# Patient Record
Sex: FEMALE | Race: WHITE | NOT HISPANIC OR LATINO | Employment: OTHER | URBAN - NONMETROPOLITAN AREA
[De-identification: names, ages, dates, MRNs, and addresses within clinical notes are randomized per-mention and may not be internally consistent; named-entity substitution may affect disease eponyms.]

---

## 2017-01-13 ENCOUNTER — TELEPHONE (OUTPATIENT)
Dept: GASTROENTEROLOGY | Facility: CLINIC | Age: 47
End: 2017-01-13

## 2017-01-13 RX ORDER — ONDANSETRON 4 MG/1
4 TABLET, FILM COATED ORAL EVERY 8 HOURS PRN
Qty: 60 TABLET | Refills: 1 | OUTPATIENT
Start: 2017-01-13 | End: 2017-02-12

## 2017-01-19 ENCOUNTER — APPOINTMENT (OUTPATIENT)
Dept: LAB | Facility: HOSPITAL | Age: 47
End: 2017-01-19
Attending: INTERNAL MEDICINE

## 2017-01-19 ENCOUNTER — TRANSCRIBE ORDERS (OUTPATIENT)
Dept: ADMINISTRATIVE | Facility: HOSPITAL | Age: 47
End: 2017-01-19

## 2017-01-19 DIAGNOSIS — N39.0 URINARY TRACT INFECTION, SITE NOT SPECIFIED: Primary | ICD-10-CM

## 2017-01-19 LAB
BACTERIA UR QL AUTO: ABNORMAL /HPF
BILIRUB UR QL STRIP: NEGATIVE
CLARITY UR: CLEAR
COLOR UR: YELLOW
GLUCOSE UR STRIP-MCNC: NEGATIVE MG/DL
HGB UR QL STRIP.AUTO: NEGATIVE
HYALINE CASTS UR QL AUTO: ABNORMAL /LPF
KETONES UR QL STRIP: NEGATIVE
LEUKOCYTE ESTERASE UR QL STRIP.AUTO: ABNORMAL
NITRITE UR QL STRIP: NEGATIVE
PH UR STRIP.AUTO: <=5 [PH] (ref 5–8)
PROT UR QL STRIP: NEGATIVE
RBC # UR: ABNORMAL /HPF
REF LAB TEST METHOD: ABNORMAL
SP GR UR STRIP: 1.01 (ref 1–1.03)
SQUAMOUS #/AREA URNS HPF: ABNORMAL /HPF
UROBILINOGEN UR QL STRIP: ABNORMAL
WBC UR QL AUTO: ABNORMAL /HPF

## 2017-01-19 PROCEDURE — 81001 URINALYSIS AUTO W/SCOPE: CPT | Performed by: INTERNAL MEDICINE

## 2017-01-19 PROCEDURE — 87086 URINE CULTURE/COLONY COUNT: CPT | Performed by: INTERNAL MEDICINE

## 2017-01-21 LAB — BACTERIA SPEC AEROBE CULT: NORMAL

## 2017-01-23 ENCOUNTER — TELEPHONE (OUTPATIENT)
Dept: PRIMARY CARE CLINIC | Age: 47
End: 2017-01-23

## 2017-01-23 ENCOUNTER — HOSPITAL ENCOUNTER (OUTPATIENT)
Dept: ULTRASOUND IMAGING | Age: 47
Discharge: HOME | End: 2017-01-23
Payer: MEDICARE

## 2017-01-23 ENCOUNTER — OFFICE VISIT (OUTPATIENT)
Dept: PRIMARY CARE CLINIC | Age: 47
End: 2017-01-23
Payer: MEDICARE

## 2017-01-23 ENCOUNTER — HOSPITAL ENCOUNTER (OUTPATIENT)
Dept: GENERAL RADIOLOGY | Age: 47
Discharge: HOME | End: 2017-01-23
Payer: MEDICARE

## 2017-01-23 VITALS
OXYGEN SATURATION: 98 % | DIASTOLIC BLOOD PRESSURE: 62 MMHG | SYSTOLIC BLOOD PRESSURE: 126 MMHG | WEIGHT: 116.8 LBS | HEIGHT: 67 IN | BODY MASS INDEX: 18.33 KG/M2 | HEART RATE: 113 BPM | RESPIRATION RATE: 18 BRPM

## 2017-01-23 DIAGNOSIS — Z76.89 ENCOUNTER TO ESTABLISH CARE WITH NEW DOCTOR: ICD-10-CM

## 2017-01-23 DIAGNOSIS — N13.30 HYDRONEPHROSIS, UNSPECIFIED HYDRONEPHROSIS TYPE: ICD-10-CM

## 2017-01-23 DIAGNOSIS — I67.5 MOYA MOYA DISEASE: ICD-10-CM

## 2017-01-23 DIAGNOSIS — I10 ESSENTIAL HYPERTENSION: ICD-10-CM

## 2017-01-23 DIAGNOSIS — M54.50 ACUTE BILATERAL LOW BACK PAIN WITHOUT SCIATICA: ICD-10-CM

## 2017-01-23 DIAGNOSIS — M54.50 ACUTE BILATERAL LOW BACK PAIN WITHOUT SCIATICA: Primary | ICD-10-CM

## 2017-01-23 PROCEDURE — 4004F PT TOBACCO SCREEN RCVD TLK: CPT | Performed by: INTERNAL MEDICINE

## 2017-01-23 PROCEDURE — 76770 US EXAM ABDO BACK WALL COMP: CPT

## 2017-01-23 PROCEDURE — G8419 CALC BMI OUT NRM PARAM NOF/U: HCPCS | Performed by: INTERNAL MEDICINE

## 2017-01-23 PROCEDURE — 99204 OFFICE O/P NEW MOD 45 MIN: CPT | Performed by: INTERNAL MEDICINE

## 2017-01-23 PROCEDURE — G8484 FLU IMMUNIZE NO ADMIN: HCPCS | Performed by: INTERNAL MEDICINE

## 2017-01-23 PROCEDURE — G8427 DOCREV CUR MEDS BY ELIG CLIN: HCPCS | Performed by: INTERNAL MEDICINE

## 2017-01-23 PROCEDURE — 72110 X-RAY EXAM L-2 SPINE 4/>VWS: CPT

## 2017-01-23 RX ORDER — METOPROLOL SUCCINATE 100 MG/1
100 TABLET, EXTENDED RELEASE ORAL NIGHTLY
Qty: 30 TABLET | Refills: 3 | Status: SHIPPED | OUTPATIENT
Start: 2017-01-23 | End: 2017-06-08 | Stop reason: SDUPTHER

## 2017-01-23 RX ORDER — FLUTICASONE PROPIONATE 50 MCG
1 SPRAY, SUSPENSION (ML) NASAL DAILY
Qty: 1 BOTTLE | Refills: 5 | Status: SHIPPED | OUTPATIENT
Start: 2017-01-23 | End: 2018-02-08 | Stop reason: SDUPTHER

## 2017-01-23 RX ORDER — MELOXICAM 7.5 MG/1
7.5 TABLET ORAL DAILY
Qty: 30 TABLET | Refills: 3 | Status: SHIPPED | OUTPATIENT
Start: 2017-01-23 | End: 2017-02-01 | Stop reason: ALTCHOICE

## 2017-01-23 RX ORDER — FLUTICASONE PROPIONATE 50 MCG
1 SPRAY, SUSPENSION (ML) NASAL DAILY
COMMUNITY
End: 2017-01-23 | Stop reason: SDUPTHER

## 2017-01-23 RX ORDER — ONDANSETRON 4 MG/1
4 TABLET, FILM COATED ORAL EVERY 8 HOURS PRN
Qty: 90 TABLET | Refills: 1 | Status: SHIPPED | OUTPATIENT
Start: 2017-01-23 | End: 2017-04-24 | Stop reason: SDUPTHER

## 2017-01-23 ASSESSMENT — ENCOUNTER SYMPTOMS
NAUSEA: 0
DIARRHEA: 0
VOMITING: 0
CONSTIPATION: 1
COUGH: 0
BACK PAIN: 0
SHORTNESS OF BREATH: 1

## 2017-01-24 ENCOUNTER — OFFICE VISIT (OUTPATIENT)
Dept: GASTROENTEROLOGY | Facility: CLINIC | Age: 47
End: 2017-01-24

## 2017-01-24 VITALS
HEIGHT: 67 IN | SYSTOLIC BLOOD PRESSURE: 102 MMHG | TEMPERATURE: 96.3 F | DIASTOLIC BLOOD PRESSURE: 78 MMHG | WEIGHT: 115 LBS | BODY MASS INDEX: 18.05 KG/M2 | HEART RATE: 76 BPM

## 2017-01-24 DIAGNOSIS — D50.8 OTHER IRON DEFICIENCY ANEMIA: Primary | ICD-10-CM

## 2017-01-24 PROCEDURE — 99213 OFFICE O/P EST LOW 20 MIN: CPT | Performed by: INTERNAL MEDICINE

## 2017-01-24 RX ORDER — CIPROFLOXACIN 500 MG/1
TABLET, FILM COATED ORAL
Refills: 0 | COMMUNITY
Start: 2016-12-21 | End: 2017-02-28 | Stop reason: HOSPADM

## 2017-01-24 RX ORDER — METRONIDAZOLE 500 MG/1
TABLET ORAL
COMMUNITY
Start: 2016-08-12 | End: 2017-01-24 | Stop reason: ALTCHOICE

## 2017-01-24 RX ORDER — FLUTICASONE PROPIONATE 50 MCG
2 SPRAY, SUSPENSION (ML) NASAL DAILY
COMMUNITY
Start: 2017-01-23 | End: 2019-06-13 | Stop reason: SDUPTHER

## 2017-01-24 RX ORDER — AMITRIPTYLINE HYDROCHLORIDE 100 MG/1
100 TABLET, FILM COATED ORAL NIGHTLY
Refills: 2 | COMMUNITY
Start: 2017-01-13 | End: 2019-06-18 | Stop reason: SDUPTHER

## 2017-01-24 RX ORDER — MELOXICAM 7.5 MG/1
TABLET ORAL
COMMUNITY
Start: 2017-01-23 | End: 2017-01-24 | Stop reason: ALTCHOICE

## 2017-01-24 RX ORDER — ONDANSETRON 4 MG/1
TABLET, FILM COATED ORAL EVERY 8 HOURS
COMMUNITY
Start: 2017-01-23 | End: 2017-02-28 | Stop reason: HOSPADM

## 2017-01-24 RX ORDER — ONDANSETRON 4 MG/1
TABLET, FILM COATED ORAL EVERY 6 HOURS PRN
COMMUNITY
Start: 2017-01-23 | End: 2018-01-19 | Stop reason: ALTCHOICE

## 2017-01-24 RX ORDER — METOPROLOL SUCCINATE 100 MG/1
100 TABLET, EXTENDED RELEASE ORAL NIGHTLY
COMMUNITY
Start: 2017-01-23 | End: 2019-03-06 | Stop reason: SDUPTHER

## 2017-01-24 RX ORDER — FLUTICASONE PROPIONATE 50 MCG
SPRAY, SUSPENSION (ML) NASAL
COMMUNITY
Start: 2017-01-23 | End: 2017-02-28 | Stop reason: HOSPADM

## 2017-01-24 NOTE — LETTER
January 24, 2017     Tong Toussaint MD  [address] [facility] [address]  [facility] [address]  [facility] [address]    Patient: Kamryn Oliva   YOB: 1970   Date of Visit: 1/24/2017       Dear Dr. Breana MD:    Thank you for referring Kamryn Oliva to me for evaluation. Below is a copy of my consult note.    If you have questions, please do not hesitate to call me. I look forward to following Kamryn along with you.         Sincerely,        Camilo Hanson MD        CC: No Recipients    [facility]    Chief Complaint   Patient presents with   • Follow-up     hosp stay       Subjective     HPI    Kamryn Oliva is a 47 y.o. female who presents with a chief complaint of  anemia.  She was in the hospital recently with apparently urosepsis.  Creatinine was above 3 and she had nausea and vomiting.  She brought in a picture of her emesis and appears to be bile-colored fluid in the commode with possible slight red discoloration.  He states she had been vomiting several times and then had some blood in her emesis.  While in the hospital she is found to be anemic.  She was admitted with a hemoglobin approximately 10 and decreased a low 7.5 with vigorous hydration.  She was transfused 2 units and at time of discharge her hemoglobin was 10.  Reviewing her labs over the last 1 year her hemoglobin has run below normal.  Dr. Rashida Nice did see her in the hospital for elevated platelets in the 800s as well as the anemia.  He felt she had iron deficiency anemia from blood loss.  The patient did have a negative endoscopy in October negative colonoscopy in November 2016.    Currently she feels better.  She admits that she is under stress and does have much of an appetite which she attributes to the stress.  She did follow up with nephrology yesterday who checked labs at [facility] and an ultrasound of her kidneys of which results we do not have.  Her last creatinine I saw her was back down to 0.6.  She still has  intermittent abdominal discomfort with her IBS.         Past Medical History   Diagnosis Date   • Constipation    • Depression    • H/O gastric ulcer    • Headache    • Hypertension    • IBS (irritable bowel syndrome)    • Insomnia    • Maravilla maravilla disease    • Rapid weight loss    • SBO (small bowel obstruction)    • Volvulosis          Current Outpatient Prescriptions:   •  amitriptyline (ELAVIL) 50 MG tablet, , Disp: , Rfl: 2  •  amLODIPine (NORVASC) 10 MG tablet, Take 10 mg by mouth daily., Disp: , Rfl:   •  aspirin 81 MG EC tablet, Take 81 mg by mouth daily., Disp: , Rfl:   •  aspirin-acetaminophen-caffeine (EXCEDRIN MIGRAINE) 250-250-65 MG per tablet, Take 1 tablet by mouth every 6 (six) hours as needed for headaches., Disp: , Rfl:   •  dicyclomine (BENTYL) 10 MG capsule, Take 1 capsule by mouth 4 (Four) Times a Day As Needed (prn abdominal cramps)., Disp: 120 capsule, Rfl: 11  •  fluticasone (FLONASE) 50 MCG/ACT nasal spray, , Disp: , Rfl:   •  metoprolol succinate XL (TOPROL-XL) 100 MG 24 hr tablet, , Disp: , Rfl:   •  ondansetron (ZOFRAN) 4 MG tablet, , Disp: , Rfl:   •  pantoprazole (PROTONIX) 40 MG EC tablet, Take 1 tablet by mouth 2 (Two) Times a Day., Disp: 60 tablet, Rfl: 1  •  zonisamide (ZONEGRAN) 100 MG capsule, Take 200 mg by mouth Daily., Disp: , Rfl:   •  amitriptyline (ELAVIL) 10 MG tablet, Take 50 mg by mouth Every Night., Disp: , Rfl:   •  ciprofloxacin (CIPRO) 500 MG tablet, , Disp: , Rfl: 0  •  fluticasone (FLONASE) 50 MCG/ACT nasal spray, 1 spray by Nasal route daily, Disp: , Rfl:   •  fluticasone (FLOVENT DISKUS) 50 MCG/BLIST diskus inhaler, Inhale 1 puff 2 (two) times a day., Disp: , Rfl:   •  lisinopril (PRINIVIL,ZESTRIL) 40 MG tablet, Take 40 mg by mouth daily., Disp: , Rfl:   •  ondansetron (ZOFRAN) 4 MG tablet, Every 8 (Eight) Hours., Disp: , Rfl:   •  ondansetron ODT (ZOFRAN-ODT) 4 MG disintegrating tablet, Take 4 mg by mouth every 8 (eight) hours as needed for nausea or vomiting.,  Disp: , Rfl:     Allergies   Allergen Reactions   • Anectine [Succinylcholine Chloride] Other (See Comments)     Hard to wake up   • Stadol [Butorphanol] Hives       Social History     Social History   • Marital status:      Spouse name: N/A   • Number of children: N/A   • Years of education: N/A     Occupational History   • Not on file.     Social History Main Topics   • Smoking status: Former Smoker     Packs/day: 1.00     Types: Cigarettes     Quit date: 3/10/2016   • Smokeless tobacco: Never Used   • Alcohol use No   • Drug use: No   • Sexual activity: Not on file     Other Topics Concern   • Not on file     Social History Narrative       Family History   Problem Relation Age of Onset   • Cancer Maternal Grandfather    • Colon cancer Neg Hx    • Colon polyps Neg Hx        Review of Systems   Constitutional: Negative for chills and fever.   Cardiovascular: Negative for chest pain and palpitations.   Gastrointestinal: Negative for abdominal distention, abdominal pain, anal bleeding, blood in stool, constipation, diarrhea, nausea, rectal pain and vomiting.         Objective     Vitals:    01/24/17 1354   BP: 102/78   Pulse: 76   Temp: 96.3 °F (35.7 °C)       Physical Exam   Constitutional: She appears well-developed and well-nourished.   Eyes: Conjunctivae and EOM are normal.   Cardiovascular: Normal rate, regular rhythm and normal heart sounds.    Pulmonary/Chest: Effort normal and breath sounds normal.   Abdominal: Soft. Bowel sounds are normal. She exhibits no distension and no mass. There is no tenderness. There is no rebound and no guarding.   Musculoskeletal: She exhibits no edema.   Skin: Skin is warm.         Assessment/Plan      Kamryn was seen today for follow-up.    Diagnoses and all orders for this visit:    Other iron deficiency anemia  -     Case request      She has no current signs of active bleeding.  She has seen bright red blood per rectum but she has known internal hemorrhoids.  It sounds  like the emesis she had and of which she showed me the picture of May of been secondary to Linh-Louis tear associated with intractable nausea and vomiting.  I do think it is reasonable to repeat an upper endoscopy examination to reassess and she is in agreement to do this.    We did talk about pursuing M2A capsule endoscopy.  She has a history of small bowel obstruction requiring surgical intervention within a year and rehospitalization for small bowel obstruction this year.  This would be a contraindication to capsule endoscopy just we will not pursue this.  She does not want to take any chances with swallowing the camera due to the difficulty she is having with past bowel obstruction and I agree.  Risks are greater than benefit.  Continue ongoing management by primary care provider and other specialists.     EMR Dragon/transcription disclaimer:  Much of this encounter note is electronic transcription/translation of spoken language to printed text.  The electronic translation of spoken language may be erroneous, or at times, nonsensical words or phrases may be inadvertently transcribed.  Although I have reviewed the note for such errors, some may still exist.    Camilo Hanson MD  4:59 PM  01/24/17

## 2017-01-24 NOTE — PROGRESS NOTES
[facility]    Chief Complaint   Patient presents with   • Follow-up     hosp stay       Subjective     HPI    Kamryn Oliva is a 47 y.o. female who presents with a chief complaint of  anemia.  She was in the hospital recently with apparently urosepsis.  Creatinine was above 3 and she had nausea and vomiting.  She brought in a picture of her emesis and appears to be bile-colored fluid in the commode with possible slight red discoloration.  He states she had been vomiting several times and then had some blood in her emesis.  While in the hospital she is found to be anemic.  She was admitted with a hemoglobin approximately 10 and decreased a low 7.5 with vigorous hydration.  She was transfused 2 units and at time of discharge her hemoglobin was 10.  Reviewing her labs over the last 1 year her hemoglobin has run below normal.  Dr. Rashida Nice did see her in the hospital for elevated platelets in the 800s as well as the anemia.  He felt she had iron deficiency anemia from blood loss.  The patient did have a negative endoscopy in October negative colonoscopy in November 2016.    Currently she feels better.  She admits that she is under stress and does have much of an appetite which she attributes to the stress.  She did follow up with nephrology yesterday who checked labs at [facility] and an ultrasound of her kidneys of which results we do not have.  Her last creatinine I saw her was back down to 0.6.  She still has intermittent abdominal discomfort with her IBS.         Past Medical History   Diagnosis Date   • Constipation    • Depression    • H/O gastric ulcer    • Headache    • Hypertension    • IBS (irritable bowel syndrome)    • Insomnia    • Maravilla maravilla disease    • Rapid weight loss    • SBO (small bowel obstruction)    • Volvulosis          Current Outpatient Prescriptions:   •  amitriptyline (ELAVIL) 50 MG tablet, , Disp: , Rfl: 2  •  amLODIPine (NORVASC) 10 MG tablet, Take 10 mg by mouth  daily., Disp: , Rfl:   •  aspirin 81 MG EC tablet, Take 81 mg by mouth daily., Disp: , Rfl:   •  aspirin-acetaminophen-caffeine (EXCEDRIN MIGRAINE) 250-250-65 MG per tablet, Take 1 tablet by mouth every 6 (six) hours as needed for headaches., Disp: , Rfl:   •  dicyclomine (BENTYL) 10 MG capsule, Take 1 capsule by mouth 4 (Four) Times a Day As Needed (prn abdominal cramps)., Disp: 120 capsule, Rfl: 11  •  fluticasone (FLONASE) 50 MCG/ACT nasal spray, , Disp: , Rfl:   •  metoprolol succinate XL (TOPROL-XL) 100 MG 24 hr tablet, , Disp: , Rfl:   •  ondansetron (ZOFRAN) 4 MG tablet, , Disp: , Rfl:   •  pantoprazole (PROTONIX) 40 MG EC tablet, Take 1 tablet by mouth 2 (Two) Times a Day., Disp: 60 tablet, Rfl: 1  •  zonisamide (ZONEGRAN) 100 MG capsule, Take 200 mg by mouth Daily., Disp: , Rfl:   •  amitriptyline (ELAVIL) 10 MG tablet, Take 50 mg by mouth Every Night., Disp: , Rfl:   •  ciprofloxacin (CIPRO) 500 MG tablet, , Disp: , Rfl: 0  •  fluticasone (FLONASE) 50 MCG/ACT nasal spray, 1 spray by Nasal route daily, Disp: , Rfl:   •  fluticasone (FLOVENT DISKUS) 50 MCG/BLIST diskus inhaler, Inhale 1 puff 2 (two) times a day., Disp: , Rfl:   •  lisinopril (PRINIVIL,ZESTRIL) 40 MG tablet, Take 40 mg by mouth daily., Disp: , Rfl:   •  ondansetron (ZOFRAN) 4 MG tablet, Every 8 (Eight) Hours., Disp: , Rfl:   •  ondansetron ODT (ZOFRAN-ODT) 4 MG disintegrating tablet, Take 4 mg by mouth every 8 (eight) hours as needed for nausea or vomiting., Disp: , Rfl:     Allergies   Allergen Reactions   • Anectine [Succinylcholine Chloride] Other (See Comments)     Hard to wake up   • Stadol [Butorphanol] Hives       Social History     Social History   • Marital status:      Spouse name: N/A   • Number of children: N/A   • Years of education: N/A     Occupational History   • Not on file.     Social History Main Topics   • Smoking status: Former Smoker     Packs/day: 1.00     Types: Cigarettes     Quit date: 3/10/2016   • Smokeless  tobacco: Never Used   • Alcohol use No   • Drug use: No   • Sexual activity: Not on file     Other Topics Concern   • Not on file     Social History Narrative       Family History   Problem Relation Age of Onset   • Cancer Maternal Grandfather    • Colon cancer Neg Hx    • Colon polyps Neg Hx        Review of Systems   Constitutional: Negative for chills and fever.   Cardiovascular: Negative for chest pain and palpitations.   Gastrointestinal: Negative for abdominal distention, abdominal pain, anal bleeding, blood in stool, constipation, diarrhea, nausea, rectal pain and vomiting.         Objective     Vitals:    01/24/17 1354   BP: 102/78   Pulse: 76   Temp: 96.3 °F (35.7 °C)       Physical Exam   Constitutional: She appears well-developed and well-nourished.   Eyes: Conjunctivae and EOM are normal.   Cardiovascular: Normal rate, regular rhythm and normal heart sounds.    Pulmonary/Chest: Effort normal and breath sounds normal.   Abdominal: Soft. Bowel sounds are normal. She exhibits no distension and no mass. There is no tenderness. There is no rebound and no guarding.   Musculoskeletal: She exhibits no edema.   Skin: Skin is warm.         Assessment/Plan      Kamryn was seen today for follow-up.    Diagnoses and all orders for this visit:    Other iron deficiency anemia  -     Case request      She has no current signs of active bleeding.  She has seen bright red blood per rectum but she has known internal hemorrhoids.  It sounds like the emesis she had and of which she showed me the picture of May of been secondary to Linh-Louis tear associated with intractable nausea and vomiting.  I do think it is reasonable to repeat an upper endoscopy examination to reassess and she is in agreement to do this.    We did talk about pursuing M2A capsule endoscopy.  She has a history of small bowel obstruction requiring surgical intervention within a year and rehospitalization for small bowel obstruction this year.  This would  be a contraindication to capsule endoscopy just we will not pursue this.  She does not want to take any chances with swallowing the camera due to the difficulty she is having with past bowel obstruction and I agree.  Risks are greater than benefit.  Continue ongoing management by primary care provider and other specialists.     EMR Dragon/transcription disclaimer:  Much of this encounter note is electronic transcription/translation of spoken language to printed text.  The electronic translation of spoken language may be erroneous, or at times, nonsensical words or phrases may be inadvertently transcribed.  Although I have reviewed the note for such errors, some may still exist.    Camilo Hanson MD  4:59 PM  01/24/17

## 2017-01-24 NOTE — MR AVS SNAPSHOT
Kamryn RODRIGUEZ Hazel   1/24/2017 1:45 PM   Office Visit    Dept Phone:  [phone]   Encounter #:  [id number]    Provider:  Camilo Hanson MD   Department:  [facility]                Your Full Care Plan              Today's Medication Changes          These changes are accurate as of: 1/24/17  2:33 PM.  If you have any questions, ask your nurse or doctor.               Stop taking medication(s)listed here:     meloxicam 7.5 MG tablet   Commonly known as:  MOBIC   Stopped by:  Camilo Hanson MD           metroNIDAZOLE 500 MG tablet   Commonly known as:  FLAGYL   Stopped by:  Camilo Hanson MD                      Your Updated Medication List          This list is accurate as of: 1/24/17  2:33 PM.  Always use your most recent med list.                * amitriptyline 50 MG tablet   Commonly known as:  ELAVIL       * amitriptyline 10 MG tablet   Commonly known as:  ELAVIL       amLODIPine 10 MG tablet   Commonly known as:  NORVASC       aspirin 81 MG EC tablet       aspirin-acetaminophen-caffeine 250-250-65 MG per tablet   Commonly known as:  EXCEDRIN MIGRAINE       ciprofloxacin 500 MG tablet   Commonly known as:  CIPRO       dicyclomine 10 MG capsule   Commonly known as:  BENTYL   Take 1 capsule by mouth 4 (Four) Times a Day As Needed (prn abdominal cramps).       * fluticasone 50 MCG/ACT nasal spray   Commonly known as:  FLONASE       * fluticasone 50 MCG/ACT nasal spray   Commonly known as:  FLONASE       fluticasone 50 MCG/BLIST diskus inhaler   Commonly known as:  FLOVENT DISKUS       lisinopril 40 MG tablet   Commonly known as:  PRINIVIL,ZESTRIL       metoprolol succinate  MG 24 hr tablet   Commonly known as:  TOPROL-XL       * ondansetron 4 MG tablet   Commonly known as:  ZOFRAN       * ondansetron 4 MG tablet   Commonly known as:  ZOFRAN       ondansetron ODT 4 MG disintegrating tablet   Commonly known as:  ZOFRAN-ODT       pantoprazole 40  "MG EC tablet   Commonly known as:  PROTONIX   Take 1 tablet by mouth 2 (Two) Times a Day.       zonisamide 100 MG capsule   Commonly known as:  ZONEGRAN       * Notice:  This list has 6 medication(s) that are the same as other medications prescribed for you. Read the directions carefully, and ask your doctor or other care provider to review them with you.            We Performed the Following     Case request       You Were Diagnosed With        Codes Comments    Other iron deficiency anemia    -  Primary ICD-10-CM: D50.8       Instructions     None    Patient Instructions History      Upcoming Appointments     Visit Type Date Time Department    OFFICE VISIT 1/24/2017  1:45 PM [facility]      MyChart Signup     Our records indicate that you have declined [employer] signup. If you would like to sign up for [employer], please email [email or web address] or call [phone] to obtain an activation code.             Other Info from Your Visit           Allergies     Anectine [Succinylcholine Chloride] Allergy Other (See Comments)    Hard to wake up    Stadol [Butorphanol] Allergy Hives      Reason for Visit     Follow-up hosp stay      Vital Signs     Blood Pressure Pulse Temperature Height Weight Body Mass Index    102/78 (BP Location: Left arm, Patient Position: Sitting, Cuff Size: Adult) 76 96.3 °F (35.7 °C) 67\" (170.2 cm) 115 lb (52.2 kg) 18.01 kg/m2    Smoking Status                   Former Smoker           Problems and Diagnoses Noted     Iron deficiency anemia    -  Primary        "

## 2017-01-24 NOTE — LETTER
January 24, 2017     Lambert Faust DO  [address] [place] [address] [address]  [place] [address]    Patient: Kamryn Oliva   YOB: 1970   Date of Visit: 1/24/2017       Dear Dr. Govind DO:    Thank you for referring Kamryn Oliva to me for evaluation. Below are the relevant portions of my assessment and plan of care.         Kamryn was seen today for follow-up.    Diagnoses and all orders for this visit:    Other iron deficiency anemia  -     Case request      She has no current signs of active bleeding.  She has seen bright red blood per rectum but she has known internal hemorrhoids.  It sounds like the emesis she had and of which she showed me the picture of May of been secondary to Linh-Louis tear associated with intractable nausea and vomiting.  I do think it is reasonable to repeat an upper endoscopy examination to reassess and she is in agreement to do this.    We did talk about pursuing M2A capsule endoscopy.  She has a history of small bowel obstruction requiring surgical intervention within a year and rehospitalization for small bowel obstruction this year.  This would be a contraindication to capsule endoscopy just we will not pursue this.  She does not want to take any chances with swallowing the camera due to the difficulty she is having with past bowel obstruction and I agree.  Risks are greater than benefit.  Continue ongoing management by primary care provider and other specialists.     EMR Dragon/transcription disclaimer:  Much of this encounter note is electronic transcription/translation of spoken language to printed text.  The electronic translation of spoken language may be erroneous, or at times, nonsensical words or phrases may be inadvertently transcribed.  Although I have reviewed the note for such errors, some may still exist.    Camilo Hanson MD  4:59 PM  01/24/17                If you have questions, please do not hesitate to call me. I look forward to following Kamryn  along with you.         Sincerely,        Camilo Hanson MD        CC: No Recipients

## 2017-01-25 ENCOUNTER — ANESTHESIA EVENT (OUTPATIENT)
Dept: GASTROENTEROLOGY | Facility: HOSPITAL | Age: 47
End: 2017-01-25

## 2017-01-25 ENCOUNTER — HOSPITAL ENCOUNTER (OUTPATIENT)
Facility: HOSPITAL | Age: 47
Setting detail: HOSPITAL OUTPATIENT SURGERY
Discharge: HOME | End: 2017-01-25
Attending: INTERNAL MEDICINE | Admitting: INTERNAL MEDICINE

## 2017-01-25 ENCOUNTER — ANESTHESIA (OUTPATIENT)
Dept: GASTROENTEROLOGY | Facility: HOSPITAL | Age: 47
End: 2017-01-25

## 2017-01-25 VITALS
SYSTOLIC BLOOD PRESSURE: 96 MMHG | BODY MASS INDEX: 18.05 KG/M2 | WEIGHT: 115 LBS | RESPIRATION RATE: 14 BRPM | HEIGHT: 67 IN | OXYGEN SATURATION: 100 % | DIASTOLIC BLOOD PRESSURE: 58 MMHG | HEART RATE: 58 BPM | TEMPERATURE: 97.3 F

## 2017-01-25 DIAGNOSIS — D50.8 OTHER IRON DEFICIENCY ANEMIA: ICD-10-CM

## 2017-01-25 PROCEDURE — 88305 TISSUE EXAM BY PATHOLOGIST: CPT | Performed by: INTERNAL MEDICINE

## 2017-01-25 PROCEDURE — 25010000002 PROPOFOL 10 MG/ML EMULSION: Performed by: NURSE ANESTHETIST, CERTIFIED REGISTERED

## 2017-01-25 RX ORDER — PROPOFOL 10 MG/ML
VIAL (ML) INTRAVENOUS AS NEEDED
Status: DISCONTINUED | OUTPATIENT
Start: 2017-01-25 | End: 2017-01-25 | Stop reason: SURG

## 2017-01-25 RX ORDER — MIDAZOLAM HYDROCHLORIDE 1 MG/ML
2 INJECTION INTRAMUSCULAR; INTRAVENOUS
Status: DISCONTINUED | OUTPATIENT
Start: 2017-01-25 | End: 2017-01-25 | Stop reason: HOSPADM

## 2017-01-25 RX ORDER — SODIUM CHLORIDE 0.9 % (FLUSH) 0.9 %
1-10 SYRINGE (ML) INJECTION AS NEEDED
Status: DISCONTINUED | OUTPATIENT
Start: 2017-01-25 | End: 2017-01-25 | Stop reason: HOSPADM

## 2017-01-25 RX ORDER — MIDAZOLAM HYDROCHLORIDE 1 MG/ML
1 INJECTION INTRAMUSCULAR; INTRAVENOUS
Status: DISCONTINUED | OUTPATIENT
Start: 2017-01-25 | End: 2017-01-25 | Stop reason: HOSPADM

## 2017-01-25 RX ORDER — SODIUM CHLORIDE, SODIUM LACTATE, POTASSIUM CHLORIDE, CALCIUM CHLORIDE 600; 310; 30; 20 MG/100ML; MG/100ML; MG/100ML; MG/100ML
100 INJECTION, SOLUTION INTRAVENOUS CONTINUOUS
Status: DISCONTINUED | OUTPATIENT
Start: 2017-01-25 | End: 2017-01-25 | Stop reason: HOSPADM

## 2017-01-25 RX ORDER — SODIUM CHLORIDE 9 MG/ML
100 INJECTION, SOLUTION INTRAVENOUS CONTINUOUS
Status: DISCONTINUED | OUTPATIENT
Start: 2017-01-25 | End: 2017-01-25 | Stop reason: HOSPADM

## 2017-01-25 RX ORDER — ONDANSETRON 2 MG/ML
4 INJECTION INTRAMUSCULAR; INTRAVENOUS ONCE AS NEEDED
Status: DISCONTINUED | OUTPATIENT
Start: 2017-01-25 | End: 2017-01-25 | Stop reason: HOSPADM

## 2017-01-25 RX ORDER — LIDOCAINE HYDROCHLORIDE 20 MG/ML
INJECTION, SOLUTION INFILTRATION; PERINEURAL AS NEEDED
Status: DISCONTINUED | OUTPATIENT
Start: 2017-01-25 | End: 2017-01-25 | Stop reason: SURG

## 2017-01-25 RX ADMIN — PROPOFOL 100 MG: 10 INJECTION, EMULSION INTRAVENOUS at 09:42

## 2017-01-25 RX ADMIN — SODIUM CHLORIDE 100 ML/HR: 9 INJECTION, SOLUTION INTRAVENOUS at 09:09

## 2017-01-25 RX ADMIN — LIDOCAINE HYDROCHLORIDE 60 MG: 20 INJECTION, SOLUTION INFILTRATION; PERINEURAL at 09:42

## 2017-01-25 NOTE — PLAN OF CARE
Problem: GI Endoscopy (Adult)  Goal: Signs and Symptoms of Listed Potential Problems Will be Absent or Manageable (GI Endoscopy)  Outcome: Outcome(s) achieved Date Met:  01/25/17 01/25/17 0958   GI Endoscopy   Problems Assessed (GI Endoscopy) all   Problems Present (GI Endoscopy) none

## 2017-01-25 NOTE — PLAN OF CARE
Problem: GI Endoscopy (Adult)  Goal: Signs and Symptoms of Listed Potential Problems Will be Absent or Manageable (GI Endoscopy)  Outcome: Ongoing (interventions implemented as appropriate)    01/25/17 0940   GI Endoscopy   Problems Assessed (GI Endoscopy) all   Problems Present (GI Endoscopy) none

## 2017-01-25 NOTE — IP AVS SNAPSHOT
AFTER VISIT SUMMARY             Kamryn Oliva           About your hospitalization     You were admitted on:  January 25, 2017 You last received care in the:  [facility] ENDOSCOPY       Procedures & Surgeries      Procedure(s) (LRB):  ESOPHAGOGASTRODUODENOSCOPY (N/A)     1/25/2017     Surgeon(s):  Camilo Hanson MD  -------------------      Medications    If you or your caregiver advised us that you are currently taking a medication and that medication is marked below as “Resume”, this simply indicates that we have reviewed those medications to make sure our new therapy recommendations do not interfere.  If you have concerns about medications other than those new ones which we are prescribing today, please consult the physician who prescribed them (or your primary physician).  Our review of your home medications is not meant to indicate that we are directing their use.             Your Medications      CONTINUE taking these medications     amitriptyline 50 MG tablet   Commonly known as:  ELAVIL           amLODIPine 10 MG tablet   Take 10 mg by mouth daily.   Commonly known as:  NORVASC           aspirin 81 MG EC tablet   Take 81 mg by mouth daily.           aspirin-acetaminophen-caffeine 250-250-65 MG per tablet   Take 1 tablet by mouth every 6 (six) hours as needed for headaches.   Commonly known as:  EXCEDRIN MIGRAINE           ciprofloxacin 500 MG tablet   Commonly known as:  CIPRO           dicyclomine 10 MG capsule   Take 1 capsule by mouth 4 (Four) Times a Day As Needed (prn abdominal cramps).   Commonly known as:  BENTYL           fluticasone 50 MCG/ACT nasal spray   Commonly known as:  FLONASE           fluticasone 50 MCG/ACT nasal spray   1 spray by Nasal route daily   Commonly known as:  FLONASE           fluticasone 50 MCG/BLIST diskus inhaler   Inhale 1 puff 2 (two) times a day.   Commonly known as:  FLOVENT DISKUS           lisinopril 40 MG tablet   Take 40 mg by mouth daily.      Commonly known as:  PRINIVIL,ZESTRIL           metoprolol succinate  MG 24 hr tablet   Commonly known as:  TOPROL-XL           ondansetron 4 MG tablet   Commonly known as:  ZOFRAN           ondansetron 4 MG tablet   Every 8 (Eight) Hours.   Commonly known as:  ZOFRAN           ondansetron ODT 4 MG disintegrating tablet   Take 4 mg by mouth every 8 (eight) hours as needed for nausea or vomiting.   Commonly known as:  ZOFRAN-ODT           pantoprazole 40 MG EC tablet   Take 1 tablet by mouth 2 (Two) Times a Day.   Commonly known as:  PROTONIX           zonisamide 100 MG capsule   Take 200 mg by mouth Daily.   Commonly known as:  ZONEGRAN                      Your Medications      Your Medication List           Morning Noon Evening Bedtime As Needed    amitriptyline 50 MG tablet   Commonly known as:  ELAVIL                                amLODIPine 10 MG tablet   Take 10 mg by mouth daily.   Commonly known as:  NORVASC                                aspirin 81 MG EC tablet   Take 81 mg by mouth daily.                                aspirin-acetaminophen-caffeine 250-250-65 MG per tablet   Take 1 tablet by mouth every 6 (six) hours as needed for headaches.   Commonly known as:  EXCEDRIN MIGRAINE                                ciprofloxacin 500 MG tablet   Commonly known as:  CIPRO                                dicyclomine 10 MG capsule   Take 1 capsule by mouth 4 (Four) Times a Day As Needed (prn abdominal cramps).   Commonly known as:  BENTYL                                * fluticasone 50 MCG/ACT nasal spray   Commonly known as:  FLONASE                                * fluticasone 50 MCG/ACT nasal spray   1 spray by Nasal route daily   Commonly known as:  FLONASE                                fluticasone 50 MCG/BLIST diskus inhaler   Inhale 1 puff 2 (two) times a day.   Commonly known as:  FLOVENT DISKUS                                lisinopril 40 MG tablet   Take 40 mg by mouth daily.   Commonly known as:   PRINIVIL,ZESTRIL                                metoprolol succinate  MG 24 hr tablet   Commonly known as:  TOPROL-XL                                * ondansetron 4 MG tablet   Commonly known as:  ZOFRAN                                * ondansetron 4 MG tablet   Every 8 (Eight) Hours.   Commonly known as:  ZOFRAN                                ondansetron ODT 4 MG disintegrating tablet   Take 4 mg by mouth every 8 (eight) hours as needed for nausea or vomiting.   Commonly known as:  ZOFRAN-ODT                                pantoprazole 40 MG EC tablet   Take 1 tablet by mouth 2 (Two) Times a Day.   Commonly known as:  PROTONIX                                zonisamide 100 MG capsule   Take 200 mg by mouth Daily.   Commonly known as:  ZONEGRAN                                * Notice:  This list has 4 medication(s) that are the same as other medications prescribed for you. Read the directions carefully, and ask your doctor or other care provider to review them with you.             Instructions for After Discharge        Diet Instructions     Diet:       Diet Texture / Consistency:  Regular             Discharge References/Attachments     CONSCIOUS SEDATION, ADULT, CARE AFTER (ENGLISH)    ESOPHAGOGASTRODUODENOSCOPY, CARE AFTER (ENGLISH)       Follow-ups for After Discharge        Follow-up Information     Follow up with Camilo Hanson MD Follow up today.    Specialty:  Gastroenterology    Why:  RETURN TO GI OFFICE AS NEEDED    Contact information:    [facility] [id number] [phone]        Referrals and Follow-ups to Schedule     Notify Physician or Go To The ED For the Following Conditions    As directed    Abdominal pain, chest pain, severe back pain, vomiting, fever, rectal bleeding or black bowel movements.              MyChart Signup     Our records indicate that you have declined [facility] MyChart signup. If you would like to sign up for "[employer]hart, please email  [email or web address] or call [phone] to obtain an activation code.         Summary of Your Hospitalization        Reason for Hospitalization     Your primary diagnosis was:  Not on File      Care Providers     Provider Service Role Specialty    Camilo Hanson MD -- Attending Provider Gastroenterology    Camilo Hanson MD -- Surgeon  Gastroenterology      Your Allergies  Date Reviewed: 1/25/2017    Allergen Reactions    Anectine (Succinylcholine Chloride) Other (See Comments)    Hard to wake up         Stadol (Butorphanol) Hives      Pending Labs     Order Current Status    Tissue Exam Collected (01/25/17 0946)      Patient Belongings Returned     Document Return of Belongings Flowsheet     Were the patient bedside belongings sent home?   Yes   Belongings Retrieved from Security & Sent Home   Yes    Belongings Sent to Safe   --   Medications Retrieved from Pharmacy & Sent Home   N/A              More Information      Moderate Conscious Sedation, Adult, Care After  Refer to this sheet in the next few weeks. These instructions provide you with information on caring for yourself after your procedure. Your health care provider may also give you more specific instructions. Your treatment has been planned according to current medical practices, but problems sometimes occur. Call your health care provider if you have any problems or questions after your procedure.  WHAT TO EXPECT AFTER THE PROCEDURE   After your procedure:  · You may feel sleepy, clumsy, and have poor balance for several hours.  · Vomiting may occur if you eat too soon after the procedure.  HOME CARE INSTRUCTIONS  · Do not participate in any activities where you could become injured for at least 24 hours. Do not:    Drive.    Swim.    Ride a bicycle.    Operate heavy machinery.    Cook.    Use power tools.    Climb ladders.    Work from a high place.  · Do not make important decisions or sign legal documents until you are improved.  · If  you vomit, drink water, juice, or soup when you can drink without vomiting. Make sure you have little or no nausea before eating solid foods.  · Only take over-the-counter or prescription medicines for pain, discomfort, or fever as directed by your health care provider.  · Make sure you and your family fully understand everything about the medicines given to you, including what side effects may occur.  · You should not drink alcohol, take sleeping pills, or take medicines that cause drowsiness for at least 24 hours.  · If you smoke, do not smoke without supervision.  · If you are feeling better, you may resume normal activities 24 hours after you were sedated.  · Keep all appointments with your health care provider.  SEEK MEDICAL CARE IF:  · Your skin is pale or bluish in color.  · You continue to feel nauseous or vomit.  · Your pain is getting worse and is not helped by medicine.  · You have bleeding or swelling.  · You are still sleepy or feeling clumsy after 24 hours.  SEEK IMMEDIATE MEDICAL CARE IF:  · You develop a rash.  · You have difficulty breathing.  · You develop any type of allergic problem.  · You have a fever.  MAKE SURE YOU:  · Understand these instructions.  · Will watch your condition.  · Will get help right away if you are not doing well or get worse.     This information is not intended to replace advice given to you by your health care provider. Make sure you discuss any questions you have with your health care provider.     Document Released: 10/08/2014 Document Revised: 01/08/2016 Document Reviewed: 10/08/2014  [employer] Interactive Patient Education ©2016 [employer]          Esophagogastroduodenoscopy, Care After  Refer to this sheet in the next few weeks. These instructions provide you with information about caring for yourself after your procedure. Your health care provider may also give you more specific instructions. Your treatment has been planned according to current medical practices,  but problems sometimes occur. Call your health care provider if you have any problems or questions after your procedure.  WHAT TO EXPECT AFTER THE PROCEDURE  After your procedure, it is typical to feel:  · Soreness in your throat.  · Pain with swallowing.  · Sick to your stomach (nauseous).  · Bloated.  · Dizzy.  · Fatigued.  HOME CARE INSTRUCTIONS  · Do not eat or drink anything until the numbing medicine (local anesthetic) has worn off and your gag reflex has returned. You will know that the local anesthetic has worn off when you can swallow comfortably.  · Do not drive or operate machinery until directed by your health care provider.  · Take medicines only as directed by your health care provider.  SEEK MEDICAL CARE IF:   · You cannot stop coughing.  · You are not urinating at all or less than usual.  SEEK IMMEDIATE MEDICAL CARE IF:  · You have difficulty swallowing.  · You cannot eat or drink.  · You have worsening throat or chest pain.  · You have dizziness or lightheadedness or you faint.  · You have nausea or vomiting.  · You have chills.  · You have a fever.  · You have severe abdominal pain.  · You have black, tarry, or bloody stools.     This information is not intended to replace advice given to you by your health care provider. Make sure you discuss any questions you have with your health care provider.     Document Released: 12/04/2013 Document Revised: 01/08/2016 Document Reviewed: 12/04/2013  [employer] Interactive Patient Education ©2016 [employer]            SYMPTOMS OF A STROKE    Call 911 or have someone take you to the Emergency Department if you have any of the following:    · Sudden numbness or weakness of your face, arm or leg especially on one side of the body  · Sudden confusion, diffiiculty speaking or trouble understanding   · Changes in your vision or loss of sight in one eye  · Sudden severe headache with no known cause  · sudden dizziness, trouble walking, loss of balance or  coordination    It is important to seek emergency care right away if you have further stroke symptoms. If you get emergency help quickly, the powerful clot-dissolving medicines can reduce the disabilities caused by a stroke.     For more information:    American Stroke Association  5-338-5-STROKE  www.strokeassociation.org           IF YOU SMOKE OR USE TOBACCO PLEASE READ THE FOLLOWING:    Why is smoking bad for me?  Smoking increases the risk of heart disease, lung disease, vascular disease, stroke, and cancer.     If you smoke, STOP!    If you would like more information on quitting smoking, please visit the [employer] website: [email or web address]   This link will provide additional resources including the QUIT line and the Beat the Pack support groups.     For more information:    American Cancer Society  (591) 241-1048    American Heart Association  1-221.972.1222               YOU ARE THE MOST IMPORTANT FACTOR IN YOUR RECOVERY.     Follow all instructions carefully.     I have reviewed my discharge instructions with my nurse, including the following information, if applicable:     Information about my illness and diagnosis   Follow up appointments (including lab draws)   Wound Care   Equipment Needs   Medications (new and continuing) along with side effects   Preventative information such as vaccines and smoking cessations   Diet   Pain   I know when to contact my Doctor's office or seek emergency care      I want my nurse to describe the side effects of my medications: YES NO   If the answer is no, I understand the side effects of my medications: YES NO   My nurse described the side effects of my medications in a way that I could understand: YES NO   I have taken my personal belongings and my own medications with me at discharge: YES NO            I have received this information and my questions have been answered. I have discussed any concerns I see with this  plan with the nurse or physician. I understand these instructions.    Signature of Patient or Responsible Person: _____________________________________    Date: _________________  Time: __________________    Signature of Healthcare Provider: _______________________________________  Date: _________________  Time: __________________

## 2017-01-25 NOTE — PLAN OF CARE
Problem: Patient Care Overview (Adult)  Goal: Plan of Care Review  Outcome: Outcome(s) achieved Date Met:  01/25/17 01/25/17 0958   Coping/Psychosocial Response Interventions   Plan Of Care Reviewed With patient   Patient Care Overview   Progress improving   Outcome Evaluation   Outcome Summary/Follow up Plan DISCHARGE CRITERIA MET

## 2017-01-25 NOTE — PLAN OF CARE
Problem: Patient Care Overview (Adult)  Goal: Discharge Needs Assessment  Outcome: Outcome(s) achieved Date Met:  01/25/17 01/25/17 0955   Discharge Needs Assessment   Concerns To Be Addressed no discharge needs identified   Equipment Needed After Discharge none   Discharge Disposition home or self-care   Current Health   Anticipated Changes Related to Illness none   Self-Care   Equipment Currently Used at Home none   Living Environment   Transportation Available car

## 2017-01-25 NOTE — H&P (VIEW-ONLY)
[facility] Gastroenterology    Chief Complaint   Patient presents with   • Follow-up     hosp stay       Subjective     HPI    Kamryn Oliva is a 47 y.o. female who presents with a chief complaint of  anemia.  She was in the hospital recently with apparently urosepsis.  Creatinine was above 3 and she had nausea and vomiting.  She brought in a picture of her emesis and appears to be bile-colored fluid in the commode with possible slight red discoloration.  He states she had been vomiting several times and then had some blood in her emesis.  While in the hospital she is found to be anemic.  She was admitted with a hemoglobin approximately 10 and decreased a low 7.5 with vigorous hydration.  She was transfused 2 units and at time of discharge her hemoglobin was 10.  Reviewing her labs over the last 1 year her hemoglobin has run below normal.  Dr. Rashida Nice did see her in the hospital for elevated platelets in the 800s as well as the anemia.  He felt she had iron deficiency anemia from blood loss.  The patient did have a negative endoscopy in October negative colonoscopy in November 2016.    Currently she feels better.  She admits that she is under stress and does have much of an appetite which she attributes to the stress.  She did follow up with nephrology yesterday who checked labs at [facility] and an ultrasound of her kidneys of which results we do not have.  Her last creatinine I saw her was back down to 0.6.  She still has intermittent abdominal discomfort with her IBS.         Past Medical History   Diagnosis Date   • Constipation    • Depression    • H/O gastric ulcer    • Headache    • Hypertension    • IBS (irritable bowel syndrome)    • Insomnia    • Maravilla maravilla disease    • Rapid weight loss    • SBO (small bowel obstruction)    • Volvulosis          Current Outpatient Prescriptions:   •  amitriptyline (ELAVIL) 50 MG tablet, , Disp: , Rfl: 2  •  amLODIPine (NORVASC) 10 MG tablet, Take 10 mg by mouth  daily., Disp: , Rfl:   •  aspirin 81 MG EC tablet, Take 81 mg by mouth daily., Disp: , Rfl:   •  aspirin-acetaminophen-caffeine (EXCEDRIN MIGRAINE) 250-250-65 MG per tablet, Take 1 tablet by mouth every 6 (six) hours as needed for headaches., Disp: , Rfl:   •  dicyclomine (BENTYL) 10 MG capsule, Take 1 capsule by mouth 4 (Four) Times a Day As Needed (prn abdominal cramps)., Disp: 120 capsule, Rfl: 11  •  fluticasone (FLONASE) 50 MCG/ACT nasal spray, , Disp: , Rfl:   •  metoprolol succinate XL (TOPROL-XL) 100 MG 24 hr tablet, , Disp: , Rfl:   •  ondansetron (ZOFRAN) 4 MG tablet, , Disp: , Rfl:   •  pantoprazole (PROTONIX) 40 MG EC tablet, Take 1 tablet by mouth 2 (Two) Times a Day., Disp: 60 tablet, Rfl: 1  •  zonisamide (ZONEGRAN) 100 MG capsule, Take 200 mg by mouth Daily., Disp: , Rfl:   •  amitriptyline (ELAVIL) 10 MG tablet, Take 50 mg by mouth Every Night., Disp: , Rfl:   •  ciprofloxacin (CIPRO) 500 MG tablet, , Disp: , Rfl: 0  •  fluticasone (FLONASE) 50 MCG/ACT nasal spray, 1 spray by Nasal route daily, Disp: , Rfl:   •  fluticasone (FLOVENT DISKUS) 50 MCG/BLIST diskus inhaler, Inhale 1 puff 2 (two) times a day., Disp: , Rfl:   •  lisinopril (PRINIVIL,ZESTRIL) 40 MG tablet, Take 40 mg by mouth daily., Disp: , Rfl:   •  ondansetron (ZOFRAN) 4 MG tablet, Every 8 (Eight) Hours., Disp: , Rfl:   •  ondansetron ODT (ZOFRAN-ODT) 4 MG disintegrating tablet, Take 4 mg by mouth every 8 (eight) hours as needed for nausea or vomiting., Disp: , Rfl:     Allergies   Allergen Reactions   • Anectine [Succinylcholine Chloride] Other (See Comments)     Hard to wake up   • Stadol [Butorphanol] Hives       Social History     Social History   • Marital status:      Spouse name: N/A   • Number of children: N/A   • Years of education: N/A     Occupational History   • Not on file.     Social History Main Topics   • Smoking status: Former Smoker     Packs/day: 1.00     Types: Cigarettes     Quit date: 3/10/2016   • Smokeless  tobacco: Never Used   • Alcohol use No   • Drug use: No   • Sexual activity: Not on file     Other Topics Concern   • Not on file     Social History Narrative       Family History   Problem Relation Age of Onset   • Cancer Maternal Grandfather    • Colon cancer Neg Hx    • Colon polyps Neg Hx        Review of Systems   Constitutional: Negative for chills and fever.   Cardiovascular: Negative for chest pain and palpitations.   Gastrointestinal: Negative for abdominal distention, abdominal pain, anal bleeding, blood in stool, constipation, diarrhea, nausea, rectal pain and vomiting.         Objective     Vitals:    01/24/17 1354   BP: 102/78   Pulse: 76   Temp: 96.3 °F (35.7 °C)       Physical Exam   Constitutional: She appears well-developed and well-nourished.   Eyes: Conjunctivae and EOM are normal.   Cardiovascular: Normal rate, regular rhythm and normal heart sounds.    Pulmonary/Chest: Effort normal and breath sounds normal.   Abdominal: Soft. Bowel sounds are normal. She exhibits no distension and no mass. There is no tenderness. There is no rebound and no guarding.   Musculoskeletal: She exhibits no edema.   Skin: Skin is warm.         Assessment/Plan      Kamryn was seen today for follow-up.    Diagnoses and all orders for this visit:    Other iron deficiency anemia  -     Case request      She has no current signs of active bleeding.  She has seen bright red blood per rectum but she has known internal hemorrhoids.  It sounds like the emesis she had and of which she showed me the picture of May of been secondary to Linh-Louis tear associated with intractable nausea and vomiting.  I do think it is reasonable to repeat an upper endoscopy examination to reassess and she is in agreement to do this.    We did talk about pursuing M2A capsule endoscopy.  She has a history of small bowel obstruction requiring surgical intervention within a year and rehospitalization for small bowel obstruction this year.  This would  be a contraindication to capsule endoscopy just we will not pursue this.  She does not want to take any chances with swallowing the camera due to the difficulty she is having with past bowel obstruction and I agree.  Risks are greater than benefit.  Continue ongoing management by primary care provider and other specialists.     EMR Dragon/transcription disclaimer:  Much of this encounter note is electronic transcription/translation of spoken language to printed text.  The electronic translation of spoken language may be erroneous, or at times, nonsensical words or phrases may be inadvertently transcribed.  Although I have reviewed the note for such errors, some may still exist.    Camilo Hanson MD  4:59 PM  01/24/17

## 2017-01-25 NOTE — IP AVS SNAPSHOT
RAAD PRINCE   Facility: [facility] ([place])   SA: [facility]    MRN:  [id number]   PT#:     Report:  [id number] - Summary of Care Document           Basic Information     Date Of Birth Sex Race Ethnicity Preferred Language Preferred Written Language    1970 Female White or  Not  or  English English      Allergies as of 1/25/2017  Reviewed On: 1/25/2017 By: Imani Metcalf RN       Noted Reaction Type Reactions    Anectine [Succinylcholine Chloride] 02/04/2013   Allergy Other (See Comments)    Hard to wake up    Stadol [Butorphanol] 02/04/2013   Allergy Hives      Current Medications Are     amitriptyline (ELAVIL) 50 MG tablet     amLODIPine (NORVASC) 10 MG tablet Take 10 mg by mouth daily.    aspirin 81 MG EC tablet Take 81 mg by mouth daily.    aspirin-acetaminophen-caffeine (EXCEDRIN MIGRAINE) 250-250-65 MG per tablet Take 1 tablet by mouth every 6 (six) hours as needed for headaches.    ciprofloxacin (CIPRO) 500 MG tablet     dicyclomine (BENTYL) 10 MG capsule Take 1 capsule by mouth 4 (Four) Times a Day As Needed (prn abdominal cramps).    fluticasone (FLONASE) 50 MCG/ACT nasal spray     fluticasone (FLONASE) 50 MCG/ACT nasal spray 1 spray by Nasal route daily    fluticasone (FLOVENT DISKUS) 50 MCG/BLIST diskus inhaler Inhale 1 puff 2 (two) times a day.    lisinopril (PRINIVIL,ZESTRIL) 40 MG tablet Take 40 mg by mouth daily.    metoprolol succinate XL (TOPROL-XL) 100 MG 24 hr tablet     ondansetron (ZOFRAN) 4 MG tablet     ondansetron (ZOFRAN) 4 MG tablet Every 8 (Eight) Hours.    ondansetron ODT (ZOFRAN-ODT) 4 MG disintegrating tablet Take 4 mg by mouth every 8 (eight) hours as needed for nausea or vomiting.    pantoprazole (PROTONIX) 40 MG EC tablet Take 1 tablet by mouth 2 (Two) Times a Day.    zonisamide (ZONEGRAN) 100 MG capsule Take 200 mg by mouth Daily.    amitriptyline (ELAVIL) 10 MG tablet (Discontinued) Take 50 mg by mouth Every Night.       "  Current Immunizations     No immunizations on file.      Problem List as of 1/25/2017  Date Reviewed: 1/25/2017             Codes Priority Class Noted - Resolved    UTI (urinary tract infection) ICD-10-CM: N39.0  ICD-9-CM: 599.0   12/26/2016 - Present    KRISHNA (acute kidney injury) ICD-10-CM: N17.9  ICD-9-CM: 584.9   12/26/2016 - Present    Hydronephrosis ICD-10-CM: N13.30  ICD-9-CM: 591   12/26/2016 - Present    Renal failure ICD-10-CM: N19  ICD-9-CM: 586   12/26/2016 - Present    Sepsis ICD-10-CM: A41.9  ICD-9-CM: 038.9, 995.91   12/27/2016 - Present      Diagnoses        Codes Comments    Other iron deficiency anemia     ICD-10-CM: D50.8       Lab and Imaging Results     None      Vitals     BP Pulse Temp Resp Ht Wt    97/61 65 97.3 °F (36.3 °C) (Temporal Artery ) 10 67\" (170.2 cm) 115 lb (52.2 kg)    LMP SpO2 BMI          (LMP Unknown) 100% 18.01 kg/m2      Vitals History      Social History     Category History    Smoking Tobacco Use Former Smoker; Start date: ; Quit date: 3/10/2016; 1 pack/day; Types: Cigarettes    Smokeless Tobacco Use Never Used    Tobacco Comment     Alcohol Use No    Drug Use No    Sexual Activity Defer    ADL Not Asked      Patient Care Team        Relationship Specialty Notifications Start End    Lambert Faust DO PCP - General Nephrology  1/24/17        Instructions for After Discharge        Follow-up Information     Follow up with Camilo Hanson MD Follow up today.    Specialty:  Gastroenterology    Why:  RETURN TO GI OFFICE AS NEEDED    Contact information:    [address] [facility] [address]  [facility] [id number] [phone]        Diet Instructions     Diet:       Diet Texture / Consistency:  Regular             Referrals and Follow-ups to Schedule     Notify Physician or Go To The ED For the Following Conditions    As directed    Abdominal pain, chest pain, severe back pain, vomiting, fever, rectal bleeding or black bowel movements.              "

## 2017-01-25 NOTE — ANESTHESIA PREPROCEDURE EVALUATION
Anesthesia Evaluation      History of anesthetic complications   Airway   Mallampati: II  TM distance: >3 FB  Neck ROM: full  no difficulty expected  Dental - normal exam     Pulmonary - negative pulmonary ROS and normal exam   Cardiovascular - normal exam  Exercise tolerance: poor (<4 METS)  (+) hypertension poorly controlled, dysrhythmias PAC, Tachycardia,     Patient on routine beta blocker and Beta blocker given within 24 hours of surgery  Rhythm: regular  Rate: normal    Neuro/Psych  (+) neuromuscular disease, CVA, headaches, psychiatric history,    GI/Hepatic/Renal/Endo    (+)  chronic renal disease ARF,     Musculoskeletal (-) negative ROS    Abdominal  - normal exam   Substance History      OB/GYN negative ob/gyn ROS         Other - negative ROS      ROS/Med Hx Other: Hydronephrosis 12/26 from kidney blockage  Anectine prolong blockade                        Anesthesia Plan    ASA 3     general     intravenous induction   Anesthetic plan and risks discussed with patient.

## 2017-01-25 NOTE — PLAN OF CARE
Problem: Patient Care Overview (Adult)  Goal: Adult Individualization and Mutuality  Outcome: Outcome(s) achieved Date Met:  01/25/17

## 2017-01-25 NOTE — PLAN OF CARE
Problem: Patient Care Overview (Adult)  Goal: Plan of Care Review  Outcome: Ongoing (interventions implemented as appropriate)    01/25/17 0940   Coping/Psychosocial Response Interventions   Plan Of Care Reviewed With patient   Patient Care Overview   Progress progress toward functional goals as expected

## 2017-01-25 NOTE — ANESTHESIA POSTPROCEDURE EVALUATION
Patient: Kamryn Oliva    Procedure Summary     Date Anesthesia Start Anesthesia Stop Room / Location    01/25/17 0942 0951 [facility] ENDOSCOPY 4 / [facility] ENDOSCOPY       Procedure Diagnosis Surgeon Provider    ESOPHAGOGASTRODUODENOSCOPY (N/A Esophagus) Other iron deficiency anemia  (Other iron deficiency anemia [D50.8]) MD Rey Donahue CRNA          Anesthesia Type: general  Last vitals  BP      Temp      Pulse     Resp      SpO2        Post Anesthesia Care and Evaluation    Patient location during evaluation: PACU  Patient participation: complete - patient participated  Level of consciousness: awake and alert  Pain score: 0  Pain management: adequate  Airway patency: patent  Anesthetic complications: No anesthetic complications    Cardiovascular status: acceptable  Respiratory status: acceptable  Hydration status: acceptable

## 2017-01-26 LAB
CYTO UR: NORMAL
LAB AP CASE REPORT: NORMAL
LAB AP CLINICAL INFORMATION: NORMAL
Lab: NORMAL
PATH REPORT.FINAL DX SPEC: NORMAL
PATH REPORT.GROSS SPEC: NORMAL

## 2017-02-01 ENCOUNTER — OFFICE VISIT (OUTPATIENT)
Dept: NEUROLOGY | Age: 47
End: 2017-02-01
Payer: MEDICARE

## 2017-02-01 VITALS
SYSTOLIC BLOOD PRESSURE: 131 MMHG | WEIGHT: 121 LBS | HEART RATE: 64 BPM | BODY MASS INDEX: 18.99 KG/M2 | DIASTOLIC BLOOD PRESSURE: 88 MMHG | HEIGHT: 67 IN

## 2017-02-01 DIAGNOSIS — G43.719 INTRACTABLE CHRONIC MIGRAINE WITHOUT AURA AND WITHOUT STATUS MIGRAINOSUS: Primary | ICD-10-CM

## 2017-02-01 PROCEDURE — 4004F PT TOBACCO SCREEN RCVD TLK: CPT | Performed by: PSYCHIATRY & NEUROLOGY

## 2017-02-01 PROCEDURE — 64615 CHEMODENERV MUSC MIGRAINE: CPT | Performed by: PSYCHIATRY & NEUROLOGY

## 2017-02-01 RX ORDER — METOPROLOL SUCCINATE 100 MG/1
TABLET, EXTENDED RELEASE ORAL
COMMUNITY
Start: 2017-01-23 | End: 2017-02-01 | Stop reason: SDUPTHER

## 2017-02-01 RX ORDER — FLUTICASONE PROPIONATE 50 MCG
SPRAY, SUSPENSION (ML) NASAL
COMMUNITY
Start: 2017-01-23 | End: 2017-02-01 | Stop reason: SDUPTHER

## 2017-02-01 RX ORDER — ONDANSETRON 4 MG/1
TABLET, FILM COATED ORAL
COMMUNITY
Start: 2017-01-23 | End: 2017-02-01 | Stop reason: SDUPTHER

## 2017-02-01 RX ORDER — AMITRIPTYLINE HYDROCHLORIDE 50 MG/1
TABLET, FILM COATED ORAL
COMMUNITY
Start: 2017-01-13 | End: 2017-02-01 | Stop reason: SDUPTHER

## 2017-02-01 RX ORDER — CIPROFLOXACIN 500 MG/1
TABLET, FILM COATED ORAL
COMMUNITY
Start: 2016-12-21 | End: 2017-02-01 | Stop reason: ALTCHOICE

## 2017-02-13 ENCOUNTER — TELEPHONE (OUTPATIENT)
Dept: PRIMARY CARE CLINIC | Age: 47
End: 2017-02-13

## 2017-02-17 RX ORDER — AMLODIPINE BESYLATE 10 MG/1
10 TABLET ORAL DAILY
Qty: 30 TABLET | Refills: 3 | Status: SHIPPED | OUTPATIENT
Start: 2017-02-17 | End: 2017-02-23 | Stop reason: ALTCHOICE

## 2017-02-23 ENCOUNTER — OFFICE VISIT (OUTPATIENT)
Dept: PRIMARY CARE CLINIC | Age: 47
End: 2017-02-23
Payer: MEDICARE

## 2017-02-23 ENCOUNTER — APPOINTMENT (OUTPATIENT)
Dept: CT IMAGING | Facility: HOSPITAL | Age: 47
End: 2017-02-23

## 2017-02-23 ENCOUNTER — HOSPITAL ENCOUNTER (INPATIENT)
Facility: HOSPITAL | Age: 47
LOS: 5 days | Discharge: HOME | End: 2017-02-28
Attending: EMERGENCY MEDICINE | Admitting: FAMILY MEDICINE

## 2017-02-23 VITALS
WEIGHT: 114 LBS | BODY MASS INDEX: 17.89 KG/M2 | SYSTOLIC BLOOD PRESSURE: 98 MMHG | OXYGEN SATURATION: 98 % | HEART RATE: 116 BPM | HEIGHT: 67 IN | RESPIRATION RATE: 20 BRPM | DIASTOLIC BLOOD PRESSURE: 64 MMHG

## 2017-02-23 DIAGNOSIS — N30.90 CYSTITIS: ICD-10-CM

## 2017-02-23 DIAGNOSIS — I10 ESSENTIAL HYPERTENSION: ICD-10-CM

## 2017-02-23 DIAGNOSIS — M54.50 ACUTE BILATERAL LOW BACK PAIN WITHOUT SCIATICA: ICD-10-CM

## 2017-02-23 DIAGNOSIS — I67.5 MOYA MOYA DISEASE: Primary | ICD-10-CM

## 2017-02-23 DIAGNOSIS — N17.9 AKI (ACUTE KIDNEY INJURY) (HCC): Primary | ICD-10-CM

## 2017-02-23 DIAGNOSIS — I10 ESSENTIAL HYPERTENSION: Chronic | ICD-10-CM

## 2017-02-23 DIAGNOSIS — R42 DIZZINESS: ICD-10-CM

## 2017-02-23 DIAGNOSIS — R77.8 ELEVATED TROPONIN: ICD-10-CM

## 2017-02-23 DIAGNOSIS — I67.5 MOYA MOYA DISEASE: ICD-10-CM

## 2017-02-23 LAB
ALBUMIN SERPL-MCNC: 5.2 G/DL (ref 3.5–5)
ALBUMIN SERPL-MCNC: 5.5 G/DL (ref 3.5–5.2)
ALBUMIN/GLOB SERPL: 1.7 G/DL (ref 1.1–2.5)
ALP BLD-CCNC: 79 U/L (ref 35–104)
ALP SERPL-CCNC: 77 U/L (ref 24–120)
ALT SERPL W P-5'-P-CCNC: 46 U/L (ref 0–54)
ALT SERPL-CCNC: 22 U/L (ref 5–33)
AMYLASE SERPL-CCNC: 69 U/L (ref 30–110)
ANION GAP SERPL CALCULATED.3IONS-SCNC: 16 MMOL/L (ref 4–13)
ANION GAP SERPL CALCULATED.3IONS-SCNC: 24 MMOL/L (ref 7–19)
AST SERPL-CCNC: 23 U/L (ref 5–32)
AST SERPL-CCNC: 29 U/L (ref 7–45)
BACTERIA UR QL AUTO: ABNORMAL /HPF
BACTERIA: ABNORMAL /HPF
BASOPHILS # BLD AUTO: 0.02 10*3/MM3 (ref 0–0.2)
BASOPHILS NFR BLD AUTO: 0.1 % (ref 0–2)
BILIRUB SERPL-MCNC: 0.5 MG/DL (ref 0.1–1)
BILIRUB SERPL-MCNC: <0.2 MG/DL (ref 0.2–1.2)
BILIRUB UR QL STRIP: NEGATIVE
BILIRUBIN URINE: NEGATIVE
BLOOD, URINE: NEGATIVE
BUN BLD-MCNC: 15 MG/DL (ref 5–21)
BUN BLDV-MCNC: 13 MG/DL (ref 6–20)
BUN/CREAT SERPL: 8 (ref 7–25)
CALCIUM SERPL-MCNC: 11.5 MG/DL (ref 8.6–10)
CALCIUM SPEC-SCNC: 11.6 MG/DL (ref 8.4–10.4)
CHLORIDE BLD-SCNC: 97 MMOL/L (ref 98–111)
CHLORIDE SERPL-SCNC: 101 MMOL/L (ref 98–110)
CLARITY UR: CLEAR
CLARITY: ABNORMAL
CO2 SERPL-SCNC: 24 MMOL/L (ref 24–31)
CO2: 23 MMOL/L (ref 22–29)
COLOR UR: YELLOW
COLOR: YELLOW
CREAT BLD-MCNC: 1.87 MG/DL (ref 0.5–1.4)
CREAT SERPL-MCNC: 1.7 MG/DL (ref 0.5–0.9)
CREATININE URINE: 172.5 MG/DL (ref 4.2–622)
D DIMER PPP FEU-MCNC: <0.22 MG/L (FEU) (ref 0–0.5)
DEPRECATED RDW RBC AUTO: 48.6 FL (ref 40–54)
EOSINOPHIL # BLD AUTO: 0.05 10*3/MM3 (ref 0–0.7)
EOSINOPHIL NFR BLD AUTO: 0.3 % (ref 0–4)
EPITHELIAL CELLS, UA: ABNORMAL /HPF
ERYTHROCYTE [DISTWIDTH] IN BLOOD BY AUTOMATED COUNT: 14.2 % (ref 12–15)
FLUAV AG NPH QL: NEGATIVE
FLUBV AG NPH QL IA: NEGATIVE
GFR NON-AFRICAN AMERICAN: 32
GFR SERPL CREATININE-BSD FRML MDRD: 29 ML/MIN/1.73
GLOBULIN UR ELPH-MCNC: 3.1 GM/DL
GLOBULIN: 3.1 G/DL
GLUCOSE BLD-MCNC: 109 MG/DL (ref 74–109)
GLUCOSE BLD-MCNC: 89 MG/DL (ref 70–100)
GLUCOSE UR STRIP-MCNC: NEGATIVE MG/DL
GLUCOSE URINE: NEGATIVE MG/DL
HCT VFR BLD AUTO: 41.7 % (ref 37–47)
HCT VFR BLD CALC: 43.4 % (ref 37–47)
HEMOGLOBIN: 14.1 G/DL (ref 12–16)
HGB BLD-MCNC: 13.8 G/DL (ref 12–16)
HGB UR QL STRIP.AUTO: NEGATIVE
HOLD SPECIMEN: NORMAL
HYALINE CASTS UR QL AUTO: ABNORMAL /LPF
IMM GRANULOCYTES # BLD: 0.05 10*3/MM3 (ref 0–0.03)
IMM GRANULOCYTES NFR BLD: 0.3 % (ref 0–5)
KETONES UR QL STRIP: NEGATIVE
KETONES, URINE: NEGATIVE MG/DL
LEUKOCYTE ESTERASE UR QL STRIP.AUTO: ABNORMAL
LEUKOCYTE ESTERASE, URINE: ABNORMAL
LIPASE SERPL-CCNC: 75 U/L (ref 23–203)
LYMPHOCYTES # BLD AUTO: 4.02 10*3/MM3 (ref 0.72–4.86)
LYMPHOCYTES NFR BLD AUTO: 27.3 % (ref 15–45)
MCH RBC QN AUTO: 30.8 PG (ref 27–31)
MCH RBC QN AUTO: 30.9 PG (ref 28–32)
MCHC RBC AUTO-ENTMCNC: 32.5 G/DL (ref 33–37)
MCHC RBC AUTO-ENTMCNC: 33.1 G/DL (ref 33–36)
MCV RBC AUTO: 93.3 FL (ref 82–98)
MCV RBC AUTO: 94.8 FL (ref 81–99)
MONOCYTES # BLD AUTO: 0.59 10*3/MM3 (ref 0.19–1.3)
MONOCYTES NFR BLD AUTO: 4 % (ref 4–12)
NEUTROPHILS # BLD AUTO: 9.99 10*3/MM3 (ref 1.87–8.4)
NEUTROPHILS NFR BLD AUTO: 68 % (ref 39–78)
NITRITE UR QL STRIP: NEGATIVE
NITRITE, URINE: NEGATIVE
NT-PROBNP SERPL-MCNC: 279 PG/ML (ref 0–450)
PDW BLD-RTO: 14.1 % (ref 11.5–14.5)
PH UA: 5.5
PH UR STRIP.AUTO: 5.5 [PH] (ref 5–8)
PLATELET # BLD AUTO: 376 10*3/MM3 (ref 130–400)
PLATELET # BLD: 432 K/UL (ref 130–400)
PMV BLD AUTO: 11 FL (ref 7.4–10.4)
PMV BLD AUTO: 11.2 FL (ref 6–12)
POTASSIUM BLD-SCNC: 4.1 MMOL/L (ref 3.5–5.3)
POTASSIUM SERPL-SCNC: 4.3 MMOL/L (ref 3.5–5)
PROT SERPL-MCNC: 8.3 G/DL (ref 6.3–8.7)
PROT UR QL STRIP: ABNORMAL
PROTEIN PROTEIN: 49 MG/DL (ref 15–45)
PROTEIN UA: 30 MG/DL
RBC # BLD AUTO: 4.47 10*6/MM3 (ref 4.2–5.4)
RBC # BLD: 4.58 M/UL (ref 4.2–5.4)
RBC # UR: ABNORMAL /HPF
RBC UA: ABNORMAL /HPF (ref 0–2)
REF LAB TEST METHOD: ABNORMAL
SODIUM BLD-SCNC: 141 MMOL/L (ref 135–145)
SODIUM BLD-SCNC: 144 MMOL/L (ref 136–145)
SP GR UR STRIP: 1.02 (ref 1–1.03)
SPECIFIC GRAVITY UA: 1.02
SQUAMOUS #/AREA URNS HPF: ABNORMAL /HPF
TOTAL PROTEIN: 8.6 G/DL (ref 6.6–8.7)
TROPONIN I SERPL-MCNC: 0.03 NG/ML (ref 0–0.07)
TROPONIN I SERPL-MCNC: 0.12 NG/ML (ref 0–0.07)
TROPONIN I SERPL-MCNC: 0.19 NG/ML (ref 0–0.07)
TSH SERPL DL<=0.05 MIU/L-ACNC: 4.1 UIU/ML (ref 0.27–4.2)
URIC ACID, SERUM: 6 MG/DL (ref 2.4–5.7)
UROBILINOGEN UR QL STRIP: ABNORMAL
UROBILINOGEN, URINE: 0.2 E.U./DL
WBC # BLD: 11.4 K/UL (ref 4.8–10.8)
WBC NRBC COR # BLD: 14.72 10*3/MM3 (ref 4.8–10.8)
WBC UA: ABNORMAL /HPF (ref 0–5)
WBC UR QL AUTO: ABNORMAL /HPF
WHOLE BLOOD HOLD SPECIMEN: NORMAL
WHOLE BLOOD HOLD SPECIMEN: NORMAL
YEAST: ABNORMAL /HPF

## 2017-02-23 PROCEDURE — 93010 ELECTROCARDIOGRAM REPORT: CPT | Performed by: INTERNAL MEDICINE

## 2017-02-23 PROCEDURE — 93005 ELECTROCARDIOGRAM TRACING: CPT

## 2017-02-23 PROCEDURE — 71250 CT THORAX DX C-: CPT

## 2017-02-23 PROCEDURE — 36415 COLL VENOUS BLD VENIPUNCTURE: CPT | Performed by: EMERGENCY MEDICINE

## 2017-02-23 PROCEDURE — 25010000002 HYDROMORPHONE PER 4 MG: Performed by: EMERGENCY MEDICINE

## 2017-02-23 PROCEDURE — 82150 ASSAY OF AMYLASE: CPT | Performed by: EMERGENCY MEDICINE

## 2017-02-23 PROCEDURE — 25010000002 ONDANSETRON PER 1 MG: Performed by: EMERGENCY MEDICINE

## 2017-02-23 PROCEDURE — G8419 CALC BMI OUT NRM PARAM NOF/U: HCPCS | Performed by: INTERNAL MEDICINE

## 2017-02-23 PROCEDURE — 93005 ELECTROCARDIOGRAM TRACING: CPT | Performed by: EMERGENCY MEDICINE

## 2017-02-23 PROCEDURE — 4004F PT TOBACCO SCREEN RCVD TLK: CPT | Performed by: INTERNAL MEDICINE

## 2017-02-23 PROCEDURE — G8484 FLU IMMUNIZE NO ADMIN: HCPCS | Performed by: INTERNAL MEDICINE

## 2017-02-23 PROCEDURE — 83690 ASSAY OF LIPASE: CPT | Performed by: EMERGENCY MEDICINE

## 2017-02-23 PROCEDURE — 84484 ASSAY OF TROPONIN QUANT: CPT

## 2017-02-23 PROCEDURE — 99214 OFFICE O/P EST MOD 30 MIN: CPT | Performed by: INTERNAL MEDICINE

## 2017-02-23 PROCEDURE — G8427 DOCREV CUR MEDS BY ELIG CLIN: HCPCS | Performed by: INTERNAL MEDICINE

## 2017-02-23 PROCEDURE — 25010000002 LORAZEPAM PER 2 MG: Performed by: FAMILY MEDICINE

## 2017-02-23 PROCEDURE — 85025 COMPLETE CBC W/AUTO DIFF WBC: CPT | Performed by: EMERGENCY MEDICINE

## 2017-02-23 PROCEDURE — 85379 FIBRIN DEGRADATION QUANT: CPT | Performed by: EMERGENCY MEDICINE

## 2017-02-23 PROCEDURE — 87804 INFLUENZA ASSAY W/OPTIC: CPT | Performed by: EMERGENCY MEDICINE

## 2017-02-23 PROCEDURE — 80053 COMPREHEN METABOLIC PANEL: CPT | Performed by: EMERGENCY MEDICINE

## 2017-02-23 PROCEDURE — 81001 URINALYSIS AUTO W/SCOPE: CPT | Performed by: EMERGENCY MEDICINE

## 2017-02-23 PROCEDURE — 87040 BLOOD CULTURE FOR BACTERIA: CPT | Performed by: EMERGENCY MEDICINE

## 2017-02-23 PROCEDURE — 70450 CT HEAD/BRAIN W/O DYE: CPT

## 2017-02-23 PROCEDURE — 99284 EMERGENCY DEPT VISIT MOD MDM: CPT

## 2017-02-23 PROCEDURE — 83880 ASSAY OF NATRIURETIC PEPTIDE: CPT | Performed by: EMERGENCY MEDICINE

## 2017-02-23 PROCEDURE — 25010000002 CEFTRIAXONE: Performed by: FAMILY MEDICINE

## 2017-02-23 RX ORDER — HYDROCODONE BITARTRATE AND ACETAMINOPHEN 5; 325 MG/1; MG/1
1 TABLET ORAL EVERY 6 HOURS PRN
Qty: 30 TABLET | Refills: 0 | Status: SHIPPED | OUTPATIENT
Start: 2017-02-23 | End: 2017-03-20 | Stop reason: SDUPTHER

## 2017-02-23 RX ORDER — TEMAZEPAM 7.5 MG/1
7.5 CAPSULE ORAL NIGHTLY PRN
Status: DISCONTINUED | OUTPATIENT
Start: 2017-02-23 | End: 2017-02-28 | Stop reason: HOSPADM

## 2017-02-23 RX ORDER — SODIUM CHLORIDE 9 MG/ML
125 INJECTION, SOLUTION INTRAVENOUS CONTINUOUS
Status: DISCONTINUED | OUTPATIENT
Start: 2017-02-23 | End: 2017-02-23

## 2017-02-23 RX ORDER — LORAZEPAM 2 MG/ML
1 INJECTION INTRAMUSCULAR ONCE
Status: COMPLETED | OUTPATIENT
Start: 2017-02-23 | End: 2017-02-23

## 2017-02-23 RX ORDER — HYDRALAZINE HYDROCHLORIDE 20 MG/ML
10 INJECTION INTRAMUSCULAR; INTRAVENOUS EVERY 4 HOURS PRN
Status: DISCONTINUED | OUTPATIENT
Start: 2017-02-23 | End: 2017-02-28 | Stop reason: HOSPADM

## 2017-02-23 RX ORDER — ONDANSETRON 2 MG/ML
4 INJECTION INTRAMUSCULAR; INTRAVENOUS ONCE
Status: COMPLETED | OUTPATIENT
Start: 2017-02-23 | End: 2017-02-23

## 2017-02-23 RX ORDER — SODIUM CHLORIDE 9 MG/ML
125 INJECTION, SOLUTION INTRAVENOUS CONTINUOUS
Status: DISCONTINUED | OUTPATIENT
Start: 2017-02-23 | End: 2017-02-26

## 2017-02-23 RX ADMIN — LORAZEPAM 1 MG: 2 INJECTION INTRAMUSCULAR; INTRAVENOUS at 19:37

## 2017-02-23 RX ADMIN — SODIUM CHLORIDE 125 ML/HR: 9 INJECTION, SOLUTION INTRAVENOUS at 14:16

## 2017-02-23 RX ADMIN — HYDROMORPHONE HYDROCHLORIDE 1 MG: 1 INJECTION, SOLUTION INTRAMUSCULAR; INTRAVENOUS; SUBCUTANEOUS at 14:16

## 2017-02-23 RX ADMIN — ONDANSETRON HYDROCHLORIDE 4 MG: 2 SOLUTION INTRAMUSCULAR; INTRAVENOUS at 14:18

## 2017-02-23 RX ADMIN — HYDROMORPHONE HYDROCHLORIDE 1 MG: 1 INJECTION, SOLUTION INTRAMUSCULAR; INTRAVENOUS; SUBCUTANEOUS at 17:10

## 2017-02-23 RX ADMIN — SODIUM CHLORIDE 125 ML/HR: 9 INJECTION, SOLUTION INTRAVENOUS at 19:42

## 2017-02-23 RX ADMIN — CEFTRIAXONE 1 G: 1 INJECTION, POWDER, FOR SOLUTION INTRAMUSCULAR; INTRAVENOUS at 21:34

## 2017-02-23 ASSESSMENT — ENCOUNTER SYMPTOMS
BACK PAIN: 0
COUGH: 0
VOMITING: 0
CONSTIPATION: 0
DIARRHEA: 0
SHORTNESS OF BREATH: 1
NAUSEA: 0

## 2017-02-24 ENCOUNTER — APPOINTMENT (OUTPATIENT)
Dept: CARDIOLOGY | Facility: HOSPITAL | Age: 47
End: 2017-02-24
Attending: INTERNAL MEDICINE

## 2017-02-24 ENCOUNTER — APPOINTMENT (OUTPATIENT)
Dept: ULTRASOUND IMAGING | Facility: HOSPITAL | Age: 47
End: 2017-02-24

## 2017-02-24 LAB
ANION GAP SERPL CALCULATED.3IONS-SCNC: 7 MMOL/L (ref 4–13)
BH CV ECHO MEAS - AO MAX PG (FULL): 2.1 MMHG
BH CV ECHO MEAS - AO MAX PG: 6 MMHG
BH CV ECHO MEAS - AO MEAN PG (FULL): 1 MMHG
BH CV ECHO MEAS - AO MEAN PG: 3 MMHG
BH CV ECHO MEAS - AO ROOT AREA (BSA CORRECTED): 1.8
BH CV ECHO MEAS - AO ROOT AREA: 6.8 CM^2
BH CV ECHO MEAS - AO ROOT DIAM: 3 CM
BH CV ECHO MEAS - AO V2 MAX: 122 CM/SEC
BH CV ECHO MEAS - AO V2 MEAN: 81.7 CM/SEC
BH CV ECHO MEAS - AO V2 VTI: 25 CM
BH CV ECHO MEAS - AVA(I,A): 2 CM^2
BH CV ECHO MEAS - AVA(I,D): 2 CM^2
BH CV ECHO MEAS - AVA(V,A): 2.3 CM^2
BH CV ECHO MEAS - AVA(V,D): 2.3 CM^2
BH CV ECHO MEAS - BSA(HAYCOCK): 1.6 M^2
BH CV ECHO MEAS - BSA: 1.6 M^2
BH CV ECHO MEAS - BZI_BMI: 17.3 KILOGRAMS/M^2
BH CV ECHO MEAS - BZI_METRIC_HEIGHT: 172.7 CM
BH CV ECHO MEAS - BZI_METRIC_WEIGHT: 51.7 KG
BH CV ECHO MEAS - CONTRAST EF 4CH: 60.3 ML/M^2
BH CV ECHO MEAS - EDV(CUBED): 47.4 ML
BH CV ECHO MEAS - EDV(MOD-SP4): 24 ML
BH CV ECHO MEAS - EDV(TEICH): 55.2 ML
BH CV ECHO MEAS - EF(CUBED): 62.5 %
BH CV ECHO MEAS - EF(MOD-SP4): 60.3 %
BH CV ECHO MEAS - EF(TEICH): 55 %
BH CV ECHO MEAS - ESV(CUBED): 17.8 ML
BH CV ECHO MEAS - ESV(MOD-SP4): 9.5 ML
BH CV ECHO MEAS - ESV(TEICH): 24.8 ML
BH CV ECHO MEAS - FS: 27.9 %
BH CV ECHO MEAS - IVS/LVPW: 0.85
BH CV ECHO MEAS - IVSD: 0.91 CM
BH CV ECHO MEAS - LA DIMENSION: 1.5 CM
BH CV ECHO MEAS - LA/AO: 0.51
BH CV ECHO MEAS - LAT PEAK E' VEL: 12.2 CM/SEC
BH CV ECHO MEAS - LV DIASTOLIC VOL/BSA (35-75): 14.9 ML/M^2
BH CV ECHO MEAS - LV MASS(C)D: 107 GRAMS
BH CV ECHO MEAS - LV MASS(C)DI: 66.5 GRAMS/M^2
BH CV ECHO MEAS - LV MAX PG: 3.9 MMHG
BH CV ECHO MEAS - LV MEAN PG: 2 MMHG
BH CV ECHO MEAS - LV SYSTOLIC VOL/BSA (12-30): 5.9 ML/M^2
BH CV ECHO MEAS - LV V1 MAX: 98.3 CM/SEC
BH CV ECHO MEAS - LV V1 MEAN: 60.4 CM/SEC
BH CV ECHO MEAS - LV V1 VTI: 17.6 CM
BH CV ECHO MEAS - LVIDD: 3.6 CM
BH CV ECHO MEAS - LVIDS: 2.6 CM
BH CV ECHO MEAS - LVLD AP4: 6 CM
BH CV ECHO MEAS - LVLS AP4: 4.7 CM
BH CV ECHO MEAS - LVOT AREA (M): 2.8 CM^2
BH CV ECHO MEAS - LVOT AREA: 2.8 CM^2
BH CV ECHO MEAS - LVOT DIAM: 1.9 CM
BH CV ECHO MEAS - LVPWD: 1.1 CM
BH CV ECHO MEAS - MV A MAX VEL: 84.4 CM/SEC
BH CV ECHO MEAS - MV DEC TIME: 0.12 SEC
BH CV ECHO MEAS - MV E MAX VEL: 98 CM/SEC
BH CV ECHO MEAS - MV E/A: 1.2
BH CV ECHO MEAS - RAP SYSTOLE: 10 MMHG
BH CV ECHO MEAS - RVSP: 32.3 MMHG
BH CV ECHO MEAS - SI(AO): 106.2 ML/M^2
BH CV ECHO MEAS - SI(CUBED): 18.4 ML/M^2
BH CV ECHO MEAS - SI(LVOT): 31 ML/M^2
BH CV ECHO MEAS - SI(MOD-SP4): 9 ML/M^2
BH CV ECHO MEAS - SI(TEICH): 18.8 ML/M^2
BH CV ECHO MEAS - SV(AO): 170.9 ML
BH CV ECHO MEAS - SV(CUBED): 29.7 ML
BH CV ECHO MEAS - SV(LVOT): 49.9 ML
BH CV ECHO MEAS - SV(MOD-SP4): 14.5 ML
BH CV ECHO MEAS - SV(TEICH): 30.3 ML
BH CV ECHO MEAS - TR MAX VEL: 236 CM/SEC
BH CV STRESS BP STAGE 1: NORMAL
BH CV STRESS BP STAGE 2: NORMAL
BH CV STRESS DURATION MIN STAGE 1: 3
BH CV STRESS DURATION MIN STAGE 2: 3
BH CV STRESS DURATION MIN STAGE 3: 2
BH CV STRESS DURATION SEC STAGE 1: 0
BH CV STRESS DURATION SEC STAGE 2: 0
BH CV STRESS DURATION SEC STAGE 3: 20
BH CV STRESS GRADE STAGE 1: 10
BH CV STRESS GRADE STAGE 2: 12
BH CV STRESS GRADE STAGE 3: 14
BH CV STRESS HR STAGE 1: 120
BH CV STRESS HR STAGE 2: 121
BH CV STRESS HR STAGE 3: 148
BH CV STRESS METS STAGE 1: 5
BH CV STRESS METS STAGE 2: 7.5
BH CV STRESS METS STAGE 3: 10
BH CV STRESS PROTOCOL 1: NORMAL
BH CV STRESS RECOVERY BP: NORMAL MMHG
BH CV STRESS RECOVERY HR: 83 BPM
BH CV STRESS SPEED STAGE 1: 1.7
BH CV STRESS SPEED STAGE 2: 2.5
BH CV STRESS SPEED STAGE 3: 3.4
BH CV STRESS STAGE 1: 1
BH CV STRESS STAGE 2: 2
BH CV STRESS STAGE 3: 3
BUN BLD-MCNC: 12 MG/DL (ref 5–21)
BUN/CREAT SERPL: 9.7 (ref 7–25)
CALCIUM SPEC-SCNC: 8.9 MG/DL (ref 8.4–10.4)
CHLORIDE SERPL-SCNC: 110 MMOL/L (ref 98–110)
CO2 SERPL-SCNC: 24 MMOL/L (ref 24–31)
CREAT BLD-MCNC: 1.24 MG/DL (ref 0.5–1.4)
D-LACTATE SERPL-SCNC: 0.6 MMOL/L (ref 0.5–2)
DEPRECATED RDW RBC AUTO: 45.4 FL (ref 40–54)
ERYTHROCYTE [DISTWIDTH] IN BLOOD BY AUTOMATED COUNT: 13.8 % (ref 12–15)
GFR SERPL CREATININE-BSD FRML MDRD: 47 ML/MIN/1.73
GLUCOSE BLD-MCNC: 81 MG/DL (ref 70–100)
HCT VFR BLD AUTO: 31.6 % (ref 37–47)
HGB BLD-MCNC: 10.3 G/DL (ref 12–16)
LEFT ATRIUM VOLUME INDEX: 11.9 ML/M2
LV EF 2D ECHO EST: 60 %
MAXIMAL PREDICTED HEART RATE: 174 BPM
MCH RBC QN AUTO: 30.6 PG (ref 28–32)
MCHC RBC AUTO-ENTMCNC: 32.6 G/DL (ref 33–36)
MCV RBC AUTO: 93.8 FL (ref 82–98)
PERCENT MAX PREDICTED HR: 85.06 %
PLATELET # BLD AUTO: 275 10*3/MM3 (ref 130–400)
PMV BLD AUTO: 10.2 FL (ref 6–12)
POTASSIUM BLD-SCNC: 4 MMOL/L (ref 3.5–5.3)
RBC # BLD AUTO: 3.37 10*6/MM3 (ref 4.2–5.4)
SODIUM BLD-SCNC: 141 MMOL/L (ref 135–145)
STRESS BASELINE BP: NORMAL MMHG
STRESS BASELINE HR: 85 BPM
STRESS PERCENT HR: 100 %
STRESS POST ESTIMATED WORKLOAD: 10 METS
STRESS POST EXERCISE DUR MIN: 8 MIN
STRESS POST EXERCISE DUR SEC: 20 SEC
STRESS POST PEAK BP: NORMAL MMHG
STRESS POST PEAK HR: 148 BPM
STRESS TARGET HR: 148 BPM
TROPONIN I SERPL-MCNC: 0.1 NG/ML (ref 0–0.03)
TROPONIN I SERPL-MCNC: 0.13 NG/ML (ref 0–0.03)
TROPONIN I SERPL-MCNC: 0.14 NG/ML (ref 0–0.03)
WBC NRBC COR # BLD: 6.65 10*3/MM3 (ref 4.8–10.8)

## 2017-02-24 PROCEDURE — 93306 TTE W/DOPPLER COMPLETE: CPT | Performed by: INTERNAL MEDICINE

## 2017-02-24 PROCEDURE — 87086 URINE CULTURE/COLONY COUNT: CPT | Performed by: INTERNAL MEDICINE

## 2017-02-24 PROCEDURE — 85027 COMPLETE CBC AUTOMATED: CPT | Performed by: INTERNAL MEDICINE

## 2017-02-24 PROCEDURE — 25010000002 HYDRALAZINE PER 20 MG: Performed by: INTERNAL MEDICINE

## 2017-02-24 PROCEDURE — 76775 US EXAM ABDO BACK WALL LIM: CPT

## 2017-02-24 PROCEDURE — 83605 ASSAY OF LACTIC ACID: CPT | Performed by: INTERNAL MEDICINE

## 2017-02-24 PROCEDURE — 93018 CV STRESS TEST I&R ONLY: CPT | Performed by: INTERNAL MEDICINE

## 2017-02-24 PROCEDURE — 93017 CV STRESS TEST TRACING ONLY: CPT

## 2017-02-24 PROCEDURE — 84484 ASSAY OF TROPONIN QUANT: CPT | Performed by: INTERNAL MEDICINE

## 2017-02-24 PROCEDURE — 93306 TTE W/DOPPLER COMPLETE: CPT

## 2017-02-24 PROCEDURE — 25010000002 CEFTRIAXONE: Performed by: INTERNAL MEDICINE

## 2017-02-24 PROCEDURE — 99222 1ST HOSP IP/OBS MODERATE 55: CPT | Performed by: UROLOGY

## 2017-02-24 PROCEDURE — 80048 BASIC METABOLIC PNL TOTAL CA: CPT | Performed by: INTERNAL MEDICINE

## 2017-02-24 RX ORDER — ALPRAZOLAM 0.5 MG/1
0.5 TABLET ORAL 3 TIMES DAILY PRN
Status: DISCONTINUED | OUTPATIENT
Start: 2017-02-24 | End: 2017-02-28 | Stop reason: HOSPADM

## 2017-02-24 RX ORDER — ALPRAZOLAM 0.5 MG/1
1 TABLET ORAL 3 TIMES DAILY PRN
Status: DISCONTINUED | OUTPATIENT
Start: 2017-02-24 | End: 2017-02-24

## 2017-02-24 RX ORDER — PANTOPRAZOLE SODIUM 40 MG/1
40 TABLET, DELAYED RELEASE ORAL
Status: DISCONTINUED | OUTPATIENT
Start: 2017-02-24 | End: 2017-02-28 | Stop reason: HOSPADM

## 2017-02-24 RX ORDER — METOPROLOL SUCCINATE 100 MG/1
100 TABLET, EXTENDED RELEASE ORAL
Status: DISCONTINUED | OUTPATIENT
Start: 2017-02-24 | End: 2017-02-28 | Stop reason: HOSPADM

## 2017-02-24 RX ORDER — ASPIRIN 81 MG/1
81 TABLET ORAL DAILY
Status: DISCONTINUED | OUTPATIENT
Start: 2017-02-24 | End: 2017-02-28 | Stop reason: HOSPADM

## 2017-02-24 RX ORDER — AMITRIPTYLINE HYDROCHLORIDE 25 MG/1
50 TABLET, FILM COATED ORAL NIGHTLY
Status: DISCONTINUED | OUTPATIENT
Start: 2017-02-24 | End: 2017-02-28 | Stop reason: HOSPADM

## 2017-02-24 RX ORDER — HYDROCODONE BITARTRATE AND ACETAMINOPHEN 7.5; 325 MG/1; MG/1
1 TABLET ORAL EVERY 6 HOURS PRN
Status: DISCONTINUED | OUTPATIENT
Start: 2017-02-24 | End: 2017-02-26

## 2017-02-24 RX ADMIN — HYDRALAZINE HYDROCHLORIDE 10 MG: 20 INJECTION INTRAMUSCULAR; INTRAVENOUS at 11:26

## 2017-02-24 RX ADMIN — METOPROLOL SUCCINATE 100 MG: 100 TABLET, FILM COATED, EXTENDED RELEASE ORAL at 10:25

## 2017-02-24 RX ADMIN — TEMAZEPAM 7.5 MG: 7.5 CAPSULE ORAL at 00:55

## 2017-02-24 RX ADMIN — HYDROCODONE BITARTRATE AND ACETAMINOPHEN 1 TABLET: 7.5; 325 TABLET ORAL at 16:48

## 2017-02-24 RX ADMIN — PANTOPRAZOLE SODIUM 40 MG: 40 TABLET, DELAYED RELEASE ORAL at 10:25

## 2017-02-24 RX ADMIN — ALPRAZOLAM 0.5 MG: 0.5 TABLET ORAL at 20:48

## 2017-02-24 RX ADMIN — SODIUM CHLORIDE 125 ML/HR: 9 INJECTION, SOLUTION INTRAVENOUS at 14:26

## 2017-02-24 RX ADMIN — AMITRIPTYLINE HYDROCHLORIDE 50 MG: 25 TABLET, FILM COATED ORAL at 20:48

## 2017-02-24 RX ADMIN — HYDROCODONE BITARTRATE AND ACETAMINOPHEN 1 TABLET: 7.5; 325 TABLET ORAL at 10:25

## 2017-02-24 RX ADMIN — ASPIRIN 81 MG: 81 TABLET ORAL at 10:25

## 2017-02-24 RX ADMIN — SODIUM CHLORIDE 125 ML/HR: 9 INJECTION, SOLUTION INTRAVENOUS at 00:55

## 2017-02-24 RX ADMIN — CEFTRIAXONE 1 G: 1 INJECTION, POWDER, FOR SOLUTION INTRAMUSCULAR; INTRAVENOUS at 20:48

## 2017-02-24 RX ADMIN — HYDRALAZINE HYDROCHLORIDE 10 MG: 20 INJECTION INTRAMUSCULAR; INTRAVENOUS at 17:16

## 2017-02-24 RX ADMIN — TEMAZEPAM 7.5 MG: 7.5 CAPSULE ORAL at 22:50

## 2017-02-24 RX ADMIN — HYDROCODONE BITARTRATE AND ACETAMINOPHEN 1 TABLET: 7.5; 325 TABLET ORAL at 22:50

## 2017-02-25 LAB
GASTROCULT GAST QL: POSITIVE
URINE CULTURE, ROUTINE: NORMAL

## 2017-02-25 PROCEDURE — 82270 OCCULT BLOOD FECES: CPT | Performed by: FAMILY MEDICINE

## 2017-02-25 PROCEDURE — 25010000002 HYDRALAZINE PER 20 MG: Performed by: INTERNAL MEDICINE

## 2017-02-25 RX ADMIN — TEMAZEPAM 7.5 MG: 7.5 CAPSULE ORAL at 22:51

## 2017-02-25 RX ADMIN — METOPROLOL SUCCINATE 100 MG: 100 TABLET, FILM COATED, EXTENDED RELEASE ORAL at 08:25

## 2017-02-25 RX ADMIN — SODIUM CHLORIDE 125 ML/HR: 9 INJECTION, SOLUTION INTRAVENOUS at 11:38

## 2017-02-25 RX ADMIN — ALPRAZOLAM 0.5 MG: 0.5 TABLET ORAL at 08:25

## 2017-02-25 RX ADMIN — SODIUM CHLORIDE 125 ML/HR: 9 INJECTION, SOLUTION INTRAVENOUS at 02:07

## 2017-02-25 RX ADMIN — ALPRAZOLAM 0.5 MG: 0.5 TABLET ORAL at 16:33

## 2017-02-25 RX ADMIN — HYDROCODONE BITARTRATE AND ACETAMINOPHEN 1 TABLET: 7.5; 325 TABLET ORAL at 22:51

## 2017-02-25 RX ADMIN — ASPIRIN 81 MG: 81 TABLET ORAL at 08:25

## 2017-02-25 RX ADMIN — HYDROCODONE BITARTRATE AND ACETAMINOPHEN 1 TABLET: 7.5; 325 TABLET ORAL at 16:33

## 2017-02-25 RX ADMIN — PANTOPRAZOLE SODIUM 40 MG: 40 TABLET, DELAYED RELEASE ORAL at 05:48

## 2017-02-25 RX ADMIN — AMITRIPTYLINE HYDROCHLORIDE 50 MG: 25 TABLET, FILM COATED ORAL at 20:53

## 2017-02-25 RX ADMIN — HYDRALAZINE HYDROCHLORIDE 10 MG: 20 INJECTION INTRAMUSCULAR; INTRAVENOUS at 17:24

## 2017-02-25 RX ADMIN — SODIUM CHLORIDE 125 ML/HR: 9 INJECTION, SOLUTION INTRAVENOUS at 18:17

## 2017-02-25 RX ADMIN — HYDROCODONE BITARTRATE AND ACETAMINOPHEN 1 TABLET: 7.5; 325 TABLET ORAL at 08:25

## 2017-02-26 ENCOUNTER — APPOINTMENT (OUTPATIENT)
Dept: NUCLEAR MEDICINE | Facility: HOSPITAL | Age: 47
End: 2017-02-26

## 2017-02-26 ENCOUNTER — APPOINTMENT (OUTPATIENT)
Dept: CARDIOLOGY | Facility: HOSPITAL | Age: 47
End: 2017-02-26

## 2017-02-26 PROBLEM — R77.8 ELEVATED TROPONIN: Status: ACTIVE | Noted: 2017-02-26

## 2017-02-26 PROBLEM — K58.2 IRRITABLE BOWEL SYNDROME WITH BOTH CONSTIPATION AND DIARRHEA: Chronic | Status: ACTIVE | Noted: 2017-02-26

## 2017-02-26 PROBLEM — I67.5 MOYA MOYA DISEASE: Chronic | Status: ACTIVE | Noted: 2017-02-26

## 2017-02-26 PROBLEM — I10 ESSENTIAL HYPERTENSION: Chronic | Status: ACTIVE | Noted: 2017-02-26

## 2017-02-26 PROBLEM — D64.9 ANEMIA: Chronic | Status: ACTIVE | Noted: 2017-02-26

## 2017-02-26 LAB
BACTERIA SPEC AEROBE CULT: NORMAL
BH CV STRESS BP STAGE 1: NORMAL
BH CV STRESS BP STAGE 2: NORMAL
BH CV STRESS BP STAGE 3: NORMAL
BH CV STRESS DURATION MIN STAGE 1: 3
BH CV STRESS DURATION MIN STAGE 2: 3
BH CV STRESS DURATION MIN STAGE 3: 3
BH CV STRESS DURATION MIN STAGE 4: 3
BH CV STRESS DURATION SEC STAGE 1: 0
BH CV STRESS DURATION SEC STAGE 2: 0
BH CV STRESS DURATION SEC STAGE 3: 0
BH CV STRESS DURATION SEC STAGE 4: 0
BH CV STRESS GRADE STAGE 1: 10
BH CV STRESS GRADE STAGE 2: 12
BH CV STRESS GRADE STAGE 3: 14
BH CV STRESS GRADE STAGE 4: 16
BH CV STRESS HR STAGE 1: 110
BH CV STRESS HR STAGE 2: 118
BH CV STRESS HR STAGE 3: 136
BH CV STRESS HR STAGE 4: 160
BH CV STRESS METS STAGE 1: 5
BH CV STRESS METS STAGE 2: 7.5
BH CV STRESS METS STAGE 3: 10
BH CV STRESS METS STAGE 4: 13.5
BH CV STRESS PROTOCOL 1: NORMAL
BH CV STRESS RECOVERY BP: NORMAL MMHG
BH CV STRESS RECOVERY HR: 75 BPM
BH CV STRESS SPEED STAGE 1: 1.7
BH CV STRESS SPEED STAGE 2: 2.5
BH CV STRESS SPEED STAGE 3: 3.4
BH CV STRESS SPEED STAGE 4: 4.2
BH CV STRESS STAGE 1: 1
BH CV STRESS STAGE 2: 2
BH CV STRESS STAGE 3: 3
BH CV STRESS STAGE 4: 4
MAXIMAL PREDICTED HEART RATE: 174 BPM
PERCENT MAX PREDICTED HR: 91.95 %
STRESS BASELINE BP: NORMAL MMHG
STRESS BASELINE HR: 67 BPM
STRESS PERCENT HR: 108 %
STRESS POST ESTIMATED WORKLOAD: 13.5 METS
STRESS POST EXERCISE DUR MIN: 9 MIN
STRESS POST EXERCISE DUR SEC: 18 SEC
STRESS POST PEAK BP: NORMAL MMHG
STRESS POST PEAK HR: 160 BPM
STRESS TARGET HR: 148 BPM

## 2017-02-26 PROCEDURE — 93352 ADMIN ECG CONTRAST AGENT: CPT | Performed by: INTERNAL MEDICINE

## 2017-02-26 PROCEDURE — A9558 XE133 XENON 10MCI: HCPCS | Performed by: FAMILY MEDICINE

## 2017-02-26 PROCEDURE — 25010000002 PERFLUTREN (DEFINITY) 8.476 MG IN SODIUM CHLORIDE 10 ML INJECTION: Performed by: FAMILY MEDICINE

## 2017-02-26 PROCEDURE — 88300 SURGICAL PATH GROSS: CPT | Performed by: FAMILY MEDICINE

## 2017-02-26 PROCEDURE — 82360 CALCULUS ASSAY QUANT: CPT | Performed by: FAMILY MEDICINE

## 2017-02-26 PROCEDURE — 93350 STRESS TTE ONLY: CPT | Performed by: INTERNAL MEDICINE

## 2017-02-26 PROCEDURE — A9540 TC99M MAA: HCPCS | Performed by: FAMILY MEDICINE

## 2017-02-26 PROCEDURE — 93017 CV STRESS TEST TRACING ONLY: CPT

## 2017-02-26 PROCEDURE — C1751 CATH, INF, PER/CENT/MIDLINE: HCPCS

## 2017-02-26 PROCEDURE — 0 TECHNETIUM ALBUMIN AGGREGATED: Performed by: FAMILY MEDICINE

## 2017-02-26 PROCEDURE — 99221 1ST HOSP IP/OBS SF/LOW 40: CPT | Performed by: INTERNAL MEDICINE

## 2017-02-26 PROCEDURE — 93351 STRESS TTE COMPLETE: CPT

## 2017-02-26 PROCEDURE — 0 XENON XE 133: Performed by: FAMILY MEDICINE

## 2017-02-26 PROCEDURE — 25010000002 HYDRALAZINE PER 20 MG: Performed by: FAMILY MEDICINE

## 2017-02-26 PROCEDURE — 02HV33Z INSERTION OF INFUSION DEVICE INTO SUPERIOR VENA CAVA, PERCUTANEOUS APPROACH: ICD-10-PCS | Performed by: FAMILY MEDICINE

## 2017-02-26 PROCEDURE — 25010000002 HYDRALAZINE PER 20 MG: Performed by: INTERNAL MEDICINE

## 2017-02-26 PROCEDURE — 78582 LUNG VENTILAT&PERFUS IMAGING: CPT

## 2017-02-26 PROCEDURE — 93018 CV STRESS TEST I&R ONLY: CPT | Performed by: INTERNAL MEDICINE

## 2017-02-26 RX ORDER — ZONISAMIDE 100 MG/1
200 CAPSULE ORAL DAILY
Status: DISCONTINUED | OUTPATIENT
Start: 2017-02-26 | End: 2017-02-28 | Stop reason: HOSPADM

## 2017-02-26 RX ORDER — BUTALBITAL, ACETAMINOPHEN AND CAFFEINE 50; 325; 40 MG/1; MG/1; MG/1
1 TABLET ORAL EVERY 6 HOURS PRN
Status: DISCONTINUED | OUTPATIENT
Start: 2017-02-26 | End: 2017-02-28 | Stop reason: HOSPADM

## 2017-02-26 RX ORDER — CLONIDINE HYDROCHLORIDE 0.1 MG/1
0.1 TABLET ORAL EVERY 4 HOURS PRN
Status: DISCONTINUED | OUTPATIENT
Start: 2017-02-26 | End: 2017-02-28

## 2017-02-26 RX ORDER — HYDROCODONE BITARTRATE AND ACETAMINOPHEN 10; 325 MG/1; MG/1
1 TABLET ORAL ONCE AS NEEDED
Status: COMPLETED | OUTPATIENT
Start: 2017-02-26 | End: 2017-02-26

## 2017-02-26 RX ORDER — LISINOPRIL 10 MG/1
10 TABLET ORAL
Status: DISCONTINUED | OUTPATIENT
Start: 2017-02-26 | End: 2017-02-28 | Stop reason: HOSPADM

## 2017-02-26 RX ORDER — ONDANSETRON 2 MG/ML
4 INJECTION INTRAMUSCULAR; INTRAVENOUS EVERY 6 HOURS PRN
Status: DISCONTINUED | OUTPATIENT
Start: 2017-02-26 | End: 2017-02-28 | Stop reason: HOSPADM

## 2017-02-26 RX ORDER — LISINOPRIL 10 MG/1
10 TABLET ORAL
Status: DISCONTINUED | OUTPATIENT
Start: 2017-02-26 | End: 2017-02-26

## 2017-02-26 RX ORDER — AMLODIPINE BESYLATE 10 MG/1
10 TABLET ORAL DAILY
Status: DISCONTINUED | OUTPATIENT
Start: 2017-02-26 | End: 2017-02-26

## 2017-02-26 RX ORDER — HYDROCODONE BITARTRATE AND ACETAMINOPHEN 10; 325 MG/1; MG/1
1 TABLET ORAL EVERY 6 HOURS PRN
Status: DISCONTINUED | OUTPATIENT
Start: 2017-02-26 | End: 2017-02-28 | Stop reason: HOSPADM

## 2017-02-26 RX ADMIN — Medication 1 DOSE: at 10:48

## 2017-02-26 RX ADMIN — HYDROCODONE BITARTRATE AND ACETAMINOPHEN 1 TABLET: 10; 325 TABLET ORAL at 18:39

## 2017-02-26 RX ADMIN — AMITRIPTYLINE HYDROCHLORIDE 50 MG: 25 TABLET, FILM COATED ORAL at 19:54

## 2017-02-26 RX ADMIN — HYDROCODONE BITARTRATE AND ACETAMINOPHEN 1 TABLET: 10; 325 TABLET ORAL at 11:43

## 2017-02-26 RX ADMIN — ALPRAZOLAM 0.5 MG: 0.5 TABLET ORAL at 11:48

## 2017-02-26 RX ADMIN — HYDROCODONE BITARTRATE AND ACETAMINOPHEN 1 TABLET: 10; 325 TABLET ORAL at 21:18

## 2017-02-26 RX ADMIN — ALPRAZOLAM 0.5 MG: 0.5 TABLET ORAL at 19:52

## 2017-02-26 RX ADMIN — CLONIDINE HYDROCHLORIDE 0.1 MG: 0.1 TABLET ORAL at 19:56

## 2017-02-26 RX ADMIN — SODIUM CHLORIDE 5 ML: 9 INJECTION INTRAMUSCULAR; INTRAVENOUS; SUBCUTANEOUS at 10:26

## 2017-02-26 RX ADMIN — HYDROCODONE BITARTRATE AND ACETAMINOPHEN 1 TABLET: 10; 325 TABLET ORAL at 21:13

## 2017-02-26 RX ADMIN — Medication 14.9 MILLICURIE: at 10:45

## 2017-02-26 RX ADMIN — HYDRALAZINE HYDROCHLORIDE 10 MG: 20 INJECTION INTRAMUSCULAR; INTRAVENOUS at 18:49

## 2017-02-26 RX ADMIN — ZONISAMIDE 200 MG: 100 CAPSULE ORAL at 19:54

## 2017-02-26 RX ADMIN — CLONIDINE HYDROCHLORIDE 0.1 MG: 0.1 TABLET ORAL at 00:56

## 2017-02-26 RX ADMIN — ASPIRIN 81 MG: 81 TABLET ORAL at 11:43

## 2017-02-26 RX ADMIN — BUTALBITAL, ACETAMINOPHEN AND CAFFEINE 1 TABLET: 50; 325; 40 TABLET ORAL at 19:52

## 2017-02-26 RX ADMIN — SODIUM CHLORIDE 5 ML: 9 INJECTION INTRAMUSCULAR; INTRAVENOUS; SUBCUTANEOUS at 10:11

## 2017-02-26 RX ADMIN — PANTOPRAZOLE SODIUM 40 MG: 40 TABLET, DELAYED RELEASE ORAL at 05:39

## 2017-02-26 RX ADMIN — ALPRAZOLAM 0.5 MG: 0.5 TABLET ORAL at 00:56

## 2017-02-26 RX ADMIN — HYDROCODONE BITARTRATE AND ACETAMINOPHEN 1 TABLET: 7.5; 325 TABLET ORAL at 05:39

## 2017-02-26 RX ADMIN — HYDRALAZINE HYDROCHLORIDE 10 MG: 20 INJECTION INTRAMUSCULAR; INTRAVENOUS at 14:28

## 2017-02-26 RX ADMIN — METOPROLOL SUCCINATE 100 MG: 100 TABLET, FILM COATED, EXTENDED RELEASE ORAL at 11:43

## 2017-02-26 RX ADMIN — TEMAZEPAM 7.5 MG: 7.5 CAPSULE ORAL at 21:18

## 2017-02-27 LAB
ANION GAP SERPL CALCULATED.3IONS-SCNC: 8 MMOL/L (ref 4–13)
BASOPHILS # BLD AUTO: 0.02 10*3/MM3 (ref 0–0.2)
BASOPHILS NFR BLD AUTO: 0.2 % (ref 0–2)
BUN BLD-MCNC: 8 MG/DL (ref 5–21)
BUN/CREAT SERPL: 8.4 (ref 7–25)
CALCIUM SPEC-SCNC: 9.3 MG/DL (ref 8.4–10.4)
CHLORIDE SERPL-SCNC: 107 MMOL/L (ref 98–110)
CO2 SERPL-SCNC: 24 MMOL/L (ref 24–31)
CREAT BLD-MCNC: 0.95 MG/DL (ref 0.5–1.4)
DEPRECATED RDW RBC AUTO: 44.2 FL (ref 40–54)
EOSINOPHIL # BLD AUTO: 0.25 10*3/MM3 (ref 0–0.7)
EOSINOPHIL NFR BLD AUTO: 2.9 % (ref 0–4)
ERYTHROCYTE [DISTWIDTH] IN BLOOD BY AUTOMATED COUNT: 13.3 % (ref 12–15)
GFR SERPL CREATININE-BSD FRML MDRD: 63 ML/MIN/1.73
GLUCOSE BLD-MCNC: 85 MG/DL (ref 70–100)
HCT VFR BLD AUTO: 35.5 % (ref 37–47)
HGB BLD-MCNC: 11.7 G/DL (ref 12–16)
IMM GRANULOCYTES # BLD: 0.01 10*3/MM3 (ref 0–0.03)
IMM GRANULOCYTES NFR BLD: 0.1 % (ref 0–5)
LYMPHOCYTES # BLD AUTO: 5.79 10*3/MM3 (ref 0.72–4.86)
LYMPHOCYTES NFR BLD AUTO: 66.2 % (ref 15–45)
MCH RBC QN AUTO: 30.1 PG (ref 28–32)
MCHC RBC AUTO-ENTMCNC: 33 G/DL (ref 33–36)
MCV RBC AUTO: 91.3 FL (ref 82–98)
MONOCYTES # BLD AUTO: 0.43 10*3/MM3 (ref 0.19–1.3)
MONOCYTES NFR BLD AUTO: 4.9 % (ref 4–12)
NEUTROPHILS # BLD AUTO: 2.24 10*3/MM3 (ref 1.87–8.4)
NEUTROPHILS NFR BLD AUTO: 25.7 % (ref 39–78)
PLATELET # BLD AUTO: 286 10*3/MM3 (ref 130–400)
PMV BLD AUTO: 10.8 FL (ref 6–12)
POTASSIUM BLD-SCNC: 3.6 MMOL/L (ref 3.5–5.3)
RBC # BLD AUTO: 3.89 10*6/MM3 (ref 4.2–5.4)
RBC MORPH BLD: NORMAL
SMALL PLATELETS BLD QL SMEAR: ADEQUATE
SODIUM BLD-SCNC: 139 MMOL/L (ref 135–145)
WBC MORPH BLD: NORMAL
WBC NRBC COR # BLD: 8.74 10*3/MM3 (ref 4.8–10.8)

## 2017-02-27 PROCEDURE — 25010000002 FUROSEMIDE PER 20 MG: Performed by: FAMILY MEDICINE

## 2017-02-27 PROCEDURE — 80048 BASIC METABOLIC PNL TOTAL CA: CPT | Performed by: NURSE PRACTITIONER

## 2017-02-27 PROCEDURE — 25010000002 ONDANSETRON PER 1 MG: Performed by: FAMILY MEDICINE

## 2017-02-27 PROCEDURE — 85007 BL SMEAR W/DIFF WBC COUNT: CPT | Performed by: FAMILY MEDICINE

## 2017-02-27 PROCEDURE — 85635 REPTILASE TEST: CPT | Performed by: FAMILY MEDICINE

## 2017-02-27 PROCEDURE — 85025 COMPLETE CBC W/AUTO DIFF WBC: CPT | Performed by: FAMILY MEDICINE

## 2017-02-27 RX ORDER — AMLODIPINE BESYLATE 10 MG/1
10 TABLET ORAL DAILY
Status: DISCONTINUED | OUTPATIENT
Start: 2017-02-27 | End: 2017-02-28 | Stop reason: HOSPADM

## 2017-02-27 RX ORDER — FUROSEMIDE 10 MG/ML
10 INJECTION INTRAMUSCULAR; INTRAVENOUS ONCE
Status: COMPLETED | OUTPATIENT
Start: 2017-02-27 | End: 2017-02-27

## 2017-02-27 RX ADMIN — CLONIDINE HYDROCHLORIDE 0.1 MG: 0.1 TABLET ORAL at 08:05

## 2017-02-27 RX ADMIN — AMLODIPINE BESYLATE 10 MG: 10 TABLET ORAL at 14:28

## 2017-02-27 RX ADMIN — TEMAZEPAM 7.5 MG: 7.5 CAPSULE ORAL at 20:45

## 2017-02-27 RX ADMIN — PANTOPRAZOLE SODIUM 40 MG: 40 TABLET, DELAYED RELEASE ORAL at 06:46

## 2017-02-27 RX ADMIN — BUTALBITAL, ACETAMINOPHEN AND CAFFEINE 1 TABLET: 50; 325; 40 TABLET ORAL at 08:28

## 2017-02-27 RX ADMIN — ZONISAMIDE 200 MG: 100 CAPSULE ORAL at 08:25

## 2017-02-27 RX ADMIN — HYDROCODONE BITARTRATE AND ACETAMINOPHEN 1 TABLET: 10; 325 TABLET ORAL at 20:45

## 2017-02-27 RX ADMIN — HYDROCODONE BITARTRATE AND ACETAMINOPHEN 1 TABLET: 10; 325 TABLET ORAL at 03:35

## 2017-02-27 RX ADMIN — ALPRAZOLAM 0.5 MG: 0.5 TABLET ORAL at 02:32

## 2017-02-27 RX ADMIN — ONDANSETRON HYDROCHLORIDE 4 MG: 2 SOLUTION INTRAMUSCULAR; INTRAVENOUS at 07:50

## 2017-02-27 RX ADMIN — ALPRAZOLAM 0.5 MG: 0.5 TABLET ORAL at 17:56

## 2017-02-27 RX ADMIN — HYDROCODONE BITARTRATE AND ACETAMINOPHEN 1 TABLET: 10; 325 TABLET ORAL at 15:46

## 2017-02-27 RX ADMIN — METOPROLOL SUCCINATE 100 MG: 100 TABLET, FILM COATED, EXTENDED RELEASE ORAL at 08:25

## 2017-02-27 RX ADMIN — ASPIRIN 81 MG: 81 TABLET ORAL at 08:25

## 2017-02-27 RX ADMIN — ALPRAZOLAM 0.5 MG: 0.5 TABLET ORAL at 10:42

## 2017-02-27 RX ADMIN — HYDROCODONE BITARTRATE AND ACETAMINOPHEN 1 TABLET: 10; 325 TABLET ORAL at 09:33

## 2017-02-27 RX ADMIN — FUROSEMIDE 10 MG: 10 INJECTION, SOLUTION INTRAMUSCULAR; INTRAVENOUS at 17:56

## 2017-02-27 RX ADMIN — AMITRIPTYLINE HYDROCHLORIDE 50 MG: 25 TABLET, FILM COATED ORAL at 20:45

## 2017-02-28 VITALS
HEART RATE: 93 BPM | SYSTOLIC BLOOD PRESSURE: 142 MMHG | WEIGHT: 119.6 LBS | OXYGEN SATURATION: 100 % | HEIGHT: 67 IN | BODY MASS INDEX: 18.77 KG/M2 | DIASTOLIC BLOOD PRESSURE: 70 MMHG | RESPIRATION RATE: 20 BRPM | TEMPERATURE: 97.8 F

## 2017-02-28 LAB — BACTERIA SPEC AEROBE CULT: NORMAL

## 2017-02-28 PROCEDURE — 25010000002 HYDRALAZINE PER 20 MG: Performed by: FAMILY MEDICINE

## 2017-02-28 RX ORDER — CLONIDINE HYDROCHLORIDE 0.1 MG/1
0.1 TABLET ORAL EVERY 4 HOURS PRN
Status: DISCONTINUED | OUTPATIENT
Start: 2017-02-28 | End: 2017-02-28 | Stop reason: HOSPADM

## 2017-02-28 RX ORDER — CLONIDINE HYDROCHLORIDE 0.1 MG/1
0.1 TABLET ORAL EVERY 8 HOURS PRN
Qty: 10 TABLET | Refills: 0 | Status: SHIPPED | OUTPATIENT
Start: 2017-02-28 | End: 2018-09-12 | Stop reason: HOSPADM

## 2017-02-28 RX ORDER — ALPRAZOLAM 0.5 MG/1
0.5 TABLET ORAL 3 TIMES DAILY PRN
Qty: 15 TABLET | Refills: 0 | Status: SHIPPED | OUTPATIENT
Start: 2017-02-28 | End: 2017-03-07

## 2017-02-28 RX ADMIN — ASPIRIN 81 MG: 81 TABLET ORAL at 08:27

## 2017-02-28 RX ADMIN — ZONISAMIDE 200 MG: 100 CAPSULE ORAL at 08:27

## 2017-02-28 RX ADMIN — HYDROCODONE BITARTRATE AND ACETAMINOPHEN 1 TABLET: 10; 325 TABLET ORAL at 05:06

## 2017-02-28 RX ADMIN — METOPROLOL SUCCINATE 100 MG: 100 TABLET, FILM COATED, EXTENDED RELEASE ORAL at 08:27

## 2017-02-28 RX ADMIN — ALPRAZOLAM 0.5 MG: 0.5 TABLET ORAL at 05:06

## 2017-02-28 RX ADMIN — PANTOPRAZOLE SODIUM 40 MG: 40 TABLET, DELAYED RELEASE ORAL at 05:06

## 2017-02-28 RX ADMIN — AMLODIPINE BESYLATE 10 MG: 10 TABLET ORAL at 08:27

## 2017-02-28 RX ADMIN — HYDRALAZINE HYDROCHLORIDE 10 MG: 20 INJECTION INTRAMUSCULAR; INTRAVENOUS at 08:35

## 2017-03-01 LAB — REPTILASE TIME: 14.7 SEC (ref 0–21.9)

## 2017-03-02 ENCOUNTER — TELEPHONE (OUTPATIENT)
Dept: PRIMARY CARE CLINIC | Age: 47
End: 2017-03-02

## 2017-03-07 ENCOUNTER — APPOINTMENT (OUTPATIENT)
Dept: PREADMISSION TESTING | Facility: HOSPITAL | Age: 47
End: 2017-03-07

## 2017-03-07 VITALS
WEIGHT: 116.4 LBS | OXYGEN SATURATION: 99 % | RESPIRATION RATE: 16 BRPM | HEIGHT: 68 IN | DIASTOLIC BLOOD PRESSURE: 96 MMHG | BODY MASS INDEX: 17.64 KG/M2 | SYSTOLIC BLOOD PRESSURE: 136 MMHG | HEART RATE: 70 BPM

## 2017-03-07 LAB
ALBUMIN SERPL-MCNC: 4.7 G/DL (ref 3.5–5)
ALBUMIN/GLOB SERPL: 1.6 G/DL (ref 1.1–2.5)
ALP SERPL-CCNC: 93 U/L (ref 24–120)
ALT SERPL W P-5'-P-CCNC: 20 U/L (ref 0–54)
ANION GAP SERPL CALCULATED.3IONS-SCNC: 10 MMOL/L (ref 4–13)
AST SERPL-CCNC: 27 U/L (ref 7–45)
BILIRUB SERPL-MCNC: 0.3 MG/DL (ref 0.1–1)
BUN BLD-MCNC: 18 MG/DL (ref 5–21)
BUN/CREAT SERPL: 16.4 (ref 7–25)
CALCIUM SPEC-SCNC: 10.7 MG/DL (ref 8.4–10.4)
CHLORIDE SERPL-SCNC: 103 MMOL/L (ref 98–110)
CO2 SERPL-SCNC: 27 MMOL/L (ref 24–31)
CREAT BLD-MCNC: 1.1 MG/DL (ref 0.5–1.4)
DEPRECATED RDW RBC AUTO: 45 FL (ref 40–54)
EOSINOPHIL # BLD MANUAL: 0.09 10*3/MM3 (ref 0–0.7)
EOSINOPHIL NFR BLD MANUAL: 1 % (ref 0–4)
ERYTHROCYTE [DISTWIDTH] IN BLOOD BY AUTOMATED COUNT: 13.5 % (ref 12–15)
GFR SERPL CREATININE-BSD FRML MDRD: 53 ML/MIN/1.73
GLOBULIN UR ELPH-MCNC: 3 GM/DL
GLUCOSE BLD-MCNC: 91 MG/DL (ref 70–100)
HCT VFR BLD AUTO: 38.8 % (ref 37–47)
HGB BLD-MCNC: 12.7 G/DL (ref 12–16)
LYMPHOCYTES # BLD MANUAL: 5.03 10*3/MM3 (ref 0.72–4.86)
LYMPHOCYTES NFR BLD MANUAL: 2 % (ref 4–12)
LYMPHOCYTES NFR BLD MANUAL: 58 % (ref 15–45)
MCH RBC QN AUTO: 30.2 PG (ref 28–32)
MCHC RBC AUTO-ENTMCNC: 32.7 G/DL (ref 33–36)
MCV RBC AUTO: 92.2 FL (ref 82–98)
MONOCYTES # BLD AUTO: 0.17 10*3/MM3 (ref 0.19–1.3)
NEUTROPHILS # BLD AUTO: 2.86 10*3/MM3 (ref 1.87–8.4)
NEUTROPHILS NFR BLD MANUAL: 33 % (ref 39–78)
PLATELET # BLD AUTO: 331 10*3/MM3 (ref 130–400)
PMV BLD AUTO: 10.7 FL (ref 6–12)
POTASSIUM BLD-SCNC: 5 MMOL/L (ref 3.5–5.3)
PROT SERPL-MCNC: 7.7 G/DL (ref 6.3–8.7)
RBC # BLD AUTO: 4.21 10*6/MM3 (ref 4.2–5.4)
RBC MORPH BLD: NORMAL
SCAN SLIDE: NORMAL
SMALL PLATELETS BLD QL SMEAR: ADEQUATE
SMUDGE CELLS BLD QL SMEAR: ABNORMAL
SODIUM BLD-SCNC: 140 MMOL/L (ref 135–145)
VARIANT LYMPHS NFR BLD MANUAL: 6 % (ref 0–5)
WBC NRBC COR # BLD: 8.67 10*3/MM3 (ref 4.8–10.8)

## 2017-03-07 PROCEDURE — 36415 COLL VENOUS BLD VENIPUNCTURE: CPT

## 2017-03-07 PROCEDURE — 80053 COMPREHEN METABOLIC PANEL: CPT | Performed by: SPECIALIST

## 2017-03-07 PROCEDURE — 85007 BL SMEAR W/DIFF WBC COUNT: CPT | Performed by: SPECIALIST

## 2017-03-07 PROCEDURE — 85025 COMPLETE CBC W/AUTO DIFF WBC: CPT | Performed by: SPECIALIST

## 2017-03-07 RX ORDER — HYDROCODONE BITARTRATE AND ACETAMINOPHEN 5; 325 MG/1; MG/1
1 TABLET ORAL EVERY 6 HOURS PRN
Status: ON HOLD | COMMUNITY
End: 2018-02-02 | Stop reason: CLARIF

## 2017-03-07 NOTE — DISCHARGE INSTRUCTIONS
DAY OF SURGERY INSTRUCTIONS        YOUR SURGEON: AIMEE COTTRELL    PROCEDURE: EXCISION OF LESION ON LEFT BREAST, LEFT AXILLARY LYMPH NODE BIOPSY    DATE OF SURGERY: MARCH 9, 2017    ARRIVAL TIME: AS DIRECTED BY OFFICE    DAY OF SURGERY TAKE ONLY THESE MEDICATIONS: METOPROLOL AND AMLODIPINE        BEFORE YOU COME TO THE HOSPITAL  (Pre-op instructions)  • Do not eat, drink, smoke or chew gum after midnight the night before surgery.  This also includes no mints.  • Morning of surgery take only the medicines you have been instructed with a sip of water unless otherwise instructed  by your physician.  • Do not shave, wear makeup or dark nail polish.  • Remove all jewelry including rings.  • Leave anything you consider valuable at home.  • Leave your suitcase in the car until after your surgery.  • Bring the following with you if applicable:  o Picture ID and insurance, Medicare or Medicaid cards  o Co-pay/deductible required by insurance (cash, check, credit card)  o Medications (no narcotics) in original bottles (not a list) including all over-the-counter medications.  o Copy of advance directive, living will or power-of- documents if not brought to PAT  o CPAP or BIPAP mask and tubing  o Skin prep instruction sheet  o Relaxation aids (MP3 player, book, magazine)  • Confirm your arrival time with you surgeon the day before your surgery (surgery times are subject to change)  • On the day of surgery check in at registration located at the main entrance of the hospital.       Outpatient Surgery Guidelines, Adult  Outpatient procedures are those for which the person having the procedure is allowed to go home the same day as the procedure. Various procedures are done on an outpatient basis. You should follow some general guidelines if you will be having an outpatient procedure.  LET YOUR HEALTH CARE PROVIDER KNOW ABOUT:  · Any allergies you have.  · All medicines you are taking, including vitamins, herbs, eye  drops, creams, and over-the-counter medicines.  · Previous problems you or members of your family have had with the use of anesthetics.  · Any blood disorders you have.  · Previous surgeries you have had.  · Medical conditions you have.  RISKS AND COMPLICATIONS  Your health care provider will discuss possible risks and complications with you before surgery. Common risks and complications include:    · Problems due to the use of anesthetics.  · Blood loss and replacement (does not apply to minor surgical procedures).  · Temporary increase in pain due to surgery.  · Uncorrected pain or problems that the surgery was meant to correct.  · Infection.  · New damage.  BEFORE THE PROCEDURE  · Ask your health care provider about changing or stopping your regular medicines. You may need to stop taking certain medicines in the days or weeks before the procedure.  · Stop smoking at least 2 weeks before surgery. This lowers your risk for complications during and after surgery. Ask your health care provider for help with this if needed.  · Eat your usual meals and a light supper the day before surgery. Continue fluid intake. Do not drink alcohol.  · Do not eat or drink after midnight the night before your surgery.   · Arrange for someone to take you home and to stay with you for 24 hours after the procedure. Medicine given for your procedure may affect your ability to drive or to care for yourself.  · Call your health care provider's office if you develop an illness or problem that may prevent you from safely having your procedure.  AFTER THE PROCEDURE  After surgery, you will be taken to a recovery area, where your progress will be monitored. If there are no complications, you will be allowed to go home when you are awake, stable, and taking fluids well. You may have numbness around the surgical site. Healing will take some time. You will have tenderness at the surgical site and may have some swelling and bruising. You may also  have some nausea.  HOME CARE INSTRUCTIONS  · Do not drive for 24 hours, or as directed by your health care provider. Do not drive while taking prescription pain medicines.  · Do not drink alcohol for 24 hours.  · Do not make important decisions or sign legal documents for 24 hours.  · You may resume a normal diet and activities as directed.  · Do not lift anything heavier than 10 pounds (4.5 kg) or play contact sports until your health care provider says it is okay.  · Change your bandages (dressings) as directed.  · Only take over-the-counter or prescription medicines as directed by your health care provider.  · Follow up with your health care provider as directed.  SEEK MEDICAL CARE IF:  · You have increased bleeding (more than a small spot) from the surgical site.  · You have redness, swelling, or increasing pain in the wound.  · You see pus coming from the wound.  · You have a fever.  · You notice a bad smell coming from the wound or dressing.  · You feel lightheaded or faint.  · You develop a rash.  · You have trouble breathing.  · You develop allergies.  MAKE SURE YOU:  · Understand these instructions.  · Will watch your condition.  · Will get help right away if you are not doing well or get worse.     This information is not intended to replace advice given to you by your health care provider. Make sure you discuss any questions you have with your health care provider.     Document Released: 09/12/2002 Document Revised: 05/03/2016 Document Reviewed: 05/22/2014  [employer] Interactive Patient Education ©2016 [employer]       Fall Prevention in Hospitals, Adult  As a hospital patient, your condition and the treatments you receive can increase your risk for falls. Some additional risk factors for falls in a hospital include:  · Being in an unfamiliar environment.  · Being on bed rest.  · Your surgery.  · Taking certain medicines.  · Your tubing requirements, such as intravenous (IV) therapy or catheters.  It is  important that you learn how to decrease fall risks while at the hospital. Below are important tips that can help prevent falls.  SAFETY TIPS FOR PREVENTING FALLS  Talk about your risk of falling.  · Ask your health care provider why you are at risk for falling. Is it your medicine, illness, tubing placement, or something else?  · Make a plan with your health care provider to keep you safe from falls.  · Ask your health care provider or pharmacist about side effects of your medicines. Some medicines can make you dizzy or affect your coordination.  Ask for help.  · Ask for help before getting out of bed. You may need to press your call button.  · Ask for assistance in getting safely to the toilet.  · Ask for a walker or cane to be put at your bedside. Ask that most of the side rails on your bed be placed up before your health care provider leaves the room.  · Ask family or friends to sit with you.  · Ask for things that are out of your reach, such as your glasses, hearing aids, telephone, bedside table, or call button.  Follow these tips to avoid falling:  · Stay lying or seated, rather than standing, while waiting for help.  · Wear rubber-soled slippers or shoes whenever you walk in the hospital.  · Avoid quick, sudden movements.  ¨ Change positions slowly.  ¨ Sit on the side of your bed before standing.  ¨ Stand up slowly and wait before you start to walk.  · Let your health care provider know if there is a spill on the floor.  · Pay careful attention to the medical equipment, electrical cords, and tubes around you.  · When you need help, use your call button by your bed or in the bathroom. Wait for one of your health care providers to help you.  · If you feel dizzy or unsure of your footing, return to bed and wait for assistance.  · Avoid being distracted by the TV, telephone, or another person in your room.  · Do not lean or support yourself on rolling objects, such as IV poles or bedside tables.     This  information is not intended to replace advice given to you by your health care provider. Make sure you discuss any questions you have with your health care provider.     Document Released: 12/15/2001 Document Revised: 01/08/2016 Document Reviewed: 08/25/2013  [employer] Interactive Patient Education ©2016 [employer]       Surgical Site Infections FAQs  What is a Surgical Site Infection (SSI)?  A surgical site infection is an infection that occurs after surgery in the part of the body where the surgery took place. Most patients who have surgery do not develop an infection. However, infections develop in about 1 to 3 out of every 100 patients who have surgery.  Some of the common symptoms of a surgical site infection are:  · Redness and pain around the area where you had surgery  · Drainage of cloudy fluid from your surgical wound  · Fever  Can SSIs be treated?  Yes. Most surgical site infections can be treated with antibiotics. The antibiotic given to you depends on the bacteria (germs) causing the infection. Sometimes patients with SSIs also need another surgery to treat the infection.  What are some of the things that hospitals are doing to prevent SSIs?  To prevent SSIs, doctors, nurses, and other healthcare providers:  · Clean their hands and arms up to their elbows with an antiseptic agent just before the surgery.  · Clean their hands with soap and water or an alcohol-based hand rub before and after caring for each patient.  · May remove some of your hair immediately before your surgery using electric clippers if the hair is in the same area where the procedure will occur. They should not shave you with a razor.  · Wear special hair covers, masks, gowns, and gloves during surgery to keep the surgery area clean.  · Give you antibiotics before your surgery starts. In most cases, you should get antibiotics within 60 minutes before the surgery starts and the antibiotics should be stopped within 24 hours after  surgery.  · Clean the skin at the site of your surgery with a special soap that kills germs.  What can I do to help prevent SSIs?  Before your surgery:  · Tell your doctor about other medical problems you may have. Health problems such as allergies, diabetes, and obesity could affect your surgery and your treatment.  · Quit smoking. Patients who smoke get more infections. Talk to your doctor about how you can quit before your surgery.  · Do not shave near where you will have surgery. Shaving with a razor can irritate your skin and make it easier to develop an infection.  At the time of your surgery:  · Speak up if someone tries to shave you with a razor before surgery. Ask why you need to be shaved and talk with your surgeon if you have any concerns.  · Ask if you will get antibiotics before surgery.  After your surgery:  · Make sure that your healthcare providers clean their hands before examining you, either with soap and water or an alcohol-based hand rub.  · If you do not see your providers clean their hands, please ask them to do so.  · Family and friends who visit you should not touch the surgical wound or dressings.  · Family and friends should clean their hands with soap and water or an alcohol-based hand rub before and after visiting you. If you do not see them clean their hands, ask them to clean their hands.  What do I need to do when I go home from the hospital?  · Before you go home, your doctor or nurse should explain everything you need to know about taking care of your wound. Make sure you understand how to care for your wound before you leave the hospital.  · Always clean your hands before and after caring for your wound.  · Before you go home, make sure you know who to contact if you have questions or problems after you get home.  · If you have any symptoms of an infection, such as redness and pain at the surgery site, drainage, or fever, call your doctor immediately.  If you have additional  questions, please ask your doctor or nurse.  Developed and co-sponsored by The Society for Healthcare Epidemiology of [place] (SHEA); Infectious Diseases Society of [place] (IDSA); American Hospital Association; Association for Professionals in Infection Control and Epidemiology (APIC); Centers for Disease Control and Prevention (CDC); and The Joint Commission.     This information is not intended to replace advice given to you by your health care provider. Make sure you discuss any questions you have with your health care provider.     Document Released: 12/23/2014 Document Revised: 01/08/2016 Document Reviewed: 03/02/2016  [employer] Interactive Patient Education ©2016 [employer].       [facility]  CHG 4% Patient Instruction Sheet    Preparing the Skin Before Surgery  Preparing or “prepping” skin before surgery can reduce the risk of infection at the surgical site. To make the process easier,[facility] has chosen 4% Chlorhexidine Gluconate (CHG) antiseptic solution.   The steps below outline the prepping process and should be carefully followed.                                                                                                                                                      Prep the skin at the following time(s):                                                      We recommend you shower the night before surgery, and again the morning of surgery with the 4% CHG antiseptic solution using half of the bottle and a cloth each time.  Dress in clean clothes/sleepwear after showering.  See instructions below for application.          Do not apply any lotions or moisturizers.       Do not shave the area to be prepped for at least 2 days prior to surgery.    Clipping the hair may be done immediately prior to your surgery at the hospital    if needed.    Directions:  Thoroughly rinse your body with water.  Apply 4% CHG to a cloth and wash skin gently, paying special attention to the operative site.   Rinse again thoroughly.  Once you have begun using this product do not apply anything else to your skin. If itching or redness persists, rinse affected areas and discontinue use.    When using this product:  • Keep out of eyes, ears, and mouth.  • If solution should contact these areas, rinse out promptly and thoroughly with water.  • For external use only.  • Do not use in genital area, on your face or head.      PATIENT/FAMILY/RESPONSIBLE PARTY VERBALIZES UNDERSTANDING OF ABOVE EDUCATION

## 2017-03-08 ENCOUNTER — OFFICE VISIT (OUTPATIENT)
Dept: PRIMARY CARE CLINIC | Age: 47
End: 2017-03-08
Payer: MEDICARE

## 2017-03-08 VITALS
RESPIRATION RATE: 18 BRPM | WEIGHT: 116 LBS | DIASTOLIC BLOOD PRESSURE: 64 MMHG | SYSTOLIC BLOOD PRESSURE: 128 MMHG | HEART RATE: 71 BPM | OXYGEN SATURATION: 97 % | BODY MASS INDEX: 18.21 KG/M2 | HEIGHT: 67 IN

## 2017-03-08 DIAGNOSIS — M54.50 ACUTE BILATERAL LOW BACK PAIN WITHOUT SCIATICA: ICD-10-CM

## 2017-03-08 DIAGNOSIS — I67.5 MOYA MOYA DISEASE: Primary | ICD-10-CM

## 2017-03-08 DIAGNOSIS — I10 ESSENTIAL HYPERTENSION: ICD-10-CM

## 2017-03-08 PROCEDURE — 99214 OFFICE O/P EST MOD 30 MIN: CPT | Performed by: INTERNAL MEDICINE

## 2017-03-08 RX ORDER — CLONIDINE HYDROCHLORIDE 0.1 MG/1
0.1 TABLET ORAL EVERY 8 HOURS PRN
COMMUNITY
End: 2017-04-27 | Stop reason: ALTCHOICE

## 2017-03-08 RX ORDER — AMITRIPTYLINE HYDROCHLORIDE 50 MG/1
50 TABLET, FILM COATED ORAL NIGHTLY
Qty: 30 TABLET | Refills: 5 | Status: SHIPPED | OUTPATIENT
Start: 2017-03-08 | End: 2017-09-21 | Stop reason: SDUPTHER

## 2017-03-08 RX ORDER — ALPRAZOLAM 0.5 MG/1
0.5 TABLET ORAL 3 TIMES DAILY PRN
COMMUNITY
End: 2017-03-08 | Stop reason: SDUPTHER

## 2017-03-08 RX ORDER — ALPRAZOLAM 0.5 MG/1
0.5 TABLET ORAL 3 TIMES DAILY PRN
Qty: 90 TABLET | Refills: 0 | Status: SHIPPED | OUTPATIENT
Start: 2017-03-08 | End: 2017-04-07 | Stop reason: SDUPTHER

## 2017-03-09 ENCOUNTER — HOSPITAL ENCOUNTER (OUTPATIENT)
Facility: HOSPITAL | Age: 47
Setting detail: HOSPITAL OUTPATIENT SURGERY
Discharge: HOME | End: 2017-03-09
Attending: SPECIALIST | Admitting: SPECIALIST

## 2017-03-09 ENCOUNTER — ANESTHESIA EVENT (OUTPATIENT)
Dept: PERIOP | Facility: HOSPITAL | Age: 47
End: 2017-03-09

## 2017-03-09 ENCOUNTER — ANESTHESIA (OUTPATIENT)
Dept: PERIOP | Facility: HOSPITAL | Age: 47
End: 2017-03-09

## 2017-03-09 VITALS
HEART RATE: 63 BPM | DIASTOLIC BLOOD PRESSURE: 97 MMHG | TEMPERATURE: 98.4 F | OXYGEN SATURATION: 91 % | SYSTOLIC BLOOD PRESSURE: 120 MMHG | RESPIRATION RATE: 16 BRPM

## 2017-03-09 DIAGNOSIS — L98.9 SKIN LESION OF CHEST WALL: ICD-10-CM

## 2017-03-09 PROCEDURE — 88305 TISSUE EXAM BY PATHOLOGIST: CPT | Performed by: SPECIALIST

## 2017-03-09 PROCEDURE — 88185 FLOWCYTOMETRY/TC ADD-ON: CPT

## 2017-03-09 PROCEDURE — 25010000002 PROPOFOL 1000 MG/ML EMULSION: Performed by: NURSE ANESTHETIST, CERTIFIED REGISTERED

## 2017-03-09 PROCEDURE — 88184 FLOWCYTOMETRY/ TC 1 MARKER: CPT | Performed by: SPECIALIST

## 2017-03-09 PROCEDURE — 25010000002 MIDAZOLAM PER 1 MG: Performed by: NURSE ANESTHETIST, CERTIFIED REGISTERED

## 2017-03-09 PROCEDURE — 25010000002 FENTANYL CITRATE (PF) 100 MCG/2ML SOLUTION: Performed by: NURSE ANESTHETIST, CERTIFIED REGISTERED

## 2017-03-09 PROCEDURE — 25010000002 MORPHINE SULFATE (PF) 2 MG/ML SOLUTION: Performed by: ANESTHESIOLOGY

## 2017-03-09 PROCEDURE — 25010000002 ONDANSETRON PER 1 MG: Performed by: ANESTHESIOLOGY

## 2017-03-09 PROCEDURE — 25010000002 HYDROMORPHONE PER 4 MG: Performed by: ANESTHESIOLOGY

## 2017-03-09 RX ORDER — BUPIVACAINE HYDROCHLORIDE AND EPINEPHRINE 2.5; 5 MG/ML; UG/ML
INJECTION, SOLUTION INFILTRATION; PERINEURAL AS NEEDED
Status: DISCONTINUED | OUTPATIENT
Start: 2017-03-09 | End: 2017-03-09 | Stop reason: HOSPADM

## 2017-03-09 RX ORDER — MIDAZOLAM HYDROCHLORIDE 1 MG/ML
INJECTION INTRAMUSCULAR; INTRAVENOUS AS NEEDED
Status: DISCONTINUED | OUTPATIENT
Start: 2017-03-09 | End: 2017-03-09 | Stop reason: SURG

## 2017-03-09 RX ORDER — HYDROCODONE BITARTRATE AND ACETAMINOPHEN 5; 325 MG/1; MG/1
1-2 TABLET ORAL EVERY 4 HOURS PRN
Qty: 30 TABLET | Refills: 0 | Status: SHIPPED | OUTPATIENT
Start: 2017-03-09 | End: 2017-08-03 | Stop reason: SDUPTHER

## 2017-03-09 RX ORDER — MIDAZOLAM HYDROCHLORIDE 1 MG/ML
1 INJECTION INTRAMUSCULAR; INTRAVENOUS
Status: DISCONTINUED | OUTPATIENT
Start: 2017-03-09 | End: 2017-03-09 | Stop reason: HOSPADM

## 2017-03-09 RX ORDER — MIDAZOLAM HYDROCHLORIDE 1 MG/ML
2 INJECTION INTRAMUSCULAR; INTRAVENOUS
Status: DISCONTINUED | OUTPATIENT
Start: 2017-03-09 | End: 2017-03-09 | Stop reason: HOSPADM

## 2017-03-09 RX ORDER — NALOXONE HCL 0.4 MG/ML
0.04 VIAL (ML) INJECTION AS NEEDED
Status: DISCONTINUED | OUTPATIENT
Start: 2017-03-09 | End: 2017-03-09 | Stop reason: HOSPADM

## 2017-03-09 RX ORDER — SODIUM CHLORIDE 0.9 % (FLUSH) 0.9 %
1-10 SYRINGE (ML) INJECTION AS NEEDED
Status: DISCONTINUED | OUTPATIENT
Start: 2017-03-09 | End: 2017-03-09 | Stop reason: HOSPADM

## 2017-03-09 RX ORDER — SODIUM CHLORIDE 9 MG/ML
50 INJECTION, SOLUTION INTRAVENOUS CONTINUOUS
Status: DISCONTINUED | OUTPATIENT
Start: 2017-03-09 | End: 2017-03-09 | Stop reason: HOSPADM

## 2017-03-09 RX ORDER — SODIUM CHLORIDE, SODIUM LACTATE, POTASSIUM CHLORIDE, CALCIUM CHLORIDE 600; 310; 30; 20 MG/100ML; MG/100ML; MG/100ML; MG/100ML
100 INJECTION, SOLUTION INTRAVENOUS CONTINUOUS
Status: DISCONTINUED | OUTPATIENT
Start: 2017-03-09 | End: 2017-03-09

## 2017-03-09 RX ORDER — IPRATROPIUM BROMIDE AND ALBUTEROL SULFATE 2.5; .5 MG/3ML; MG/3ML
3 SOLUTION RESPIRATORY (INHALATION) ONCE AS NEEDED
Status: DISCONTINUED | OUTPATIENT
Start: 2017-03-09 | End: 2017-03-09 | Stop reason: HOSPADM

## 2017-03-09 RX ORDER — MAGNESIUM HYDROXIDE 1200 MG/15ML
LIQUID ORAL AS NEEDED
Status: DISCONTINUED | OUTPATIENT
Start: 2017-03-09 | End: 2017-03-09 | Stop reason: HOSPADM

## 2017-03-09 RX ORDER — ONDANSETRON 2 MG/ML
4 INJECTION INTRAMUSCULAR; INTRAVENOUS AS NEEDED
Status: DISCONTINUED | OUTPATIENT
Start: 2017-03-09 | End: 2017-03-09 | Stop reason: HOSPADM

## 2017-03-09 RX ORDER — MORPHINE SULFATE 2 MG/ML
2 INJECTION, SOLUTION INTRAMUSCULAR; INTRAVENOUS AS NEEDED
Status: DISCONTINUED | OUTPATIENT
Start: 2017-03-09 | End: 2017-03-09 | Stop reason: HOSPADM

## 2017-03-09 RX ORDER — FLUMAZENIL 0.1 MG/ML
0.2 INJECTION INTRAVENOUS AS NEEDED
Status: DISCONTINUED | OUTPATIENT
Start: 2017-03-09 | End: 2017-03-09 | Stop reason: HOSPADM

## 2017-03-09 RX ORDER — HYDROCODONE BITARTRATE AND ACETAMINOPHEN 5; 325 MG/1; MG/1
1 TABLET ORAL EVERY 4 HOURS PRN
Status: DISCONTINUED | OUTPATIENT
Start: 2017-03-09 | End: 2017-03-09 | Stop reason: HOSPADM

## 2017-03-09 RX ORDER — VASOPRESSIN 20 U/ML
INJECTION PARENTERAL AS NEEDED
Status: DISCONTINUED | OUTPATIENT
Start: 2017-03-09 | End: 2017-03-09 | Stop reason: SURG

## 2017-03-09 RX ORDER — MEPERIDINE HYDROCHLORIDE 25 MG/ML
12.5 INJECTION INTRAMUSCULAR; INTRAVENOUS; SUBCUTANEOUS
Status: DISCONTINUED | OUTPATIENT
Start: 2017-03-09 | End: 2017-03-09 | Stop reason: HOSPADM

## 2017-03-09 RX ORDER — SODIUM CHLORIDE 9 MG/ML
INJECTION, SOLUTION INTRAVENOUS CONTINUOUS PRN
Status: DISCONTINUED | OUTPATIENT
Start: 2017-03-09 | End: 2017-03-09 | Stop reason: SURG

## 2017-03-09 RX ORDER — HYDRALAZINE HYDROCHLORIDE 20 MG/ML
5 INJECTION INTRAMUSCULAR; INTRAVENOUS
Status: DISCONTINUED | OUTPATIENT
Start: 2017-03-09 | End: 2017-03-09 | Stop reason: HOSPADM

## 2017-03-09 RX ORDER — LABETALOL HYDROCHLORIDE 5 MG/ML
5 INJECTION, SOLUTION INTRAVENOUS
Status: DISCONTINUED | OUTPATIENT
Start: 2017-03-09 | End: 2017-03-09 | Stop reason: HOSPADM

## 2017-03-09 RX ORDER — FENTANYL CITRATE 50 UG/ML
INJECTION, SOLUTION INTRAMUSCULAR; INTRAVENOUS AS NEEDED
Status: DISCONTINUED | OUTPATIENT
Start: 2017-03-09 | End: 2017-03-09 | Stop reason: SURG

## 2017-03-09 RX ORDER — METOCLOPRAMIDE HYDROCHLORIDE 5 MG/ML
5 INJECTION INTRAMUSCULAR; INTRAVENOUS
Status: DISCONTINUED | OUTPATIENT
Start: 2017-03-09 | End: 2017-03-09 | Stop reason: HOSPADM

## 2017-03-09 RX ORDER — ALPRAZOLAM 1 MG/1
1 TABLET ORAL 3 TIMES DAILY PRN
COMMUNITY
End: 2019-03-06 | Stop reason: ALTCHOICE

## 2017-03-09 RX ADMIN — MIDAZOLAM HYDROCHLORIDE 5 MG: 1 INJECTION, SOLUTION INTRAMUSCULAR; INTRAVENOUS at 08:52

## 2017-03-09 RX ADMIN — PROPOFOL 150 MCG/KG/MIN: 10 INJECTION, EMULSION INTRAVENOUS at 08:52

## 2017-03-09 RX ADMIN — HYDROMORPHONE HYDROCHLORIDE 1 MG: 1 INJECTION, SOLUTION INTRAMUSCULAR; INTRAVENOUS; SUBCUTANEOUS at 10:03

## 2017-03-09 RX ADMIN — SODIUM CHLORIDE: 9 INJECTION, SOLUTION INTRAVENOUS at 08:52

## 2017-03-09 RX ADMIN — HYDROMORPHONE HYDROCHLORIDE 1 MG: 1 INJECTION, SOLUTION INTRAMUSCULAR; INTRAVENOUS; SUBCUTANEOUS at 10:17

## 2017-03-09 RX ADMIN — HYDROCODONE BITARTRATE AND ACETAMINOPHEN 1 TABLET: 5; 325 TABLET ORAL at 11:40

## 2017-03-09 RX ADMIN — SODIUM CHLORIDE 50 ML/HR: 9 INJECTION, SOLUTION INTRAVENOUS at 10:30

## 2017-03-09 RX ADMIN — MORPHINE SULFATE 2 MG: 2 INJECTION, SOLUTION INTRAMUSCULAR; INTRAVENOUS at 09:57

## 2017-03-09 RX ADMIN — VASOPRESSIN 2 UNITS: 20 INJECTION INTRAVENOUS at 09:03

## 2017-03-09 RX ADMIN — FENTANYL CITRATE 250 MCG: 50 INJECTION INTRAMUSCULAR; INTRAVENOUS at 08:52

## 2017-03-09 RX ADMIN — ONDANSETRON 4 MG: 2 INJECTION INTRAMUSCULAR; INTRAVENOUS at 09:57

## 2017-03-09 RX ADMIN — CEFAZOLIN 1 G: 1 INJECTION, POWDER, FOR SOLUTION INTRAMUSCULAR; INTRAVENOUS; PARENTERAL at 08:46

## 2017-03-09 NOTE — ANESTHESIA PREPROCEDURE EVALUATION
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):  NPO Status: > 8 hours   Airway   Mallampati: I  TM distance: <3 FB  Neck ROM: full  no difficulty expected  Dental - normal exam     Pulmonary - normal exam   (-) COPD, asthma, not a smoker  Cardiovascular - normal exam  Exercise tolerance: good (4-7 METS)    ECG reviewed    (+) hypertension, PVD (maravilla maravilla disease- multiple strokes, reports two bypass surgeries on arteries in brain, 2007),   (-) CAD, angina      Neuro/Psych  (+) seizures (last seizure 2-3 years ago) well controlled, CVA residual symptoms, headaches, psychiatric history Anxiety,    GI/Hepatic/Renal/Endo    (+)  chronic renal disease (recent admission last week for ARF. Resolved with IV hydration. Etiology unclear. ),     Musculoskeletal     Abdominal  - normal exam    Bowel sounds: normal.   Substance History      OB/GYN          Other          Other Comment: Crusted lesion on breast, swollen lymph nodes                            Anesthesia Plan    ASA 3     MAC     intravenous induction   Anesthetic plan and risks discussed with patient.

## 2017-03-09 NOTE — ANESTHESIA PROCEDURE NOTES
Airway  Urgency: elective    Date/Time: 3/9/2017 8:45 AM  Airway not difficult    General Information and Staff    Patient location during procedure: OR    Indications and Patient Condition  Indications for airway management: airway protection    Preoxygenated: yes  MILS maintained throughout  Mask difficulty assessment: 1 - vent by mask    Final Airway Details  Final airway type: supraglottic airway      Successful airway: classic  Size 3.5  Airway Seal Pressure (cm H2O): 20    Number of attempts at approach: 1

## 2017-03-10 NOTE — ANESTHESIA POSTPROCEDURE EVALUATION
Patient: Kamryn RODRIGUEZ [place]    Procedure Summary     Date Anesthesia Start Anesthesia Stop Room / Location    03/09/17 0852 0947  [facility] OR 06 / [facility] OR       Procedure Diagnosis Surgeon Provider    EXCISION LEFT BREAST LESION AND LEFT AXILLARY LYMPH NODE BIOPSY (Left Breast) (LEFT BREAST LESION) April L MD Rafael Farley CRNA          Anesthesia Type: MAC  Last vitals  BP      Temp      Pulse     Resp      SpO2        Post Anesthesia Care and Evaluation    Patient participation: complete - patient participated  Level of consciousness: awake and alert  Pain management: adequate  Airway patency: patent  Anesthetic complications: No anesthetic complications    Cardiovascular status: acceptable  Respiratory status: acceptable  Hydration status: acceptable    Comments: Discharged from pacu per protocol

## 2017-03-16 LAB
LAB AP CASE REPORT: NORMAL
Lab: NORMAL
PATH REPORT.FINAL DX SPEC: NORMAL
PATH REPORT.GROSS SPEC: NORMAL

## 2017-03-23 RX ORDER — HYDROCODONE BITARTRATE AND ACETAMINOPHEN 5; 325 MG/1; MG/1
1 TABLET ORAL EVERY 6 HOURS PRN
Qty: 30 TABLET | Refills: 0 | Status: SHIPPED | OUTPATIENT
Start: 2017-03-23 | End: 2017-03-30

## 2017-04-07 RX ORDER — ALPRAZOLAM 0.5 MG/1
0.5 TABLET ORAL 3 TIMES DAILY PRN
Qty: 90 TABLET | Refills: 2 | OUTPATIENT
Start: 2017-04-07 | End: 2017-05-07

## 2017-04-12 ENCOUNTER — OFFICE VISIT (OUTPATIENT)
Dept: PRIMARY CARE CLINIC | Age: 47
End: 2017-04-12
Payer: MEDICARE

## 2017-04-12 VITALS
HEART RATE: 68 BPM | OXYGEN SATURATION: 99 % | DIASTOLIC BLOOD PRESSURE: 68 MMHG | BODY MASS INDEX: 18.68 KG/M2 | SYSTOLIC BLOOD PRESSURE: 128 MMHG | RESPIRATION RATE: 18 BRPM | WEIGHT: 119 LBS | HEIGHT: 67 IN

## 2017-04-12 DIAGNOSIS — I67.5 MOYA MOYA DISEASE: Primary | ICD-10-CM

## 2017-04-12 DIAGNOSIS — I10 ESSENTIAL HYPERTENSION: ICD-10-CM

## 2017-04-12 DIAGNOSIS — N17.9 AKI (ACUTE KIDNEY INJURY) (HCC): ICD-10-CM

## 2017-04-12 DIAGNOSIS — G89.29 CHRONIC LEFT-SIDED LOW BACK PAIN WITH LEFT-SIDED SCIATICA: ICD-10-CM

## 2017-04-12 DIAGNOSIS — M54.42 CHRONIC LEFT-SIDED LOW BACK PAIN WITH LEFT-SIDED SCIATICA: ICD-10-CM

## 2017-04-12 LAB
ANION GAP SERPL CALCULATED.3IONS-SCNC: 12 MMOL/L (ref 7–19)
BUN BLDV-MCNC: 14 MG/DL (ref 6–20)
CALCIUM SERPL-MCNC: 10.4 MG/DL (ref 8.6–10)
CHLORIDE BLD-SCNC: 105 MMOL/L (ref 98–111)
CO2: 25 MMOL/L (ref 22–29)
CREAT SERPL-MCNC: 0.8 MG/DL (ref 0.5–0.9)
GFR NON-AFRICAN AMERICAN: >60
GLUCOSE BLD-MCNC: 99 MG/DL (ref 74–109)
POTASSIUM SERPL-SCNC: 4.6 MMOL/L (ref 3.5–5)
SODIUM BLD-SCNC: 142 MMOL/L (ref 136–145)

## 2017-04-12 PROCEDURE — 99214 OFFICE O/P EST MOD 30 MIN: CPT | Performed by: INTERNAL MEDICINE

## 2017-04-12 PROCEDURE — G8420 CALC BMI NORM PARAMETERS: HCPCS | Performed by: INTERNAL MEDICINE

## 2017-04-12 PROCEDURE — 4004F PT TOBACCO SCREEN RCVD TLK: CPT | Performed by: INTERNAL MEDICINE

## 2017-04-12 PROCEDURE — G8427 DOCREV CUR MEDS BY ELIG CLIN: HCPCS | Performed by: INTERNAL MEDICINE

## 2017-04-12 RX ORDER — HYDROCODONE BITARTRATE AND ACETAMINOPHEN 5; 325 MG/1; MG/1
1 TABLET ORAL EVERY 6 HOURS PRN
Qty: 90 TABLET | Refills: 0 | Status: SHIPPED | OUTPATIENT
Start: 2017-04-12 | End: 2017-06-02 | Stop reason: ALTCHOICE

## 2017-04-12 RX ORDER — HYDROCODONE BITARTRATE AND ACETAMINOPHEN 5; 325 MG/1; MG/1
1 TABLET ORAL EVERY 6 HOURS PRN
COMMUNITY
End: 2017-04-12 | Stop reason: SDUPTHER

## 2017-04-12 ASSESSMENT — ENCOUNTER SYMPTOMS
NAUSEA: 0
DIARRHEA: 0
VOMITING: 0
CONSTIPATION: 0
SHORTNESS OF BREATH: 1
BACK PAIN: 0
COUGH: 0

## 2017-04-24 RX ORDER — ONDANSETRON 4 MG/1
4 TABLET, FILM COATED ORAL EVERY 8 HOURS PRN
Qty: 90 TABLET | Refills: 1 | Status: SHIPPED | OUTPATIENT
Start: 2017-04-24 | End: 2017-07-27 | Stop reason: SDUPTHER

## 2017-04-26 ENCOUNTER — PROCEDURE VISIT (OUTPATIENT)
Dept: NEUROLOGY | Age: 47
End: 2017-04-26
Payer: MEDICARE

## 2017-04-26 VITALS
DIASTOLIC BLOOD PRESSURE: 68 MMHG | WEIGHT: 115 LBS | HEIGHT: 67 IN | SYSTOLIC BLOOD PRESSURE: 116 MMHG | BODY MASS INDEX: 18.05 KG/M2

## 2017-04-26 DIAGNOSIS — G43.719 INTRACTABLE CHRONIC MIGRAINE WITHOUT AURA AND WITHOUT STATUS MIGRAINOSUS: Primary | ICD-10-CM

## 2017-04-26 PROCEDURE — 64615 CHEMODENERV MUSC MIGRAINE: CPT | Performed by: PSYCHIATRY & NEUROLOGY

## 2017-04-26 PROCEDURE — 4004F PT TOBACCO SCREEN RCVD TLK: CPT | Performed by: PSYCHIATRY & NEUROLOGY

## 2017-04-27 ENCOUNTER — HOSPITAL ENCOUNTER (OUTPATIENT)
Dept: PAIN MANAGEMENT | Age: 47
Discharge: HOME | End: 2017-04-27
Payer: MEDICARE

## 2017-04-27 VITALS
HEART RATE: 116 BPM | DIASTOLIC BLOOD PRESSURE: 127 MMHG | OXYGEN SATURATION: 100 % | TEMPERATURE: 97.5 F | RESPIRATION RATE: 16 BRPM | HEIGHT: 67 IN | BODY MASS INDEX: 18.21 KG/M2 | WEIGHT: 116 LBS | SYSTOLIC BLOOD PRESSURE: 179 MMHG

## 2017-04-27 PROCEDURE — 99205 OFFICE O/P NEW HI 60 MIN: CPT

## 2017-04-27 RX ORDER — AMLODIPINE BESYLATE 10 MG/1
10 TABLET ORAL DAILY
COMMUNITY
End: 2017-06-29

## 2017-04-27 RX ORDER — CLONIDINE HYDROCHLORIDE 0.1 MG/1
0.1 TABLET ORAL EVERY 8 HOURS PRN
Qty: 60 TABLET | Refills: 11 | Status: SHIPPED | OUTPATIENT
Start: 2017-04-27 | End: 2018-02-28 | Stop reason: SDUPTHER

## 2017-04-27 ASSESSMENT — PAIN DESCRIPTION - ORIENTATION: ORIENTATION: LOWER

## 2017-04-27 ASSESSMENT — PAIN DESCRIPTION - LOCATION: LOCATION: BACK

## 2017-04-27 ASSESSMENT — PAIN DESCRIPTION - PAIN TYPE: TYPE: CHRONIC PAIN

## 2017-04-27 ASSESSMENT — PAIN DESCRIPTION - ONSET: ONSET: ON-GOING

## 2017-04-27 ASSESSMENT — PAIN DESCRIPTION - FREQUENCY: FREQUENCY: CONTINUOUS

## 2017-04-27 ASSESSMENT — PAIN SCALES - GENERAL: PAINLEVEL_OUTOF10: 8

## 2017-04-27 ASSESSMENT — ACTIVITIES OF DAILY LIVING (ADL): EFFECT OF PAIN ON DAILY ACTIVITIES: DAILY CHORES AND ACTIVITES

## 2017-04-27 ASSESSMENT — PAIN DESCRIPTION - PROGRESSION: CLINICAL_PROGRESSION: NOT CHANGED

## 2017-04-27 ASSESSMENT — PAIN DESCRIPTION - DESCRIPTORS: DESCRIPTORS: ACHING;CONSTANT;THROBBING;RADIATING;NUMBNESS;TINGLING

## 2017-05-02 RX ORDER — LIDOCAINE 50 MG/G
1-3 PATCH TOPICAL DAILY
Qty: 60 PATCH | Refills: 5 | Status: SHIPPED | OUTPATIENT
Start: 2017-05-02 | End: 2017-06-02

## 2017-05-25 ENCOUNTER — OFFICE VISIT (OUTPATIENT)
Dept: PRIMARY CARE CLINIC | Age: 47
End: 2017-05-25
Payer: MEDICARE

## 2017-05-25 VITALS
RESPIRATION RATE: 18 BRPM | HEART RATE: 69 BPM | DIASTOLIC BLOOD PRESSURE: 78 MMHG | WEIGHT: 112 LBS | OXYGEN SATURATION: 98 % | BODY MASS INDEX: 17.58 KG/M2 | SYSTOLIC BLOOD PRESSURE: 130 MMHG | HEIGHT: 67 IN

## 2017-05-25 DIAGNOSIS — R30.0 DYSURIA: ICD-10-CM

## 2017-05-25 DIAGNOSIS — N13.30 HYDRONEPHROSIS, UNSPECIFIED HYDRONEPHROSIS TYPE: ICD-10-CM

## 2017-05-25 DIAGNOSIS — I67.5 MOYA MOYA DISEASE: Primary | ICD-10-CM

## 2017-05-25 DIAGNOSIS — I10 ESSENTIAL HYPERTENSION: ICD-10-CM

## 2017-05-25 LAB
ALBUMIN SERPL-MCNC: 4.9 G/DL (ref 3.5–5.2)
ALP BLD-CCNC: 72 U/L (ref 35–104)
ALT SERPL-CCNC: 13 U/L (ref 5–33)
ANION GAP SERPL CALCULATED.3IONS-SCNC: 14 MMOL/L (ref 7–19)
AST SERPL-CCNC: 21 U/L (ref 5–32)
BILIRUB SERPL-MCNC: 0.3 MG/DL (ref 0.2–1.2)
BILIRUBIN URINE: ABNORMAL
BLOOD, URINE: NEGATIVE
BUN BLDV-MCNC: 10 MG/DL (ref 6–20)
CALCIUM SERPL-MCNC: 10.1 MG/DL (ref 8.6–10)
CHLORIDE BLD-SCNC: 100 MMOL/L (ref 98–111)
CLARITY: CLEAR
CO2: 24 MMOL/L (ref 22–29)
COLOR: YELLOW
CREAT SERPL-MCNC: 0.9 MG/DL (ref 0.5–0.9)
GFR NON-AFRICAN AMERICAN: >60
GLUCOSE BLD-MCNC: 98 MG/DL (ref 74–109)
GLUCOSE URINE: NEGATIVE MG/DL
HCT VFR BLD CALC: 41.2 % (ref 37–47)
HEMOGLOBIN: 13.2 G/DL (ref 12–16)
KETONES, URINE: NEGATIVE MG/DL
LEUKOCYTE ESTERASE, URINE: NEGATIVE
MCH RBC QN AUTO: 31 PG (ref 27–31)
MCHC RBC AUTO-ENTMCNC: 32 G/DL (ref 33–37)
MCV RBC AUTO: 96.7 FL (ref 81–99)
NITRITE, URINE: NEGATIVE
PDW BLD-RTO: 13.4 % (ref 11.5–14.5)
PH UA: 5.5
PLATELET # BLD: 356 K/UL (ref 130–400)
PMV BLD AUTO: 10.8 FL (ref 7.4–10.4)
POTASSIUM SERPL-SCNC: 3.7 MMOL/L (ref 3.5–5)
PROTEIN UA: NEGATIVE MG/DL
RBC # BLD: 4.26 M/UL (ref 4.2–5.4)
SODIUM BLD-SCNC: 138 MMOL/L (ref 136–145)
SPECIFIC GRAVITY UA: 1.02
TOTAL PROTEIN: 7.7 G/DL (ref 6.6–8.7)
UROBILINOGEN, URINE: 0.2 E.U./DL
WBC # BLD: 8.1 K/UL (ref 4.8–10.8)

## 2017-05-25 PROCEDURE — 99214 OFFICE O/P EST MOD 30 MIN: CPT | Performed by: INTERNAL MEDICINE

## 2017-05-25 PROCEDURE — G8427 DOCREV CUR MEDS BY ELIG CLIN: HCPCS | Performed by: INTERNAL MEDICINE

## 2017-05-25 PROCEDURE — 4004F PT TOBACCO SCREEN RCVD TLK: CPT | Performed by: INTERNAL MEDICINE

## 2017-05-25 PROCEDURE — G8419 CALC BMI OUT NRM PARAM NOF/U: HCPCS | Performed by: INTERNAL MEDICINE

## 2017-05-25 ASSESSMENT — ENCOUNTER SYMPTOMS
DIARRHEA: 0
COUGH: 0
NAUSEA: 0
VOMITING: 0
SHORTNESS OF BREATH: 1
BACK PAIN: 1
CONSTIPATION: 0

## 2017-05-27 LAB — URINE CULTURE, ROUTINE: NORMAL

## 2017-06-02 ENCOUNTER — HOSPITAL ENCOUNTER (OUTPATIENT)
Dept: PAIN MANAGEMENT | Age: 47
Discharge: HOME | End: 2017-06-02
Payer: MEDICARE

## 2017-06-02 VITALS
BODY MASS INDEX: 17.13 KG/M2 | OXYGEN SATURATION: 100 % | WEIGHT: 113 LBS | DIASTOLIC BLOOD PRESSURE: 92 MMHG | HEIGHT: 68 IN | TEMPERATURE: 98.4 F | SYSTOLIC BLOOD PRESSURE: 136 MMHG | HEART RATE: 83 BPM | RESPIRATION RATE: 18 BRPM

## 2017-06-02 DIAGNOSIS — M51.36 DDD (DEGENERATIVE DISC DISEASE), LUMBAR: Primary | ICD-10-CM

## 2017-06-02 DIAGNOSIS — M54.16 LUMBAR RADICULOPATHY: ICD-10-CM

## 2017-06-02 PROCEDURE — 99214 OFFICE O/P EST MOD 30 MIN: CPT

## 2017-06-02 RX ORDER — ALPRAZOLAM 0.5 MG/1
0.5 TABLET ORAL 3 TIMES DAILY PRN
COMMUNITY
End: 2017-07-12 | Stop reason: SDUPTHER

## 2017-06-02 RX ORDER — HYDROCODONE BITARTRATE AND ACETAMINOPHEN 7.5; 325 MG/1; MG/1
1 TABLET ORAL EVERY 6 HOURS PRN
COMMUNITY
End: 2017-06-02 | Stop reason: CLARIF

## 2017-06-02 RX ORDER — HYDROCODONE BITARTRATE AND ACETAMINOPHEN 7.5; 325 MG/1; MG/1
1 TABLET ORAL 2 TIMES DAILY PRN
Qty: 60 TABLET | Refills: 0 | Status: SHIPPED | OUTPATIENT
Start: 2017-06-02 | End: 2017-06-28 | Stop reason: SDUPTHER

## 2017-06-02 ASSESSMENT — PAIN SCALES - GENERAL: PAINLEVEL_OUTOF10: 4

## 2017-06-02 ASSESSMENT — PAIN DESCRIPTION - PAIN TYPE: TYPE: CHRONIC PAIN

## 2017-06-02 ASSESSMENT — PAIN DESCRIPTION - LOCATION: LOCATION: BACK;HEAD

## 2017-06-02 ASSESSMENT — PAIN DESCRIPTION - PROGRESSION: CLINICAL_PROGRESSION: NOT CHANGED

## 2017-06-02 ASSESSMENT — PAIN DESCRIPTION - ONSET: ONSET: ON-GOING

## 2017-06-02 ASSESSMENT — PAIN DESCRIPTION - FREQUENCY: FREQUENCY: CONTINUOUS

## 2017-06-02 ASSESSMENT — PAIN DESCRIPTION - ORIENTATION: ORIENTATION: LOWER;UPPER;MID

## 2017-06-02 ASSESSMENT — ACTIVITIES OF DAILY LIVING (ADL): EFFECT OF PAIN ON DAILY ACTIVITIES: LIMITS ACTIVITY

## 2017-06-02 ASSESSMENT — PAIN DESCRIPTION - DESCRIPTORS: DESCRIPTORS: ACHING;CONSTANT;THROBBING;NUMBNESS;RADIATING

## 2017-06-08 RX ORDER — METOPROLOL SUCCINATE 100 MG/1
100 TABLET, EXTENDED RELEASE ORAL NIGHTLY
Qty: 30 TABLET | Refills: 11 | Status: SHIPPED | OUTPATIENT
Start: 2017-06-08 | End: 2017-12-27 | Stop reason: SDUPTHER

## 2017-06-19 ENCOUNTER — HOSPITAL ENCOUNTER (EMERGENCY)
Facility: HOSPITAL | Age: 47
Discharge: HOME | End: 2017-06-19
Attending: EMERGENCY MEDICINE | Admitting: EMERGENCY MEDICINE

## 2017-06-19 ENCOUNTER — HOSPITAL ENCOUNTER (OUTPATIENT)
Dept: MRI IMAGING | Age: 47
Discharge: HOME | End: 2017-06-19
Payer: MEDICARE

## 2017-06-19 ENCOUNTER — TELEPHONE (OUTPATIENT)
Dept: PRIMARY CARE CLINIC | Age: 47
End: 2017-06-19

## 2017-06-19 VITALS
SYSTOLIC BLOOD PRESSURE: 119 MMHG | RESPIRATION RATE: 16 BRPM | HEART RATE: 64 BPM | HEIGHT: 68 IN | BODY MASS INDEX: 16.82 KG/M2 | WEIGHT: 111 LBS | OXYGEN SATURATION: 100 % | DIASTOLIC BLOOD PRESSURE: 86 MMHG | TEMPERATURE: 97.2 F

## 2017-06-19 DIAGNOSIS — R11.0 NAUSEA: Primary | ICD-10-CM

## 2017-06-19 DIAGNOSIS — M54.16 LUMBAR RADICULOPATHY: ICD-10-CM

## 2017-06-19 DIAGNOSIS — M51.36 DDD (DEGENERATIVE DISC DISEASE), LUMBAR: ICD-10-CM

## 2017-06-19 LAB
ALBUMIN SERPL-MCNC: 5.1 G/DL (ref 3.5–5)
ALBUMIN/GLOB SERPL: 1.5 G/DL (ref 1.1–2.5)
ALP SERPL-CCNC: 65 U/L (ref 24–120)
ALT SERPL W P-5'-P-CCNC: 33 U/L (ref 0–54)
AMYLASE SERPL-CCNC: 91 U/L (ref 30–110)
ANION GAP SERPL CALCULATED.3IONS-SCNC: 13 MMOL/L (ref 4–13)
APTT PPP: 23.5 SECONDS (ref 24.1–34.8)
AST SERPL-CCNC: 31 U/L (ref 7–45)
BASOPHILS # BLD AUTO: 0.01 10*3/MM3 (ref 0–0.2)
BASOPHILS NFR BLD AUTO: 0.1 % (ref 0–2)
BILIRUB SERPL-MCNC: 0.5 MG/DL (ref 0.1–1)
BUN BLD-MCNC: 14 MG/DL (ref 5–21)
BUN/CREAT SERPL: 10.9 (ref 7–25)
CALCIUM SPEC-SCNC: 10.8 MG/DL (ref 8.4–10.4)
CHLORIDE SERPL-SCNC: 101 MMOL/L (ref 98–110)
CO2 SERPL-SCNC: 27 MMOL/L (ref 24–31)
CREAT BLD-MCNC: 1.29 MG/DL (ref 0.5–1.4)
DEPRECATED RDW RBC AUTO: 47.3 FL (ref 40–54)
EOSINOPHIL # BLD AUTO: 0.08 10*3/MM3 (ref 0–0.7)
EOSINOPHIL NFR BLD AUTO: 1 % (ref 0–4)
ERYTHROCYTE [DISTWIDTH] IN BLOOD BY AUTOMATED COUNT: 13.6 % (ref 12–15)
GFR SERPL CREATININE-BSD FRML MDRD: 44 ML/MIN/1.73
GLOBULIN UR ELPH-MCNC: 3.3 GM/DL
GLUCOSE BLD-MCNC: 79 MG/DL (ref 70–100)
HCT VFR BLD AUTO: 39.4 % (ref 37–47)
HGB BLD-MCNC: 12.9 G/DL (ref 12–16)
IMM GRANULOCYTES # BLD: 0.01 10*3/MM3 (ref 0–0.03)
IMM GRANULOCYTES NFR BLD: 0.1 % (ref 0–5)
INR PPP: 0.84 (ref 0.91–1.09)
LIPASE SERPL-CCNC: 40 U/L (ref 23–203)
LYMPHOCYTES # BLD AUTO: 3.95 10*3/MM3 (ref 0.72–4.86)
LYMPHOCYTES NFR BLD AUTO: 49.7 % (ref 15–45)
MCH RBC QN AUTO: 31.1 PG (ref 28–32)
MCHC RBC AUTO-ENTMCNC: 32.7 G/DL (ref 33–36)
MCV RBC AUTO: 94.9 FL (ref 82–98)
MONOCYTES # BLD AUTO: 0.35 10*3/MM3 (ref 0.19–1.3)
MONOCYTES NFR BLD AUTO: 4.4 % (ref 4–12)
NEUTROPHILS # BLD AUTO: 3.54 10*3/MM3 (ref 1.87–8.4)
NEUTROPHILS NFR BLD AUTO: 44.7 % (ref 39–78)
PLATELET # BLD AUTO: 290 10*3/MM3 (ref 130–400)
PMV BLD AUTO: 11 FL (ref 6–12)
POTASSIUM BLD-SCNC: 4.2 MMOL/L (ref 3.5–5.3)
PROT SERPL-MCNC: 8.4 G/DL (ref 6.3–8.7)
PROTHROMBIN TIME: 11.8 SECONDS (ref 11.9–14.6)
RBC # BLD AUTO: 4.15 10*6/MM3 (ref 4.2–5.4)
SODIUM BLD-SCNC: 141 MMOL/L (ref 135–145)
WBC NRBC COR # BLD: 7.94 10*3/MM3 (ref 4.8–10.8)

## 2017-06-19 PROCEDURE — 85025 COMPLETE CBC W/AUTO DIFF WBC: CPT | Performed by: EMERGENCY MEDICINE

## 2017-06-19 PROCEDURE — 80053 COMPREHEN METABOLIC PANEL: CPT | Performed by: EMERGENCY MEDICINE

## 2017-06-19 PROCEDURE — 85610 PROTHROMBIN TIME: CPT | Performed by: EMERGENCY MEDICINE

## 2017-06-19 PROCEDURE — 83690 ASSAY OF LIPASE: CPT | Performed by: EMERGENCY MEDICINE

## 2017-06-19 PROCEDURE — 85730 THROMBOPLASTIN TIME PARTIAL: CPT | Performed by: EMERGENCY MEDICINE

## 2017-06-19 PROCEDURE — 82150 ASSAY OF AMYLASE: CPT | Performed by: EMERGENCY MEDICINE

## 2017-06-19 PROCEDURE — 72148 MRI LUMBAR SPINE W/O DYE: CPT

## 2017-06-19 PROCEDURE — 93010 ELECTROCARDIOGRAM REPORT: CPT | Performed by: INTERNAL MEDICINE

## 2017-06-19 PROCEDURE — 99284 EMERGENCY DEPT VISIT MOD MDM: CPT

## 2017-06-19 PROCEDURE — 93005 ELECTROCARDIOGRAM TRACING: CPT | Performed by: EMERGENCY MEDICINE

## 2017-06-19 NOTE — ED PROVIDER NOTES
Subjective   Patient is a 47 y.o. female presenting with GI bleeding.   GI Bleeding   Location:  Gi bleeding and wants her kidneys checked   Severity:  Moderate  Onset quality:  Gradual  Timing:  Constant  Progression:  Worsening  Chronicity:  New  Associated symptoms: vomiting    Associated symptoms: no abdominal pain, no chest pain, no congestion, no cough, no diarrhea, no fever, no headaches, no loss of consciousness, no myalgias, no rhinorrhea, no shortness of breath and no sore throat        Review of Systems   Constitutional: Negative.  Negative for fever.   HENT: Negative.  Negative for congestion, rhinorrhea and sore throat.    Eyes: Negative.    Respiratory: Negative.  Negative for cough and shortness of breath.    Cardiovascular: Negative.  Negative for chest pain.   Gastrointestinal: Positive for vomiting. Negative for abdominal pain and diarrhea.   Endocrine: Negative.    Genitourinary: Negative.    Musculoskeletal: Negative for myalgias.   Skin: Negative.    Neurological: Negative.  Negative for loss of consciousness and headaches.   Hematological: Negative.    All other systems reviewed and are negative.      Past Medical History:   Diagnosis Date   • Acute kidney failure    • Constipation    • Depression    • H/O gastric ulcer    • Headache    • History of transfusion    • Hypertension    • IBS (irritable bowel syndrome)    • Insomnia    • Kidney stone    • Maravilla maravilla disease    • Rapid weight loss    • SBO (small bowel obstruction)    • Stroke     X 2   • UTI (urinary tract infection)     CHRONIC, SEPTIC X2   • Volvulosis        Allergies   Allergen Reactions   • Anectine [Succinylcholine Chloride] Other (See Comments)     Hard to wake up   • Stadol [Butorphanol] Hives       Past Surgical History:   Procedure Laterality Date   • APPENDECTOMY      COMPLICATION OF SEPTIC   • BRAIN SURGERY      x2   • BREAST LUMPECTOMY Left 3/9/2017    Procedure: EXCISION LEFT BREAST LESION AND LEFT AXILLARY LYMPH NODE  BIOPSY;  Surgeon: Evi Farley MD;  Location: [facility] OR;  Service:    • BREAST SURGERY     •  SECTION     • COLON SURGERY     • COLONOSCOPY  2007    normal   • COLONOSCOPY N/A 11/10/2016    Procedure: COLONOSCOPY WITH ANESTHESIA;  Surgeon: Camilo Hanson MD;  Location: [facility] ENDOSCOPY;  Service:    • ENDOSCOPY  2009    antral ulcer   • ENDOSCOPY N/A 10/10/2016    Procedure: ESOPHAGOGASTRODUODENOSCOPY WITH ANESTHESIA;  Surgeon: Camilo Hanson MD;  Location: [facility] ENDOSCOPY;  Service:    • ENDOSCOPY N/A 2017    Procedure: ESOPHAGOGASTRODUODENOSCOPY;  Surgeon: Camilo Hanson MD;  Location: [facility] ENDOSCOPY;  Service:    • HERNIA REPAIR     • HYSTERECTOMY     • SMALL INTESTINE SURGERY N/A 2016   • TONSILLECTOMY         Family History   Problem Relation Age of Onset   • Cancer Maternal Grandfather    • No Known Problems Mother    • No Known Problems Father    • No Known Problems Sister    • No Known Problems Brother    • No Known Problems Brother    • No Known Problems Son    • Colon cancer Neg Hx    • Colon polyps Neg Hx        Social History     Social History   • Marital status:      Spouse name: N/A   • Number of children: N/A   • Years of education: N/A     Social History Main Topics   • Smoking status: Former Smoker     Packs/day: 1.00     Years: 15.00     Types: Cigarettes, Electronic Cigarette     Quit date: 3/10/2016   • Smokeless tobacco: Never Used   • Alcohol use Yes      Comment: occasional   • Drug use: No   • Sexual activity: Defer     Other Topics Concern   • None     Social History Narrative           Objective   Physical Exam   Constitutional: She is oriented to person, place, and time. She appears well-developed and well-nourished.  Non-toxic appearance.   HENT:   Head: Normocephalic and atraumatic.   Mouth/Throat: Oropharynx is clear and moist.   Eyes: Conjunctivae are normal. Pupils are equal, round, and reactive to light.   Neck: Normal range of  motion. Neck supple. No hepatojugular reflux and no JVD present.   Cardiovascular: Normal rate, regular rhythm, normal heart sounds and intact distal pulses.  PMI is not displaced.  Exam reveals no decreased pulses.    No murmur heard.  Pulmonary/Chest: Effort normal and breath sounds normal. No accessory muscle usage. No apnea. No respiratory distress. She has no decreased breath sounds. She has no wheezes.   Abdominal: Normal appearance, normal aorta and bowel sounds are normal. She exhibits no shifting dullness, no distension, no fluid wave, no abdominal bruit, no ascites, no pulsatile midline mass and no mass. There is no tenderness. There is no guarding.   Musculoskeletal: Normal range of motion.   Neurological: She is alert and oriented to person, place, and time. She has normal strength and normal reflexes. She displays normal reflexes. No cranial nerve deficit. Coordination normal. GCS eye subscore is 4. GCS verbal subscore is 5. GCS motor subscore is 6.   Skin: Skin is warm and dry.   Psychiatric: She has a normal mood and affect. Her behavior is normal.   Nursing note and vitals reviewed.      Procedures         ED Course  ED Course   Comment By Time   NSR Reza Reyes MD 06/19 0906   Was on the patient and the patient does not have the renal failure there is no evidence of any GI bleed and she does not complain or hematemesis Reza Reyes MD 06/19 2528   She had THOUGHT THAT they may have been some blood in the vomit but she was not sure Reza Reyes MD 06/19 7888   The patient's symptoms are now better.  Patient is not having pain.  No chest pain, difficulty breathing, nausea, vomiting or palpitations.  No anxiety or dizziness.  Vital signs have been reviewed and appear to be correct.  Physical exam findings are improved.  Alert.  Oriented X3 .  No acute distress.  Breath sounds normal.  No respiratory distress, decreased breath sounds or wheezes.  Normal heart rate and rhythm.  Heart sounds normal.   .  Abdomen soft and nontender.  No abdominal tenderness or guarding or rebound tenderness.  Skin warm and dry.  No cyanosis or diaphoresis.  Oriented X 3.  Not anxious.  No alteration in mental status or weakness. Reza Reyes MD 06/19 173                  [facility]    Final diagnoses:   Nausea            Reza Reyes MD  06/19/17 [id number]

## 2017-06-29 ENCOUNTER — TELEPHONE (OUTPATIENT)
Dept: PAIN MANAGEMENT | Age: 47
End: 2017-06-29

## 2017-06-29 RX ORDER — AMLODIPINE BESYLATE 10 MG/1
10 TABLET ORAL DAILY
Qty: 30 TABLET | Refills: 5 | Status: SHIPPED | OUTPATIENT
Start: 2017-06-29 | End: 2018-01-03 | Stop reason: SDUPTHER

## 2017-07-06 RX ORDER — HYDROCODONE BITARTRATE AND ACETAMINOPHEN 7.5; 325 MG/1; MG/1
1 TABLET ORAL 2 TIMES DAILY PRN
Qty: 60 TABLET | Refills: 0
Start: 2017-07-06 | End: 2017-07-13 | Stop reason: SDUPTHER

## 2017-07-12 ENCOUNTER — OFFICE VISIT (OUTPATIENT)
Dept: PRIMARY CARE CLINIC | Age: 47
End: 2017-07-12
Payer: MEDICARE

## 2017-07-12 VITALS
HEIGHT: 67 IN | OXYGEN SATURATION: 98 % | BODY MASS INDEX: 17.27 KG/M2 | SYSTOLIC BLOOD PRESSURE: 114 MMHG | RESPIRATION RATE: 18 BRPM | HEART RATE: 97 BPM | DIASTOLIC BLOOD PRESSURE: 68 MMHG | WEIGHT: 110 LBS

## 2017-07-12 DIAGNOSIS — I10 ESSENTIAL HYPERTENSION: Primary | ICD-10-CM

## 2017-07-12 DIAGNOSIS — M54.42 CHRONIC LEFT-SIDED LOW BACK PAIN WITH LEFT-SIDED SCIATICA: ICD-10-CM

## 2017-07-12 DIAGNOSIS — I67.5 MOYA MOYA DISEASE: ICD-10-CM

## 2017-07-12 DIAGNOSIS — G89.29 CHRONIC LEFT-SIDED LOW BACK PAIN WITH LEFT-SIDED SCIATICA: ICD-10-CM

## 2017-07-12 DIAGNOSIS — F41.9 ANXIETY: ICD-10-CM

## 2017-07-12 PROCEDURE — 1036F TOBACCO NON-USER: CPT | Performed by: INTERNAL MEDICINE

## 2017-07-12 PROCEDURE — G8427 DOCREV CUR MEDS BY ELIG CLIN: HCPCS | Performed by: INTERNAL MEDICINE

## 2017-07-12 PROCEDURE — G8419 CALC BMI OUT NRM PARAM NOF/U: HCPCS | Performed by: INTERNAL MEDICINE

## 2017-07-12 PROCEDURE — 99214 OFFICE O/P EST MOD 30 MIN: CPT | Performed by: INTERNAL MEDICINE

## 2017-07-12 RX ORDER — METOCLOPRAMIDE 5 MG/1
5 TABLET ORAL 4 TIMES DAILY
Qty: 120 TABLET | Refills: 3 | Status: SHIPPED | OUTPATIENT
Start: 2017-07-12 | End: 2017-08-30 | Stop reason: ALTCHOICE

## 2017-07-12 RX ORDER — ALPRAZOLAM 0.5 MG/1
0.5 TABLET ORAL 3 TIMES DAILY PRN
Qty: 90 TABLET | Refills: 1 | Status: SHIPPED | OUTPATIENT
Start: 2017-07-12 | End: 2017-08-09 | Stop reason: DRUGHIGH

## 2017-07-12 ASSESSMENT — ENCOUNTER SYMPTOMS
CONSTIPATION: 0
COUGH: 0
SHORTNESS OF BREATH: 1
BACK PAIN: 1
DIARRHEA: 0
NAUSEA: 0
VOMITING: 0

## 2017-07-13 ENCOUNTER — HOSPITAL ENCOUNTER (OUTPATIENT)
Dept: PAIN MANAGEMENT | Age: 47
Discharge: HOME | End: 2017-07-13
Payer: MEDICARE

## 2017-07-13 VITALS
TEMPERATURE: 98 F | HEIGHT: 67 IN | HEART RATE: 67 BPM | SYSTOLIC BLOOD PRESSURE: 140 MMHG | RESPIRATION RATE: 16 BRPM | BODY MASS INDEX: 17.27 KG/M2 | OXYGEN SATURATION: 100 % | DIASTOLIC BLOOD PRESSURE: 84 MMHG | WEIGHT: 110 LBS

## 2017-07-13 DIAGNOSIS — M47.816 LUMBAR FACET ARTHROPATHY: Chronic | ICD-10-CM

## 2017-07-13 DIAGNOSIS — M54.59 LUMBAR FACET JOINT PAIN: ICD-10-CM

## 2017-07-13 PROCEDURE — 2500000003 HC RX 250 WO HCPCS

## 2017-07-13 PROCEDURE — 64493 INJ PARAVERT F JNT L/S 1 LEV: CPT

## 2017-07-13 PROCEDURE — 6360000002 HC RX W HCPCS

## 2017-07-13 PROCEDURE — 80307 DRUG TEST PRSMV CHEM ANLYZR: CPT

## 2017-07-13 PROCEDURE — 64494 INJ PARAVERT F JNT L/S 2 LEV: CPT

## 2017-07-13 PROCEDURE — 3209999900 FLUORO FOR SURGICAL PROCEDURES

## 2017-07-13 RX ORDER — HYDROCODONE BITARTRATE AND ACETAMINOPHEN 7.5; 325 MG/1; MG/1
1 TABLET ORAL 2 TIMES DAILY PRN
Qty: 60 TABLET | Refills: 0 | Status: SHIPPED | OUTPATIENT
Start: 2017-08-01 | End: 2017-08-30 | Stop reason: ALTCHOICE

## 2017-07-13 RX ORDER — BUPIVACAINE HYDROCHLORIDE 5 MG/ML
INJECTION, SOLUTION EPIDURAL; INTRACAUDAL
Status: COMPLETED | OUTPATIENT
Start: 2017-07-13 | End: 2017-07-13

## 2017-07-13 RX ADMIN — BUPIVACAINE HYDROCHLORIDE 0.5 ML: 5 INJECTION, SOLUTION EPIDURAL; INTRACAUDAL at 12:02

## 2017-07-13 ASSESSMENT — PAIN DESCRIPTION - PAIN TYPE: TYPE: CHRONIC PAIN

## 2017-07-13 ASSESSMENT — PAIN DESCRIPTION - ORIENTATION: ORIENTATION: LEFT;LOWER

## 2017-07-13 ASSESSMENT — PAIN DESCRIPTION - DESCRIPTORS: DESCRIPTORS: ACHING;CONSTANT;RADIATING;THROBBING;NUMBNESS

## 2017-07-13 ASSESSMENT — PAIN SCALES - GENERAL: PAINLEVEL_OUTOF10: 8

## 2017-07-13 ASSESSMENT — PAIN DESCRIPTION - PROGRESSION: CLINICAL_PROGRESSION: NOT CHANGED

## 2017-07-13 ASSESSMENT — PAIN DESCRIPTION - LOCATION: LOCATION: BACK;HIP;BUTTOCKS

## 2017-07-13 ASSESSMENT — PAIN DESCRIPTION - FREQUENCY: FREQUENCY: CONTINUOUS

## 2017-07-13 ASSESSMENT — PAIN DESCRIPTION - ONSET: ONSET: ON-GOING

## 2017-07-19 ENCOUNTER — PROCEDURE VISIT (OUTPATIENT)
Dept: NEUROLOGY | Age: 47
End: 2017-07-19
Payer: MEDICARE

## 2017-07-19 ENCOUNTER — HOSPITAL ENCOUNTER (EMERGENCY)
Facility: HOSPITAL | Age: 47
Discharge: HOME | End: 2017-07-19
Attending: EMERGENCY MEDICINE | Admitting: EMERGENCY MEDICINE

## 2017-07-19 ENCOUNTER — APPOINTMENT (OUTPATIENT)
Dept: CT IMAGING | Facility: HOSPITAL | Age: 47
End: 2017-07-19

## 2017-07-19 VITALS
OXYGEN SATURATION: 100 % | HEIGHT: 68 IN | WEIGHT: 109.13 LBS | HEART RATE: 54 BPM | TEMPERATURE: 98.8 F | DIASTOLIC BLOOD PRESSURE: 93 MMHG | BODY MASS INDEX: 16.54 KG/M2 | RESPIRATION RATE: 18 BRPM | SYSTOLIC BLOOD PRESSURE: 137 MMHG

## 2017-07-19 VITALS
WEIGHT: 110 LBS | SYSTOLIC BLOOD PRESSURE: 110 MMHG | BODY MASS INDEX: 17.27 KG/M2 | DIASTOLIC BLOOD PRESSURE: 60 MMHG | HEIGHT: 67 IN

## 2017-07-19 DIAGNOSIS — R10.84 GENERALIZED ABDOMINAL PAIN: Primary | ICD-10-CM

## 2017-07-19 DIAGNOSIS — G43.719 INTRACTABLE CHRONIC MIGRAINE WITHOUT AURA AND WITHOUT STATUS MIGRAINOSUS: Primary | ICD-10-CM

## 2017-07-19 DIAGNOSIS — N83.201 CYST OF RIGHT OVARY: ICD-10-CM

## 2017-07-19 LAB
ALBUMIN SERPL-MCNC: 5.1 G/DL (ref 3.5–5)
ALBUMIN/GLOB SERPL: 1.6 G/DL (ref 1.1–2.5)
ALP SERPL-CCNC: 76 U/L (ref 24–120)
ALT SERPL W P-5'-P-CCNC: 32 U/L (ref 0–54)
AMPHET+METHAMPHET UR QL: NEGATIVE
AMYLASE SERPL-CCNC: 68 U/L (ref 30–110)
ANION GAP SERPL CALCULATED.3IONS-SCNC: 12 MMOL/L (ref 4–13)
AST SERPL-CCNC: 30 U/L (ref 7–45)
BARBITURATES UR QL SCN: NEGATIVE
BASOPHILS # BLD AUTO: 0.02 10*3/MM3 (ref 0–0.2)
BASOPHILS NFR BLD AUTO: 0.3 % (ref 0–2)
BENZODIAZ UR QL SCN: POSITIVE
BILIRUB SERPL-MCNC: 0.4 MG/DL (ref 0.1–1)
BILIRUB UR QL STRIP: NEGATIVE
BUN BLD-MCNC: 9 MG/DL (ref 5–21)
BUN/CREAT SERPL: 7.8 (ref 7–25)
CALCIUM SPEC-SCNC: 10.2 MG/DL (ref 8.4–10.4)
CANNABINOIDS SERPL QL: POSITIVE
CHLORIDE SERPL-SCNC: 104 MMOL/L (ref 98–110)
CLARITY UR: CLEAR
CO2 SERPL-SCNC: 24 MMOL/L (ref 24–31)
COCAINE UR QL: NEGATIVE
COLOR UR: YELLOW
CREAT BLD-MCNC: 1.15 MG/DL (ref 0.5–1.4)
CRP SERPL-MCNC: <0.5 MG/DL (ref 0–0.99)
D-LACTATE SERPL-SCNC: 0.7 MMOL/L (ref 0.5–2)
DEPRECATED RDW RBC AUTO: 45.2 FL (ref 40–54)
EOSINOPHIL # BLD AUTO: 0.1 10*3/MM3 (ref 0–0.7)
EOSINOPHIL NFR BLD AUTO: 1.4 % (ref 0–4)
ERYTHROCYTE [DISTWIDTH] IN BLOOD BY AUTOMATED COUNT: 13 % (ref 12–15)
GFR SERPL CREATININE-BSD FRML MDRD: 51 ML/MIN/1.73
GLOBULIN UR ELPH-MCNC: 3.1 GM/DL
GLUCOSE BLD-MCNC: 83 MG/DL (ref 70–100)
GLUCOSE UR STRIP-MCNC: NEGATIVE MG/DL
HCG SERPL QL: NEGATIVE
HCT VFR BLD AUTO: 39 % (ref 37–47)
HGB BLD-MCNC: 12.7 G/DL (ref 12–16)
HGB UR QL STRIP.AUTO: NEGATIVE
IMM GRANULOCYTES # BLD: 0.02 10*3/MM3 (ref 0–0.03)
IMM GRANULOCYTES NFR BLD: 0.3 % (ref 0–5)
KETONES UR QL STRIP: NEGATIVE
LEUKOCYTE ESTERASE UR QL STRIP.AUTO: NEGATIVE
LIPASE SERPL-CCNC: 34 U/L (ref 23–203)
LYMPHOCYTES # BLD AUTO: 3.76 10*3/MM3 (ref 0.72–4.86)
LYMPHOCYTES NFR BLD AUTO: 53 % (ref 15–45)
MCH RBC QN AUTO: 30.8 PG (ref 28–32)
MCHC RBC AUTO-ENTMCNC: 32.6 G/DL (ref 33–36)
MCV RBC AUTO: 94.7 FL (ref 82–98)
METHADONE UR QL SCN: NEGATIVE
MONOCYTES # BLD AUTO: 0.44 10*3/MM3 (ref 0.19–1.3)
MONOCYTES NFR BLD AUTO: 6.2 % (ref 4–12)
NEUTROPHILS # BLD AUTO: 2.76 10*3/MM3 (ref 1.87–8.4)
NEUTROPHILS NFR BLD AUTO: 38.8 % (ref 39–78)
NITRITE UR QL STRIP: NEGATIVE
OPIATES UR QL: POSITIVE
PCP UR QL SCN: NEGATIVE
PH UR STRIP.AUTO: <=5 [PH] (ref 5–8)
PLATELET # BLD AUTO: 332 10*3/MM3 (ref 130–400)
PMV BLD AUTO: 10.4 FL (ref 6–12)
POTASSIUM BLD-SCNC: 3.8 MMOL/L (ref 3.5–5.3)
PROCALCITONIN SERPL-MCNC: <0.25 NG/ML
PROT SERPL-MCNC: 8.2 G/DL (ref 6.3–8.7)
PROT UR QL STRIP: NEGATIVE
RBC # BLD AUTO: 4.12 10*6/MM3 (ref 4.2–5.4)
SODIUM BLD-SCNC: 140 MMOL/L (ref 135–145)
SP GR UR STRIP: 1.01 (ref 1–1.03)
UROBILINOGEN UR QL STRIP: NORMAL
WBC NRBC COR # BLD: 7.1 10*3/MM3 (ref 4.8–10.8)

## 2017-07-19 PROCEDURE — 96375 TX/PRO/DX INJ NEW DRUG ADDON: CPT

## 2017-07-19 PROCEDURE — 80307 DRUG TEST PRSMV CHEM ANLYZR: CPT | Performed by: EMERGENCY MEDICINE

## 2017-07-19 PROCEDURE — 80053 COMPREHEN METABOLIC PANEL: CPT | Performed by: EMERGENCY MEDICINE

## 2017-07-19 PROCEDURE — 83605 ASSAY OF LACTIC ACID: CPT | Performed by: EMERGENCY MEDICINE

## 2017-07-19 PROCEDURE — 64615 CHEMODENERV MUSC MIGRAINE: CPT | Performed by: PSYCHIATRY & NEUROLOGY

## 2017-07-19 PROCEDURE — 83690 ASSAY OF LIPASE: CPT | Performed by: EMERGENCY MEDICINE

## 2017-07-19 PROCEDURE — 84703 CHORIONIC GONADOTROPIN ASSAY: CPT | Performed by: EMERGENCY MEDICINE

## 2017-07-19 PROCEDURE — 0 IOHEXOL 300 MG/ML SOLUTION: Performed by: EMERGENCY MEDICINE

## 2017-07-19 PROCEDURE — 1036F TOBACCO NON-USER: CPT | Performed by: PSYCHIATRY & NEUROLOGY

## 2017-07-19 PROCEDURE — 85025 COMPLETE CBC W/AUTO DIFF WBC: CPT | Performed by: EMERGENCY MEDICINE

## 2017-07-19 PROCEDURE — 99284 EMERGENCY DEPT VISIT MOD MDM: CPT

## 2017-07-19 PROCEDURE — 96374 THER/PROPH/DIAG INJ IV PUSH: CPT

## 2017-07-19 PROCEDURE — 86140 C-REACTIVE PROTEIN: CPT | Performed by: EMERGENCY MEDICINE

## 2017-07-19 PROCEDURE — 25010000002 HYDROMORPHONE PER 4 MG: Performed by: EMERGENCY MEDICINE

## 2017-07-19 PROCEDURE — 74177 CT ABD & PELVIS W/CONTRAST: CPT

## 2017-07-19 PROCEDURE — 81003 URINALYSIS AUTO W/O SCOPE: CPT | Performed by: EMERGENCY MEDICINE

## 2017-07-19 PROCEDURE — 25010000002 ONDANSETRON PER 1 MG: Performed by: EMERGENCY MEDICINE

## 2017-07-19 PROCEDURE — 84145 PROCALCITONIN (PCT): CPT | Performed by: EMERGENCY MEDICINE

## 2017-07-19 PROCEDURE — 82150 ASSAY OF AMYLASE: CPT | Performed by: EMERGENCY MEDICINE

## 2017-07-19 PROCEDURE — 0 IOPAMIDOL 61 % SOLUTION: Performed by: EMERGENCY MEDICINE

## 2017-07-19 RX ORDER — ONDANSETRON 2 MG/ML
4 INJECTION INTRAMUSCULAR; INTRAVENOUS ONCE
Status: COMPLETED | OUTPATIENT
Start: 2017-07-19 | End: 2017-07-19

## 2017-07-19 RX ADMIN — IOHEXOL 50 ML: 300 INJECTION, SOLUTION INTRAVENOUS at 15:14

## 2017-07-19 RX ADMIN — HYDROMORPHONE HYDROCHLORIDE 1 MG: 1 INJECTION, SOLUTION INTRAMUSCULAR; INTRAVENOUS; SUBCUTANEOUS at 15:54

## 2017-07-19 RX ADMIN — IOPAMIDOL 100 ML: 612 INJECTION, SOLUTION INTRAVENOUS at 17:30

## 2017-07-19 RX ADMIN — ONDANSETRON 4 MG: 2 INJECTION INTRAMUSCULAR; INTRAVENOUS at 15:54

## 2017-07-19 NOTE — ED PROVIDER NOTES
Subjective   Patient is a 47 y.o. female presenting with abdominal pain.   Abdominal Pain   Pain location:  Generalized  Pain quality: aching, bloating and burning    Pain radiates to:  Does not radiate  Pain severity:  Moderate  Onset quality:  Gradual  Timing:  Constant  Progression:  Worsening  Chronicity:  Recurrent  Context: not alcohol use, not awakening from sleep, not diet changes, not previous surgeries, not recent illness, not recent travel, not sick contacts and not suspicious food intake    Relieved by:  Nothing  Worsened by:  Nothing  Ineffective treatments:  None tried  Associated symptoms: fatigue and nausea    Associated symptoms: no anorexia, no belching, no chest pain, no chills, no constipation, no cough, no diarrhea, no fever, no hematuria, no shortness of breath, no vaginal discharge and no vomiting    Risk factors: no alcohol abuse, no NSAID use and not obese        Review of Systems   Constitutional: Positive for fatigue. Negative for chills and fever.   HENT: Negative.    Eyes: Negative.    Respiratory: Negative.  Negative for cough and shortness of breath.    Cardiovascular: Negative.  Negative for chest pain.   Gastrointestinal: Positive for abdominal pain and nausea. Negative for anorexia, constipation, diarrhea and vomiting.   Endocrine: Negative.    Genitourinary: Negative.  Negative for hematuria and vaginal discharge.   Skin: Negative.    Neurological: Negative.    Hematological: Negative.    All other systems reviewed and are negative.      Past Medical History:   Diagnosis Date   • Acute kidney failure    • Constipation    • Depression    • H/O gastric ulcer    • Headache    • History of transfusion    • Hypertension    • IBS (irritable bowel syndrome)    • Insomnia    • Kidney stone    • Maravilla maravilla disease    • Rapid weight loss    • SBO (small bowel obstruction)    • Stroke     X 2   • UTI (urinary tract infection)     CHRONIC, SEPTIC X2   • Volvulosis        Allergies   Allergen  Reactions   • Anectine [Succinylcholine Chloride] Other (See Comments)     Hard to wake up   • Stadol [Butorphanol] Hives       Past Surgical History:   Procedure Laterality Date   • APPENDECTOMY      COMPLICATION OF SEPTIC   • BRAIN SURGERY      x2   • BREAST LUMPECTOMY Left 3/9/2017    Procedure: EXCISION LEFT BREAST LESION AND LEFT AXILLARY LYMPH NODE BIOPSY;  Surgeon: Evi Farley MD;  Location: [facility] OR;  Service:    • BREAST SURGERY     •  SECTION     • COLON SURGERY     • COLONOSCOPY  2007    normal   • COLONOSCOPY N/A 11/10/2016    Procedure: COLONOSCOPY WITH ANESTHESIA;  Surgeon: Camilo Hanson MD;  Location: [facility] ENDOSCOPY;  Service:    • ENDOSCOPY  2009    antral ulcer   • ENDOSCOPY N/A 10/10/2016    Procedure: ESOPHAGOGASTRODUODENOSCOPY WITH ANESTHESIA;  Surgeon: Camilo Hanson MD;  Location: [facility] ENDOSCOPY;  Service:    • ENDOSCOPY N/A 2017    Procedure: ESOPHAGOGASTRODUODENOSCOPY;  Surgeon: Camilo Hanson MD;  Location: [facility] ENDOSCOPY;  Service:    • HERNIA REPAIR     • HYSTERECTOMY     • SMALL INTESTINE SURGERY N/A 2016   • TONSILLECTOMY         Family History   Problem Relation Age of Onset   • Cancer Maternal Grandfather    • No Known Problems Mother    • No Known Problems Father    • No Known Problems Sister    • No Known Problems Brother    • No Known Problems Brother    • No Known Problems Son    • Colon cancer Neg Hx    • Colon polyps Neg Hx        Social History     Social History   • Marital status:      Spouse name: N/A   • Number of children: N/A   • Years of education: N/A     Social History Main Topics   • Smoking status: Former Smoker     Packs/day: 1.00     Years: 15.00     Types: Cigarettes, Electronic Cigarette     Quit date: 3/10/2016   • Smokeless tobacco: Never Used   • Alcohol use Yes      Comment: occasional   • Drug use: No   • Sexual activity: Defer     Other Topics Concern   • None     Social History Narrative            Objective   Physical Exam   Constitutional: She is oriented to person, place, and time. She appears well-developed and well-nourished.  Non-toxic appearance.   HENT:   Head: Normocephalic and atraumatic.   Mouth/Throat: Oropharynx is clear and moist.   Eyes: Conjunctivae are normal. Pupils are equal, round, and reactive to light.   Neck: Normal range of motion. Neck supple. No hepatojugular reflux and no JVD present.   Cardiovascular: Normal rate, regular rhythm, normal heart sounds and intact distal pulses.  PMI is not displaced.  Exam reveals no decreased pulses.    No murmur heard.  Pulmonary/Chest: Effort normal and breath sounds normal. No accessory muscle usage. No apnea. No respiratory distress. She has no decreased breath sounds. She has no wheezes.   Abdominal: Normal appearance, normal aorta and bowel sounds are normal. She exhibits no shifting dullness, no distension, no fluid wave, no abdominal bruit, no ascites, no pulsatile midline mass and no mass. There is tenderness. There is no guarding.   Musculoskeletal: Normal range of motion.   Neurological: She is alert and oriented to person, place, and time. She has normal strength and normal reflexes. No cranial nerve deficit. GCS eye subscore is 4. GCS verbal subscore is 5. GCS motor subscore is 6.   Skin: Skin is warm and dry.   Psychiatric: She has a normal mood and affect. Her behavior is normal.   Nursing note and vitals reviewed.      Procedures         ED Course  ED Course   Comment By Time   Patient with multiple visits for abdominal pain lab workup today shows urine to be positive for benzodiazepines opioids and marijuana cc urinalysis and chemistries are all within normal limits with a normal amylase lipase and CRP and pro calcitonin level Reza Reyes MD 07/19 1828   Right ovarian cyst, likely physiologic in this age group. Exam  otherwise negative. Reza Reyes MD 07/19 1829   CT scan report was discussed and the patient patient came  in abdominal pain she is on PPIs at home my advise is to follow the GI specialist will discharge her home she pelvic ultrasound as an outpatient also Reza Reyes MD 07/19 1829                  MDM    Final diagnoses:   Generalized abdominal pain   Cyst of right ovary            Reza Reyes MD  07/19/17 [id number]

## 2017-07-24 LAB
ALPRAZOLAM, URINE CONFIRM: 260 NG/ML
ALPRAZOLAM, URINE: POSITIVE
AMPHETAMINES, URINE: NEGATIVE NG/ML
BARBITURATES, URINE: NEGATIVE NG/ML
BENZODIAZEPINES, URINE: ABNORMAL NG/ML
BENZODIAZEPINES, URINE: POSITIVE NG/ML
CANNABINOIDS, URINE: ABNORMAL NG/ML
CARBOXY THC GC/MS URINE: 13 NG/ML
CLONAZEPAM, URINE: NEGATIVE
COCAINE METABOLITE, URINE: NEGATIVE NG/ML
CODEINE, URINE: NEGATIVE
CREATININE, URINE: 65.2 MG/DL (ref 20–300)
ETHANOL U, QUAN: NEGATIVE %
FENTANYL URINE: NEGATIVE PG/ML
FLURAZEPAM, UR: NEGATIVE
HYDROCODONE, UR CONF: 740 NG/ML
HYDROCODONE, URINE: POSITIVE
HYDROMORPHONE, URINE: NEGATIVE
LORAZEPAM, URINE: NEGATIVE
MEPERIDINE, UR: NEGATIVE NG/ML
METHADONE SCREEN, URINE: NEGATIVE NG/ML
MIDAZOLAM, URINE: NEGATIVE
MORPHINE URINE: NEGATIVE
NORDIAZEPAM, URINE: NEGATIVE
OPIATES, URINE: ABNORMAL NG/ML
OPIATES, URINE: POSITIVE NG/ML
OXAZEPAM, URINE: NEGATIVE
OXYCODONE/OXYMORPHONE, UR: NEGATIVE NG/ML
PH, URINE: 5.3 (ref 4.5–8.9)
PHENCYCLIDINE, URINE: NEGATIVE NG/ML
PROPOXYPHENE, URINE: NEGATIVE NG/ML
TEMAZEPAM, URINE: NEGATIVE
TRIAZOLAM, URINE: NEGATIVE

## 2017-07-27 RX ORDER — ONDANSETRON 4 MG/1
4 TABLET, FILM COATED ORAL EVERY 8 HOURS PRN
Qty: 90 TABLET | Refills: 0 | OUTPATIENT
Start: 2017-07-27 | End: 2017-12-04

## 2017-08-03 ENCOUNTER — OFFICE VISIT (OUTPATIENT)
Dept: GASTROENTEROLOGY | Facility: CLINIC | Age: 47
End: 2017-08-03

## 2017-08-03 VITALS
DIASTOLIC BLOOD PRESSURE: 92 MMHG | TEMPERATURE: 96.8 F | OXYGEN SATURATION: 98 % | SYSTOLIC BLOOD PRESSURE: 132 MMHG | BODY MASS INDEX: 15.91 KG/M2 | HEIGHT: 68 IN | WEIGHT: 105 LBS | HEART RATE: 61 BPM

## 2017-08-03 DIAGNOSIS — R10.84 GENERALIZED ABDOMINAL PAIN: ICD-10-CM

## 2017-08-03 DIAGNOSIS — Z72.0 TOBACCO USE: ICD-10-CM

## 2017-08-03 DIAGNOSIS — R11.0 NAUSEA: Primary | ICD-10-CM

## 2017-08-03 DIAGNOSIS — I10 ESSENTIAL HYPERTENSION: ICD-10-CM

## 2017-08-03 DIAGNOSIS — R63.4 WEIGHT LOSS: ICD-10-CM

## 2017-08-03 PROCEDURE — 99214 OFFICE O/P EST MOD 30 MIN: CPT | Performed by: NURSE PRACTITIONER

## 2017-08-03 RX ORDER — METOCLOPRAMIDE 5 MG/1
5 TABLET ORAL
COMMUNITY
End: 2018-07-09 | Stop reason: ALTCHOICE

## 2017-08-03 NOTE — PROGRESS NOTES
[facility] GASTROENTEROLOGY - OFFICE NOTE    8/3/2017    Kamryn Oliva   1970    Primary Physician: Lambert Faust DO    Chief Complaint   Patient presents with   • GI Problem     weight loss, N/V         HISTORY OF PRESENT ILLNESS    Kamryn Oliva is a 47 y.o. female presents  With  Complaint of nausea.  She also has abdominal pain as had weight loss as well.  States that she has had this nausea for at least 4-5 months.  This occurs daily.  Eating makes it worse.  She has only had 2 episodes of vomiting during this time.  She is lost 20 pounds over the last 2 months.  Her appetite is decreased.  She has been experiencing generalized abdominal pain.  She has been taking Bentyl which does not help.  She is currently on pantoprazole twice a day and has been taking for more than a year.  She has taken Reglan over the last 2 months with no help.  She does take aspirin daily.  She takes Excedrin at least once a week.  She is moving her bowels once daily.  She has not seen any signs of active GI bleeding such as bright red blood per rectum or black stool.  No hematemesis.  No fevers or chills.  She has been evaluated by Dr. Hanson.  Last EGD was 01/25/2017 which was normal, biopsies for celiac was negative.  Last colonoscopy was November 2016 which showed internal hemorrhoid.  She also had another EGD October 2016 which was normal and urea was negative.  Her last office visit in January 2017 with Dr. Hanson, they did discuss pursuing an M2A capsule endoscopy however with her history of small bowel obstruction requiring surgical intervention this would be a contraindication to capsule endoscopy.  The patient did not want to take any chances with swallowing the camera due to history of bowel obstruction, risk or greater than benefit.   CT scan abdomen and pelvis with contrast 07/19/2017 showed right ovarian cyst, somewhat heterogenous appearance of the liver felt to be related to the face of contrast  enhancement, there was no appreciable mesenteric or retro-peritoneal lymphadenopathy, no origins of the celiac axis, SMA, and MOON opacify normally.  CT of the head without contrast February 2017 showed nothing acute, no intracranial hemorrhage or mass identified.   August 2016 she had a small bowel follow-through which showed no evidence of small bowel obstruction.          Ov  1/24/17    Kamryn Oliva is a 47 y.o. female who presents with a chief complaint of  anemia.  She was in the hospital recently with apparently urosepsis.  Creatinine was above 3 and she had nausea and vomiting.  She brought in a picture of her emesis and appears to be bile-colored fluid in the commode with possible slight red discoloration.  He states she had been vomiting several times and then had some blood in her emesis.  While in the hospital she is found to be anemic.  She was admitted with a hemoglobin approximately 10 and decreased a low 7.5 with vigorous hydration.  She was transfused 2 units and at time of discharge her hemoglobin was 10.  Reviewing her labs over the last 1 year her hemoglobin has run below normal.  Dr. Rashida Nice did see her in the hospital for elevated platelets in the 800s as well as the anemia.  He felt she had iron deficiency anemia from blood loss.  The patient did have a negative endoscopy in October negative colonoscopy in November 2016.     Currently she feels better.  She admits that she is under stress and does have much of an appetite which she attributes to the stress.  She did follow up with nephrology yesterday who checked labs at [facility] and an ultrasound of her kidneys of which results we do not have.  Her last creatinine I saw her was back down to 0.6.  She still has intermittent abdominal discomfort with her IBS.       Past Medical History:   Diagnosis Date   • Acute kidney failure    • Constipation    • Depression    • H/O gastric ulcer    • Headache    • History of transfusion    •  Hypertension    • IBS (irritable bowel syndrome)    • Insomnia    • Kidney stone    • Maravilla maravilla disease    • Rapid weight loss    • SBO (small bowel obstruction)    • Stroke     X 2   • UTI (urinary tract infection)     CHRONIC, SEPTIC X2   • Volvulosis        Past Surgical History:   Procedure Laterality Date   • APPENDECTOMY      COMPLICATION OF SEPTIC   • BRAIN SURGERY      x2   • BREAST LUMPECTOMY Left 3/9/2017    Procedure: EXCISION LEFT BREAST LESION AND LEFT AXILLARY LYMPH NODE BIOPSY;  Surgeon: Evi Farley MD;  Location: [facility] OR;  Service:    • BREAST SURGERY     •  SECTION     • COLON SURGERY     • COLONOSCOPY  2007    normal   • COLONOSCOPY N/A 11/10/2016    Procedure: COLONOSCOPY WITH ANESTHESIA;  Surgeon: Camilo Hanson MD;  Location: [facility] ENDOSCOPY;  Service:    • ENDOSCOPY  2009    antral ulcer   • ENDOSCOPY N/A 10/10/2016    Procedure: ESOPHAGOGASTRODUODENOSCOPY WITH ANESTHESIA;  Surgeon: Camilo Hanson MD;  Location: [facility] ENDOSCOPY;  Service:    • ENDOSCOPY N/A 2017    Procedure: ESOPHAGOGASTRODUODENOSCOPY;  Surgeon: Camilo Hanson MD;  Location: [facility] ENDOSCOPY;  Service:    • HERNIA REPAIR     • HYSTERECTOMY     • SMALL INTESTINE SURGERY N/A 2016   • TONSILLECTOMY         Outpatient Prescriptions Marked as Taking for the 8/3/17 encounter (Office Visit) with YOUNG Oropeza   Medication Sig Dispense Refill   • ALPRAZolam (XANAX) 0.5 MG tablet Take 0.5 mg by mouth 3 (Three) Times a Day As Needed for Anxiety.     • amitriptyline (ELAVIL) 50 MG tablet Every Night.  2   • amLODIPine (NORVASC) 10 MG tablet Take 10 mg by mouth daily.     • aspirin 81 MG EC tablet Take 81 mg by mouth daily.     • aspirin-acetaminophen-caffeine (EXCEDRIN MIGRAINE) 250-250-65 MG per tablet Take 1 tablet by mouth every 6 (six) hours as needed for headaches.     • CloNIDine (CATAPRES) 0.1 MG tablet Take 1 tablet by mouth Every 8 (Eight) Hours As Needed for high blood pressure  (SBP>160 mmHg). 10 tablet 0   • dicyclomine (BENTYL) 10 MG capsule Take 1 capsule by mouth 4 (Four) Times a Day As Needed (prn abdominal cramps). 120 capsule 11   • fluticasone (FLONASE) 50 MCG/ACT nasal spray 2 sprays Daily.     • HYDROcodone-acetaminophen (NORCO) 5-325 MG per tablet Take 1 tablet by mouth Every 6 (Six) Hours As Needed.     • metoclopramide (REGLAN) 5 MG tablet Take 5 mg by mouth 4 (Four) Times a Day Before Meals & at Bedtime.     • metoprolol succinate XL (TOPROL-XL) 100 MG 24 hr tablet Every Night.     • ondansetron (ZOFRAN) 4 MG tablet Every 6 (Six) Hours As Needed for Nausea.     • pantoprazole (PROTONIX) 40 MG EC tablet Take 1 tablet by mouth 2 (Two) Times a Day. 60 tablet 1   • zonisamide (ZONEGRAN) 100 MG capsule Take 200 mg by mouth Every Night.         Allergies   Allergen Reactions   • Anectine [Succinylcholine Chloride] Other (See Comments)     Hard to wake up   • Stadol [Butorphanol] Hives       Social History     Social History   • Marital status:      Spouse name: N/A   • Number of children: N/A   • Years of education: N/A     Occupational History   • Not on file.     Social History Main Topics   • Smoking status: Former Smoker     Packs/day: 1.00     Years: 15.00     Types: Cigarettes, Electronic Cigarette     Quit date: 3/10/2016   • Smokeless tobacco: Never Used   • Alcohol use Yes      Comment: occasional   • Drug use: No   • Sexual activity: Defer     Other Topics Concern   • Not on file     Social History Narrative       Family History   Problem Relation Age of Onset   • Cancer Maternal Grandfather    • No Known Problems Mother    • No Known Problems Father    • No Known Problems Sister    • No Known Problems Brother    • No Known Problems Brother    • No Known Problems Son    • Colon cancer Neg Hx    • Colon polyps Neg Hx        Review of Systems   Constitutional: Positive for fatigue and unexpected weight change. Negative for appetite change, chills and fever.   HENT:  "Negative for sore throat and trouble swallowing.    Respiratory: Negative for cough, chest tightness, shortness of breath and wheezing.    Cardiovascular: Negative for chest pain and palpitations.   Gastrointestinal: Positive for abdominal pain, nausea and vomiting. Negative for abdominal distention, anal bleeding, blood in stool, constipation, diarrhea and rectal pain.        As mentioned in hpi   Genitourinary: Negative for difficulty urinating, dysuria and hematuria.   Musculoskeletal: Negative for arthralgias and back pain.   Skin: Negative for color change and rash.   Neurological: Negative for dizziness, syncope, light-headedness and headaches.       Vitals:    08/03/17 1341   BP: 132/92   BP Location: Left arm   Patient Position: Sitting   Cuff Size: Adult   Pulse: 61   Temp: 96.8 °F (36 °C)   SpO2: 98%   Weight: 105 lb (47.6 kg)   Height: 67.5\" (171.5 cm)      Body mass index is 16.2 kg/(m^2).    Physical Exam   Constitutional: She appears well-developed and well-nourished. No distress.   HENT:   Head: Normocephalic and atraumatic.   Eyes: EOM are normal. No scleral icterus.   Neck: Neck supple. No JVD present.   Cardiovascular: Normal rate, regular rhythm and normal heart sounds.    Pulmonary/Chest: Effort normal and breath sounds normal. No stridor.   Abdominal: Soft. Bowel sounds are normal. She exhibits no distension and no mass. There is tenderness (mild generalized). There is no rebound and no guarding.   Musculoskeletal: She exhibits no edema.   Neurological: She is alert.   Skin: Skin is warm and dry. No rash noted.   Psychiatric: She has a normal mood and affect. Her behavior is normal.   Vitals reviewed.      Results for orders placed or performed during the hospital encounter of 07/19/17   Comprehensive Metabolic Panel   Result Value Ref Range    Glucose 83 70 - 100 mg/dL    BUN 9 5 - 21 mg/dL    Creatinine 1.15 0.50 - 1.40 mg/dL    Sodium 140 135 - 145 mmol/L    Potassium 3.8 3.5 - 5.3 mmol/L    " Chloride 104 98 - 110 mmol/L    CO2 24.0 24.0 - 31.0 mmol/L    Calcium 10.2 8.4 - 10.4 mg/dL    Total Protein 8.2 6.3 - 8.7 g/dL    Albumin 5.10 (H) 3.50 - 5.00 g/dL    ALT (SGPT) 32 0 - 54 U/L    AST (SGOT) 30 7 - 45 U/L    Alkaline Phosphatase 76 24 - 120 U/L    Total Bilirubin 0.4 0.1 - 1.0 mg/dL    eGFR Non African Amer 51 (L) >60 mL/min/1.73    Globulin 3.1 gm/dL    A/G Ratio 1.6 1.1 - 2.5 g/dL    BUN/Creatinine Ratio 7.8 7.0 - 25.0    Anion Gap 12.0 4.0 - 13.0 mmol/L   Amylase   Result Value Ref Range    Amylase 68 30 - 110 U/L   Lipase   Result Value Ref Range    Lipase 34 23 - 203 U/L   Urinalysis With / Culture If Indicated   Result Value Ref Range    Color, UA Yellow Yellow, Straw    Appearance, UA Clear Clear    pH, UA <=5.0 5.0 - 8.0    Specific Gravity, UA 1.008 1.005 - 1.030    Glucose, UA Negative Negative    Ketones, UA Negative Negative    Bilirubin, UA Negative Negative    Blood, UA Negative Negative    Protein, UA Negative Negative    Leuk Esterase, UA Negative Negative    Nitrite, UA Negative Negative    Urobilinogen, UA 0.2 E.U./dL 0.2 - 1.0 E.U./dL   Urine Drug Screen   Result Value Ref Range    Amphetamine Screen, Urine Negative Negative    Barbiturates Screen, Urine Negative Negative    Benzodiazepine Screen, Urine Positive (A) Negative    Cocaine Screen, Urine Negative Negative    Methadone Screen, Urine Negative Negative    Opiate Screen Positive (A) Negative    Phencyclidine (PCP), Urine Negative Negative    THC, Screen, Urine Positive (A) Negative   C-reactive Protein   Result Value Ref Range    C-Reactive Protein <0.50 0.00 - 0.99 mg/dL   Procalcitonin   Result Value Ref Range    Procalcitonin <0.25 <=0.25 ng/mL   Lactic Acid, Plasma   Result Value Ref Range    Lactate 0.7 0.5 - 2.0 mmol/L   hCG, Serum, Qualitative   Result Value Ref Range    HCG Qualitative Negative Negative   CBC Auto Differential   Result Value Ref Range    WBC 7.10 4.80 - 10.80 10*3/mm3    RBC 4.12 (L) 4.20 - 5.40  10*6/mm3    Hemoglobin 12.7 12.0 - 16.0 g/dL    Hematocrit 39.0 37.0 - 47.0 %    MCV 94.7 82.0 - 98.0 fL    MCH 30.8 28.0 - 32.0 pg    MCHC 32.6 (L) 33.0 - 36.0 g/dL    RDW 13.0 12.0 - 15.0 %    RDW-SD 45.2 40.0 - 54.0 fl    MPV 10.4 6.0 - 12.0 fL    Platelets 332 130 - 400 10*3/mm3    Neutrophil % 38.8 (L) 39.0 - 78.0 %    Lymphocyte % 53.0 (H) 15.0 - 45.0 %    Monocyte % 6.2 4.0 - 12.0 %    Eosinophil % 1.4 0.0 - 4.0 %    Basophil % 0.3 0.0 - 2.0 %    Immature Grans % 0.3 0.0 - 5.0 %    Neutrophils, Absolute 2.76 1.87 - 8.40 10*3/mm3    Lymphocytes, Absolute 3.76 0.72 - 4.86 10*3/mm3    Monocytes, Absolute 0.44 0.19 - 1.30 10*3/mm3    Eosinophils, Absolute 0.10 0.00 - 0.70 10*3/mm3    Basophils, Absolute 0.02 0.00 - 0.20 10*3/mm3    Immature Grans, Absolute 0.02 0.00 - 0.03 10*3/mm3           ASSESSMENT AND PLAN    Kamryn was seen today for gi problem.    Diagnoses and all orders for this visit:    Nausea  -     Case Request; Standing  -     Case Request    Generalized abdominal pain    Weight loss    Essential hypertension    Tobacco use    Other orders  -     Implement Anesthesia Orders Day of Procedure; Standing  -     Obtain Informed Consent; Standing    She continues with nausea.  She has had workup including EGD, colonoscopy, recent CT scan abdomen and pelvis.  CT of the head that were 2017.  M2A capsule study was discussed with her history of bowel obstruction the risk outweighed the benefits. We will plan for repeat EGD.  I am going to add Carafate.  Recommend continue PPI daily. Instructed patient to take heart or blood pressure medication a.m. of procedure if that is when normally taken.  Strongly encouraged smoking cessation, greater than 3 minutes  Counseled.  ER if worsening symptoms.    ESOPHAGOGASTRODUODENOSCOPY WITH ANESTHESIA (N/A)   Risk, benefits, and alternatives of endoscopy were explained in full.  They understand that there is a risk of bleeding, perforation, and infection.  The risk of  perforation goes up with esophageal dilation.  Other options to evaluate UGI complaints could involve barium swallow or UGI series, but these would be diagnostic tests only.  Patient was given time to ask questions.  I answered them to their satisfaction and they are agreeable to proceeding    Body mass index is 16.2 kg/(m^2).           YOUNG Medina    EMR Dragon/transcription disclaimer:  Much of this encounter note is electronic transcription/translation of spoken language to printed text.  The electronic translation of spoken language may be erroneous, or at times, nonsensical words or phrases may be inadvertently transcribed.  Although I have reviewed the note for such errors, some may still exist.

## 2017-08-04 PROBLEM — R10.84 GENERALIZED ABDOMINAL PAIN: Status: ACTIVE | Noted: 2017-08-04

## 2017-08-04 PROBLEM — R63.4 WEIGHT LOSS: Status: ACTIVE | Noted: 2017-08-04

## 2017-08-05 ENCOUNTER — APPOINTMENT (OUTPATIENT)
Dept: GENERAL RADIOLOGY | Facility: HOSPITAL | Age: 47
End: 2017-08-05

## 2017-08-05 ENCOUNTER — HOSPITAL ENCOUNTER (EMERGENCY)
Facility: HOSPITAL | Age: 47
Discharge: HOME | End: 2017-08-05
Attending: EMERGENCY MEDICINE | Admitting: EMERGENCY MEDICINE

## 2017-08-05 ENCOUNTER — APPOINTMENT (OUTPATIENT)
Dept: CT IMAGING | Facility: HOSPITAL | Age: 47
End: 2017-08-05

## 2017-08-05 VITALS
HEIGHT: 68 IN | WEIGHT: 106 LBS | OXYGEN SATURATION: 94 % | TEMPERATURE: 98.8 F | HEART RATE: 82 BPM | DIASTOLIC BLOOD PRESSURE: 84 MMHG | BODY MASS INDEX: 16.06 KG/M2 | SYSTOLIC BLOOD PRESSURE: 138 MMHG | RESPIRATION RATE: 18 BRPM

## 2017-08-05 DIAGNOSIS — E87.1 HYPONATREMIA: Primary | ICD-10-CM

## 2017-08-05 DIAGNOSIS — R10.84 GENERALIZED ABDOMINAL PAIN: ICD-10-CM

## 2017-08-05 LAB
ALBUMIN SERPL-MCNC: 4.9 G/DL (ref 3.5–5)
ALBUMIN/GLOB SERPL: 1.8 G/DL (ref 1.1–2.5)
ALP SERPL-CCNC: 78 U/L (ref 24–120)
ALT SERPL W P-5'-P-CCNC: 33 U/L (ref 0–54)
AMYLASE SERPL-CCNC: 83 U/L (ref 30–110)
ANION GAP SERPL CALCULATED.3IONS-SCNC: 12 MMOL/L (ref 4–13)
APTT PPP: 26.4 SECONDS (ref 24.1–34.8)
AST SERPL-CCNC: 25 U/L (ref 7–45)
BASOPHILS # BLD AUTO: 0.01 10*3/MM3 (ref 0–0.2)
BASOPHILS NFR BLD AUTO: 0.1 % (ref 0–2)
BILIRUB SERPL-MCNC: 0.4 MG/DL (ref 0.1–1)
BILIRUB UR QL STRIP: NEGATIVE
BUN BLD-MCNC: 11 MG/DL (ref 5–21)
BUN/CREAT SERPL: 12.8 (ref 7–25)
CALCIUM SPEC-SCNC: 10 MG/DL (ref 8.4–10.4)
CHLORIDE SERPL-SCNC: 95 MMOL/L (ref 98–110)
CK MB SERPL-CCNC: 0.51 NG/ML (ref 0–5)
CLARITY UR: CLEAR
CO2 SERPL-SCNC: 25 MMOL/L (ref 24–31)
COLOR UR: YELLOW
CREAT BLD-MCNC: 0.86 MG/DL (ref 0.5–1.4)
D DIMER PPP FEU-MCNC: <0.22 MG/L (FEU) (ref 0–0.5)
DEPRECATED RDW RBC AUTO: 39.1 FL (ref 40–54)
EOSINOPHIL # BLD AUTO: 0.03 10*3/MM3 (ref 0–0.7)
EOSINOPHIL NFR BLD AUTO: 0.3 % (ref 0–4)
ERYTHROCYTE [DISTWIDTH] IN BLOOD BY AUTOMATED COUNT: 11.9 % (ref 12–15)
GFR SERPL CREATININE-BSD FRML MDRD: 71 ML/MIN/1.73
GLOBULIN UR ELPH-MCNC: 2.8 GM/DL
GLUCOSE BLD-MCNC: 86 MG/DL (ref 70–100)
GLUCOSE UR STRIP-MCNC: NEGATIVE MG/DL
HCT VFR BLD AUTO: 37.9 % (ref 37–47)
HGB BLD-MCNC: 12.9 G/DL (ref 12–16)
HGB UR QL STRIP.AUTO: NEGATIVE
HOLD SPECIMEN: NORMAL
HOLD SPECIMEN: NORMAL
IMM GRANULOCYTES # BLD: 0.02 10*3/MM3 (ref 0–0.03)
IMM GRANULOCYTES NFR BLD: 0.2 % (ref 0–5)
INR PPP: 0.87 (ref 0.91–1.09)
KETONES UR QL STRIP: NEGATIVE
LEUKOCYTE ESTERASE UR QL STRIP.AUTO: NEGATIVE
LIPASE SERPL-CCNC: 75 U/L (ref 23–203)
LYMPHOCYTES # BLD AUTO: 2.45 10*3/MM3 (ref 0.72–4.86)
LYMPHOCYTES NFR BLD AUTO: 26.4 % (ref 15–45)
MAGNESIUM SERPL-MCNC: 1.8 MG/DL (ref 1.4–2.2)
MCH RBC QN AUTO: 30.9 PG (ref 28–32)
MCHC RBC AUTO-ENTMCNC: 34 G/DL (ref 33–36)
MCV RBC AUTO: 90.9 FL (ref 82–98)
MONOCYTES # BLD AUTO: 0.55 10*3/MM3 (ref 0.19–1.3)
MONOCYTES NFR BLD AUTO: 5.9 % (ref 4–12)
MYOGLOBIN SERPL-MCNC: 47.1 NG/ML (ref 0–110)
NEUTROPHILS # BLD AUTO: 6.23 10*3/MM3 (ref 1.87–8.4)
NEUTROPHILS NFR BLD AUTO: 67.1 % (ref 39–78)
NITRITE UR QL STRIP: NEGATIVE
NT-PROBNP SERPL-MCNC: 136 PG/ML (ref 0–450)
PH UR STRIP.AUTO: 6.5 [PH] (ref 5–8)
PLATELET # BLD AUTO: 303 10*3/MM3 (ref 130–400)
PMV BLD AUTO: 10.8 FL (ref 6–12)
POTASSIUM BLD-SCNC: 3.6 MMOL/L (ref 3.5–5.3)
PROT SERPL-MCNC: 7.7 G/DL (ref 6.3–8.7)
PROT UR QL STRIP: NEGATIVE
PROTHROMBIN TIME: 12.1 SECONDS (ref 11.9–14.6)
RBC # BLD AUTO: 4.17 10*6/MM3 (ref 4.2–5.4)
SODIUM BLD-SCNC: 132 MMOL/L (ref 135–145)
SP GR UR STRIP: 1.01 (ref 1–1.03)
TROPONIN I SERPL-MCNC: <0.012 NG/ML (ref 0–0.03)
TROPONIN I SERPL-MCNC: <0.012 NG/ML (ref 0–0.03)
UROBILINOGEN UR QL STRIP: NORMAL
WBC NRBC COR # BLD: 9.29 10*3/MM3 (ref 4.8–10.8)
WHOLE BLOOD HOLD SPECIMEN: NORMAL
WHOLE BLOOD HOLD SPECIMEN: NORMAL

## 2017-08-05 PROCEDURE — 93005 ELECTROCARDIOGRAM TRACING: CPT | Performed by: EMERGENCY MEDICINE

## 2017-08-05 PROCEDURE — 36415 COLL VENOUS BLD VENIPUNCTURE: CPT | Performed by: EMERGENCY MEDICINE

## 2017-08-05 PROCEDURE — 83874 ASSAY OF MYOGLOBIN: CPT | Performed by: EMERGENCY MEDICINE

## 2017-08-05 PROCEDURE — 85025 COMPLETE CBC W/AUTO DIFF WBC: CPT | Performed by: EMERGENCY MEDICINE

## 2017-08-05 PROCEDURE — 96374 THER/PROPH/DIAG INJ IV PUSH: CPT

## 2017-08-05 PROCEDURE — 83690 ASSAY OF LIPASE: CPT | Performed by: EMERGENCY MEDICINE

## 2017-08-05 PROCEDURE — 0 IOPAMIDOL 61 % SOLUTION: Performed by: EMERGENCY MEDICINE

## 2017-08-05 PROCEDURE — 99284 EMERGENCY DEPT VISIT MOD MDM: CPT

## 2017-08-05 PROCEDURE — 85730 THROMBOPLASTIN TIME PARTIAL: CPT | Performed by: EMERGENCY MEDICINE

## 2017-08-05 PROCEDURE — 74177 CT ABD & PELVIS W/CONTRAST: CPT

## 2017-08-05 PROCEDURE — 25010000002 PROMETHAZINE PER 50 MG: Performed by: EMERGENCY MEDICINE

## 2017-08-05 PROCEDURE — 96361 HYDRATE IV INFUSION ADD-ON: CPT

## 2017-08-05 PROCEDURE — 25010000002 ONDANSETRON PER 1 MG: Performed by: EMERGENCY MEDICINE

## 2017-08-05 PROCEDURE — 71010 HC CHEST PA OR AP: CPT

## 2017-08-05 PROCEDURE — 81003 URINALYSIS AUTO W/O SCOPE: CPT | Performed by: EMERGENCY MEDICINE

## 2017-08-05 PROCEDURE — 82150 ASSAY OF AMYLASE: CPT | Performed by: EMERGENCY MEDICINE

## 2017-08-05 PROCEDURE — 83735 ASSAY OF MAGNESIUM: CPT | Performed by: EMERGENCY MEDICINE

## 2017-08-05 PROCEDURE — 84484 ASSAY OF TROPONIN QUANT: CPT | Performed by: EMERGENCY MEDICINE

## 2017-08-05 PROCEDURE — 25010000002 MEPERIDINE PER 100 MG: Performed by: EMERGENCY MEDICINE

## 2017-08-05 PROCEDURE — 85379 FIBRIN DEGRADATION QUANT: CPT | Performed by: EMERGENCY MEDICINE

## 2017-08-05 PROCEDURE — 93010 ELECTROCARDIOGRAM REPORT: CPT | Performed by: INTERNAL MEDICINE

## 2017-08-05 PROCEDURE — 96375 TX/PRO/DX INJ NEW DRUG ADDON: CPT

## 2017-08-05 PROCEDURE — 85610 PROTHROMBIN TIME: CPT | Performed by: EMERGENCY MEDICINE

## 2017-08-05 PROCEDURE — 96376 TX/PRO/DX INJ SAME DRUG ADON: CPT

## 2017-08-05 PROCEDURE — 83880 ASSAY OF NATRIURETIC PEPTIDE: CPT | Performed by: EMERGENCY MEDICINE

## 2017-08-05 PROCEDURE — 80053 COMPREHEN METABOLIC PANEL: CPT | Performed by: EMERGENCY MEDICINE

## 2017-08-05 PROCEDURE — 82553 CREATINE MB FRACTION: CPT | Performed by: EMERGENCY MEDICINE

## 2017-08-05 RX ORDER — MEPERIDINE HYDROCHLORIDE 25 MG/ML
25 INJECTION INTRAMUSCULAR; INTRAVENOUS; SUBCUTANEOUS ONCE
Status: COMPLETED | OUTPATIENT
Start: 2017-08-05 | End: 2017-08-05

## 2017-08-05 RX ORDER — PROMETHAZINE HYDROCHLORIDE 25 MG/ML
12.5 INJECTION, SOLUTION INTRAMUSCULAR; INTRAVENOUS ONCE
Status: COMPLETED | OUTPATIENT
Start: 2017-08-05 | End: 2017-08-05

## 2017-08-05 RX ORDER — ONDANSETRON 2 MG/ML
4 INJECTION INTRAMUSCULAR; INTRAVENOUS ONCE
Status: COMPLETED | OUTPATIENT
Start: 2017-08-05 | End: 2017-08-05

## 2017-08-05 RX ORDER — PROMETHAZINE HYDROCHLORIDE 25 MG/1
25 TABLET ORAL EVERY 6 HOURS PRN
Qty: 6 TABLET | Refills: 0 | Status: SHIPPED | OUTPATIENT
Start: 2017-08-05 | End: 2019-03-06 | Stop reason: SDUPTHER

## 2017-08-05 RX ORDER — SODIUM CHLORIDE 9 MG/ML
75 INJECTION, SOLUTION INTRAVENOUS CONTINUOUS
Status: DISCONTINUED | OUTPATIENT
Start: 2017-08-05 | End: 2017-08-05 | Stop reason: HOSPADM

## 2017-08-05 RX ADMIN — PROMETHAZINE HYDROCHLORIDE 12.5 MG: 25 INJECTION INTRAMUSCULAR; INTRAVENOUS at 16:29

## 2017-08-05 RX ADMIN — ONDANSETRON 4 MG: 2 INJECTION INTRAMUSCULAR; INTRAVENOUS at 14:30

## 2017-08-05 RX ADMIN — MEPERIDINE HYDROCHLORIDE 25 MG: 25 INJECTION, SOLUTION INTRAMUSCULAR; INTRAVENOUS; SUBCUTANEOUS at 14:29

## 2017-08-05 RX ADMIN — SODIUM CHLORIDE 250 ML: 9 INJECTION, SOLUTION INTRAVENOUS at 15:14

## 2017-08-05 RX ADMIN — MEPERIDINE HYDROCHLORIDE 25 MG: 25 INJECTION, SOLUTION INTRAMUSCULAR; INTRAVENOUS; SUBCUTANEOUS at 16:29

## 2017-08-05 RX ADMIN — SODIUM CHLORIDE 75 ML/HR: 9 INJECTION, SOLUTION INTRAVENOUS at 16:30

## 2017-08-05 RX ADMIN — IOPAMIDOL 100 ML: 612 INJECTION, SOLUTION INTRAVENOUS at 15:30

## 2017-08-05 NOTE — ED PROVIDER NOTES
"Subjective   HPI Comments: Patient is a 47-year-old female with history of SBO, kidney stones, chronic hypertension, chronic  iron deficiency anemia, chronic IBS with constipation and diarrhea and chronic Moyamoya disease.  Patient reports that over the last 2 months she has had a 20 pound weight loss, intermittent chronic dizziness, intermittent generalized abdominal \"pain\" and right lower back pain with chronic nausea and intermittent \"dull\" left-sided chest pain with occasional shortness of breath.  She reports that over the last 2 weeks she has had worsening abdominal pain, worsening right lower back pain and decreased oral intake.  She reports her abdominal pain currently is worsened by nothing and relieved by nothing.  She reports her abdominal pain currently is a 10 (out of 10).  Patient reports that Dr. Hanson is scheduled to do an EGD test on her this coming Tuesday (08/08/2017).      History provided by:  Patient      Review of Systems   Constitutional: Positive for appetite change, fatigue and unexpected weight change.   HENT: Negative.    Eyes: Negative.    Respiratory: Positive for shortness of breath.    Cardiovascular: Positive for chest pain.   Gastrointestinal: Positive for abdominal pain, diarrhea and nausea. Negative for vomiting.   Endocrine: Negative.    Genitourinary: Negative.    Musculoskeletal: Positive for back pain (Right sided lower back pain).   Skin: Negative.    Allergic/Immunologic: Negative.    Neurological: Positive for dizziness.   Hematological: Negative.    Psychiatric/Behavioral: Negative.    All other systems reviewed and are negative.      Past Medical History:   Diagnosis Date   • Acute kidney failure    • Constipation    • Depression    • H/O gastric ulcer    • Headache    • History of transfusion    • Hypertension    • IBS (irritable bowel syndrome)    • Insomnia    • Kidney stone    • Maravilla maravilla disease    • Rapid weight loss    • SBO (small bowel obstruction)    • Stroke  "    X 2   • UTI (urinary tract infection)     CHRONIC, SEPTIC X2   • Volvulosis        Allergies   Allergen Reactions   • Anectine [Succinylcholine Chloride] Other (See Comments)     Hard to wake up   • Stadol [Butorphanol] Hives       Past Surgical History:   Procedure Laterality Date   • APPENDECTOMY      COMPLICATION OF SEPTIC   • BRAIN SURGERY      x2   • BREAST LUMPECTOMY Left 3/9/2017    Procedure: EXCISION LEFT BREAST LESION AND LEFT AXILLARY LYMPH NODE BIOPSY;  Surgeon: Evi Farley MD;  Location: [facility] OR;  Service:    • BREAST SURGERY     •  SECTION     • COLON SURGERY     • COLONOSCOPY  2007    normal   • COLONOSCOPY N/A 11/10/2016    Procedure: COLONOSCOPY WITH ANESTHESIA;  Surgeon: Camilo Hanson MD;  Location: [facility] ENDOSCOPY;  Service:    • ENDOSCOPY  2009    antral ulcer   • ENDOSCOPY N/A 10/10/2016    Procedure: ESOPHAGOGASTRODUODENOSCOPY WITH ANESTHESIA;  Surgeon: Camilo Hanson MD;  Location: [facility] ENDOSCOPY;  Service:    • ENDOSCOPY N/A 2017    Procedure: ESOPHAGOGASTRODUODENOSCOPY;  Surgeon: Camilo Hanson MD;  Location: [facility] ENDOSCOPY;  Service:    • HERNIA REPAIR     • HYSTERECTOMY     • SMALL INTESTINE SURGERY N/A 2016   • TONSILLECTOMY         Family History   Problem Relation Age of Onset   • Cancer Maternal Grandfather    • No Known Problems Mother    • No Known Problems Father    • No Known Problems Sister    • No Known Problems Brother    • No Known Problems Brother    • No Known Problems Son    • Colon cancer Neg Hx    • Colon polyps Neg Hx        Social History     Social History   • Marital status:      Spouse name: N/A   • Number of children: N/A   • Years of education: N/A     Social History Main Topics   • Smoking status: Former Smoker     Packs/day: 1.00     Years: 15.00     Types: Cigarettes, Electronic Cigarette     Quit date: 3/10/2016   • Smokeless tobacco: Never Used   • Alcohol use Yes      Comment: occasional   • Drug use:  No   • Sexual activity: Defer     Other Topics Concern   • None     Social History Narrative           Objective   Physical Exam   Constitutional: She is oriented to person, place, and time. She appears well-developed and well-nourished.   Thin, ill-appearing female   HENT:   Head: Normocephalic and atraumatic.   Right Ear: External ear normal.   Left Ear: External ear normal.   Nose: Nose normal.   Mouth/Throat: Oropharynx is clear and moist.   Eyes: Conjunctivae and EOM are normal. Pupils are equal, round, and reactive to light. Right eye exhibits no discharge. Left eye exhibits no discharge.   Neck: Normal range of motion. Neck supple. No tracheal deviation present. No thyromegaly present.   Cardiovascular: Normal rate, regular rhythm, normal heart sounds and intact distal pulses.    No murmur heard.  Pulmonary/Chest: Effort normal and breath sounds normal. No respiratory distress. She exhibits no tenderness.   Abdominal: Soft. She exhibits no distension. There is tenderness (Moderate generalized abdominal pain to palpation and bilateral CVA pain.).   Musculoskeletal: She exhibits no edema, tenderness or deformity.   Mild generalized weakness.   Neurological: She is alert and oriented to person, place, and time. No cranial nerve deficit.   Skin: Skin is warm and dry. No erythema. No pallor.   Psychiatric: She has a normal mood and affect. Judgment normal.   Nursing note and vitals reviewed.      ECG 12 Lead    Date/Time: 8/5/2017 1:03 PM  Performed by: NONI POP  Authorized by: NONI POP   Interpreted by physician  Rhythm: sinus rhythm  BPM: 64  QRS axis: normal  Comments: Possible Left atrial enlargement; Septal infarct, age undetermined.    ECG 12 Lead    Date/Time: 8/5/2017 5:36 PM  Performed by: NONI POP  Authorized by: NONI POP   Interpreted by physician  Rhythm: sinus rhythm  BPM: 67  Comments: Possible Left atrial enlargement; Septal infarct, age  "undetermined; ST & T wave abnormality, consider anterior ischemia.               ED Course  ED Course   Comment By Time   Indications, risks and benefits of a CT scan of the abdomen and pelvis was discussed with the patient in details.  The patient voiced understanding.  The patient agrees to have a CT scan of the abdomen and pelvis performed at this emergency department visit. Inder Modi MD 08/05 7380   Nursing has just reported that the patient has told her that she has been drinking alcohol \"heavily\" for the last 2 weeks. Inder Modi MD 08/05 1514                  MDM  Number of Diagnoses or Management Options  Generalized abdominal pain:   Hyponatremia: minor     Amount and/or Complexity of Data Reviewed  Clinical lab tests: ordered and reviewed  Tests in the radiology section of CPT®: ordered and reviewed    Risk of Complications, Morbidity, and/or Mortality  Presenting problems: moderate  Diagnostic procedures: moderate  Management options: moderate    Patient Progress  Patient progress: stable      Final diagnoses:   Hyponatremia   Generalized abdominal pain            Inder Modi MD  08/05/17 1918    "

## 2017-08-07 ENCOUNTER — TELEPHONE (OUTPATIENT)
Dept: GASTROENTEROLOGY | Facility: CLINIC | Age: 47
End: 2017-08-07

## 2017-08-07 ENCOUNTER — TELEPHONE (OUTPATIENT)
Dept: NEUROLOGY | Age: 47
End: 2017-08-07

## 2017-08-07 NOTE — TELEPHONE ENCOUNTER
As we discussed. Pt called stating she was dx. With hypotremia in th ER Sat and wants to know if this will effect her endoscopy tomorrow. I told her no.

## 2017-08-08 ENCOUNTER — ANESTHESIA (OUTPATIENT)
Dept: GASTROENTEROLOGY | Facility: HOSPITAL | Age: 47
End: 2017-08-08

## 2017-08-08 ENCOUNTER — HOSPITAL ENCOUNTER (OUTPATIENT)
Facility: HOSPITAL | Age: 47
Setting detail: HOSPITAL OUTPATIENT SURGERY
Discharge: HOME | End: 2017-08-08
Attending: INTERNAL MEDICINE | Admitting: INTERNAL MEDICINE

## 2017-08-08 ENCOUNTER — ANESTHESIA EVENT (OUTPATIENT)
Dept: GASTROENTEROLOGY | Facility: HOSPITAL | Age: 47
End: 2017-08-08

## 2017-08-08 VITALS
TEMPERATURE: 97.3 F | RESPIRATION RATE: 16 BRPM | SYSTOLIC BLOOD PRESSURE: 137 MMHG | WEIGHT: 100 LBS | OXYGEN SATURATION: 100 % | HEART RATE: 79 BPM | HEIGHT: 67 IN | DIASTOLIC BLOOD PRESSURE: 101 MMHG | BODY MASS INDEX: 15.7 KG/M2

## 2017-08-08 DIAGNOSIS — R11.0 NAUSEA: Primary | ICD-10-CM

## 2017-08-08 DIAGNOSIS — R63.4 WEIGHT LOSS: ICD-10-CM

## 2017-08-08 PROCEDURE — 25010000002 PROPOFOL 10 MG/ML EMULSION: Performed by: NURSE ANESTHETIST, CERTIFIED REGISTERED

## 2017-08-08 PROCEDURE — 43235 EGD DIAGNOSTIC BRUSH WASH: CPT | Performed by: INTERNAL MEDICINE

## 2017-08-08 RX ORDER — ONDANSETRON 2 MG/ML
4 INJECTION INTRAMUSCULAR; INTRAVENOUS ONCE AS NEEDED
Status: DISCONTINUED | OUTPATIENT
Start: 2017-08-08 | End: 2017-08-08 | Stop reason: HOSPADM

## 2017-08-08 RX ORDER — FENTANYL CITRATE 50 UG/ML
25 INJECTION, SOLUTION INTRAMUSCULAR; INTRAVENOUS
Status: CANCELLED | OUTPATIENT
Start: 2017-08-08

## 2017-08-08 RX ORDER — DEXTROSE MONOHYDRATE 25 G/50ML
12.5 INJECTION, SOLUTION INTRAVENOUS AS NEEDED
Status: CANCELLED | OUTPATIENT
Start: 2017-08-08

## 2017-08-08 RX ORDER — METOPROLOL TARTRATE 5 MG/5ML
2 INJECTION INTRAVENOUS ONCE AS NEEDED
Status: COMPLETED | OUTPATIENT
Start: 2017-08-08 | End: 2017-08-08

## 2017-08-08 RX ORDER — MIDAZOLAM HYDROCHLORIDE 1 MG/ML
1 INJECTION INTRAMUSCULAR; INTRAVENOUS
Status: CANCELLED | OUTPATIENT
Start: 2017-08-08

## 2017-08-08 RX ORDER — MIDAZOLAM HYDROCHLORIDE 1 MG/ML
2 INJECTION INTRAMUSCULAR; INTRAVENOUS
Status: CANCELLED | OUTPATIENT
Start: 2017-08-08

## 2017-08-08 RX ORDER — SODIUM CHLORIDE, SODIUM LACTATE, POTASSIUM CHLORIDE, CALCIUM CHLORIDE 600; 310; 30; 20 MG/100ML; MG/100ML; MG/100ML; MG/100ML
9 INJECTION, SOLUTION INTRAVENOUS CONTINUOUS
Status: CANCELLED | OUTPATIENT
Start: 2017-08-08

## 2017-08-08 RX ORDER — SODIUM CHLORIDE 0.9 % (FLUSH) 0.9 %
3 SYRINGE (ML) INJECTION AS NEEDED
Status: DISCONTINUED | OUTPATIENT
Start: 2017-08-08 | End: 2017-08-08 | Stop reason: HOSPADM

## 2017-08-08 RX ORDER — SODIUM CHLORIDE 0.9 % (FLUSH) 0.9 %
1-10 SYRINGE (ML) INJECTION AS NEEDED
Status: CANCELLED | OUTPATIENT
Start: 2017-08-08

## 2017-08-08 RX ORDER — LIDOCAINE HYDROCHLORIDE 10 MG/ML
0.5 INJECTION, SOLUTION INFILTRATION; PERINEURAL ONCE AS NEEDED
Status: DISCONTINUED | OUTPATIENT
Start: 2017-08-08 | End: 2017-08-08 | Stop reason: HOSPADM

## 2017-08-08 RX ORDER — LIDOCAINE HYDROCHLORIDE 20 MG/ML
INJECTION, SOLUTION INFILTRATION; PERINEURAL AS NEEDED
Status: DISCONTINUED | OUTPATIENT
Start: 2017-08-08 | End: 2017-08-08 | Stop reason: SURG

## 2017-08-08 RX ORDER — PROPOFOL 10 MG/ML
VIAL (ML) INTRAVENOUS AS NEEDED
Status: DISCONTINUED | OUTPATIENT
Start: 2017-08-08 | End: 2017-08-08 | Stop reason: SURG

## 2017-08-08 RX ORDER — SODIUM CHLORIDE 9 MG/ML
500 INJECTION, SOLUTION INTRAVENOUS CONTINUOUS PRN
Status: DISCONTINUED | OUTPATIENT
Start: 2017-08-08 | End: 2017-08-08 | Stop reason: HOSPADM

## 2017-08-08 RX ADMIN — PROPOFOL 25 MG: 10 INJECTION, EMULSION INTRAVENOUS at 12:56

## 2017-08-08 RX ADMIN — METOPROLOL TARTRATE 2 MG: 5 INJECTION INTRAVENOUS at 10:55

## 2017-08-08 RX ADMIN — PROPOFOL 50 MG: 10 INJECTION, EMULSION INTRAVENOUS at 12:54

## 2017-08-08 RX ADMIN — PROPOFOL 25 MG: 10 INJECTION, EMULSION INTRAVENOUS at 12:51

## 2017-08-08 RX ADMIN — SODIUM CHLORIDE 500 ML: 9 INJECTION, SOLUTION INTRAVENOUS at 10:54

## 2017-08-08 RX ADMIN — PROPOFOL 50 MG: 10 INJECTION, EMULSION INTRAVENOUS at 12:50

## 2017-08-08 RX ADMIN — PROPOFOL 50 MG: 10 INJECTION, EMULSION INTRAVENOUS at 12:47

## 2017-08-08 RX ADMIN — LIDOCAINE HYDROCHLORIDE 100 MG: 20 INJECTION, SOLUTION INFILTRATION; PERINEURAL at 12:47

## 2017-08-08 NOTE — H&P (VIEW-ONLY)
[facility] GASTROENTEROLOGY - OFFICE NOTE    8/3/2017    Kamryn Oliva   1970    Primary Physician: Lambert Faust DO    Chief Complaint   Patient presents with   • GI Problem     weight loss, N/V         HISTORY OF PRESENT ILLNESS    Kamryn Oliva is a 47 y.o. female presents  With  Complaint of nausea.  She also has abdominal pain as had weight loss as well.  States that she has had this nausea for at least 4-5 months.  This occurs daily.  Eating makes it worse.  She has only had 2 episodes of vomiting during this time.  She is lost 20 pounds over the last 2 months.  Her appetite is decreased.  She has been experiencing generalized abdominal pain.  She has been taking Bentyl which does not help.  She is currently on pantoprazole twice a day and has been taking for more than a year.  She has taken Reglan over the last 2 months with no help.  She does take aspirin daily.  She takes Excedrin at least once a week.  She is moving her bowels once daily.  She has not seen any signs of active GI bleeding such as bright red blood per rectum or black stool.  No hematemesis.  No fevers or chills.  She has been evaluated by Dr. Hanson.  Last EGD was 01/25/2017 which was normal, biopsies for celiac was negative.  Last colonoscopy was November 2016 which showed internal hemorrhoid.  She also had another EGD October 2016 which was normal and urea was negative.  Her last office visit in January 2017 with Dr. Hanson, they did discuss pursuing an M2A capsule endoscopy however with her history of small bowel obstruction requiring surgical intervention this would be a contraindication to capsule endoscopy.  The patient did not want to take any chances with swallowing the camera due to history of bowel obstruction, risk or greater than benefit.   CT scan abdomen and pelvis with contrast 07/19/2017 showed right ovarian cyst, somewhat heterogenous appearance of the liver felt to be related to the face of contrast  enhancement, there was no appreciable mesenteric or retro-peritoneal lymphadenopathy, no origins of the celiac axis, SMA, and MOON opacify normally.  CT of the head without contrast February 2017 showed nothing acute, no intracranial hemorrhage or mass identified.   August 2016 she had a small bowel follow-through which showed no evidence of small bowel obstruction.          Ov  1/24/17    Kamryn Oliva is a 47 y.o. female who presents with a chief complaint of  anemia.  She was in the hospital recently with apparently urosepsis.  Creatinine was above 3 and she had nausea and vomiting.  She brought in a picture of her emesis and appears to be bile-colored fluid in the commode with possible slight red discoloration.  He states she had been vomiting several times and then had some blood in her emesis.  While in the hospital she is found to be anemic.  She was admitted with a hemoglobin approximately 10 and decreased a low 7.5 with vigorous hydration.  She was transfused 2 units and at time of discharge her hemoglobin was 10.  Reviewing her labs over the last 1 year her hemoglobin has run below normal.  Dr. Rashida Nice did see her in the hospital for elevated platelets in the 800s as well as the anemia.  He felt she had iron deficiency anemia from blood loss.  The patient did have a negative endoscopy in October negative colonoscopy in November 2016.     Currently she feels better.  She admits that she is under stress and does have much of an appetite which she attributes to the stress.  She did follow up with nephrology yesterday who checked labs at [facility] and an ultrasound of her kidneys of which results we do not have.  Her last creatinine I saw her was back down to 0.6.  She still has intermittent abdominal discomfort with her IBS.       Past Medical History:   Diagnosis Date   • Acute kidney failure    • Constipation    • Depression    • H/O gastric ulcer    • Headache    • History of transfusion    •  Hypertension    • IBS (irritable bowel syndrome)    • Insomnia    • Kidney stone    • Maravilla maravilla disease    • Rapid weight loss    • SBO (small bowel obstruction)    • Stroke     X 2   • UTI (urinary tract infection)     CHRONIC, SEPTIC X2   • Volvulosis        Past Surgical History:   Procedure Laterality Date   • APPENDECTOMY      COMPLICATION OF SEPTIC   • BRAIN SURGERY      x2   • BREAST LUMPECTOMY Left 3/9/2017    Procedure: EXCISION LEFT BREAST LESION AND LEFT AXILLARY LYMPH NODE BIOPSY;  Surgeon: Evi Farley MD;  Location: [facility] OR;  Service:    • BREAST SURGERY     •  SECTION     • COLON SURGERY     • COLONOSCOPY  2007    normal   • COLONOSCOPY N/A 11/10/2016    Procedure: COLONOSCOPY WITH ANESTHESIA;  Surgeon: Camilo Hanson MD;  Location: [facility] ENDOSCOPY;  Service:    • ENDOSCOPY  2009    antral ulcer   • ENDOSCOPY N/A 10/10/2016    Procedure: ESOPHAGOGASTRODUODENOSCOPY WITH ANESTHESIA;  Surgeon: Camilo Hanson MD;  Location: [facility] ENDOSCOPY;  Service:    • ENDOSCOPY N/A 2017    Procedure: ESOPHAGOGASTRODUODENOSCOPY;  Surgeon: Camilo Hanson MD;  Location: [facility] ENDOSCOPY;  Service:    • HERNIA REPAIR     • HYSTERECTOMY     • SMALL INTESTINE SURGERY N/A 2016   • TONSILLECTOMY         Outpatient Prescriptions Marked as Taking for the 8/3/17 encounter (Office Visit) with YOUNG Oropeza   Medication Sig Dispense Refill   • ALPRAZolam (XANAX) 0.5 MG tablet Take 0.5 mg by mouth 3 (Three) Times a Day As Needed for Anxiety.     • amitriptyline (ELAVIL) 50 MG tablet Every Night.  2   • amLODIPine (NORVASC) 10 MG tablet Take 10 mg by mouth daily.     • aspirin 81 MG EC tablet Take 81 mg by mouth daily.     • aspirin-acetaminophen-caffeine (EXCEDRIN MIGRAINE) 250-250-65 MG per tablet Take 1 tablet by mouth every 6 (six) hours as needed for headaches.     • CloNIDine (CATAPRES) 0.1 MG tablet Take 1 tablet by mouth Every 8 (Eight) Hours As Needed for high blood pressure  (SBP>160 mmHg). 10 tablet 0   • dicyclomine (BENTYL) 10 MG capsule Take 1 capsule by mouth 4 (Four) Times a Day As Needed (prn abdominal cramps). 120 capsule 11   • fluticasone (FLONASE) 50 MCG/ACT nasal spray 2 sprays Daily.     • HYDROcodone-acetaminophen (NORCO) 5-325 MG per tablet Take 1 tablet by mouth Every 6 (Six) Hours As Needed.     • metoclopramide (REGLAN) 5 MG tablet Take 5 mg by mouth 4 (Four) Times a Day Before Meals & at Bedtime.     • metoprolol succinate XL (TOPROL-XL) 100 MG 24 hr tablet Every Night.     • ondansetron (ZOFRAN) 4 MG tablet Every 6 (Six) Hours As Needed for Nausea.     • pantoprazole (PROTONIX) 40 MG EC tablet Take 1 tablet by mouth 2 (Two) Times a Day. 60 tablet 1   • zonisamide (ZONEGRAN) 100 MG capsule Take 200 mg by mouth Every Night.         Allergies   Allergen Reactions   • Anectine [Succinylcholine Chloride] Other (See Comments)     Hard to wake up   • Stadol [Butorphanol] Hives       Social History     Social History   • Marital status:      Spouse name: N/A   • Number of children: N/A   • Years of education: N/A     Occupational History   • Not on file.     Social History Main Topics   • Smoking status: Former Smoker     Packs/day: 1.00     Years: 15.00     Types: Cigarettes, Electronic Cigarette     Quit date: 3/10/2016   • Smokeless tobacco: Never Used   • Alcohol use Yes      Comment: occasional   • Drug use: No   • Sexual activity: Defer     Other Topics Concern   • Not on file     Social History Narrative       Family History   Problem Relation Age of Onset   • Cancer Maternal Grandfather    • No Known Problems Mother    • No Known Problems Father    • No Known Problems Sister    • No Known Problems Brother    • No Known Problems Brother    • No Known Problems Son    • Colon cancer Neg Hx    • Colon polyps Neg Hx        Review of Systems   Constitutional: Positive for fatigue and unexpected weight change. Negative for appetite change, chills and fever.   HENT:  "Negative for sore throat and trouble swallowing.    Respiratory: Negative for cough, chest tightness, shortness of breath and wheezing.    Cardiovascular: Negative for chest pain and palpitations.   Gastrointestinal: Positive for abdominal pain, nausea and vomiting. Negative for abdominal distention, anal bleeding, blood in stool, constipation, diarrhea and rectal pain.        As mentioned in hpi   Genitourinary: Negative for difficulty urinating, dysuria and hematuria.   Musculoskeletal: Negative for arthralgias and back pain.   Skin: Negative for color change and rash.   Neurological: Negative for dizziness, syncope, light-headedness and headaches.       Vitals:    08/03/17 1341   BP: 132/92   BP Location: Left arm   Patient Position: Sitting   Cuff Size: Adult   Pulse: 61   Temp: 96.8 °F (36 °C)   SpO2: 98%   Weight: 105 lb (47.6 kg)   Height: 67.5\" (171.5 cm)      Body mass index is 16.2 kg/(m^2).    Physical Exam   Constitutional: She appears well-developed and well-nourished. No distress.   HENT:   Head: Normocephalic and atraumatic.   Eyes: EOM are normal. No scleral icterus.   Neck: Neck supple. No JVD present.   Cardiovascular: Normal rate, regular rhythm and normal heart sounds.    Pulmonary/Chest: Effort normal and breath sounds normal. No stridor.   Abdominal: Soft. Bowel sounds are normal. She exhibits no distension and no mass. There is tenderness (mild generalized). There is no rebound and no guarding.   Musculoskeletal: She exhibits no edema.   Neurological: She is alert.   Skin: Skin is warm and dry. No rash noted.   Psychiatric: She has a normal mood and affect. Her behavior is normal.   Vitals reviewed.      Results for orders placed or performed during the hospital encounter of 07/19/17   Comprehensive Metabolic Panel   Result Value Ref Range    Glucose 83 70 - 100 mg/dL    BUN 9 5 - 21 mg/dL    Creatinine 1.15 0.50 - 1.40 mg/dL    Sodium 140 135 - 145 mmol/L    Potassium 3.8 3.5 - 5.3 mmol/L    " Chloride 104 98 - 110 mmol/L    CO2 24.0 24.0 - 31.0 mmol/L    Calcium 10.2 8.4 - 10.4 mg/dL    Total Protein 8.2 6.3 - 8.7 g/dL    Albumin 5.10 (H) 3.50 - 5.00 g/dL    ALT (SGPT) 32 0 - 54 U/L    AST (SGOT) 30 7 - 45 U/L    Alkaline Phosphatase 76 24 - 120 U/L    Total Bilirubin 0.4 0.1 - 1.0 mg/dL    eGFR Non African Amer 51 (L) >60 mL/min/1.73    Globulin 3.1 gm/dL    A/G Ratio 1.6 1.1 - 2.5 g/dL    BUN/Creatinine Ratio 7.8 7.0 - 25.0    Anion Gap 12.0 4.0 - 13.0 mmol/L   Amylase   Result Value Ref Range    Amylase 68 30 - 110 U/L   Lipase   Result Value Ref Range    Lipase 34 23 - 203 U/L   Urinalysis With / Culture If Indicated   Result Value Ref Range    Color, UA Yellow Yellow, Straw    Appearance, UA Clear Clear    pH, UA <=5.0 5.0 - 8.0    Specific Gravity, UA 1.008 1.005 - 1.030    Glucose, UA Negative Negative    Ketones, UA Negative Negative    Bilirubin, UA Negative Negative    Blood, UA Negative Negative    Protein, UA Negative Negative    Leuk Esterase, UA Negative Negative    Nitrite, UA Negative Negative    Urobilinogen, UA 0.2 E.U./dL 0.2 - 1.0 E.U./dL   Urine Drug Screen   Result Value Ref Range    Amphetamine Screen, Urine Negative Negative    Barbiturates Screen, Urine Negative Negative    Benzodiazepine Screen, Urine Positive (A) Negative    Cocaine Screen, Urine Negative Negative    Methadone Screen, Urine Negative Negative    Opiate Screen Positive (A) Negative    Phencyclidine (PCP), Urine Negative Negative    THC, Screen, Urine Positive (A) Negative   C-reactive Protein   Result Value Ref Range    C-Reactive Protein <0.50 0.00 - 0.99 mg/dL   Procalcitonin   Result Value Ref Range    Procalcitonin <0.25 <=0.25 ng/mL   Lactic Acid, Plasma   Result Value Ref Range    Lactate 0.7 0.5 - 2.0 mmol/L   hCG, Serum, Qualitative   Result Value Ref Range    HCG Qualitative Negative Negative   CBC Auto Differential   Result Value Ref Range    WBC 7.10 4.80 - 10.80 10*3/mm3    RBC 4.12 (L) 4.20 - 5.40  10*6/mm3    Hemoglobin 12.7 12.0 - 16.0 g/dL    Hematocrit 39.0 37.0 - 47.0 %    MCV 94.7 82.0 - 98.0 fL    MCH 30.8 28.0 - 32.0 pg    MCHC 32.6 (L) 33.0 - 36.0 g/dL    RDW 13.0 12.0 - 15.0 %    RDW-SD 45.2 40.0 - 54.0 fl    MPV 10.4 6.0 - 12.0 fL    Platelets 332 130 - 400 10*3/mm3    Neutrophil % 38.8 (L) 39.0 - 78.0 %    Lymphocyte % 53.0 (H) 15.0 - 45.0 %    Monocyte % 6.2 4.0 - 12.0 %    Eosinophil % 1.4 0.0 - 4.0 %    Basophil % 0.3 0.0 - 2.0 %    Immature Grans % 0.3 0.0 - 5.0 %    Neutrophils, Absolute 2.76 1.87 - 8.40 10*3/mm3    Lymphocytes, Absolute 3.76 0.72 - 4.86 10*3/mm3    Monocytes, Absolute 0.44 0.19 - 1.30 10*3/mm3    Eosinophils, Absolute 0.10 0.00 - 0.70 10*3/mm3    Basophils, Absolute 0.02 0.00 - 0.20 10*3/mm3    Immature Grans, Absolute 0.02 0.00 - 0.03 10*3/mm3           ASSESSMENT AND PLAN    Kamryn was seen today for gi problem.    Diagnoses and all orders for this visit:    Nausea  -     Case Request; Standing  -     Case Request    Generalized abdominal pain    Weight loss    Essential hypertension    Tobacco use    Other orders  -     Implement Anesthesia Orders Day of Procedure; Standing  -     Obtain Informed Consent; Standing    She continues with nausea.  She has had workup including EGD, colonoscopy, recent CT scan abdomen and pelvis.  CT of the head that were 2017.  M2A capsule study was discussed with her history of bowel obstruction the risk outweighed the benefits. We will plan for repeat EGD.  I am going to add Carafate.  Recommend continue PPI daily. Instructed patient to take heart or blood pressure medication a.m. of procedure if that is when normally taken.  Strongly encouraged smoking cessation, greater than 3 minutes  Counseled.  ER if worsening symptoms.    ESOPHAGOGASTRODUODENOSCOPY WITH ANESTHESIA (N/A)   Risk, benefits, and alternatives of endoscopy were explained in full.  They understand that there is a risk of bleeding, perforation, and infection.  The risk of  perforation goes up with esophageal dilation.  Other options to evaluate UGI complaints could involve barium swallow or UGI series, but these would be diagnostic tests only.  Patient was given time to ask questions.  I answered them to their satisfaction and they are agreeable to proceeding    Body mass index is 16.2 kg/(m^2).           YOUNG Medina    EMR Dragon/transcription disclaimer:  Much of this encounter note is electronic transcription/translation of spoken language to printed text.  The electronic translation of spoken language may be erroneous, or at times, nonsensical words or phrases may be inadvertently transcribed.  Although I have reviewed the note for such errors, some may still exist.

## 2017-08-08 NOTE — INTERVAL H&P NOTE
H&P updated. The patient was examined and the following changes are noted:  Patient states she was in the emergency room this past weekend.  Chest CT scan and pelvis which was unremarkable.  She is able keeping calorie supplements.

## 2017-08-08 NOTE — ANESTHESIA POSTPROCEDURE EVALUATION
Patient: Kamryn RODRIGUEZ [place]    Procedure Summary     Date Anesthesia Start Anesthesia Stop Room / Location    08/08/17 1242 [id number] [facility] ENDOSCOPY 5 / [facility] ENDOSCOPY       Procedure Diagnosis Surgeon Provider    ESOPHAGOGASTRODUODENOSCOPY WITH ANESTHESIA (N/A Esophagus) Nausea  (Nausea [R11.0]) MD Virgilio Donahue CRNA          Anesthesia Type: general  Last vitals  BP   128/91 (08/08/17 1308)    Temp        Pulse   74 (08/08/17 1308)   Resp   20 (08/08/17 1308)    SpO2   99 % (08/08/17 1308)      Post Anesthesia Care and Evaluation    Patient location during evaluation: PACU  Patient participation: complete - patient participated  Level of consciousness: awake and alert  Pain score: 0  Pain management: adequate  Airway patency: patent  Anesthetic complications: No anesthetic complications  PONV Status: none  Cardiovascular status: acceptable and stable  Respiratory status: acceptable  Hydration status: acceptable

## 2017-08-08 NOTE — ANESTHESIA PREPROCEDURE EVALUATION
Anesthesia Evaluation     Patient summary reviewed   history of anesthetic complications (pseudocholinesterase deficiency):  NPO Solid Status: > 8 hours       Airway   Mallampati: II  TM distance: >3 FB  Neck ROM: full  Dental      Pulmonary    (-) asthma, sleep apnea, not a smoker  Cardiovascular   Exercise tolerance: good (4-7 METS)    Patient on routine beta blocker and Beta blocker given within 24 hours of surgery    (+) hypertension,   (-) pacemaker, past MI, angina, cardiac stents      Neuro/Psych  (+) seizures well controlled, CVA (moyamoya disease),    GI/Hepatic/Renal/Endo    (+)  GERD,   (-) liver disease, no renal disease, diabetes    Musculoskeletal     Abdominal    Substance History      OB/GYN          Other                                      Anesthesia Plan    ASA 3     general     intravenous induction   Anesthetic plan and risks discussed with patient.

## 2017-08-08 NOTE — PLAN OF CARE
Problem: GI Endoscopy (Adult)  Goal: Signs and Symptoms of Listed Potential Problems Will be Absent or Manageable (GI Endoscopy)  Outcome: Outcome(s) achieved Date Met:  08/08/17 08/08/17 3972   GI Endoscopy   Problems Assessed (GI Endoscopy) all   Problems Present (GI Endoscopy) none

## 2017-08-08 NOTE — DISCHARGE INSTRUCTIONS
An order for a small bowel follow through has been scheduled for you by Dr. Hanson.  If you haven't heard from scheduling within 48 hours contact Dr. Hanson's office.

## 2017-08-08 NOTE — PLAN OF CARE
Problem: Patient Care Overview (Adult)  Goal: Plan of Care Review  Outcome: Outcome(s) achieved Date Met:  08/08/17 08/08/17 1331   Patient Care Overview   Progress no change   Outcome Evaluation   Outcome Summary/Follow up Plan meets discharge criteria   Coping/Psychosocial Response Interventions   Plan Of Care Reviewed With patient;family

## 2017-08-08 NOTE — PLAN OF CARE
Problem: Patient Care Overview (Adult)  Goal: Plan of Care Review  Outcome: Ongoing (interventions implemented as appropriate)    08/08/17 1248   Patient Care Overview   Progress improving   Outcome Evaluation   Outcome Summary/Follow up Plan no noted problems

## 2017-08-09 ENCOUNTER — OFFICE VISIT (OUTPATIENT)
Dept: PRIMARY CARE CLINIC | Age: 47
End: 2017-08-09
Payer: MEDICARE

## 2017-08-09 VITALS
RESPIRATION RATE: 16 BRPM | HEIGHT: 68 IN | BODY MASS INDEX: 15.31 KG/M2 | SYSTOLIC BLOOD PRESSURE: 160 MMHG | HEART RATE: 150 BPM | WEIGHT: 101 LBS | TEMPERATURE: 97.4 F | DIASTOLIC BLOOD PRESSURE: 100 MMHG | OXYGEN SATURATION: 99 %

## 2017-08-09 DIAGNOSIS — M47.816 LUMBAR FACET ARTHROPATHY: Chronic | ICD-10-CM

## 2017-08-09 DIAGNOSIS — G89.29 CHRONIC LEFT-SIDED LOW BACK PAIN WITH LEFT-SIDED SCIATICA: ICD-10-CM

## 2017-08-09 DIAGNOSIS — I67.5 MOYA MOYA DISEASE: Primary | ICD-10-CM

## 2017-08-09 DIAGNOSIS — M54.42 CHRONIC LEFT-SIDED LOW BACK PAIN WITH LEFT-SIDED SCIATICA: ICD-10-CM

## 2017-08-09 DIAGNOSIS — I77.79 DISSECTION OF OTHER SPECIFIED ARTERY (HCC): ICD-10-CM

## 2017-08-09 PROCEDURE — 1036F TOBACCO NON-USER: CPT | Performed by: INTERNAL MEDICINE

## 2017-08-09 PROCEDURE — G8419 CALC BMI OUT NRM PARAM NOF/U: HCPCS | Performed by: INTERNAL MEDICINE

## 2017-08-09 PROCEDURE — G8427 DOCREV CUR MEDS BY ELIG CLIN: HCPCS | Performed by: INTERNAL MEDICINE

## 2017-08-09 PROCEDURE — 99214 OFFICE O/P EST MOD 30 MIN: CPT | Performed by: INTERNAL MEDICINE

## 2017-08-09 RX ORDER — ALPRAZOLAM 1 MG/1
1 TABLET ORAL NIGHTLY PRN
Qty: 90 TABLET | Refills: 0 | Status: SHIPPED | OUTPATIENT
Start: 2017-08-09 | End: 2017-09-11

## 2017-08-10 ENCOUNTER — HOSPITAL ENCOUNTER (OUTPATIENT)
Dept: MRI IMAGING | Age: 47
Discharge: HOME | End: 2017-08-10
Payer: MEDICARE

## 2017-08-10 ENCOUNTER — TELEPHONE (OUTPATIENT)
Dept: PRIMARY CARE CLINIC | Age: 47
End: 2017-08-10

## 2017-08-10 DIAGNOSIS — I67.5 MOYA MOYA DISEASE: ICD-10-CM

## 2017-08-10 LAB
GFR NON-AFRICAN AMERICAN: 37
PERFORMED ON: ABNORMAL
POC CREATININE: 1.5 MG/DL (ref 0.3–1.3)
POC SAMPLE TYPE: ABNORMAL

## 2017-08-10 PROCEDURE — 82565 ASSAY OF CREATININE: CPT

## 2017-08-10 PROCEDURE — 70551 MRI BRAIN STEM W/O DYE: CPT

## 2017-08-10 ASSESSMENT — ENCOUNTER SYMPTOMS
BLOOD IN STOOL: 0
BACK PAIN: 1
CONSTIPATION: 0
SHORTNESS OF BREATH: 1
ANAL BLEEDING: 0
ABDOMINAL PAIN: 1
VOMITING: 0
ABDOMINAL DISTENTION: 0
DIARRHEA: 0
NAUSEA: 1
COUGH: 0

## 2017-08-11 ENCOUNTER — HOSPITAL ENCOUNTER (EMERGENCY)
Age: 47
Discharge: HOME | End: 2017-08-11
Attending: EMERGENCY MEDICINE
Payer: MEDICARE

## 2017-08-11 ENCOUNTER — TELEPHONE (OUTPATIENT)
Dept: NEUROLOGY | Age: 47
End: 2017-08-11

## 2017-08-11 ENCOUNTER — TELEPHONE (OUTPATIENT)
Dept: PRIMARY CARE CLINIC | Age: 47
End: 2017-08-11

## 2017-08-11 ENCOUNTER — APPOINTMENT (OUTPATIENT)
Dept: CT IMAGING | Age: 47
End: 2017-08-11
Payer: MEDICARE

## 2017-08-11 VITALS
DIASTOLIC BLOOD PRESSURE: 78 MMHG | SYSTOLIC BLOOD PRESSURE: 135 MMHG | HEIGHT: 67 IN | TEMPERATURE: 98.7 F | RESPIRATION RATE: 16 BRPM | WEIGHT: 101 LBS | HEART RATE: 70 BPM | OXYGEN SATURATION: 100 % | BODY MASS INDEX: 15.85 KG/M2

## 2017-08-11 DIAGNOSIS — R10.84 GENERALIZED ABDOMINAL PAIN: Primary | ICD-10-CM

## 2017-08-11 DIAGNOSIS — R46.89 SPELL OF ABNORMAL BEHAVIOR: Primary | ICD-10-CM

## 2017-08-11 LAB
ALBUMIN SERPL-MCNC: 5.2 G/DL (ref 3.5–5.2)
ALP BLD-CCNC: 70 U/L (ref 35–104)
ALT SERPL-CCNC: 15 U/L (ref 5–33)
ANION GAP SERPL CALCULATED.3IONS-SCNC: 20 MMOL/L (ref 7–19)
AST SERPL-CCNC: 20 U/L (ref 5–32)
BILIRUB SERPL-MCNC: <0.2 MG/DL (ref 0.2–1.2)
BILIRUBIN URINE: NEGATIVE
BLOOD, URINE: NEGATIVE
BUN BLDV-MCNC: 17 MG/DL (ref 6–20)
CALCIUM SERPL-MCNC: 10.7 MG/DL (ref 8.6–10)
CHLORIDE BLD-SCNC: 94 MMOL/L (ref 98–111)
CLARITY: CLEAR
CO2: 25 MMOL/L (ref 22–29)
COLOR: YELLOW
CREAT SERPL-MCNC: 1.3 MG/DL (ref 0.5–0.9)
GFR NON-AFRICAN AMERICAN: 44
GLUCOSE BLD-MCNC: 96 MG/DL (ref 74–109)
GLUCOSE URINE: NEGATIVE MG/DL
HCT VFR BLD CALC: 42.8 % (ref 37–47)
HEMOGLOBIN: 14.3 G/DL (ref 12–16)
INR BLD: 0.86 (ref 0.88–1.18)
KETONES, URINE: NEGATIVE MG/DL
LEUKOCYTE ESTERASE, URINE: NEGATIVE
LIPASE: 26 U/L (ref 13–60)
MCH RBC QN AUTO: 31.4 PG (ref 27–31)
MCHC RBC AUTO-ENTMCNC: 33.4 G/DL (ref 33–37)
MCV RBC AUTO: 93.9 FL (ref 81–99)
NITRITE, URINE: NEGATIVE
PDW BLD-RTO: 11.9 % (ref 11.5–14.5)
PH UA: 6.5
PLATELET # BLD: 312 K/UL (ref 130–400)
PMV BLD AUTO: 9.9 FL (ref 9.4–12.3)
POTASSIUM SERPL-SCNC: 3 MMOL/L (ref 3.5–5)
PROTEIN UA: NEGATIVE MG/DL
PROTHROMBIN TIME: 11.6 SEC (ref 12–14.6)
RBC # BLD: 4.56 M/UL (ref 4.2–5.4)
SODIUM BLD-SCNC: 139 MMOL/L (ref 136–145)
SPECIFIC GRAVITY UA: 1.02
TOTAL PROTEIN: 8.1 G/DL (ref 6.6–8.7)
UROBILINOGEN, URINE: 0.2 E.U./DL
WBC # BLD: 8.1 K/UL (ref 4.8–10.8)

## 2017-08-11 PROCEDURE — 99284 EMERGENCY DEPT VISIT MOD MDM: CPT

## 2017-08-11 PROCEDURE — 83690 ASSAY OF LIPASE: CPT

## 2017-08-11 PROCEDURE — 80053 COMPREHEN METABOLIC PANEL: CPT

## 2017-08-11 PROCEDURE — 36415 COLL VENOUS BLD VENIPUNCTURE: CPT

## 2017-08-11 PROCEDURE — 99282 EMERGENCY DEPT VISIT SF MDM: CPT | Performed by: NURSE PRACTITIONER

## 2017-08-11 PROCEDURE — 6360000004 HC RX CONTRAST MEDICATION: Performed by: EMERGENCY MEDICINE

## 2017-08-11 PROCEDURE — 6360000002 HC RX W HCPCS: Performed by: NURSE PRACTITIONER

## 2017-08-11 PROCEDURE — 81003 URINALYSIS AUTO W/O SCOPE: CPT

## 2017-08-11 PROCEDURE — 96374 THER/PROPH/DIAG INJ IV PUSH: CPT

## 2017-08-11 PROCEDURE — 85610 PROTHROMBIN TIME: CPT

## 2017-08-11 PROCEDURE — 75635 CT ANGIO ABDOMINAL ARTERIES: CPT

## 2017-08-11 PROCEDURE — 2580000003 HC RX 258: Performed by: NURSE PRACTITIONER

## 2017-08-11 PROCEDURE — 85027 COMPLETE CBC AUTOMATED: CPT

## 2017-08-11 RX ORDER — 0.9 % SODIUM CHLORIDE 0.9 %
500 INTRAVENOUS SOLUTION INTRAVENOUS ONCE
Status: COMPLETED | OUTPATIENT
Start: 2017-08-11 | End: 2017-08-11

## 2017-08-11 RX ORDER — LORAZEPAM 2 MG/ML
1 INJECTION INTRAMUSCULAR ONCE
Status: COMPLETED | OUTPATIENT
Start: 2017-08-11 | End: 2017-08-11

## 2017-08-11 RX ADMIN — SODIUM CHLORIDE 500 ML: 9 INJECTION, SOLUTION INTRAVENOUS at 02:47

## 2017-08-11 RX ADMIN — IOVERSOL 90 ML: 741 INJECTION INTRA-ARTERIAL; INTRAVENOUS at 03:10

## 2017-08-11 RX ADMIN — LORAZEPAM 1 MG: 2 INJECTION INTRAMUSCULAR; INTRAVENOUS at 02:54

## 2017-08-11 ASSESSMENT — ENCOUNTER SYMPTOMS
ABDOMINAL DISTENTION: 0
CONSTIPATION: 1
VOMITING: 1
ABDOMINAL PAIN: 1
NAUSEA: 1
DIARRHEA: 1

## 2017-08-11 ASSESSMENT — PAIN SCALES - GENERAL: PAINLEVEL_OUTOF10: 10

## 2017-08-13 ENCOUNTER — HOSPITAL ENCOUNTER (EMERGENCY)
Age: 47
Discharge: HOME | End: 2017-08-13
Attending: EMERGENCY MEDICINE
Payer: MEDICARE

## 2017-08-13 ENCOUNTER — APPOINTMENT (OUTPATIENT)
Dept: CT IMAGING | Age: 47
End: 2017-08-13
Payer: MEDICARE

## 2017-08-13 VITALS
HEART RATE: 78 BPM | BODY MASS INDEX: 14.85 KG/M2 | OXYGEN SATURATION: 99 % | WEIGHT: 98 LBS | TEMPERATURE: 98 F | HEIGHT: 68 IN | DIASTOLIC BLOOD PRESSURE: 60 MMHG | SYSTOLIC BLOOD PRESSURE: 118 MMHG | RESPIRATION RATE: 20 BRPM

## 2017-08-13 DIAGNOSIS — F41.1 ANXIETY STATE: ICD-10-CM

## 2017-08-13 DIAGNOSIS — K82.1 HYDROPS OF GALLBLADDER: Primary | ICD-10-CM

## 2017-08-13 DIAGNOSIS — E87.6 HYPOKALEMIA: ICD-10-CM

## 2017-08-13 LAB
ALBUMIN SERPL-MCNC: 4.9 G/DL (ref 3.5–5.2)
ALP BLD-CCNC: 61 U/L (ref 35–104)
ALT SERPL-CCNC: 14 U/L (ref 5–33)
ANION GAP SERPL CALCULATED.3IONS-SCNC: 15 MMOL/L (ref 7–19)
AST SERPL-CCNC: 18 U/L (ref 5–32)
BASOPHILS ABSOLUTE: 0 K/UL (ref 0–0.2)
BASOPHILS RELATIVE PERCENT: 0.3 % (ref 0–1)
BILIRUB SERPL-MCNC: <0.2 MG/DL (ref 0.2–1.2)
BILIRUBIN URINE: NEGATIVE
BLOOD, URINE: NEGATIVE
BUN BLDV-MCNC: 17 MG/DL (ref 6–20)
CALCIUM SERPL-MCNC: 9.9 MG/DL (ref 8.6–10)
CHLORIDE BLD-SCNC: 99 MMOL/L (ref 98–111)
CLARITY: CLEAR
CO2: 24 MMOL/L (ref 22–29)
COLOR: YELLOW
CREAT SERPL-MCNC: 1.1 MG/DL (ref 0.5–0.9)
EOSINOPHILS ABSOLUTE: 0.1 K/UL (ref 0–0.6)
EOSINOPHILS RELATIVE PERCENT: 1.7 % (ref 0–5)
GFR NON-AFRICAN AMERICAN: 53
GLUCOSE BLD-MCNC: 90 MG/DL (ref 74–109)
GLUCOSE URINE: NEGATIVE MG/DL
HCT VFR BLD CALC: 33.5 % (ref 37–47)
HEMOGLOBIN: 11.3 G/DL (ref 12–16)
KETONES, URINE: NEGATIVE MG/DL
LEUKOCYTE ESTERASE, URINE: NEGATIVE
LYMPHOCYTES ABSOLUTE: 4 K/UL (ref 1.1–4.5)
LYMPHOCYTES RELATIVE PERCENT: 65.9 % (ref 20–40)
MCH RBC QN AUTO: 31.8 PG (ref 27–31)
MCHC RBC AUTO-ENTMCNC: 33.7 G/DL (ref 33–37)
MCV RBC AUTO: 94.4 FL (ref 81–99)
MONOCYTES ABSOLUTE: 0.3 K/UL (ref 0–0.9)
MONOCYTES RELATIVE PERCENT: 4.8 % (ref 0–10)
NEUTROPHILS ABSOLUTE: 1.7 K/UL (ref 1.5–7.5)
NEUTROPHILS RELATIVE PERCENT: 27.3 % (ref 50–65)
NITRITE, URINE: NEGATIVE
PDW BLD-RTO: 11.6 % (ref 11.5–14.5)
PH UA: 6
PLATELET # BLD: 227 K/UL (ref 130–400)
PMV BLD AUTO: 9.9 FL (ref 9.4–12.3)
POTASSIUM SERPL-SCNC: 3 MMOL/L (ref 3.5–5)
PROTEIN UA: NEGATIVE MG/DL
RBC # BLD: 3.55 M/UL (ref 4.2–5.4)
SODIUM BLD-SCNC: 138 MMOL/L (ref 136–145)
SPECIFIC GRAVITY UA: 1.01
TOTAL PROTEIN: 7.8 G/DL (ref 6.6–8.7)
UROBILINOGEN, URINE: 0.2 E.U./DL
WBC # BLD: 6 K/UL (ref 4.8–10.8)

## 2017-08-13 PROCEDURE — 81003 URINALYSIS AUTO W/O SCOPE: CPT

## 2017-08-13 PROCEDURE — 80053 COMPREHEN METABOLIC PANEL: CPT

## 2017-08-13 PROCEDURE — 6360000002 HC RX W HCPCS: Performed by: EMERGENCY MEDICINE

## 2017-08-13 PROCEDURE — 36415 COLL VENOUS BLD VENIPUNCTURE: CPT

## 2017-08-13 PROCEDURE — 96375 TX/PRO/DX INJ NEW DRUG ADDON: CPT

## 2017-08-13 PROCEDURE — 99283 EMERGENCY DEPT VISIT LOW MDM: CPT

## 2017-08-13 PROCEDURE — 85025 COMPLETE CBC W/AUTO DIFF WBC: CPT

## 2017-08-13 PROCEDURE — 99283 EMERGENCY DEPT VISIT LOW MDM: CPT | Performed by: EMERGENCY MEDICINE

## 2017-08-13 PROCEDURE — 74150 CT ABDOMEN W/O CONTRAST: CPT

## 2017-08-13 PROCEDURE — 96374 THER/PROPH/DIAG INJ IV PUSH: CPT

## 2017-08-13 PROCEDURE — 6370000000 HC RX 637 (ALT 250 FOR IP): Performed by: EMERGENCY MEDICINE

## 2017-08-13 RX ORDER — POTASSIUM CHLORIDE 20 MEQ/1
40 TABLET, EXTENDED RELEASE ORAL ONCE
Status: COMPLETED | OUTPATIENT
Start: 2017-08-13 | End: 2017-08-13

## 2017-08-13 RX ORDER — LORAZEPAM 1 MG/1
2 TABLET ORAL ONCE
Status: COMPLETED | OUTPATIENT
Start: 2017-08-13 | End: 2017-08-13

## 2017-08-13 RX ORDER — KETOROLAC TROMETHAMINE 30 MG/ML
30 INJECTION, SOLUTION INTRAMUSCULAR; INTRAVENOUS ONCE
Status: COMPLETED | OUTPATIENT
Start: 2017-08-13 | End: 2017-08-13

## 2017-08-13 RX ORDER — ONDANSETRON 2 MG/ML
4 INJECTION INTRAMUSCULAR; INTRAVENOUS ONCE
Status: COMPLETED | OUTPATIENT
Start: 2017-08-13 | End: 2017-08-13

## 2017-08-13 RX ADMIN — KETOROLAC TROMETHAMINE 30 MG: 30 INJECTION, SOLUTION INTRAMUSCULAR at 17:29

## 2017-08-13 RX ADMIN — LORAZEPAM 2 MG: 1 TABLET ORAL at 19:02

## 2017-08-13 RX ADMIN — POTASSIUM CHLORIDE 40 MEQ: 20 TABLET, EXTENDED RELEASE ORAL at 19:02

## 2017-08-13 RX ADMIN — ONDANSETRON 4 MG: 2 INJECTION INTRAMUSCULAR; INTRAVENOUS at 17:29

## 2017-08-13 ASSESSMENT — ENCOUNTER SYMPTOMS
NAUSEA: 1
VOMITING: 0

## 2017-08-13 ASSESSMENT — PAIN SCALES - GENERAL
PAINLEVEL_OUTOF10: 0
PAINLEVEL_OUTOF10: 10
PAINLEVEL_OUTOF10: 10

## 2017-08-13 ASSESSMENT — PAIN DESCRIPTION - LOCATION: LOCATION: ABDOMEN

## 2017-08-14 ENCOUNTER — TELEPHONE (OUTPATIENT)
Dept: NEUROLOGY | Age: 47
End: 2017-08-14

## 2017-08-14 ENCOUNTER — HOSPITAL ENCOUNTER (OUTPATIENT)
Dept: GENERAL RADIOLOGY | Facility: HOSPITAL | Age: 47
Discharge: HOME | End: 2017-08-14
Attending: INTERNAL MEDICINE | Admitting: INTERNAL MEDICINE

## 2017-08-14 DIAGNOSIS — R11.0 NAUSEA: ICD-10-CM

## 2017-08-14 DIAGNOSIS — R63.4 WEIGHT LOSS: ICD-10-CM

## 2017-08-14 PROCEDURE — 74245: CPT

## 2017-08-15 ENCOUNTER — TELEPHONE (OUTPATIENT)
Dept: NEUROLOGY | Age: 47
End: 2017-08-15

## 2017-08-15 ENCOUNTER — TELEPHONE (OUTPATIENT)
Dept: PRIMARY CARE CLINIC | Age: 47
End: 2017-08-15

## 2017-08-15 RX ORDER — ZONISAMIDE 100 MG/1
CAPSULE ORAL
Qty: 60 CAPSULE | Refills: 11 | Status: ON HOLD | OUTPATIENT
Start: 2017-08-15 | End: 2017-08-22

## 2017-08-18 ENCOUNTER — TELEPHONE (OUTPATIENT)
Dept: PRIMARY CARE CLINIC | Age: 47
End: 2017-08-18

## 2017-08-21 ENCOUNTER — APPOINTMENT (OUTPATIENT)
Dept: CT IMAGING | Age: 47
End: 2017-08-21
Payer: MEDICARE

## 2017-08-21 ENCOUNTER — HOSPITAL ENCOUNTER (OUTPATIENT)
Age: 47
Setting detail: OBSERVATION
Discharge: HOME | End: 2017-08-24
Attending: EMERGENCY MEDICINE | Admitting: INTERNAL MEDICINE
Payer: MEDICARE

## 2017-08-21 ENCOUNTER — APPOINTMENT (OUTPATIENT)
Dept: GENERAL RADIOLOGY | Age: 47
End: 2017-08-21
Payer: MEDICARE

## 2017-08-21 DIAGNOSIS — R41.82 ALTERED MENTAL STATUS, UNSPECIFIED: Primary | ICD-10-CM

## 2017-08-21 LAB
ALBUMIN SERPL-MCNC: 4.8 G/DL (ref 3.5–5.2)
ALP BLD-CCNC: 68 U/L (ref 35–104)
ALT SERPL-CCNC: 18 U/L (ref 5–33)
ANION GAP SERPL CALCULATED.3IONS-SCNC: 15 MMOL/L (ref 7–19)
APTT: 27.9 SEC (ref 26–36.2)
AST SERPL-CCNC: 19 U/L (ref 5–32)
BASOPHILS ABSOLUTE: 0 K/UL (ref 0–0.2)
BASOPHILS RELATIVE PERCENT: 0.4 % (ref 0–1)
BILIRUB SERPL-MCNC: <0.2 MG/DL (ref 0.2–1.2)
BILIRUBIN URINE: NEGATIVE
BLOOD, URINE: NEGATIVE
BUN BLDV-MCNC: 11 MG/DL (ref 6–20)
CALCIUM SERPL-MCNC: 9.8 MG/DL (ref 8.6–10)
CHLORIDE BLD-SCNC: 101 MMOL/L (ref 98–111)
CLARITY: CLEAR
CO2: 22 MMOL/L (ref 22–29)
COLOR: YELLOW
CREAT SERPL-MCNC: 0.9 MG/DL (ref 0.5–0.9)
EOSINOPHILS ABSOLUTE: 0.2 K/UL (ref 0–0.6)
EOSINOPHILS RELATIVE PERCENT: 2.1 % (ref 0–5)
FOLATE: 13.5 NG/ML (ref 4.8–37.3)
GFR NON-AFRICAN AMERICAN: >60
GLUCOSE BLD-MCNC: 100 MG/DL (ref 74–109)
GLUCOSE URINE: NEGATIVE MG/DL
HCG QUALITATIVE: NEGATIVE
HCT VFR BLD CALC: 39.8 % (ref 37–47)
HEMOGLOBIN: 13.4 G/DL (ref 12–16)
INR BLD: 0.88 (ref 0.88–1.18)
KETONES, URINE: NEGATIVE MG/DL
LEUKOCYTE ESTERASE, URINE: NEGATIVE
LYMPHOCYTES ABSOLUTE: 4.2 K/UL (ref 1.1–4.5)
LYMPHOCYTES RELATIVE PERCENT: 55.8 % (ref 20–40)
MCH RBC QN AUTO: 31.7 PG (ref 27–31)
MCHC RBC AUTO-ENTMCNC: 33.7 G/DL (ref 33–37)
MCV RBC AUTO: 94.1 FL (ref 81–99)
MONOCYTES ABSOLUTE: 0.5 K/UL (ref 0–0.9)
MONOCYTES RELATIVE PERCENT: 6.2 % (ref 0–10)
NEUTROPHILS ABSOLUTE: 2.7 K/UL (ref 1.5–7.5)
NEUTROPHILS RELATIVE PERCENT: 35.2 % (ref 50–65)
NITRITE, URINE: NEGATIVE
PDW BLD-RTO: 11.9 % (ref 11.5–14.5)
PH UA: 7
PLATELET # BLD: 293 K/UL (ref 130–400)
PMV BLD AUTO: 10.1 FL (ref 9.4–12.3)
POTASSIUM SERPL-SCNC: 4 MMOL/L (ref 3.5–5)
PROTEIN UA: NEGATIVE MG/DL
PROTHROMBIN TIME: 11.8 SEC (ref 12–14.6)
RBC # BLD: 4.23 M/UL (ref 4.2–5.4)
SODIUM BLD-SCNC: 138 MMOL/L (ref 136–145)
SPECIFIC GRAVITY UA: 1.01
TOTAL PROTEIN: 7.6 G/DL (ref 6.6–8.7)
UROBILINOGEN, URINE: 0.2 E.U./DL
VITAMIN B-12: 1984 PG/ML (ref 211–946)
WBC # BLD: 7.6 K/UL (ref 4.8–10.8)

## 2017-08-21 PROCEDURE — G0378 HOSPITAL OBSERVATION PER HR: HCPCS

## 2017-08-21 PROCEDURE — 84703 CHORIONIC GONADOTROPIN ASSAY: CPT

## 2017-08-21 PROCEDURE — 85730 THROMBOPLASTIN TIME PARTIAL: CPT

## 2017-08-21 PROCEDURE — 80307 DRUG TEST PRSMV CHEM ANLYZR: CPT

## 2017-08-21 PROCEDURE — 81003 URINALYSIS AUTO W/O SCOPE: CPT

## 2017-08-21 PROCEDURE — 2580000003 HC RX 258: Performed by: EMERGENCY MEDICINE

## 2017-08-21 PROCEDURE — 93005 ELECTROCARDIOGRAM TRACING: CPT

## 2017-08-21 PROCEDURE — 85610 PROTHROMBIN TIME: CPT

## 2017-08-21 PROCEDURE — 1210000000 HC MED SURG R&B

## 2017-08-21 PROCEDURE — 99284 EMERGENCY DEPT VISIT MOD MDM: CPT | Performed by: EMERGENCY MEDICINE

## 2017-08-21 PROCEDURE — 96374 THER/PROPH/DIAG INJ IV PUSH: CPT

## 2017-08-21 PROCEDURE — 99285 EMERGENCY DEPT VISIT HI MDM: CPT

## 2017-08-21 PROCEDURE — 6360000002 HC RX W HCPCS: Performed by: EMERGENCY MEDICINE

## 2017-08-21 PROCEDURE — 82746 ASSAY OF FOLIC ACID SERUM: CPT

## 2017-08-21 PROCEDURE — 80053 COMPREHEN METABOLIC PANEL: CPT

## 2017-08-21 PROCEDURE — 71010 XR CHEST PORTABLE: CPT

## 2017-08-21 PROCEDURE — 70450 CT HEAD/BRAIN W/O DYE: CPT

## 2017-08-21 PROCEDURE — 85025 COMPLETE CBC W/AUTO DIFF WBC: CPT

## 2017-08-21 PROCEDURE — 82607 VITAMIN B-12: CPT

## 2017-08-21 PROCEDURE — 36415 COLL VENOUS BLD VENIPUNCTURE: CPT

## 2017-08-21 RX ORDER — LORAZEPAM 2 MG/ML
1 INJECTION INTRAMUSCULAR ONCE
Status: COMPLETED | OUTPATIENT
Start: 2017-08-21 | End: 2017-08-21

## 2017-08-21 RX ORDER — 0.9 % SODIUM CHLORIDE 0.9 %
1000 INTRAVENOUS SOLUTION INTRAVENOUS ONCE
Status: COMPLETED | OUTPATIENT
Start: 2017-08-21 | End: 2017-08-22

## 2017-08-21 RX ADMIN — LORAZEPAM 1 MG: 2 INJECTION INTRAMUSCULAR; INTRAVENOUS at 23:13

## 2017-08-21 RX ADMIN — SODIUM CHLORIDE 1000 ML: 9 INJECTION, SOLUTION INTRAVENOUS at 21:52

## 2017-08-21 ASSESSMENT — PAIN SCALES - GENERAL
PAINLEVEL_OUTOF10: 0
PAINLEVEL_OUTOF10: 6

## 2017-08-22 PROBLEM — I63.9 ISCHEMIC STROKE (HCC): Status: ACTIVE | Noted: 2017-08-22

## 2017-08-22 PROBLEM — R41.3 MEMORY LOSS: Status: ACTIVE | Noted: 2017-08-22

## 2017-08-22 LAB
ALBUMIN SERPL-MCNC: 4.2 G/DL (ref 3.5–5.2)
ALP BLD-CCNC: 55 U/L (ref 35–104)
ALT SERPL-CCNC: 15 U/L (ref 5–33)
AMPHETAMINE SCREEN, URINE: NEGATIVE
ANION GAP SERPL CALCULATED.3IONS-SCNC: 12 MMOL/L (ref 7–19)
AST SERPL-CCNC: 17 U/L (ref 5–32)
BARBITURATE SCREEN URINE: NEGATIVE
BASOPHILS ABSOLUTE: 0 K/UL (ref 0–0.2)
BASOPHILS RELATIVE PERCENT: 0.3 % (ref 0–1)
BENZODIAZEPINE SCREEN, URINE: POSITIVE
BILIRUB SERPL-MCNC: <0.2 MG/DL (ref 0.2–1.2)
BUN BLDV-MCNC: 8 MG/DL (ref 6–20)
CALCIUM SERPL-MCNC: 9.2 MG/DL (ref 8.6–10)
CANNABINOID SCREEN URINE: POSITIVE
CHLORIDE BLD-SCNC: 107 MMOL/L (ref 98–111)
CO2: 22 MMOL/L (ref 22–29)
COCAINE METABOLITE SCREEN URINE: NEGATIVE
CREAT SERPL-MCNC: 0.9 MG/DL (ref 0.5–0.9)
EOSINOPHILS ABSOLUTE: 0.1 K/UL (ref 0–0.6)
EOSINOPHILS RELATIVE PERCENT: 2.2 % (ref 0–5)
GFR NON-AFRICAN AMERICAN: >60
GLUCOSE BLD-MCNC: 86 MG/DL (ref 74–109)
HCT VFR BLD CALC: 36.7 % (ref 37–47)
HEMOGLOBIN: 11.9 G/DL (ref 12–16)
LYMPHOCYTES ABSOLUTE: 3.5 K/UL (ref 1.1–4.5)
LYMPHOCYTES RELATIVE PERCENT: 54.4 % (ref 20–40)
Lab: ABNORMAL
MCH RBC QN AUTO: 31.1 PG (ref 27–31)
MCHC RBC AUTO-ENTMCNC: 32.4 G/DL (ref 33–37)
MCV RBC AUTO: 95.8 FL (ref 81–99)
MONOCYTES ABSOLUTE: 0.3 K/UL (ref 0–0.9)
MONOCYTES RELATIVE PERCENT: 5.1 % (ref 0–10)
NEUTROPHILS ABSOLUTE: 2.5 K/UL (ref 1.5–7.5)
NEUTROPHILS RELATIVE PERCENT: 37.8 % (ref 50–65)
OPIATE SCREEN URINE: POSITIVE
PDW BLD-RTO: 12.1 % (ref 11.5–14.5)
PLATELET # BLD: 256 K/UL (ref 130–400)
PMV BLD AUTO: 10.2 FL (ref 9.4–12.3)
POTASSIUM SERPL-SCNC: 3.9 MMOL/L (ref 3.5–5)
RBC # BLD: 3.83 M/UL (ref 4.2–5.4)
SODIUM BLD-SCNC: 141 MMOL/L (ref 136–145)
TOTAL PROTEIN: 6.5 G/DL (ref 6.6–8.7)
TROPONIN: <0.01 NG/ML (ref 0–0.03)
WBC # BLD: 6.5 K/UL (ref 4.8–10.8)

## 2017-08-22 PROCEDURE — 6370000000 HC RX 637 (ALT 250 FOR IP): Performed by: INTERNAL MEDICINE

## 2017-08-22 PROCEDURE — G0378 HOSPITAL OBSERVATION PER HR: HCPCS

## 2017-08-22 PROCEDURE — 36415 COLL VENOUS BLD VENIPUNCTURE: CPT

## 2017-08-22 PROCEDURE — 2580000003 HC RX 258: Performed by: INTERNAL MEDICINE

## 2017-08-22 PROCEDURE — 85025 COMPLETE CBC W/AUTO DIFF WBC: CPT

## 2017-08-22 PROCEDURE — 95951 PR EEG MONITORING/VIDEORECORD: CPT | Performed by: PSYCHIATRY & NEUROLOGY

## 2017-08-22 PROCEDURE — 99215 OFFICE O/P EST HI 40 MIN: CPT | Performed by: PSYCHIATRY & NEUROLOGY

## 2017-08-22 PROCEDURE — 95951 HC EEG MONITORING VIDEO RECORDING: CPT

## 2017-08-22 PROCEDURE — 99222 1ST HOSP IP/OBS MODERATE 55: CPT | Performed by: INTERNAL MEDICINE

## 2017-08-22 PROCEDURE — 6370000000 HC RX 637 (ALT 250 FOR IP): Performed by: PHYSICIAN ASSISTANT

## 2017-08-22 PROCEDURE — 99999 PR OFFICE/OUTPT VISIT,PROCEDURE ONLY: CPT | Performed by: PHYSICIAN ASSISTANT

## 2017-08-22 PROCEDURE — 6370000000 HC RX 637 (ALT 250 FOR IP): Performed by: PSYCHIATRY & NEUROLOGY

## 2017-08-22 PROCEDURE — 84484 ASSAY OF TROPONIN QUANT: CPT

## 2017-08-22 PROCEDURE — 80053 COMPREHEN METABOLIC PANEL: CPT

## 2017-08-22 RX ORDER — AMLODIPINE BESYLATE 10 MG/1
10 TABLET ORAL DAILY
Status: DISCONTINUED | OUTPATIENT
Start: 2017-08-22 | End: 2017-08-24 | Stop reason: HOSPADM

## 2017-08-22 RX ORDER — PANTOPRAZOLE SODIUM 40 MG/1
40 TABLET, DELAYED RELEASE ORAL DAILY
Status: DISCONTINUED | OUTPATIENT
Start: 2017-08-22 | End: 2017-08-24 | Stop reason: HOSPADM

## 2017-08-22 RX ORDER — ALPRAZOLAM 0.5 MG/1
0.5 TABLET ORAL 3 TIMES DAILY PRN
Status: DISCONTINUED | OUTPATIENT
Start: 2017-08-22 | End: 2017-08-24 | Stop reason: HOSPADM

## 2017-08-22 RX ORDER — METOPROLOL SUCCINATE 50 MG/1
100 TABLET, EXTENDED RELEASE ORAL NIGHTLY
Status: DISCONTINUED | OUTPATIENT
Start: 2017-08-22 | End: 2017-08-24 | Stop reason: HOSPADM

## 2017-08-22 RX ORDER — ASPIRIN 81 MG/1
81 TABLET ORAL DAILY
Status: DISCONTINUED | OUTPATIENT
Start: 2017-08-22 | End: 2017-08-24 | Stop reason: HOSPADM

## 2017-08-22 RX ORDER — FLUTICASONE PROPIONATE 50 MCG
1 SPRAY, SUSPENSION (ML) NASAL DAILY
Status: DISCONTINUED | OUTPATIENT
Start: 2017-08-22 | End: 2017-08-24 | Stop reason: HOSPADM

## 2017-08-22 RX ORDER — ALPRAZOLAM 0.5 MG/1
0.5 TABLET ORAL 3 TIMES DAILY PRN
COMMUNITY
End: 2017-09-11 | Stop reason: DRUGHIGH

## 2017-08-22 RX ORDER — LORAZEPAM 0.5 MG/1
0.5 TABLET ORAL EVERY 4 HOURS PRN
Status: DISCONTINUED | OUTPATIENT
Start: 2017-08-22 | End: 2017-08-22

## 2017-08-22 RX ORDER — HYDROCODONE BITARTRATE AND ACETAMINOPHEN 7.5; 325 MG/1; MG/1
1 TABLET ORAL EVERY 6 HOURS PRN
Status: DISCONTINUED | OUTPATIENT
Start: 2017-08-22 | End: 2017-08-24 | Stop reason: HOSPADM

## 2017-08-22 RX ORDER — ZONISAMIDE 100 MG/1
200 CAPSULE ORAL DAILY
Status: DISCONTINUED | OUTPATIENT
Start: 2017-08-22 | End: 2017-08-24 | Stop reason: HOSPADM

## 2017-08-22 RX ORDER — CLONIDINE HYDROCHLORIDE 0.1 MG/1
0.1 TABLET ORAL EVERY 8 HOURS PRN
Status: DISCONTINUED | OUTPATIENT
Start: 2017-08-22 | End: 2017-08-24 | Stop reason: HOSPADM

## 2017-08-22 RX ORDER — ALPRAZOLAM 1 MG/1
1 TABLET ORAL NIGHTLY PRN
Status: DISCONTINUED | OUTPATIENT
Start: 2017-08-22 | End: 2017-08-24 | Stop reason: HOSPADM

## 2017-08-22 RX ORDER — ACETAMINOPHEN 325 MG/1
650 TABLET ORAL EVERY 6 HOURS PRN
Status: DISCONTINUED | OUTPATIENT
Start: 2017-08-22 | End: 2017-08-24 | Stop reason: HOSPADM

## 2017-08-22 RX ORDER — AMITRIPTYLINE HYDROCHLORIDE 50 MG/1
50 TABLET, FILM COATED ORAL NIGHTLY
Status: DISCONTINUED | OUTPATIENT
Start: 2017-08-22 | End: 2017-08-24 | Stop reason: HOSPADM

## 2017-08-22 RX ORDER — SODIUM CHLORIDE 9 MG/ML
INJECTION, SOLUTION INTRAVENOUS CONTINUOUS
Status: DISCONTINUED | OUTPATIENT
Start: 2017-08-22 | End: 2017-08-24

## 2017-08-22 RX ORDER — ATORVASTATIN CALCIUM 40 MG/1
40 TABLET, FILM COATED ORAL NIGHTLY
Status: DISCONTINUED | OUTPATIENT
Start: 2017-08-22 | End: 2017-08-24 | Stop reason: HOSPADM

## 2017-08-22 RX ADMIN — METOPROLOL SUCCINATE 100 MG: 50 TABLET, EXTENDED RELEASE ORAL at 20:59

## 2017-08-22 RX ADMIN — HYDROCODONE BITARTRATE AND ACETAMINOPHEN 1 TABLET: 7.5; 325 TABLET ORAL at 04:55

## 2017-08-22 RX ADMIN — LORAZEPAM 0.5 MG: 0.5 TABLET ORAL at 07:28

## 2017-08-22 RX ADMIN — AMITRIPTYLINE HYDROCHLORIDE 50 MG: 50 TABLET, FILM COATED ORAL at 20:59

## 2017-08-22 RX ADMIN — LORAZEPAM 0.5 MG: 0.5 TABLET ORAL at 12:33

## 2017-08-22 RX ADMIN — ATORVASTATIN CALCIUM 40 MG: 40 TABLET, FILM COATED ORAL at 20:59

## 2017-08-22 RX ADMIN — AMLODIPINE BESYLATE 10 MG: 10 TABLET ORAL at 17:25

## 2017-08-22 RX ADMIN — ZONISAMIDE 200 MG: 100 CAPSULE ORAL at 10:45

## 2017-08-22 RX ADMIN — HYDROCODONE BITARTRATE AND ACETAMINOPHEN 1 TABLET: 7.5; 325 TABLET ORAL at 21:00

## 2017-08-22 RX ADMIN — ALPRAZOLAM 1 MG: 1 TABLET ORAL at 21:00

## 2017-08-22 RX ADMIN — SODIUM CHLORIDE: 9 INJECTION, SOLUTION INTRAVENOUS at 04:07

## 2017-08-22 RX ADMIN — HYDROCODONE BITARTRATE AND ACETAMINOPHEN 1 TABLET: 7.5; 325 TABLET ORAL at 12:37

## 2017-08-22 RX ADMIN — CLONIDINE HYDROCHLORIDE 0.1 MG: 0.1 TABLET ORAL at 12:33

## 2017-08-22 RX ADMIN — ASPIRIN 81 MG: 81 TABLET, COATED ORAL at 08:52

## 2017-08-22 RX ADMIN — ALPRAZOLAM 0.5 MG: 0.5 TABLET ORAL at 17:25

## 2017-08-22 RX ADMIN — FLUTICASONE PROPIONATE 1 SPRAY: 50 SPRAY, METERED NASAL at 08:52

## 2017-08-22 RX ADMIN — PANTOPRAZOLE SODIUM 40 MG: 40 TABLET, DELAYED RELEASE ORAL at 08:52

## 2017-08-22 ASSESSMENT — ENCOUNTER SYMPTOMS
VOMITING: 0
SORE THROAT: 0
SHORTNESS OF BREATH: 1
BACK PAIN: 0
ABDOMINAL PAIN: 1
NAUSEA: 1
DIARRHEA: 0
RHINORRHEA: 0

## 2017-08-22 ASSESSMENT — PAIN SCALES - GENERAL
PAINLEVEL_OUTOF10: 10
PAINLEVEL_OUTOF10: 8
PAINLEVEL_OUTOF10: 10

## 2017-08-23 ENCOUNTER — APPOINTMENT (OUTPATIENT)
Dept: MRI IMAGING | Age: 47
End: 2017-08-23
Payer: MEDICARE

## 2017-08-23 ENCOUNTER — TELEPHONE (OUTPATIENT)
Dept: PRIMARY CARE CLINIC | Age: 47
End: 2017-08-23

## 2017-08-23 LAB
ALBUMIN SERPL-MCNC: 4 G/DL (ref 3.5–5.2)
ALP BLD-CCNC: 64 U/L (ref 35–104)
ALT SERPL-CCNC: 18 U/L (ref 5–33)
ANION GAP SERPL CALCULATED.3IONS-SCNC: 14 MMOL/L (ref 7–19)
AST SERPL-CCNC: 20 U/L (ref 5–32)
BASOPHILS ABSOLUTE: 0 K/UL (ref 0–0.2)
BASOPHILS RELATIVE PERCENT: 0.3 % (ref 0–1)
BILIRUB SERPL-MCNC: <0.2 MG/DL (ref 0.2–1.2)
BUN BLDV-MCNC: 12 MG/DL (ref 6–20)
CALCIUM SERPL-MCNC: 9.6 MG/DL (ref 8.6–10)
CHLORIDE BLD-SCNC: 105 MMOL/L (ref 98–111)
CO2: 22 MMOL/L (ref 22–29)
CREAT SERPL-MCNC: 0.8 MG/DL (ref 0.5–0.9)
EOSINOPHILS ABSOLUTE: 0.1 K/UL (ref 0–0.6)
EOSINOPHILS RELATIVE PERCENT: 1.4 % (ref 0–5)
GFR NON-AFRICAN AMERICAN: >60
GLUCOSE BLD-MCNC: 100 MG/DL (ref 74–109)
HCT VFR BLD CALC: 35.9 % (ref 37–47)
HEMOGLOBIN: 11.8 G/DL (ref 12–16)
LYMPHOCYTES ABSOLUTE: 4.2 K/UL (ref 1.1–4.5)
LYMPHOCYTES RELATIVE PERCENT: 54 % (ref 20–40)
MCH RBC QN AUTO: 31.1 PG (ref 27–31)
MCHC RBC AUTO-ENTMCNC: 32.9 G/DL (ref 33–37)
MCV RBC AUTO: 94.5 FL (ref 81–99)
MONOCYTES ABSOLUTE: 0.5 K/UL (ref 0–0.9)
MONOCYTES RELATIVE PERCENT: 6.2 % (ref 0–10)
NEUTROPHILS ABSOLUTE: 2.9 K/UL (ref 1.5–7.5)
NEUTROPHILS RELATIVE PERCENT: 37.7 % (ref 50–65)
PDW BLD-RTO: 11.9 % (ref 11.5–14.5)
PLATELET # BLD: 251 K/UL (ref 130–400)
PMV BLD AUTO: 10 FL (ref 9.4–12.3)
POTASSIUM SERPL-SCNC: 4.2 MMOL/L (ref 3.5–5)
RBC # BLD: 3.8 M/UL (ref 4.2–5.4)
SODIUM BLD-SCNC: 141 MMOL/L (ref 136–145)
TOTAL PROTEIN: 6.5 G/DL (ref 6.6–8.7)
WBC # BLD: 7.7 K/UL (ref 4.8–10.8)

## 2017-08-23 PROCEDURE — 6370000000 HC RX 637 (ALT 250 FOR IP): Performed by: PHYSICIAN ASSISTANT

## 2017-08-23 PROCEDURE — 2580000003 HC RX 258: Performed by: INTERNAL MEDICINE

## 2017-08-23 PROCEDURE — 99213 OFFICE O/P EST LOW 20 MIN: CPT | Performed by: PSYCHIATRY & NEUROLOGY

## 2017-08-23 PROCEDURE — 6370000000 HC RX 637 (ALT 250 FOR IP): Performed by: INTERNAL MEDICINE

## 2017-08-23 PROCEDURE — 85025 COMPLETE CBC W/AUTO DIFF WBC: CPT

## 2017-08-23 PROCEDURE — 36415 COLL VENOUS BLD VENIPUNCTURE: CPT

## 2017-08-23 PROCEDURE — 99225 PR SBSQ OBSERVATION CARE/DAY 25 MINUTES: CPT | Performed by: FAMILY MEDICINE

## 2017-08-23 PROCEDURE — 70544 MR ANGIOGRAPHY HEAD W/O DYE: CPT

## 2017-08-23 PROCEDURE — 70547 MR ANGIOGRAPHY NECK W/O DYE: CPT

## 2017-08-23 PROCEDURE — G0378 HOSPITAL OBSERVATION PER HR: HCPCS

## 2017-08-23 PROCEDURE — 80053 COMPREHEN METABOLIC PANEL: CPT

## 2017-08-23 PROCEDURE — 6370000000 HC RX 637 (ALT 250 FOR IP): Performed by: PSYCHIATRY & NEUROLOGY

## 2017-08-23 RX ORDER — ONDANSETRON 4 MG/1
4 TABLET, FILM COATED ORAL EVERY 4 HOURS PRN
Status: DISCONTINUED | OUTPATIENT
Start: 2017-08-23 | End: 2017-08-24 | Stop reason: HOSPADM

## 2017-08-23 RX ADMIN — ALPRAZOLAM 0.5 MG: 0.5 TABLET ORAL at 08:31

## 2017-08-23 RX ADMIN — AMITRIPTYLINE HYDROCHLORIDE 50 MG: 50 TABLET, FILM COATED ORAL at 21:11

## 2017-08-23 RX ADMIN — ATORVASTATIN CALCIUM 40 MG: 40 TABLET, FILM COATED ORAL at 21:12

## 2017-08-23 RX ADMIN — ZONISAMIDE 200 MG: 100 CAPSULE ORAL at 09:59

## 2017-08-23 RX ADMIN — ALPRAZOLAM 0.5 MG: 0.5 TABLET ORAL at 16:43

## 2017-08-23 RX ADMIN — PANTOPRAZOLE SODIUM 40 MG: 40 TABLET, DELAYED RELEASE ORAL at 10:00

## 2017-08-23 RX ADMIN — FLUTICASONE PROPIONATE 1 SPRAY: 50 SPRAY, METERED NASAL at 09:59

## 2017-08-23 RX ADMIN — METOPROLOL SUCCINATE 100 MG: 50 TABLET, EXTENDED RELEASE ORAL at 21:12

## 2017-08-23 RX ADMIN — ASPIRIN 81 MG: 81 TABLET, COATED ORAL at 10:00

## 2017-08-23 RX ADMIN — SODIUM CHLORIDE: 9 INJECTION, SOLUTION INTRAVENOUS at 14:02

## 2017-08-23 RX ADMIN — ALPRAZOLAM 1 MG: 1 TABLET ORAL at 21:23

## 2017-08-24 VITALS
TEMPERATURE: 98.8 F | HEIGHT: 67 IN | RESPIRATION RATE: 16 BRPM | WEIGHT: 98 LBS | SYSTOLIC BLOOD PRESSURE: 149 MMHG | OXYGEN SATURATION: 99 % | BODY MASS INDEX: 15.38 KG/M2 | HEART RATE: 83 BPM | DIASTOLIC BLOOD PRESSURE: 106 MMHG

## 2017-08-24 PROBLEM — F50.00 ANOREXIA NERVOSA: Status: ACTIVE | Noted: 2017-08-24

## 2017-08-24 LAB
ALBUMIN SERPL-MCNC: 3.6 G/DL (ref 3.5–5.2)
ALP BLD-CCNC: 58 U/L (ref 35–104)
ALT SERPL-CCNC: 15 U/L (ref 5–33)
ANION GAP SERPL CALCULATED.3IONS-SCNC: 13 MMOL/L (ref 7–19)
AST SERPL-CCNC: 16 U/L (ref 5–32)
BASOPHILS ABSOLUTE: 0 K/UL (ref 0–0.2)
BASOPHILS RELATIVE PERCENT: 0.4 % (ref 0–1)
BILIRUB SERPL-MCNC: <0.2 MG/DL (ref 0.2–1.2)
BUN BLDV-MCNC: 11 MG/DL (ref 6–20)
CALCIUM SERPL-MCNC: 9.2 MG/DL (ref 8.6–10)
CHLORIDE BLD-SCNC: 105 MMOL/L (ref 98–111)
CO2: 21 MMOL/L (ref 22–29)
CREAT SERPL-MCNC: 0.7 MG/DL (ref 0.5–0.9)
EOSINOPHILS ABSOLUTE: 0.1 K/UL (ref 0–0.6)
EOSINOPHILS RELATIVE PERCENT: 1.9 % (ref 0–5)
GFR NON-AFRICAN AMERICAN: >60
GLUCOSE BLD-MCNC: 94 MG/DL (ref 74–109)
HCT VFR BLD CALC: 35.7 % (ref 37–47)
HEMOGLOBIN: 11.5 G/DL (ref 12–16)
LYMPHOCYTES ABSOLUTE: 2.9 K/UL (ref 1.1–4.5)
LYMPHOCYTES RELATIVE PERCENT: 50.3 % (ref 20–40)
MCH RBC QN AUTO: 31.3 PG (ref 27–31)
MCHC RBC AUTO-ENTMCNC: 32.2 G/DL (ref 33–37)
MCV RBC AUTO: 97.3 FL (ref 81–99)
MONOCYTES ABSOLUTE: 0.3 K/UL (ref 0–0.9)
MONOCYTES RELATIVE PERCENT: 5.6 % (ref 0–10)
NEUTROPHILS ABSOLUTE: 2.4 K/UL (ref 1.5–7.5)
NEUTROPHILS RELATIVE PERCENT: 41.8 % (ref 50–65)
PDW BLD-RTO: 12.1 % (ref 11.5–14.5)
PLATELET # BLD: 220 K/UL (ref 130–400)
PMV BLD AUTO: 10.8 FL (ref 9.4–12.3)
POTASSIUM SERPL-SCNC: 4 MMOL/L (ref 3.5–5)
RBC # BLD: 3.67 M/UL (ref 4.2–5.4)
SODIUM BLD-SCNC: 139 MMOL/L (ref 136–145)
TOTAL PROTEIN: 5.9 G/DL (ref 6.6–8.7)
WBC # BLD: 5.7 K/UL (ref 4.8–10.8)

## 2017-08-24 PROCEDURE — 6360000002 HC RX W HCPCS: Performed by: INTERNAL MEDICINE

## 2017-08-24 PROCEDURE — 85025 COMPLETE CBC W/AUTO DIFF WBC: CPT

## 2017-08-24 PROCEDURE — 6370000000 HC RX 637 (ALT 250 FOR IP): Performed by: INTERNAL MEDICINE

## 2017-08-24 PROCEDURE — 99213 OFFICE O/P EST LOW 20 MIN: CPT | Performed by: PSYCHIATRY & NEUROLOGY

## 2017-08-24 PROCEDURE — G0378 HOSPITAL OBSERVATION PER HR: HCPCS

## 2017-08-24 PROCEDURE — 2580000003 HC RX 258: Performed by: INTERNAL MEDICINE

## 2017-08-24 PROCEDURE — 80053 COMPREHEN METABOLIC PANEL: CPT

## 2017-08-24 PROCEDURE — 6370000000 HC RX 637 (ALT 250 FOR IP): Performed by: PHYSICIAN ASSISTANT

## 2017-08-24 PROCEDURE — 36415 COLL VENOUS BLD VENIPUNCTURE: CPT

## 2017-08-24 PROCEDURE — 6370000000 HC RX 637 (ALT 250 FOR IP): Performed by: PSYCHIATRY & NEUROLOGY

## 2017-08-24 PROCEDURE — 99217 PR OBSERVATION CARE DISCHARGE MANAGEMENT: CPT | Performed by: FAMILY MEDICINE

## 2017-08-24 RX ADMIN — ZONISAMIDE 200 MG: 100 CAPSULE ORAL at 08:47

## 2017-08-24 RX ADMIN — ASPIRIN 81 MG: 81 TABLET, COATED ORAL at 08:52

## 2017-08-24 RX ADMIN — SODIUM CHLORIDE: 9 INJECTION, SOLUTION INTRAVENOUS at 05:21

## 2017-08-24 RX ADMIN — FLUTICASONE PROPIONATE 1 SPRAY: 50 SPRAY, METERED NASAL at 08:44

## 2017-08-24 RX ADMIN — PANTOPRAZOLE SODIUM 40 MG: 40 TABLET, DELAYED RELEASE ORAL at 08:46

## 2017-08-24 RX ADMIN — AMLODIPINE BESYLATE 10 MG: 10 TABLET ORAL at 08:46

## 2017-08-24 RX ADMIN — ALPRAZOLAM 0.5 MG: 0.5 TABLET ORAL at 08:52

## 2017-08-24 RX ADMIN — HYDROCODONE BITARTRATE AND ACETAMINOPHEN 1 TABLET: 7.5; 325 TABLET ORAL at 08:46

## 2017-08-24 RX ADMIN — CLONIDINE HYDROCHLORIDE 0.1 MG: 0.1 TABLET ORAL at 12:51

## 2017-08-24 RX ADMIN — ONDANSETRON HYDROCHLORIDE 4 MG: 4 TABLET, FILM COATED ORAL at 01:36

## 2017-08-24 ASSESSMENT — PAIN SCALES - GENERAL: PAINLEVEL_OUTOF10: 8

## 2017-08-25 ENCOUNTER — OFFICE VISIT (OUTPATIENT)
Dept: PRIMARY CARE CLINIC | Age: 47
End: 2017-08-25
Payer: MEDICARE

## 2017-08-25 VITALS
BODY MASS INDEX: 16.17 KG/M2 | WEIGHT: 103 LBS | OXYGEN SATURATION: 99 % | TEMPERATURE: 97.5 F | HEART RATE: 71 BPM | SYSTOLIC BLOOD PRESSURE: 106 MMHG | HEIGHT: 67 IN | DIASTOLIC BLOOD PRESSURE: 80 MMHG | RESPIRATION RATE: 18 BRPM

## 2017-08-25 DIAGNOSIS — I67.5 MOYA MOYA DISEASE: Primary | ICD-10-CM

## 2017-08-25 DIAGNOSIS — M47.816 LUMBAR FACET ARTHROPATHY: ICD-10-CM

## 2017-08-25 DIAGNOSIS — F41.9 ANXIETY: ICD-10-CM

## 2017-08-25 DIAGNOSIS — I10 ESSENTIAL HYPERTENSION: ICD-10-CM

## 2017-08-25 PROCEDURE — G8427 DOCREV CUR MEDS BY ELIG CLIN: HCPCS | Performed by: INTERNAL MEDICINE

## 2017-08-25 PROCEDURE — G8419 CALC BMI OUT NRM PARAM NOF/U: HCPCS | Performed by: INTERNAL MEDICINE

## 2017-08-25 PROCEDURE — 99214 OFFICE O/P EST MOD 30 MIN: CPT | Performed by: INTERNAL MEDICINE

## 2017-08-25 PROCEDURE — 1036F TOBACCO NON-USER: CPT | Performed by: INTERNAL MEDICINE

## 2017-08-25 RX ORDER — PROMETHAZINE HYDROCHLORIDE 25 MG/1
25 TABLET ORAL EVERY 6 HOURS PRN
Qty: 30 TABLET | Refills: 2 | Status: SHIPPED | OUTPATIENT
Start: 2017-08-25 | End: 2017-08-30 | Stop reason: DRUGHIGH

## 2017-08-25 RX ORDER — ZONISAMIDE 100 MG/1
CAPSULE ORAL
Qty: 30 CAPSULE | Refills: 3 | Status: SHIPPED | OUTPATIENT
Start: 2017-08-25 | End: 2017-12-04 | Stop reason: ALTCHOICE

## 2017-08-25 RX ORDER — MEGESTROL ACETATE 40 MG/1
40 TABLET ORAL DAILY
Qty: 30 TABLET | Refills: 3 | Status: SHIPPED | OUTPATIENT
Start: 2017-08-25 | End: 2018-10-19 | Stop reason: SDUPTHER

## 2017-08-25 ASSESSMENT — ENCOUNTER SYMPTOMS
ABDOMINAL DISTENTION: 0
ANAL BLEEDING: 0
DIARRHEA: 0
COUGH: 0
BACK PAIN: 1
VOMITING: 0
ABDOMINAL PAIN: 1
NAUSEA: 1
BLOOD IN STOOL: 0
SHORTNESS OF BREATH: 1
CONSTIPATION: 0

## 2017-08-28 ENCOUNTER — HOSPITAL ENCOUNTER (OUTPATIENT)
Dept: CT IMAGING | Age: 47
Discharge: HOME | End: 2017-08-28
Payer: MEDICARE

## 2017-08-28 ENCOUNTER — HOSPITAL ENCOUNTER (OUTPATIENT)
Dept: MRI IMAGING | Age: 47
Discharge: HOME | End: 2017-08-28
Payer: MEDICARE

## 2017-08-28 DIAGNOSIS — I67.5 MOYA MOYA DISEASE: ICD-10-CM

## 2017-08-28 DIAGNOSIS — G89.29 CHRONIC LEFT-SIDED LOW BACK PAIN WITH LEFT-SIDED SCIATICA: ICD-10-CM

## 2017-08-28 DIAGNOSIS — M47.816 LUMBAR FACET ARTHROPATHY: Chronic | ICD-10-CM

## 2017-08-28 DIAGNOSIS — M54.42 CHRONIC LEFT-SIDED LOW BACK PAIN WITH LEFT-SIDED SCIATICA: ICD-10-CM

## 2017-08-28 DIAGNOSIS — I77.79 DISSECTION OF OTHER SPECIFIED ARTERY (HCC): ICD-10-CM

## 2017-08-28 LAB
GFR NON-AFRICAN AMERICAN: 53
PERFORMED ON: ABNORMAL
POC CREATININE: 1.1 MG/DL (ref 0.3–1.3)
POC SAMPLE TYPE: ABNORMAL

## 2017-08-28 PROCEDURE — 6360000004 HC RX CONTRAST MEDICATION: Performed by: INTERNAL MEDICINE

## 2017-08-28 PROCEDURE — 75635 CT ANGIO ABDOMINAL ARTERIES: CPT

## 2017-08-28 PROCEDURE — 82565 ASSAY OF CREATININE: CPT

## 2017-08-28 RX ADMIN — IOVERSOL 88 ML: 741 INJECTION INTRA-ARTERIAL; INTRAVENOUS at 09:59

## 2017-08-29 LAB
EKG P AXIS: 70 DEGREES
EKG P-R INTERVAL: 115 MS
EKG Q-T INTERVAL: 385 MS
EKG QRS DURATION: 66 MS
EKG QTC CALCULATION (BAZETT): 422 MS
EKG T AXIS: 45 DEGREES

## 2017-08-30 ENCOUNTER — HOSPITAL ENCOUNTER (OUTPATIENT)
Dept: PAIN MANAGEMENT | Age: 47
Discharge: HOME | End: 2017-08-30
Payer: MEDICARE

## 2017-08-30 VITALS
OXYGEN SATURATION: 98 % | SYSTOLIC BLOOD PRESSURE: 132 MMHG | TEMPERATURE: 96.9 F | RESPIRATION RATE: 18 BRPM | WEIGHT: 99 LBS | HEIGHT: 68 IN | DIASTOLIC BLOOD PRESSURE: 94 MMHG | HEART RATE: 109 BPM | BODY MASS INDEX: 15.01 KG/M2

## 2017-08-30 DIAGNOSIS — M47.816 LUMBAR FACET ARTHROPATHY: ICD-10-CM

## 2017-08-30 PROCEDURE — 99213 OFFICE O/P EST LOW 20 MIN: CPT

## 2017-08-30 RX ORDER — PROMETHAZINE HYDROCHLORIDE 25 MG/1
25 TABLET ORAL EVERY 6 HOURS PRN
COMMUNITY
Start: 2017-08-05 | End: 2017-09-21 | Stop reason: SDUPTHER

## 2017-08-30 RX ORDER — ACETAMINOPHEN, ASPIRIN AND CAFFEINE 250; 250; 65 MG/1; MG/1; MG/1
1 TABLET, FILM COATED ORAL 2 TIMES DAILY PRN
COMMUNITY
End: 2019-01-25

## 2017-08-30 ASSESSMENT — PAIN DESCRIPTION - ONSET: ONSET: ON-GOING

## 2017-08-30 ASSESSMENT — PAIN DESCRIPTION - FREQUENCY: FREQUENCY: CONTINUOUS

## 2017-08-30 ASSESSMENT — PAIN DESCRIPTION - DESCRIPTORS: DESCRIPTORS: ACHING;CONSTANT;SHARP;BURNING

## 2017-08-30 ASSESSMENT — PAIN DESCRIPTION - PAIN TYPE: TYPE: CHRONIC PAIN

## 2017-08-30 ASSESSMENT — PAIN DESCRIPTION - LOCATION: LOCATION: BACK

## 2017-08-30 ASSESSMENT — ACTIVITIES OF DAILY LIVING (ADL): EFFECT OF PAIN ON DAILY ACTIVITIES: LIMITS ACTIVITIES

## 2017-08-30 ASSESSMENT — PAIN DESCRIPTION - PROGRESSION: CLINICAL_PROGRESSION: GRADUALLY WORSENING

## 2017-08-30 ASSESSMENT — PAIN SCALES - GENERAL: PAINLEVEL_OUTOF10: 10

## 2017-09-11 RX ORDER — ALPRAZOLAM 1 MG/1
1 TABLET ORAL 3 TIMES DAILY PRN
Qty: 90 TABLET | Refills: 1 | OUTPATIENT
Start: 2017-09-11 | End: 2017-11-13 | Stop reason: SDUPTHER

## 2017-09-21 ENCOUNTER — OFFICE VISIT (OUTPATIENT)
Dept: PRIMARY CARE CLINIC | Age: 47
End: 2017-09-21
Payer: MEDICARE

## 2017-09-21 VITALS
HEIGHT: 67 IN | RESPIRATION RATE: 20 BRPM | SYSTOLIC BLOOD PRESSURE: 122 MMHG | DIASTOLIC BLOOD PRESSURE: 82 MMHG | HEART RATE: 69 BPM | OXYGEN SATURATION: 97 % | WEIGHT: 97.2 LBS | BODY MASS INDEX: 15.26 KG/M2 | TEMPERATURE: 98.2 F

## 2017-09-21 DIAGNOSIS — I67.5 MOYA MOYA DISEASE: ICD-10-CM

## 2017-09-21 DIAGNOSIS — I10 ESSENTIAL HYPERTENSION: ICD-10-CM

## 2017-09-21 DIAGNOSIS — G43.019 INTRACTABLE MIGRAINE WITHOUT AURA AND WITHOUT STATUS MIGRAINOSUS: Primary | ICD-10-CM

## 2017-09-21 PROCEDURE — G8418 CALC BMI BLW LOW PARAM F/U: HCPCS | Performed by: INTERNAL MEDICINE

## 2017-09-21 PROCEDURE — 99214 OFFICE O/P EST MOD 30 MIN: CPT | Performed by: INTERNAL MEDICINE

## 2017-09-21 PROCEDURE — G8428 CUR MEDS NOT DOCUMENT: HCPCS | Performed by: INTERNAL MEDICINE

## 2017-09-21 PROCEDURE — 1036F TOBACCO NON-USER: CPT | Performed by: INTERNAL MEDICINE

## 2017-09-21 RX ORDER — PROMETHAZINE HYDROCHLORIDE 25 MG/1
25 TABLET ORAL EVERY 6 HOURS PRN
Qty: 90 TABLET | Refills: 3 | Status: SHIPPED | OUTPATIENT
Start: 2017-09-21 | End: 2017-12-04 | Stop reason: SDUPTHER

## 2017-09-21 RX ORDER — BUTALBITAL, ACETAMINOPHEN AND CAFFEINE 300; 40; 50 MG/1; MG/1; MG/1
1 CAPSULE ORAL EVERY 4 HOURS PRN
Qty: 60 CAPSULE | Refills: 1 | Status: SHIPPED | OUTPATIENT
Start: 2017-09-21 | End: 2017-09-22 | Stop reason: DRUGHIGH

## 2017-09-21 RX ORDER — AMITRIPTYLINE HYDROCHLORIDE 50 MG/1
50 TABLET, FILM COATED ORAL NIGHTLY
Qty: 30 TABLET | Refills: 5 | Status: SHIPPED | OUTPATIENT
Start: 2017-09-21 | End: 2018-03-20 | Stop reason: SDUPTHER

## 2017-09-21 ASSESSMENT — ENCOUNTER SYMPTOMS
DIARRHEA: 0
NAUSEA: 0
CONSTIPATION: 0
SHORTNESS OF BREATH: 0
COUGH: 0
VOMITING: 0
BACK PAIN: 0

## 2017-09-22 RX ORDER — BUTALBITAL, ACETAMINOPHEN AND CAFFEINE 300; 40; 50 MG/1; MG/1; MG/1
1 CAPSULE ORAL EVERY 4 HOURS PRN
Qty: 60 CAPSULE | Refills: 1 | Status: CANCELLED | OUTPATIENT
Start: 2017-09-22

## 2017-09-22 RX ORDER — BUTALBITAL, ACETAMINOPHEN AND CAFFEINE 50; 325; 40 MG/1; MG/1; MG/1
1 TABLET ORAL EVERY 4 HOURS PRN
Qty: 60 TABLET | Refills: 1 | Status: SHIPPED | OUTPATIENT
Start: 2017-09-22 | End: 2017-12-04

## 2017-11-07 ENCOUNTER — HOSPITAL ENCOUNTER (EMERGENCY)
Facility: HOSPITAL | Age: 47
Discharge: HOME | End: 2017-11-07
Admitting: EMERGENCY MEDICINE

## 2017-11-07 ENCOUNTER — APPOINTMENT (OUTPATIENT)
Dept: GENERAL RADIOLOGY | Facility: HOSPITAL | Age: 47
End: 2017-11-07

## 2017-11-07 VITALS
OXYGEN SATURATION: 98 % | SYSTOLIC BLOOD PRESSURE: 113 MMHG | RESPIRATION RATE: 17 BRPM | WEIGHT: 96 LBS | HEIGHT: 67 IN | DIASTOLIC BLOOD PRESSURE: 90 MMHG | BODY MASS INDEX: 15.07 KG/M2 | TEMPERATURE: 97.5 F | HEART RATE: 75 BPM

## 2017-11-07 DIAGNOSIS — R07.81 PLEURITIC CHEST PAIN: ICD-10-CM

## 2017-11-07 DIAGNOSIS — T85.49XA DEFLATION OF BREAST IMPLANT, INITIAL ENCOUNTER: Primary | ICD-10-CM

## 2017-11-07 LAB
ALBUMIN SERPL-MCNC: 4.9 G/DL (ref 3.5–5)
ALBUMIN/GLOB SERPL: 1.6 G/DL (ref 1.1–2.5)
ALP SERPL-CCNC: 59 U/L (ref 24–120)
ALT SERPL W P-5'-P-CCNC: 19 U/L (ref 0–54)
ANION GAP SERPL CALCULATED.3IONS-SCNC: 20 MMOL/L (ref 4–13)
AST SERPL-CCNC: 28 U/L (ref 7–45)
BASOPHILS # BLD AUTO: 0.03 10*3/MM3 (ref 0–0.2)
BASOPHILS NFR BLD AUTO: 0.2 % (ref 0–2)
BILIRUB SERPL-MCNC: 0.3 MG/DL (ref 0.1–1)
BILIRUB UR QL STRIP: NEGATIVE
BUN BLD-MCNC: 18 MG/DL (ref 5–21)
BUN/CREAT SERPL: 13.5 (ref 7–25)
CALCIUM SPEC-SCNC: 10.7 MG/DL (ref 8.4–10.4)
CHLORIDE SERPL-SCNC: 107 MMOL/L (ref 98–110)
CLARITY UR: CLEAR
CO2 SERPL-SCNC: 16 MMOL/L (ref 24–31)
COLOR UR: YELLOW
CREAT BLD-MCNC: 1.33 MG/DL (ref 0.5–1.4)
CRP SERPL-MCNC: <0.5 MG/DL (ref 0–0.99)
D-LACTATE SERPL-SCNC: 1.1 MMOL/L (ref 0.5–2)
DEPRECATED RDW RBC AUTO: 49.2 FL (ref 40–54)
EOSINOPHIL # BLD AUTO: 0.15 10*3/MM3 (ref 0–0.7)
EOSINOPHIL NFR BLD AUTO: 1.2 % (ref 0–4)
ERYTHROCYTE [DISTWIDTH] IN BLOOD BY AUTOMATED COUNT: 14.4 % (ref 12–15)
ERYTHROCYTE [SEDIMENTATION RATE] IN BLOOD: 9 MM/HR (ref 0–20)
GFR SERPL CREATININE-BSD FRML MDRD: 43 ML/MIN/1.73
GLOBULIN UR ELPH-MCNC: 3.1 GM/DL
GLUCOSE BLD-MCNC: 96 MG/DL (ref 70–100)
GLUCOSE UR STRIP-MCNC: NEGATIVE MG/DL
HCT VFR BLD AUTO: 38.8 % (ref 37–47)
HGB BLD-MCNC: 13.2 G/DL (ref 12–16)
HGB UR QL STRIP.AUTO: NEGATIVE
HOLD SPECIMEN: NORMAL
HOLD SPECIMEN: NORMAL
IMM GRANULOCYTES # BLD: 0.07 10*3/MM3 (ref 0–0.03)
IMM GRANULOCYTES NFR BLD: 0.6 % (ref 0–5)
KETONES UR QL STRIP: NEGATIVE
LEUKOCYTE ESTERASE UR QL STRIP.AUTO: NEGATIVE
LYMPHOCYTES # BLD AUTO: 4.2 10*3/MM3 (ref 0.72–4.86)
LYMPHOCYTES NFR BLD AUTO: 33.4 % (ref 15–45)
MCH RBC QN AUTO: 31.7 PG (ref 28–32)
MCHC RBC AUTO-ENTMCNC: 34 G/DL (ref 33–36)
MCV RBC AUTO: 93.3 FL (ref 82–98)
MONOCYTES # BLD AUTO: 0.76 10*3/MM3 (ref 0.19–1.3)
MONOCYTES NFR BLD AUTO: 6 % (ref 4–12)
NEUTROPHILS # BLD AUTO: 7.36 10*3/MM3 (ref 1.87–8.4)
NEUTROPHILS NFR BLD AUTO: 58.6 % (ref 39–78)
NITRITE UR QL STRIP: NEGATIVE
PH UR STRIP.AUTO: <=5 [PH] (ref 5–8)
PLATELET # BLD AUTO: 479 10*3/MM3 (ref 130–400)
PMV BLD AUTO: 9.9 FL (ref 6–12)
POTASSIUM BLD-SCNC: 4.1 MMOL/L (ref 3.5–5.3)
PROT SERPL-MCNC: 8 G/DL (ref 6.3–8.7)
PROT UR QL STRIP: NEGATIVE
RBC # BLD AUTO: 4.16 10*6/MM3 (ref 4.2–5.4)
SODIUM BLD-SCNC: 143 MMOL/L (ref 135–145)
SP GR UR STRIP: 1.03 (ref 1–1.03)
UROBILINOGEN UR QL STRIP: NORMAL
WBC NRBC COR # BLD: 12.57 10*3/MM3 (ref 4.8–10.8)
WHOLE BLOOD HOLD SPECIMEN: NORMAL
WHOLE BLOOD HOLD SPECIMEN: NORMAL

## 2017-11-07 PROCEDURE — 96374 THER/PROPH/DIAG INJ IV PUSH: CPT

## 2017-11-07 PROCEDURE — 80053 COMPREHEN METABOLIC PANEL: CPT | Performed by: PHYSICIAN ASSISTANT

## 2017-11-07 PROCEDURE — 96361 HYDRATE IV INFUSION ADD-ON: CPT

## 2017-11-07 PROCEDURE — 99284 EMERGENCY DEPT VISIT MOD MDM: CPT

## 2017-11-07 PROCEDURE — 85651 RBC SED RATE NONAUTOMATED: CPT | Performed by: PHYSICIAN ASSISTANT

## 2017-11-07 PROCEDURE — 74022 RADEX COMPL AQT ABD SERIES: CPT

## 2017-11-07 PROCEDURE — 81003 URINALYSIS AUTO W/O SCOPE: CPT | Performed by: PHYSICIAN ASSISTANT

## 2017-11-07 PROCEDURE — 83605 ASSAY OF LACTIC ACID: CPT | Performed by: PHYSICIAN ASSISTANT

## 2017-11-07 PROCEDURE — 86140 C-REACTIVE PROTEIN: CPT | Performed by: PHYSICIAN ASSISTANT

## 2017-11-07 PROCEDURE — 25010000002 KETOROLAC TROMETHAMINE PER 15 MG: Performed by: PHYSICIAN ASSISTANT

## 2017-11-07 PROCEDURE — 85025 COMPLETE CBC W/AUTO DIFF WBC: CPT | Performed by: PHYSICIAN ASSISTANT

## 2017-11-07 RX ORDER — KETOROLAC TROMETHAMINE 15 MG/ML
15 INJECTION, SOLUTION INTRAMUSCULAR; INTRAVENOUS ONCE
Status: COMPLETED | OUTPATIENT
Start: 2017-11-07 | End: 2017-11-07

## 2017-11-07 RX ORDER — ONDANSETRON 4 MG/1
4 TABLET, ORALLY DISINTEGRATING ORAL ONCE
Status: COMPLETED | OUTPATIENT
Start: 2017-11-07 | End: 2017-11-07

## 2017-11-07 RX ORDER — DICLOFENAC SODIUM 75 MG/1
75 TABLET, DELAYED RELEASE ORAL 2 TIMES DAILY
Qty: 14 TABLET | Refills: 0 | Status: ON HOLD | OUTPATIENT
Start: 2017-11-07 | End: 2018-02-02

## 2017-11-07 RX ADMIN — ONDANSETRON 4 MG: 4 TABLET, ORALLY DISINTEGRATING ORAL at 15:48

## 2017-11-07 RX ADMIN — SODIUM CHLORIDE 1000 ML: 9 INJECTION, SOLUTION INTRAVENOUS at 14:24

## 2017-11-07 RX ADMIN — KETOROLAC TROMETHAMINE 15 MG: 15 INJECTION, SOLUTION INTRAMUSCULAR; INTRAVENOUS at 14:22

## 2017-11-07 NOTE — ED PROVIDER NOTES
Subjective   History of Present Illness  47-year-old female presents with multiple complaints.  The patient reports she has had sharp uncomfortable pains in her left side that is worse with taking a deep breath and also she has noticed intermittent swelling from the left side of her ribs.  Patient reports symptoms have been present for 2 weeks and are progressively getting worse.  She also notes she has had dark foul-smelling urine, and her right breast implant is deflated.  Denies chest pressure or weight on her chest, nausea vomiting diarrhea fevers chills  Review of Systems   All other systems reviewed and are negative.      Past Medical History:   Diagnosis Date   • Acute kidney failure    • Constipation    • Depression    • H/O gastric ulcer    • Headache    • History of transfusion    • Hypertension    • IBS (irritable bowel syndrome)    • Insomnia    • Kidney stone    • Maravilla maravilla disease    • Rapid weight loss    • SBO (small bowel obstruction)    • Stroke     X 2   • UTI (urinary tract infection)     CHRONIC, SEPTIC X2   • Volvulosis        Allergies   Allergen Reactions   • Anectine [Succinylcholine Chloride] Other (See Comments)     Hard to wake up   • Stadol [Butorphanol] Hives       Past Surgical History:   Procedure Laterality Date   • APPENDECTOMY      COMPLICATION OF SEPTIC   • BRAIN SURGERY      x2   • BREAST LUMPECTOMY Left 3/9/2017    Procedure: EXCISION LEFT BREAST LESION AND LEFT AXILLARY LYMPH NODE BIOPSY;  Surgeon: Evi Farley MD;  Location: [facility] OR;  Service:    • BREAST SURGERY     •  SECTION     • COLON SURGERY     • COLONOSCOPY  2007    normal   • COLONOSCOPY N/A 11/10/2016    Procedure: COLONOSCOPY WITH ANESTHESIA;  Surgeon: Camilo Hanson MD;  Location: [facility] ENDOSCOPY;  Service:    • ENDOSCOPY  2009    antral ulcer   • ENDOSCOPY N/A 10/10/2016    Procedure: ESOPHAGOGASTRODUODENOSCOPY WITH ANESTHESIA;  Surgeon: Camilo Hanson MD;  Location: [facility] ENDOSCOPY;   Service:    • ENDOSCOPY N/A 1/25/2017    Procedure: ESOPHAGOGASTRODUODENOSCOPY;  Surgeon: Camilo Hanson MD;  Location: [facility] ENDOSCOPY;  Service:    • ENDOSCOPY N/A 8/8/2017    Procedure: ESOPHAGOGASTRODUODENOSCOPY WITH ANESTHESIA;  Surgeon: Camilo Hanson MD;  Location: [facility] ENDOSCOPY;  Service:    • HERNIA REPAIR     • HYSTERECTOMY     • SMALL INTESTINE SURGERY N/A 03/2016   • TONSILLECTOMY         Family History   Problem Relation Age of Onset   • Cancer Maternal Grandfather    • No Known Problems Mother    • No Known Problems Father    • No Known Problems Sister    • No Known Problems Brother    • No Known Problems Brother    • No Known Problems Son    • Colon cancer Neg Hx    • Colon polyps Neg Hx        Social History     Social History   • Marital status:      Spouse name: N/A   • Number of children: N/A   • Years of education: N/A     Social History Main Topics   • Smoking status: Former Smoker     Packs/day: 1.00     Years: 15.00     Types: Cigarettes, Electronic Cigarette     Quit date: 3/10/2016   • Smokeless tobacco: Never Used   • Alcohol use Yes      Comment: occasional   • Drug use: No   • Sexual activity: Defer     Other Topics Concern   • None     Social History Narrative           Objective   Physical Exam   Constitutional: She is oriented to person, place, and time. She appears well-developed and well-nourished.   HENT:   Head: Normocephalic.   Eyes: Pupils are equal, round, and reactive to light.   Neck: Normal range of motion.   Cardiovascular: Normal rate and regular rhythm.    Pulmonary/Chest: Effort normal.   Abdominal: Soft. There is no tenderness.   Musculoskeletal: Normal range of motion.   Right deflated breast implant   Neurological: She is alert and oriented to person, place, and time.   Skin: Skin is warm.   Psychiatric: She has a normal mood and affect. Her behavior is normal.   Nursing note and vitals reviewed.      Procedures         ED Course  ED Course                   MDM  Number of Diagnoses or Management Options  Diagnosis management comments: Will d.c in stable condition and treat for pleurisy, labs unremarkable       Amount and/or Complexity of Data Reviewed  Clinical lab tests: ordered and reviewed  Tests in the radiology section of CPT®: ordered and reviewed  Tests in the medicine section of CPT®: reviewed and ordered  Decide to obtain previous medical records or to obtain history from someone other than the patient: yes    Risk of Complications, Morbidity, and/or Mortality  Presenting problems: moderate  Diagnostic procedures: moderate  Management options: moderate    Patient Progress  Patient progress: improved      Final diagnoses:   Deflation of breast implant, initial encounter   Pleuritic chest pain            Jasen Chambers PA-C  11/08/17 0047

## 2017-11-07 NOTE — DISCHARGE INSTRUCTIONS
Follow up with Dr. Mark for re evaluation of breast implant      Return to ED for worsening symptoms

## 2017-11-13 RX ORDER — ALPRAZOLAM 1 MG/1
1 TABLET ORAL 3 TIMES DAILY PRN
Qty: 90 TABLET | Refills: 1 | OUTPATIENT
Start: 2017-11-13 | End: 2018-01-15 | Stop reason: SDUPTHER

## 2017-11-13 NOTE — TELEPHONE ENCOUNTER
patient called with request for refill on xanax. Last office visit 9-21 with next scheduled appointment 12-4  Dose verified.    Rx reviewed,sent to pharmacy

## 2017-11-14 ENCOUNTER — TELEPHONE (OUTPATIENT)
Dept: PRIMARY CARE CLINIC | Age: 47
End: 2017-11-14

## 2017-11-14 ENCOUNTER — TELEPHONE (OUTPATIENT)
Dept: NEUROLOGY | Age: 47
End: 2017-11-14

## 2017-11-14 NOTE — TELEPHONE ENCOUNTER
I am unaware of this problem. I would be happy to review any literature she can produce on it. O/w, her primary doctor can feel free to substitute another med for zonegran.

## 2017-11-15 NOTE — ED NOTES
"ED Call Back Questions    1. How are you doing since leaving the Emergency Department?  No better; lots of pain-passing stones    2. Do you have any questions about your discharge instructions? No     3. Have you filled your new prescriptions yet? Yes   a. Do you have any questions about those medications? No     4. Were you able to make a follow-up appointment with the physician? No  - states Dr. Faust is out of town    5. Do you have a primary care physician? Yes   a. If No, would you like for me to set you up with one? N/A  i. If Yes, “I will have our ED  give you a call right back at this number to work with you on the best time for an appointment.”    6. We are always looking to get better at what we do. Do you have any suggestions for what we can do to be even better? No   a. If Yes, \"Thank you for sharing your concerns. I apologize. I will follow up with our manager and patient . Would you like someone to call you back?\" N/A    7. Is there anything else I can do for you? No  - pt states she is still in excruciating pain and unsure what to do because of pain; recommended pt to come back to er if needed for adam Sharif RN  11/15/17 3721    "

## 2017-11-21 ENCOUNTER — APPOINTMENT (OUTPATIENT)
Dept: GENERAL RADIOLOGY | Facility: HOSPITAL | Age: 47
End: 2017-11-21

## 2017-11-21 ENCOUNTER — HOSPITAL ENCOUNTER (EMERGENCY)
Facility: HOSPITAL | Age: 47
Discharge: HOME | End: 2017-11-21
Admitting: EMERGENCY MEDICINE

## 2017-11-21 ENCOUNTER — APPOINTMENT (OUTPATIENT)
Dept: CT IMAGING | Facility: HOSPITAL | Age: 47
End: 2017-11-21

## 2017-11-21 VITALS
WEIGHT: 105 LBS | HEART RATE: 72 BPM | HEIGHT: 67 IN | RESPIRATION RATE: 16 BRPM | BODY MASS INDEX: 16.48 KG/M2 | OXYGEN SATURATION: 98 % | DIASTOLIC BLOOD PRESSURE: 77 MMHG | SYSTOLIC BLOOD PRESSURE: 131 MMHG | TEMPERATURE: 97.8 F

## 2017-11-21 DIAGNOSIS — D64.9 ANEMIA, UNSPECIFIED TYPE: Primary | ICD-10-CM

## 2017-11-21 DIAGNOSIS — R07.9 CHEST PAIN, UNSPECIFIED TYPE: ICD-10-CM

## 2017-11-21 LAB
ABO GROUP BLD: NORMAL
ALBUMIN SERPL-MCNC: 4 G/DL (ref 3.5–5)
ALBUMIN/GLOB SERPL: 1.5 G/DL (ref 1.1–2.5)
ALP SERPL-CCNC: 66 U/L (ref 24–120)
ALT SERPL W P-5'-P-CCNC: 28 U/L (ref 0–54)
AMYLASE SERPL-CCNC: 67 U/L (ref 30–110)
ANION GAP SERPL CALCULATED.3IONS-SCNC: 9 MMOL/L (ref 4–13)
APTT PPP: 27 SECONDS (ref 24.1–34.8)
ARTERIAL PATENCY WRIST A: ABNORMAL
AST SERPL-CCNC: 21 U/L (ref 7–45)
ATMOSPHERIC PRESS: 751 MMHG
BASE EXCESS BLDA CALC-SCNC: -3.3 MMOL/L (ref 0–2)
BASOPHILS # BLD AUTO: 0.01 10*3/MM3 (ref 0–0.2)
BASOPHILS NFR BLD AUTO: 0.1 % (ref 0–2)
BDY SITE: ABNORMAL
BILIRUB SERPL-MCNC: <0.1 MG/DL (ref 0.1–1)
BILIRUB UR QL STRIP: NEGATIVE
BLD GP AB SCN SERPL QL: NEGATIVE
BODY TEMPERATURE: 37 C
BUN BLD-MCNC: 8 MG/DL (ref 5–21)
BUN/CREAT SERPL: 8.2 (ref 7–25)
CALCIUM SPEC-SCNC: 9.5 MG/DL (ref 8.4–10.4)
CHLORIDE SERPL-SCNC: 107 MMOL/L (ref 98–110)
CLARITY UR: CLEAR
CO2 SERPL-SCNC: 25 MMOL/L (ref 24–31)
COLOR UR: YELLOW
CREAT BLD-MCNC: 0.98 MG/DL (ref 0.5–1.4)
CRP SERPL-MCNC: <0.5 MG/DL (ref 0–0.99)
D DIMER PPP FEU-MCNC: 0.24 MG/L (FEU) (ref 0–0.5)
D-LACTATE SERPL-SCNC: 0.9 MMOL/L (ref 0.5–2)
DEPRECATED RDW RBC AUTO: 49.7 FL (ref 40–54)
DEVELOPER EXPIRATION DATE: NORMAL
DEVELOPER LOT NUMBER: 113
EOSINOPHIL # BLD AUTO: 0.2 10*3/MM3 (ref 0–0.7)
EOSINOPHIL NFR BLD AUTO: 2 % (ref 0–4)
ERYTHROCYTE [DISTWIDTH] IN BLOOD BY AUTOMATED COUNT: 14.2 % (ref 12–15)
EXPIRATION DATE: NORMAL
FECAL OCCULT BLOOD SCREEN, POC: NEGATIVE
GFR SERPL CREATININE-BSD FRML MDRD: 61 ML/MIN/1.73
GLOBULIN UR ELPH-MCNC: 2.7 GM/DL
GLUCOSE BLD-MCNC: 74 MG/DL (ref 70–100)
GLUCOSE UR STRIP-MCNC: NEGATIVE MG/DL
HCO3 BLDA-SCNC: 21 MMOL/L (ref 20–26)
HCT VFR BLD AUTO: 33 % (ref 37–47)
HGB BLD-MCNC: 10.8 G/DL (ref 12–16)
HGB UR QL STRIP.AUTO: NEGATIVE
IMM GRANULOCYTES # BLD: 0.03 10*3/MM3 (ref 0–0.03)
IMM GRANULOCYTES NFR BLD: 0.3 % (ref 0–5)
INR PPP: 0.83 (ref 0.91–1.09)
KETONES UR QL STRIP: NEGATIVE
LEUKOCYTE ESTERASE UR QL STRIP.AUTO: NEGATIVE
LIPASE SERPL-CCNC: 52 U/L (ref 23–203)
LYMPHOCYTES # BLD AUTO: 3.48 10*3/MM3 (ref 0.72–4.86)
LYMPHOCYTES NFR BLD AUTO: 35.5 % (ref 15–45)
Lab: 113
Lab: ABNORMAL
MCH RBC QN AUTO: 31.4 PG (ref 28–32)
MCHC RBC AUTO-ENTMCNC: 32.7 G/DL (ref 33–36)
MCV RBC AUTO: 95.9 FL (ref 82–98)
MODALITY: ABNORMAL
MONOCYTES # BLD AUTO: 0.51 10*3/MM3 (ref 0.19–1.3)
MONOCYTES NFR BLD AUTO: 5.2 % (ref 4–12)
NEGATIVE CONTROL: NEGATIVE
NEUTROPHILS # BLD AUTO: 5.56 10*3/MM3 (ref 1.87–8.4)
NEUTROPHILS NFR BLD AUTO: 56.9 % (ref 39–78)
NITRITE UR QL STRIP: NEGATIVE
NT-PROBNP SERPL-MCNC: 377 PG/ML (ref 0–450)
PCO2 BLDA: 33.8 MM HG (ref 35–45)
PH BLDA: 7.4 PH UNITS (ref 7.35–7.45)
PH UR STRIP.AUTO: 5.5 [PH] (ref 5–8)
PLATELET # BLD AUTO: 473 10*3/MM3 (ref 130–400)
PMV BLD AUTO: 10.1 FL (ref 6–12)
PO2 BLDA: 100 MM HG (ref 83–108)
POSITIVE CONTROL: POSITIVE
POTASSIUM BLD-SCNC: 3.7 MMOL/L (ref 3.5–5.3)
PROT SERPL-MCNC: 6.7 G/DL (ref 6.3–8.7)
PROT UR QL STRIP: NEGATIVE
PROTHROMBIN TIME: 11.6 SECONDS (ref 11.9–14.6)
RBC # BLD AUTO: 3.44 10*6/MM3 (ref 4.2–5.4)
RH BLD: POSITIVE
SAO2 % BLDCOA: 98.1 % (ref 94–99)
SODIUM BLD-SCNC: 141 MMOL/L (ref 135–145)
SP GR UR STRIP: 1.01 (ref 1–1.03)
TROPONIN I SERPL-MCNC: <0.012 NG/ML (ref 0–0.03)
TROPONIN I SERPL-MCNC: <0.012 NG/ML (ref 0–0.03)
UROBILINOGEN UR QL STRIP: NORMAL
VENTILATOR MODE: ABNORMAL
WBC NRBC COR # BLD: 9.79 10*3/MM3 (ref 4.8–10.8)

## 2017-11-21 PROCEDURE — 85610 PROTHROMBIN TIME: CPT | Performed by: NURSE PRACTITIONER

## 2017-11-21 PROCEDURE — 86900 BLOOD TYPING SEROLOGIC ABO: CPT | Performed by: NURSE PRACTITIONER

## 2017-11-21 PROCEDURE — 99285 EMERGENCY DEPT VISIT HI MDM: CPT

## 2017-11-21 PROCEDURE — 85379 FIBRIN DEGRADATION QUANT: CPT | Performed by: NURSE PRACTITIONER

## 2017-11-21 PROCEDURE — 96375 TX/PRO/DX INJ NEW DRUG ADDON: CPT

## 2017-11-21 PROCEDURE — 93010 ELECTROCARDIOGRAM REPORT: CPT | Performed by: INTERNAL MEDICINE

## 2017-11-21 PROCEDURE — 25010000002 ONDANSETRON PER 1 MG: Performed by: NURSE PRACTITIONER

## 2017-11-21 PROCEDURE — 25010000002 HYDROMORPHONE PER 4 MG: Performed by: NURSE PRACTITIONER

## 2017-11-21 PROCEDURE — 83880 ASSAY OF NATRIURETIC PEPTIDE: CPT | Performed by: NURSE PRACTITIONER

## 2017-11-21 PROCEDURE — 82803 BLOOD GASES ANY COMBINATION: CPT

## 2017-11-21 PROCEDURE — 80053 COMPREHEN METABOLIC PANEL: CPT | Performed by: NURSE PRACTITIONER

## 2017-11-21 PROCEDURE — 36415 COLL VENOUS BLD VENIPUNCTURE: CPT | Performed by: NURSE PRACTITIONER

## 2017-11-21 PROCEDURE — 86901 BLOOD TYPING SEROLOGIC RH(D): CPT | Performed by: NURSE PRACTITIONER

## 2017-11-21 PROCEDURE — 96374 THER/PROPH/DIAG INJ IV PUSH: CPT

## 2017-11-21 PROCEDURE — 82270 OCCULT BLOOD FECES: CPT | Performed by: NURSE PRACTITIONER

## 2017-11-21 PROCEDURE — 83605 ASSAY OF LACTIC ACID: CPT | Performed by: NURSE PRACTITIONER

## 2017-11-21 PROCEDURE — 0 IOPAMIDOL 61 % SOLUTION: Performed by: NURSE PRACTITIONER

## 2017-11-21 PROCEDURE — 85025 COMPLETE CBC W/AUTO DIFF WBC: CPT | Performed by: NURSE PRACTITIONER

## 2017-11-21 PROCEDURE — 85730 THROMBOPLASTIN TIME PARTIAL: CPT | Performed by: NURSE PRACTITIONER

## 2017-11-21 PROCEDURE — 71020 HC CHEST PA AND LATERAL: CPT

## 2017-11-21 PROCEDURE — 82150 ASSAY OF AMYLASE: CPT | Performed by: NURSE PRACTITIONER

## 2017-11-21 PROCEDURE — 84484 ASSAY OF TROPONIN QUANT: CPT | Performed by: NURSE PRACTITIONER

## 2017-11-21 PROCEDURE — 86140 C-REACTIVE PROTEIN: CPT | Performed by: NURSE PRACTITIONER

## 2017-11-21 PROCEDURE — 81003 URINALYSIS AUTO W/O SCOPE: CPT | Performed by: NURSE PRACTITIONER

## 2017-11-21 PROCEDURE — 74177 CT ABD & PELVIS W/CONTRAST: CPT

## 2017-11-21 PROCEDURE — 93005 ELECTROCARDIOGRAM TRACING: CPT | Performed by: NURSE PRACTITIONER

## 2017-11-21 PROCEDURE — 96376 TX/PRO/DX INJ SAME DRUG ADON: CPT

## 2017-11-21 PROCEDURE — 86850 RBC ANTIBODY SCREEN: CPT | Performed by: NURSE PRACTITIONER

## 2017-11-21 PROCEDURE — 36600 WITHDRAWAL OF ARTERIAL BLOOD: CPT

## 2017-11-21 PROCEDURE — 83690 ASSAY OF LIPASE: CPT | Performed by: NURSE PRACTITIONER

## 2017-11-21 PROCEDURE — 36415 COLL VENOUS BLD VENIPUNCTURE: CPT

## 2017-11-21 RX ORDER — ONDANSETRON 2 MG/ML
4 INJECTION INTRAMUSCULAR; INTRAVENOUS ONCE
Status: COMPLETED | OUTPATIENT
Start: 2017-11-21 | End: 2017-11-21

## 2017-11-21 RX ORDER — SODIUM CHLORIDE 0.9 % (FLUSH) 0.9 %
10 SYRINGE (ML) INJECTION AS NEEDED
Status: DISCONTINUED | OUTPATIENT
Start: 2017-11-21 | End: 2017-11-22 | Stop reason: HOSPADM

## 2017-11-21 RX ADMIN — IOPAMIDOL 100 ML: 612 INJECTION, SOLUTION INTRAVENOUS at 17:32

## 2017-11-21 RX ADMIN — SODIUM CHLORIDE 500 ML: 9 INJECTION, SOLUTION INTRAVENOUS at 15:02

## 2017-11-21 RX ADMIN — HYDROMORPHONE HYDROCHLORIDE 0.5 MG: 1 INJECTION, SOLUTION INTRAMUSCULAR; INTRAVENOUS; SUBCUTANEOUS at 15:23

## 2017-11-21 RX ADMIN — ONDANSETRON 4 MG: 2 INJECTION, SOLUTION INTRAMUSCULAR; INTRAVENOUS at 15:22

## 2017-11-21 RX ADMIN — HYDROMORPHONE HYDROCHLORIDE 0.5 MG: 1 INJECTION, SOLUTION INTRAMUSCULAR; INTRAVENOUS; SUBCUTANEOUS at 18:43

## 2017-11-21 NOTE — ED PROVIDER NOTES
"Subjective   HPI Comments: Patient is a 47-year-old  female who presents ER today with multiple complaints.  The patient reports to me that \"my urine is backing up into the stomach..  Patient states she has a history of kidney failure in the past due to a urinary tract infection.  States this happened one year ago.  Patient states they never found out what caused this.  She states that this time she is having dark colored urine.  He states chest distress of her stomach to make herself urinate.  The patient states that she feels that her urine is backing up into her stomach.  She states that this is causing her entire abdomen hurt.  Patient also reports that she is having chest pain.  States that this is been occurring for 1 month.  In nature and is midsternal nonradiating.  The patient reports cough and congestion recently.  She reports she has felt short of breath.  The patient also reports black tarry stools.  He states this is been occurring for over one year.  The patient states she has had an EGD performed which was normal however has been unable to have a colonoscopy due to a previous bowel obstruction.  The patient states that she sees Dr. Field for this.  Patient presents ER today for further evaluation.    Patient is a 47 y.o. female presenting with abdominal pain.   History provided by:  Patient   used: No    Abdominal Pain   Pain location:  Generalized  Pain quality: aching and cramping    Pain radiates to:  Does not radiate  Pain severity:  Mild  Onset quality:  Sudden  Duration:  4 weeks  Timing:  Constant  Progression:  Unchanged  Chronicity:  New  Context: not alcohol use, not awakening from sleep, not diet changes, not eating, not laxative use, not medication withdrawal, not previous surgeries, not recent illness, not recent sexual activity, not recent travel, not retching, not sick contacts, not suspicious food intake and not trauma    Relieved by:  " Nothing  Worsened by:  Nothing  Ineffective treatments:  None tried  Associated symptoms: chest pain, cough, dysuria, melena, nausea, shortness of breath and vomiting    Associated symptoms: no anorexia, no belching, no chills, no constipation, no diarrhea, no fatigue, no flatus, no hematemesis, no hematochezia, no hematuria, no sore throat, no vaginal bleeding and no vaginal discharge    Risk factors: multiple surgeries    Risk factors: no alcohol abuse, no aspirin use, not elderly, no NSAID use, not obese, not pregnant and no recent hospitalization        Review of Systems   Constitutional: Negative for chills and fatigue.   HENT: Negative for sore throat.    Respiratory: Positive for cough and shortness of breath.    Cardiovascular: Positive for chest pain.   Gastrointestinal: Positive for abdominal pain, melena, nausea and vomiting. Negative for anorexia, constipation, diarrhea, flatus, hematemesis and hematochezia.   Genitourinary: Positive for dysuria. Negative for hematuria, vaginal bleeding and vaginal discharge.   All other systems reviewed and are negative.      Past Medical History:   Diagnosis Date   • Acute kidney failure    • Constipation    • Depression    • H/O gastric ulcer    • Headache    • History of transfusion    • Hypertension    • IBS (irritable bowel syndrome)    • Insomnia    • Kidney stone    • Maravilla maravilla disease    • Rapid weight loss    • SBO (small bowel obstruction)    • Stroke     X 2   • UTI (urinary tract infection)     CHRONIC, SEPTIC X2   • Volvulosis        Allergies   Allergen Reactions   • Anectine [Succinylcholine Chloride] Other (See Comments)     Hard to wake up   • Stadol [Butorphanol] Hives       Past Surgical History:   Procedure Laterality Date   • APPENDECTOMY      COMPLICATION OF SEPTIC   • BRAIN SURGERY      x2   • BREAST LUMPECTOMY Left 3/9/2017    Procedure: EXCISION LEFT BREAST LESION AND LEFT AXILLARY LYMPH NODE BIOPSY;  Surgeon: Evi Farley MD;  Location:   [facility] OR;  Service:    • BREAST SURGERY     •  SECTION     • COLON SURGERY     • COLONOSCOPY  2007    normal   • COLONOSCOPY N/A 11/10/2016    Procedure: COLONOSCOPY WITH ANESTHESIA;  Surgeon: Camilo Hanson MD;  Location: [facility] ENDOSCOPY;  Service:    • ENDOSCOPY  2009    antral ulcer   • ENDOSCOPY N/A 10/10/2016    Procedure: ESOPHAGOGASTRODUODENOSCOPY WITH ANESTHESIA;  Surgeon: Camilo Hanson MD;  Location: [facility] ENDOSCOPY;  Service:    • ENDOSCOPY N/A 2017    Procedure: ESOPHAGOGASTRODUODENOSCOPY;  Surgeon: Camilo Hanson MD;  Location: [facility] ENDOSCOPY;  Service:    • ENDOSCOPY N/A 2017    Procedure: ESOPHAGOGASTRODUODENOSCOPY WITH ANESTHESIA;  Surgeon: Camilo Hanson MD;  Location: [facility] ENDOSCOPY;  Service:    • HERNIA REPAIR     • HYSTERECTOMY     • SMALL INTESTINE SURGERY N/A 2016   • TONSILLECTOMY         Family History   Problem Relation Age of Onset   • Cancer Maternal Grandfather    • No Known Problems Mother    • No Known Problems Father    • No Known Problems Sister    • No Known Problems Brother    • No Known Problems Brother    • No Known Problems Son    • Colon cancer Neg Hx    • Colon polyps Neg Hx        Social History     Social History   • Marital status:      Spouse name: N/A   • Number of children: N/A   • Years of education: N/A     Social History Main Topics   • Smoking status: Former Smoker     Packs/day: 1.00     Years: 15.00     Types: Cigarettes, Electronic Cigarette     Quit date: 3/10/2016   • Smokeless tobacco: Never Used   • Alcohol use Yes      Comment: occasional   • Drug use: No   • Sexual activity: Defer     Other Topics Concern   • None     Social History Narrative           Objective   Physical Exam   Constitutional: She is oriented to person, place, and time. She appears well-developed and well-nourished.   HENT:   Head: Normocephalic and atraumatic.   Cardiovascular: Normal rate, regular rhythm and normal heart sounds.     Pulmonary/Chest: Effort normal and breath sounds normal.   Abdominal: Soft. Bowel sounds are normal. There is generalized tenderness.   Genitourinary: Rectal exam shows external hemorrhoid. Rectal exam shows guaiac negative stool.   Neurological: She is alert and oriented to person, place, and time.   Skin: Skin is warm and dry.   Psychiatric: She has a normal mood and affect.   Nursing note and vitals reviewed.      Procedures         ED Course  ED Course   Comment By Time   Hemocult stool negative; exam chaperoned per FEDERICO Castillo, APRN 11/21 1800   Patient's labs reviewed.  They are unremarkable.  Patient does have immobility and hematocrit of 33.  This is decreased from her labs 2 weeks ago.  She did have a Hemoccult negative stool. Samantha Sutton, APRN 11/21 2151   CT scan of the abdomen and pelvis showed mild cough still patient.  There is no other acute findings time.  Chest x-ray was normal.  The patient has had 2 EKGs that showed no significant changes and was 2 normal troponins. Samantha Sutton, APRN 11/21 2152   At this time patient will be discharged home in stable condition.  She is advised to follow-up with her primary care provider as well as her GI physician.  She is advised that she may return to the ER if worsening symptoms.  This time she will be discharged home in stable condition. Samantha KIMBERLEY Sutton, APRN 11/21 2152        CT Abdomen Pelvis With Contrast   Final Result   1. Mild constipation with mild colonic distention. A discrete transition   point or obstruction is not demonstrated.   2. Mild stranding within the peritoneal fat was noted on the previous   exam of 08/05/2017 within the anterior pelvis. This is felt to represent   a more chronic finding in this patient. This may be related to the lack   of intra-abdominal fat. This is felt to be unlikely to represent an   acute inflammatory process. There is no free fluid or focal bowel wall   thickening present. The  appendix is not well visualized.   2. Mild gallbladder distention. This is unchanged from previous study.   3. The patient's undergone previous bilateral breast augmentation. The   right implant is ruptured.   This report was finalized on 11/21/2017 18:22 by Dr. Jerry Land MD.      XR Chest 2 View   Final Result        Labs Reviewed   COMPREHENSIVE METABOLIC PANEL - Abnormal; Notable for the following:        Result Value    Total Bilirubin <0.1 (*)     All other components within normal limits   PROTIME-INR - Abnormal; Notable for the following:     Protime 11.6 (*)     INR 0.83 (*)     All other components within normal limits   CBC WITH AUTO DIFFERENTIAL - Abnormal; Notable for the following:     RBC 3.44 (*)     Hemoglobin 10.8 (*)     Hematocrit 33.0 (*)     MCHC 32.7 (*)     Platelets 473 (*)     All other components within normal limits   BLOOD GAS, ARTERIAL - Abnormal; Notable for the following:     pCO2, Arterial 33.8 (*)     Base Excess, Arterial -3.3 (*)     All other components within normal limits   APTT - Normal   LIPASE - Normal   AMYLASE - Normal   URINALYSIS W/ CULTURE IF INDICATED - Normal    Narrative:     Urine microscopic not indicated.   TROPONIN (IN-HOUSE) - Normal   D-DIMER, QUANTITATIVE - Normal    Narrative:     Reference Range is 0-0.50 mg/L FEU. However, results <0.50 mg/L FEU tends to rule out DVT or PE. Results >0.50 mg/L FEU are not useful in predicting absence or presence of DVT or PE.   BNP (IN-HOUSE) - Normal   LACTIC ACID, PLASMA - Normal   C-REACTIVE PROTEIN - Normal   TROPONIN (IN-HOUSE) - Normal   POCT OCCULT BLOOD STOOL - Normal   BLOOD GAS, ARTERIAL   TYPE AND SCREEN   CBC AND DIFFERENTIAL    Narrative:     The following orders were created for panel order CBC & Differential.  Procedure                               Abnormality         Status                     ---------                               -----------         ------                     CBC Auto  Differential[[id number]]        Abnormal            Final result                 Please view results for these tests on the individual orders.               MDM  Number of Diagnoses or Management Options  Anemia, unspecified type: established and worsening  Chest pain, unspecified type: new and requires workup     Amount and/or Complexity of Data Reviewed  Clinical lab tests: ordered and reviewed  Tests in the radiology section of CPT®: ordered and reviewed  Tests in the medicine section of CPT®: ordered and reviewed  Decide to obtain previous medical records or to obtain history from someone other than the patient: yes  Discuss the patient with other providers: yes    Patient Progress  Patient progress: stable      Final diagnoses:   Anemia, unspecified type   Chest pain, unspecified type            Samantha Sutton, APRN  11/21/17 7627

## 2017-11-22 NOTE — DISCHARGE INSTRUCTIONS
Please f/u with your GI physician tomorrow  Please f/u with  Your PCP tomorrow  R/t to the ER as needed

## 2017-12-04 ENCOUNTER — OFFICE VISIT (OUTPATIENT)
Dept: PRIMARY CARE CLINIC | Age: 47
End: 2017-12-04
Payer: MEDICARE

## 2017-12-04 VITALS
HEIGHT: 68 IN | BODY MASS INDEX: 16.37 KG/M2 | WEIGHT: 108 LBS | DIASTOLIC BLOOD PRESSURE: 60 MMHG | TEMPERATURE: 98.6 F | HEART RATE: 101 BPM | SYSTOLIC BLOOD PRESSURE: 110 MMHG | OXYGEN SATURATION: 96 %

## 2017-12-04 DIAGNOSIS — I67.5 MOYA MOYA DISEASE: ICD-10-CM

## 2017-12-04 DIAGNOSIS — M47.816 LUMBAR FACET ARTHROPATHY: ICD-10-CM

## 2017-12-04 DIAGNOSIS — N20.0 NEPHROLITHIASIS: Primary | ICD-10-CM

## 2017-12-04 DIAGNOSIS — F41.9 ANXIETY: ICD-10-CM

## 2017-12-04 DIAGNOSIS — I10 ESSENTIAL HYPERTENSION: ICD-10-CM

## 2017-12-04 PROCEDURE — G8484 FLU IMMUNIZE NO ADMIN: HCPCS | Performed by: INTERNAL MEDICINE

## 2017-12-04 PROCEDURE — G8427 DOCREV CUR MEDS BY ELIG CLIN: HCPCS | Performed by: INTERNAL MEDICINE

## 2017-12-04 PROCEDURE — 99214 OFFICE O/P EST MOD 30 MIN: CPT | Performed by: INTERNAL MEDICINE

## 2017-12-04 PROCEDURE — G8418 CALC BMI BLW LOW PARAM F/U: HCPCS | Performed by: INTERNAL MEDICINE

## 2017-12-04 PROCEDURE — 1036F TOBACCO NON-USER: CPT | Performed by: INTERNAL MEDICINE

## 2017-12-04 RX ORDER — LEVETIRACETAM 500 MG/1
500 TABLET ORAL 2 TIMES DAILY
Qty: 60 TABLET | Refills: 3 | Status: SHIPPED | OUTPATIENT
Start: 2017-12-04 | End: 2018-04-11 | Stop reason: SDUPTHER

## 2017-12-04 RX ORDER — ACETAMINOPHEN AND CODEINE PHOSPHATE 300; 30 MG/1; MG/1
1 TABLET ORAL EVERY 4 HOURS PRN
Qty: 25 TABLET | Refills: 0 | Status: SHIPPED | OUTPATIENT
Start: 2017-12-04 | End: 2017-12-13 | Stop reason: SDUPTHER

## 2017-12-04 RX ORDER — PROMETHAZINE HYDROCHLORIDE 25 MG/1
25 TABLET ORAL EVERY 6 HOURS PRN
Qty: 90 TABLET | Refills: 3 | Status: SHIPPED | OUTPATIENT
Start: 2017-12-04 | End: 2018-05-11 | Stop reason: SDUPTHER

## 2017-12-04 ASSESSMENT — ENCOUNTER SYMPTOMS
SHORTNESS OF BREATH: 0
COUGH: 0
NAUSEA: 1
BACK PAIN: 1
CONSTIPATION: 1
DIARRHEA: 0
ABDOMINAL DISTENTION: 1
ABDOMINAL PAIN: 1
VOMITING: 0

## 2017-12-04 NOTE — PROGRESS NOTES
treatment plan. Follow up as directed.      Electronically signed by Anselmo Daniels DO on 1/1/2018 at 1:33 PM

## 2017-12-08 DIAGNOSIS — N20.0 NEPHROLITHIASIS: ICD-10-CM

## 2017-12-08 LAB
24HR URINE VOLUME (ML): 1500 ML
CALCIUM 24 HOUR URINE: 120 MG/24HR (ref 100–300)
CREATININE 24 HOUR URINE: 0.6 G/24HR (ref 1–2)
Lab: 24 HOURS
PARATHYROID HORMONE INTACT: 19.8 PG/ML (ref 15–65)
PROTEIN 24 HOUR URINE: 75 MG/24HR (ref 50–100)
SODIUM 24 HOUR URINE: 123 MMOL/24 HR (ref 40–220)
URIC ACID, SERUM: 3.1 MG/DL (ref 2.4–5.7)

## 2017-12-11 LAB
CITRIC ACID, U, 24HR: 26 MG/D (ref 320–1240)
CITRIC ACID, URINE: 17 MG/L
CITRIC ACID/CREATININE RATIO URINE: 46 MG/G
CREATININE 24 HOUR URINE: 555 MG/D (ref 700–1600)
CREATININE URINE: 37 MG/DL
HOURS COLLECTED: 24 HR
URINE TOTAL VOLUME: 1500 ML

## 2017-12-13 RX ORDER — ACETAMINOPHEN AND CODEINE PHOSPHATE 300; 30 MG/1; MG/1
1 TABLET ORAL EVERY 4 HOURS PRN
Qty: 25 TABLET | Refills: 0 | Status: SHIPPED | OUTPATIENT
Start: 2017-12-13 | End: 2017-12-27 | Stop reason: SDUPTHER

## 2017-12-13 NOTE — TELEPHONE ENCOUNTER
patient called left message with request for refill on tylenol #3. Last office visit 12-4 with next scheduled appointment 1-15  Dose verified.    Last UDS  8-22    Last fill per chart 12-4  Component Results     Component Value Ref Range & Units Status Collected Lab   Amphetamine Screen, Urine Negative  Negative <1000 ng/mL Final 08/21/2017 10:11 PM [address] Lab   Barbiturate Screen, Ur Negative  Negative < 200 ng/mL Final 08/21/2017 10:11 PM [address] [facility] [address] [facility]   Benzodiazepine Screen, Urine Positive   Negative <100 ng/mL Final 08/21/2017 10:11 PM [address] [facility] [address] [facility]   Cannabinoid Scrn, Ur Positive   Negative <50 ng/mL Final 08/21/2017 10:11 PM [address] URINE Negative  Negative <300 ng/mL Final 08/21/2017 10:11 PM [address] Lab   Opiate Scrn, Ur Positive   Negative < 300 ng/mL Final 08/21/2017 10:11 PM [address] Lab   Drug Screen Comment: see below   Final 08/21/2017 10:11 PM [address] [facility]   This method is a screening test to detect only these drug   classes as part of a medical workup.  Confirmatory testing

## 2017-12-27 RX ORDER — METOPROLOL SUCCINATE 100 MG/1
100 TABLET, EXTENDED RELEASE ORAL NIGHTLY
Qty: 30 TABLET | Refills: 5 | Status: SHIPPED | OUTPATIENT
Start: 2017-12-27

## 2017-12-27 RX ORDER — ACETAMINOPHEN AND CODEINE PHOSPHATE 300; 30 MG/1; MG/1
1 TABLET ORAL EVERY 4 HOURS PRN
Qty: 25 TABLET | Refills: 0 | Status: SHIPPED | OUTPATIENT
Start: 2017-12-27 | End: 2018-01-09 | Stop reason: SDUPTHER

## 2018-01-03 RX ORDER — METOCLOPRAMIDE 5 MG/1
TABLET ORAL
Qty: 120 TABLET | Refills: 3 | Status: SHIPPED | OUTPATIENT
Start: 2018-01-03 | End: 2018-07-17 | Stop reason: ALTCHOICE

## 2018-01-03 RX ORDER — AMLODIPINE BESYLATE 10 MG/1
TABLET ORAL
Qty: 30 TABLET | Refills: 5 | Status: SHIPPED | OUTPATIENT
Start: 2018-01-03 | End: 2018-08-16 | Stop reason: SDUPTHER

## 2018-01-09 NOTE — TELEPHONE ENCOUNTER
patient called left message with request for refill on tylenol #3. Last office visit 12-4 with next scheduled appointment 1-15  Dose verified.    Last UDS  8-2017    Last fill per 12-27  Component Results     Component Value Ref Range & Units Status Collected Lab   Amphetamine Screen, Urine Negative  Negative <1000 ng/mL Final 08/21/2017 10:11 PM [address] [facility]   Barbiturate Screen, Ur Negative  Negative < 200 ng/mL Final 08/21/2017 10:11 PM [address] Lab   Benzodiazepine Screen, Urine Positive   Negative <100 ng/mL Final 08/21/2017 10:11 PM [address] Lab   Cannabinoid Scrn, Ur Positive   Negative <50 ng/mL Final 08/21/2017 10:11 PM [address] Lab   COCAINE METABOLITE SCREEN URINE Negative  Negative <300 ng/mL Final 08/21/2017 10:11 PM [address] [facility]   Opiate Scrn, Ur Positive   Negative < 300 ng/mL Final 08/21/2017 10:11 PM [address] [facility]   Drug Screen Comment: see below   Final 08/21/2017 10:11 PM [address] [facility]   This method is a screening test to detect only these drug

## 2018-01-10 RX ORDER — ACETAMINOPHEN AND CODEINE PHOSPHATE 300; 30 MG/1; MG/1
1 TABLET ORAL EVERY 4 HOURS PRN
Qty: 25 TABLET | Refills: 0 | Status: SHIPPED | OUTPATIENT
Start: 2018-01-10 | End: 2018-01-18 | Stop reason: SDUPTHER

## 2018-01-15 ENCOUNTER — OFFICE VISIT (OUTPATIENT)
Dept: PRIMARY CARE CLINIC | Age: 48
End: 2018-01-15
Payer: MEDICARE

## 2018-01-15 VITALS
DIASTOLIC BLOOD PRESSURE: 102 MMHG | TEMPERATURE: 97.7 F | HEART RATE: 101 BPM | SYSTOLIC BLOOD PRESSURE: 132 MMHG | OXYGEN SATURATION: 98 % | BODY MASS INDEX: 19.29 KG/M2 | RESPIRATION RATE: 18 BRPM | WEIGHT: 125 LBS

## 2018-01-15 DIAGNOSIS — N63.11 BREAST LUMP ON RIGHT SIDE AT 11 O'CLOCK POSITION: Primary | ICD-10-CM

## 2018-01-15 DIAGNOSIS — R11.0 NAUSEA IN ADULT: ICD-10-CM

## 2018-01-15 DIAGNOSIS — K59.00 CONSTIPATION, UNSPECIFIED CONSTIPATION TYPE: ICD-10-CM

## 2018-01-15 DIAGNOSIS — R30.0 DYSURIA: ICD-10-CM

## 2018-01-15 LAB
AMPHETAMINE SCREEN, URINE: NORMAL
BARBITURATE SCREEN, URINE: NORMAL
BENZODIAZEPINE SCREEN, URINE: NORMAL
COCAINE METABOLITE SCREEN URINE: NORMAL
MDMA URINE: NORMAL
METHADONE SCREEN, URINE: NORMAL
METHAMPHETAMINE, URINE: NORMAL
OPIATE SCREEN URINE: NORMAL
OXYCODONE SCREEN URINE: NORMAL
PHENCYCLIDINE SCREEN URINE: NORMAL
PROPOXYPHENE SCREEN, URINE: NORMAL
THC: NORMAL
TRICYCLIC ANTIDEPRESSANTS, UR: NORMAL

## 2018-01-15 PROCEDURE — G8427 DOCREV CUR MEDS BY ELIG CLIN: HCPCS | Performed by: INTERNAL MEDICINE

## 2018-01-15 PROCEDURE — G8484 FLU IMMUNIZE NO ADMIN: HCPCS | Performed by: INTERNAL MEDICINE

## 2018-01-15 PROCEDURE — 80305 DRUG TEST PRSMV DIR OPT OBS: CPT | Performed by: INTERNAL MEDICINE

## 2018-01-15 PROCEDURE — 99214 OFFICE O/P EST MOD 30 MIN: CPT | Performed by: INTERNAL MEDICINE

## 2018-01-15 PROCEDURE — G8420 CALC BMI NORM PARAMETERS: HCPCS | Performed by: INTERNAL MEDICINE

## 2018-01-15 PROCEDURE — 1036F TOBACCO NON-USER: CPT | Performed by: INTERNAL MEDICINE

## 2018-01-15 RX ORDER — ALPRAZOLAM 1 MG/1
1 TABLET ORAL 3 TIMES DAILY PRN
Qty: 90 TABLET | Refills: 1 | Status: SHIPPED | OUTPATIENT
Start: 2018-01-15 | End: 2018-02-14

## 2018-01-15 ASSESSMENT — ENCOUNTER SYMPTOMS
NAUSEA: 0
SHORTNESS OF BREATH: 0
CONSTIPATION: 1
DIARRHEA: 0
SORE THROAT: 1
VOMITING: 0
ABDOMINAL PAIN: 1
BACK PAIN: 0
COUGH: 0

## 2018-01-15 NOTE — PROGRESS NOTES
[facility]  [address] [facility]  [address] [facility] [address]  Dept: [phone]  Dept Fax: [phone]  Loc: [phone]    Ivett Lugo is a 53 y.o. female who presents today for her medical conditions/complaints as noted below. Ivett Lugo is c/o of   Chief Complaint   Patient presents with    Medication Refill    Hypertension         HPI:     HPI  Ms. Stefan Guadarrama returns for follow-up of moyamoya disease, chronic pain syndrome, hypertension. She is not doing well. She has multiple complaints. She is complaining of persistent lower abdominal pain. It has been greater than 5 years since her last colonoscopy. She also suffers from chronic constipation. She has also developed a fairly large lump in her right breast at the 11:00 position. She has had a previous biopsy on her left breast.  Dr. Lynnette Lisa is her surgeon. She is also plagued with persistent nausea. I gave her some Reglan which provided some relief, however, she still is fairly miserable. She has never had a gastric emptying study. She continues to have persistent dysuria. I recommend evaluation with urology.     No results found for: LABA1C          ( goal A1C is < 7)   No results found for: LABMICR  LDL Calculated (MG/DL)   Date Value   06/17/2011 136       (goal LDL is <100)   AST (U/L)   Date Value   08/24/2017 16     ALT (U/L)   Date Value   08/24/2017 15     BUN (mg/dL)   Date Value   08/24/2017 11     BP Readings from Last 3 Encounters:   01/15/18 (!) 132/102   12/04/17 110/60   09/21/17 122/82          (goal 120/80)    Past Medical History:   Diagnosis Date    Anemia     Anticoagulation therapy not indicated     Chronic back pain     [facility]     GERD (gastroesophageal reflux disease)     Hyperlipidemia     Hypertension     Ischemic stroke ([facility] 8/22/2017    Kidney failure     Memory loss 8/22/2017    Moyamoya disease     Seizures [facility])       Past Surgical History:   Procedure Laterality Date    APPENDECTOMY      BRAIN SURGERY      has had 2     BREAST SURGERY       SECTION      DILATION AND CURETTAGE OF UTERUS      ENDOMETRIAL ABLATION      HERNIA REPAIR      HYSTERECTOMY      SMALL INTESTINE SURGERY      TUBAL LIGATION         Family History   Problem Relation Age of Onset    High Blood Pressure Mother     Cancer Maternal Grandfather        Social History   Substance Use Topics    Smoking status: Former Smoker     Packs/day: 0.50     Years: 10.00     Types: Cigarettes     Quit date: 3/2/2016    Smokeless tobacco: Never Used    Alcohol use No      Current Outpatient Prescriptions   Medication Sig Dispense Refill    ALPRAZolam (XANAX) 1 MG tablet Take 1 tablet by mouth 3 times daily as needed (anxiety/sleep) for up to 30 days. 90 tablet 1    acetaminophen-codeine (TYLENOL #3) 300-30 MG per tablet Take 1 tablet by mouth every 4 hours as needed for Pain for up to 10 days.  25 tablet 0    amLODIPine (NORVASC) 10 MG tablet TAKE ONE TABLET EVERY DAY 30 tablet 5    metoclopramide (REGLAN) 5 MG tablet TAKE ONE TABLET FOUR TIMES DAILY 120 tablet 3    metoprolol succinate (TOPROL XL) 100 MG extended release tablet Take 1 tablet by mouth nightly 30 tablet 5    promethazine (PHENERGAN) 25 MG tablet Take 1 tablet by mouth every 6 hours as needed for Nausea 90 tablet 3    levETIRAcetam (KEPPRA) 500 MG tablet Take 1 tablet by mouth 2 times daily 60 tablet 3    amitriptyline (ELAVIL) 50 MG tablet Take 1 tablet by mouth nightly 30 tablet 5    aspirin-acetaminophen-caffeine (EXCEDRIN MIGRAINE) 250-250-65 MG per tablet Take 1 tablet by mouth 2 times daily as needed for Headaches      megestrol (MEGACE) 40 MG tablet Take 1 tablet by mouth daily 30 tablet 3    cloNIDine (CATAPRES) 0.1 MG tablet Take 1 tablet by mouth every 8 hours as needed for High Blood Pressure 60 tablet 11    fluticasone (FLONASE) 50 MCG/ACT nasal spray 1 spray by Nasal route daily 1 Bottle 5    pantoprazole (PROTONIX) 40 MG tablet Take 40 mg by mouth daily        No current facility-administered medications for this visit. Allergies   Allergen Reactions    Succinylcholine Chloride Other (See Comments)     Hard to wake up    Stadol [Butorphanol] Hives       Health Maintenance   Topic Date Due    HIV screen  01/08/1986    Lipid screen  06/17/2016    DTaP/Tdap/Td vaccine (1 - Tdap) 03/01/2018 (Originally 1/8/1990)    Flu vaccine (1) 03/02/2018 (Originally 9/1/2017)       Subjective:      Review of Systems   Constitutional: Negative for activity change and fatigue. HENT: Positive for sore throat. Respiratory: Negative for cough and shortness of breath. Cardiovascular: Negative for chest pain and leg swelling. Gastrointestinal: Positive for abdominal pain and constipation. Negative for diarrhea, nausea and vomiting. Genitourinary: Positive for difficulty urinating, dysuria and frequency. Musculoskeletal: Negative for arthralgias and back pain. Neurological: Negative for dizziness and headaches. Psychiatric/Behavioral: Positive for dysphoric mood and sleep disturbance. The patient is nervous/anxious. Objective:     Physical Exam   Constitutional: She is oriented to person, place, and time. She appears well-developed and well-nourished. HENT:   Head: Normocephalic and atraumatic. Mouth/Throat: Oropharynx is clear and moist.   Eyes: Conjunctivae are normal. Pupils are equal, round, and reactive to light. Cardiovascular: Normal rate, regular rhythm and normal heart sounds. Pulmonary/Chest: Effort normal and breath sounds normal.   Abdominal: Soft. Musculoskeletal: Normal range of motion. Neurological: She is alert and oriented to person, place, and time. Skin: Skin is warm and dry. Vitals reviewed.     BP (!) 132/102 Comment: 2nd bp  Pulse 101   Temp 97.7 °F (36.5 °C) (Temporal)   Resp 18   Wt 125 lb (56.7 kg)   LMP materials - see patient instructions. Discussed use, benefit, and side effects of prescribed medications. All patient questions answered. Pt voiced understanding. Reviewed health maintenance. Instructed to continue current medications, diet and exercise. Patient agreed with treatment plan. Follow up as directed.      Electronically signed by Sherie Coffey DO on 1/15/2018 at 5:09 PM

## 2018-01-18 RX ORDER — ACETAMINOPHEN AND CODEINE PHOSPHATE 300; 30 MG/1; MG/1
1 TABLET ORAL EVERY 4 HOURS PRN
Qty: 25 TABLET | Refills: 0 | Status: SHIPPED | OUTPATIENT
Start: 2018-01-20 | End: 2018-01-29 | Stop reason: SDUPTHER

## 2018-01-18 NOTE — TELEPHONE ENCOUNTER
patient called left message with request for refill on tylenol #3. Last office visit 1-15 with next scheduled appointment 3-15  Dose verified.    Last UDS  1-15    Last fill per 1-10    Component Results     Component Collected Lab   Amphetamine Screen, Urine 01/15/2018  5:06 PM Unknown   NEG    Barbiturate Screen, Urine 01/15/2018  5:06 PM Unknown   NEG    Benzodiazepine Screen, Urine 01/15/2018  5:06 PM Unknown   POS    COCAINE METABOLITE SCREEN URINE 01/15/2018  5:06 PM Unknown   NEG    THC 01/15/2018  5:06 PM Unknown   NEG    MDMA URINE 01/15/2018  5:06 PM Unknown   NEG    Methadone Screen, Urine 01/15/2018  5:06 PM Unknown   NEG    Opiate Scrn, Ur 01/15/2018  5:06 PM Unknown   POS    Oxycodone Screen, Ur 01/15/2018  5:06 PM Unknown   NEG    PCP Scrn, Ur 01/15/2018  5:06 PM Unknown   NEG    Propoxyphene Screen, Urine 01/15/2018  5:06 PM Unknown   NEG    Tricyclic Antidepressants, Ur 01/15/2018  5:06 PM Unknown   NEG    Methamphetamine, Urine 01/15/2018  5:06 PM Unknown   NEG    Lab and Collection     POCT Rapid Drug Screen on 1/15/2018

## 2018-01-19 ENCOUNTER — OFFICE VISIT (OUTPATIENT)
Dept: GASTROENTEROLOGY | Facility: CLINIC | Age: 48
End: 2018-01-19

## 2018-01-19 VITALS
DIASTOLIC BLOOD PRESSURE: 96 MMHG | TEMPERATURE: 96.7 F | BODY MASS INDEX: 19.25 KG/M2 | WEIGHT: 127 LBS | HEIGHT: 68 IN | HEART RATE: 88 BPM | SYSTOLIC BLOOD PRESSURE: 138 MMHG

## 2018-01-19 DIAGNOSIS — I10 ESSENTIAL HYPERTENSION: ICD-10-CM

## 2018-01-19 DIAGNOSIS — R14.0 ABDOMINAL BLOATING: ICD-10-CM

## 2018-01-19 DIAGNOSIS — K59.00 CONSTIPATION, UNSPECIFIED CONSTIPATION TYPE: Primary | ICD-10-CM

## 2018-01-19 DIAGNOSIS — K21.9 GASTROESOPHAGEAL REFLUX DISEASE, ESOPHAGITIS PRESENCE NOT SPECIFIED: ICD-10-CM

## 2018-01-19 PROCEDURE — 99214 OFFICE O/P EST MOD 30 MIN: CPT | Performed by: NURSE PRACTITIONER

## 2018-01-19 RX ORDER — PANTOPRAZOLE SODIUM 40 MG/1
40 TABLET, DELAYED RELEASE ORAL 2 TIMES DAILY
Qty: 60 TABLET | Refills: 11 | Status: SHIPPED | OUTPATIENT
Start: 2018-01-19 | End: 2018-10-16 | Stop reason: HOSPADM

## 2018-01-19 RX ORDER — POLYETHYLENE GLYCOL 3350, SODIUM CHLORIDE, SODIUM BICARBONATE, POTASSIUM CHLORIDE 420; 11.2; 5.72; 1.48 G/4L; G/4L; G/4L; G/4L
4000 POWDER, FOR SOLUTION ORAL SEE ADMIN INSTRUCTIONS
Qty: 4000 ML | Refills: 0 | Status: SHIPPED | OUTPATIENT
Start: 2018-01-19 | End: 2018-07-09

## 2018-01-19 RX ORDER — ALPRAZOLAM 1 MG/1
TABLET ORAL
Qty: 90 TABLET | Refills: 1 | OUTPATIENT
Start: 2018-01-19

## 2018-01-19 RX ORDER — LEVETIRACETAM 500 MG/1
500 TABLET ORAL 2 TIMES DAILY
COMMUNITY
End: 2019-03-06 | Stop reason: SDUPTHER

## 2018-01-19 NOTE — PROGRESS NOTES
[facility] GASTROENTEROLOGY - OFFICE NOTE    1/19/2018    Kamryn Oliva   1970    Primary Physician: Lambert Faust DO    Chief Complaint   Patient presents with   • Constipation   • Nausea   • Abdominal Pain         HISTORY OF PRESENT ILLNESS    Kamryn Oliva is a 48 y.o. female presents  with main complaint of constipation x  2 weeks,  prior to that she had more of diarrhea.  He is moving her bowels every other day and is utilizing over-the-counter laxatives about every other day to promote bowel movements.  Gas-X does seem to help some with the bloating.  Has been on Bentyl for the last 4 months prescribed by her PCP, this does not seem to be helping with the bloating.  Takes pantoprazole daily for acid reflux symptoms with good control.  No rectal bleeding.  No unintentional weight loss.  Weight today is 127 pounds, of note weight August 2017 was 105.  No new medicines other than Keppra and I did look this medication of an constipation is not listed as adverse effect.  She does take Tylenol with codeine.  Does complain of a lot of abdominal bloating.  No abdominal pain.  She has chronic nausea with occasional vomiting.  Currently no fever.  Last colonoscopy was November 2016 noting an internal hemorrhoid.  She has no personal history of colorectal cancer or colon polyps.  There is no family history of colon cancer colon polyps.  He had CT scan of the abdomen and pelvis with contrast that was done 11/21/2017 showed mild constipation with mild colonic distention, discrete transition point or obstruction is not demonstrated, mild stranding within the peritoneal fat was noted on the previous exam of August 2017, this is felt to represent a more chronic finding in this patient, may be related to the lack of intra-abdominal fat, there is no free fluid or focal wall thickening, mild gallbladder distention noted which is unchanged from previous study.  She has had several CT scans of her abdomen pelvis  in the past.  Upper GI with small bowel follow-through 2017 normal.  Last EGD 2017 for chronic nausea which was normal, at that time Dr. Hanson anticipated no further need for GI evaluation as she had had multiple CT scans around the pelvis and several endoscopies and a colonoscopy with small bowel follow-through within the last several months.  Recommended if new sign or symptom does appear then to consider evaluation, otherwise was suggested to continue follow-up with primary medicine for further evaluation of non-GI causes of chronic nausea.  She has had several abdominal surgeries.      She is to see Dr. Evi Farley saying for a breast lump.  Her PCP is also scheduling her for a gastric emptying study.  She is to also see Dr. Maxwell for her bladder.  She does have history of intra-abdominal adhesions.  Had surgery 2016 exploratory laparotomy with lysis of adhesions and biopsy of the peritoneal mass by Dr. Evi Farley, path was benign.  She also had laparoscopic exploration with loss of adhesions and appendectomy 2016 by Dr. Salvador.         Past Medical History:   Diagnosis Date   • Acute kidney failure    • Constipation    • Depression    • H/O gastric ulcer    • Headache    • History of transfusion    • Hypertension    • IBS (irritable bowel syndrome)    • Insomnia    • Kidney stone    • Maravilla maravilla disease    • Rapid weight loss    • SBO (small bowel obstruction)    • Stroke     X 2   • UTI (urinary tract infection)     CHRONIC, SEPTIC X2   • Volvulosis        Past Surgical History:   Procedure Laterality Date   • APPENDECTOMY      COMPLICATION OF SEPTIC   • BRAIN SURGERY      x2   • BREAST LUMPECTOMY Left 3/9/2017    Procedure: EXCISION LEFT BREAST LESION AND LEFT AXILLARY LYMPH NODE BIOPSY;  Surgeon: Evi Farley MD;  Location: [facility];  Service:    • BREAST SURGERY     •  SECTION     • COLON SURGERY     • COLONOSCOPY  2007    normal   • COLONOSCOPY N/A  11/10/2016    Procedure: COLONOSCOPY WITH ANESTHESIA;  Surgeon: Camilo Hanson MD;  Location: [facility] ENDOSCOPY;  Service:    • ENDOSCOPY  09/2009    antral ulcer   • ENDOSCOPY N/A 10/10/2016    Procedure: ESOPHAGOGASTRODUODENOSCOPY WITH ANESTHESIA;  Surgeon: Camilo Hanson MD;  Location: [facility] ENDOSCOPY;  Service:    • ENDOSCOPY N/A 1/25/2017    Procedure: ESOPHAGOGASTRODUODENOSCOPY;  Surgeon: aCmilo Hanson MD;  Location: [facility] ENDOSCOPY;  Service:    • ENDOSCOPY N/A 8/8/2017    Procedure: ESOPHAGOGASTRODUODENOSCOPY WITH ANESTHESIA;  Surgeon: Camilo Hanson MD;  Location: [facility] ENDOSCOPY;  Service:    • HERNIA REPAIR     • HYSTERECTOMY     • SMALL INTESTINE SURGERY N/A 03/2016   • TONSILLECTOMY         Outpatient Prescriptions Marked as Taking for the 1/19/18 encounter (Office Visit) with YOUNG Oropeza   Medication Sig Dispense Refill   • ALPRAZolam (XANAX) 0.5 MG tablet Take 0.5 mg by mouth 3 (Three) Times a Day As Needed for Anxiety.     • amitriptyline (ELAVIL) 50 MG tablet Every Night.  2   • amLODIPine (NORVASC) 10 MG tablet Take 10 mg by mouth daily.     • aspirin 81 MG EC tablet Take 81 mg by mouth daily.     • aspirin-acetaminophen-caffeine (EXCEDRIN MIGRAINE) 250-250-65 MG per tablet Take 1 tablet by mouth every 6 (six) hours as needed for headaches.     • CloNIDine (CATAPRES) 0.1 MG tablet Take 1 tablet by mouth Every 8 (Eight) Hours As Needed for high blood pressure (SBP>160 mmHg). 10 tablet 0   • diclofenac (VOLTAREN) 75 MG EC tablet Take 1 tablet by mouth 2 (Two) Times a Day. 14 tablet 0   • dicyclomine (BENTYL) 10 MG capsule Take 1 capsule by mouth 4 (Four) Times a Day As Needed (prn abdominal cramps). 120 capsule 11   • fluticasone (FLONASE) 50 MCG/ACT nasal spray 2 sprays Daily.     • levETIRAcetam (KEPPRA) 250 MG tablet Take  by mouth 2 (Two) Times a Day.     • metoclopramide (REGLAN) 5 MG tablet Take 5 mg by mouth 4 (Four) Times a Day Before Meals & at Bedtime.     •  metoprolol succinate XL (TOPROL-XL) 100 MG 24 hr tablet Every Night.     • pantoprazole (PROTONIX) 40 MG EC tablet Take 1 tablet by mouth 2 (Two) Times a Day. 60 tablet 11   • promethazine (PHENERGAN) 25 MG tablet Take 1 tablet by mouth Every 6 (Six) Hours As Needed for Nausea or Vomiting. 6 tablet 0   • [DISCONTINUED] ondansetron (ZOFRAN) 4 MG tablet Every 6 (Six) Hours As Needed for Nausea.     • [DISCONTINUED] pantoprazole (PROTONIX) 40 MG EC tablet Take 1 tablet by mouth 2 (Two) Times a Day. 60 tablet 1   • [DISCONTINUED] zonisamide (ZONEGRAN) 100 MG capsule Take 200 mg by mouth Every Night.         Allergies   Allergen Reactions   • Anectine [Succinylcholine Chloride] Other (See Comments)     Hard to wake up   • Stadol [Butorphanol] Hives   • Butorphanol Tartrate Rash     Pt states she does not recall being allergic       Social History     Social History   • Marital status:      Spouse name: N/A   • Number of children: N/A   • Years of education: N/A     Occupational History   • Not on file.     Social History Main Topics   • Smoking status: Former Smoker     Packs/day: 1.00     Years: 15.00     Types: Cigarettes, Electronic Cigarette     Quit date: 3/10/2016   • Smokeless tobacco: Never Used   • Alcohol use Yes      Comment: occasional   • Drug use: No   • Sexual activity: Defer     Other Topics Concern   • Not on file     Social History Narrative       Family History   Problem Relation Age of Onset   • Cancer Maternal Grandfather    • No Known Problems Mother    • No Known Problems Father    • No Known Problems Sister    • No Known Problems Brother    • No Known Problems Brother    • No Known Problems Son    • Colon cancer Neg Hx    • Colon polyps Neg Hx        Review of Systems   Constitutional: Positive for fever (states temp 100 few days ago ). Negative for chills.   Respiratory: Positive for shortness of breath (chronic, x  1yr. intermittent). Negative for cough and wheezing.    Cardiovascular:  "Negative for chest pain and palpitations.   Gastrointestinal: Positive for constipation, nausea (chronic) and vomiting (occasional, no blood. ). Negative for abdominal distention, abdominal pain, anal bleeding, blood in stool, diarrhea and rectal pain.        C/o abdominal bloating.    Genitourinary: Positive for difficulty urinating (\" not emptying out \" ) and hematuria (states noted on urinalysis ).   Musculoskeletal: Positive for back pain (chronic ).        Chronic joint pain    Skin: Negative for color change and rash.   Neurological: Positive for dizziness (occasional ), light-headedness (occasional ) and headaches (chronic ). Negative for seizures and syncope.   Hematological: Positive for adenopathy (states enlarged lymph nodes right breast, to see dr tadeo banda ).        Vitals:    01/19/18 0925   BP: 138/96   BP Location: Left arm   Patient Position: Sitting   Cuff Size: Adult   Pulse: 88   Temp: 96.7 °F (35.9 °C)   Weight: 57.6 kg (127 lb)   Height: 171.5 cm (67.5\")      Body mass index is 19.6 kg/(m^2).    Physical Exam   Constitutional: She appears well-developed and well-nourished. No distress.   HENT:   Head: Normocephalic and atraumatic.   Eyes: EOM are normal. No scleral icterus.   Neck: Neck supple. No JVD present.   Cardiovascular: Normal rate, regular rhythm and normal heart sounds.    Pulmonary/Chest: Effort normal and breath sounds normal. No stridor.   Abdominal: Soft. Bowel sounds are normal. She exhibits no distension and no mass. There is no tenderness. There is no rebound and no guarding.   Musculoskeletal: She exhibits no edema.   Neurological: She is alert.   Skin: Skin is warm and dry. No rash noted.   Psychiatric: She has a normal mood and affect. Her behavior is normal.   Vitals reviewed.        CT scan abdomen and pelvis with contrast November 2017  IMPRESSION:  1. Mild constipation with mild colonic distention. A discrete transition  point or obstruction is not demonstrated.  2. " Mild stranding within the peritoneal fat was noted on the previous  exam of 08/05/2017 within the anterior pelvis. This is felt to represent  a more chronic finding in this patient. This may be related to the lack  of intra-abdominal fat. This is felt to be unlikely to represent an  acute inflammatory process. There is no free fluid or focal bowel wall  thickening present. The appendix is not well visualized.  2. Mild gallbladder distention. This is unchanged from previous study.  3. The patient's undergone previous bilateral breast augmentation. The  right implant is ruptured.  This report was finalized on 11/21/2017 18:22 by Dr. Jerry Land MD.        ASSESSMENT AND PLAN    Kamryn was seen today for constipation, nausea and abdominal pain.    Diagnoses and all orders for this visit:    Constipation, unspecified constipation type  -     Case Request; Standing  -     Case Request    Abdominal bloating    Gastroesophageal reflux disease, esophagitis presence not specified    Essential hypertension    Other orders  -     Implement Anesthesia Orders Day of Procedure; Standing  -     Obtain Informed Consent; Standing  -     polyethylene glycol-electrolytes (NULYTELY WITH FLAVOR PACKS) 420 g solution; Take 4,000 mL by mouth See Admin Instructions.  -     pantoprazole (PROTONIX) 40 MG EC tablet; Take 1 tablet by mouth 2 (Two) Times a Day.         She has chronic abdominal complaints however states that the constipation is new over the last 2 weeks.  CT scan of the abdomen and pelvis report reviewed from November 2017, see report.  She is concerned and would like to have a repeat colonoscopy.  I have recommended that she start taking MiraLAX on a daily basis to help promote good bowel movements on a daily basis.  Also recommend increase water intake.  She also has history of intra-abdominal adhesions so good possibility that she may have developed more adhesions that could be causing issues as well.  The patient was  advised to take any blood pressure or heart  medications the morning of  procedure if that is when he/she normally takes.  She would also like a refill on her Protonix.      Follow-up as needed.    COLONOSCOPY WITH ANESTHESIA (N/A)   All risks, benefits, alternatives, and indications of colonoscopy procedure have been discussed with the patient. Risks to include perforation of the colon requiring possible surgery or colostomy, risk of bleeding from biopsies or removal of colon tissue, possibility of missing a colon polyp or cancer, or adverse drug reaction.  Benefits to include the diagnosis and management of disease of the colon and rectum. Alternatives to include barium enema, radiographic evaluation, lab testing or no intervention. Pt verbalizes understanding and agrees.       Body mass index is 19.6 kg/(m^2).         YOUNG Medina    EMR Dragon/transcription disclaimer:  Much of this encounter note is electronic transcription/translation of spoken language to printed text.  The electronic translation of spoken language may be erroneous, or at times, nonsensical words or phrases may be inadvertently transcribed.  Although I have reviewed the note for such errors, some may still exist.

## 2018-01-23 ENCOUNTER — OFFICE VISIT (OUTPATIENT)
Dept: UROLOGY | Age: 48
End: 2018-01-23
Payer: MEDICARE

## 2018-01-23 VITALS
HEART RATE: 119 BPM | WEIGHT: 127 LBS | SYSTOLIC BLOOD PRESSURE: 131 MMHG | BODY MASS INDEX: 19.93 KG/M2 | DIASTOLIC BLOOD PRESSURE: 75 MMHG | TEMPERATURE: 96.9 F | HEIGHT: 67 IN

## 2018-01-23 DIAGNOSIS — Z87.442 HISTORY OF KIDNEY STONES: ICD-10-CM

## 2018-01-23 DIAGNOSIS — R39.14 FEELING OF INCOMPLETE BLADDER EMPTYING: Primary | ICD-10-CM

## 2018-01-23 DIAGNOSIS — R10.9 BILATERAL FLANK PAIN: ICD-10-CM

## 2018-01-23 DIAGNOSIS — R31.9 HEMATURIA OF UNDIAGNOSED CAUSE: ICD-10-CM

## 2018-01-23 DIAGNOSIS — R30.0 DYSURIA: ICD-10-CM

## 2018-01-23 LAB
APPEARANCE FLUID: CLEAR
BILIRUBIN, POC: NORMAL
BLOOD URINE, POC: NORMAL
CLARITY, POC: NORMAL
COLOR, POC: NORMAL
GLUCOSE URINE, POC: NORMAL
KETONES, POC: NORMAL
LEUKOCYTE EST, POC: NORMAL
NITRITE, POC: NORMAL
PH, POC: 5.5
PROTEIN, POC: NORMAL
SPECIFIC GRAVITY, POC: 1.01
UROBILINOGEN, POC: 0.2

## 2018-01-23 PROCEDURE — 51798 US URINE CAPACITY MEASURE: CPT | Performed by: NURSE PRACTITIONER

## 2018-01-23 PROCEDURE — 81003 URINALYSIS AUTO W/O SCOPE: CPT | Performed by: NURSE PRACTITIONER

## 2018-01-23 PROCEDURE — G8427 DOCREV CUR MEDS BY ELIG CLIN: HCPCS | Performed by: NURSE PRACTITIONER

## 2018-01-23 PROCEDURE — G8420 CALC BMI NORM PARAMETERS: HCPCS | Performed by: NURSE PRACTITIONER

## 2018-01-23 PROCEDURE — 1036F TOBACCO NON-USER: CPT | Performed by: NURSE PRACTITIONER

## 2018-01-23 PROCEDURE — 99203 OFFICE O/P NEW LOW 30 MIN: CPT | Performed by: NURSE PRACTITIONER

## 2018-01-23 PROCEDURE — G8484 FLU IMMUNIZE NO ADMIN: HCPCS | Performed by: NURSE PRACTITIONER

## 2018-01-23 RX ORDER — DICLOFENAC SODIUM 75 MG/1
75 TABLET, DELAYED RELEASE ORAL
COMMUNITY
Start: 2017-11-07 | End: 2018-07-17

## 2018-01-23 ASSESSMENT — ENCOUNTER SYMPTOMS
DOUBLE VISION: 0
HEARTBURN: 0
WHEEZING: 0
BLURRED VISION: 0
SORE THROAT: 0
NAUSEA: 0
SHORTNESS OF BREATH: 0

## 2018-01-23 NOTE — PROGRESS NOTES
08/24/2017    RDW 12.1 08/24/2017     08/24/2017     06/17/2011    MPV 10.8 08/24/2017     BMP:    Lab Results   Component Value Date     08/24/2017     06/18/2011    K 4.0 08/24/2017    K 4.7 06/18/2011     08/24/2017     06/18/2011    CO2 21 08/24/2017    BUN 11 08/24/2017    LABALBU 3.6 08/24/2017    LABALBU 4.0 06/17/2011    CREATININE 1.1 08/28/2017    CREATININE 0.7 08/24/2017    CREATININE 0.4 06/18/2011    CALCIUM 9.2 08/24/2017    LABGLOM 53 08/28/2017    GLUCOSE 94 08/24/2017     U/A:    Lab Results   Component Value Date    NITRITE neg 01/23/2018    COLORU YELLOW 08/21/2017    PROTEINU neg 01/23/2018    PROTEINU Negative 08/21/2017    PHUR 5.5 01/23/2018    PHUR 7.0 08/21/2017    WBCUA 3-5 02/23/2017    RBCUA 0-1 02/23/2017    YEAST 1+ 02/23/2017    BACTERIA trace 02/23/2017    CLARITYU Clear 08/21/2017    SPECGRAV 1.015 01/23/2018    SPECGRAV 1.011 08/21/2017    LEUKOCYTESUR trace 01/23/2018    LEUKOCYTESUR Negative 08/21/2017    UROBILINOGEN 0.2 08/21/2017    BILIRUBINUR neg 01/23/2018    BLOODU trace--intact 01/23/2018    BLOODU Negative 08/21/2017    GLUCOSEU neg 01/23/2018    GLUCOSEU Negative 08/21/2017           Imaging: CT kidney without contrast 8/13/17  Bilateral faint nonobstructing renal calculi. .      1. Dysuria    - POCT Urinalysis no Micro    Orders Placed This Encounter   Procedures    Urine Culture     Order Specific Question:   Specify (ex-cath, midstream, cysto, etc)? Answer:   dysuria    XR ABDOMEN (KUB) (SINGLE AP VIEW)     Standing Status:   Future     Standing Expiration Date:   1/23/2019     Order Specific Question:   Reason for exam:     Answer:   History of kidney stones    POCT Urinalysis no Micro    OK MEASUREMENT,POST-VOID RESIDUAL VOLUME BY US,NON-IMAGING     Bladder scan 25ml       Plan:   Patient will get a KUB to see the progress of her bilateral nonobstructing renal calculi that was discovered in 08/13/17.      Diagnostic

## 2018-01-25 LAB — URINE CULTURE, ROUTINE: NORMAL

## 2018-01-29 ENCOUNTER — TRANSCRIBE ORDERS (OUTPATIENT)
Dept: ADMINISTRATIVE | Facility: HOSPITAL | Age: 48
End: 2018-01-29

## 2018-01-29 ENCOUNTER — HOSPITAL ENCOUNTER (OUTPATIENT)
Dept: GENERAL RADIOLOGY | Age: 48
Discharge: HOME | End: 2018-01-29
Payer: MEDICARE

## 2018-01-29 DIAGNOSIS — Z87.442 HISTORY OF KIDNEY STONES: ICD-10-CM

## 2018-01-29 DIAGNOSIS — N63.11 BREAST LUMP ON RIGHT SIDE AT 11 O'CLOCK POSITION: Primary | ICD-10-CM

## 2018-01-29 DIAGNOSIS — N63.10 BREAST MASS, RIGHT: ICD-10-CM

## 2018-01-29 DIAGNOSIS — R10.9 BILATERAL FLANK PAIN: ICD-10-CM

## 2018-01-29 PROCEDURE — 74018 RADEX ABDOMEN 1 VIEW: CPT

## 2018-01-29 NOTE — TELEPHONE ENCOUNTER
patient called left message with request for refill on tylenol #3. Last office visit 1-15 with next scheduled appointment 3-15  Dose verified.    Last UDS  1-15    Last fill per 1-20  Micha Estrada pending  Component Results     Component Collected Lab   Amphetamine Screen, Urine 01/15/2018  5:06 PM Unknown   NEG    Barbiturate Screen, Urine 01/15/2018  5:06 PM Unknown   NEG    Benzodiazepine Screen, Urine 01/15/2018  5:06 PM Unknown   POS    COCAINE METABOLITE SCREEN URINE 01/15/2018  5:06 PM Unknown   NEG    THC 01/15/2018  5:06 PM Unknown   NEG    MDMA URINE 01/15/2018  5:06 PM Unknown   NEG    Methadone Screen, Urine 01/15/2018  5:06 PM Unknown   NEG    Opiate Scrn, Ur 01/15/2018  5:06 PM Unknown   POS    Oxycodone Screen, Ur 01/15/2018  5:06 PM Unknown   NEG    PCP Scrn, Ur 01/15/2018  5:06 PM Unknown   NEG    Propoxyphene Screen, Urine 01/15/2018  5:06 PM Unknown   NEG    Tricyclic Antidepressants, Ur 01/15/2018  5:06 PM Unknown   NEG    Methamphetamine, Urine 01/15/2018  5:06 PM Unknown   NEG    Lab and Collection     POCT Rapid Drug Screen on 1/15/2018

## 2018-01-30 RX ORDER — ACETAMINOPHEN AND CODEINE PHOSPHATE 300; 30 MG/1; MG/1
1 TABLET ORAL EVERY 6 HOURS PRN
Qty: 25 TABLET | Refills: 0 | Status: SHIPPED | OUTPATIENT
Start: 2018-01-30 | End: 2018-02-08 | Stop reason: SDUPTHER

## 2018-01-31 ENCOUNTER — APPOINTMENT (OUTPATIENT)
Dept: ULTRASOUND IMAGING | Facility: HOSPITAL | Age: 48
End: 2018-01-31
Attending: SPECIALIST

## 2018-01-31 ENCOUNTER — APPOINTMENT (OUTPATIENT)
Dept: MAMMOGRAPHY | Facility: HOSPITAL | Age: 48
End: 2018-01-31
Attending: SPECIALIST

## 2018-02-02 ENCOUNTER — ANESTHESIA EVENT (OUTPATIENT)
Dept: GASTROENTEROLOGY | Facility: HOSPITAL | Age: 48
End: 2018-02-02

## 2018-02-02 ENCOUNTER — HOSPITAL ENCOUNTER (OUTPATIENT)
Facility: HOSPITAL | Age: 48
Setting detail: HOSPITAL OUTPATIENT SURGERY
Discharge: HOME | End: 2018-02-02
Attending: INTERNAL MEDICINE | Admitting: INTERNAL MEDICINE

## 2018-02-02 ENCOUNTER — ANESTHESIA (OUTPATIENT)
Dept: GASTROENTEROLOGY | Facility: HOSPITAL | Age: 48
End: 2018-02-02

## 2018-02-02 VITALS
TEMPERATURE: 97.7 F | WEIGHT: 131 LBS | RESPIRATION RATE: 18 BRPM | SYSTOLIC BLOOD PRESSURE: 131 MMHG | DIASTOLIC BLOOD PRESSURE: 89 MMHG | OXYGEN SATURATION: 100 % | HEIGHT: 67 IN | BODY MASS INDEX: 20.56 KG/M2 | HEART RATE: 77 BPM

## 2018-02-02 DIAGNOSIS — K59.00 CONSTIPATION, UNSPECIFIED CONSTIPATION TYPE: ICD-10-CM

## 2018-02-02 PROCEDURE — 45385 COLONOSCOPY W/LESION REMOVAL: CPT | Performed by: INTERNAL MEDICINE

## 2018-02-02 PROCEDURE — 88305 TISSUE EXAM BY PATHOLOGIST: CPT | Performed by: INTERNAL MEDICINE

## 2018-02-02 PROCEDURE — 25010000002 PROPOFOL 10 MG/ML EMULSION: Performed by: NURSE ANESTHETIST, CERTIFIED REGISTERED

## 2018-02-02 RX ORDER — METRONIDAZOLE 250 MG/1
250 TABLET ORAL 3 TIMES DAILY
Qty: 21 TABLET | Refills: 0 | Status: SHIPPED | OUTPATIENT
Start: 2018-02-02 | End: 2018-02-09

## 2018-02-02 RX ORDER — SODIUM CHLORIDE 9 MG/ML
500 INJECTION, SOLUTION INTRAVENOUS CONTINUOUS PRN
Status: DISCONTINUED | OUTPATIENT
Start: 2018-02-02 | End: 2018-02-02 | Stop reason: HOSPADM

## 2018-02-02 RX ORDER — PROPOFOL 10 MG/ML
VIAL (ML) INTRAVENOUS AS NEEDED
Status: DISCONTINUED | OUTPATIENT
Start: 2018-02-02 | End: 2018-02-02 | Stop reason: SURG

## 2018-02-02 RX ORDER — ONDANSETRON 2 MG/ML
4 INJECTION INTRAMUSCULAR; INTRAVENOUS ONCE AS NEEDED
Status: DISCONTINUED | OUTPATIENT
Start: 2018-02-02 | End: 2018-02-02 | Stop reason: HOSPADM

## 2018-02-02 RX ORDER — ACETAMINOPHEN AND CODEINE PHOSPHATE 300; 30 MG/1; MG/1
1 TABLET ORAL EVERY 4 HOURS PRN
Status: ON HOLD | COMMUNITY
End: 2018-11-07

## 2018-02-02 RX ORDER — MEGESTROL ACETATE 40 MG/1
40 TABLET ORAL DAILY
COMMUNITY
End: 2018-07-09

## 2018-02-02 RX ORDER — LIDOCAINE HYDROCHLORIDE 20 MG/ML
INJECTION, SOLUTION INFILTRATION; PERINEURAL AS NEEDED
Status: DISCONTINUED | OUTPATIENT
Start: 2018-02-02 | End: 2018-02-02 | Stop reason: SURG

## 2018-02-02 RX ORDER — SODIUM CHLORIDE 0.9 % (FLUSH) 0.9 %
3 SYRINGE (ML) INJECTION AS NEEDED
Status: DISCONTINUED | OUTPATIENT
Start: 2018-02-02 | End: 2018-02-02 | Stop reason: HOSPADM

## 2018-02-02 RX ADMIN — PROPOFOL 50 MG: 10 INJECTION, EMULSION INTRAVENOUS at 12:27

## 2018-02-02 RX ADMIN — PROPOFOL 50 MG: 10 INJECTION, EMULSION INTRAVENOUS at 12:30

## 2018-02-02 RX ADMIN — PROPOFOL 100 MG: 10 INJECTION, EMULSION INTRAVENOUS at 12:21

## 2018-02-02 RX ADMIN — LIDOCAINE HYDROCHLORIDE 20 MG: 20 INJECTION, SOLUTION INFILTRATION; PERINEURAL at 12:21

## 2018-02-02 RX ADMIN — LIDOCAINE HYDROCHLORIDE 0.5 ML: 10 INJECTION, SOLUTION EPIDURAL; INFILTRATION; INTRACAUDAL; PERINEURAL at 10:28

## 2018-02-02 RX ADMIN — PROPOFOL 50 MG: 10 INJECTION, EMULSION INTRAVENOUS at 12:36

## 2018-02-02 RX ADMIN — SODIUM CHLORIDE 500 ML: 9 INJECTION, SOLUTION INTRAVENOUS at 10:27

## 2018-02-02 RX ADMIN — PROPOFOL 50 MG: 10 INJECTION, EMULSION INTRAVENOUS at 12:24

## 2018-02-02 RX ADMIN — PROPOFOL 50 MG: 10 INJECTION, EMULSION INTRAVENOUS at 12:33

## 2018-02-02 RX ADMIN — PROPOFOL 50 MG: 10 INJECTION, EMULSION INTRAVENOUS at 12:40

## 2018-02-02 RX ADMIN — PROPOFOL 50 MG: 10 INJECTION, EMULSION INTRAVENOUS at 12:23

## 2018-02-02 RX ADMIN — SODIUM CHLORIDE: 9 INJECTION, SOLUTION INTRAVENOUS at 12:40

## 2018-02-02 NOTE — INTERVAL H&P NOTE
H&P updated. The patient was examined and the following changes are noted:  She complains of abdominal bloating, blurry vision, itchy eyes, 25 pound weight gain, constipation.  Dulcolax helps more than MiraLAX.  She notes she is passing mucus with her stool.

## 2018-02-02 NOTE — H&P (VIEW-ONLY)
[facility] GASTROENTEROLOGY - OFFICE NOTE    1/19/2018    Kamryn Oliva   1970    Primary Physician: Lambert Faust DO    Chief Complaint   Patient presents with   • Constipation   • Nausea   • Abdominal Pain         HISTORY OF PRESENT ILLNESS    Kamryn Oliva is a 48 y.o. female presents  with main complaint of constipation x  2 weeks,  prior to that she had more of diarrhea.  He is moving her bowels every other day and is utilizing over-the-counter laxatives about every other day to promote bowel movements.  Gas-X does seem to help some with the bloating.  Has been on Bentyl for the last 4 months prescribed by her PCP, this does not seem to be helping with the bloating.  Takes pantoprazole daily for acid reflux symptoms with good control.  No rectal bleeding.  No unintentional weight loss.  Weight today is 127 pounds, of note weight August 2017 was 105.  No new medicines other than Keppra and I did look this medication of an constipation is not listed as adverse effect.  She does take Tylenol with codeine.  Does complain of a lot of abdominal bloating.  No abdominal pain.  She has chronic nausea with occasional vomiting.  Currently no fever.  Last colonoscopy was November 2016 noting an internal hemorrhoid.  She has no personal history of colorectal cancer or colon polyps.  There is no family history of colon cancer colon polyps.  He had CT scan of the abdomen and pelvis with contrast that was done 11/21/2017 showed mild constipation with mild colonic distention, discrete transition point or obstruction is not demonstrated, mild stranding within the peritoneal fat was noted on the previous exam of August 2017, this is felt to represent a more chronic finding in this patient, may be related to the lack of intra-abdominal fat, there is no free fluid or focal wall thickening, mild gallbladder distention noted which is unchanged from previous study.  She has had several CT scans of her abdomen pelvis  in the past.  Upper GI with small bowel follow-through 2017 normal.  Last EGD 2017 for chronic nausea which was normal, at that time Dr. Hanson anticipated no further need for GI evaluation as she had had multiple CT scans around the pelvis and several endoscopies and a colonoscopy with small bowel follow-through within the last several months.  Recommended if new sign or symptom does appear then to consider evaluation, otherwise was suggested to continue follow-up with primary medicine for further evaluation of non-GI causes of chronic nausea.  She has had several abdominal surgeries.      She is to see Dr. Evi Farley saying for a breast lump.  Her PCP is also scheduling her for a gastric emptying study.  She is to also see Dr. Maxwell for her bladder.  She does have history of intra-abdominal adhesions.  Had surgery 2016 exploratory laparotomy with lysis of adhesions and biopsy of the peritoneal mass by Dr. Evi Farley, path was benign.  She also had laparoscopic exploration with loss of adhesions and appendectomy 2016 by Dr. Salvador.         Past Medical History:   Diagnosis Date   • Acute kidney failure    • Constipation    • Depression    • H/O gastric ulcer    • Headache    • History of transfusion    • Hypertension    • IBS (irritable bowel syndrome)    • Insomnia    • Kidney stone    • Maravilla maravilla disease    • Rapid weight loss    • SBO (small bowel obstruction)    • Stroke     X 2   • UTI (urinary tract infection)     CHRONIC, SEPTIC X2   • Volvulosis        Past Surgical History:   Procedure Laterality Date   • APPENDECTOMY      COMPLICATION OF SEPTIC   • BRAIN SURGERY      x2   • BREAST LUMPECTOMY Left 3/9/2017    Procedure: EXCISION LEFT BREAST LESION AND LEFT AXILLARY LYMPH NODE BIOPSY;  Surgeon: Evi Farley MD;  Location: [facility];  Service:    • BREAST SURGERY     •  SECTION     • COLON SURGERY     • COLONOSCOPY  2007    normal   • COLONOSCOPY N/A  11/10/2016    Procedure: COLONOSCOPY WITH ANESTHESIA;  Surgeon: Camilo Hanson MD;  Location: [facility] ENDOSCOPY;  Service:    • ENDOSCOPY  09/2009    antral ulcer   • ENDOSCOPY N/A 10/10/2016    Procedure: ESOPHAGOGASTRODUODENOSCOPY WITH ANESTHESIA;  Surgeon: Camilo Hanson MD;  Location: [facility] ENDOSCOPY;  Service:    • ENDOSCOPY N/A 1/25/2017    Procedure: ESOPHAGOGASTRODUODENOSCOPY;  Surgeon: Camilo Hanson MD;  Location: [facility] ENDOSCOPY;  Service:    • ENDOSCOPY N/A 8/8/2017    Procedure: ESOPHAGOGASTRODUODENOSCOPY WITH ANESTHESIA;  Surgeon: Camilo Hanson MD;  Location: [facility] ENDOSCOPY;  Service:    • HERNIA REPAIR     • HYSTERECTOMY     • SMALL INTESTINE SURGERY N/A 03/2016   • TONSILLECTOMY         Outpatient Prescriptions Marked as Taking for the 1/19/18 encounter (Office Visit) with YOUNG Oropeza   Medication Sig Dispense Refill   • ALPRAZolam (XANAX) 0.5 MG tablet Take 0.5 mg by mouth 3 (Three) Times a Day As Needed for Anxiety.     • amitriptyline (ELAVIL) 50 MG tablet Every Night.  2   • amLODIPine (NORVASC) 10 MG tablet Take 10 mg by mouth daily.     • aspirin 81 MG EC tablet Take 81 mg by mouth daily.     • aspirin-acetaminophen-caffeine (EXCEDRIN MIGRAINE) 250-250-65 MG per tablet Take 1 tablet by mouth every 6 (six) hours as needed for headaches.     • CloNIDine (CATAPRES) 0.1 MG tablet Take 1 tablet by mouth Every 8 (Eight) Hours As Needed for high blood pressure (SBP>160 mmHg). 10 tablet 0   • diclofenac (VOLTAREN) 75 MG EC tablet Take 1 tablet by mouth 2 (Two) Times a Day. 14 tablet 0   • dicyclomine (BENTYL) 10 MG capsule Take 1 capsule by mouth 4 (Four) Times a Day As Needed (prn abdominal cramps). 120 capsule 11   • fluticasone (FLONASE) 50 MCG/ACT nasal spray 2 sprays Daily.     • levETIRAcetam (KEPPRA) 250 MG tablet Take  by mouth 2 (Two) Times a Day.     • metoclopramide (REGLAN) 5 MG tablet Take 5 mg by mouth 4 (Four) Times a Day Before Meals & at Bedtime.     •  metoprolol succinate XL (TOPROL-XL) 100 MG 24 hr tablet Every Night.     • pantoprazole (PROTONIX) 40 MG EC tablet Take 1 tablet by mouth 2 (Two) Times a Day. 60 tablet 11   • promethazine (PHENERGAN) 25 MG tablet Take 1 tablet by mouth Every 6 (Six) Hours As Needed for Nausea or Vomiting. 6 tablet 0   • [DISCONTINUED] ondansetron (ZOFRAN) 4 MG tablet Every 6 (Six) Hours As Needed for Nausea.     • [DISCONTINUED] pantoprazole (PROTONIX) 40 MG EC tablet Take 1 tablet by mouth 2 (Two) Times a Day. 60 tablet 1   • [DISCONTINUED] zonisamide (ZONEGRAN) 100 MG capsule Take 200 mg by mouth Every Night.         Allergies   Allergen Reactions   • Anectine [Succinylcholine Chloride] Other (See Comments)     Hard to wake up   • Stadol [Butorphanol] Hives   • Butorphanol Tartrate Rash     Pt states she does not recall being allergic       Social History     Social History   • Marital status:      Spouse name: N/A   • Number of children: N/A   • Years of education: N/A     Occupational History   • Not on file.     Social History Main Topics   • Smoking status: Former Smoker     Packs/day: 1.00     Years: 15.00     Types: Cigarettes, Electronic Cigarette     Quit date: 3/10/2016   • Smokeless tobacco: Never Used   • Alcohol use Yes      Comment: occasional   • Drug use: No   • Sexual activity: Defer     Other Topics Concern   • Not on file     Social History Narrative       Family History   Problem Relation Age of Onset   • Cancer Maternal Grandfather    • No Known Problems Mother    • No Known Problems Father    • No Known Problems Sister    • No Known Problems Brother    • No Known Problems Brother    • No Known Problems Son    • Colon cancer Neg Hx    • Colon polyps Neg Hx        Review of Systems   Constitutional: Positive for fever (states temp 100 few days ago ). Negative for chills.   Respiratory: Positive for shortness of breath (chronic, x  1yr. intermittent). Negative for cough and wheezing.    Cardiovascular:  "Negative for chest pain and palpitations.   Gastrointestinal: Positive for constipation, nausea (chronic) and vomiting (occasional, no blood. ). Negative for abdominal distention, abdominal pain, anal bleeding, blood in stool, diarrhea and rectal pain.        C/o abdominal bloating.    Genitourinary: Positive for difficulty urinating (\" not emptying out \" ) and hematuria (states noted on urinalysis ).   Musculoskeletal: Positive for back pain (chronic ).        Chronic joint pain    Skin: Negative for color change and rash.   Neurological: Positive for dizziness (occasional ), light-headedness (occasional ) and headaches (chronic ). Negative for seizures and syncope.   Hematological: Positive for adenopathy (states enlarged lymph nodes right breast, to see dr tadeo banda ).        Vitals:    01/19/18 0925   BP: 138/96   BP Location: Left arm   Patient Position: Sitting   Cuff Size: Adult   Pulse: 88   Temp: 96.7 °F (35.9 °C)   Weight: 57.6 kg (127 lb)   Height: 171.5 cm (67.5\")      Body mass index is 19.6 kg/(m^2).    Physical Exam   Constitutional: She appears well-developed and well-nourished. No distress.   HENT:   Head: Normocephalic and atraumatic.   Eyes: EOM are normal. No scleral icterus.   Neck: Neck supple. No JVD present.   Cardiovascular: Normal rate, regular rhythm and normal heart sounds.    Pulmonary/Chest: Effort normal and breath sounds normal. No stridor.   Abdominal: Soft. Bowel sounds are normal. She exhibits no distension and no mass. There is no tenderness. There is no rebound and no guarding.   Musculoskeletal: She exhibits no edema.   Neurological: She is alert.   Skin: Skin is warm and dry. No rash noted.   Psychiatric: She has a normal mood and affect. Her behavior is normal.   Vitals reviewed.        CT scan abdomen and pelvis with contrast November 2017  IMPRESSION:  1. Mild constipation with mild colonic distention. A discrete transition  point or obstruction is not demonstrated.  2. " Mild stranding within the peritoneal fat was noted on the previous  exam of 08/05/2017 within the anterior pelvis. This is felt to represent  a more chronic finding in this patient. This may be related to the lack  of intra-abdominal fat. This is felt to be unlikely to represent an  acute inflammatory process. There is no free fluid or focal bowel wall  thickening present. The appendix is not well visualized.  2. Mild gallbladder distention. This is unchanged from previous study.  3. The patient's undergone previous bilateral breast augmentation. The  right implant is ruptured.  This report was finalized on 11/21/2017 18:22 by Dr. Jerry Land MD.        ASSESSMENT AND PLAN    Kamryn was seen today for constipation, nausea and abdominal pain.    Diagnoses and all orders for this visit:    Constipation, unspecified constipation type  -     Case Request; Standing  -     Case Request    Abdominal bloating    Gastroesophageal reflux disease, esophagitis presence not specified    Essential hypertension    Other orders  -     Implement Anesthesia Orders Day of Procedure; Standing  -     Obtain Informed Consent; Standing  -     polyethylene glycol-electrolytes (NULYTELY WITH FLAVOR PACKS) 420 g solution; Take 4,000 mL by mouth See Admin Instructions.  -     pantoprazole (PROTONIX) 40 MG EC tablet; Take 1 tablet by mouth 2 (Two) Times a Day.         She has chronic abdominal complaints however states that the constipation is new over the last 2 weeks.  CT scan of the abdomen and pelvis report reviewed from November 2017, see report.  She is concerned and would like to have a repeat colonoscopy.  I have recommended that she start taking MiraLAX on a daily basis to help promote good bowel movements on a daily basis.  Also recommend increase water intake.  She also has history of intra-abdominal adhesions so good possibility that she may have developed more adhesions that could be causing issues as well.  The patient was  advised to take any blood pressure or heart  medications the morning of  procedure if that is when he/she normally takes.  She would also like a refill on her Protonix.      Follow-up as needed.    COLONOSCOPY WITH ANESTHESIA (N/A)   All risks, benefits, alternatives, and indications of colonoscopy procedure have been discussed with the patient. Risks to include perforation of the colon requiring possible surgery or colostomy, risk of bleeding from biopsies or removal of colon tissue, possibility of missing a colon polyp or cancer, or adverse drug reaction.  Benefits to include the diagnosis and management of disease of the colon and rectum. Alternatives to include barium enema, radiographic evaluation, lab testing or no intervention. Pt verbalizes understanding and agrees.       Body mass index is 19.6 kg/(m^2).         YOUNG Medina    EMR Dragon/transcription disclaimer:  Much of this encounter note is electronic transcription/translation of spoken language to printed text.  The electronic translation of spoken language may be erroneous, or at times, nonsensical words or phrases may be inadvertently transcribed.  Although I have reviewed the note for such errors, some may still exist.

## 2018-02-02 NOTE — PLAN OF CARE
Problem: Patient Care Overview (Adult)  Goal: Plan of Care Review  Outcome: Outcome(s) achieved Date Met: 02/02/18 02/02/18 1251   Coping/Psychosocial Response Interventions   Plan Of Care Reviewed With patient;spouse   Patient Care Overview   Progress improving   Outcome Evaluation   Outcome Summary/Follow up Plan discharge criteria met

## 2018-02-02 NOTE — PLAN OF CARE
Problem: Patient Care Overview (Adult)  Goal: Plan of Care Review  Outcome: Ongoing (interventions implemented as appropriate)   02/02/18 1232   Coping/Psychosocial Response Interventions   Plan Of Care Reviewed With patient   Patient Care Overview   Progress progress toward functional goals as expected   Outcome Evaluation   Outcome Summary/Follow up Plan tolerating procedure well

## 2018-02-02 NOTE — PLAN OF CARE
Problem: GI Endoscopy (Adult)  Goal: Signs and Symptoms of Listed Potential Problems Will be Absent or Manageable (GI Endoscopy)  Outcome: Ongoing (interventions implemented as appropriate)   02/02/18 1232   GI Endoscopy   Problems Assessed (GI Endoscopy) all   Problems Present (GI Endoscopy) none

## 2018-02-02 NOTE — ANESTHESIA POSTPROCEDURE EVALUATION
Patient: Kamryn Oliva    Procedure Summary     Date Anesthesia Start Anesthesia Stop Room / Location    02/02/18 1204 1245  [facility]       Procedure Diagnosis Surgeon Provider    COLONOSCOPY WITH ANESTHESIA (N/A ) Constipation, unspecified constipation type  (Constipation, unspecified constipation type [K59.00]) MD Jeni Donahue, LAXMI          Anesthesia Type: general  Last vitals  BP   92/56 (02/02/18 1250)   Temp   97.7 °F (36.5 °C) (02/02/18 1005)   Pulse   70 (02/02/18 1250)   Resp   19 (02/02/18 1250)     SpO2   98 % (02/02/18 1250)     Post Anesthesia Care and Evaluation    Patient location during evaluation: PHASE II  Patient participation: complete - patient participated  Level of consciousness: awake and alert  Pain score: 0  Pain management: adequate  Airway patency: patent  Anesthetic complications: No anesthetic complications  PONV Status: none  Cardiovascular status: acceptable, hemodynamically stable and stable  Respiratory status: acceptable and room air  Hydration status: acceptable

## 2018-02-02 NOTE — ANESTHESIA POSTPROCEDURE EVALUATION
Patient: Kamryn Oliva    Procedure Summary     Date Anesthesia Start Anesthesia Stop Room / Location    02/02/18 1204 1245  [facility]       Procedure Diagnosis Surgeon Provider    COLONOSCOPY WITH ANESTHESIA (N/A ) Constipation, unspecified constipation type  (Constipation, unspecified constipation type [K59.00]) MD Jeni Donahue, LAXMI          Anesthesia Type: general  Last vitals  BP   131/89 (02/02/18 1350)   Temp   97.7 °F (36.5 °C) (02/02/18 1005)   Pulse   77 (02/02/18 1350)   Resp   18 (02/02/18 1350)     SpO2   100 % (02/02/18 1350)     Post Anesthesia Care and Evaluation    Patient location during evaluation: PHASE II  Patient participation: complete - patient participated  Level of consciousness: awake and alert  Pain score: 0  Pain management: adequate  Airway patency: patent  Anesthetic complications: No anesthetic complications  PONV Status: none  Cardiovascular status: acceptable, hemodynamically stable and stable  Respiratory status: acceptable and room air  Hydration status: acceptable

## 2018-02-02 NOTE — ANESTHESIA PREPROCEDURE EVALUATION
Anesthesia Evaluation     Patient summary reviewed   history of anesthetic complications (pseudocholinesterase deficiency):  NPO Solid Status: > 8 hours       Airway   Mallampati: II  TM distance: >3 FB  Neck ROM: full  Dental      Pulmonary    (-) asthma, sleep apnea, not a smoker  Cardiovascular   Exercise tolerance: good (4-7 METS)    Patient on routine beta blocker and Beta blocker given within 24 hours of surgery    (+) hypertension,   (-) pacemaker, past MI, angina, cardiac stents      Neuro/Psych  (+) seizures well controlled, CVA (moyamoya disease),     GI/Hepatic/Renal/Endo    (+)  GERD,   (-) liver disease, no renal disease, diabetes    Musculoskeletal     Abdominal    Substance History      OB/GYN          Other                                                  Anesthesia Plan    ASA 3     general   total IV anesthesia  intravenous induction   Anesthetic plan and risks discussed with patient.

## 2018-02-02 NOTE — PLAN OF CARE
Problem: GI Endoscopy (Adult)  Goal: Signs and Symptoms of Listed Potential Problems Will be Absent or Manageable (GI Endoscopy)  Outcome: Outcome(s) achieved Date Met: 02/02/18 02/02/18 1251   GI Endoscopy   Problems Assessed (GI Endoscopy) all   Problems Present (GI Endoscopy) none

## 2018-02-05 ENCOUNTER — PREP FOR SURGERY (OUTPATIENT)
Dept: OTHER | Facility: HOSPITAL | Age: 48
End: 2018-02-05

## 2018-02-05 ENCOUNTER — HOSPITAL ENCOUNTER (OUTPATIENT)
Dept: ULTRASOUND IMAGING | Facility: HOSPITAL | Age: 48
Discharge: HOME | End: 2018-02-05
Attending: SPECIALIST

## 2018-02-05 ENCOUNTER — HOSPITAL ENCOUNTER (OUTPATIENT)
Dept: MAMMOGRAPHY | Facility: HOSPITAL | Age: 48
Discharge: HOME | End: 2018-02-05
Attending: SPECIALIST | Admitting: SPECIALIST

## 2018-02-05 DIAGNOSIS — N63.11 BREAST LUMP ON RIGHT SIDE AT 11 O'CLOCK POSITION: ICD-10-CM

## 2018-02-05 DIAGNOSIS — K21.9 GASTROESOPHAGEAL REFLUX DISEASE, ESOPHAGITIS PRESENCE NOT SPECIFIED: Primary | ICD-10-CM

## 2018-02-05 PROCEDURE — 77066 DX MAMMO INCL CAD BI: CPT

## 2018-02-05 PROCEDURE — G0279 TOMOSYNTHESIS, MAMMO: HCPCS

## 2018-02-05 PROCEDURE — 76642 ULTRASOUND BREAST LIMITED: CPT

## 2018-02-07 PROBLEM — K21.9 GASTROESOPHAGEAL REFLUX DISEASE: Status: ACTIVE | Noted: 2018-02-07

## 2018-02-08 RX ORDER — ACETAMINOPHEN AND CODEINE PHOSPHATE 300; 30 MG/1; MG/1
1 TABLET ORAL EVERY 6 HOURS PRN
Qty: 25 TABLET | Refills: 0 | Status: SHIPPED | OUTPATIENT
Start: 2018-02-08 | End: 2018-02-19 | Stop reason: SDUPTHER

## 2018-02-08 RX ORDER — FLUTICASONE PROPIONATE 50 MCG
1 SPRAY, SUSPENSION (ML) NASAL DAILY
Qty: 1 BOTTLE | Refills: 5 | Status: SHIPPED | OUTPATIENT
Start: 2018-02-08

## 2018-02-12 RX ORDER — CLONIDINE HYDROCHLORIDE 0.1 MG/1
0.1 TABLET ORAL EVERY 4 HOURS PRN
Qty: 60 TABLET | Refills: 11 | OUTPATIENT
Start: 2018-02-12

## 2018-02-13 ENCOUNTER — HOSPITAL ENCOUNTER (OUTPATIENT)
Facility: HOSPITAL | Age: 48
Setting detail: HOSPITAL OUTPATIENT SURGERY
Discharge: HOME | End: 2018-02-13
Attending: INTERNAL MEDICINE | Admitting: INTERNAL MEDICINE

## 2018-02-13 ENCOUNTER — ANESTHESIA (OUTPATIENT)
Dept: GASTROENTEROLOGY | Facility: HOSPITAL | Age: 48
End: 2018-02-13

## 2018-02-13 ENCOUNTER — ANESTHESIA EVENT (OUTPATIENT)
Dept: GASTROENTEROLOGY | Facility: HOSPITAL | Age: 48
End: 2018-02-13

## 2018-02-13 VITALS
OXYGEN SATURATION: 99 % | TEMPERATURE: 97.1 F | RESPIRATION RATE: 11 BRPM | SYSTOLIC BLOOD PRESSURE: 102 MMHG | HEIGHT: 67 IN | WEIGHT: 123 LBS | HEART RATE: 78 BPM | BODY MASS INDEX: 19.3 KG/M2 | DIASTOLIC BLOOD PRESSURE: 71 MMHG

## 2018-02-13 DIAGNOSIS — K21.9 GASTROESOPHAGEAL REFLUX DISEASE, ESOPHAGITIS PRESENCE NOT SPECIFIED: ICD-10-CM

## 2018-02-13 PROCEDURE — 43239 EGD BIOPSY SINGLE/MULTIPLE: CPT | Performed by: INTERNAL MEDICINE

## 2018-02-13 PROCEDURE — 87081 CULTURE SCREEN ONLY: CPT | Performed by: INTERNAL MEDICINE

## 2018-02-13 PROCEDURE — 25010000002 PROPOFOL 10 MG/ML EMULSION: Performed by: NURSE ANESTHETIST, CERTIFIED REGISTERED

## 2018-02-13 PROCEDURE — 88305 TISSUE EXAM BY PATHOLOGIST: CPT | Performed by: INTERNAL MEDICINE

## 2018-02-13 RX ORDER — SODIUM CHLORIDE 9 MG/ML
500 INJECTION, SOLUTION INTRAVENOUS CONTINUOUS PRN
Status: DISCONTINUED | OUTPATIENT
Start: 2018-02-13 | End: 2018-02-13 | Stop reason: HOSPADM

## 2018-02-13 RX ORDER — PROPOFOL 10 MG/ML
VIAL (ML) INTRAVENOUS AS NEEDED
Status: DISCONTINUED | OUTPATIENT
Start: 2018-02-13 | End: 2018-02-13 | Stop reason: SURG

## 2018-02-13 RX ORDER — SODIUM CHLORIDE 0.9 % (FLUSH) 0.9 %
3 SYRINGE (ML) INJECTION AS NEEDED
Status: DISCONTINUED | OUTPATIENT
Start: 2018-02-13 | End: 2018-02-13 | Stop reason: HOSPADM

## 2018-02-13 RX ORDER — ONDANSETRON 2 MG/ML
4 INJECTION INTRAMUSCULAR; INTRAVENOUS ONCE AS NEEDED
Status: DISCONTINUED | OUTPATIENT
Start: 2018-02-13 | End: 2018-02-13 | Stop reason: HOSPADM

## 2018-02-13 RX ORDER — LIDOCAINE HYDROCHLORIDE 20 MG/ML
INJECTION, SOLUTION INFILTRATION; PERINEURAL AS NEEDED
Status: DISCONTINUED | OUTPATIENT
Start: 2018-02-13 | End: 2018-02-13 | Stop reason: SURG

## 2018-02-13 RX ADMIN — SODIUM CHLORIDE 500 ML: 9 INJECTION, SOLUTION INTRAVENOUS at 08:21

## 2018-02-13 RX ADMIN — LIDOCAINE HYDROCHLORIDE 100 MG: 20 INJECTION, SOLUTION INFILTRATION; PERINEURAL at 10:17

## 2018-02-13 RX ADMIN — PROPOFOL 200 MG: 10 INJECTION, EMULSION INTRAVENOUS at 10:17

## 2018-02-13 RX ADMIN — LIDOCAINE HYDROCHLORIDE 0.5 ML: 10 INJECTION, SOLUTION EPIDURAL; INFILTRATION; INTRACAUDAL; PERINEURAL at 08:21

## 2018-02-13 NOTE — PLAN OF CARE
Problem: GI Endoscopy (Adult)  Goal: Signs and Symptoms of Listed Potential Problems Will be Absent or Manageable (GI Endoscopy)  Outcome: Outcome(s) achieved Date Met: 02/13/18

## 2018-02-13 NOTE — INTERVAL H&P NOTE
H&P updated. The patient was examined and the following changes are noted:  The patient had an unremarkable colonoscopy on February 2.  We gave her a trial of Flagyl 250 mg 3 times a day for 7 days see if this would help her symptoms.  She tells me she still has abdominal discomfort bloating in the same symptoms.  She had no noticeable change with the Flagyl.  She also informs me she is not had a gastric emptying test yet.  She is here today for upper endoscopy and okay to proceed

## 2018-02-13 NOTE — PLAN OF CARE
Problem: Patient Care Overview (Adult)  Goal: Plan of Care Review  Outcome: Ongoing (interventions implemented as appropriate)   02/13/18 1029   Coping/Psychosocial Response Interventions   Plan Of Care Reviewed With patient   Patient Care Overview   Progress no change   Outcome Evaluation   Outcome Summary/Follow up Plan pt tlerated procedure well.

## 2018-02-13 NOTE — PLAN OF CARE
Problem: Patient Care Overview (Adult)  Goal: Adult Individualization and Mutuality   02/13/18 0758   Individualization   Patient Specific Preferences none

## 2018-02-13 NOTE — ANESTHESIA POSTPROCEDURE EVALUATION
"Patient: Kamryn Oliva    Procedure Summary     Date Anesthesia Start Anesthesia Stop Room / Location    02/13/18 1010 1031  [facility]       Procedure Diagnosis Surgeon Provider    ESOPHAGOGASTRODUODENOSCOPY  (N/A Esophagus) Gastroesophageal reflux disease, esophagitis presence not specified  (Gastroesophageal reflux disease, esophagitis presence not specified [K21.9]) MD Bo Donahue CRNA          Anesthesia Type: general  Last vitals  BP   119/82 (02/13/18 0801)   Temp   97.1 °F (36.2 °C) (02/13/18 0801)   Pulse   100 (02/13/18 0801)   Resp   20 (02/13/18 0801)     SpO2   97 % (02/13/18 0801)     Post Anesthesia Care and Evaluation    Patient location during evaluation: PHASE II  Patient participation: complete - patient participated  Level of consciousness: awake and alert  Pain management: adequate  Airway patency: patent  Anesthetic complications: No anesthetic complications    Cardiovascular status: acceptable  Respiratory status: acceptable  Hydration status: acceptable    Comments: Blood pressure 119/82, pulse 100, temperature 97.1 °F (36.2 °C), temperature source Temporal Artery , resp. rate 20, height 170.2 cm (67\"), weight 55.8 kg (123 lb), SpO2 97 %, not currently breastfeeding.    Pt discharged from PACU based on veronica score >8      "

## 2018-02-13 NOTE — PLAN OF CARE
Problem: Patient Care Overview (Adult)  Goal: Plan of Care Review  Outcome: Outcome(s) achieved Date Met: 02/13/18

## 2018-02-14 LAB
CYTO UR: NORMAL
LAB AP CASE REPORT: NORMAL
LAB AP CLINICAL INFORMATION: NORMAL
Lab: NORMAL
PATH REPORT.FINAL DX SPEC: NORMAL
PATH REPORT.GROSS SPEC: NORMAL
UREASE TISS QL: NEGATIVE

## 2018-02-16 ENCOUNTER — HOSPITAL ENCOUNTER (OUTPATIENT)
Dept: NUCLEAR MEDICINE | Age: 48
Discharge: HOME | End: 2018-02-18
Payer: MEDICARE

## 2018-02-16 DIAGNOSIS — R11.0 NAUSEA IN ADULT: ICD-10-CM

## 2018-02-16 PROCEDURE — 78264 GASTRIC EMPTYING IMG STUDY: CPT

## 2018-02-16 PROCEDURE — A9541 TC99M SULFUR COLLOID: HCPCS | Performed by: INTERNAL MEDICINE

## 2018-02-16 PROCEDURE — 3430000000 HC RX DIAGNOSTIC RADIOPHARMACEUTICAL: Performed by: INTERNAL MEDICINE

## 2018-02-16 RX ADMIN — Medication 2 MILLICURIE: at 15:31

## 2018-02-19 RX ORDER — ACETAMINOPHEN AND CODEINE PHOSPHATE 300; 30 MG/1; MG/1
1 TABLET ORAL EVERY 6 HOURS PRN
Qty: 25 TABLET | Refills: 0 | Status: SHIPPED | OUTPATIENT
Start: 2018-02-19 | End: 2018-02-28 | Stop reason: SDUPTHER

## 2018-02-22 ENCOUNTER — TRANSCRIBE ORDERS (OUTPATIENT)
Dept: ADMINISTRATIVE | Facility: HOSPITAL | Age: 48
End: 2018-02-22

## 2018-02-22 DIAGNOSIS — Z12.31 ENCOUNTER FOR SCREENING MAMMOGRAM FOR MALIGNANT NEOPLASM OF BREAST: Primary | ICD-10-CM

## 2018-02-28 RX ORDER — ACETAMINOPHEN AND CODEINE PHOSPHATE 300; 30 MG/1; MG/1
1 TABLET ORAL EVERY 6 HOURS PRN
Qty: 25 TABLET | Refills: 0 | Status: SHIPPED | OUTPATIENT
Start: 2018-02-28 | End: 2018-03-14 | Stop reason: SDUPTHER

## 2018-02-28 RX ORDER — CLONIDINE HYDROCHLORIDE 0.1 MG/1
0.1 TABLET ORAL EVERY 8 HOURS PRN
Qty: 60 TABLET | Refills: 11 | Status: SHIPPED | OUTPATIENT
Start: 2018-02-28 | End: 2018-03-26 | Stop reason: SDUPTHER

## 2018-03-05 ENCOUNTER — PROCEDURE VISIT (OUTPATIENT)
Dept: UROLOGY | Age: 48
End: 2018-03-05
Payer: MEDICARE

## 2018-03-05 VITALS — TEMPERATURE: 96.3 F | BODY MASS INDEX: 19.1 KG/M2 | HEIGHT: 68 IN | WEIGHT: 126 LBS

## 2018-03-05 DIAGNOSIS — R30.0 DYSURIA: ICD-10-CM

## 2018-03-05 DIAGNOSIS — R39.14 FEELING OF INCOMPLETE BLADDER EMPTYING: ICD-10-CM

## 2018-03-05 DIAGNOSIS — R31.9 HEMATURIA OF UNDIAGNOSED CAUSE: Primary | ICD-10-CM

## 2018-03-05 LAB
APPEARANCE FLUID: NORMAL
BILIRUBIN, POC: NORMAL
BLOOD URINE, POC: NORMAL
CLARITY, POC: CLEAR
COLOR, POC: YELLOW
GLUCOSE URINE, POC: NORMAL
KETONES, POC: NORMAL
LEUKOCYTE EST, POC: NORMAL
NITRITE, POC: NORMAL
PH, POC: 5.5
PROTEIN, POC: NORMAL
SPECIFIC GRAVITY, POC: 1.02
UROBILINOGEN, POC: 0.2

## 2018-03-05 PROCEDURE — 52000 CYSTOURETHROSCOPY: CPT | Performed by: UROLOGY

## 2018-03-05 PROCEDURE — 81003 URINALYSIS AUTO W/O SCOPE: CPT | Performed by: UROLOGY

## 2018-03-05 PROCEDURE — 99999 PR OFFICE/OUTPT VISIT,PROCEDURE ONLY: CPT | Performed by: UROLOGY

## 2018-03-16 RX ORDER — ACETAMINOPHEN AND CODEINE PHOSPHATE 300; 30 MG/1; MG/1
1 TABLET ORAL EVERY 6 HOURS PRN
Qty: 25 TABLET | Refills: 0 | Status: SHIPPED | OUTPATIENT
Start: 2018-03-17 | End: 2018-03-26 | Stop reason: SDUPTHER

## 2018-03-16 NOTE — TELEPHONE ENCOUNTER
MIRZA was reviewed today per office protocol. Report shows No discrepancies. Fill pattern is consistent from single provider(s) at single pharmacy(s). Prescription escribed.  Patient is aware to check within the pharmacy

## 2018-03-20 RX ORDER — AMITRIPTYLINE HYDROCHLORIDE 50 MG/1
50 TABLET, FILM COATED ORAL NIGHTLY
Qty: 30 TABLET | Refills: 5 | Status: SHIPPED | OUTPATIENT
Start: 2018-03-20 | End: 2018-07-17 | Stop reason: SDUPTHER

## 2018-03-20 RX ORDER — ALPRAZOLAM 0.5 MG/1
0.5 TABLET ORAL 3 TIMES DAILY PRN
Qty: 90 TABLET | Refills: 0 | Status: SHIPPED | OUTPATIENT
Start: 2018-03-20 | End: 2018-03-26 | Stop reason: SDUPTHER

## 2018-03-26 ENCOUNTER — OFFICE VISIT (OUTPATIENT)
Dept: PRIMARY CARE CLINIC | Age: 48
End: 2018-03-26
Payer: MEDICARE

## 2018-03-26 VITALS
HEART RATE: 127 BPM | BODY MASS INDEX: 19.46 KG/M2 | WEIGHT: 128 LBS | OXYGEN SATURATION: 96 % | TEMPERATURE: 98 F | DIASTOLIC BLOOD PRESSURE: 86 MMHG | SYSTOLIC BLOOD PRESSURE: 148 MMHG

## 2018-03-26 DIAGNOSIS — I10 ESSENTIAL HYPERTENSION: ICD-10-CM

## 2018-03-26 DIAGNOSIS — I67.5 MOYA MOYA DISEASE: ICD-10-CM

## 2018-03-26 DIAGNOSIS — R11.0 NAUSEA IN ADULT: Primary | ICD-10-CM

## 2018-03-26 DIAGNOSIS — N20.0 NEPHROLITHIASIS: ICD-10-CM

## 2018-03-26 PROCEDURE — G8420 CALC BMI NORM PARAMETERS: HCPCS | Performed by: INTERNAL MEDICINE

## 2018-03-26 PROCEDURE — G8484 FLU IMMUNIZE NO ADMIN: HCPCS | Performed by: INTERNAL MEDICINE

## 2018-03-26 PROCEDURE — 99213 OFFICE O/P EST LOW 20 MIN: CPT | Performed by: INTERNAL MEDICINE

## 2018-03-26 PROCEDURE — 1036F TOBACCO NON-USER: CPT | Performed by: INTERNAL MEDICINE

## 2018-03-26 PROCEDURE — G8427 DOCREV CUR MEDS BY ELIG CLIN: HCPCS | Performed by: INTERNAL MEDICINE

## 2018-03-26 RX ORDER — CLONIDINE HYDROCHLORIDE 0.1 MG/1
0.1 TABLET ORAL EVERY 4 HOURS
Qty: 180 TABLET | Refills: 11 | Status: SHIPPED | OUTPATIENT
Start: 2018-03-26 | End: 2019-01-25

## 2018-03-26 RX ORDER — ESCITALOPRAM OXALATE 10 MG/1
10 TABLET ORAL DAILY
Qty: 30 TABLET | Refills: 3 | Status: SHIPPED | OUTPATIENT
Start: 2018-03-26 | End: 2018-07-17

## 2018-03-26 RX ORDER — ALPRAZOLAM 1 MG/1
1 TABLET ORAL 3 TIMES DAILY PRN
Qty: 90 TABLET | Refills: 0 | Status: SHIPPED | OUTPATIENT
Start: 2018-03-26 | End: 2018-04-23 | Stop reason: SDUPTHER

## 2018-03-26 RX ORDER — ACETAMINOPHEN AND CODEINE PHOSPHATE 300; 30 MG/1; MG/1
1 TABLET ORAL EVERY 6 HOURS PRN
Qty: 45 TABLET | Refills: 0 | Status: SHIPPED | OUTPATIENT
Start: 2018-03-26 | End: 2018-04-09 | Stop reason: SDUPTHER

## 2018-03-26 ASSESSMENT — PATIENT HEALTH QUESTIONNAIRE - PHQ9
5. POOR APPETITE OR OVEREATING: 0
6. FEELING BAD ABOUT YOURSELF - OR THAT YOU ARE A FAILURE OR HAVE LET YOURSELF OR YOUR FAMILY DOWN: 0
SUM OF ALL RESPONSES TO PHQ9 QUESTIONS 1 & 2: 6
10. IF YOU CHECKED OFF ANY PROBLEMS, HOW DIFFICULT HAVE THESE PROBLEMS MADE IT FOR YOU TO DO YOUR WORK, TAKE CARE OF THINGS AT HOME, OR GET ALONG WITH OTHER PEOPLE: 0
3. TROUBLE FALLING OR STAYING ASLEEP: 3
4. FEELING TIRED OR HAVING LITTLE ENERGY: 3
2. FEELING DOWN, DEPRESSED OR HOPELESS: 3
9. THOUGHTS THAT YOU WOULD BE BETTER OFF DEAD, OR OF HURTING YOURSELF: 0
SUM OF ALL RESPONSES TO PHQ QUESTIONS 1-9: 12
1. LITTLE INTEREST OR PLEASURE IN DOING THINGS: 3
7. TROUBLE CONCENTRATING ON THINGS, SUCH AS READING THE NEWSPAPER OR WATCHING TELEVISION: 0
8. MOVING OR SPEAKING SO SLOWLY THAT OTHER PEOPLE COULD HAVE NOTICED. OR THE OPPOSITE, BEING SO FIGETY OR RESTLESS THAT YOU HAVE BEEN MOVING AROUND A LOT MORE THAN USUAL: 0

## 2018-04-08 ASSESSMENT — ENCOUNTER SYMPTOMS
NAUSEA: 0
VOMITING: 0
CONSTIPATION: 0
ABDOMINAL DISTENTION: 1
BACK PAIN: 0
COUGH: 0
SHORTNESS OF BREATH: 0
ABDOMINAL PAIN: 1
DIARRHEA: 0

## 2018-04-09 DIAGNOSIS — F50.00 ANOREXIA NERVOSA: ICD-10-CM

## 2018-04-09 DIAGNOSIS — R63.4 WEIGHT LOSS: Primary | ICD-10-CM

## 2018-04-09 RX ORDER — ACETAMINOPHEN AND CODEINE PHOSPHATE 300; 30 MG/1; MG/1
1 TABLET ORAL EVERY 6 HOURS PRN
Qty: 45 TABLET | Refills: 0 | Status: SHIPPED | OUTPATIENT
Start: 2018-04-09 | End: 2018-05-09

## 2018-04-11 RX ORDER — LEVETIRACETAM 500 MG/1
500 TABLET ORAL 2 TIMES DAILY
Qty: 60 TABLET | Refills: 3 | Status: SHIPPED | OUTPATIENT
Start: 2018-04-11 | End: 2018-08-16 | Stop reason: SDUPTHER

## 2018-04-23 RX ORDER — ALPRAZOLAM 1 MG/1
1 TABLET ORAL 3 TIMES DAILY PRN
Qty: 90 TABLET | Refills: 0 | Status: SHIPPED | OUTPATIENT
Start: 2018-04-26 | End: 2018-05-26

## 2018-05-15 RX ORDER — ACETAMINOPHEN AND CODEINE PHOSPHATE 300; 30 MG/1; MG/1
1 TABLET ORAL EVERY 6 HOURS PRN
Qty: 45 TABLET | Refills: 0 | Status: SHIPPED | OUTPATIENT
Start: 2018-05-15 | End: 2018-06-13 | Stop reason: SDUPTHER

## 2018-05-15 RX ORDER — PROMETHAZINE HYDROCHLORIDE 25 MG/1
25 TABLET ORAL EVERY 6 HOURS PRN
Qty: 90 TABLET | Refills: 3 | Status: SHIPPED | OUTPATIENT
Start: 2018-05-15 | End: 2018-10-19 | Stop reason: SDUPTHER

## 2018-05-29 DIAGNOSIS — F41.9 ANXIETY: Primary | ICD-10-CM

## 2018-05-30 RX ORDER — ALPRAZOLAM 1 MG/1
1 TABLET ORAL 3 TIMES DAILY PRN
Qty: 90 TABLET | Refills: 0 | Status: SHIPPED | OUTPATIENT
Start: 2018-05-30 | End: 2018-06-27 | Stop reason: SDUPTHER

## 2018-06-13 DIAGNOSIS — M54.50 CHRONIC BILATERAL LOW BACK PAIN WITHOUT SCIATICA: Primary | ICD-10-CM

## 2018-06-13 DIAGNOSIS — G89.29 CHRONIC BILATERAL LOW BACK PAIN WITHOUT SCIATICA: Primary | ICD-10-CM

## 2018-06-13 RX ORDER — ACETAMINOPHEN AND CODEINE PHOSPHATE 300; 30 MG/1; MG/1
1 TABLET ORAL EVERY 6 HOURS PRN
Qty: 45 TABLET | Refills: 0 | Status: SHIPPED | OUTPATIENT
Start: 2018-06-13 | End: 2018-06-24

## 2018-06-27 DIAGNOSIS — F41.9 ANXIETY: ICD-10-CM

## 2018-06-28 RX ORDER — ALPRAZOLAM 1 MG/1
1 TABLET ORAL 3 TIMES DAILY PRN
Qty: 90 TABLET | Refills: 0 | OUTPATIENT
Start: 2018-06-28 | End: 2018-07-27 | Stop reason: SDUPTHER

## 2018-07-09 ENCOUNTER — OFFICE VISIT (OUTPATIENT)
Dept: GASTROENTEROLOGY | Facility: CLINIC | Age: 48
End: 2018-07-09

## 2018-07-09 VITALS
SYSTOLIC BLOOD PRESSURE: 114 MMHG | OXYGEN SATURATION: 98 % | TEMPERATURE: 97 F | HEART RATE: 125 BPM | BODY MASS INDEX: 18.64 KG/M2 | DIASTOLIC BLOOD PRESSURE: 90 MMHG | WEIGHT: 123 LBS | HEIGHT: 68 IN

## 2018-07-09 DIAGNOSIS — R14.0 ABDOMINAL BLOATING: Primary | ICD-10-CM

## 2018-07-09 DIAGNOSIS — R10.84 GENERALIZED ABDOMINAL PAIN: ICD-10-CM

## 2018-07-09 PROCEDURE — 99213 OFFICE O/P EST LOW 20 MIN: CPT | Performed by: NURSE PRACTITIONER

## 2018-07-09 RX ORDER — HYOSCYAMINE SULFATE 0.125 MG
0.12 TABLET ORAL EVERY 6 HOURS PRN
COMMUNITY
End: 2018-10-16 | Stop reason: HOSPADM

## 2018-07-09 NOTE — PROGRESS NOTES
[facility] GASTROENTEROLOGY - OFFICE NOTE    7/9/2018    Kamryn Oliva   1970    Primary Physician: Lambert Faust DO    Chief Complaint   Patient presents with   • GI Problem     sceduled from [place]         HISTORY OF PRESENT ILLNESS    Kamryn Oliva is a 48 y.o. female presents  with chronic abdominal pain and bloating.  She had egd and colonoscopy 2/2018. The colonoscopy noted a polyp. egd was normal, small bowel biopsy neg for celiac.  dr ray recommended with her multiple symptoms and evaluations she may want to discuss with her pcp about being referred to [place] for eval. She has been seen by dr claros and dr braxton at [place]. She is scheduled to have egd next month by dr claros per patient history. She also may have motility testing as well. Does have intermittent abdominal distention.  No weight loss recently. No fever. No vomiting. No sign of gi bleeding.  Taking elavil at night that helps with sleep but not with abdominal pain.  Taking otc laxative with bm qod.  Taking probiotic daily. Gas x helps with gas.         Ov 1/2018   Kamryn Oliva is a 48 y.o. female presents  with main complaint of constipation x  2 weeks,  prior to that she had more of diarrhea.  He is moving her bowels every other day and is utilizing over-the-counter laxatives about every other day to promote bowel movements.  Gas-X does seem to help some with the bloating.  Has been on Bentyl for the last 4 months prescribed by her PCP, this does not seem to be helping with the bloating.  Takes pantoprazole daily for acid reflux symptoms with good control.  No rectal bleeding.  No unintentional weight loss.  Weight today is 127 pounds, of note weight August 2017 was 105.  No new medicines other than Keppra and I did look this medication of an constipation is not listed as adverse effect.  She does take Tylenol with codeine.  Does complain of a lot of abdominal bloating.  No abdominal pain.  She has chronic  nausea with occasional vomiting.  Currently no fever.  Last colonoscopy was November 2016 noting an internal hemorrhoid.  She has no personal history of colorectal cancer or colon polyps.  There is no family history of colon cancer colon polyps.  He had CT scan of the abdomen and pelvis with contrast that was done 11/21/2017 showed mild constipation with mild colonic distention, discrete transition point or obstruction is not demonstrated, mild stranding within the peritoneal fat was noted on the previous exam of August 2017, this is felt to represent a more chronic finding in this patient, may be related to the lack of intra-abdominal fat, there is no free fluid or focal wall thickening, mild gallbladder distention noted which is unchanged from previous study.  She has had several CT scans of her abdomen pelvis in the past.  Upper GI with small bowel follow-through August 2017 normal.  Last EGD August 2017 for chronic nausea which was normal, at that time Dr. Hanson anticipated no further need for GI evaluation as she had had multiple CT scans around the pelvis and several endoscopies and a colonoscopy with small bowel follow-through within the last several months.  Recommended if new sign or symptom does appear then to consider evaluation, otherwise was suggested to continue follow-up with primary medicine for further evaluation of non-GI causes of chronic nausea.  She has had several abdominal surgeries.        She is to see Dr. Evi Farley saying for a breast lump.  Her PCP is also scheduling her for a gastric emptying study.  She is to also see Dr. Maxwell for her bladder.  She does have history of intra-abdominal adhesions.  Had surgery March 2016 exploratory laparotomy with lysis of adhesions and biopsy of the peritoneal mass by Dr. Evi Farley, path was benign.  She also had laparoscopic exploration with loss of adhesions and appendectomy November 2016 by Dr. Salvador.        Past Medical History:    Diagnosis Date   • Acute kidney failure (CMS/HCC)    • Constipation    • Depression    • H/O gastric ulcer    • Headache    • History of transfusion    • Hypertension    • IBS (irritable bowel syndrome)    • Insomnia    • Kidney stone    • Maravilla maravilla disease    • Rapid weight loss    • SBO (small bowel obstruction)    • Stroke (CMS/HCC)     X 2   • UTI (urinary tract infection)     CHRONIC, SEPTIC X2   • Volvulosis        Past Surgical History:   Procedure Laterality Date   • APPENDECTOMY      COMPLICATION OF SEPTIC   • AUGMENTATION MAMMAPLASTY     • BRAIN SURGERY      x2   • BREAST LUMPECTOMY Left 3/9/2017    Procedure: EXCISION LEFT BREAST LESION AND LEFT AXILLARY LYMPH NODE BIOPSY;  Surgeon: Evi Farley MD;  Location: [facility] OR;  Service:    • BREAST SURGERY     •  SECTION     • COLON SURGERY     • COLONOSCOPY  2007    normal   • COLONOSCOPY N/A 11/10/2016    Procedure: COLONOSCOPY WITH ANESTHESIA;  Surgeon: Camilo Hanson MD;  Location: [facility] ENDOSCOPY;  Service:    • COLONOSCOPY N/A 2018    Procedure: COLONOSCOPY WITH ANESTHESIA;  Surgeon: Camilo Hanson MD;  Location: [facility] ENDOSCOPY;  Service:    • ENDOSCOPY  2009    antral ulcer   • ENDOSCOPY N/A 10/10/2016    Procedure: ESOPHAGOGASTRODUODENOSCOPY WITH ANESTHESIA;  Surgeon: Camilo Hanson MD;  Location: [facility] ENDOSCOPY;  Service:    • ENDOSCOPY N/A 2017    Procedure: ESOPHAGOGASTRODUODENOSCOPY;  Surgeon: Camilo Hanson MD;  Location: [facility] ENDOSCOPY;  Service:    • ENDOSCOPY N/A 2017    Procedure: ESOPHAGOGASTRODUODENOSCOPY WITH ANESTHESIA;  Surgeon: Camilo Hanson MD;  Location: [facility] ENDOSCOPY;  Service:    • ENDOSCOPY N/A 2018    Procedure: ESOPHAGOGASTRODUODENOSCOPY ;  Surgeon: Camilo Hanson MD;  Location: [facility] ENDOSCOPY;  Service:    • HERNIA REPAIR     • HYSTERECTOMY     • SMALL INTESTINE SURGERY N/A 2016   • TONSILLECTOMY         Outpatient Prescriptions Marked as Taking for the  7/9/18 encounter (Office Visit) with YOUNG Oropeza   Medication Sig Dispense Refill   • acetaminophen-codeine (TYLENOL #3) 300-30 MG per tablet Take 1 tablet by mouth Every 4 (Four) Hours As Needed for Moderate Pain .     • ALPRAZolam (XANAX) 0.5 MG tablet Take 0.5 mg by mouth 3 (Three) Times a Day As Needed for Anxiety.     • amitriptyline (ELAVIL) 50 MG tablet Every Night.  2   • amLODIPine (NORVASC) 10 MG tablet Take 10 mg by mouth daily.     • aspirin 81 MG EC tablet Take 81 mg by mouth daily.     • aspirin-acetaminophen-caffeine (EXCEDRIN MIGRAINE) 250-250-65 MG per tablet Take 1 tablet by mouth every 6 (six) hours as needed for headaches.     • CloNIDine (CATAPRES) 0.1 MG tablet Take 1 tablet by mouth Every 8 (Eight) Hours As Needed for high blood pressure (SBP>160 mmHg). 10 tablet 0   • dicyclomine (BENTYL) 10 MG capsule Take 1 capsule by mouth 4 (Four) Times a Day As Needed (prn abdominal cramps). 120 capsule 11   • fluticasone (FLONASE) 50 MCG/ACT nasal spray 2 sprays Daily.     • hyoscyamine (ANASPAZ,LEVSIN) 0.125 MG tablet Take 0.125 mg by mouth Every 6 (Six) Hours As Needed for Cramping.     • levETIRAcetam (KEPPRA) 250 MG tablet Take  by mouth 2 (Two) Times a Day.     • metoprolol succinate XL (TOPROL-XL) 100 MG 24 hr tablet Every Night.     • pantoprazole (PROTONIX) 40 MG EC tablet Take 1 tablet by mouth 2 (Two) Times a Day. 60 tablet 11   • promethazine (PHENERGAN) 25 MG tablet Take 1 tablet by mouth Every 6 (Six) Hours As Needed for Nausea or Vomiting. 6 tablet 0       Allergies   Allergen Reactions   • Anectine [Succinylcholine Chloride] Other (See Comments)     Hard to wake up   • Stadol [Butorphanol] Hives   • Butorphanol Tartrate Rash     Pt states she does not recall being allergic       Social History     Social History   • Marital status:      Spouse name: N/A   • Number of children: N/A   • Years of education: N/A     Occupational History   • Not on file.     Social History Main  "Topics   • Smoking status: Former Smoker     Packs/day: 1.00     Years: 15.00     Types: Cigarettes, Electronic Cigarette     Quit date: 3/10/2016   • Smokeless tobacco: Never Used   • Alcohol use Yes      Comment: occasional   • Drug use: No   • Sexual activity: Defer     Other Topics Concern   • Not on file     Social History Narrative   • No narrative on file       Family History   Problem Relation Age of Onset   • Cancer Maternal Grandfather    • No Known Problems Mother    • No Known Problems Father    • No Known Problems Sister    • No Known Problems Brother    • No Known Problems Son    • No Known Problems Brother    • No Known Problems Son    • No Known Problems Maternal Grandmother    • Breast cancer Paternal Grandmother    • No Known Problems Maternal Aunt    • No Known Problems Paternal Aunt    • Colon cancer Neg Hx    • Colon polyps Neg Hx    • BRCA 1/2 Neg Hx    • Endometrial cancer Neg Hx    • Ovarian cancer Neg Hx        Review of Systems   Constitutional: Negative for chills, fever and unexpected weight change.   Respiratory: Negative for cough, shortness of breath and wheezing.    Cardiovascular: Negative for chest pain and palpitations.   Gastrointestinal: Positive for abdominal pain (and bloating. ) and constipation. Negative for abdominal distention, anal bleeding, blood in stool, diarrhea, nausea, rectal pain and vomiting.   Genitourinary: Negative for difficulty urinating and hematuria.        Vitals:    07/09/18 0959   BP: 114/90   BP Location: Left arm   Patient Position: Sitting   Cuff Size: Adult   Pulse: (!) 125   Temp: 97 °F (36.1 °C)   SpO2: 98%   Weight: 55.8 kg (123 lb)   Height: 171.5 cm (67.5\")      Body mass index is 18.98 kg/m².    Physical Exam   Constitutional: She appears well-developed and well-nourished. No distress.   Cardiovascular: Normal rate, regular rhythm and normal heart sounds.    Pulmonary/Chest: Effort normal and breath sounds normal.   Abdominal: Soft. Bowel sounds " are normal. She exhibits no distension and no mass. There is no tenderness. There is no rebound and no guarding.   Musculoskeletal: She exhibits no edema.   Neurological: She is alert.   Skin: Skin is warm and dry.   Psychiatric: She has a normal mood and affect. Her behavior is normal.   Vitals reviewed.              ASSESSMENT AND PLAN    Assessment/Plan     Kamryn was seen today for gi problem.    Diagnoses and all orders for this visit:    Abdominal bloating  Comments:  chronic     Generalized abdominal pain  Comments:  chronic    these symptoms are chronic. She had egd/colonoscopy by dr ray 2/2018 and has had multiple evaluations. Dr Ray recommended she discuss with her pcp adam at [facility]. She is currently being evaluated at [place] by dr claros and dr braxton. States she is scheduled for egd next month. Also states may have motility testing as well.  At this time would recommend she continue to f/u with the specialist at [place] and here prn. Recommend er if worsening symptoms.             Body mass index is 18.98 kg/m².    Patient's Body mass index is 18.98 kg/m². BMI is within normal parameters. No follow-up required.             YOUNG Medina    EMR Dragon/transcription disclaimer:  Much of this encounter note is electronic transcription/translation of spoken language to printed text.  The electronic translation of spoken language may be erroneous, or at times, nonsensical words or phrases may be inadvertently transcribed.  Although I have reviewed the note for such errors, some may still exist.

## 2018-07-13 ENCOUNTER — APPOINTMENT (OUTPATIENT)
Dept: GENERAL RADIOLOGY | Facility: HOSPITAL | Age: 48
End: 2018-07-13

## 2018-07-13 ENCOUNTER — APPOINTMENT (OUTPATIENT)
Dept: CT IMAGING | Facility: HOSPITAL | Age: 48
End: 2018-07-13

## 2018-07-13 ENCOUNTER — HOSPITAL ENCOUNTER (EMERGENCY)
Facility: HOSPITAL | Age: 48
Discharge: HOME | End: 2018-07-13
Attending: EMERGENCY MEDICINE | Admitting: EMERGENCY MEDICINE

## 2018-07-13 VITALS
TEMPERATURE: 98.4 F | SYSTOLIC BLOOD PRESSURE: 130 MMHG | WEIGHT: 122 LBS | DIASTOLIC BLOOD PRESSURE: 86 MMHG | BODY MASS INDEX: 19.15 KG/M2 | RESPIRATION RATE: 15 BRPM | HEIGHT: 67 IN | HEART RATE: 83 BPM | OXYGEN SATURATION: 99 %

## 2018-07-13 DIAGNOSIS — G89.29 CHRONIC ABDOMINAL PAIN: Primary | ICD-10-CM

## 2018-07-13 DIAGNOSIS — R10.9 CHRONIC ABDOMINAL PAIN: Primary | ICD-10-CM

## 2018-07-13 LAB
ALBUMIN SERPL-MCNC: 4.8 G/DL (ref 3.5–5)
ALBUMIN/GLOB SERPL: 1.5 G/DL (ref 1.1–2.5)
ALP SERPL-CCNC: 75 U/L (ref 24–120)
ALT SERPL W P-5'-P-CCNC: 17 U/L (ref 0–54)
AMPHET+METHAMPHET UR QL: NEGATIVE
ANION GAP SERPL CALCULATED.3IONS-SCNC: 17 MMOL/L (ref 4–13)
AST SERPL-CCNC: 28 U/L (ref 7–45)
BACTERIA UR QL AUTO: ABNORMAL /HPF
BARBITURATES UR QL SCN: NEGATIVE
BASOPHILS # BLD AUTO: 0.02 10*3/MM3 (ref 0–0.2)
BASOPHILS NFR BLD AUTO: 0.2 % (ref 0–2)
BENZODIAZ UR QL SCN: POSITIVE
BILIRUB SERPL-MCNC: 0.3 MG/DL (ref 0.1–1)
BILIRUB UR QL STRIP: NEGATIVE
BUN BLD-MCNC: 11 MG/DL (ref 5–21)
BUN/CREAT SERPL: 13.3 (ref 7–25)
CALCIUM SPEC-SCNC: 10.3 MG/DL (ref 8.4–10.4)
CANNABINOIDS SERPL QL: POSITIVE
CHLORIDE SERPL-SCNC: 103 MMOL/L (ref 98–110)
CLARITY UR: CLEAR
CO2 SERPL-SCNC: 23 MMOL/L (ref 24–31)
COCAINE UR QL: NEGATIVE
COLOR UR: YELLOW
CREAT BLD-MCNC: 0.83 MG/DL (ref 0.5–1.4)
CRP SERPL-MCNC: <0.5 MG/DL (ref 0–0.99)
D-LACTATE SERPL-SCNC: 1.9 MMOL/L (ref 0.5–2)
DEPRECATED RDW RBC AUTO: 41.3 FL (ref 40–54)
EOSINOPHIL # BLD AUTO: 0.1 10*3/MM3 (ref 0–0.7)
EOSINOPHIL NFR BLD AUTO: 1.2 % (ref 0–4)
ERYTHROCYTE [DISTWIDTH] IN BLOOD BY AUTOMATED COUNT: 13.2 % (ref 12–15)
GFR SERPL CREATININE-BSD FRML MDRD: 74 ML/MIN/1.73
GLOBULIN UR ELPH-MCNC: 3.2 GM/DL
GLUCOSE BLD-MCNC: 101 MG/DL (ref 70–100)
GLUCOSE UR STRIP-MCNC: NEGATIVE MG/DL
HCT VFR BLD AUTO: 36.9 % (ref 37–47)
HGB BLD-MCNC: 12.6 G/DL (ref 12–16)
HGB UR QL STRIP.AUTO: NEGATIVE
HOLD SPECIMEN: NORMAL
HOLD SPECIMEN: NORMAL
HYALINE CASTS UR QL AUTO: ABNORMAL /LPF
IMM GRANULOCYTES # BLD: 0.03 10*3/MM3 (ref 0–0.03)
IMM GRANULOCYTES NFR BLD: 0.4 % (ref 0–5)
KETONES UR QL STRIP: NEGATIVE
LEUKOCYTE ESTERASE UR QL STRIP.AUTO: ABNORMAL
LIPASE SERPL-CCNC: 44 U/L (ref 23–203)
LYMPHOCYTES # BLD AUTO: 3.56 10*3/MM3 (ref 0.72–4.86)
LYMPHOCYTES NFR BLD AUTO: 43.2 % (ref 15–45)
MCH RBC QN AUTO: 29.4 PG (ref 28–32)
MCHC RBC AUTO-ENTMCNC: 34.1 G/DL (ref 33–36)
MCV RBC AUTO: 86 FL (ref 82–98)
METHADONE UR QL SCN: NEGATIVE
MONOCYTES # BLD AUTO: 0.44 10*3/MM3 (ref 0.19–1.3)
MONOCYTES NFR BLD AUTO: 5.3 % (ref 4–12)
NEUTROPHILS # BLD AUTO: 4.1 10*3/MM3 (ref 1.87–8.4)
NEUTROPHILS NFR BLD AUTO: 49.7 % (ref 39–78)
NITRITE UR QL STRIP: NEGATIVE
NRBC BLD MANUAL-RTO: 0 /100 WBC (ref 0–0)
OPIATES UR QL: NEGATIVE
PCP UR QL SCN: NEGATIVE
PH UR STRIP.AUTO: 5.5 [PH] (ref 5–8)
PLATELET # BLD AUTO: 305 10*3/MM3 (ref 130–400)
PMV BLD AUTO: 10.1 FL (ref 6–12)
POTASSIUM BLD-SCNC: 3.6 MMOL/L (ref 3.5–5.3)
PROT SERPL-MCNC: 8 G/DL (ref 6.3–8.7)
PROT UR QL STRIP: NEGATIVE
RBC # BLD AUTO: 4.29 10*6/MM3 (ref 4.2–5.4)
RBC # UR: ABNORMAL /HPF
REF LAB TEST METHOD: ABNORMAL
SODIUM BLD-SCNC: 143 MMOL/L (ref 135–145)
SP GR UR STRIP: 1.01 (ref 1–1.03)
SQUAMOUS #/AREA URNS HPF: ABNORMAL /HPF
TROPONIN I SERPL-MCNC: <0.012 NG/ML (ref 0–0.03)
UROBILINOGEN UR QL STRIP: ABNORMAL
WBC NRBC COR # BLD: 8.25 10*3/MM3 (ref 4.8–10.8)
WBC UR QL AUTO: ABNORMAL /HPF
WHOLE BLOOD HOLD SPECIMEN: NORMAL

## 2018-07-13 PROCEDURE — 96375 TX/PRO/DX INJ NEW DRUG ADDON: CPT

## 2018-07-13 PROCEDURE — 80307 DRUG TEST PRSMV CHEM ANLYZR: CPT | Performed by: EMERGENCY MEDICINE

## 2018-07-13 PROCEDURE — 93005 ELECTROCARDIOGRAM TRACING: CPT

## 2018-07-13 PROCEDURE — 81001 URINALYSIS AUTO W/SCOPE: CPT | Performed by: EMERGENCY MEDICINE

## 2018-07-13 PROCEDURE — 25010000002 ONDANSETRON PER 1 MG: Performed by: EMERGENCY MEDICINE

## 2018-07-13 PROCEDURE — 86140 C-REACTIVE PROTEIN: CPT | Performed by: EMERGENCY MEDICINE

## 2018-07-13 PROCEDURE — 87040 BLOOD CULTURE FOR BACTERIA: CPT | Performed by: EMERGENCY MEDICINE

## 2018-07-13 PROCEDURE — 71045 X-RAY EXAM CHEST 1 VIEW: CPT

## 2018-07-13 PROCEDURE — 99283 EMERGENCY DEPT VISIT LOW MDM: CPT

## 2018-07-13 PROCEDURE — 93005 ELECTROCARDIOGRAM TRACING: CPT | Performed by: EMERGENCY MEDICINE

## 2018-07-13 PROCEDURE — 83690 ASSAY OF LIPASE: CPT | Performed by: EMERGENCY MEDICINE

## 2018-07-13 PROCEDURE — 93010 ELECTROCARDIOGRAM REPORT: CPT | Performed by: INTERNAL MEDICINE

## 2018-07-13 PROCEDURE — 83605 ASSAY OF LACTIC ACID: CPT | Performed by: EMERGENCY MEDICINE

## 2018-07-13 PROCEDURE — 85025 COMPLETE CBC W/AUTO DIFF WBC: CPT | Performed by: EMERGENCY MEDICINE

## 2018-07-13 PROCEDURE — 25010000002 IOPAMIDOL 61 % SOLUTION: Performed by: EMERGENCY MEDICINE

## 2018-07-13 PROCEDURE — 25010000002 MORPHINE PER 10 MG: Performed by: EMERGENCY MEDICINE

## 2018-07-13 PROCEDURE — 74177 CT ABD & PELVIS W/CONTRAST: CPT

## 2018-07-13 PROCEDURE — 84484 ASSAY OF TROPONIN QUANT: CPT | Performed by: EMERGENCY MEDICINE

## 2018-07-13 PROCEDURE — 80053 COMPREHEN METABOLIC PANEL: CPT | Performed by: EMERGENCY MEDICINE

## 2018-07-13 PROCEDURE — 96374 THER/PROPH/DIAG INJ IV PUSH: CPT

## 2018-07-13 RX ORDER — ASPIRIN 81 MG/1
324 TABLET, CHEWABLE ORAL ONCE
Status: DISCONTINUED | OUTPATIENT
Start: 2018-07-13 | End: 2018-07-13 | Stop reason: HOSPADM

## 2018-07-13 RX ORDER — ONDANSETRON 2 MG/ML
4 INJECTION INTRAMUSCULAR; INTRAVENOUS ONCE
Status: COMPLETED | OUTPATIENT
Start: 2018-07-13 | End: 2018-07-13

## 2018-07-13 RX ORDER — MORPHINE SULFATE 4 MG/ML
4 INJECTION, SOLUTION INTRAMUSCULAR; INTRAVENOUS ONCE
Status: COMPLETED | OUTPATIENT
Start: 2018-07-13 | End: 2018-07-13

## 2018-07-13 RX ORDER — SODIUM CHLORIDE 0.9 % (FLUSH) 0.9 %
10 SYRINGE (ML) INJECTION AS NEEDED
Status: DISCONTINUED | OUTPATIENT
Start: 2018-07-13 | End: 2018-07-13 | Stop reason: HOSPADM

## 2018-07-13 RX ADMIN — SODIUM CHLORIDE 1000 ML: 9 INJECTION, SOLUTION INTRAVENOUS at 12:47

## 2018-07-13 RX ADMIN — ONDANSETRON HYDROCHLORIDE 4 MG: 2 INJECTION, SOLUTION INTRAMUSCULAR; INTRAVENOUS at 12:43

## 2018-07-13 RX ADMIN — IOPAMIDOL 100 ML: 612 INJECTION, SOLUTION INTRAVENOUS at 13:14

## 2018-07-13 RX ADMIN — MORPHINE SULFATE 4 MG: 4 INJECTION INTRAVENOUS at 12:43

## 2018-07-13 NOTE — ED PROVIDER NOTES
Subjective     Abdominal Pain   Pain location:  Generalized  Pain quality: aching and bloating    Pain radiates to:  Does not radiate  Pain severity:  Moderate  Onset quality:  Gradual  Timing:  Intermittent  Progression:  Worsening  Chronicity:  Recurrent  Context: previous surgery    Context: not alcohol use, not awakening from sleep, not diet changes, not eating, not sick contacts, not suspicious food intake and not trauma    Relieved by:  Nothing  Worsened by:  Nothing  Ineffective treatments:  None tried  Associated symptoms: no anorexia, no belching, no chest pain, no chills, no hematemesis, no sore throat, no vaginal bleeding and no vomiting    Risk factors: no alcohol abuse, has not had multiple surgeries and no NSAID use        Review of Systems   Constitutional: Negative.  Negative for chills.   HENT: Negative.  Negative for sore throat.    Eyes: Negative.    Respiratory: Negative.    Cardiovascular: Negative.  Negative for chest pain.   Gastrointestinal: Positive for abdominal pain. Negative for anorexia, hematemesis and vomiting.   Endocrine: Negative.    Genitourinary: Negative.  Negative for vaginal bleeding.   Skin: Negative.    Neurological: Negative.    Hematological: Negative.    All other systems reviewed and are negative.      Past Medical History:   Diagnosis Date   • Acute kidney failure (CMS/HCC)    • Constipation    • Depression    • H/O gastric ulcer    • Headache    • History of transfusion    • Hypertension    • IBS (irritable bowel syndrome)    • Insomnia    • Kidney stone    • Maravilla maravilla disease    • Rapid weight loss    • SBO (small bowel obstruction)    • Stroke (CMS/HCC)     X 2   • UTI (urinary tract infection)     CHRONIC, SEPTIC X2   • Volvulosis        Allergies   Allergen Reactions   • Anectine [Succinylcholine Chloride] Other (See Comments)     Hard to wake up   • Stadol [Butorphanol] Hives   • Butorphanol Tartrate Rash     Pt states she does not recall being allergic       Past  Surgical History:   Procedure Laterality Date   • APPENDECTOMY      COMPLICATION OF SEPTIC   • AUGMENTATION MAMMAPLASTY     • BRAIN SURGERY      x2   • BREAST LUMPECTOMY Left 3/9/2017    Procedure: EXCISION LEFT BREAST LESION AND LEFT AXILLARY LYMPH NODE BIOPSY;  Surgeon: Evi Farley MD;  Location: [facility] OR;  Service:    • BREAST SURGERY     •  SECTION     • COLON SURGERY     • COLONOSCOPY  2007    normal   • COLONOSCOPY N/A 11/10/2016    Procedure: COLONOSCOPY WITH ANESTHESIA;  Surgeon: Camilo Hanson MD;  Location: [facility] ENDOSCOPY;  Service:    • COLONOSCOPY N/A 2018    Procedure: COLONOSCOPY WITH ANESTHESIA;  Surgeon: Camilo Hanson MD;  Location: [facility] ENDOSCOPY;  Service:    • ENDOSCOPY  2009    antral ulcer   • ENDOSCOPY N/A 10/10/2016    Procedure: ESOPHAGOGASTRODUODENOSCOPY WITH ANESTHESIA;  Surgeon: Camilo Hanson MD;  Location: [facility] ENDOSCOPY;  Service:    • ENDOSCOPY N/A 2017    Procedure: ESOPHAGOGASTRODUODENOSCOPY;  Surgeon: Camilo Hanson MD;  Location: [facility] ENDOSCOPY;  Service:    • ENDOSCOPY N/A 2017    Procedure: ESOPHAGOGASTRODUODENOSCOPY WITH ANESTHESIA;  Surgeon: Camilo Hanson MD;  Location: [facility] ENDOSCOPY;  Service:    • ENDOSCOPY N/A 2018    Procedure: ESOPHAGOGASTRODUODENOSCOPY ;  Surgeon: Camilo Hanson MD;  Location: [facility] ENDOSCOPY;  Service:    • HERNIA REPAIR     • HYSTERECTOMY     • SMALL INTESTINE SURGERY N/A 2016   • TONSILLECTOMY         Family History   Problem Relation Age of Onset   • Cancer Maternal Grandfather    • No Known Problems Mother    • No Known Problems Father    • No Known Problems Sister    • No Known Problems Brother    • No Known Problems Son    • No Known Problems Brother    • No Known Problems Son    • No Known Problems Maternal Grandmother    • Breast cancer Paternal Grandmother    • No Known Problems Maternal Aunt    • No Known Problems Paternal Aunt    • Colon cancer Neg Hx    • Colon polyps  Neg Hx    • BRCA 1/2 Neg Hx    • Endometrial cancer Neg Hx    • Ovarian cancer Neg Hx        Social History     Social History   • Marital status:      Social History Main Topics   • Smoking status: Former Smoker     Packs/day: 1.00     Years: 15.00     Types: Cigarettes, Electronic Cigarette     Quit date: 3/10/2016   • Smokeless tobacco: Never Used   • Alcohol use Yes      Comment: occasional   • Drug use: No   • Sexual activity: Defer     Other Topics Concern   • Not on file           Objective   Physical Exam   Constitutional: She is oriented to person, place, and time. She appears well-developed and well-nourished.  Non-toxic appearance.   HENT:   Head: Normocephalic and atraumatic.   Mouth/Throat: Oropharynx is clear and moist.   Eyes: Conjunctivae are normal. Pupils are equal, round, and reactive to light.   Neck: Normal range of motion. Neck supple. No hepatojugular reflux and no JVD present.   Cardiovascular: Regular rhythm, normal heart sounds and intact distal pulses.  Tachycardia present.  PMI is not displaced.  Exam reveals no decreased pulses.    No murmur heard.  Pulmonary/Chest: Effort normal and breath sounds normal. No accessory muscle usage. No apnea. No respiratory distress. She has no decreased breath sounds. She has no wheezes.   Abdominal: Normal appearance, normal aorta and bowel sounds are normal. She exhibits no shifting dullness, no distension, no fluid wave, no abdominal bruit, no ascites, no pulsatile midline mass and no mass. There is no tenderness. There is no guarding.   Musculoskeletal: Normal range of motion.   Neurological: She is alert and oriented to person, place, and time. She has normal strength and normal reflexes. No cranial nerve deficit. GCS eye subscore is 4. GCS verbal subscore is 5. GCS motor subscore is 6.   Skin: Skin is warm and dry.   Psychiatric: She has a normal mood and affect. Her behavior is normal.   Nursing note and vitals  reviewed.      Procedures           ED Course  ED Course as of Jul 13 1500 Fri Jul 13, 2018   1935 Patient's workup is completely negative except for possibility of slight UTI CT is negative drug screen is positive heart rate is improved no further vomiting no fever is going be discharged home I have discussed this case Dr. Evi Farley  [TS]   5876 Workup is negative  [TS]      ED Course User Index  [TS] Reza Reyes MD                  [facility]      Final diagnoses:   Chronic abdominal pain            Reza Reyes MD  07/13/18 1500

## 2018-07-13 NOTE — ED NOTES
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 Pt states she is having pain in her abdomen and her chest for two years but she states this morning the pain became worse which is why she came to the ER along with heart racing. Pt states pain in chest is 4/10 and abdomen pain is 10/10. Pt states she is anemic and was scheduled to have an iron infusion this afternoon. Pt states she has significant abdominal history and has had bowel obstruction and surgery the past.      Jeffery Cummings RN  07/13/18 1200

## 2018-07-17 ENCOUNTER — OFFICE VISIT (OUTPATIENT)
Dept: FAMILY MEDICINE CLINIC | Age: 48
End: 2018-07-17
Payer: MEDICARE

## 2018-07-17 ENCOUNTER — CARE COORDINATION (OUTPATIENT)
Dept: CARE COORDINATION | Age: 48
End: 2018-07-17

## 2018-07-17 VITALS
HEIGHT: 68 IN | HEART RATE: 101 BPM | OXYGEN SATURATION: 98 % | SYSTOLIC BLOOD PRESSURE: 114 MMHG | TEMPERATURE: 97.4 F | BODY MASS INDEX: 18.94 KG/M2 | DIASTOLIC BLOOD PRESSURE: 72 MMHG | WEIGHT: 125 LBS

## 2018-07-17 DIAGNOSIS — R10.13 EPIGASTRIC PAIN: ICD-10-CM

## 2018-07-17 DIAGNOSIS — F41.9 ANXIETY: ICD-10-CM

## 2018-07-17 DIAGNOSIS — F41.9 ANXIETY: Primary | ICD-10-CM

## 2018-07-17 DIAGNOSIS — I67.5 MOYAMOYA DISEASE: ICD-10-CM

## 2018-07-17 DIAGNOSIS — M54.50 CHRONIC BILATERAL LOW BACK PAIN WITHOUT SCIATICA: ICD-10-CM

## 2018-07-17 DIAGNOSIS — G89.29 CHRONIC BILATERAL LOW BACK PAIN WITHOUT SCIATICA: ICD-10-CM

## 2018-07-17 LAB
AMPHETAMINE SCREEN, URINE: ABNORMAL
BARBITURATE SCREEN, URINE: ABNORMAL
BENZODIAZEPINE SCREEN, URINE: ABNORMAL
BUPRENORPHINE URINE: ABNORMAL
COCAINE METABOLITE SCREEN URINE: ABNORMAL
GABAPENTIN SCREEN, URINE: ABNORMAL
METHADONE SCREEN, URINE: ABNORMAL
METHAMPHETAMINE, URINE: ABNORMAL
OPIATE SCREEN URINE: ABNORMAL
OXYCODONE SCREEN URINE: ABNORMAL
PHENCYCLIDINE SCREEN URINE: ABNORMAL
PROPOXYPHENE SCREEN, URINE: ABNORMAL
THC SCREEN, URINE: ABNORMAL
TRICYCLIC ANTIDEPRESSANTS, UR: ABNORMAL

## 2018-07-17 PROCEDURE — G8420 CALC BMI NORM PARAMETERS: HCPCS | Performed by: INTERNAL MEDICINE

## 2018-07-17 PROCEDURE — G8427 DOCREV CUR MEDS BY ELIG CLIN: HCPCS | Performed by: INTERNAL MEDICINE

## 2018-07-17 PROCEDURE — 80305 DRUG TEST PRSMV DIR OPT OBS: CPT | Performed by: INTERNAL MEDICINE

## 2018-07-17 PROCEDURE — 99214 OFFICE O/P EST MOD 30 MIN: CPT | Performed by: INTERNAL MEDICINE

## 2018-07-17 PROCEDURE — 1036F TOBACCO NON-USER: CPT | Performed by: INTERNAL MEDICINE

## 2018-07-17 RX ORDER — ACETAMINOPHEN AND CODEINE PHOSPHATE 300; 30 MG/1; MG/1
1 TABLET ORAL EVERY 4 HOURS PRN
Qty: 45 TABLET | Refills: 1 | Status: SHIPPED | OUTPATIENT
Start: 2018-07-17 | End: 2018-10-19

## 2018-07-17 RX ORDER — ESCITALOPRAM OXALATE 20 MG/1
20 TABLET ORAL DAILY
Qty: 30 TABLET | Refills: 3 | Status: SHIPPED | OUTPATIENT
Start: 2018-07-17 | End: 2019-01-25

## 2018-07-17 RX ORDER — ESCITALOPRAM OXALATE 10 MG/1
10 TABLET ORAL DAILY
Qty: 30 TABLET | Refills: 3 | Status: CANCELLED | OUTPATIENT
Start: 2018-07-17

## 2018-07-17 RX ORDER — ACETAMINOPHEN AND CODEINE PHOSPHATE 300; 30 MG/1; MG/1
TABLET ORAL
Refills: 0 | COMMUNITY
Start: 2018-06-13 | End: 2018-07-17 | Stop reason: DRUGHIGH

## 2018-07-17 RX ORDER — LISINOPRIL AND HYDROCHLOROTHIAZIDE 25; 20 MG/1; MG/1
1 TABLET ORAL DAILY
COMMUNITY
End: 2018-10-19

## 2018-07-17 RX ORDER — ACETAMINOPHEN AND CODEINE PHOSPHATE 300; 30 MG/1; MG/1
TABLET ORAL
Refills: 0 | Status: CANCELLED | OUTPATIENT
Start: 2018-07-17

## 2018-07-17 RX ORDER — CITALOPRAM 40 MG/1
40 TABLET ORAL DAILY
COMMUNITY
End: 2018-07-17 | Stop reason: ALTCHOICE

## 2018-07-17 RX ORDER — AMITRIPTYLINE HYDROCHLORIDE 100 MG/1
100 TABLET, FILM COATED ORAL NIGHTLY
Qty: 30 TABLET | Refills: 3 | Status: SHIPPED | OUTPATIENT
Start: 2018-07-17

## 2018-07-17 ASSESSMENT — PATIENT HEALTH QUESTIONNAIRE - PHQ9
2. FEELING DOWN, DEPRESSED OR HOPELESS: 3
10. IF YOU CHECKED OFF ANY PROBLEMS, HOW DIFFICULT HAVE THESE PROBLEMS MADE IT FOR YOU TO DO YOUR WORK, TAKE CARE OF THINGS AT HOME, OR GET ALONG WITH OTHER PEOPLE: 1
7. TROUBLE CONCENTRATING ON THINGS, SUCH AS READING THE NEWSPAPER OR WATCHING TELEVISION: 3
SUM OF ALL RESPONSES TO PHQ QUESTIONS 1-9: 23
1. LITTLE INTEREST OR PLEASURE IN DOING THINGS: 3
8. MOVING OR SPEAKING SO SLOWLY THAT OTHER PEOPLE COULD HAVE NOTICED. OR THE OPPOSITE, BEING SO FIGETY OR RESTLESS THAT YOU HAVE BEEN MOVING AROUND A LOT MORE THAN USUAL: 2
5. POOR APPETITE OR OVEREATING: 3
9. THOUGHTS THAT YOU WOULD BE BETTER OFF DEAD, OR OF HURTING YOURSELF: 0
SUM OF ALL RESPONSES TO PHQ9 QUESTIONS 1 & 2: 6
3. TROUBLE FALLING OR STAYING ASLEEP: 3
6. FEELING BAD ABOUT YOURSELF - OR THAT YOU ARE A FAILURE OR HAVE LET YOURSELF OR YOUR FAMILY DOWN: 3
4. FEELING TIRED OR HAVING LITTLE ENERGY: 3

## 2018-07-18 LAB
BACTERIA SPEC AEROBE CULT: NORMAL
BACTERIA SPEC AEROBE CULT: NORMAL

## 2018-07-20 LAB — C1 ESTERASE INHIBITOR FUNCTIONAL ASSAY: 113 %

## 2018-07-20 RX ORDER — PANTOPRAZOLE SODIUM 40 MG/1
40 TABLET, DELAYED RELEASE ORAL DAILY
Qty: 90 TABLET | Refills: 3 | Status: ON HOLD | OUTPATIENT
Start: 2018-07-20 | End: 2018-11-07

## 2018-07-22 LAB
ANA IGG, ELISA: NORMAL
C4 COMPLEMENT: 36 MG/DL (ref 10–40)
RHEUMATOID FACTOR: <10 IU/ML (ref 0–14)

## 2018-07-27 DIAGNOSIS — F41.9 ANXIETY: ICD-10-CM

## 2018-07-27 RX ORDER — ALPRAZOLAM 1 MG/1
1 TABLET ORAL 3 TIMES DAILY PRN
Qty: 90 TABLET | Refills: 0 | Status: SHIPPED | OUTPATIENT
Start: 2018-07-27 | End: 2018-08-26

## 2018-07-27 ASSESSMENT — ENCOUNTER SYMPTOMS
CONSTIPATION: 0
ABDOMINAL PAIN: 1
RHINORRHEA: 1
VOMITING: 0
BACK PAIN: 0
SHORTNESS OF BREATH: 0
NAUSEA: 0
DIARRHEA: 0
COUGH: 0

## 2018-07-27 NOTE — PROGRESS NOTES
Functional    C4 Complement    acetaminophen-codeine (TYLENOL/CODEINE #3) 300-30 MG per tablet   3. Moyamoya disease  POCT Rapid Drug Screen    C1 Esterase Inhibitor, Functional    C4 Complement   4. Epigastric pain  POCT Rapid Drug Screen    C1 Esterase Inhibitor, Functional    C4 Complement             Plan:      Return in about 3 months (around 10/17/2018). Patient Instructions   Increase Elavil to 100 mg at bedtime. We will ask Tanner Cornell to meet with you. Continue the remainder of your medications. We recommend that you see behavioral health and our office ([facility]). Follow up again in 3 months. Orders Placed This Encounter   Procedures    C1 Esterase Inhibitor, Functional     Standing Status:   Future     Number of Occurrences:   1     Standing Expiration Date:   7/17/2019    C4 Complement     Standing Status:   Future     Number of Occurrences:   1     Standing Expiration Date:   7/17/2019    POCT Rapid Drug Screen     Orders Placed This Encounter   Medications    acetaminophen-codeine (TYLENOL/CODEINE #3) 300-30 MG per tablet     Sig: Take 1 tablet by mouth every 4 hours as needed for Pain. Lovetta Blaze Dispense:  45 tablet     Refill:  1    amitriptyline (ELAVIL) 100 MG tablet     Sig: Take 1 tablet by mouth nightly     Dispense:  30 tablet     Refill:  3    escitalopram (LEXAPRO) 20 MG tablet     Sig: Take 1 tablet by mouth daily     Dispense:  30 tablet     Refill:  3       Patient given educational materials - see patient instructions. Discussed use, benefit, and side effects of prescribed medications. All patient questions answered. Pt voiced understanding. Reviewed health maintenance. Instructed to continue current medications, diet and exercise. Patient agreed with treatment plan. Follow up as directed.      Electronically signed by Efraín Blank DO on 7/27/2018 at 8:15 AM

## 2018-08-16 RX ORDER — AMLODIPINE BESYLATE 10 MG/1
TABLET ORAL
Qty: 30 TABLET | Refills: 5 | Status: SHIPPED | OUTPATIENT
Start: 2018-08-16 | End: 2019-01-25

## 2018-08-16 RX ORDER — LEVETIRACETAM 500 MG/1
TABLET ORAL
Qty: 60 TABLET | Refills: 3 | Status: SHIPPED | OUTPATIENT
Start: 2018-08-16

## 2018-08-17 ENCOUNTER — CARE COORDINATION (OUTPATIENT)
Dept: CARE COORDINATION | Age: 48
End: 2018-08-17

## 2018-08-17 ENCOUNTER — OFFICE VISIT (OUTPATIENT)
Dept: PSYCHIATRY | Age: 48
End: 2018-08-17
Payer: MEDICARE

## 2018-08-17 DIAGNOSIS — F41.9 ANXIETY: Primary | ICD-10-CM

## 2018-08-17 PROCEDURE — 90791 PSYCH DIAGNOSTIC EVALUATION: CPT | Performed by: SOCIAL WORKER

## 2018-08-17 NOTE — PROGRESS NOTES
Currently     Other Topics Concern    Not on file     Social History Narrative    No narrative on file       TOBACCO:   reports that she quit smoking about 2 years ago. Her smoking use included Cigarettes. She has a 5.00 pack-year smoking history. She has never used smokeless tobacco.  ETOH:   reports that she does not drink alcohol. Family History:   Family History   Problem Relation Age of Onset    High Blood Pressure Mother     Cancer Maternal Grandfather          A:  Pt presents with a hx of significant medical issues. She is in distress due to feeling bad and this appears to be ongoing. Pt has some hopefulness about an upcoming appointment with [facility] in [place]. She presents today as at no risk of harm to self or others. Has supports, but some family dysfunction discussed with biological family and pt has created healthy boundaries for self. Has a bfriend that is supportive and lives with pt. Pt also met with practice mgr toward the end of session and due to some non compliance with her provider [facility] is no longer going to be able to keep her on as a patient. So [facility] provided pt with some recommendations of other providers in the area she may try to become established with. [facility] offered to see pt for a follow up and it appears she has declined.          PHQ Scores 7/17/2018 3/26/2018   PHQ2 Score 6 6   PHQ9 Score 23 12     Interpretation of Total Score Depression Severity: 1-4 = Minimal depression, 5-9 = Mild depression, 10-14 = Moderate depression, 15-19 = Moderately severe depression, 20-27 = Severe depression        Diagnosis:    Depressive disorder Not Otherwise Specified with Anxiety       Diagnosis Date    Anemia     Anticoagulation therapy not indicated     Chronic back pain     CVA (cerebral vascular accident) ([facility] [address]     GERD (gastroesophageal reflux disease)     Hyperlipidemia     Hypertension     Ischemic stroke ([facility] [address] 8/22/2017    Kidney failure     Memory loss 8/22/2017 internists to follow patients over the duration of their adult lives and establish rewarding personal relationships with our patients. We can also diagnose and treat common medical conditions and can help you manage chronic conditions.

## 2018-08-17 NOTE — PATIENT INSTRUCTIONS
into your chair, let your legs fall comfortably apart. 8.  Feel your shins and calves become heavier and your feet rest heavy on the floor. 9.  Now breathe in slowly and out slowly. 10.  Each time you breathe out try to relax even more. Personal Thought  Control. Our thinking often creates anxiety for us. Getting better control of our thinking can go a long way in helping us cope. The following steps can be useful. Let yourself become aware of thoughts you have when you are anxious. What are the words that you are saying to yourself at that moment? Sometimes it takes a little practice before we become aware of our thoughts. Some examples might be:  I know something bad is going to happen, or This is horrible or Waymond Kerri is this happening to me!?  Write your thoughts down. Its much easier to work with our thoughts, analyze them, and replace them if they are in black and white.   Ask yourself the following questions about your thoughts:  Is it true? (Is it logically correct? Where is the evidence to support the truth of that thought? Are there alternative ways of thinking that would be more correct?). If a thought is not as true as it could be, replace it with a more realistic and helpful one. The majority of thoughts we have that generate anxiety are not the most realistic appraisals of the situation. So what? (If this is logically correct, what does it mean to me? Is there anything I can do about the situation? Is it in my best interest to get anxious about this?). Use coping self-statements. When feeling anxious, you may be able to tell yourself automatic phrases without thinking too much about it. A couple of examples would be phrases such as Its OK, I can handle it, or Ive been through things like this before and have done all right.   Notice that these statements tend to be true for all of us. Notice a change in your emotional state as you change your thinking.   As your thoughts become more realistic, you will probably notice a decrease in anxiety and tension, and an increase in your ability to cope. Constructive Worry Worksheet  Concerns Solutions     1. ________________________                        2.  ______________________                        3.  _______________________     1. __________________________        2. __________________________        3. __________________________        1.  _________________________        2. __________________________        3. __________________________        1. __________________________        2. __________________________        3.  __________________________           Constructive Worry Instructions  When we have challenges, we tend to use our problem-solving skills to make our lives better and to relieve ourselves of anxiety. It is not surprising that some of us may use our problem-solving skills at the wrong time and place, namely bedtime. We may think about a problem, trying to solve it, but unfortunately this will oftentimes keep us awake. Constructive worry is a method for managing the tendency to worry during that quiet time when sleep is supposed to be taking over. Do this exercise early in the evening (at least 2 hours before bed). It should take only about 15 minutes to complete. Here is how it is done:  1. Write down the problem(s) facing you that has the greatest chance of keeping you awake a bedtime, and list them in the Concerns column of the [address]. 2. Then, think of the next step that might help fix it. Write it down in the Solutions column. This need not be the final solution to the problem, since most problems have to be solved by taking steps anyhow, and you will be doing this again tomorrow night and the night after until you finally get to the best solution.  If you know how to fix the problem completely, then write that down.     If you decide that this is not really a

## 2018-08-17 NOTE — CARE COORDINATION
Ambulatory Care Coordination Note  8/17/2018  CM Risk Score: 8  Adarsh Mortality Risk Score:      ACC: William Bynum RN    Summary Note: Pt reports [facility] obtained PA for Xifaxan and pt has already completed this. She has a f/u for EGD, possible esophageal stricture next week on Tuesday. Care Coordination Interventions    Program Enrollment:  Complex Care  Referral from Primary Care Provider:  Yes  Suggested Interventions and [id number]:  Completed  Medication Assistance Program:  Completed (Comment: 8/17/18: Pt reports that [facility] obtained approval for her medication and she has completed it.)  Pharmacist:  Completed (Comment: 8/17/18: Billy Ray PA completed and pt has taken med.)         Goals Addressed      COMPLETED: Medication Management                  I will take my medication as directed. I will notify my provider/Care Coordinator if I am unable to afford my medications. Barriers: financial  Plan for overcoming my barriers: Will work w [facility] to obtain [employer] to work with pharmacy and [employer] as directed. Confidence: 7/10  Anticipated Goal Completion Date: 10/16/18            Prior to Admission medications    Medication Sig Start Date End Date Taking? Authorizing Provider   amLODIPine (NORVASC) 10 MG tablet TAKE ONE TABLET EVERY DAY 8/16/18   Jordon Cat, DO   levETIRAcetam (KEPPRA) 500 MG tablet TAKE ONE TABLET TWO TIMES A DAY 8/16/18   Jordon Cat, DO   ALPRAZolam Georgie Amos) 1 MG tablet Take 1 tablet by mouth 3 times daily as needed for Anxiety for up to 30 days. . 7/27/18 8/26/18  Jordon Cat,    Hyoscyamine Sulfate (HYOSCYAMINE PO) Take 0.125 mg by mouth every 6 hours as needed for Diarrhea or Cramping 5/29/18 8/28/18  Historical Provider, MD   polyethylene glycol (GAVILYTE-G) 236 g solution Take by mouth once    Historical Provider, MD   lisinopril-hydrochlorothiazide (PRINZIDE;ZESTORETIC) 20-25 MG per tablet Take 1 tablet by mouth daily    Historical Provider, MD   acetaminophen-codeine (TYLENOL/CODEINE #3) 300-30 MG per tablet Take 1 tablet by mouth every 4 hours as needed for Pain. . 7/17/18 7/17/19  Rolf Taylor, DO   amitriptyline (ELAVIL) 100 MG tablet Take 1 tablet by mouth nightly 7/17/18   Rolf Taylor, DO   escitalopram (LEXAPRO) 20 MG tablet Take 1 tablet by mouth daily 7/17/18   Rolf Taylor, DO   promethazine (PHENERGAN) 25 MG tablet Take 1 tablet by mouth every 6 hours as needed for Nausea 5/15/18   Nasreen Higgins MD   cloNIDine (CATAPRES) 0.1 MG tablet Take 1 tablet by mouth every 4 hours 3/26/18   Rolf Taylor, DO   fluticasone [facility]) 50 MCG/ACT nasal spray 1 spray by Nasal route daily 2/8/18   Rolf Taylor, DO   metoprolol succinate (TOPROL XL) 100 MG extended release tablet Take 1 tablet by mouth nightly 12/27/17   Rolf Taylor, DO   aspirin-acetaminophen-caffeine (EXCEDRIN MIGRAINE) 683-664-98 MG per tablet Take 1 tablet by mouth 2 times daily as needed for Headaches    Historical Provider, MD   megestrol (MEGACE) 40 MG tablet Take 1 tablet by mouth daily 8/25/17   Rolf Taylor, DO   pantoprazole (PROTONIX) 40 MG tablet Take 40 mg by mouth daily  8/9/16   Historical Provider, MD       Future Appointments  Date Time Provider Maite Kearney   8/17/2018 10:30 AM Sherry Riojas

## 2018-09-05 ENCOUNTER — CARE COORDINATION (OUTPATIENT)
Dept: CARE COORDINATION | Age: 48
End: 2018-09-05

## 2018-09-10 ENCOUNTER — HOSPITAL ENCOUNTER (OUTPATIENT)
Facility: HOSPITAL | Age: 48
Setting detail: OBSERVATION
Discharge: HOME | End: 2018-09-12
Attending: EMERGENCY MEDICINE | Admitting: EMERGENCY MEDICINE

## 2018-09-10 ENCOUNTER — APPOINTMENT (OUTPATIENT)
Dept: GENERAL RADIOLOGY | Facility: HOSPITAL | Age: 48
End: 2018-09-10

## 2018-09-10 ENCOUNTER — APPOINTMENT (OUTPATIENT)
Dept: CT IMAGING | Facility: HOSPITAL | Age: 48
End: 2018-09-10

## 2018-09-10 DIAGNOSIS — K52.9 COLITIS: ICD-10-CM

## 2018-09-10 DIAGNOSIS — E86.0 DEHYDRATION: ICD-10-CM

## 2018-09-10 DIAGNOSIS — R55 SYNCOPE, UNSPECIFIED SYNCOPE TYPE: Primary | ICD-10-CM

## 2018-09-10 LAB
ALBUMIN SERPL-MCNC: 4.8 G/DL (ref 3.5–5)
ALBUMIN/GLOB SERPL: 1.5 G/DL (ref 1.1–2.5)
ALP SERPL-CCNC: 95 U/L (ref 24–120)
ALT SERPL W P-5'-P-CCNC: <15 U/L (ref 0–54)
AMPHET+METHAMPHET UR QL: NEGATIVE
AMYLASE SERPL-CCNC: 66 U/L (ref 30–110)
ANION GAP SERPL CALCULATED.3IONS-SCNC: 18 MMOL/L (ref 4–13)
APTT PPP: 27.6 SECONDS (ref 24.1–34.8)
AST SERPL-CCNC: 26 U/L (ref 7–45)
BACTERIA UR QL AUTO: ABNORMAL /HPF
BARBITURATES UR QL SCN: NEGATIVE
BENZODIAZ UR QL SCN: POSITIVE
BILIRUB SERPL-MCNC: 0.4 MG/DL (ref 0.1–1)
BILIRUB UR QL STRIP: NEGATIVE
BUN BLD-MCNC: 7 MG/DL (ref 5–21)
BUN/CREAT SERPL: 8.3 (ref 7–25)
CALCIUM SPEC-SCNC: 10.8 MG/DL (ref 8.4–10.4)
CANNABINOIDS SERPL QL: POSITIVE
CHLORIDE SERPL-SCNC: 101 MMOL/L (ref 98–110)
CK MB SERPL-CCNC: 0.75 NG/ML (ref 0–5)
CLARITY UR: ABNORMAL
CO2 SERPL-SCNC: 19 MMOL/L (ref 24–31)
COCAINE UR QL: NEGATIVE
COLOR UR: YELLOW
CREAT BLD-MCNC: 0.84 MG/DL (ref 0.5–1.4)
DEPRECATED RDW RBC AUTO: 44.1 FL (ref 40–54)
EOSINOPHIL # BLD MANUAL: 0.1 10*3/MM3 (ref 0–0.7)
EOSINOPHIL NFR BLD MANUAL: 1 % (ref 0–4)
ERYTHROCYTE [DISTWIDTH] IN BLOOD BY AUTOMATED COUNT: 13.6 % (ref 12–15)
GFR SERPL CREATININE-BSD FRML MDRD: 73 ML/MIN/1.73
GIANT PLATELETS: ABNORMAL
GLOBULIN UR ELPH-MCNC: 3.2 GM/DL
GLUCOSE BLD-MCNC: 256 MG/DL (ref 70–100)
GLUCOSE UR STRIP-MCNC: NEGATIVE MG/DL
HCT VFR BLD AUTO: 41.7 % (ref 37–47)
HGB BLD-MCNC: 14.2 G/DL (ref 12–16)
HGB UR QL STRIP.AUTO: NEGATIVE
HOLD SPECIMEN: NORMAL
HOLD SPECIMEN: NORMAL
HYALINE CASTS UR QL AUTO: ABNORMAL /LPF
INR PPP: 0.9 (ref 0.91–1.09)
KETONES UR QL STRIP: NEGATIVE
LEUKOCYTE ESTERASE UR QL STRIP.AUTO: ABNORMAL
LIPASE SERPL-CCNC: 47 U/L (ref 23–203)
LYMPHOCYTES # BLD MANUAL: 3.74 10*3/MM3 (ref 0.72–4.86)
LYMPHOCYTES NFR BLD MANUAL: 2 % (ref 4–12)
LYMPHOCYTES NFR BLD MANUAL: 37.4 % (ref 15–45)
MCH RBC QN AUTO: 30.1 PG (ref 28–32)
MCHC RBC AUTO-ENTMCNC: 34.1 G/DL (ref 33–36)
MCV RBC AUTO: 88.3 FL (ref 82–98)
METHADONE UR QL SCN: NEGATIVE
MONOCYTES # BLD AUTO: 0.2 10*3/MM3 (ref 0.19–1.3)
NEUTROPHILS # BLD AUTO: 4.44 10*3/MM3 (ref 1.87–8.4)
NEUTROPHILS NFR BLD MANUAL: 44.4 % (ref 39–78)
NITRITE UR QL STRIP: NEGATIVE
NT-PROBNP SERPL-MCNC: 263 PG/ML (ref 0–450)
OPIATES UR QL: NEGATIVE
PCP UR QL SCN: NEGATIVE
PH UR STRIP.AUTO: 5.5 [PH] (ref 5–8)
PLATELET # BLD AUTO: 363 10*3/MM3 (ref 130–400)
PMV BLD AUTO: 10.9 FL (ref 6–12)
POTASSIUM BLD-SCNC: 3.2 MMOL/L (ref 3.5–5.3)
PROT SERPL-MCNC: 8 G/DL (ref 6.3–8.7)
PROT UR QL STRIP: ABNORMAL
PROTHROMBIN TIME: 12.4 SECONDS (ref 11.9–14.6)
RBC # BLD AUTO: 4.72 10*6/MM3 (ref 4.2–5.4)
RBC # UR: ABNORMAL /HPF
RBC MORPH BLD: NORMAL
REF LAB TEST METHOD: ABNORMAL
SODIUM BLD-SCNC: 138 MMOL/L (ref 135–145)
SP GR UR STRIP: 1.03 (ref 1–1.03)
SQUAMOUS #/AREA URNS HPF: ABNORMAL /HPF
TROPONIN I SERPL-MCNC: 0.03 NG/ML (ref 0–0.03)
UROBILINOGEN UR QL STRIP: ABNORMAL
VARIANT LYMPHS NFR BLD MANUAL: 15.2 % (ref 0–5)
WBC MORPH BLD: NORMAL
WBC NRBC COR # BLD: 9.99 10*3/MM3 (ref 4.8–10.8)
WBC UR QL AUTO: ABNORMAL /HPF
WHOLE BLOOD HOLD SPECIMEN: NORMAL
WHOLE BLOOD HOLD SPECIMEN: NORMAL

## 2018-09-10 PROCEDURE — 85610 PROTHROMBIN TIME: CPT | Performed by: EMERGENCY MEDICINE

## 2018-09-10 PROCEDURE — 84484 ASSAY OF TROPONIN QUANT: CPT | Performed by: EMERGENCY MEDICINE

## 2018-09-10 PROCEDURE — 80307 DRUG TEST PRSMV CHEM ANLYZR: CPT | Performed by: EMERGENCY MEDICINE

## 2018-09-10 PROCEDURE — 36415 COLL VENOUS BLD VENIPUNCTURE: CPT

## 2018-09-10 PROCEDURE — 80053 COMPREHEN METABOLIC PANEL: CPT | Performed by: EMERGENCY MEDICINE

## 2018-09-10 PROCEDURE — 85730 THROMBOPLASTIN TIME PARTIAL: CPT | Performed by: EMERGENCY MEDICINE

## 2018-09-10 PROCEDURE — 81001 URINALYSIS AUTO W/SCOPE: CPT | Performed by: EMERGENCY MEDICINE

## 2018-09-10 PROCEDURE — 0 IOHEXOL 300 MG/ML SOLUTION: Performed by: EMERGENCY MEDICINE

## 2018-09-10 PROCEDURE — 99285 EMERGENCY DEPT VISIT HI MDM: CPT

## 2018-09-10 PROCEDURE — 96375 TX/PRO/DX INJ NEW DRUG ADDON: CPT

## 2018-09-10 PROCEDURE — 83880 ASSAY OF NATRIURETIC PEPTIDE: CPT | Performed by: EMERGENCY MEDICINE

## 2018-09-10 PROCEDURE — 71275 CT ANGIOGRAPHY CHEST: CPT

## 2018-09-10 PROCEDURE — 85007 BL SMEAR W/DIFF WBC COUNT: CPT | Performed by: EMERGENCY MEDICINE

## 2018-09-10 PROCEDURE — 82150 ASSAY OF AMYLASE: CPT | Performed by: EMERGENCY MEDICINE

## 2018-09-10 PROCEDURE — 25010000002 MORPHINE PER 10 MG: Performed by: EMERGENCY MEDICINE

## 2018-09-10 PROCEDURE — 70450 CT HEAD/BRAIN W/O DYE: CPT

## 2018-09-10 PROCEDURE — 25010000002 CEFTRIAXONE PER 250 MG: Performed by: EMERGENCY MEDICINE

## 2018-09-10 PROCEDURE — 71045 X-RAY EXAM CHEST 1 VIEW: CPT

## 2018-09-10 PROCEDURE — 25010000003 LEVETIRACETAM IN NACL 0.75% 1000 MG/100ML SOLUTION: Performed by: EMERGENCY MEDICINE

## 2018-09-10 PROCEDURE — 74177 CT ABD & PELVIS W/CONTRAST: CPT

## 2018-09-10 PROCEDURE — 83036 HEMOGLOBIN GLYCOSYLATED A1C: CPT | Performed by: EMERGENCY MEDICINE

## 2018-09-10 PROCEDURE — 83690 ASSAY OF LIPASE: CPT | Performed by: EMERGENCY MEDICINE

## 2018-09-10 PROCEDURE — 93005 ELECTROCARDIOGRAM TRACING: CPT | Performed by: EMERGENCY MEDICINE

## 2018-09-10 PROCEDURE — 25010000002 ONDANSETRON PER 1 MG: Performed by: EMERGENCY MEDICINE

## 2018-09-10 PROCEDURE — 82553 CREATINE MB FRACTION: CPT | Performed by: EMERGENCY MEDICINE

## 2018-09-10 PROCEDURE — 93010 ELECTROCARDIOGRAM REPORT: CPT | Performed by: INTERNAL MEDICINE

## 2018-09-10 PROCEDURE — 85025 COMPLETE CBC W/AUTO DIFF WBC: CPT | Performed by: EMERGENCY MEDICINE

## 2018-09-10 PROCEDURE — 93005 ELECTROCARDIOGRAM TRACING: CPT

## 2018-09-10 RX ORDER — PROMETHAZINE HYDROCHLORIDE 25 MG/ML
12.5 INJECTION, SOLUTION INTRAMUSCULAR; INTRAVENOUS ONCE
Status: DISCONTINUED | OUTPATIENT
Start: 2018-09-10 | End: 2018-09-10

## 2018-09-10 RX ORDER — FAMOTIDINE 10 MG/ML
20 INJECTION, SOLUTION INTRAVENOUS ONCE
Status: COMPLETED | OUTPATIENT
Start: 2018-09-10 | End: 2018-09-10

## 2018-09-10 RX ORDER — METOCLOPRAMIDE HYDROCHLORIDE 5 MG/ML
10 INJECTION INTRAMUSCULAR; INTRAVENOUS ONCE
Status: DISCONTINUED | OUTPATIENT
Start: 2018-09-10 | End: 2018-09-11

## 2018-09-10 RX ORDER — ONDANSETRON 2 MG/ML
4 INJECTION INTRAMUSCULAR; INTRAVENOUS ONCE
Status: COMPLETED | OUTPATIENT
Start: 2018-09-10 | End: 2018-09-10

## 2018-09-10 RX ORDER — LEVETIRACETAM 10 MG/ML
1000 INJECTION INTRAVASCULAR ONCE
Status: COMPLETED | OUTPATIENT
Start: 2018-09-10 | End: 2018-09-10

## 2018-09-10 RX ORDER — ONDANSETRON 2 MG/ML
4 INJECTION INTRAMUSCULAR; INTRAVENOUS ONCE
Status: DISCONTINUED | OUTPATIENT
Start: 2018-09-10 | End: 2018-09-10

## 2018-09-10 RX ADMIN — SODIUM CHLORIDE 500 ML: 9 INJECTION, SOLUTION INTRAVENOUS at 22:54

## 2018-09-10 RX ADMIN — IOHEXOL 50 ML: 300 INJECTION, SOLUTION INTRAVENOUS at 21:21

## 2018-09-10 RX ADMIN — ONDANSETRON HYDROCHLORIDE 4 MG: 2 INJECTION, SOLUTION INTRAMUSCULAR; INTRAVENOUS at 20:41

## 2018-09-10 RX ADMIN — LEVETIRACETAM 1000 MG: 10 INJECTION INTRAVENOUS at 21:23

## 2018-09-10 RX ADMIN — MORPHINE SULFATE 4 MG: 4 INJECTION INTRAVENOUS at 21:21

## 2018-09-10 RX ADMIN — FAMOTIDINE 20 MG: 10 INJECTION INTRAVENOUS at 20:41

## 2018-09-10 RX ADMIN — IOPAMIDOL 100 ML: 755 INJECTION, SOLUTION INTRAVENOUS at 23:54

## 2018-09-10 RX ADMIN — CEFTRIAXONE SODIUM 1 G: 1 INJECTION, POWDER, FOR SOLUTION INTRAMUSCULAR; INTRAVENOUS at 22:48

## 2018-09-10 RX ADMIN — SODIUM CHLORIDE 1500 ML: 9 INJECTION, SOLUTION INTRAVENOUS at 20:41

## 2018-09-11 ENCOUNTER — APPOINTMENT (OUTPATIENT)
Dept: CARDIOLOGY | Facility: HOSPITAL | Age: 48
End: 2018-09-11
Attending: FAMILY MEDICINE

## 2018-09-11 PROBLEM — R55 SYNCOPE: Status: ACTIVE | Noted: 2018-09-11

## 2018-09-11 LAB
ALBUMIN SERPL-MCNC: 3.1 G/DL (ref 3.5–5)
ALBUMIN/GLOB SERPL: 1.3 G/DL (ref 1.1–2.5)
ALP SERPL-CCNC: 52 U/L (ref 24–120)
ALT SERPL W P-5'-P-CCNC: 25 U/L (ref 0–54)
AMYLASE SERPL-CCNC: 46 U/L (ref 30–110)
ANION GAP SERPL CALCULATED.3IONS-SCNC: 7 MMOL/L (ref 4–13)
AST SERPL-CCNC: 16 U/L (ref 7–45)
BASOPHILS # BLD AUTO: 0.02 10*3/MM3 (ref 0–0.2)
BASOPHILS NFR BLD AUTO: 0.3 % (ref 0–2)
BH CV ECHO MEAS - AO MAX PG (FULL): 2.1 MMHG
BH CV ECHO MEAS - AO MAX PG: 6.6 MMHG
BH CV ECHO MEAS - AO MEAN PG (FULL): 0.5 MMHG
BH CV ECHO MEAS - AO MEAN PG: 3.5 MMHG
BH CV ECHO MEAS - AO ROOT AREA (BSA CORRECTED): 1.6
BH CV ECHO MEAS - AO ROOT AREA: 5.3 CM^2
BH CV ECHO MEAS - AO ROOT DIAM: 2.6 CM
BH CV ECHO MEAS - AO V2 MAX: 128 CM/SEC
BH CV ECHO MEAS - AO V2 MEAN: 90.2 CM/SEC
BH CV ECHO MEAS - AO V2 VTI: 30.3 CM
BH CV ECHO MEAS - AVA(I,A): 2.6 CM^2
BH CV ECHO MEAS - AVA(I,D): 2.6 CM^2
BH CV ECHO MEAS - AVA(V,A): 2.6 CM^2
BH CV ECHO MEAS - AVA(V,D): 2.6 CM^2
BH CV ECHO MEAS - BSA(HAYCOCK): 1.6 M^2
BH CV ECHO MEAS - BSA: 1.6 M^2
BH CV ECHO MEAS - BZI_BMI: 18.6 KILOGRAMS/M^2
BH CV ECHO MEAS - BZI_METRIC_HEIGHT: 170.2 CM
BH CV ECHO MEAS - BZI_METRIC_WEIGHT: 54 KG
BH CV ECHO MEAS - EDV(CUBED): 71.5 ML
BH CV ECHO MEAS - EDV(TEICH): 76.4 ML
BH CV ECHO MEAS - EF(CUBED): 70.3 %
BH CV ECHO MEAS - EF(TEICH): 62.3 %
BH CV ECHO MEAS - ESV(CUBED): 21.3 ML
BH CV ECHO MEAS - ESV(TEICH): 28.8 ML
BH CV ECHO MEAS - FS: 33.3 %
BH CV ECHO MEAS - IVS/LVPW: 1.2
BH CV ECHO MEAS - IVSD: 0.99 CM
BH CV ECHO MEAS - LA DIMENSION: 2.7 CM
BH CV ECHO MEAS - LA/AO: 1
BH CV ECHO MEAS - LAT PEAK E' VEL: 12.4 CM/SEC
BH CV ECHO MEAS - LV MASS(C)D: 118 GRAMS
BH CV ECHO MEAS - LV MASS(C)DI: 72.8 GRAMS/M^2
BH CV ECHO MEAS - LV MAX PG: 4.5 MMHG
BH CV ECHO MEAS - LV MEAN PG: 3 MMHG
BH CV ECHO MEAS - LV V1 MAX: 106 CM/SEC
BH CV ECHO MEAS - LV V1 MEAN: 77.7 CM/SEC
BH CV ECHO MEAS - LV V1 VTI: 24.9 CM
BH CV ECHO MEAS - LVIDD: 4.2 CM
BH CV ECHO MEAS - LVIDS: 2.8 CM
BH CV ECHO MEAS - LVOT AREA (M): 3.1 CM^2
BH CV ECHO MEAS - LVOT AREA: 3.1 CM^2
BH CV ECHO MEAS - LVOT DIAM: 2 CM
BH CV ECHO MEAS - LVPWD: 0.83 CM
BH CV ECHO MEAS - MED PEAK E' VEL: 9.9 CM/SEC
BH CV ECHO MEAS - MV A MAX VEL: 57.2 CM/SEC
BH CV ECHO MEAS - MV DEC TIME: 0.19 SEC
BH CV ECHO MEAS - MV E MAX VEL: 89 CM/SEC
BH CV ECHO MEAS - MV E/A: 1.6
BH CV ECHO MEAS - RAP SYSTOLE: 5 MMHG
BH CV ECHO MEAS - RVSP: 16.3 MMHG
BH CV ECHO MEAS - SI(AO): 99 ML/M^2
BH CV ECHO MEAS - SI(CUBED): 31 ML/M^2
BH CV ECHO MEAS - SI(LVOT): 48.2 ML/M^2
BH CV ECHO MEAS - SI(TEICH): 29.4 ML/M^2
BH CV ECHO MEAS - SV(AO): 160.6 ML
BH CV ECHO MEAS - SV(CUBED): 50.2 ML
BH CV ECHO MEAS - SV(LVOT): 78.2 ML
BH CV ECHO MEAS - SV(TEICH): 47.6 ML
BH CV ECHO MEAS - TR MAX VEL: 168 CM/SEC
BH CV ECHO MEASUREMENTS AVERAGE E/E' RATIO: 7.98
BILIRUB SERPL-MCNC: 0.2 MG/DL (ref 0.1–1)
BILIRUB UR QL STRIP: NEGATIVE
BUN BLD-MCNC: 4 MG/DL (ref 5–21)
BUN/CREAT SERPL: 7 (ref 7–25)
CA-I BLD-MCNC: 4.35 MG/DL (ref 4.6–5.4)
CALCIUM SPEC-SCNC: 7.9 MG/DL (ref 8.4–10.4)
CHLORIDE SERPL-SCNC: 114 MMOL/L (ref 98–110)
CLARITY UR: CLEAR
CO2 SERPL-SCNC: 21 MMOL/L (ref 24–31)
COLOR UR: YELLOW
CREAT BLD-MCNC: 0.57 MG/DL (ref 0.5–1.4)
D-LACTATE SERPL-SCNC: 0.8 MMOL/L (ref 0.5–2)
D-LACTATE SERPL-SCNC: 4.8 MMOL/L (ref 0.5–2)
DEPRECATED RDW RBC AUTO: 44.9 FL (ref 40–54)
EOSINOPHIL # BLD AUTO: 0.05 10*3/MM3 (ref 0–0.7)
EOSINOPHIL NFR BLD AUTO: 0.7 % (ref 0–4)
ERYTHROCYTE [DISTWIDTH] IN BLOOD BY AUTOMATED COUNT: 13.7 % (ref 12–15)
GFR SERPL CREATININE-BSD FRML MDRD: 114 ML/MIN/1.73
GLOBULIN UR ELPH-MCNC: 2.4 GM/DL
GLUCOSE BLD-MCNC: 85 MG/DL (ref 70–100)
GLUCOSE BLDC GLUCOMTR-MCNC: 104 MG/DL (ref 70–130)
GLUCOSE BLDC GLUCOMTR-MCNC: 80 MG/DL (ref 70–130)
GLUCOSE BLDC GLUCOMTR-MCNC: 89 MG/DL (ref 70–130)
GLUCOSE BLDC GLUCOMTR-MCNC: 90 MG/DL (ref 70–130)
GLUCOSE BLDC GLUCOMTR-MCNC: 97 MG/DL (ref 70–130)
GLUCOSE UR STRIP-MCNC: NEGATIVE MG/DL
HBA1C MFR BLD: 5.8 %
HCT VFR BLD AUTO: 32 % (ref 37–47)
HGB BLD-MCNC: 10.7 G/DL (ref 12–16)
HGB UR QL STRIP.AUTO: NEGATIVE
HOLD SPECIMEN: NORMAL
IMM GRANULOCYTES # BLD: 0.02 10*3/MM3 (ref 0–0.03)
IMM GRANULOCYTES NFR BLD: 0.3 % (ref 0–5)
KETONES UR QL STRIP: NEGATIVE
LEFT ATRIUM VOLUME INDEX: 24.8 ML/M2
LEFT ATRIUM VOLUME: 40.1 CM3
LEUKOCYTE ESTERASE UR QL STRIP.AUTO: NEGATIVE
LIPASE SERPL-CCNC: 37 U/L (ref 23–203)
LYMPHOCYTES # BLD AUTO: 3.28 10*3/MM3 (ref 0.72–4.86)
LYMPHOCYTES NFR BLD AUTO: 43.2 % (ref 15–45)
Lab: ABNORMAL
MAGNESIUM SERPL-MCNC: 1.5 MG/DL (ref 1.4–2.2)
MAXIMAL PREDICTED HEART RATE: 173 BPM
MCH RBC QN AUTO: 30.1 PG (ref 28–32)
MCHC RBC AUTO-ENTMCNC: 33.4 G/DL (ref 33–36)
MCV RBC AUTO: 89.9 FL (ref 82–98)
MONOCYTES # BLD AUTO: 0.52 10*3/MM3 (ref 0.19–1.3)
MONOCYTES NFR BLD AUTO: 6.9 % (ref 4–12)
NEUTROPHILS # BLD AUTO: 3.7 10*3/MM3 (ref 1.87–8.4)
NEUTROPHILS NFR BLD AUTO: 48.6 % (ref 39–78)
NITRITE UR QL STRIP: NEGATIVE
NRBC BLD MANUAL-RTO: 0 /100 WBC (ref 0–0)
PH UR STRIP.AUTO: 5.5 [PH] (ref 5–8)
PHOSPHATE SERPL-MCNC: 2.8 MG/DL (ref 2.5–4.5)
PLATELET # BLD AUTO: 305 10*3/MM3 (ref 130–400)
PMV BLD AUTO: 10.4 FL (ref 6–12)
POTASSIUM BLD-SCNC: 4 MMOL/L (ref 3.5–5.3)
PROCALCITONIN SERPL-MCNC: <0.25 NG/ML
PROT SERPL-MCNC: 5.5 G/DL (ref 6.3–8.7)
PROT UR QL STRIP: NEGATIVE
RBC # BLD AUTO: 3.56 10*6/MM3 (ref 4.2–5.4)
SODIUM BLD-SCNC: 142 MMOL/L (ref 135–145)
SP GR UR STRIP: 1.03 (ref 1–1.03)
STRESS TARGET HR: 147 BPM
TROPONIN I SERPL-MCNC: 0.17 NG/ML (ref 0–0.03)
TROPONIN I SERPL-MCNC: 0.37 NG/ML (ref 0–0.03)
TROPONIN I SERPL-MCNC: 0.67 NG/ML (ref 0–0.03)
TSH SERPL DL<=0.05 MIU/L-ACNC: 2.18 MIU/ML (ref 0.47–4.68)
UROBILINOGEN UR QL STRIP: NORMAL
WBC NRBC COR # BLD: 7.59 10*3/MM3 (ref 4.8–10.8)

## 2018-09-11 PROCEDURE — 80053 COMPREHEN METABOLIC PANEL: CPT | Performed by: FAMILY MEDICINE

## 2018-09-11 PROCEDURE — 84100 ASSAY OF PHOSPHORUS: CPT | Performed by: FAMILY MEDICINE

## 2018-09-11 PROCEDURE — 94760 N-INVAS EAR/PLS OXIMETRY 1: CPT

## 2018-09-11 PROCEDURE — 82962 GLUCOSE BLOOD TEST: CPT

## 2018-09-11 PROCEDURE — 85025 COMPLETE CBC W/AUTO DIFF WBC: CPT | Performed by: FAMILY MEDICINE

## 2018-09-11 PROCEDURE — 84145 PROCALCITONIN (PCT): CPT | Performed by: FAMILY MEDICINE

## 2018-09-11 PROCEDURE — 0 IOPAMIDOL PER 1 ML: Performed by: EMERGENCY MEDICINE

## 2018-09-11 PROCEDURE — 25010000002 POTASSIUM CHLORIDE PER 2 MEQ: Performed by: FAMILY MEDICINE

## 2018-09-11 PROCEDURE — 83735 ASSAY OF MAGNESIUM: CPT | Performed by: FAMILY MEDICINE

## 2018-09-11 PROCEDURE — G0378 HOSPITAL OBSERVATION PER HR: HCPCS

## 2018-09-11 PROCEDURE — 84443 ASSAY THYROID STIM HORMONE: CPT | Performed by: FAMILY MEDICINE

## 2018-09-11 PROCEDURE — 87040 BLOOD CULTURE FOR BACTERIA: CPT | Performed by: EMERGENCY MEDICINE

## 2018-09-11 PROCEDURE — 25010000002 LEVOFLOXACIN PER 250 MG: Performed by: EMERGENCY MEDICINE

## 2018-09-11 PROCEDURE — 93010 ELECTROCARDIOGRAM REPORT: CPT | Performed by: INTERNAL MEDICINE

## 2018-09-11 PROCEDURE — 96368 THER/DIAG CONCURRENT INF: CPT

## 2018-09-11 PROCEDURE — 96366 THER/PROPH/DIAG IV INF ADDON: CPT

## 2018-09-11 PROCEDURE — 84484 ASSAY OF TROPONIN QUANT: CPT | Performed by: FAMILY MEDICINE

## 2018-09-11 PROCEDURE — 83690 ASSAY OF LIPASE: CPT | Performed by: FAMILY MEDICINE

## 2018-09-11 PROCEDURE — 87147 CULTURE TYPE IMMUNOLOGIC: CPT | Performed by: EMERGENCY MEDICINE

## 2018-09-11 PROCEDURE — 82330 ASSAY OF CALCIUM: CPT

## 2018-09-11 PROCEDURE — 93005 ELECTROCARDIOGRAM TRACING: CPT | Performed by: INTERNAL MEDICINE

## 2018-09-11 PROCEDURE — 96376 TX/PRO/DX INJ SAME DRUG ADON: CPT

## 2018-09-11 PROCEDURE — 94799 UNLISTED PULMONARY SVC/PX: CPT

## 2018-09-11 PROCEDURE — 25010000002 HEPARIN (PORCINE) PER 1000 UNITS: Performed by: FAMILY MEDICINE

## 2018-09-11 PROCEDURE — 96361 HYDRATE IV INFUSION ADD-ON: CPT

## 2018-09-11 PROCEDURE — 81003 URINALYSIS AUTO W/O SCOPE: CPT | Performed by: FAMILY MEDICINE

## 2018-09-11 PROCEDURE — 25010000002 ONDANSETRON PER 1 MG: Performed by: FAMILY MEDICINE

## 2018-09-11 PROCEDURE — 82150 ASSAY OF AMYLASE: CPT | Performed by: FAMILY MEDICINE

## 2018-09-11 PROCEDURE — 93306 TTE W/DOPPLER COMPLETE: CPT

## 2018-09-11 PROCEDURE — 93306 TTE W/DOPPLER COMPLETE: CPT | Performed by: INTERNAL MEDICINE

## 2018-09-11 PROCEDURE — 96365 THER/PROPH/DIAG IV INF INIT: CPT

## 2018-09-11 PROCEDURE — 84484 ASSAY OF TROPONIN QUANT: CPT | Performed by: EMERGENCY MEDICINE

## 2018-09-11 PROCEDURE — 96372 THER/PROPH/DIAG INJ SC/IM: CPT

## 2018-09-11 PROCEDURE — 96367 TX/PROPH/DG ADDL SEQ IV INF: CPT

## 2018-09-11 PROCEDURE — 87150 DNA/RNA AMPLIFIED PROBE: CPT | Performed by: EMERGENCY MEDICINE

## 2018-09-11 PROCEDURE — 83605 ASSAY OF LACTIC ACID: CPT | Performed by: EMERGENCY MEDICINE

## 2018-09-11 PROCEDURE — 93005 ELECTROCARDIOGRAM TRACING: CPT | Performed by: EMERGENCY MEDICINE

## 2018-09-11 PROCEDURE — 99214 OFFICE O/P EST MOD 30 MIN: CPT | Performed by: INTERNAL MEDICINE

## 2018-09-11 PROCEDURE — 87205 SMEAR GRAM STAIN: CPT | Performed by: EMERGENCY MEDICINE

## 2018-09-11 PROCEDURE — 84484 ASSAY OF TROPONIN QUANT: CPT | Performed by: INTERNAL MEDICINE

## 2018-09-11 RX ORDER — FAMOTIDINE 20 MG/1
40 TABLET, FILM COATED ORAL DAILY
Status: DISCONTINUED | OUTPATIENT
Start: 2018-09-11 | End: 2018-09-12 | Stop reason: HOSPADM

## 2018-09-11 RX ORDER — ASPIRIN 81 MG/1
81 TABLET ORAL DAILY
Status: DISCONTINUED | OUTPATIENT
Start: 2018-09-11 | End: 2018-09-12 | Stop reason: HOSPADM

## 2018-09-11 RX ORDER — LEVETIRACETAM 250 MG/1
250 TABLET ORAL EVERY 12 HOURS SCHEDULED
Status: DISCONTINUED | OUTPATIENT
Start: 2018-09-11 | End: 2018-09-12 | Stop reason: HOSPADM

## 2018-09-11 RX ORDER — SODIUM CHLORIDE 0.9 % (FLUSH) 0.9 %
1-10 SYRINGE (ML) INJECTION AS NEEDED
Status: DISCONTINUED | OUTPATIENT
Start: 2018-09-11 | End: 2018-09-12 | Stop reason: HOSPADM

## 2018-09-11 RX ORDER — METOPROLOL SUCCINATE 100 MG/1
100 TABLET, EXTENDED RELEASE ORAL NIGHTLY
Status: DISCONTINUED | OUTPATIENT
Start: 2018-09-11 | End: 2018-09-12 | Stop reason: HOSPADM

## 2018-09-11 RX ORDER — DEXTROSE MONOHYDRATE 25 G/50ML
25 INJECTION, SOLUTION INTRAVENOUS
Status: DISCONTINUED | OUTPATIENT
Start: 2018-09-11 | End: 2018-09-12 | Stop reason: HOSPADM

## 2018-09-11 RX ORDER — FLUTICASONE PROPIONATE 50 MCG
2 SPRAY, SUSPENSION (ML) NASAL DAILY
Status: DISCONTINUED | OUTPATIENT
Start: 2018-09-11 | End: 2018-09-12 | Stop reason: HOSPADM

## 2018-09-11 RX ORDER — DICYCLOMINE HYDROCHLORIDE 10 MG/1
10 CAPSULE ORAL 4 TIMES DAILY PRN
Status: DISCONTINUED | OUTPATIENT
Start: 2018-09-11 | End: 2018-09-12 | Stop reason: HOSPADM

## 2018-09-11 RX ORDER — AMITRIPTYLINE HYDROCHLORIDE 25 MG/1
50 TABLET, FILM COATED ORAL NIGHTLY
Status: DISCONTINUED | OUTPATIENT
Start: 2018-09-11 | End: 2018-09-12 | Stop reason: HOSPADM

## 2018-09-11 RX ORDER — PROMETHAZINE HYDROCHLORIDE 25 MG/1
25 TABLET ORAL EVERY 6 HOURS PRN
Status: DISCONTINUED | OUTPATIENT
Start: 2018-09-11 | End: 2018-09-12 | Stop reason: HOSPADM

## 2018-09-11 RX ORDER — ONDANSETRON 2 MG/ML
4 INJECTION INTRAMUSCULAR; INTRAVENOUS EVERY 6 HOURS PRN
Status: DISCONTINUED | OUTPATIENT
Start: 2018-09-11 | End: 2018-09-12 | Stop reason: HOSPADM

## 2018-09-11 RX ORDER — ALPRAZOLAM 0.5 MG/1
0.5 TABLET ORAL 3 TIMES DAILY PRN
Status: DISCONTINUED | OUTPATIENT
Start: 2018-09-11 | End: 2018-09-12 | Stop reason: HOSPADM

## 2018-09-11 RX ORDER — POTASSIUM CHLORIDE 14.9 MG/ML
20 INJECTION INTRAVENOUS ONCE
Status: COMPLETED | OUTPATIENT
Start: 2018-09-11 | End: 2018-09-11

## 2018-09-11 RX ORDER — LEVOFLOXACIN 5 MG/ML
500 INJECTION, SOLUTION INTRAVENOUS ONCE
Status: COMPLETED | OUTPATIENT
Start: 2018-09-11 | End: 2018-09-11

## 2018-09-11 RX ORDER — ASPIRIN 81 MG/1
324 TABLET, CHEWABLE ORAL ONCE
Status: COMPLETED | OUTPATIENT
Start: 2018-09-11 | End: 2018-09-11

## 2018-09-11 RX ORDER — HEPARIN SODIUM 5000 [USP'U]/ML
5000 INJECTION, SOLUTION INTRAVENOUS; SUBCUTANEOUS EVERY 12 HOURS SCHEDULED
Status: DISCONTINUED | OUTPATIENT
Start: 2018-09-11 | End: 2018-09-12 | Stop reason: HOSPADM

## 2018-09-11 RX ORDER — SODIUM CHLORIDE 9 MG/ML
75 INJECTION, SOLUTION INTRAVENOUS CONTINUOUS
Status: DISCONTINUED | OUTPATIENT
Start: 2018-09-11 | End: 2018-09-12

## 2018-09-11 RX ORDER — NICOTINE POLACRILEX 4 MG
15 LOZENGE BUCCAL
Status: DISCONTINUED | OUTPATIENT
Start: 2018-09-11 | End: 2018-09-12 | Stop reason: HOSPADM

## 2018-09-11 RX ORDER — LEVOFLOXACIN 5 MG/ML
750 INJECTION, SOLUTION INTRAVENOUS EVERY 24 HOURS
Status: DISCONTINUED | OUTPATIENT
Start: 2018-09-12 | End: 2018-09-12 | Stop reason: HOSPADM

## 2018-09-11 RX ORDER — HYDROCODONE BITARTRATE AND ACETAMINOPHEN 5; 325 MG/1; MG/1
1 TABLET ORAL EVERY 6 HOURS PRN
Status: DISCONTINUED | OUTPATIENT
Start: 2018-09-11 | End: 2018-09-12 | Stop reason: HOSPADM

## 2018-09-11 RX ADMIN — ONDANSETRON 4 MG: 2 INJECTION INTRAMUSCULAR; INTRAVENOUS at 10:06

## 2018-09-11 RX ADMIN — ASPIRIN 81 MG: 81 TABLET ORAL at 09:56

## 2018-09-11 RX ADMIN — METRONIDAZOLE 500 MG: 500 INJECTION, SOLUTION INTRAVENOUS at 09:57

## 2018-09-11 RX ADMIN — ASPIRIN 81 MG 324 MG: 81 TABLET ORAL at 01:31

## 2018-09-11 RX ADMIN — POTASSIUM CHLORIDE 20 MEQ: 200 INJECTION, SOLUTION INTRAVENOUS at 02:53

## 2018-09-11 RX ADMIN — ALPRAZOLAM 0.5 MG: 0.5 TABLET ORAL at 20:12

## 2018-09-11 RX ADMIN — SODIUM CHLORIDE 125 ML/HR: 9 INJECTION, SOLUTION INTRAVENOUS at 16:25

## 2018-09-11 RX ADMIN — LEVETIRACETAM 250 MG: 250 TABLET, FILM COATED ORAL at 20:06

## 2018-09-11 RX ADMIN — HEPARIN SODIUM 5000 UNITS: 5000 INJECTION, SOLUTION INTRAVENOUS; SUBCUTANEOUS at 20:06

## 2018-09-11 RX ADMIN — METOPROLOL SUCCINATE 100 MG: 100 TABLET, FILM COATED, EXTENDED RELEASE ORAL at 20:06

## 2018-09-11 RX ADMIN — LEVETIRACETAM 250 MG: 250 TABLET, FILM COATED ORAL at 09:56

## 2018-09-11 RX ADMIN — SODIUM CHLORIDE 125 ML/HR: 9 INJECTION, SOLUTION INTRAVENOUS at 02:50

## 2018-09-11 RX ADMIN — ONDANSETRON 4 MG: 2 INJECTION INTRAMUSCULAR; INTRAVENOUS at 22:26

## 2018-09-11 RX ADMIN — Medication 10 ML: at 02:51

## 2018-09-11 RX ADMIN — FAMOTIDINE 40 MG: 20 TABLET, FILM COATED ORAL at 09:56

## 2018-09-11 RX ADMIN — HYDROCODONE BITARTRATE AND ACETAMINOPHEN 1 TABLET: 5; 325 TABLET ORAL at 18:18

## 2018-09-11 RX ADMIN — HEPARIN SODIUM 5000 UNITS: 5000 INJECTION, SOLUTION INTRAVENOUS; SUBCUTANEOUS at 09:56

## 2018-09-11 RX ADMIN — LEVOFLOXACIN 500 MG: 5 INJECTION, SOLUTION INTRAVENOUS at 01:21

## 2018-09-11 RX ADMIN — AMITRIPTYLINE HYDROCHLORIDE 50 MG: 25 TABLET, FILM COATED ORAL at 20:06

## 2018-09-11 RX ADMIN — ONDANSETRON 4 MG: 2 INJECTION INTRAMUSCULAR; INTRAVENOUS at 16:25

## 2018-09-11 RX ADMIN — METRONIDAZOLE 500 MG: 500 INJECTION, SOLUTION INTRAVENOUS at 18:17

## 2018-09-11 RX ADMIN — ALPRAZOLAM 0.5 MG: 0.5 TABLET ORAL at 10:06

## 2018-09-11 RX ADMIN — METRONIDAZOLE 500 MG: 500 INJECTION, SOLUTION INTRAVENOUS at 01:21

## 2018-09-11 RX ADMIN — SODIUM CHLORIDE 1000 ML: 9 INJECTION, SOLUTION INTRAVENOUS at 00:13

## 2018-09-11 NOTE — ED PROVIDER NOTES
"Subjective   49 y/o female with extensive gastrointestinal issues including gastroparesis, what sounds like an esophageal stricture and what pancreatitis who arrives for evaluation of \"not able to keep anything down for one year\" with worsening vomiting the last few days with noted syncope today. Patient states she was walking back to bathroom with her  when, per her , she began to shake her leg behind her and become unresponsive falling to the ground with some shaking activity (she does have history of seizures on keppra but has been unable to keep down her medications secondary to vomiting). She does see GI in [facility] and had a dilation of her esophagus around 1 week ago but states she was having the above issues prior to this. She also has noted right sided intermittent sharp/stabbing chest pain without radiation or provoking/alleviating factors as well as diffuse but worse mid-epigastric abdominal pain without radiation or provoking/alleviating factors. She denies blood per vomitus or diarrhea. She notes some hematuria but states \"I have a history of kidney stones.\" She denies vaginal bleeding or discharge. She arrives in [place] with noted hypotension (bp in 80s) on arrival.        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  No date: Rapid weight loss  No date: SBO (small bowel obstruction)  No date: Stroke (CMS/HCC)      Comment:  X 2  No date: UTI (urinary tract infection)      Comment:  CHRONIC, SEPTIC X2  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    Past Surgical History:  No date: APPENDECTOMY      Comment:  COMPLICATION OF " SEPTIC  No date: AUGMENTATION MAMMAPLASTY  No date: BRAIN SURGERY      Comment:  x2  3/9/2017: BREAST LUMPECTOMY; Left      Comment:  Procedure: EXCISION LEFT BREAST LESION AND LEFT AXILLARY               LYMPH NODE BIOPSY;  Surgeon: Evi Farley MD;                 Location: [facility] OR;  Service:   No date: BREAST SURGERY  No date:  SECTION  No date: COLON SURGERY  2007: COLONOSCOPY      Comment:  normal  11/10/2016: COLONOSCOPY; N/A      Comment:  Procedure: COLONOSCOPY WITH ANESTHESIA;  Surgeon:                Camilo Hanson MD;  Location: [facility] ENDOSCOPY;                 Service:   2018: COLONOSCOPY; N/A      Comment:  Procedure: COLONOSCOPY WITH ANESTHESIA;  Surgeon:                Camilo Hanson MD;  Location: [facility] ENDOSCOPY;                 Service:   2009: ENDOSCOPY      Comment:  antral ulcer  10/10/2016: ENDOSCOPY; N/A      Comment:  Procedure: ESOPHAGOGASTRODUODENOSCOPY WITH ANESTHESIA;                 Surgeon: Camilo Hanson MD;  Location: [facility]                ENDOSCOPY;  Service:   2017: ENDOSCOPY; N/A      Comment:  Procedure: ESOPHAGOGASTRODUODENOSCOPY;  Surgeon: Camilo Hanson MD;  Location: [facility] ENDOSCOPY;  Service:   2017: ENDOSCOPY; N/A      Comment:  Procedure: ESOPHAGOGASTRODUODENOSCOPY WITH ANESTHESIA;                 Surgeon: Camilo Hanson MD;  Location: [facility]                ENDOSCOPY;  Service:   2018: ENDOSCOPY; N/A      Comment:  Procedure: ESOPHAGOGASTRODUODENOSCOPY ;  Surgeon:                Camilo Hanson MD;  Location: [facility] ENDOSCOPY;                 Service:   No date: HERNIA REPAIR  No date: HYSTERECTOMY  2016: SMALL INTESTINE SURGERY; N/A  No date: TONSILLECTOMY    Social History    Marital status:             Spouse name:                       Years of education:                 Number of children:               Occupational History    None on file    Social History Main Topics    Smoking  status: Former Smoker                                                                Packs/day: 1.00      Years: 15.00          Types: Cigarettes, Electronic Cigarette       Quit date: 3/10/2016    Smokeless tobacco: Never Used                        Alcohol use: Yes                Comment: occasional    Drug use: No              Sexual activity: Defer                Other Topics            Concern    None on file    Social History Narrative    None on file        Family history: reviewed and noncontributory             Review of Systems   All other systems reviewed and are negative.      Past Medical History:   Diagnosis Date   • Acute kidney failure (CMS/HCC)    • Constipation    • Depression    • H/O gastric ulcer    • Headache    • History of transfusion    • Hypertension    • IBS (irritable bowel syndrome)    • Insomnia    • Kidney stone    • Maravilla maravilla disease    • Rapid weight loss    • SBO (small bowel obstruction)    • Stroke (CMS/HCC)     X 2   • UTI (urinary tract infection)     CHRONIC, SEPTIC X2   • Volvulosis        Allergies   Allergen Reactions   • Anectine [Succinylcholine Chloride] Other (See Comments)     Hard to wake up   • Stadol [Butorphanol] Hives   • Butorphanol Tartrate Rash     Pt states she does not recall being allergic       Past Surgical History:   Procedure Laterality Date   • APPENDECTOMY      COMPLICATION OF SEPTIC   • AUGMENTATION MAMMAPLASTY     • BRAIN SURGERY      x2   • BREAST LUMPECTOMY Left 3/9/2017    Procedure: EXCISION LEFT BREAST LESION AND LEFT AXILLARY LYMPH NODE BIOPSY;  Surgeon: Evi Farley MD;  Location: [facility] OR;  Service:    • BREAST SURGERY     •  SECTION     • COLON SURGERY     • COLONOSCOPY  2007    normal   • COLONOSCOPY N/A 11/10/2016    Procedure: COLONOSCOPY WITH ANESTHESIA;  Surgeon: Camilo Hanson MD;  Location: [facility] ENDOSCOPY;  Service:    • COLONOSCOPY N/A 2018    Procedure: COLONOSCOPY WITH ANESTHESIA;  Surgeon: Camilo ESCAMILLA  MD Valentin;  Location: [facility] ENDOSCOPY;  Service:    • ENDOSCOPY  09/2009    antral ulcer   • ENDOSCOPY N/A 10/10/2016    Procedure: ESOPHAGOGASTRODUODENOSCOPY WITH ANESTHESIA;  Surgeon: Camilo Hanson MD;  Location: [facility] ENDOSCOPY;  Service:    • ENDOSCOPY N/A 1/25/2017    Procedure: ESOPHAGOGASTRODUODENOSCOPY;  Surgeon: Camilo Hanson MD;  Location: [facility] ENDOSCOPY;  Service:    • ENDOSCOPY N/A 8/8/2017    Procedure: ESOPHAGOGASTRODUODENOSCOPY WITH ANESTHESIA;  Surgeon: Camilo Hanson MD;  Location: [facility] ENDOSCOPY;  Service:    • ENDOSCOPY N/A 2/13/2018    Procedure: ESOPHAGOGASTRODUODENOSCOPY ;  Surgeon: Camilo Hanson MD;  Location: [facility] ENDOSCOPY;  Service:    • HERNIA REPAIR     • HYSTERECTOMY     • SMALL INTESTINE SURGERY N/A 03/2016   • TONSILLECTOMY         Family History   Problem Relation Age of Onset   • Cancer Maternal Grandfather    • No Known Problems Mother    • No Known Problems Father    • No Known Problems Sister    • No Known Problems Brother    • No Known Problems Son    • No Known Problems Brother    • No Known Problems Son    • No Known Problems Maternal Grandmother    • Breast cancer Paternal Grandmother    • No Known Problems Maternal Aunt    • No Known Problems Paternal Aunt    • Colon cancer Neg Hx    • Colon polyps Neg Hx    • BRCA 1/2 Neg Hx    • Endometrial cancer Neg Hx    • Ovarian cancer Neg Hx        Social History     Social History   • Marital status:      Social History Main Topics   • Smoking status: Former Smoker     Packs/day: 1.00     Years: 15.00     Types: Cigarettes, Electronic Cigarette     Quit date: 3/10/2016   • Smokeless tobacco: Never Used   • Alcohol use Yes      Comment: occasional   • Drug use: No   • Sexual activity: Defer     Other Topics Concern   • Not on file           Objective   Physical Exam   Constitutional: She is oriented to person, place, and time. She appears well-developed and well-nourished.   HENT:   Head: Normocephalic.    Nose: Nose normal.   Eyes: Pupils are equal, round, and reactive to light. Conjunctivae and EOM are normal.   Neck: Normal range of motion. Neck supple.   Cardiovascular: Normal rate, regular rhythm, normal heart sounds and intact distal pulses.  Exam reveals no gallop and no friction rub.    No murmur heard.  Pulmonary/Chest: Effort normal and breath sounds normal. No respiratory distress. She has no wheezes. She has no rales. She exhibits tenderness.   Abdominal: Soft. Bowel sounds are normal. She exhibits no distension and no mass. There is tenderness in the epigastric area. There is no rigidity, no rebound, no guarding, no tenderness at McBurney's point and negative Umanzor's sign. No hernia.   Musculoskeletal: Normal range of motion. She exhibits no edema or tenderness.   Neurological: She is alert and oriented to person, place, and time. She displays normal reflexes. No cranial nerve deficit or sensory deficit. She exhibits normal muscle tone. Coordination normal.   Skin: Skin is warm. Capillary refill takes less than 2 seconds.   Psychiatric: She has a normal mood and affect. Her behavior is normal.   Vitals reviewed.      ECG 12 Lead    Date/Time: 9/10/2018 8:04 PM  Performed by: IVON MARX  Authorized by: IVON MARX   Interpreted by physician  Comparison: compared with previous ECG from 7/13/2018  Rhythm: sinus rhythm  Rate: normal  BPM: 86  QRS axis: normal  Comments: St t changes laterally that have changed since 7/13/18                 ED Course      XR Chest 1 View   Final Result   1. No radiographic evidence of acute cardiopulmonary process.           This report was finalized on 09/10/2018 20:54 by Dr. Romie Murcia MD.      CT Angiogram Chest With Contrast    (Results Pending)   CT Abdomen Pelvis With Contrast    (Results Pending)   CT Head Without Contrast    (Results Pending)     Labs Reviewed   COMPREHENSIVE METABOLIC PANEL - Abnormal; Notable for the following:        Result  Value    Glucose 256 (*)     Potassium 3.2 (*)     CO2 19.0 (*)     Calcium 10.8 (*)     Anion Gap 18.0 (*)     All other components within normal limits   PROTIME-INR - Abnormal; Notable for the following:     INR 0.90 (*)     All other components within normal limits   URINALYSIS W/ MICROSCOPIC IF INDICATED (NO CULTURE) - Abnormal; Notable for the following:     Appearance, UA Cloudy (*)     Protein, UA Trace (*)     Leuk Esterase, UA Moderate (2+) (*)     All other components within normal limits   URINE DRUG SCREEN - Abnormal; Notable for the following:     Benzodiazepine Screen, Urine Positive (*)     THC, Screen, Urine Positive (*)     All other components within normal limits    Narrative:     Negative Thresholds For Drugs Screened in Urine:    Amphetamines          500 ng/ml  Barbiturates          200 ng/ml  Benzodiazepines       200 ng/ml  Cocaine               150 ng/ml  Methadone             150 ng/ml  Opiates               300 ng/ml  Phencyclidine         25 ng/ml  THC                      50 ng/ml    The normal value for all drugs tested is negative. This report includes final unconfirmed screening results.  A positive result by this assay can be, at your request, sent to the Reference Lab for confirmation by gas chromatography. Unconfirmed results must not be used for non-medical purposes, such as employment or legal testing. Clinical consideration should be applied to any drug of abuse test result, particularly when unconfirmed results are used.   URINALYSIS, MICROSCOPIC ONLY - Abnormal; Notable for the following:     WBC, UA 13-20 (*)     Bacteria, UA 4+ (*)     All other components within normal limits   LACTIC ACID, PLASMA - Abnormal; Notable for the following:     Lactate 4.8 (*)     All other components within normal limits   MANUAL DIFFERENTIAL - Abnormal; Notable for the following:     Monocyte % 2.0 (*)     Atypical Lymphocyte % 15.2 (*)     All other components within normal limits   APTT -  Normal   LIPASE - Normal   AMYLASE - Normal   BNP (IN-HOUSE) - Normal   CK MB - Normal    Narrative:     CKMB Index not indicated   TROPONIN (IN-HOUSE) - Normal   CBC WITH AUTO DIFFERENTIAL - Normal   POCT GLUCOSE FINGERSTICK - Normal   BLOOD CULTURE WITH CHRISSY   BLOOD CULTURE WITH CHRISSY   RAINBOW DRAW    Narrative:     The following orders were created for panel order [place] Draw.  Procedure                               Abnormality         Status                     ---------                               -----------         ------                     Light Blue Top[[id number]]                                   Final result               Green Top (Gel)[[id number]]                                  Final result               Lavender Top[[id number]]                                     Final result               Red Top[[id number]]                                          Final result                 Please view results for these tests on the individual orders.   HEMOGLOBIN A1C   LACTIC ACID REFLEX TIMER   TROPONIN (IN-HOUSE)   POCT GLUCOSE FINGERSTICK   LIGHT BLUE TOP   GREEN TOP   LAVENDER TOP   RED TOP   CBC AND DIFFERENTIAL    Narrative:     The following orders were created for panel order CBC & Differential.  Procedure                               Abnormality         Status                     ---------                               -----------         ------                     Manual Differential[[id number]]          Abnormal            Final result               CBC Auto Differential[[id number]]        Normal              Final result                 Please view results for these tests on the individual orders.     CT of head read as no acute process  CT of chest read as no acute process  CT of abdomen read as moderate colitis    Patient BP has remained low. Her MAP has remained above 65 thus no need for CVC or pressers. She has received now 3 liters and her BP has finally crossed over the 90s. I feel this is why  she had her syncopal episode and given the fact she still feels weak when trying to stand I do not feel she is a good candidate for dc to home. I do feels he needs further hydration.     Patient has no hematochezia, thus I do not feel she has ischemic colitis. Will add on blood cultures. BP has improved. Abx have been given. MAPs have remained above 65 since arrival thus no need for pressers at this time. Patient looks very well aside from the lactate of 4.8. She already has received abx and a sepsis bolus (her sepsis bolus would have been 1500 cc she has received 3000 cc). Blood cultures have been drawn. Repeat EKG was unrevealing.             MDM      Final diagnoses:   Syncope, unspecified syncope type   Colitis   Dehydration            Sreedhar Pelletier MD  09/11/18 0120       Sreedhar Pelletier MD  09/11/18 0147       Sreedhar Pelletier MD  09/11/18 0200

## 2018-09-11 NOTE — PLAN OF CARE
Problem: Fall Risk (Adult)  Goal: Identify Related Risk Factors and Signs and Symptoms  Outcome: Outcome(s) achieved Date Met: 09/11/18    Goal: Absence of Fall  Outcome: Ongoing (interventions implemented as appropriate)      Problem: Syncope (Adult)  Goal: Identify Related Risk Factors and Signs and Symptoms  Outcome: Outcome(s) achieved Date Met: 09/11/18    Goal: Physical Safety/Health Maintenance  Outcome: Ongoing (interventions implemented as appropriate)    Goal: Optimal Emotional/Functional [place]  Outcome: Ongoing (interventions implemented as appropriate)      Problem: Bowel Disease, Inflammatory (Adult)  Goal: Signs and Symptoms of Listed Potential Problems Will be Absent, Minimized or Managed (Bowel Disease, Inflammatory)  Outcome: Ongoing (interventions implemented as appropriate)

## 2018-09-11 NOTE — H&P
[facility]  HISTORY AND PHYSICAL    Date of Admission: 9/10/2018  Primary Care Physician: Provider, No Known    Subjective     Chief Complaint: Abdominal pain and syncopal episode    History of Present Illness  48-year-old female was brought into the ER after having a syncopal episode today.  Patient states she has been experiencing mid lower abdominal discomfort for 2 days.  Today it progressively gotten worse.  Patient has not been tolerating water or food at home.  She was trying to go to the bathroom earlier this afternoon and had a syncopal episode.  Syncopal he was witnessed by family member.  Did not notice any seizure-like activity after loss of consciousness.  In the ER patient had a CT abdomen, CT chest and CT head.  CT abdomen pelvis was noted for moderate colitis.  Patient blood pressure was also noted to be low during initial evaluation.  Patient received IV fluid and IV antibiotics in the ER.  She will be admitted for further evaluation and treatment.        Review of Systems     Otherwise complete ROS reviewed and negative except as mentioned in the HPI.    Past Medical History:   Past Medical History:   Diagnosis Date   • Acute kidney failure (CMS/HCC)    • Constipation    • Depression    • H/O gastric ulcer    • Headache    • History of transfusion    • Hypertension    • IBS (irritable bowel syndrome)    • Insomnia    • Kidney stone    • Maravilla maravilla disease    • Rapid weight loss    • SBO (small bowel obstruction)    • Stroke (CMS/HCC)     X 2   • UTI (urinary tract infection)     CHRONIC, SEPTIC X2   • Volvulosis      Past Surgical History:  Past Surgical History:   Procedure Laterality Date   • APPENDECTOMY      COMPLICATION OF SEPTIC   • AUGMENTATION MAMMAPLASTY     • BRAIN SURGERY      x2   • BREAST LUMPECTOMY Left 3/9/2017    Procedure: EXCISION LEFT BREAST LESION AND LEFT AXILLARY LYMPH NODE BIOPSY;  Surgeon: Evi Farley MD;  Location:  [facility] OR;  Service:    • BREAST SURGERY     •  SECTION     • COLON SURGERY     • COLONOSCOPY  2007    normal   • COLONOSCOPY N/A 11/10/2016    Procedure: COLONOSCOPY WITH ANESTHESIA;  Surgeon: Camilo Hanson MD;  Location: [facility] ENDOSCOPY;  Service:    • COLONOSCOPY N/A 2018    Procedure: COLONOSCOPY WITH ANESTHESIA;  Surgeon: Camilo Hanson MD;  Location: [facility] ENDOSCOPY;  Service:    • ENDOSCOPY  2009    antral ulcer   • ENDOSCOPY N/A 10/10/2016    Procedure: ESOPHAGOGASTRODUODENOSCOPY WITH ANESTHESIA;  Surgeon: Camilo Hanson MD;  Location: [facility] ENDOSCOPY;  Service:    • ENDOSCOPY N/A 2017    Procedure: ESOPHAGOGASTRODUODENOSCOPY;  Surgeon: Camilo Hanson MD;  Location: [facility] ENDOSCOPY;  Service:    • ENDOSCOPY N/A 2017    Procedure: ESOPHAGOGASTRODUODENOSCOPY WITH ANESTHESIA;  Surgeon: Camilo Hanson MD;  Location: [facility] ENDOSCOPY;  Service:    • ENDOSCOPY N/A 2018    Procedure: ESOPHAGOGASTRODUODENOSCOPY ;  Surgeon: Camilo Hanson MD;  Location: [facility] ENDOSCOPY;  Service:    • HERNIA REPAIR     • HYSTERECTOMY     • SMALL INTESTINE SURGERY N/A 2016   • TONSILLECTOMY       Social History:  reports that she quit smoking about 2 years ago. Her smoking use included Cigarettes and Electronic Cigarette. She has a 15.00 pack-year smoking history. She has never used smokeless tobacco. She reports that she drinks alcohol. She reports that she does not use drugs.    Family History: family history includes Breast cancer in her paternal grandmother; Cancer in her maternal grandfather; No Known Problems in her brother, brother, father, maternal aunt, maternal grandmother, mother, paternal aunt, sister, son, and son.      Allergies:  Allergies   Allergen Reactions   • Anectine [Succinylcholine Chloride] Other (See Comments)     Hard to wake up   • Stadol [Butorphanol] Hives   • Butorphanol Tartrate Rash     Pt states she does not recall being allergic      Medications:  Prior to Admission medications    Medication Sig Start Date End Date Taking? Authorizing Provider   acetaminophen-codeine (TYLENOL #3) 300-30 MG per tablet Take 1 tablet by mouth Every 4 (Four) Hours As Needed for Moderate Pain .    Ramana Tovar MD   ALPRAZolam (XANAX) 0.5 MG tablet Take 0.5 mg by mouth 3 (Three) Times a Day As Needed for Anxiety.    Ramana Tovar MD   amitriptyline (ELAVIL) 50 MG tablet Every Night. 1/13/17   Ramana Tovar MD   amLODIPine (NORVASC) 10 MG tablet Take 10 mg by mouth daily.    Ramana Tovar MD   aspirin 81 MG EC tablet Take 81 mg by mouth daily.    Ramana Tovar MD   aspirin-acetaminophen-caffeine (EXCEDRIN MIGRAINE) 250-250-65 MG per tablet Take 1 tablet by mouth every 6 (six) hours as needed for headaches.    Ramana Tovar MD   CloNIDine (CATAPRES) 0.1 MG tablet Take 1 tablet by mouth Every 8 (Eight) Hours As Needed for high blood pressure (SBP>160 mmHg). 2/28/17   Nury Fonseca MD   dicyclomine (BENTYL) 10 MG capsule Take 1 capsule by mouth 4 (Four) Times a Day As Needed (prn abdominal cramps). 11/10/16   Camilo Hanson MD   fluticasone (FLONASE) 50 MCG/ACT nasal spray 2 sprays Daily. 1/23/17   Ramana Tovar MD   hyoscyamine (ANASPAZ,LEVSIN) 0.125 MG tablet Take 0.125 mg by mouth Every 6 (Six) Hours As Needed for Cramping.    Ramana Tovar MD   levETIRAcetam (KEPPRA) 250 MG tablet Take  by mouth 2 (Two) Times a Day.    Ramana Tovar MD   metoprolol succinate XL (TOPROL-XL) 100 MG 24 hr tablet Every Night. 1/23/17   Ramana Tovar MD   pantoprazole (PROTONIX) 40 MG EC tablet Take 1 tablet by mouth 2 (Two) Times a Day. 1/19/18   Lyubov Nice APRN   pantoprazole (PROTONIX) 40 MG EC tablet Take 1 tablet by mouth Daily. 7/20/18   Lyubov Nice APRN   promethazine (PHENERGAN) 25 MG tablet Take 1 tablet by mouth Every 6 (Six) Hours As Needed for Nausea or Vomiting. 8/5/17    "Inder Modi MD     Objective     Vital Signs: BP 92/63   Pulse 50   Temp 97.6 °F (36.4 °C)   Resp 18   Ht 170.2 cm (67\")   Wt 49.9 kg (110 lb)   LMP  (LMP Unknown)   SpO2 98%   BMI 17.23 kg/m²   Physical Exam   Constitutional: She appears well-developed.   HENT:   Head: Normocephalic.   Eyes: Pupils are equal, round, and reactive to light.   Neck: Normal range of motion.   Cardiovascular: Normal rate and regular rhythm.    Pulmonary/Chest: Effort normal and breath sounds normal.   Abdominal: Soft. She exhibits no distension and no mass. There is tenderness. There is no rebound and no guarding. No hernia.   Musculoskeletal: Normal range of motion.   Neurological: She is alert.   Skin: Skin is warm. Capillary refill takes less than 2 seconds.   Psychiatric: She has a normal mood and affect. Her behavior is normal.             Results Reviewed:  Lab Results (last 24 hours)     Procedure Component Value Units Date/Time    [facility] Draw [[id number]] Collected:  09/10/18 2007    Specimen:  Blood Updated:  09/10/18 2116    Narrative:       The following orders were created for panel order [facility] Draw.  Procedure                               Abnormality         Status                     ---------                               -----------         ------                     Light Blue Top[[id number]]                                   Final result               Green Top (Gel)[[id number]]                                  Final result               Lavender Top[[id number]]                                     Final result               Red Top[[id number]]                                          Final result                 Please view results for these tests on the individual orders.    Green Top (Gel) [[id number]] Collected:  09/10/18 2007    Specimen:  Blood Updated:  09/10/18 2116     Extra Tube Hold for add-ons.     Comment: Auto resulted.       Light Blue Top [[id number]] Collected:  09/10/18 2007    " Specimen:  Blood Updated:  09/10/18 2116     Extra Tube hold for add-on     Comment: Auto resulted       Lavender Top [[id number]] Collected:  09/10/18 2007    Specimen:  Blood Updated:  09/10/18 2116     Extra Tube hold for add-on     Comment: Auto resulted       Red Top [[id number]] Collected:  09/10/18 2007    Specimen:  Blood Updated:  09/10/18 2116     Extra Tube Hold for add-ons.     Comment: Auto resulted.       Urine Drug Screen - Urine, Clean Catch [[id number]]  (Abnormal) Collected:  09/10/18 2048    Specimen:  Urine from Urine, Clean Catch Updated:  09/10/18 2115     Amphetamine Screen, Urine Negative     Barbiturates Screen, Urine Negative     Benzodiazepine Screen, Urine Positive (A)     Cocaine Screen, Urine Negative     Methadone Screen, Urine Negative     Opiate Screen Negative     Phencyclidine (PCP), Urine Negative     THC, Screen, Urine Positive (A)    Narrative:       Negative Thresholds For Drugs Screened in Urine:    Amphetamines          500 ng/ml  Barbiturates          200 ng/ml  Benzodiazepines       200 ng/ml  Cocaine               150 ng/ml  Methadone             150 ng/ml  Opiates               300 ng/ml  Phencyclidine         25 ng/ml  THC                      50 ng/ml    The normal value for all drugs tested is negative. This report includes final unconfirmed screening results.  A positive result by this assay can be, at your request, sent to the Reference Lab for confirmation by gas chromatography. Unconfirmed results must not be used for non-medical purposes, such as employment or legal testing. Clinical consideration should be applied to any drug of abuse test result, particularly when unconfirmed results are used.    Urinalysis With Microscopic If Indicated (No Culture) - Urine, Clean Catch [[id number]]  (Abnormal) Collected:  09/10/18 2048    Specimen:  Urine from Urine, Clean Catch Updated:  09/10/18 2113     Color, UA Yellow     Appearance, UA Cloudy (A)     pH, UA 5.5     Specific  Gravity, UA 1.027     Glucose, UA Negative     Ketones, UA Negative     Bilirubin, UA Negative     Blood, UA Negative     Protein, UA Trace (A)     Leuk Esterase, UA Moderate (2+) (A)     Nitrite, UA Negative     Urobilinogen, UA 0.2 E.U./dL    Urinalysis, Microscopic Only - Urine, Clean Catch [[id number]]  (Abnormal) Collected:  09/10/18 2048    Specimen:  Urine from Urine, Clean Catch Updated:  09/10/18 2113     RBC, UA None Seen /HPF      WBC, UA 13-20 (A) /HPF      Bacteria, UA 4+ (A) /HPF      Squamous Epithelial Cells, UA 0-2 /HPF      Hyaline Casts, UA 3-6 /LPF      Methodology Manual Light Microscopy    CK-MB [[id number]]  (Normal) Collected:  09/10/18 2007    Specimen:  Blood Updated:  09/10/18 2101     CKMB 0.75 ng/mL     Narrative:       CKMB Index not indicated    BNP [[id number]]  (Normal) Collected:  09/10/18 2007    Specimen:  Blood Updated:  09/10/18 2101     proBNP 263.0 pg/mL     Troponin [[id number]]  (Normal) Collected:  09/10/18 2007    Specimen:  Blood Updated:  09/10/18 2101     Troponin I 0.025 ng/mL     CBC & Differential [[id number]] Collected:  09/10/18 2007    Specimen:  Blood Updated:  09/10/18 2059    Narrative:       The following orders were created for panel order CBC & Differential.  Procedure                               Abnormality         Status                     ---------                               -----------         ------                     Manual Differential[[id number]]          Abnormal            Final result               CBC Auto Differential[[id number]]        Normal              Final result                 Please view results for these tests on the individual orders.    CBC Auto Differential [[id number]]  (Normal) Collected:  09/10/18 2007    Specimen:  Blood Updated:  09/10/18 2059     WBC 9.99 10*3/mm3      RBC 4.72 10*6/mm3      Hemoglobin 14.2 g/dL      Hematocrit 41.7 %      MCV 88.3 fL      MCH 30.1 pg      MCHC 34.1 g/dL      RDW 13.6 %      RDW-SD 44.1 fl       MPV 10.9 fL      Platelets 363 10*3/mm3     Manual Differential [[id number]]  (Abnormal) Collected:  09/10/18 2007    Specimen:  Blood Updated:  09/10/18 2059     Neutrophil % 44.4 %      Lymphocyte % 37.4 %      Monocyte % 2.0 (L) %      Eosinophil % 1.0 %      Atypical Lymphocyte % 15.2 (H) %      Neutrophils Absolute 4.44 10*3/mm3      Lymphocytes Absolute 3.74 10*3/mm3      Monocytes Absolute 0.20 10*3/mm3      Eosinophils Absolute 0.10 10*3/mm3      RBC Morphology Normal     WBC Morphology Normal     Giant Platelets Slight/1+    Comprehensive Metabolic Panel [[id number]]  (Abnormal) Collected:  09/10/18 2007    Specimen:  Blood Updated:  09/10/18 2050     Glucose 256 (H) mg/dL      BUN 7 mg/dL      Creatinine 0.84 mg/dL      Sodium 138 mmol/L      Potassium 3.2 (L) mmol/L      Chloride 101 mmol/L      CO2 19.0 (L) mmol/L      Calcium 10.8 (H) mg/dL      Total Protein 8.0 g/dL      Albumin 4.80 g/dL      ALT (SGPT) <15 U/L      AST (SGOT) 26 U/L      Alkaline Phosphatase 95 U/L      Total Bilirubin 0.4 mg/dL      eGFR Non African Amer 73 mL/min/1.73      Globulin 3.2 gm/dL      A/G Ratio 1.5 g/dL      BUN/Creatinine Ratio 8.3     Anion Gap 18.0 (H) mmol/L     Lipase [[id number]]  (Normal) Collected:  09/10/18 2007    Specimen:  Blood Updated:  09/10/18 2050     Lipase 47 U/L     Amylase [[id number]]  (Normal) Collected:  09/10/18 2007    Specimen:  Blood Updated:  09/10/18 2050     Amylase 66 U/L     Protime-INR [[id number]]  (Abnormal) Collected:  09/10/18 2007    Specimen:  Blood Updated:  09/10/18 2040     Protime 12.4 Seconds      INR 0.90 (L)    aPTT [[id number]]  (Normal) Collected:  09/10/18 2007    Specimen:  Blood Updated:  09/10/18 2040     PTT 27.6 seconds         Imaging Results (last 24 hours)     Procedure Component Value Units Date/Time    CT Angiogram Chest With Contrast [[id number]] Updated:  09/11/18 0021    CT Abdomen Pelvis With Contrast [[id number]] Updated:  09/11/18 0021    CT Head Without  Contrast [[id number]] Updated:  09/11/18 0021    XR Chest 1 View [[id number]] Collected:  09/10/18 2054     Updated:  09/10/18 2057    Narrative:       XR CHEST 1 VW- 9/10/2018 8:52 PM CDT     HISTORY: Chest pain       COMPARISON: 7/13/2018.     FINDINGS:   The lungs are clear. The cardiomediastinal silhouette and pulmonary  vascularity are unchanged.      The osseous structures and surrounding soft tissues demonstrate no acute  abnormality.       Impression:       1. No radiographic evidence of acute cardiopulmonary process.        This report was finalized on 09/10/2018 20:54 by Dr. Romie Murcia MD.        I have personally reviewed and interpreted the radiology studies and ECG obtained at time of admission.     Assessment / Plan     Assessment:   Hospital Problem List     Syncope        1.  Syncopal episode  2.  Hypotension possibly secondary to dehydration  3.  Moderate colitis  4.  UTI  5.  Hyperglycemia  6.  Hypokalemia    Plan:      -Admit to telemetry  -Continue cardiac monitoring and pulse ox  -Follow-up EKG  -CT head noted for no acute findings  -Follow-up echocardiogram and carotid Doppler  -Consider further imaging studies if indicated  -Continue IV fluid and maintain optimal blood pressure  -Hold home blood pressure medications for now  -CT abdomen pelvis noted for moderate colitis  -continue IV antibiotic  -Follow-up blood culture  -Follow-up urine culture  -Follow-up lactic acid  -Follow-up a.m. chest x-ray  -Strict glycemic control  -Replete electrolytes as needed  -GI prophylaxis  -DVT prophylaxis              Code Status: Full code     I discussed the patient's findings and my recommendations with the patient's RN    Estimated length of stay 2-3 days    Jesus Saravia MD   09/11/18   1:17 AM

## 2018-09-11 NOTE — CONSULTS
Patient Care Team:  Provider, No Known as PCP - General  Tong Toussaint MD as PCP - Claims Attributed  Camilo Hanson MD as Consulting Physician (Gastroenterology)  Jesus Saravia MD  REASON FOR REFERRAL: concern for WPW, syncope   Chief complaint : abdominal pain, syncope     Subjective     Patient is a 48 y.o. female presents with a variety of acute on chronic symptoms. She states her symptoms have been ongoing for 3 years. She reports abdominal pain, poor appetite, weight loss, dehydration, nausea, vomiting, diarrhea, weakness, fatigue, chest pain, shortness of breath, recurrent syncope, and palpitations. She states she is following with GI at [facility] and was told she might have gastroparesis. She reports her most recent episode of syncope was yesterday while walking to the bathroom. She reports she developed abdominal pain, palpitations, anxiety, dizziness and weakness prior to the event. She also reports she was shaking.     She reports typically the sequence of events involves abdominal pain, with chest pain, followed by elevated heart rate secondary to pain, anxiety, and then syncope. She reports she is very weak and spends most of the time in the bed. She reports she is unable to keep any food or fluid down in recent days. She describes her chest pain as sharp and often exacerbated by her abdominal pain.    Workup reveals probable colitis, UTI, elevated lactate, and hypotension. She has been treated with IV fluids and antibiotics per the primary team. EKG reveals NSR, short NM interval and non specific STT wave abnormalities. Troponin peaked at 0.673. She denies a cardiac history. 2/2017 she underwent a stress echo which was low risk for ischemia. 2d echo this admission reveals an LVEF of 61-65% and trace MR. Cardiology consultation is obtained due to syncope and concern for WPW based on EKG abnormalities.     Review of Systems   Review of Systems   Constitutional: Positive for fatigue. Negative  for diaphoresis, fever and unexpected weight change.   HENT: Negative for nosebleeds.    Respiratory: Positive for cough and shortness of breath. Negative for apnea, chest tightness and wheezing.    Cardiovascular: Positive for chest pain, palpitations and leg swelling.   Gastrointestinal: Positive for abdominal pain, nausea and vomiting. Negative for abdominal distention.   Genitourinary: Negative for hematuria.   Musculoskeletal: Positive for gait problem.   Skin: Negative for color change.   Neurological: Positive for dizziness, syncope, weakness and light-headedness.       History  Past Medical History:   Diagnosis Date   • Acute kidney failure (CMS/HCC)    • Constipation    • Depression    • H/O gastric ulcer    • Headache    • History of transfusion    • Hypertension    • IBS (irritable bowel syndrome)    • Insomnia    • Kidney stone    • Maravilla maravilla disease    • Rapid weight loss    • SBO (small bowel obstruction)    • Stroke (CMS/HCC)     X 2   • UTI (urinary tract infection)     CHRONIC, SEPTIC X2   • Volvulosis      Past Surgical History:   Procedure Laterality Date   • APPENDECTOMY      COMPLICATION OF SEPTIC   • AUGMENTATION MAMMAPLASTY     • BRAIN SURGERY      x2   • BREAST LUMPECTOMY Left 3/9/2017    Procedure: EXCISION LEFT BREAST LESION AND LEFT AXILLARY LYMPH NODE BIOPSY;  Surgeon: Evi Farley MD;  Location: [facility] OR;  Service:    • BREAST SURGERY     •  SECTION     • COLON SURGERY     • COLONOSCOPY  2007    normal   • COLONOSCOPY N/A 11/10/2016    Procedure: COLONOSCOPY WITH ANESTHESIA;  Surgeon: Camilo Hanson MD;  Location: [facility] ENDOSCOPY;  Service:    • COLONOSCOPY N/A 2018    Procedure: COLONOSCOPY WITH ANESTHESIA;  Surgeon: Camilo Hanson MD;  Location: [facility] ENDOSCOPY;  Service:    • ENDOSCOPY  2009    antral ulcer   • ENDOSCOPY N/A 10/10/2016    Procedure: ESOPHAGOGASTRODUODENOSCOPY WITH ANESTHESIA;  Surgeon: Camilo Hanson MD;  Location: [facility]  ENDOSCOPY;  Service:    • ENDOSCOPY N/A 1/25/2017    Procedure: ESOPHAGOGASTRODUODENOSCOPY;  Surgeon: Camilo Hanson MD;  Location: [facility] ENDOSCOPY;  Service:    • ENDOSCOPY N/A 8/8/2017    Procedure: ESOPHAGOGASTRODUODENOSCOPY WITH ANESTHESIA;  Surgeon: Camilo Hanson MD;  Location: [facility] ENDOSCOPY;  Service:    • ENDOSCOPY N/A 2/13/2018    Procedure: ESOPHAGOGASTRODUODENOSCOPY ;  Surgeon: Camilo Hanson MD;  Location: [facility] ENDOSCOPY;  Service:    • HERNIA REPAIR     • HYSTERECTOMY     • SMALL INTESTINE SURGERY N/A 03/2016   • TONSILLECTOMY       Family History   Problem Relation Age of Onset   • Cancer Maternal Grandfather    • No Known Problems Mother    • No Known Problems Father    • No Known Problems Sister    • No Known Problems Brother    • No Known Problems Son    • No Known Problems Brother    • No Known Problems Son    • No Known Problems Maternal Grandmother    • Breast cancer Paternal Grandmother    • No Known Problems Maternal Aunt    • No Known Problems Paternal Aunt    • Colon cancer Neg Hx    • Colon polyps Neg Hx    • BRCA 1/2 Neg Hx    • Endometrial cancer Neg Hx    • Ovarian cancer Neg Hx      Social History   Substance Use Topics   • Smoking status: Former Smoker     Packs/day: 1.00     Years: 15.00     Types: Cigarettes, Electronic Cigarette     Quit date: 3/10/2016   • Smokeless tobacco: Never Used   • Alcohol use Yes      Comment: occasional     Prescriptions Prior to Admission   Medication Sig Dispense Refill Last Dose   • acetaminophen-codeine (TYLENOL #3) 300-30 MG per tablet Take 1 tablet by mouth Every 4 (Four) Hours As Needed for Moderate Pain .   Taking   • ALPRAZolam (XANAX) 0.5 MG tablet Take 0.5 mg by mouth 3 (Three) Times a Day As Needed for Anxiety.   Taking   • amitriptyline (ELAVIL) 50 MG tablet Every Night.  2 Taking   • amLODIPine (NORVASC) 10 MG tablet Take 10 mg by mouth daily.   Taking   • aspirin 81 MG EC tablet Take 81 mg by mouth daily.   Taking   •  aspirin-acetaminophen-caffeine (EXCEDRIN MIGRAINE) 250-250-65 MG per tablet Take 1 tablet by mouth every 6 (six) hours as needed for headaches.   Taking   • CloNIDine (CATAPRES) 0.1 MG tablet Take 1 tablet by mouth Every 8 (Eight) Hours As Needed for high blood pressure (SBP>160 mmHg). 10 tablet 0 Taking   • dicyclomine (BENTYL) 10 MG capsule Take 1 capsule by mouth 4 (Four) Times a Day As Needed (prn abdominal cramps). 120 capsule 11 Taking   • fluticasone (FLONASE) 50 MCG/ACT nasal spray 2 sprays Daily.   Taking   • hyoscyamine (ANASPAZ,LEVSIN) 0.125 MG tablet Take 0.125 mg by mouth Every 6 (Six) Hours As Needed for Cramping.   Taking   • levETIRAcetam (KEPPRA) 250 MG tablet Take  by mouth 2 (Two) Times a Day.   Taking   • metoprolol succinate XL (TOPROL-XL) 100 MG 24 hr tablet Every Night.   Taking   • pantoprazole (PROTONIX) 40 MG EC tablet Take 1 tablet by mouth 2 (Two) Times a Day. 60 tablet 11 Taking   • pantoprazole (PROTONIX) 40 MG EC tablet Take 1 tablet by mouth Daily. 90 tablet 3    • promethazine (PHENERGAN) 25 MG tablet Take 1 tablet by mouth Every 6 (Six) Hours As Needed for Nausea or Vomiting. 6 tablet 0 Taking       Current Facility-Administered Medications:   •  ALPRAZolam (XANAX) tablet 0.5 mg, 0.5 mg, Oral, TID PRN, Jesus Saravia MD, 0.5 mg at 09/11/18 1006  •  amitriptyline (ELAVIL) tablet 50 mg, 50 mg, Oral, Nightly, Jesus Saravia MD  •  aspirin EC tablet 81 mg, 81 mg, Oral, Daily, Jesus Saravia MD, 81 mg at 09/11/18 0956  •  dextrose (D50W) 25 g/ 50mL Intravenous Solution 25 g, 25 g, Intravenous, Q15 Min PRN, Jesus Saravia MD  •  dextrose (GLUTOSE) oral gel 15 g, 15 g, Oral, Q15 Min PRN, Jesus Saravia MD  •  dicyclomine (BENTYL) capsule 10 mg, 10 mg, Oral, 4x Daily PRN, Jesus Saravia MD  •  famotidine (PEPCID) tablet 40 mg, 40 mg, Oral, Daily, Jesus Saravia MD, 40 mg at 09/11/18 0956  •  fluticasone (FLONASE) 50 MCG/ACT nasal spray 2 spray, 2 spray, Each  Danielle, Daily, Jesus Saravia MD  •  glucagon (human recombinant) (GLUCAGEN DIAGNOSTIC) injection 1 mg, 1 mg, Subcutaneous, PRN, Jesus Saravia MD  •  heparin (porcine) 5000 UNIT/ML injection 5,000 Units, 5,000 Units, Subcutaneous, Q12H, Jesus Saravia MD, 5,000 Units at 09/11/18 0956  •  insulin lispro (humaLOG) injection 2-7 Units, 2-7 Units, Subcutaneous, 4x Daily With Meals & Nightly, Jesus Saravia MD  •  levETIRAcetam (KEPPRA) tablet 250 mg, 250 mg, Oral, Q12H, Jesus Saravia MD, 250 mg at 09/11/18 0956  •  [START ON 9/12/2018] levoFLOXacin (LEVAQUIN) 750 mg/150 mL D5W (premix) (LEVAQUIN) 750 mg, 750 mg, Intravenous, Q24H, Jesus Saravia MD  •  metoclopramide (REGLAN) injection 10 mg, 10 mg, Intravenous, Once, Sreedhar Pelletier MD  •  metoprolol succinate XL (TOPROL-XL) 24 hr tablet 100 mg, 100 mg, Oral, Nightly, Jesus Saravia MD  •  metroNIDAZOLE (FLAGYL) IVPB 500 mg, 500 mg, Intravenous, Q8H, Jesus Saravia MD, 500 mg at 09/11/18 0957  •  ondansetron (ZOFRAN) injection 4 mg, 4 mg, Intravenous, Q6H PRN, Jesus Saravia MD, 4 mg at 09/11/18 1006  •  promethazine (PHENERGAN) tablet 25 mg, 25 mg, Oral, Q6H PRN, Jesus Saravia MD  •  sodium chloride 0.9 % flush 1-10 mL, 1-10 mL, Intravenous, PRN, Jesus Saravia MD, 10 mL at 09/11/18 0251  •  sodium chloride 0.9 % infusion, 125 mL/hr, Intravenous, Continuous, Jesus Saravia MD, Last Rate: 125 mL/hr at 09/11/18 0957, 125 mL/hr at 09/11/18 0957  Allergies:  Anectine [succinylcholine chloride]; Stadol [butorphanol]; and Butorphanol tartrate    Objective     Vital Signs  Temp:  [96.9 °F (36.1 °C)-97.6 °F (36.4 °C)] 97.4 °F (36.3 °C)  Heart Rate:  [50-87] 55  Resp:  [18] 18  BP: ()/(59-76) 100/70    Physical Exam:   Physical Exam   Constitutional: She is oriented to person, place, and time. She appears well-developed. She appears cachectic. She has a sickly appearance. She appears ill. No distress.   HENT:   Head:  Normocephalic and atraumatic.   Eyes: Pupils are equal, round, and reactive to light.   Neck: Normal range of motion. Neck supple. No JVD present. No thyromegaly present.   Cardiovascular: Normal rate, regular rhythm, normal heart sounds and intact distal pulses.  Exam reveals no gallop and no friction rub.    No murmur heard.  Pulmonary/Chest: Effort normal and breath sounds normal. No respiratory distress. She has no wheezes. She has no rales. She exhibits no tenderness.   Abdominal: Soft. Bowel sounds are normal. She exhibits no distension. There is no tenderness.   Musculoskeletal: Normal range of motion. She exhibits no edema.   Neurological: She is alert and oriented to person, place, and time. No cranial nerve deficit.   Skin: Skin is warm and dry. She is not diaphoretic.   Psychiatric: She has a normal mood and affect. Her behavior is normal.     Results Review:     Lab Results (last 72 hours)     Procedure Component Value Units Date/Time    Blood Culture With CHRISSY - Blood, [[id number]]  (Normal) Collected:  09/11/18 0154    Specimen:  Blood from Hand, Right Updated:  09/11/18 1415     Blood Culture No growth at less than 24 hours    Blood Culture With CHRISSY - Blood, [[id number]]  (Normal) Collected:  09/11/18 0158    Specimen:  Blood from Hand, Right Updated:  09/11/18 1415     Blood Culture No growth at less than 24 hours    Troponin [[id number]]  (Abnormal) Collected:  09/11/18 1332    Specimen:  Blood Updated:  09/11/18 1400     Troponin I 0.174 (H) ng/mL     POC Glucose Once [[id number]]  (Normal) Collected:  09/11/18 1128    Specimen:  Blood Updated:  09/11/18 1214     Glucose 97 mg/dL      Comment: : [id number] Henry LisaMeter ID: [id number]       TSH [[id number]]  (Normal) Collected:  09/11/18 0602    Specimen:  Blood Updated:  09/11/18 1102     TSH 2.180 mIU/mL     Troponin [[id number]]  (Abnormal) Collected:  09/11/18 0825    Specimen:  Blood Updated:  09/11/18 0902     Troponin I 0.369 (H) ng/mL      Calcium, Ionized [[id number]]  (Abnormal) Collected:  09/11/18 0825    Specimen:  Blood Updated:  09/11/18 0840     Ionized Calcium 4.35 (L) mg/dL      Collected by [id number]    POC Glucose Once [[id number]]  (Normal) Collected:  09/11/18 0814    Specimen:  Blood Updated:  09/11/18 0832     Glucose 80 mg/dL      Comment: : [id number] Henry LisaMeter ID: [id number]       Procalcitonin [[id number]]  (Normal) Collected:  09/11/18 0602    Specimen:  Blood Updated:  09/11/18 0647     Procalcitonin <0.25 ng/mL     Narrative:       SIRS, sepsis, severe sepsis, and septic shock are categorized according to the criteria of the consensus conference of the American College of Chest Physicians/Society of Critical Care Medicine.    PCT < 0.5 ng/mL     Systemic infection (sepsis) is not likely.    PCT >0.5 and < 2.0 ng/mL Systemic infection (sepsis) is possible, but other conditions are known to elevate PCT as well.    PCT > 2.0 ng/mL     Systemic infection (sepsis) is likely, unless other causes are known.      PCT > 10.0 ng/mL    Important systemic inflammatory response, almost exclusively due to severe bacterial sepsis or septic shock.    PCT values of < 0.5 ng/mL do not exclude an infection, because localized infections (without systemic signs) may be associated with such low concentrations, or a systemic infection in its initial stages (<6 hours).  Increased PCT can occur without infection.  PCT concentrations between 0.5 and 2.0 ng/mL should be interpreted taking into account the patients history.  It is recommended to retest PCT within 6-24 hours if any concentrations < 2.0 ng/mL are obtained.    Comprehensive Metabolic Panel [[id number]]  (Abnormal) Collected:  09/11/18 0602    Specimen:  Blood Updated:  09/11/18 0636     Glucose 85 mg/dL      BUN 4 (L) mg/dL      Creatinine 0.57 mg/dL      Sodium 142 mmol/L      Potassium 4.0 mmol/L      Chloride 114 (H) mmol/L      CO2 21.0 (L) mmol/L      Calcium 7.9 (L) mg/dL      Total  Protein 5.5 (L) g/dL      Albumin 3.10 (L) g/dL      ALT (SGPT) 25 U/L      AST (SGOT) 16 U/L      Alkaline Phosphatase 52 U/L      Total Bilirubin 0.2 mg/dL      eGFR Non African Amer 114 mL/min/1.73      Globulin 2.4 gm/dL      A/G Ratio 1.3 g/dL      BUN/Creatinine Ratio 7.0     Anion Gap 7.0 mmol/L     Lipase [[id number]]  (Normal) Collected:  09/11/18 0602    Specimen:  Blood Updated:  09/11/18 0630     Lipase 37 U/L     Amylase [[id number]]  (Normal) Collected:  09/11/18 0602    Specimen:  Blood Updated:  09/11/18 0630     Amylase 46 U/L     Magnesium [[id number]]  (Normal) Collected:  09/11/18 0602    Specimen:  Blood Updated:  09/11/18 0630     Magnesium 1.5 mg/dL     Phosphorus [[id number]]  (Normal) Collected:  09/11/18 0602    Specimen:  Blood Updated:  09/11/18 0630     Phosphorus 2.8 mg/dL     Lactic Acid, Reflex [[id number]]  (Normal) Collected:  09/11/18 0602    Specimen:  Blood Updated:  09/11/18 0625     Lactate 0.8 mmol/L     CBC Auto Differential [[id number]]  (Abnormal) Collected:  09/11/18 0602    Specimen:  Blood Updated:  09/11/18 0621     WBC 7.59 10*3/mm3      RBC 3.56 (L) 10*6/mm3      Hemoglobin 10.7 (L) g/dL      Hematocrit 32.0 (L) %      MCV 89.9 fL      MCH 30.1 pg      MCHC 33.4 g/dL      RDW 13.7 %      RDW-SD 44.9 fl      MPV 10.4 fL      Platelets 305 10*3/mm3      Neutrophil % 48.6 %      Lymphocyte % 43.2 %      Monocyte % 6.9 %      Eosinophil % 0.7 %      Basophil % 0.3 %      Immature Grans % 0.3 %      Neutrophils, Absolute 3.70 10*3/mm3      Lymphocytes, Absolute 3.28 10*3/mm3      Monocytes, Absolute 0.52 10*3/mm3      Eosinophils, Absolute 0.05 10*3/mm3      Basophils, Absolute 0.02 10*3/mm3      Immature Grans, Absolute 0.02 10*3/mm3      nRBC 0.0 /100 WBC     Hemoglobin A1c [[id number]] Collected:  09/10/18 2007    Specimen:  Blood Updated:  09/11/18 0531     Hemoglobin A1C 5.8 %     Narrative:       Less than 6.0           Non-Diabetic Range  6.0-7.0                 ADA  Therapeutic Target  Greater than 7.0        Action Suggested    Lactic Acid, Reflex Timer (This will reflex a repeat order 3-3:15 hours after ordered.) [[id number]] Collected:  09/11/18 0129    Specimen:  Blood Updated:  09/11/18 0445     Extra Tube Hold for add-ons.     Comment: Auto resulted.       Urinalysis With Culture If Indicated - Urine, Clean Catch [[id number]]  (Normal) Collected:  09/11/18 0321    Specimen:  Urine from Urine, Clean Catch Updated:  09/11/18 0329     Color, UA Yellow     Appearance, UA Clear     pH, UA 5.5     Specific Gravity, UA 1.029     Glucose, UA Negative     Ketones, UA Negative     Bilirubin, UA Negative     Blood, UA Negative     Protein, UA Negative     Leuk Esterase, UA Negative     Nitrite, UA Negative     Urobilinogen, UA 0.2 E.U./dL    Narrative:       Urine microscopic not indicated.    Troponin [[id number]]  (Abnormal) Collected:  09/11/18 0158    Specimen:  Blood Updated:  09/11/18 0234     Troponin I 0.673 (C) ng/mL     POC Glucose Once [[id number]]  (Normal) Collected:  09/11/18 0132    Specimen:  Blood Updated:  09/11/18 0144     Glucose 104 mg/dL      Comment: : [id number] Kaiser Hernandez DMeter ID: [id number]       Lactic Acid, Plasma [[id number]]  (Abnormal) Collected:  09/11/18 0129    Specimen:  Blood Updated:  09/11/18 0143     Lactate 4.8 (C) mmol/L     [place] Draw [[id number]] Collected:  09/10/18 2007    Specimen:  Blood Updated:  09/10/18 2116    Narrative:       The following orders were created for panel order [place] Draw.  Procedure                               Abnormality         Status                     ---------                               -----------         ------                     Light Blue Top[[id number]]                                   Final result               Green Top (Gel)[[id number]]                                  Final result               Lavender Top[[id number]]                                     Final result               Red  Top[[id number]]                                          Final result                 Please view results for these tests on the individual orders.    Green Top (Gel) [[id number]] Collected:  09/10/18 2007    Specimen:  Blood Updated:  09/10/18 2116     Extra Tube Hold for add-ons.     Comment: Auto resulted.       Light Blue Top [[id number]] Collected:  09/10/18 2007    Specimen:  Blood Updated:  09/10/18 2116     Extra Tube hold for add-on     Comment: Auto resulted       Lavender Top [[id number]] Collected:  09/10/18 2007    Specimen:  Blood Updated:  09/10/18 2116     Extra Tube hold for add-on     Comment: Auto resulted       Red Top [[id number]] Collected:  09/10/18 2007    Specimen:  Blood Updated:  09/10/18 2116     Extra Tube Hold for add-ons.     Comment: Auto resulted.       Urine Drug Screen - Urine, Clean Catch [[id number]]  (Abnormal) Collected:  09/10/18 2048    Specimen:  Urine from Urine, Clean Catch Updated:  09/10/18 2115     Amphetamine Screen, Urine Negative     Barbiturates Screen, Urine Negative     Benzodiazepine Screen, Urine Positive (A)     Cocaine Screen, Urine Negative     Methadone Screen, Urine Negative     Opiate Screen Negative     Phencyclidine (PCP), Urine Negative     THC, Screen, Urine Positive (A)    Narrative:       Negative Thresholds For Drugs Screened in Urine:    Amphetamines          500 ng/ml  Barbiturates          200 ng/ml  Benzodiazepines       200 ng/ml  Cocaine               150 ng/ml  Methadone             150 ng/ml  Opiates               300 ng/ml  Phencyclidine         25 ng/ml  THC                      50 ng/ml    The normal value for all drugs tested is negative. This report includes final unconfirmed screening results.  A positive result by this assay can be, at your request, sent to the Reference Lab for confirmation by gas chromatography. Unconfirmed results must not be used for non-medical purposes, such as employment or legal testing. Clinical consideration  should be applied to any drug of abuse test result, particularly when unconfirmed results are used.    Urinalysis With Microscopic If Indicated (No Culture) - Urine, Clean Catch [[id number]]  (Abnormal) Collected:  09/10/18 2048    Specimen:  Urine from Urine, Clean Catch Updated:  09/10/18 2113     Color, UA Yellow     Appearance, UA Cloudy (A)     pH, UA 5.5     Specific Gravity, UA 1.027     Glucose, UA Negative     Ketones, UA Negative     Bilirubin, UA Negative     Blood, UA Negative     Protein, UA Trace (A)     Leuk Esterase, UA Moderate (2+) (A)     Nitrite, UA Negative     Urobilinogen, UA 0.2 E.U./dL    Urinalysis, Microscopic Only - Urine, Clean Catch [[id number]]  (Abnormal) Collected:  09/10/18 2048    Specimen:  Urine from Urine, Clean Catch Updated:  09/10/18 2113     RBC, UA None Seen /HPF      WBC, UA 13-20 (A) /HPF      Bacteria, UA 4+ (A) /HPF      Squamous Epithelial Cells, UA 0-2 /HPF      Hyaline Casts, UA 3-6 /LPF      Methodology Manual Light Microscopy    CK-MB [[id number]]  (Normal) Collected:  09/10/18 2007    Specimen:  Blood Updated:  09/10/18 2101     CKMB 0.75 ng/mL     Narrative:       CKMB Index not indicated    BNP [[id number]]  (Normal) Collected:  09/10/18 2007    Specimen:  Blood Updated:  09/10/18 2101     proBNP 263.0 pg/mL     Troponin [[id number]]  (Normal) Collected:  09/10/18 2007    Specimen:  Blood Updated:  09/10/18 2101     Troponin I 0.025 ng/mL     CBC & Differential [[id number]] Collected:  09/10/18 2007    Specimen:  Blood Updated:  09/10/18 2059    Narrative:       The following orders were created for panel order CBC & Differential.  Procedure                               Abnormality         Status                     ---------                               -----------         ------                     Manual Differential[[id number]]          Abnormal            Final result               CBC Auto Differential[[id number]]        Normal              Final result                  Please view results for these tests on the individual orders.    CBC Auto Differential [[id number]]  (Normal) Collected:  09/10/18 2007    Specimen:  Blood Updated:  09/10/18 2059     WBC 9.99 10*3/mm3      RBC 4.72 10*6/mm3      Hemoglobin 14.2 g/dL      Hematocrit 41.7 %      MCV 88.3 fL      MCH 30.1 pg      MCHC 34.1 g/dL      RDW 13.6 %      RDW-SD 44.1 fl      MPV 10.9 fL      Platelets 363 10*3/mm3     Manual Differential [[id number]]  (Abnormal) Collected:  09/10/18 2007    Specimen:  Blood Updated:  09/10/18 2059     Neutrophil % 44.4 %      Lymphocyte % 37.4 %      Monocyte % 2.0 (L) %      Eosinophil % 1.0 %      Atypical Lymphocyte % 15.2 (H) %      Neutrophils Absolute 4.44 10*3/mm3      Lymphocytes Absolute 3.74 10*3/mm3      Monocytes Absolute 0.20 10*3/mm3      Eosinophils Absolute 0.10 10*3/mm3      RBC Morphology Normal     WBC Morphology Normal     Giant Platelets Slight/1+    Comprehensive Metabolic Panel [[id number]]  (Abnormal) Collected:  09/10/18 2007    Specimen:  Blood Updated:  09/10/18 2050     Glucose 256 (H) mg/dL      BUN 7 mg/dL      Creatinine 0.84 mg/dL      Sodium 138 mmol/L      Potassium 3.2 (L) mmol/L      Chloride 101 mmol/L      CO2 19.0 (L) mmol/L      Calcium 10.8 (H) mg/dL      Total Protein 8.0 g/dL      Albumin 4.80 g/dL      ALT (SGPT) <15 U/L      AST (SGOT) 26 U/L      Alkaline Phosphatase 95 U/L      Total Bilirubin 0.4 mg/dL      eGFR Non African Amer 73 mL/min/1.73      Globulin 3.2 gm/dL      A/G Ratio 1.5 g/dL      BUN/Creatinine Ratio 8.3     Anion Gap 18.0 (H) mmol/L     Lipase [[id number]]  (Normal) Collected:  09/10/18 2007    Specimen:  Blood Updated:  09/10/18 2050     Lipase 47 U/L     Amylase [[id number]]  (Normal) Collected:  09/10/18 2007    Specimen:  Blood Updated:  09/10/18 2050     Amylase 66 U/L     Protime-INR [[id number]]  (Abnormal) Collected:  09/10/18 2007    Specimen:  Blood Updated:  09/10/18 2040     Protime 12.4 Seconds      INR 0.90 (L)     aPTT [[id number]]  (Normal) Collected:  09/10/18 2007    Specimen:  Blood Updated:  09/10/18 2040     PTT 27.6 seconds           Assessment/Plan     -Chronic abdominal pain   -Probable colitis per CT of the abdomen  -UTI   -Elevated lactate   -Anemia   -Atypical chest pain  -Elevated troponin- peak 0.673- likely type 2 elevation secondary to acute colitis, UTI  -Recurrent syncope- ?secondary to dehydration and malnourishment   -Short DC interval   -Moyamoya disease  -History of strokes   -History of hypertension with hypotension this admission     Plan-  Agree with plan of care thus far  Monitor telemetry  Consider outpatient [facility] at discharge to monitor for tachyarrhythmias as possible contributing factor to syncope. At this point it appears her syncope may be secondary to dehydration/hypotension/orthostasis  Further recommendations regarding the concern for WPW per Dr. Cedeno     I discussed the patient's findings and my recommendations with patient.     Juana Montoya, APRN  09/11/18  2:41 PM

## 2018-09-11 NOTE — PLAN OF CARE
Problem: Patient Care Overview  Goal: Plan of Care Review  Outcome: Ongoing (interventions implemented as appropriate)   09/11/18 1031   Coping/Psychosocial   Plan of Care Reviewed With patient   Plan of Care Review   Progress no change   OTHER   Outcome Summary Currently NPO. Pt with complaints of abdominal pain and intermittent nausea. Reports she has not been tolerating food/fluid for the last couple of days. BMI is 18.68. Will follow for diet initiation and tolerance.       Problem: Nutrition, Imbalanced: Inadequate Oral Intake (Adult)  Goal: Identify Related Risk Factors and Signs and Symptoms  Outcome: Outcome(s) achieved Date Met: 09/11/18    Goal: Improved Oral Intake  Outcome: Ongoing (interventions implemented as appropriate)    Goal: Prevent Further Weight Loss  Outcome: Ongoing (interventions implemented as appropriate)

## 2018-09-12 VITALS
HEART RATE: 74 BPM | DIASTOLIC BLOOD PRESSURE: 84 MMHG | WEIGHT: 117.13 LBS | TEMPERATURE: 98.8 F | HEIGHT: 67 IN | RESPIRATION RATE: 18 BRPM | SYSTOLIC BLOOD PRESSURE: 133 MMHG | OXYGEN SATURATION: 98 % | BODY MASS INDEX: 18.38 KG/M2

## 2018-09-12 LAB
ANION GAP SERPL CALCULATED.3IONS-SCNC: 8 MMOL/L (ref 4–13)
BACTERIA BLD CULT: ABNORMAL
BUN BLD-MCNC: <2 MG/DL (ref 5–21)
BUN/CREAT SERPL: ABNORMAL (ref 7–25)
CALCIUM SPEC-SCNC: 8.9 MG/DL (ref 8.4–10.4)
CHLORIDE SERPL-SCNC: 110 MMOL/L (ref 98–110)
CO2 SERPL-SCNC: 25 MMOL/L (ref 24–31)
CREAT BLD-MCNC: 0.56 MG/DL (ref 0.5–1.4)
DEPRECATED RDW RBC AUTO: 46.7 FL (ref 40–54)
ERYTHROCYTE [DISTWIDTH] IN BLOOD BY AUTOMATED COUNT: 13.7 % (ref 12–15)
GFR SERPL CREATININE-BSD FRML MDRD: 116 ML/MIN/1.73
GLUCOSE BLD-MCNC: 80 MG/DL (ref 70–100)
GLUCOSE BLDC GLUCOMTR-MCNC: 106 MG/DL (ref 70–130)
GLUCOSE BLDC GLUCOMTR-MCNC: 84 MG/DL (ref 70–130)
HCT VFR BLD AUTO: 43.7 % (ref 37–47)
HGB BLD-MCNC: 14.4 G/DL (ref 12–16)
MCH RBC QN AUTO: 30.3 PG (ref 28–32)
MCHC RBC AUTO-ENTMCNC: 33 G/DL (ref 33–36)
MCV RBC AUTO: 92 FL (ref 82–98)
PLATELET # BLD AUTO: 269 10*3/MM3 (ref 130–400)
PMV BLD AUTO: 11.1 FL (ref 6–12)
POTASSIUM BLD-SCNC: 4.3 MMOL/L (ref 3.5–5.3)
RBC # BLD AUTO: 4.75 10*6/MM3 (ref 4.2–5.4)
SODIUM BLD-SCNC: 143 MMOL/L (ref 135–145)
WBC NRBC COR # BLD: 8.6 10*3/MM3 (ref 4.8–10.8)

## 2018-09-12 PROCEDURE — 94760 N-INVAS EAR/PLS OXIMETRY 1: CPT

## 2018-09-12 PROCEDURE — 80048 BASIC METABOLIC PNL TOTAL CA: CPT | Performed by: INTERNAL MEDICINE

## 2018-09-12 PROCEDURE — 96361 HYDRATE IV INFUSION ADD-ON: CPT

## 2018-09-12 PROCEDURE — 96372 THER/PROPH/DIAG INJ SC/IM: CPT

## 2018-09-12 PROCEDURE — 25010000002 ONDANSETRON PER 1 MG: Performed by: FAMILY MEDICINE

## 2018-09-12 PROCEDURE — 25010000002 HEPARIN (PORCINE) PER 1000 UNITS: Performed by: FAMILY MEDICINE

## 2018-09-12 PROCEDURE — 94799 UNLISTED PULMONARY SVC/PX: CPT

## 2018-09-12 PROCEDURE — G0378 HOSPITAL OBSERVATION PER HR: HCPCS

## 2018-09-12 PROCEDURE — 96376 TX/PRO/DX INJ SAME DRUG ADON: CPT

## 2018-09-12 PROCEDURE — 93005 ELECTROCARDIOGRAM TRACING: CPT | Performed by: FAMILY MEDICINE

## 2018-09-12 PROCEDURE — 25010000002 LEVOFLOXACIN PER 250 MG: Performed by: FAMILY MEDICINE

## 2018-09-12 PROCEDURE — 93010 ELECTROCARDIOGRAM REPORT: CPT | Performed by: INTERNAL MEDICINE

## 2018-09-12 PROCEDURE — 85027 COMPLETE CBC AUTOMATED: CPT | Performed by: INTERNAL MEDICINE

## 2018-09-12 PROCEDURE — 96366 THER/PROPH/DIAG IV INF ADDON: CPT

## 2018-09-12 PROCEDURE — 82962 GLUCOSE BLOOD TEST: CPT

## 2018-09-12 RX ORDER — LEVOFLOXACIN 750 MG/1
750 TABLET ORAL DAILY
Qty: 6 TABLET | Refills: 0 | Status: SHIPPED | OUTPATIENT
Start: 2018-09-12 | End: 2018-10-16 | Stop reason: HOSPADM

## 2018-09-12 RX ORDER — METRONIDAZOLE 500 MG/1
500 TABLET ORAL 3 TIMES DAILY
Qty: 18 TABLET | Refills: 0 | Status: SHIPPED | OUTPATIENT
Start: 2018-09-12 | End: 2018-10-16 | Stop reason: HOSPADM

## 2018-09-12 RX ADMIN — METRONIDAZOLE 500 MG: 500 INJECTION, SOLUTION INTRAVENOUS at 00:03

## 2018-09-12 RX ADMIN — HEPARIN SODIUM 5000 UNITS: 5000 INJECTION, SOLUTION INTRAVENOUS; SUBCUTANEOUS at 09:36

## 2018-09-12 RX ADMIN — ALPRAZOLAM 0.5 MG: 0.5 TABLET ORAL at 09:36

## 2018-09-12 RX ADMIN — ONDANSETRON 4 MG: 2 INJECTION INTRAMUSCULAR; INTRAVENOUS at 11:05

## 2018-09-12 RX ADMIN — SODIUM CHLORIDE 75 ML/HR: 9 INJECTION, SOLUTION INTRAVENOUS at 00:04

## 2018-09-12 RX ADMIN — FAMOTIDINE 40 MG: 20 TABLET, FILM COATED ORAL at 09:36

## 2018-09-12 RX ADMIN — METRONIDAZOLE 500 MG: 500 INJECTION, SOLUTION INTRAVENOUS at 09:36

## 2018-09-12 RX ADMIN — ASPIRIN 81 MG: 81 TABLET ORAL at 09:36

## 2018-09-12 RX ADMIN — LEVETIRACETAM 250 MG: 250 TABLET, FILM COATED ORAL at 09:36

## 2018-09-12 RX ADMIN — ONDANSETRON 4 MG: 2 INJECTION INTRAMUSCULAR; INTRAVENOUS at 05:12

## 2018-09-12 RX ADMIN — HYDROCODONE BITARTRATE AND ACETAMINOPHEN 1 TABLET: 5; 325 TABLET ORAL at 03:15

## 2018-09-12 RX ADMIN — LEVOFLOXACIN 750 MG: 5 INJECTION, SOLUTION INTRAVENOUS at 00:01

## 2018-09-12 NOTE — DISCHARGE SUMMARY
[facility] Medicine Services  DISCHARGE SUMMARY       Date of Admission: 9/10/2018  Date of Discharge:  9/12/2018  Primary Care Physician: Provider, No Known    Presenting Problem/History of Present Illness:  Abdominal pain and hypotension    Final Discharge Diagnoses:  Colitis  Dehydration  Chronic abdominal     Consults: None    Procedures Performed: None    Pertinent Test Results:   Imaging Results (last 7 days)     Procedure Component Value Units Date/Time    CT Abdomen Pelvis With Contrast [[id number]] Collected:  09/11/18 0729     Updated:  09/11/18 0739    Narrative:       CT ABDOMEN PELVIS W CONTRAST- 9/10/2018 11:39 PM CDT     HISTORY: abdominal pain, history of nec. pancreatitis,       COMPARISON: 7/13/2018.      DLP: 488.5 mGy cm. Automated exposure control was utilized to diminish  patient radiation dose.           TECHNIQUE: Following the oral ingestion and intravenous administration  of contrast, helical CT tomographic images of the abdomen and pelvis  were acquired. Coronal reformatted images were also provided for review.        FINDINGS:   The patient's undergone bilateral breast augmentation. There has been  rupture of the right implant with collapse. There is dependent  atelectasis within both lung bases with the lung bases otherwise clear.  No effusion is present. The base of the heart is unremarkable. A small  hiatal hernia is present..      LIVER: No focal liver lesion. The hepatic vasculature is patent.      BILIARY SYSTEM: The gallbladder is unremarkable. No intrahepatic or  extrahepatic ductal dilatation.      PANCREAS: No focal pancreatic lesion.      SPLEEN: Unremarkable.      KIDNEYS AND ADRENALS: Bilateral kidneys and adrenal glands are  unremarkable. The ureters are decompressed and normal in appearance.     RETROPERITONEUM: No mass, lymphadenopathy or hemorrhage.      GI TRACT: There is mild distention of the right and transverse colon  with some  enhancement of the wall and mild pericolonic inflammatory  stranding suggesting colitis. This also some mucosal enhancement and  ill-defined margins of the descending colon. No evidence of mechanical  obstruction or focal bowel wall thickening.. The appendix is not  well-visualized but there is no localized inflammatory process in the  right lower quadrant..     OTHER: There is no mesenteric mass, lymphadenopathy or fluid collection.  The abdominopelvic vasculature is patent. The osseous structures and  soft tissues demonstrate no worrisome lesions. No free fluid is present.  The patient's undergone prior hysterectomy.      PELVIS: No mass lesion, fluid collection or significant lymphadenopathy  is seen in the pelvis. The urinary bladder is normal in appearance.       Impression:       1. There are findings suggesting a colitis with the right and transverse  colon fluid-filled and moderately distended. There is mucosal  enhancement of the right and transverse colon and descending colon with  mild pericolonic inflammatory stranding suggesting a colitis. There is  no evidence of mechanical obstruction.  2. Dependent atelectasis within both lung bases. The soft tissue organs  within the upper abdomen are otherwise unremarkable.  3. No other localized inflammatory process is demonstrated. There is no  evidence of free fluid.  4. The patient's undergone prior hysterectomy..         This report was finalized on 09/11/2018 07:36 by Dr. Jerry Land MD.    CT Angiogram Chest With Contrast [[id number]] Collected:  09/11/18 0718     Updated:  09/11/18 0732    Narrative:       CT chest angiogram dated 9/10/2018 11:39 PM CDT      HISTORY: Chest pain with syncope.     COMPARISON: 2/23/2017.      DLP: 292.3 mGy cm. Automated exposure control was utilized to diminish  patient radiation dose.     TECHNIQUE: Helical tomographic images of the chest were obtained after  the administration of intravenous contrast following  angiogram protocol.  Additionally, 3D MIP reconstructions in the coronal and sagittal planes  were provided.        FINDINGS:    The imaged portion of the base of the neck appears unremarkable.      There is adequate enhancement of the pulmonary arteries to evaluate for  central and segmental pulmonary emboli. There are no filling defects  within the main, lobar, segmental or visualized subsegmental pulmonary  arteries. The pulmonary vessels are within normal limits.      There is dependent atelectasis within both posterior lower lobes. No  evidence of acute consolidative pneumonia or nodularity. No pleural  effusion is present.. The trachea and bronchial tree are patent.      The heart and great vessels appear unremarkable. There is no pericardial  effusion.      No enlarged axillary or mediastinal lymph nodes are present. There is a  new 1.4 cm right hilar node for which follow-up will be suggested. This  may be reactive in nature.     No acute bony abnormalities are present. The patient's undergone  previous bilateral breast augmentation with saline implants. The right  implant is ruptured and collapsed..      The imaged portion of the upper abdomen appears unremarkable.        Impression:       1. No evidence of pulmonary embolus. The thoracic aorta and proximal  great vessels are unremarkable.  2. There is a new enlarged right hilar node measuring 14 mm in size.  This would warrant follow-up with repeat enhanced CT of the chest in 6  months. There is dependent atelectasis within both lower lobes..  3. The patient has undergone bilateral breast augmentation. The  patient's right saline implant is ruptured and collapsed.        This report was finalized on 09/11/2018 07:29 by Dr. Jerry Land MD.    CT Head Without Contrast [[id number]] Collected:  09/11/18 0708     Updated:  09/11/18 0713    Narrative:       CT BRAIN without contrast dated 9/10/2018 11:39 PM CDT     HISTORY: Syncope. HISTORY of moyamoya  disease.     COMPARISON: 2/23/2017      DOSE LENGTH PRODUCT: 514.6 mGy cm. Automated exposure control was  utilized to diminish patient radiation dose.     In order to have a CT radiation dose as low as reasonably achievable,  Automated Exposure Control was utilized for adjustment of the mA and/or  KV according to patient size.     TECHNIQUE: Serial axial tomographic images of the brain were obtained  without the use of intravenous contrast.      FINDINGS:   The midline structures are nondisplaced. The patient's undergone  previous bilateral craniotomies. The ventricles and basilar cisterns are  normal in size and configuration. There is no evidence of intracranial  hemorrhage or mass-effect. The gray-white matter differentiation is  maintained. The sulci have a normal configuration, and there are no  abnormal extra-axial fluid collections. The structures of the posterior  fossa are unremarkable.      The included orbits and their contents are unremarkable. The visualized  paranasal sinuses, mastoid air cells and middle ear cavities are clear.  The visualized osseous structures and overlying soft tissues of the  skull and face are unremarkable.        Impression:       1. No acute intracranial process.  2. Previous bilateral craniotomies.  3. The study was reviewed by [facility] radiology during the overnight  hours. I am in agreement with the preliminary report.        This report was finalized on 09/11/2018 07:10 by Dr. Jerry Land MD.    XR Chest 1 View [[id number]] Collected:  09/10/18 2054     Updated:  09/10/18 2057    Narrative:       XR CHEST 1 VW- 9/10/2018 8:52 PM CDT     HISTORY: Chest pain       COMPARISON: 7/13/2018.     FINDINGS:   The lungs are clear. The cardiomediastinal silhouette and pulmonary  vascularity are unchanged.      The osseous structures and surrounding soft tissues demonstrate no acute  abnormality.       Impression:       1. No radiographic evidence of acute cardiopulmonary  process.        This report was finalized on 09/10/2018 20:54 by Dr. Romie Murcia MD.        Lab Results (last 7 days)     Procedure Component Value Units Date/Time    POC Glucose Once [[id number]]  (Normal) Collected:  09/12/18 1120    Specimen:  Blood Updated:  09/12/18 1151     Glucose 106 mg/dL      Comment: : [id number] Henry LisaMeter ID: [id number]       Basic Metabolic Panel [[id number]]  (Abnormal) Collected:  09/12/18 0912    Specimen:  Blood Updated:  09/12/18 1007     Glucose 80 mg/dL      BUN <2 (L) mg/dL      Creatinine 0.56 mg/dL      Sodium 143 mmol/L      Potassium 4.3 mmol/L      Comment: Specimen hemolyzed.  Results may be affected.        Chloride 110 mmol/L      CO2 25.0 mmol/L      Calcium 8.9 mg/dL      eGFR Non African Amer 116 mL/min/1.73      BUN/Creatinine Ratio --     Comment: Unable to calculate Bun/Crea Ratio.        Anion Gap 8.0 mmol/L     Narrative:       GFR Normal >60  Chronic Kidney Disease <60  Kidney Failure <15    POC Glucose Once [[id number]]  (Normal) Collected:  09/12/18 0804    Specimen:  Blood Updated:  09/12/18 0826     Glucose 84 mg/dL      Comment: : [id number] Henry LisaMeter ID: [id number]       CBC (No Diff) [[id number]]  (Normal) Collected:  09/12/18 0710    Specimen:  Blood Updated:  09/12/18 0741     WBC 8.60 10*3/mm3      RBC 4.75 10*6/mm3      Hemoglobin 14.4 g/dL      Hematocrit 43.7 %      MCV 92.0 fL      MCH 30.3 pg      MCHC 33.0 g/dL      RDW 13.7 %      RDW-SD 46.7 fl      MPV 11.1 fL      Platelets 269 10*3/mm3     Blood Culture With CHRISSY - Blood, [[id number]]  (Abnormal) Collected:  09/11/18 0154    Specimen:  Blood from Hand, Right Updated:  09/12/18 0706     Blood Culture Abnormal Stain (A)      Gram Positive Cocci (A)     Isolated from Aerobic Bottle     Gram Stain Result Gram positive cocci    Narrative:       No growth anaerobic bottle.    Blood Culture With CHRISSY - Blood, [[id number]]  (Normal) Collected:  09/11/18 0158    Specimen:  Blood from  Hand, Right Updated:  09/12/18 0203     Blood Culture No growth at 24 hours    Blood Culture ID, PCR - Blood, [[id number]]  (Abnormal) Collected:  09/11/18 0154    Specimen:  Blood from Hand, Right Updated:  09/12/18 0200     BCID, PCR Staphylococcus spp, not aureus. Identification by BCID PCR. (C)    POC Glucose Once [[id number]]  (Normal) Collected:  09/11/18 1959    Specimen:  Blood Updated:  09/11/18 2015     Glucose 89 mg/dL      Comment: : [id number] Silverio LindaMeter ID: [id number]       POC Glucose Once [[id number]]  (Normal) Collected:  09/11/18 1606    Specimen:  Blood Updated:  09/11/18 1619     Glucose 90 mg/dL      Comment: : [id number] Henry LisaMeter ID: [id number]       Troponin [[id number]]  (Abnormal) Collected:  09/11/18 1332    Specimen:  Blood Updated:  09/11/18 1400     Troponin I 0.174 (H) ng/mL     POC Glucose Once [[id number]]  (Normal) Collected:  09/11/18 1128    Specimen:  Blood Updated:  09/11/18 1214     Glucose 97 mg/dL      Comment: : [id number] Henry LisaMeter ID: [id number]       TSH [[id number]]  (Normal) Collected:  09/11/18 0602    Specimen:  Blood Updated:  09/11/18 1102     TSH 2.180 mIU/mL     Troponin [[id number]]  (Abnormal) Collected:  09/11/18 0825    Specimen:  Blood Updated:  09/11/18 0902     Troponin I 0.369 (H) ng/mL     Calcium, Ionized [[id number]]  (Abnormal) Collected:  09/11/18 0825    Specimen:  Blood Updated:  09/11/18 0840     Ionized Calcium 4.35 (L) mg/dL      Collected by [id number]    POC Glucose Once [[id number]]  (Normal) Collected:  09/11/18 0814    Specimen:  Blood Updated:  09/11/18 0832     Glucose 80 mg/dL      Comment: : [id number] Henry LisaMeter ID: [id number]       Procalcitonin [[id number]]  (Normal) Collected:  09/11/18 0602    Specimen:  Blood Updated:  09/11/18 0647     Procalcitonin <0.25 ng/mL     Narrative:       SIRS, sepsis, severe sepsis, and septic shock are categorized according to the criteria of the consensus conference of the  American College of Chest Physicians/Society of Critical Care Medicine.    PCT < 0.5 ng/mL     Systemic infection (sepsis) is not likely.    PCT >0.5 and < 2.0 ng/mL Systemic infection (sepsis) is possible, but other conditions are known to elevate PCT as well.    PCT > 2.0 ng/mL     Systemic infection (sepsis) is likely, unless other causes are known.      PCT > 10.0 ng/mL    Important systemic inflammatory response, almost exclusively due to severe bacterial sepsis or septic shock.    PCT values of < 0.5 ng/mL do not exclude an infection, because localized infections (without systemic signs) may be associated with such low concentrations, or a systemic infection in its initial stages (<6 hours).  Increased PCT can occur without infection.  PCT concentrations between 0.5 and 2.0 ng/mL should be interpreted taking into account the patients history.  It is recommended to retest PCT within 6-24 hours if any concentrations < 2.0 ng/mL are obtained.    Comprehensive Metabolic Panel [[id number]]  (Abnormal) Collected:  09/11/18 0602    Specimen:  Blood Updated:  09/11/18 0636     Glucose 85 mg/dL      BUN 4 (L) mg/dL      Creatinine 0.57 mg/dL      Sodium 142 mmol/L      Potassium 4.0 mmol/L      Chloride 114 (H) mmol/L      CO2 21.0 (L) mmol/L      Calcium 7.9 (L) mg/dL      Total Protein 5.5 (L) g/dL      Albumin 3.10 (L) g/dL      ALT (SGPT) 25 U/L      AST (SGOT) 16 U/L      Alkaline Phosphatase 52 U/L      Total Bilirubin 0.2 mg/dL      eGFR Non African Amer 114 mL/min/1.73      Globulin 2.4 gm/dL      A/G Ratio 1.3 g/dL      BUN/Creatinine Ratio 7.0     Anion Gap 7.0 mmol/L     Lipase [[id number]]  (Normal) Collected:  09/11/18 0602    Specimen:  Blood Updated:  09/11/18 0630     Lipase 37 U/L     Amylase [[id number]]  (Normal) Collected:  09/11/18 0602    Specimen:  Blood Updated:  09/11/18 0630     Amylase 46 U/L     Magnesium [[id number]]  (Normal) Collected:  09/11/18 0602    Specimen:  Blood Updated:  09/11/18  0630     Magnesium 1.5 mg/dL     Phosphorus [[id number]]  (Normal) Collected:  09/11/18 0602    Specimen:  Blood Updated:  09/11/18 0630     Phosphorus 2.8 mg/dL     Lactic Acid, Reflex [[id number]]  (Normal) Collected:  09/11/18 0602    Specimen:  Blood Updated:  09/11/18 0625     Lactate 0.8 mmol/L     CBC Auto Differential [[id number]]  (Abnormal) Collected:  09/11/18 0602    Specimen:  Blood Updated:  09/11/18 0621     WBC 7.59 10*3/mm3      RBC 3.56 (L) 10*6/mm3      Hemoglobin 10.7 (L) g/dL      Hematocrit 32.0 (L) %      MCV 89.9 fL      MCH 30.1 pg      MCHC 33.4 g/dL      RDW 13.7 %      RDW-SD 44.9 fl      MPV 10.4 fL      Platelets 305 10*3/mm3      Neutrophil % 48.6 %      Lymphocyte % 43.2 %      Monocyte % 6.9 %      Eosinophil % 0.7 %      Basophil % 0.3 %      Immature Grans % 0.3 %      Neutrophils, Absolute 3.70 10*3/mm3      Lymphocytes, Absolute 3.28 10*3/mm3      Monocytes, Absolute 0.52 10*3/mm3      Eosinophils, Absolute 0.05 10*3/mm3      Basophils, Absolute 0.02 10*3/mm3      Immature Grans, Absolute 0.02 10*3/mm3      nRBC 0.0 /100 WBC     Hemoglobin A1c [[id number]] Collected:  09/10/18 2007    Specimen:  Blood Updated:  09/11/18 0531     Hemoglobin A1C 5.8 %     Narrative:       Less than 6.0           Non-Diabetic Range  6.0-7.0                 ADA Therapeutic Target  Greater than 7.0        Action Suggested    Lactic Acid, Reflex Timer (This will reflex a repeat order 3-3:15 hours after ordered.) [[id number]] Collected:  09/11/18 0129    Specimen:  Blood Updated:  09/11/18 0445     Extra Tube Hold for add-ons.     Comment: Auto resulted.       Urinalysis With Culture If Indicated - Urine, Clean Catch [[id number]]  (Normal) Collected:  09/11/18 0321    Specimen:  Urine from Urine, Clean Catch Updated:  09/11/18 0329     Color, UA Yellow     Appearance, UA Clear     pH, UA 5.5     Specific Gravity, UA 1.029     Glucose, UA Negative     Ketones, UA Negative     Bilirubin, UA Negative     Blood,  UA Negative     Protein, UA Negative     Leuk Esterase, UA Negative     Nitrite, UA Negative     Urobilinogen, UA 0.2 E.U./dL    Narrative:       Urine microscopic not indicated.    Troponin [[id number]]  (Abnormal) Collected:  09/11/18 0158    Specimen:  Blood Updated:  09/11/18 0234     Troponin I 0.673 (C) ng/mL     POC Glucose Once [[id number]]  (Normal) Collected:  09/11/18 0132    Specimen:  Blood Updated:  09/11/18 0144     Glucose 104 mg/dL      Comment: : [id number] Kaiser Hernandez DMeter ID: [id number]       Lactic Acid, Plasma [[id number]]  (Abnormal) Collected:  09/11/18 0129    Specimen:  Blood Updated:  09/11/18 0143     Lactate 4.8 (C) mmol/L     [place] Draw [[id number]] Collected:  09/10/18 2007    Specimen:  Blood Updated:  09/10/18 2116    Narrative:       The following orders were created for panel order [place] Draw.  Procedure                               Abnormality         Status                     ---------                               -----------         ------                     Light Blue Top[[id number]]                                   Final result               Green Top (Gel)[[id number]]                                  Final result               Lavender Top[[id number]]                                     Final result               Red Top[[id number]]                                          Final result                 Please view results for these tests on the individual orders.    Green Top (Gel) [[id number]] Collected:  09/10/18 2007    Specimen:  Blood Updated:  09/10/18 2116     Extra Tube Hold for add-ons.     Comment: Auto resulted.       Light Blue Top [[id number]] Collected:  09/10/18 2007    Specimen:  Blood Updated:  09/10/18 2116     Extra Tube hold for add-on     Comment: Auto resulted       Lavender Top [[id number]] Collected:  09/10/18 2007    Specimen:  Blood Updated:  09/10/18 2116     Extra Tube hold for add-on     Comment: Auto resulted       Red Top [[id number]]  Collected:  09/10/18 2007    Specimen:  Blood Updated:  09/10/18 2116     Extra Tube Hold for add-ons.     Comment: Auto resulted.       Urine Drug Screen - Urine, Clean Catch [[id number]]  (Abnormal) Collected:  09/10/18 2048    Specimen:  Urine from Urine, Clean Catch Updated:  09/10/18 2115     Amphetamine Screen, Urine Negative     Barbiturates Screen, Urine Negative     Benzodiazepine Screen, Urine Positive (A)     Cocaine Screen, Urine Negative     Methadone Screen, Urine Negative     Opiate Screen Negative     Phencyclidine (PCP), Urine Negative     THC, Screen, Urine Positive (A)    Narrative:       Negative Thresholds For Drugs Screened in Urine:    Amphetamines          500 ng/ml  Barbiturates          200 ng/ml  Benzodiazepines       200 ng/ml  Cocaine               150 ng/ml  Methadone             150 ng/ml  Opiates               300 ng/ml  Phencyclidine         25 ng/ml  THC                      50 ng/ml    The normal value for all drugs tested is negative. This report includes final unconfirmed screening results.  A positive result by this assay can be, at your request, sent to the Reference Lab for confirmation by gas chromatography. Unconfirmed results must not be used for non-medical purposes, such as employment or legal testing. Clinical consideration should be applied to any drug of abuse test result, particularly when unconfirmed results are used.    Urinalysis With Microscopic If Indicated (No Culture) - Urine, Clean Catch [[id number]]  (Abnormal) Collected:  09/10/18 2048    Specimen:  Urine from Urine, Clean Catch Updated:  09/10/18 2113     Color, UA Yellow     Appearance, UA Cloudy (A)     pH, UA 5.5     Specific Gravity, UA 1.027     Glucose, UA Negative     Ketones, UA Negative     Bilirubin, UA Negative     Blood, UA Negative     Protein, UA Trace (A)     Leuk Esterase, UA Moderate (2+) (A)     Nitrite, UA Negative     Urobilinogen, UA 0.2 E.U./dL    Urinalysis, Microscopic Only - Urine,  Clean Catch [[id number]]  (Abnormal) Collected:  09/10/18 2048    Specimen:  Urine from Urine, Clean Catch Updated:  09/10/18 2113     RBC, UA None Seen /HPF      WBC, UA 13-20 (A) /HPF      Bacteria, UA 4+ (A) /HPF      Squamous Epithelial Cells, UA 0-2 /HPF      Hyaline Casts, UA 3-6 /LPF      Methodology Manual Light Microscopy    CK-MB [[id number]]  (Normal) Collected:  09/10/18 2007    Specimen:  Blood Updated:  09/10/18 2101     CKMB 0.75 ng/mL     Narrative:       CKMB Index not indicated    BNP [[id number]]  (Normal) Collected:  09/10/18 2007    Specimen:  Blood Updated:  09/10/18 2101     proBNP 263.0 pg/mL     Troponin [[id number]]  (Normal) Collected:  09/10/18 2007    Specimen:  Blood Updated:  09/10/18 2101     Troponin I 0.025 ng/mL     CBC & Differential [[id number]] Collected:  09/10/18 2007    Specimen:  Blood Updated:  09/10/18 2059    Narrative:       The following orders were created for panel order CBC & Differential.  Procedure                               Abnormality         Status                     ---------                               -----------         ------                     Manual Differential[[id number]]          Abnormal            Final result               CBC Auto Differential[[id number]]        Normal              Final result                 Please view results for these tests on the individual orders.    CBC Auto Differential [[id number]]  (Normal) Collected:  09/10/18 2007    Specimen:  Blood Updated:  09/10/18 2059     WBC 9.99 10*3/mm3      RBC 4.72 10*6/mm3      Hemoglobin 14.2 g/dL      Hematocrit 41.7 %      MCV 88.3 fL      MCH 30.1 pg      MCHC 34.1 g/dL      RDW 13.6 %      RDW-SD 44.1 fl      MPV 10.9 fL      Platelets 363 10*3/mm3     Manual Differential [[id number]]  (Abnormal) Collected:  09/10/18 2007    Specimen:  Blood Updated:  09/10/18 2059     Neutrophil % 44.4 %      Lymphocyte % 37.4 %      Monocyte % 2.0 (L) %      Eosinophil % 1.0 %      Atypical Lymphocyte  % 15.2 (H) %      Neutrophils Absolute 4.44 10*3/mm3      Lymphocytes Absolute 3.74 10*3/mm3      Monocytes Absolute 0.20 10*3/mm3      Eosinophils Absolute 0.10 10*3/mm3      RBC Morphology Normal     WBC Morphology Normal     Giant Platelets Slight/1+    Comprehensive Metabolic Panel [[id number]]  (Abnormal) Collected:  09/10/18 2007    Specimen:  Blood Updated:  09/10/18 2050     Glucose 256 (H) mg/dL      BUN 7 mg/dL      Creatinine 0.84 mg/dL      Sodium 138 mmol/L      Potassium 3.2 (L) mmol/L      Chloride 101 mmol/L      CO2 19.0 (L) mmol/L      Calcium 10.8 (H) mg/dL      Total Protein 8.0 g/dL      Albumin 4.80 g/dL      ALT (SGPT) <15 U/L      AST (SGOT) 26 U/L      Alkaline Phosphatase 95 U/L      Total Bilirubin 0.4 mg/dL      eGFR Non African Amer 73 mL/min/1.73      Globulin 3.2 gm/dL      A/G Ratio 1.5 g/dL      BUN/Creatinine Ratio 8.3     Anion Gap 18.0 (H) mmol/L     Lipase [[id number]]  (Normal) Collected:  09/10/18 2007    Specimen:  Blood Updated:  09/10/18 2050     Lipase 47 U/L     Amylase [[id number]]  (Normal) Collected:  09/10/18 2007    Specimen:  Blood Updated:  09/10/18 2050     Amylase 66 U/L     Protime-INR [[id number]]  (Abnormal) Collected:  09/10/18 2007    Specimen:  Blood Updated:  09/10/18 2040     Protime 12.4 Seconds      INR 0.90 (L)    aPTT [[id number]]  (Normal) Collected:  09/10/18 2007    Specimen:  Blood Updated:  09/10/18 2040     PTT 27.6 seconds         Hospital Course:  The patient is a 48 y.o. female who presented to [facility] with abdominal pain and syncopal episode.  Patient has been having poor by mouth intake.  Patient was hypotensive upon arrival and significantly dehydrated.  CT of abdomen and pelvis was noted to have moderate colitis.  Patient received IV fluids, and her hypotension resolved.  Patient also received IV antibiotics for a couple of days and will subsequently transposition to oral levofloxacin and metronidazole.  Patient felt as though  "she was back to her baseline, she indicates that she is still unable to tolerate significant amounts of food, but she feels as though she is back to her baseline.  She would like to be discharged so that she can make sure that she obtains her follow-up at [facility] next week.      Physical Exam on Discharge:  /84 (BP Location: Right arm, Patient Position: Lying)   Pulse 74   Temp 98.8 °F (37.1 °C) (Temporal Artery )   Resp 18   Ht 170.2 cm (67.01\")   Wt 53.1 kg (117 lb 2 oz)   LMP  (LMP Unknown)   SpO2 98%   BMI 18.34 kg/m²   Physical Exam   Constitutional: She is oriented to person, place, and time. She appears well-developed and well-nourished.   Eyes: Conjunctivae are normal. No scleral icterus.   Cardiovascular: Normal rate and regular rhythm.  Exam reveals no friction rub.    No murmur heard.  Pulmonary/Chest: Effort normal and breath sounds normal. No respiratory distress.   Abdominal: Soft. Bowel sounds are normal. She exhibits no distension. There is no tenderness.   Neurological: She is alert and oriented to person, place, and time.   Skin: Skin is warm and dry.   Psychiatric: She has a normal mood and affect. Her behavior is normal.   Vitals reviewed.      Condition on Discharge: Baseline    Discharge Disposition:  Home or Self Care    Discharge Medications:     Discharge Medications      New Medications      Instructions Start Date   levoFLOXacin 750 MG tablet  Commonly known as:  LEVAQUIN   750 mg, Oral, Daily      metroNIDAZOLE 500 MG tablet  Commonly known as:  FLAGYL   500 mg, Oral, 3 Times Daily         Continue These Medications      Instructions Start Date   acetaminophen-codeine 300-30 MG per tablet  Commonly known as:  TYLENOL #3   1 tablet, Oral, Every 4 Hours PRN      ALPRAZolam 0.5 MG tablet  Commonly known as:  XANAX   0.5 mg, Oral, 3 Times Daily PRN      amitriptyline 50 MG tablet  Commonly known as:  ELAVIL   Nightly      aspirin 81 MG EC tablet   Take 81 mg by " mouth daily.      dicyclomine 10 MG capsule  Commonly known as:  BENTYL   10 mg, Oral, 4 Times Daily PRN      fluticasone 50 MCG/ACT nasal spray  Commonly known as:  FLONASE   2 sprays, Daily      hyoscyamine 0.125 MG tablet  Commonly known as:  ANASPAZ,LEVSIN   0.125 mg, Oral, Every 6 Hours PRN      levETIRAcetam 250 MG tablet  Commonly known as:  KEPPRA   Oral, 2 Times Daily      metoprolol succinate  MG 24 hr tablet  Commonly known as:  TOPROL-XL   Nightly      pantoprazole 40 MG EC tablet  Commonly known as:  PROTONIX   40 mg, Oral, 2 Times Daily      pantoprazole 40 MG EC tablet  Commonly known as:  PROTONIX   40 mg, Oral, Daily      promethazine 25 MG tablet  Commonly known as:  PHENERGAN   25 mg, Oral, Every 6 Hours PRN         Stop These Medications    amLODIPine 10 MG tablet  Commonly known as:  NORVASC     aspirin-acetaminophen-caffeine 250-250-65 MG per tablet  Commonly known as:  EXCEDRIN MIGRAINE     CloNIDine 0.1 MG tablet  Commonly known as:  CATAPRES          Discharge Diet:   Diet Instructions     Diet: Regular       Discharge Diet:  Regular    Pedro    Continue supplements        Activity at Discharge:     Follow-up Appointments:   Future Appointments  Date Time Provider Department Center   2/6/2019 10:40 AM PAD [facility] 1 [facility]       Test Results Pending at Discharge: None    Rey Malloy MD  09/12/18  2:59 PM    Time: 35 minutes.

## 2018-09-12 NOTE — PLAN OF CARE
Problem: Patient Care Overview  Goal: Plan of Care Review  Outcome: Ongoing (interventions implemented as appropriate)   09/12/18 8607   Coping/Psychosocial   Plan of Care Reviewed With patient;spouse   Plan of Care Review   Progress no change   OTHER   Outcome Summary Pt still c/o abd pain and nausea. Zofran given. Pain med given x1 sleeping after med given. S [id number]

## 2018-09-16 LAB
BACTERIA SPEC AEROBE CULT: ABNORMAL
BACTERIA SPEC AEROBE CULT: NORMAL
GRAM STN SPEC: ABNORMAL
ISOLATED FROM: ABNORMAL

## 2018-10-07 NOTE — TELEPHONE ENCOUNTER
As we discussed I called in Zofran 4 mg 1 po q8hrs prn # 5 no refills to [facility] and explained if she continues to have N/V she needs to go to a walk in clinic or ER.  
- - -

## 2018-10-14 ENCOUNTER — APPOINTMENT (OUTPATIENT)
Dept: CT IMAGING | Facility: HOSPITAL | Age: 48
End: 2018-10-14

## 2018-10-14 ENCOUNTER — HOSPITAL ENCOUNTER (OUTPATIENT)
Facility: HOSPITAL | Age: 48
Setting detail: OBSERVATION
Discharge: HOME | End: 2018-10-16
Attending: EMERGENCY MEDICINE | Admitting: FAMILY MEDICINE

## 2018-10-14 DIAGNOSIS — E87.29 HIGH ANION GAP METABOLIC ACIDOSIS: ICD-10-CM

## 2018-10-14 DIAGNOSIS — E87.6 HYPOKALEMIA: ICD-10-CM

## 2018-10-14 DIAGNOSIS — R11.2 NAUSEA AND VOMITING, INTRACTABILITY OF VOMITING NOT SPECIFIED, UNSPECIFIED VOMITING TYPE: ICD-10-CM

## 2018-10-14 DIAGNOSIS — E87.20 METABOLIC ACIDOSIS: Primary | ICD-10-CM

## 2018-10-14 LAB
ACETONE BLD QL: ABNORMAL
ALBUMIN SERPL-MCNC: 4.9 G/DL (ref 3.5–5)
ALBUMIN/GLOB SERPL: 1.5 G/DL (ref 1.1–2.5)
ALP SERPL-CCNC: 99 U/L (ref 24–120)
ALT SERPL W P-5'-P-CCNC: <15 U/L (ref 0–54)
ANION GAP SERPL CALCULATED.3IONS-SCNC: 23 MMOL/L (ref 4–13)
AST SERPL-CCNC: 17 U/L (ref 7–45)
ATMOSPHERIC PRESS: 749 MMHG
BACTERIA UR QL AUTO: ABNORMAL /HPF
BASE EXCESS BLDV CALC-SCNC: -8.4 MMOL/L (ref 0–2)
BASOPHILS # BLD AUTO: 0.03 10*3/MM3 (ref 0–0.2)
BASOPHILS NFR BLD AUTO: 0.3 % (ref 0–2)
BDY SITE: ABNORMAL
BILIRUB SERPL-MCNC: 0.5 MG/DL (ref 0.1–1)
BILIRUB UR QL STRIP: NEGATIVE
BODY TEMPERATURE: 37 C
BUN BLD-MCNC: 16 MG/DL (ref 5–21)
BUN/CREAT SERPL: 15.1 (ref 7–25)
CALCIUM SPEC-SCNC: 11.3 MG/DL (ref 8.4–10.4)
CHLORIDE SERPL-SCNC: 100 MMOL/L (ref 98–110)
CLARITY UR: CLEAR
CO2 SERPL-SCNC: 18 MMOL/L (ref 24–31)
COLOR UR: YELLOW
CREAT BLD-MCNC: 1.06 MG/DL (ref 0.5–1.4)
D-LACTATE SERPL-SCNC: 1.8 MMOL/L (ref 0.5–2)
DEPRECATED RDW RBC AUTO: 44.8 FL (ref 40–54)
EOSINOPHIL # BLD AUTO: 0.07 10*3/MM3 (ref 0–0.7)
EOSINOPHIL NFR BLD AUTO: 0.6 % (ref 0–4)
ERYTHROCYTE [DISTWIDTH] IN BLOOD BY AUTOMATED COUNT: 13.8 % (ref 12–15)
GFR SERPL CREATININE-BSD FRML MDRD: 56 ML/MIN/1.73
GLOBULIN UR ELPH-MCNC: 3.3 GM/DL
GLUCOSE BLD-MCNC: 92 MG/DL (ref 70–100)
GLUCOSE UR STRIP-MCNC: NEGATIVE MG/DL
HCO3 BLDV-SCNC: 18.1 MMOL/L (ref 22–28)
HCT VFR BLD AUTO: 45.6 % (ref 37–47)
HGB BLD-MCNC: 15.5 G/DL (ref 12–16)
HGB UR QL STRIP.AUTO: NEGATIVE
HOLD SPECIMEN: NORMAL
HOLD SPECIMEN: NORMAL
HYALINE CASTS UR QL AUTO: ABNORMAL /LPF
IMM GRANULOCYTES # BLD: 0.04 10*3/MM3 (ref 0–0.03)
IMM GRANULOCYTES NFR BLD: 0.3 % (ref 0–5)
KETONES UR QL STRIP: ABNORMAL
LEUKOCYTE ESTERASE UR QL STRIP.AUTO: ABNORMAL
LIPASE SERPL-CCNC: 52 U/L (ref 23–203)
LYMPHOCYTES # BLD AUTO: 5.19 10*3/MM3 (ref 0.72–4.86)
LYMPHOCYTES NFR BLD AUTO: 45.2 % (ref 15–45)
Lab: ABNORMAL
MCH RBC QN AUTO: 30.1 PG (ref 28–32)
MCHC RBC AUTO-ENTMCNC: 34 G/DL (ref 33–36)
MCV RBC AUTO: 88.5 FL (ref 82–98)
MODALITY: ABNORMAL
MONOCYTES # BLD AUTO: 0.61 10*3/MM3 (ref 0.19–1.3)
MONOCYTES NFR BLD AUTO: 5.3 % (ref 4–12)
NEUTROPHILS # BLD AUTO: 5.55 10*3/MM3 (ref 1.87–8.4)
NEUTROPHILS NFR BLD AUTO: 48.3 % (ref 39–78)
NITRITE UR QL STRIP: NEGATIVE
NRBC BLD MANUAL-RTO: 0 /100 WBC (ref 0–0)
PCO2 BLDV: 39.9 MM HG (ref 41–51)
PH BLDV: 7.26 PH UNITS (ref 7.32–7.42)
PH UR STRIP.AUTO: <=5 [PH] (ref 5–8)
PLATELET # BLD AUTO: 360 10*3/MM3 (ref 130–400)
PMV BLD AUTO: 11.3 FL (ref 6–12)
PO2 BLDV: 36 MM HG (ref 27–53)
POTASSIUM BLD-SCNC: 2.9 MMOL/L (ref 3.5–5.3)
PROT SERPL-MCNC: 8.2 G/DL (ref 6.3–8.7)
PROT UR QL STRIP: NEGATIVE
RBC # BLD AUTO: 5.15 10*6/MM3 (ref 4.2–5.4)
RBC # UR: ABNORMAL /HPF
REF LAB TEST METHOD: ABNORMAL
SAO2 % BLDCOV: 64.7 % (ref 45–75)
SODIUM BLD-SCNC: 141 MMOL/L (ref 135–145)
SP GR UR STRIP: >1.03 (ref 1–1.03)
SQUAMOUS #/AREA URNS HPF: ABNORMAL /HPF
UROBILINOGEN UR QL STRIP: ABNORMAL
VENTILATOR MODE: ABNORMAL
WBC NRBC COR # BLD: 11.49 10*3/MM3 (ref 4.8–10.8)
WBC UR QL AUTO: ABNORMAL /HPF
WHOLE BLOOD HOLD SPECIMEN: NORMAL
WHOLE BLOOD HOLD SPECIMEN: NORMAL

## 2018-10-14 PROCEDURE — 85025 COMPLETE CBC W/AUTO DIFF WBC: CPT | Performed by: EMERGENCY MEDICINE

## 2018-10-14 PROCEDURE — 96365 THER/PROPH/DIAG IV INF INIT: CPT

## 2018-10-14 PROCEDURE — 94760 N-INVAS EAR/PLS OXIMETRY 1: CPT

## 2018-10-14 PROCEDURE — 25010000002 FENTANYL CITRATE (PF) 100 MCG/2ML SOLUTION: Performed by: EMERGENCY MEDICINE

## 2018-10-14 PROCEDURE — 25010000002 HEPARIN (PORCINE) PER 1000 UNITS: Performed by: FAMILY MEDICINE

## 2018-10-14 PROCEDURE — 83605 ASSAY OF LACTIC ACID: CPT | Performed by: EMERGENCY MEDICINE

## 2018-10-14 PROCEDURE — G0378 HOSPITAL OBSERVATION PER HR: HCPCS

## 2018-10-14 PROCEDURE — 25010000003 POTASSIUM CHLORIDE 10 MEQ/100ML SOLUTION: Performed by: EMERGENCY MEDICINE

## 2018-10-14 PROCEDURE — 99285 EMERGENCY DEPT VISIT HI MDM: CPT

## 2018-10-14 PROCEDURE — 96361 HYDRATE IV INFUSION ADD-ON: CPT

## 2018-10-14 PROCEDURE — 80053 COMPREHEN METABOLIC PANEL: CPT | Performed by: EMERGENCY MEDICINE

## 2018-10-14 PROCEDURE — 83690 ASSAY OF LIPASE: CPT | Performed by: EMERGENCY MEDICINE

## 2018-10-14 PROCEDURE — 81001 URINALYSIS AUTO W/SCOPE: CPT | Performed by: EMERGENCY MEDICINE

## 2018-10-14 PROCEDURE — 74177 CT ABD & PELVIS W/CONTRAST: CPT

## 2018-10-14 PROCEDURE — 87086 URINE CULTURE/COLONY COUNT: CPT | Performed by: FAMILY MEDICINE

## 2018-10-14 PROCEDURE — 96375 TX/PRO/DX INJ NEW DRUG ADDON: CPT

## 2018-10-14 PROCEDURE — 94799 UNLISTED PULMONARY SVC/PX: CPT

## 2018-10-14 PROCEDURE — 96376 TX/PRO/DX INJ SAME DRUG ADON: CPT

## 2018-10-14 PROCEDURE — 82009 KETONE BODYS QUAL: CPT | Performed by: EMERGENCY MEDICINE

## 2018-10-14 PROCEDURE — 25010000002 PROMETHAZINE PER 50 MG: Performed by: EMERGENCY MEDICINE

## 2018-10-14 PROCEDURE — 25010000003 MORPHINE 5 MG/ML SOLUTION: Performed by: EMERGENCY MEDICINE

## 2018-10-14 PROCEDURE — 82803 BLOOD GASES ANY COMBINATION: CPT

## 2018-10-14 PROCEDURE — 25010000002 IOPAMIDOL 61 % SOLUTION: Performed by: EMERGENCY MEDICINE

## 2018-10-14 PROCEDURE — 96372 THER/PROPH/DIAG INJ SC/IM: CPT

## 2018-10-14 RX ORDER — SODIUM CHLORIDE 9 MG/ML
100 INJECTION, SOLUTION INTRAVENOUS CONTINUOUS
Status: DISCONTINUED | OUTPATIENT
Start: 2018-10-14 | End: 2018-10-16 | Stop reason: HOSPADM

## 2018-10-14 RX ORDER — ASPIRIN 81 MG/1
81 TABLET ORAL DAILY
Status: DISCONTINUED | OUTPATIENT
Start: 2018-10-15 | End: 2018-10-16 | Stop reason: HOSPADM

## 2018-10-14 RX ORDER — LEVETIRACETAM 250 MG/1
250 TABLET ORAL EVERY 12 HOURS SCHEDULED
Status: DISCONTINUED | OUTPATIENT
Start: 2018-10-14 | End: 2018-10-16 | Stop reason: HOSPADM

## 2018-10-14 RX ORDER — AMITRIPTYLINE HYDROCHLORIDE 25 MG/1
50 TABLET, FILM COATED ORAL NIGHTLY
Status: DISCONTINUED | OUTPATIENT
Start: 2018-10-14 | End: 2018-10-16 | Stop reason: HOSPADM

## 2018-10-14 RX ORDER — PROMETHAZINE HYDROCHLORIDE 25 MG/ML
12.5 INJECTION, SOLUTION INTRAMUSCULAR; INTRAVENOUS ONCE
Status: COMPLETED | OUTPATIENT
Start: 2018-10-14 | End: 2018-10-14

## 2018-10-14 RX ORDER — ACETAMINOPHEN AND CODEINE PHOSPHATE 300; 30 MG/1; MG/1
1 TABLET ORAL EVERY 4 HOURS PRN
Status: DISCONTINUED | OUTPATIENT
Start: 2018-10-14 | End: 2018-10-16 | Stop reason: HOSPADM

## 2018-10-14 RX ORDER — ONDANSETRON 2 MG/ML
4 INJECTION INTRAMUSCULAR; INTRAVENOUS EVERY 6 HOURS PRN
Status: DISCONTINUED | OUTPATIENT
Start: 2018-10-14 | End: 2018-10-16 | Stop reason: HOSPADM

## 2018-10-14 RX ORDER — DICYCLOMINE HYDROCHLORIDE 10 MG/1
10 CAPSULE ORAL 4 TIMES DAILY PRN
Status: DISCONTINUED | OUTPATIENT
Start: 2018-10-14 | End: 2018-10-16 | Stop reason: HOSPADM

## 2018-10-14 RX ORDER — MORPHINE SULFATE 5 MG/ML
4 INJECTION, SOLUTION INTRAMUSCULAR; INTRAVENOUS ONCE
Status: COMPLETED | OUTPATIENT
Start: 2018-10-14 | End: 2018-10-14

## 2018-10-14 RX ORDER — SODIUM CHLORIDE 0.9 % (FLUSH) 0.9 %
3-10 SYRINGE (ML) INJECTION AS NEEDED
Status: DISCONTINUED | OUTPATIENT
Start: 2018-10-14 | End: 2018-10-16 | Stop reason: HOSPADM

## 2018-10-14 RX ORDER — FLUTICASONE PROPIONATE 50 MCG
2 SPRAY, SUSPENSION (ML) NASAL DAILY
Status: DISCONTINUED | OUTPATIENT
Start: 2018-10-15 | End: 2018-10-16 | Stop reason: HOSPADM

## 2018-10-14 RX ORDER — PANTOPRAZOLE SODIUM 40 MG/1
40 TABLET, DELAYED RELEASE ORAL
Status: DISCONTINUED | OUTPATIENT
Start: 2018-10-15 | End: 2018-10-16 | Stop reason: HOSPADM

## 2018-10-14 RX ORDER — POTASSIUM CHLORIDE 750 MG/1
40 TABLET, EXTENDED RELEASE ORAL ONCE
Status: DISCONTINUED | OUTPATIENT
Start: 2018-10-14 | End: 2018-10-14 | Stop reason: ALTCHOICE

## 2018-10-14 RX ORDER — POTASSIUM CHLORIDE 750 MG/1
40 CAPSULE, EXTENDED RELEASE ORAL ONCE
Status: COMPLETED | OUTPATIENT
Start: 2018-10-14 | End: 2018-10-14

## 2018-10-14 RX ORDER — SODIUM CHLORIDE 0.9 % (FLUSH) 0.9 %
3 SYRINGE (ML) INJECTION EVERY 12 HOURS SCHEDULED
Status: DISCONTINUED | OUTPATIENT
Start: 2018-10-14 | End: 2018-10-16 | Stop reason: HOSPADM

## 2018-10-14 RX ORDER — FENTANYL CITRATE 50 UG/ML
50 INJECTION, SOLUTION INTRAMUSCULAR; INTRAVENOUS ONCE
Status: COMPLETED | OUTPATIENT
Start: 2018-10-14 | End: 2018-10-14

## 2018-10-14 RX ORDER — ALPRAZOLAM 0.5 MG/1
0.5 TABLET ORAL 3 TIMES DAILY PRN
Status: DISCONTINUED | OUTPATIENT
Start: 2018-10-14 | End: 2018-10-16 | Stop reason: HOSPADM

## 2018-10-14 RX ORDER — HEPARIN SODIUM 5000 [USP'U]/ML
5000 INJECTION, SOLUTION INTRAVENOUS; SUBCUTANEOUS EVERY 12 HOURS SCHEDULED
Status: DISCONTINUED | OUTPATIENT
Start: 2018-10-14 | End: 2018-10-16 | Stop reason: HOSPADM

## 2018-10-14 RX ORDER — PROMETHAZINE HYDROCHLORIDE 25 MG/1
25 TABLET ORAL EVERY 6 HOURS PRN
Status: DISCONTINUED | OUTPATIENT
Start: 2018-10-14 | End: 2018-10-16 | Stop reason: HOSPADM

## 2018-10-14 RX ORDER — SODIUM CHLORIDE 0.9 % (FLUSH) 0.9 %
10 SYRINGE (ML) INJECTION AS NEEDED
Status: DISCONTINUED | OUTPATIENT
Start: 2018-10-14 | End: 2018-10-16 | Stop reason: HOSPADM

## 2018-10-14 RX ORDER — POTASSIUM CHLORIDE 7.45 MG/ML
10 INJECTION INTRAVENOUS ONCE
Status: COMPLETED | OUTPATIENT
Start: 2018-10-14 | End: 2018-10-14

## 2018-10-14 RX ORDER — METOPROLOL SUCCINATE 100 MG/1
100 TABLET, EXTENDED RELEASE ORAL
Status: DISCONTINUED | OUTPATIENT
Start: 2018-10-15 | End: 2018-10-16 | Stop reason: HOSPADM

## 2018-10-14 RX ADMIN — PROMETHAZINE HYDROCHLORIDE 12.5 MG: 25 INJECTION INTRAMUSCULAR; INTRAVENOUS at 19:29

## 2018-10-14 RX ADMIN — SODIUM CHLORIDE 1000 ML: 9 INJECTION, SOLUTION INTRAVENOUS at 16:44

## 2018-10-14 RX ADMIN — SODIUM CHLORIDE 100 ML/HR: 9 INJECTION, SOLUTION INTRAVENOUS at 21:56

## 2018-10-14 RX ADMIN — ALPRAZOLAM 0.5 MG: 0.5 TABLET ORAL at 21:56

## 2018-10-14 RX ADMIN — HEPARIN SODIUM 5000 UNITS: 5000 INJECTION, SOLUTION INTRAVENOUS; SUBCUTANEOUS at 21:56

## 2018-10-14 RX ADMIN — POTASSIUM CHLORIDE 40 MEQ: 750 CAPSULE, EXTENDED RELEASE ORAL at 18:27

## 2018-10-14 RX ADMIN — POTASSIUM CHLORIDE 10 MEQ: 10 INJECTION, SOLUTION INTRAVENOUS at 18:02

## 2018-10-14 RX ADMIN — LEVETIRACETAM 250 MG: 250 TABLET, FILM COATED ORAL at 21:56

## 2018-10-14 RX ADMIN — IOPAMIDOL 100 ML: 612 INJECTION, SOLUTION INTRAVENOUS at 17:33

## 2018-10-14 RX ADMIN — PROMETHAZINE HYDROCHLORIDE 12.5 MG: 25 INJECTION INTRAMUSCULAR; INTRAVENOUS at 16:46

## 2018-10-14 RX ADMIN — AMITRIPTYLINE HYDROCHLORIDE 50 MG: 25 TABLET, FILM COATED ORAL at 21:56

## 2018-10-14 RX ADMIN — FENTANYL CITRATE 50 MCG: 50 INJECTION, SOLUTION INTRAMUSCULAR; INTRAVENOUS at 16:47

## 2018-10-14 RX ADMIN — ACETAMINOPHEN AND CODEINE PHOSPHATE 1 TABLET: 300; 30 TABLET ORAL at 21:56

## 2018-10-14 RX ADMIN — MORPHINE SULFATE 4 MG: 5 INJECTION, SOLUTION INTRAMUSCULAR; INTRAVENOUS at 17:55

## 2018-10-14 RX ADMIN — DICYCLOMINE HYDROCHLORIDE 10 MG: 10 CAPSULE ORAL at 22:31

## 2018-10-14 NOTE — ED PROVIDER NOTES
Subjective   This 48-year-old female with history of previous small bowel obstruction and gastroparesis who presents with abdominal pain.  Patient states she has had symptoms for 2 years and worsening over the past 2-1/2 months.  She notes difficulty with oral intake.  She notes diffuse abdominal pain described as cramping.  Radiates throughout.  Constant.  No obvious exacerbating or relieving factors.  She does have irritable bowel syndrome in her stool has been unchanged.  No urinary symptoms.  No specific fevers but temperature has been 99.  Denies chest pain or shortness of breath.  She has noted some episodes of vomiting recently.    Patient was discharged from the hospital about one month ago for hypotension, elevated lactate, colitis.  She was very dehydrated at that time and dry on exam.  She arrives today in no significant distress and blood pressure 146/88.            Review of Systems   Constitutional: Negative for chills, diaphoresis, fatigue and fever.   HENT: Negative for sore throat.    Eyes: Negative for visual disturbance.   Respiratory: Negative for shortness of breath.    Cardiovascular: Negative for chest pain.   Gastrointestinal: Positive for abdominal pain, nausea and vomiting. Negative for abdominal distention, anal bleeding, blood in stool, constipation and diarrhea.   Genitourinary: Negative for hematuria.   Musculoskeletal: Negative for back pain.   Skin: Negative for rash.   Neurological: Negative for headaches.       Past Medical History:   Diagnosis Date   • Acute kidney failure (CMS/HCC)    • Constipation    • Depression    • H/O gastric ulcer    • Headache    • History of transfusion    • Hypertension    • IBS (irritable bowel syndrome)    • Insomnia    • Kidney stone    • Maravilla maravilla disease    • Rapid weight loss    • SBO (small bowel obstruction)    • Stroke (CMS/HCC)     X 2   • UTI (urinary tract infection)     CHRONIC, SEPTIC X2   • Volvulosis        Allergies   Allergen Reactions    • Anectine [Succinylcholine Chloride] Other (See Comments)     Hard to wake up   • Stadol [Butorphanol] Hives   • Butorphanol Tartrate Rash     Pt states she does not recall being allergic       Past Surgical History:   Procedure Laterality Date   • APPENDECTOMY      COMPLICATION OF SEPTIC   • AUGMENTATION MAMMAPLASTY     • BRAIN SURGERY      x2   • BREAST LUMPECTOMY Left 3/9/2017    Procedure: EXCISION LEFT BREAST LESION AND LEFT AXILLARY LYMPH NODE BIOPSY;  Surgeon: Evi Farley MD;  Location: [facility] OR;  Service:    • BREAST SURGERY     •  SECTION     • COLON SURGERY     • COLONOSCOPY  2007    normal   • COLONOSCOPY N/A 11/10/2016    Procedure: COLONOSCOPY WITH ANESTHESIA;  Surgeon: Camilo Hanson MD;  Location: [facility] ENDOSCOPY;  Service:    • COLONOSCOPY N/A 2018    Procedure: COLONOSCOPY WITH ANESTHESIA;  Surgeon: Camilo Hanson MD;  Location: [facility] ENDOSCOPY;  Service:    • ENDOSCOPY  2009    antral ulcer   • ENDOSCOPY N/A 10/10/2016    Procedure: ESOPHAGOGASTRODUODENOSCOPY WITH ANESTHESIA;  Surgeon: Camilo Hanson MD;  Location: [facility] ENDOSCOPY;  Service:    • ENDOSCOPY N/A 2017    Procedure: ESOPHAGOGASTRODUODENOSCOPY;  Surgeon: Camilo Hanson MD;  Location: [facility] ENDOSCOPY;  Service:    • ENDOSCOPY N/A 2017    Procedure: ESOPHAGOGASTRODUODENOSCOPY WITH ANESTHESIA;  Surgeon: Camilo Hanson MD;  Location: [facility] ENDOSCOPY;  Service:    • ENDOSCOPY N/A 2018    Procedure: ESOPHAGOGASTRODUODENOSCOPY ;  Surgeon: Camilo Hanson MD;  Location: [facility] ENDOSCOPY;  Service:    • HERNIA REPAIR     • HYSTERECTOMY     • SMALL INTESTINE SURGERY N/A 2016   • TONSILLECTOMY         Family History   Problem Relation Age of Onset   • Cancer Maternal Grandfather    • No Known Problems Mother    • No Known Problems Father    • No Known Problems Sister    • No Known Problems Brother    • No Known Problems Son    • No Known Problems Brother    • No Known Problems  Son    • No Known Problems Maternal Grandmother    • Breast cancer Paternal Grandmother    • No Known Problems Maternal Aunt    • No Known Problems Paternal Aunt    • Colon cancer Neg Hx    • Colon polyps Neg Hx    • BRCA 1/2 Neg Hx    • Endometrial cancer Neg Hx    • Ovarian cancer Neg Hx        Social History     Social History   • Marital status:      Social History Main Topics   • Smoking status: Former Smoker     Packs/day: 1.00     Years: 15.00     Types: Cigarettes, Electronic Cigarette     Quit date: 3/10/2016   • Smokeless tobacco: Never Used   • Alcohol use Yes      Comment: occasional   • Drug use: No   • Sexual activity: Defer     Other Topics Concern   • Not on file       Lab Results (last 24 hours)     Procedure Component Value Units Date/Time    CBC & Differential [[id number]] Collected:  10/14/18 1617    Specimen:  Blood Updated:  10/14/18 1643    Narrative:       The following orders were created for panel order CBC & Differential.  Procedure                               Abnormality         Status                     ---------                               -----------         ------                     CBC Auto Differential[[id number]]        Abnormal            Final result                 Please view results for these tests on the individual orders.    Comprehensive Metabolic Panel [[id number]]  (Abnormal) Collected:  10/14/18 1617    Specimen:  Blood Updated:  10/14/18 1702     Glucose 92 mg/dL      BUN 16 mg/dL      Creatinine 1.06 mg/dL      Sodium 141 mmol/L      Potassium 2.9 (C) mmol/L      Chloride 100 mmol/L      CO2 18.0 (L) mmol/L      Calcium 11.3 (H) mg/dL      Total Protein 8.2 g/dL      Albumin 4.90 g/dL      ALT (SGPT) <15 U/L      AST (SGOT) 17 U/L      Alkaline Phosphatase 99 U/L      Total Bilirubin 0.5 mg/dL      eGFR Non African Amer 56 (L) mL/min/1.73      Globulin 3.3 gm/dL      A/G Ratio 1.5 g/dL      BUN/Creatinine Ratio 15.1     Anion Gap 23.0 (H) mmol/L     Lipase  [[id number]]  (Normal) Collected:  10/14/18 1617    Specimen:  Blood Updated:  10/14/18 1659     Lipase 52 U/L     CBC Auto Differential [[id number]]  (Abnormal) Collected:  10/14/18 1617    Specimen:  Blood Updated:  10/14/18 1643     WBC 11.49 (H) 10*3/mm3      RBC 5.15 10*6/mm3      Hemoglobin 15.5 g/dL      Hematocrit 45.6 %      MCV 88.5 fL      MCH 30.1 pg      MCHC 34.0 g/dL      RDW 13.8 %      RDW-SD 44.8 fl      MPV 11.3 fL      Platelets 360 10*3/mm3      Neutrophil % 48.3 %      Lymphocyte % 45.2 (H) %      Monocyte % 5.3 %      Eosinophil % 0.6 %      Basophil % 0.3 %      Immature Grans % 0.3 %      Neutrophils, Absolute 5.55 10*3/mm3      Lymphocytes, Absolute 5.19 (H) 10*3/mm3      Monocytes, Absolute 0.61 10*3/mm3      Eosinophils, Absolute 0.07 10*3/mm3      Basophils, Absolute 0.03 10*3/mm3      Immature Grans, Absolute 0.04 (H) 10*3/mm3      nRBC 0.0 /100 WBC     Lactic Acid, Plasma [[id number]]  (Normal) Collected:  10/14/18 1617    Specimen:  Blood Updated:  10/14/18 1817     Lactate 1.8 mmol/L     Acetone [[id number]]  (Abnormal) Collected:  10/14/18 1617    Specimen:  Blood Updated:  10/14/18 1850     Acetone Small (A)    Blood Gas, Venous [[id number]]  (Abnormal) Collected:  10/14/18 1757    Specimen:  Venous Blood Updated:  10/14/18 1804     Site OTHER     pH, Venous 7.265 (L) pH Units      pCO2, Venous 39.9 (L) mm Hg      pO2, Venous 36.0 mm Hg      HCO3, Venous 18.1 (L) mmol/L      Base Excess, Venous -8.4 (L) mmol/L      O2 Saturation, Venous 64.7 %      Temperature 37.0 C      Barometric Pressure for Blood Gas 749 mmHg      Modality Room Air     Ventilator Mode NA     Collected by [id number]    Urinalysis With Microscopic If Indicated (No Culture) - Urine, Clean Catch [[id number]] Collected:  10/14/18 1916    Specimen:  Urine from Urine, Clean Catch Updated:  10/14/18 1923          Objective   Physical Exam   Constitutional: She is oriented to person, place, and time. She appears well-nourished.  "No distress.   HENT:   Head: Atraumatic.   Mouth/Throat: Oropharynx is clear and moist and mucous membranes are normal. Mucous membranes are not dry.   Eyes: Pupils are equal, round, and reactive to light. EOM are normal.   Neck: Normal range of motion. Neck supple.   Cardiovascular: Regular rhythm and normal heart sounds.  Tachycardia present.  Exam reveals no gallop and no friction rub.    No murmur heard.  Pulmonary/Chest: Effort normal and breath sounds normal. No respiratory distress.   Abdominal: Soft. Bowel sounds are normal. There is generalized tenderness. There is no rigidity, no rebound, no guarding and no CVA tenderness.   Musculoskeletal: She exhibits no edema.   Neurological: She is alert and oriented to person, place, and time.   Skin: Skin is warm and dry.   Psychiatric: She has a normal mood and affect.   Nursing note and vitals reviewed.      Procedures         CT Abdomen Pelvis With Contrast   Final Result   1. Mild enhancement of the right collecting system, could be seen with   pyelitis. Correlate with patient's symptoms.   2. Mildly abnormal left superior renal contour, could be due to scarring   or underlying mass. Limited evaluation due to phase of imaging.   Recommend nonemergent multiphase CT or MRI with contrast (renal   protocol) for improved evaluation.   This report was finalized on 10/14/2018 19:07 by Dr Mercedez Almeida MD.          /86   Pulse 86   Temp 98.2 °F (36.8 °C)   Resp 12   Ht 170.2 cm (67\")   Wt 45.4 kg (100 lb)   LMP  (LMP Unknown)   SpO2 99%   BMI 15.66 kg/m²     ED Course    ED Course as of Oct 14 1929   Sun Oct 14, 2018   1915 pH, Venous: (!) 7.265 [TH]   1915 HCO3, Venous: (!) 18.1 [TH]   1915 Acetone: (!) Small [TH]   1915 WBC: (!) 11.49 [TH]   1915 Potassium: (!!) 2.9 [TH]   1915 CO2: (!) 18.0 [TH]   1915 Anion Gap: (!) 23.0 [TH]   1926 This is a 48 year old female who presents in setting of vomiting.  She is acidotic and ketotic.  Her potassium is 2.9.  " Abdomen was diffusely tender.  No small bowel obstruction but does show possible inflammation and renal collecting system.  She has had no urinary symptoms.  UA is pending.  No fever.  We have given her Phenergan and fluids and potassium.  We will admit her for further fluid resuscitation.  [TH]      ED Course User Index  [TH] Rufus Zaragoza MD       Medications   sodium chloride 0.9 % flush 10 mL (not administered)   promethazine (PHENERGAN) injection 12.5 mg (not administered)   sodium chloride 0.9 % bolus 1,000 mL (0 mL Intravenous Stopped 10/14/18 1745)   promethazine (PHENERGAN) injection 12.5 mg (12.5 mg Intravenous Given 10/14/18 1646)   fentaNYL citrate (PF) (SUBLIMAZE) injection 50 mcg (50 mcg Intravenous Given 10/14/18 1647)   potassium chloride 10 mEq in 100 mL IVPB (0 mEq Intravenous Stopped 10/14/18 1910)   potassium chloride (MICRO-K) CR capsule 40 mEq (40 mEq Oral Given 10/14/18 1827)   Morphine injection 4 mg (4 mg Intravenous Given 10/14/18 1755)   iopamidol (ISOVUE-300) 61 % injection 100 mL (100 mL Intravenous Given 10/14/18 1733)            MDM  Number of Diagnoses or Management Options  High anion gap metabolic acidosis: new and requires workup  Hypokalemia: new and requires workup  Metabolic acidosis: new and requires workup  Nausea and vomiting, intractability of vomiting not specified, unspecified vomiting type: new and requires workup     Amount and/or Complexity of Data Reviewed  Clinical lab tests: ordered and reviewed  Tests in the radiology section of CPT®: ordered and reviewed  Review and summarize past medical records: yes    Risk of Complications, Morbidity, and/or Mortality  Presenting problems: moderate  Diagnostic procedures: moderate  Management options: moderate    Patient Progress  Patient progress: improved      Final diagnoses:   Metabolic acidosis   Hypokalemia   Nausea and vomiting, intractability of vomiting not specified, unspecified vomiting type   High anion gap  metabolic acidosis          Rufus Zaragoza MD  10/14/18 1929

## 2018-10-15 ENCOUNTER — APPOINTMENT (OUTPATIENT)
Dept: GENERAL RADIOLOGY | Facility: HOSPITAL | Age: 48
End: 2018-10-15

## 2018-10-15 LAB
ALBUMIN SERPL-MCNC: 4.6 G/DL (ref 3.5–5)
ALBUMIN/GLOB SERPL: 1.8 G/DL (ref 1.1–2.5)
ALP SERPL-CCNC: 81 U/L (ref 24–120)
ALT SERPL W P-5'-P-CCNC: <15 U/L (ref 0–54)
ANION GAP SERPL CALCULATED.3IONS-SCNC: 16 MMOL/L (ref 4–13)
ARTERIAL PATENCY WRIST A: POSITIVE
AST SERPL-CCNC: 13 U/L (ref 7–45)
ATMOSPHERIC PRESS: 751 MMHG
BASE EXCESS BLDA CALC-SCNC: -8.5 MMOL/L (ref 0–2)
BASOPHILS # BLD AUTO: 0.02 10*3/MM3 (ref 0–0.2)
BASOPHILS NFR BLD AUTO: 0.2 % (ref 0–2)
BDY SITE: ABNORMAL
BILIRUB SERPL-MCNC: 0.4 MG/DL (ref 0.1–1)
BODY TEMPERATURE: 37 C
BUN BLD-MCNC: 8 MG/DL (ref 5–21)
BUN/CREAT SERPL: 12.3 (ref 7–25)
CALCIUM SPEC-SCNC: 9.2 MG/DL (ref 8.4–10.4)
CHLORIDE SERPL-SCNC: 108 MMOL/L (ref 98–110)
CO2 SERPL-SCNC: 19 MMOL/L (ref 24–31)
CREAT BLD-MCNC: 0.65 MG/DL (ref 0.5–1.4)
D-LACTATE SERPL-SCNC: 0.8 MMOL/L (ref 0.5–2)
DEPRECATED RDW RBC AUTO: 47.4 FL (ref 40–54)
EOSINOPHIL # BLD AUTO: 0.07 10*3/MM3 (ref 0–0.7)
EOSINOPHIL NFR BLD AUTO: 0.7 % (ref 0–4)
ERYTHROCYTE [DISTWIDTH] IN BLOOD BY AUTOMATED COUNT: 14.2 % (ref 12–15)
GFR SERPL CREATININE-BSD FRML MDRD: 98 ML/MIN/1.73
GLOBULIN UR ELPH-MCNC: 2.6 GM/DL
GLUCOSE BLD-MCNC: 46 MG/DL (ref 70–100)
GLUCOSE BLDC GLUCOMTR-MCNC: 65 MG/DL (ref 70–130)
HCO3 BLDA-SCNC: 16.9 MMOL/L (ref 20–26)
HCT VFR BLD AUTO: 44.3 % (ref 37–47)
HGB BLD-MCNC: 14.7 G/DL (ref 12–16)
IMM GRANULOCYTES # BLD: 0.03 10*3/MM3 (ref 0–0.03)
IMM GRANULOCYTES NFR BLD: 0.3 % (ref 0–5)
LYMPHOCYTES # BLD AUTO: 3.89 10*3/MM3 (ref 0.72–4.86)
LYMPHOCYTES NFR BLD AUTO: 41.4 % (ref 15–45)
Lab: ABNORMAL
MAGNESIUM SERPL-MCNC: 1.5 MG/DL (ref 1.4–2.2)
MCH RBC QN AUTO: 30.2 PG (ref 28–32)
MCHC RBC AUTO-ENTMCNC: 33.2 G/DL (ref 33–36)
MCV RBC AUTO: 91 FL (ref 82–98)
MODALITY: ABNORMAL
MONOCYTES # BLD AUTO: 0.45 10*3/MM3 (ref 0.19–1.3)
MONOCYTES NFR BLD AUTO: 4.8 % (ref 4–12)
NEUTROPHILS # BLD AUTO: 4.93 10*3/MM3 (ref 1.87–8.4)
NEUTROPHILS NFR BLD AUTO: 52.6 % (ref 39–78)
NRBC BLD MANUAL-RTO: 0 /100 WBC (ref 0–0)
PCO2 BLDA: 34.1 MM HG (ref 35–45)
PH BLDA: 7.3 PH UNITS (ref 7.35–7.45)
PHOSPHATE SERPL-MCNC: 2.9 MG/DL (ref 2.5–4.5)
PLATELET # BLD AUTO: 303 10*3/MM3 (ref 130–400)
PMV BLD AUTO: 10.7 FL (ref 6–12)
PO2 BLDA: 94.4 MM HG (ref 83–108)
POTASSIUM BLD-SCNC: 3.9 MMOL/L (ref 3.5–5.3)
PROT SERPL-MCNC: 7.2 G/DL (ref 6.3–8.7)
RBC # BLD AUTO: 4.87 10*6/MM3 (ref 4.2–5.4)
SAO2 % BLDCOA: 97.1 % (ref 94–99)
SODIUM BLD-SCNC: 143 MMOL/L (ref 135–145)
VENTILATOR MODE: ABNORMAL
WBC NRBC COR # BLD: 9.39 10*3/MM3 (ref 4.8–10.8)

## 2018-10-15 PROCEDURE — 83735 ASSAY OF MAGNESIUM: CPT | Performed by: FAMILY MEDICINE

## 2018-10-15 PROCEDURE — 96372 THER/PROPH/DIAG INJ SC/IM: CPT

## 2018-10-15 PROCEDURE — 25010000002 HEPARIN (PORCINE) PER 1000 UNITS: Performed by: FAMILY MEDICINE

## 2018-10-15 PROCEDURE — 84100 ASSAY OF PHOSPHORUS: CPT | Performed by: FAMILY MEDICINE

## 2018-10-15 PROCEDURE — 96361 HYDRATE IV INFUSION ADD-ON: CPT

## 2018-10-15 PROCEDURE — 85025 COMPLETE CBC W/AUTO DIFF WBC: CPT | Performed by: FAMILY MEDICINE

## 2018-10-15 PROCEDURE — 82803 BLOOD GASES ANY COMBINATION: CPT

## 2018-10-15 PROCEDURE — 63710000001 PROMETHAZINE PER 25 MG: Performed by: FAMILY MEDICINE

## 2018-10-15 PROCEDURE — 94799 UNLISTED PULMONARY SVC/PX: CPT

## 2018-10-15 PROCEDURE — 83605 ASSAY OF LACTIC ACID: CPT | Performed by: FAMILY MEDICINE

## 2018-10-15 PROCEDURE — 93010 ELECTROCARDIOGRAM REPORT: CPT | Performed by: INTERNAL MEDICINE

## 2018-10-15 PROCEDURE — 36600 WITHDRAWAL OF ARTERIAL BLOOD: CPT

## 2018-10-15 PROCEDURE — 71045 X-RAY EXAM CHEST 1 VIEW: CPT

## 2018-10-15 PROCEDURE — G0378 HOSPITAL OBSERVATION PER HR: HCPCS

## 2018-10-15 PROCEDURE — 82962 GLUCOSE BLOOD TEST: CPT

## 2018-10-15 PROCEDURE — 94760 N-INVAS EAR/PLS OXIMETRY 1: CPT

## 2018-10-15 PROCEDURE — 93005 ELECTROCARDIOGRAM TRACING: CPT | Performed by: FAMILY MEDICINE

## 2018-10-15 PROCEDURE — 80053 COMPREHEN METABOLIC PANEL: CPT | Performed by: FAMILY MEDICINE

## 2018-10-15 RX ORDER — MULTIVIT AND MINERALS-FERROUS GLUCONATE 9 MG IRON/15 ML ORAL LIQUID 9 MG/15 ML
15 LIQUID (ML) ORAL DAILY
Status: DISCONTINUED | OUTPATIENT
Start: 2018-10-15 | End: 2018-10-16 | Stop reason: HOSPADM

## 2018-10-15 RX ADMIN — HEPARIN SODIUM 5000 UNITS: 5000 INJECTION, SOLUTION INTRAVENOUS; SUBCUTANEOUS at 20:25

## 2018-10-15 RX ADMIN — ACETAMINOPHEN AND CODEINE PHOSPHATE 1 TABLET: 300; 30 TABLET ORAL at 07:48

## 2018-10-15 RX ADMIN — ACETAMINOPHEN AND CODEINE PHOSPHATE 1 TABLET: 300; 30 TABLET ORAL at 12:27

## 2018-10-15 RX ADMIN — ASPIRIN 81 MG: 81 TABLET ORAL at 08:34

## 2018-10-15 RX ADMIN — ACETAMINOPHEN AND CODEINE PHOSPHATE 1 TABLET: 300; 30 TABLET ORAL at 17:27

## 2018-10-15 RX ADMIN — METOPROLOL SUCCINATE 100 MG: 100 TABLET, FILM COATED, EXTENDED RELEASE ORAL at 08:34

## 2018-10-15 RX ADMIN — Medication 15 ML: at 17:27

## 2018-10-15 RX ADMIN — PANTOPRAZOLE SODIUM 40 MG: 40 TABLET, DELAYED RELEASE ORAL at 06:04

## 2018-10-15 RX ADMIN — FLUTICASONE PROPIONATE 2 SPRAY: 50 SPRAY, METERED NASAL at 12:27

## 2018-10-15 RX ADMIN — LEVETIRACETAM 250 MG: 250 TABLET, FILM COATED ORAL at 20:25

## 2018-10-15 RX ADMIN — Medication 3 ML: at 08:35

## 2018-10-15 RX ADMIN — PROMETHAZINE HYDROCHLORIDE 25 MG: 25 TABLET ORAL at 06:06

## 2018-10-15 RX ADMIN — AMITRIPTYLINE HYDROCHLORIDE 50 MG: 25 TABLET, FILM COATED ORAL at 20:25

## 2018-10-15 RX ADMIN — PROMETHAZINE HYDROCHLORIDE 25 MG: 25 TABLET ORAL at 12:27

## 2018-10-15 RX ADMIN — SODIUM CHLORIDE 100 ML/HR: 9 INJECTION, SOLUTION INTRAVENOUS at 17:27

## 2018-10-15 RX ADMIN — PROMETHAZINE HYDROCHLORIDE 25 MG: 25 TABLET ORAL at 22:04

## 2018-10-15 RX ADMIN — ACETAMINOPHEN AND CODEINE PHOSPHATE 1 TABLET: 300; 30 TABLET ORAL at 22:04

## 2018-10-15 RX ADMIN — SODIUM CHLORIDE 100 ML/HR: 9 INJECTION, SOLUTION INTRAVENOUS at 07:39

## 2018-10-15 RX ADMIN — ALPRAZOLAM 0.5 MG: 0.5 TABLET ORAL at 20:25

## 2018-10-15 RX ADMIN — LEVETIRACETAM 250 MG: 250 TABLET, FILM COATED ORAL at 08:34

## 2018-10-15 RX ADMIN — HEPARIN SODIUM 5000 UNITS: 5000 INJECTION, SOLUTION INTRAVENOUS; SUBCUTANEOUS at 08:34

## 2018-10-15 NOTE — H&P
[facility] Medicine Services  HISTORY AND PHYSICAL    Date of Admission: 10/14/2018  Primary Care Physician: Provider, No Known    Subjective     Chief Complaint: Intractable nausea and vomiting    History of Present Illness    48-year-old female comes in with intractable nausea and vomiting.  Patient had several episodes of vomiting throughout the day.  She is not tolerating food or water.  Patient received Phenergan in the ER which did not improve her nausea and vomiting.  In the ER patient's ABG noted for metabolic acidosis.  Patient was noted to be hypokalemic.  Other labs showed leukocytosis.  She will be admitted for observation overnight.        Review of Systems     Otherwise complete ROS reviewed and negative except as mentioned in the HPI.    Past Medical History:   Past Medical History:   Diagnosis Date   • Acute kidney failure (CMS/HCC)    • Constipation    • Depression    • H/O gastric ulcer    • Headache    • History of transfusion    • Hypertension    • IBS (irritable bowel syndrome)    • Insomnia    • Kidney stone    • Maravilla maravilla disease    • Rapid weight loss    • SBO (small bowel obstruction)    • Stroke (CMS/HCC)     X 2   • UTI (urinary tract infection)     CHRONIC, SEPTIC X2   • Volvulosis      Past Surgical History:  Past Surgical History:   Procedure Laterality Date   • APPENDECTOMY      COMPLICATION OF SEPTIC   • AUGMENTATION MAMMAPLASTY     • BRAIN SURGERY      x2   • BREAST LUMPECTOMY Left 3/9/2017    Procedure: EXCISION LEFT BREAST LESION AND LEFT AXILLARY LYMPH NODE BIOPSY;  Surgeon: Evi Farley MD;  Location: [facility] OR;  Service:    • BREAST SURGERY     •  SECTION     • COLON SURGERY     • COLONOSCOPY  2007    normal   • COLONOSCOPY N/A 11/10/2016    Procedure: COLONOSCOPY WITH ANESTHESIA;  Surgeon: Camilo Hanson MD;  Location: [facility] ENDOSCOPY;  Service:    • COLONOSCOPY N/A 2018    Procedure: COLONOSCOPY WITH ANESTHESIA;   Surgeon: Camilo Hanson MD;  Location: [facility] ENDOSCOPY;  Service:    • ENDOSCOPY  09/2009    antral ulcer   • ENDOSCOPY N/A 10/10/2016    Procedure: ESOPHAGOGASTRODUODENOSCOPY WITH ANESTHESIA;  Surgeon: Camilo Hanson MD;  Location: [facility] ENDOSCOPY;  Service:    • ENDOSCOPY N/A 1/25/2017    Procedure: ESOPHAGOGASTRODUODENOSCOPY;  Surgeon: Camilo Hanson MD;  Location: [facility] ENDOSCOPY;  Service:    • ENDOSCOPY N/A 8/8/2017    Procedure: ESOPHAGOGASTRODUODENOSCOPY WITH ANESTHESIA;  Surgeon: Camilo Hanson MD;  Location: [facility] ENDOSCOPY;  Service:    • ENDOSCOPY N/A 2/13/2018    Procedure: ESOPHAGOGASTRODUODENOSCOPY ;  Surgeon: Camilo Hanson MD;  Location: [facility] ENDOSCOPY;  Service:    • HERNIA REPAIR     • HYSTERECTOMY     • SMALL INTESTINE SURGERY N/A 03/2016   • TONSILLECTOMY       Social History:  reports that she quit smoking about 2 years ago. Her smoking use included Cigarettes and Electronic Cigarette. She has a 15.00 pack-year smoking history. She has never used smokeless tobacco. She reports that she drinks alcohol. She reports that she does not use drugs.    Family History: family history includes Breast cancer in her paternal grandmother; Cancer in her maternal grandfather; No Known Problems in her brother, brother, father, maternal aunt, maternal grandmother, mother, paternal aunt, sister, son, and son.      Allergies:  Allergies   Allergen Reactions   • Anectine [Succinylcholine Chloride] Other (See Comments)     Hard to wake up   • Stadol [Butorphanol] Hives   • Butorphanol Tartrate Rash     Pt states she does not recall being allergic     Medications:  Prior to Admission medications    Medication Sig Start Date End Date Taking? Authorizing Provider   acetaminophen-codeine (TYLENOL #3) 300-30 MG per tablet Take 1 tablet by mouth Every 4 (Four) Hours As Needed for Moderate Pain .    Provider, MD Ramana   ALPRAZolam (XANAX) 0.5 MG tablet Take 0.5 mg by mouth 3 (Three) Times a Day  "As Needed for Anxiety.    Ramana Tovar MD   amitriptyline (ELAVIL) 50 MG tablet Every Night. 1/13/17   Ramana Tovar MD   aspirin 81 MG EC tablet Take 81 mg by mouth daily.    Ramana Tovar MD   dicyclomine (BENTYL) 10 MG capsule Take 1 capsule by mouth 4 (Four) Times a Day As Needed (prn abdominal cramps). 11/10/16   Camilo Hanson MD   fluticasone (FLONASE) 50 MCG/ACT nasal spray 2 sprays Daily. 1/23/17   Ramana Tovar MD   hyoscyamine (ANASPAZ,LEVSIN) 0.125 MG tablet Take 0.125 mg by mouth Every 6 (Six) Hours As Needed for Cramping.    Ramana Tovar MD   levETIRAcetam (KEPPRA) 250 MG tablet Take  by mouth 2 (Two) Times a Day.    Ramana Tovar MD   levoFLOXacin (LEVAQUIN) 750 MG tablet Take 1 tablet by mouth Daily. 9/12/18   Rey Malloy MD   metoprolol succinate XL (TOPROL-XL) 100 MG 24 hr tablet Every Night. 1/23/17   Ramana Tovar MD   metroNIDAZOLE (FLAGYL) 500 MG tablet Take 1 tablet by mouth 3 (Three) Times a Day. 9/12/18   Rey Malloy MD   pantoprazole (PROTONIX) 40 MG EC tablet Take 1 tablet by mouth 2 (Two) Times a Day. 1/19/18   Lyubov Nice APRN   pantoprazole (PROTONIX) 40 MG EC tablet Take 1 tablet by mouth Daily. 7/20/18   Lyubov Nice APRN   promethazine (PHENERGAN) 25 MG tablet Take 1 tablet by mouth Every 6 (Six) Hours As Needed for Nausea or Vomiting. 8/5/17   Inder Modi MD     Objective     Vital Signs: /86   Pulse 86   Temp 98.2 °F (36.8 °C)   Resp 12   Ht 170.2 cm (67\")   Wt 45.4 kg (100 lb)   LMP  (LMP Unknown)   SpO2 99%   BMI 15.66 kg/m²   Physical Exam   Constitutional: She appears well-developed.   HENT:   Head: Normocephalic.   Eyes: Pupils are equal, round, and reactive to light.   Neck: Normal range of motion.   Cardiovascular: Normal rate and regular rhythm.    Pulmonary/Chest: Effort normal and breath sounds normal.   Abdominal: Soft. Bowel sounds are normal.   Musculoskeletal: " Normal range of motion.   Neurological: She is alert.   Skin: Skin is warm. Capillary refill takes less than 2 seconds.   Psychiatric: She has a normal mood and affect. Her behavior is normal.             Results Reviewed:  Lab Results (last 24 hours)     Procedure Component Value Units Date/Time    Acetone [[id number]]  (Abnormal) Collected:  10/14/18 1617    Specimen:  Blood Updated:  10/14/18 1850     Acetone Small (A)    Lactic Acid, Plasma [[id number]]  (Normal) Collected:  10/14/18 1617    Specimen:  Blood Updated:  10/14/18 1817     Lactate 1.8 mmol/L     Blood Gas, Venous [[id number]]  (Abnormal) Collected:  10/14/18 1757    Specimen:  Venous Blood Updated:  10/14/18 1804     Site OTHER     pH, Venous 7.265 (L) pH Units      pCO2, Venous 39.9 (L) mm Hg      pO2, Venous 36.0 mm Hg      HCO3, Venous 18.1 (L) mmol/L      Base Excess, Venous -8.4 (L) mmol/L      O2 Saturation, Venous 64.7 %      Temperature 37.0 C      Barometric Pressure for Blood Gas 749 mmHg      Modality Room Air     Ventilator Mode NA     Collected by [id number]    [place] Draw [[id number]] Collected:  10/14/18 1617    Specimen:  Blood Updated:  10/14/18 1730    Narrative:       The following orders were created for panel order [place] Draw.  Procedure                               Abnormality         Status                     ---------                               -----------         ------                     Light Blue Top[[id number]]                                   Final result               Green Top (Gel)[[id number]]                                  Final result               Lavender Top[[id number]]                                     Final result               Red Top[[id number]]                                          Final result                 Please view results for these tests on the individual orders.    Green Top (Gel) [[id number]] Collected:  10/14/18 1617    Specimen:  Blood Updated:  10/14/18 1730     Extra Tube Hold for add-ons.      Comment: Auto resulted.       Light Blue Top [[id number]] Collected:  10/14/18 1617    Specimen:  Blood Updated:  10/14/18 1730     Extra Tube hold for add-on     Comment: Auto resulted       Lavender Top [[id number]] Collected:  10/14/18 1617    Specimen:  Blood Updated:  10/14/18 1730     Extra Tube hold for add-on     Comment: Auto resulted       Red Top [[id number]] Collected:  10/14/18 1617    Specimen:  Blood Updated:  10/14/18 1730     Extra Tube Hold for add-ons.     Comment: Auto resulted.       Comprehensive Metabolic Panel [[id number]]  (Abnormal) Collected:  10/14/18 1617    Specimen:  Blood Updated:  10/14/18 1702     Glucose 92 mg/dL      BUN 16 mg/dL      Creatinine 1.06 mg/dL      Sodium 141 mmol/L      Potassium 2.9 (C) mmol/L      Chloride 100 mmol/L      CO2 18.0 (L) mmol/L      Calcium 11.3 (H) mg/dL      Total Protein 8.2 g/dL      Albumin 4.90 g/dL      ALT (SGPT) <15 U/L      AST (SGOT) 17 U/L      Alkaline Phosphatase 99 U/L      Total Bilirubin 0.5 mg/dL      eGFR Non African Amer 56 (L) mL/min/1.73      Globulin 3.3 gm/dL      A/G Ratio 1.5 g/dL      BUN/Creatinine Ratio 15.1     Anion Gap 23.0 (H) mmol/L     Lipase [[id number]]  (Normal) Collected:  10/14/18 1617    Specimen:  Blood Updated:  10/14/18 1659     Lipase 52 U/L     CBC & Differential [[id number]] Collected:  10/14/18 1617    Specimen:  Blood Updated:  10/14/18 1643    Narrative:       The following orders were created for panel order CBC & Differential.  Procedure                               Abnormality         Status                     ---------                               -----------         ------                     CBC Auto Differential[[id number]]        Abnormal            Final result                 Please view results for these tests on the individual orders.    CBC Auto Differential [[id number]]  (Abnormal) Collected:  10/14/18 1617    Specimen:  Blood Updated:  10/14/18 1643     WBC 11.49 (H) 10*3/mm3      RBC  5.15 10*6/mm3      Hemoglobin 15.5 g/dL      Hematocrit 45.6 %      MCV 88.5 fL      MCH 30.1 pg      MCHC 34.0 g/dL      RDW 13.8 %      RDW-SD 44.8 fl      MPV 11.3 fL      Platelets 360 10*3/mm3      Neutrophil % 48.3 %      Lymphocyte % 45.2 (H) %      Monocyte % 5.3 %      Eosinophil % 0.6 %      Basophil % 0.3 %      Immature Grans % 0.3 %      Neutrophils, Absolute 5.55 10*3/mm3      Lymphocytes, Absolute 5.19 (H) 10*3/mm3      Monocytes, Absolute 0.61 10*3/mm3      Eosinophils, Absolute 0.07 10*3/mm3      Basophils, Absolute 0.03 10*3/mm3      Immature Grans, Absolute 0.04 (H) 10*3/mm3      nRBC 0.0 /100 WBC         Imaging Results (last 24 hours)     Procedure Component Value Units Date/Time    CT Abdomen Pelvis With Contrast [[id number]] Collected:  10/14/18 1845     Updated:  10/14/18 1910    Narrative:       EXAM: CT ABDOMEN PELVIS W CONTRAST- - 10/14/2018 5:31 PM CDT     HISTORY: Abdominal pain, unspecified, nausea, vomiting, history of small  bowel obstruction and gastroparesis. Prior episode of colitis.      COMPARISON: 9/10/2018.      DOSE LENGTH PRODUCT: 187 mGy cm. Automated exposure control was also  utilized to decrease patient radiation dose.     TECHNIQUE: Enhanced  axial images of the abdomen and pelvis obtained  with multiplanar reformats.     FINDINGS:  VISUALIZED CHEST: No pleural or pericardial effusion. Lung bases clear.     LIVER: No focal liver lesion. Patent portal and hepatic veins.      BILIARY: No calcified gallstone. No intrahepatic or extrahepatic bile  duct dilation.      PANCREAS: Normal pancreas contour and enhancement.     SPLEEN: Normal size and contour.      ADRENAL: Normal appearance of the bilateral adrenal glands.     GENITOURINARY:   No hydronephrosis or urolithiasis. Possible mild enhancement of the  right renal collecting system. Left kidney with masslike contour  abnormality at the superior pole measuring 2.2 cm on coronal image 30.  This contour appears changed  compared to 2/23/2017 and 12/26/2016.  Urinary bladder is within normal limits.  Uterus absent. No pelvic mass.     PERITONEUM: No free air or ascites.     GI TRACT: Normal configuration of the stomach and duodenum.  No  abnormally dilated loops of bowel or bowel wall thickening.  The  appendix is not visualized, but there are no inflammatory changes in the  right lower quadrant..     VESSELS: Aorta normal in course and caliber with minimal calcified  atherosclerosis. Celiac, superior mesenteric, bilateral renal and  inferior mesenteric arteries opacify normally.     RETROPERITONEUM: No mass, lymphadenopathy or hemorrhage.     SOFT TISSUES: Normal appearance of the overlying abdominal soft tissues.        BONES: No acute or aggressive bony lesion.           Impression:       1. Mild enhancement of the right collecting system, could be seen with  pyelitis. Correlate with patient's symptoms.  2. Mildly abnormal left superior renal contour, could be due to scarring  or underlying mass. Limited evaluation due to phase of imaging.  Recommend nonemergent multiphase CT or MRI with contrast (renal  protocol) for improved evaluation.  This report was finalized on 10/14/2018 19:07 by Dr Mercedez Almeida MD.        I have personally reviewed and interpreted the radiology studies and ECG obtained at time of admission.     Assessment / Plan     Assessment:   There are no active hospital problems to display for this patient.    1.  Intractable nausea and vomiting  2.  Metabolic acidosis  3.  Hypokalemia   4.  Leukocytosis    Plan:      -Admit for observation  -monitor vitals  -Continue IV fluid  -Continue IV antiemetics  -Follow-up repeat ABG in the a.m.  -Replete electrolytes as needed  -Follow-up urinalysis and chest x-ray  -GI prophylaxis   -DVT prophylaxis          Code Status: Full code     I discussed the patient's findings and my recommendations with the patient's RN    Estimated length of stay 2-3 days    Jesus Saravia  MD   10/14/18   7:22 PM

## 2018-10-15 NOTE — PLAN OF CARE
Problem: Patient Care Overview  Goal: Plan of Care Review  Outcome: Ongoing (interventions implemented as appropriate)   10/15/18 0321   Coping/Psychosocial   Plan of Care Reviewed With patient   Plan of Care Review   Progress no change   OTHER   Outcome Summary Patient NPO except for sips with meds. Patient receiving IV fluids. Will continue to monitor labs       Problem: Nausea/Vomiting (Adult)  Goal: Identify Related Risk Factors and Signs and Symptoms  Outcome: Ongoing (interventions implemented as appropriate)    Goal: Symptom Relief  Outcome: Ongoing (interventions implemented as appropriate)    Goal: Adequate Hydration  Outcome: Ongoing (interventions implemented as appropriate)      Problem: Fluid Volume Deficit (Adult)  Goal: Identify Related Risk Factors and Signs and Symptoms  Outcome: Ongoing (interventions implemented as appropriate)    Goal: Optimal Fluid Balance  Outcome: Ongoing (interventions implemented as appropriate)

## 2018-10-15 NOTE — PLAN OF CARE
Problem: Patient Care Overview  Goal: Plan of Care Review   10/15/18 8959   Coping/Psychosocial   Plan of Care Reviewed With patient   Plan of Care Review   Progress improving   OTHER   Outcome Summary no vomiting today, prn phernergan given for nausea, prn tylenol 3 given for pain, IVF continue, labs in AM, Nutrition consult, full liquid diet, advance diet as tolerated, will continue to monitor       Problem: Nausea/Vomiting (Adult)  Goal: Symptom Relief  Outcome: Ongoing (interventions implemented as appropriate)    Goal: Adequate Hydration  Outcome: Ongoing (interventions implemented as appropriate)      Problem: Fluid Volume Deficit (Adult)  Goal: Identify Related Risk Factors and Signs and Symptoms  Outcome: Ongoing (interventions implemented as appropriate)    Goal: Optimal Fluid Balance  Outcome: Ongoing (interventions implemented as appropriate)

## 2018-10-15 NOTE — PROGRESS NOTES
1           [facility] Medicine Services  INPATIENT PROGRESS NOTE    Patient Name: Kamryn Oliva  Date of Admission: 10/14/2018  Today's Date: 10/15/18  Length of Stay: 0  Primary Care Physician: Provider, No Known    Subjective   Chief Complaint: follow up  HPI   Should not was admitted yesterday for intractable nausea and vomiting.  She had a small amount of acetone.  Blood gas was a venous sample and is expected PaO2 is low although the pH is 7.26 (likely if corrected will be about 7.31)    He was hypokalemic at 2.9, calcium was 11.3, CO2 was 18.  She had an anion gap of 23.  CT scan of the abdomen and pelvis showed mild enhancement of the right collecting system which could be seen with pyelitis.  There is a  mildly abnormal left superior renal contour could be due to scarring or underlying mass.  The radiologist recommend nonemergent multiphase CT or MRI with contrast (renal protocol for improved evaluation_  Noted earlier an episode of hypoglycemia (46) .  She carries personal hx of gastroparesis.  She has not thrown up today she said      amitriptyline 50 mg Oral Nightly   aspirin 81 mg Oral Daily   fluticasone 2 spray Each Nare Daily   heparin (porcine) 5,000 Units Subcutaneous Q12H   levETIRAcetam 250 mg Oral Q12H   metoprolol succinate  mg Oral Q24H   pantoprazole 40 mg Oral Q AM   sodium chloride 3 mL Intravenous Q12H     Review of record from gastroenterology office.  Patient is known to their service for chronic abdominal pain and bloating.  July 2018 she had seen Lyubov Nice.  From this record patient has a scheduled EGD in August by Dr. Arzate    Review of Systems   All pertinent negatives and positives are as above. All other systems have been reviewed and are negative unless otherwise stated.     Objective    Temp:  [97.8 °F (36.6 °C)-98.5 °F (36.9 °C)] 98.2 °F (36.8 °C)  Heart Rate:  [] 76  Resp:  [12-16] 16  BP: (114-146)/(47-94) 133/88  Physical  Exam  Hemodynamically stable  BMI 16.6  Constitutional: She is oriented to person, place, and time. She appears well-developed. No distress.   HENT:   Head: Normocephalic and atraumatic.   Right Ear: External ear normal.   Left Ear: External ear normal.   Mouth/Throat: Oropharynx is clear and moist.   Eyes: Pupils are equal, round,  Conjunctivae and EOM are normal.   Neck: Normal range of motion. Neck supple. No JVD present. No tracheal deviation present. No thyromegaly present.   Cardiovascular: Normal rate and regular rhythm.  Exam reveals no friction rub.    No murmur heard.  Pulmonary/Chest: Effort normal and breath sounds normal. No respiratory distress. She has no wheezes. She has no rales.   Abdominal: Soft. Bowel sounds are normal.   Musculoskeletal: Normal range of motion. She exhibits no edema or tenderness.   Neurological: She is alert and oriented to person, place, and time. No cranial nerve deficit. She exhibits normal muscle tone. Coordination normal.   Skin: Skin is warm and dry. Capillary refill takes less than 2 seconds. She is not diaphoretic. No erythema.   Psychiatric: She has a normal mood and affect. Her behavior is normal. Judgment and thought content normal.   Vitals reviewed.    Results Review:  I have reviewed the labs, radiology results, and diagnostic studies.    Laboratory Data:     Results from last 7 days  Lab Units 10/15/18  0713 10/14/18  1617   WBC 10*3/mm3 9.39 11.49*   HEMOGLOBIN g/dL 14.7 15.5   HEMATOCRIT % 44.3 45.6   PLATELETS 10*3/mm3 303 360          Results from last 7 days  Lab Units 10/15/18  0713 10/14/18  1617   SODIUM mmol/L 143 141   POTASSIUM mmol/L 3.9 2.9*   CHLORIDE mmol/L 108 100   CO2 mmol/L 19.0* 18.0*   BUN mg/dL 8 16   CREATININE mg/dL 0.65 1.06   CALCIUM mg/dL 9.2 11.3*   BILIRUBIN mg/dL 0.4 0.5   ALK PHOS U/L 81 99   ALT (SGPT) U/L <15 <15   AST (SGOT) U/L 13 17   GLUCOSE mg/dL 46* 92       Culture Data:   Urine Culture   Date Value Ref Range Status    10/14/2018 No growth at 24 hours  Preliminary       Radiology Data:   Imaging Results (last 24 hours)     Procedure Component Value Units Date/Time    XR Chest 1 View [[id number]] Collected:  10/15/18 0950     Updated:  10/15/18 0956    Narrative:       Exam:   XR CHEST 1 VW-       Date:  10/15/2018      History:  Female, age  48 years;f/u; E87.2-Acidosis; E87.6-Hypokalemia;  R11.2-Nausea with vomiting, unspecified; E87.2-Acidosis     COMPARISON:  Chest x-ray dated 9/10/2018.     Findings :     The heart and mediastinum are normal in size. Lungs are without focal  infiltrate, mass or effusions.  The bones show no acute pathology.    Left breast implant redemonstrated.    Impression:       Impression:     No interval changes when compared to chest x-ray dated 9/10/2018.     This report was finalized on 10/15/2018 09:52 by Dr. Anel Dougherty MD.    CT Abdomen Pelvis With Contrast [[id number]] Collected:  10/14/18 1845     Updated:  10/14/18 1910    Narrative:       EXAM: CT ABDOMEN PELVIS W CONTRAST- - 10/14/2018 5:31 PM CDT     HISTORY: Abdominal pain, unspecified, nausea, vomiting, history of small  bowel obstruction and gastroparesis. Prior episode of colitis.      COMPARISON: 9/10/2018.      DOSE LENGTH PRODUCT: 187 mGy cm. Automated exposure control was also  utilized to decrease patient radiation dose.     TECHNIQUE: Enhanced  axial images of the abdomen and pelvis obtained  with multiplanar reformats.     FINDINGS:  VISUALIZED CHEST: No pleural or pericardial effusion. Lung bases clear.     LIVER: No focal liver lesion. Patent portal and hepatic veins.      BILIARY: No calcified gallstone. No intrahepatic or extrahepatic bile  duct dilation.      PANCREAS: Normal pancreas contour and enhancement.     SPLEEN: Normal size and contour.      ADRENAL: Normal appearance of the bilateral adrenal glands.     GENITOURINARY:   No hydronephrosis or urolithiasis. Possible mild enhancement of the  right renal collecting  system. Left kidney with masslike contour  abnormality at the superior pole measuring 2.2 cm on coronal image 30.  This contour appears changed compared to 2/23/2017 and 12/26/2016.  Urinary bladder is within normal limits.  Uterus absent. No pelvic mass.     PERITONEUM: No free air or ascites.     GI TRACT: Normal configuration of the stomach and duodenum.  No  abnormally dilated loops of bowel or bowel wall thickening.  The  appendix is not visualized, but there are no inflammatory changes in the  right lower quadrant..     VESSELS: Aorta normal in course and caliber with minimal calcified  atherosclerosis. Celiac, superior mesenteric, bilateral renal and  inferior mesenteric arteries opacify normally.     RETROPERITONEUM: No mass, lymphadenopathy or hemorrhage.     SOFT TISSUES: Normal appearance of the overlying abdominal soft tissues.        BONES: No acute or aggressive bony lesion.           Impression:       1. Mild enhancement of the right collecting system, could be seen with  pyelitis. Correlate with patient's symptoms.  2. Mildly abnormal left superior renal contour, could be due to scarring  or underlying mass. Limited evaluation due to phase of imaging.  Recommend nonemergent multiphase CT or MRI with contrast (renal  protocol) for improved evaluation.  This report was finalized on 10/14/2018 19:07 by Dr Mercedez Almeida MD.          I have reviewed the patient's current medications.     Assessment/Plan     Active Hospital Problems    Diagnosis   • Metabolic acidosis       Nausea and vomiting  Hypokalemia  Anion gap metabolic acidosis likely from nausea and vomiting  Hx of gastroparesis  Dehydration  Hypocalcemia - imp[roving   Abnormal contour of the kidney - she is followed by Dr. Maxwell.  Patient is strongly advised to f/u with Dr. Maxwell.   and patient informed regarding the importance of this and need for f/u imaging particularly to make comparison and understand nature of this  abnormality  Moderate to severe malnutrition   Plan    Cont ivf  Change to full liquid diet and advance as tolerated  chemistry in AM  Increase activities as tolerated  Dietary/nutritionist consult; mvi     Discharge Planning: possibly home tomorrow  Peter Calabrese MD   10/15/18   3:05 PM

## 2018-10-15 NOTE — PLAN OF CARE
Problem: Patient Care Overview  Goal: Plan of Care Review  Outcome: Ongoing (interventions implemented as appropriate)   10/14/18 7167   Coping/Psychosocial   Plan of Care Reviewed With patient;significant other   Plan of Care Review   Progress no change   OTHER   Outcome Summary New admit from ED with metabolic acidosis. Patient complains of n/v for 2 weeks. Decreased oral intake with weight loss. Patient NPO and IV fluids started. Will continue to monitor labs.       Problem: Nausea/Vomiting (Adult)  Goal: Identify Related Risk Factors and Signs and Symptoms  Outcome: Ongoing (interventions implemented as appropriate)    Goal: Symptom Relief  Outcome: Ongoing (interventions implemented as appropriate)    Goal: Adequate Hydration  Outcome: Ongoing (interventions implemented as appropriate)      Problem: Fluid Volume Deficit (Adult)  Goal: Identify Related Risk Factors and Signs and Symptoms  Outcome: Ongoing (interventions implemented as appropriate)    Goal: Optimal Fluid Balance  Outcome: Ongoing (interventions implemented as appropriate)

## 2018-10-16 VITALS
RESPIRATION RATE: 16 BRPM | BODY MASS INDEX: 17.27 KG/M2 | HEART RATE: 86 BPM | SYSTOLIC BLOOD PRESSURE: 109 MMHG | DIASTOLIC BLOOD PRESSURE: 73 MMHG | WEIGHT: 110 LBS | OXYGEN SATURATION: 98 % | TEMPERATURE: 98.5 F | HEIGHT: 67 IN

## 2018-10-16 PROBLEM — E44.0 MODERATE PROTEIN-CALORIE MALNUTRITION (HCC): Status: ACTIVE | Noted: 2018-10-16

## 2018-10-16 PROBLEM — E86.0 DEHYDRATION: Status: ACTIVE | Noted: 2018-10-16

## 2018-10-16 PROBLEM — R11.2 NAUSEA AND VOMITING: Status: ACTIVE | Noted: 2017-08-03

## 2018-10-16 PROBLEM — E83.52 HYPERCALCEMIA: Status: ACTIVE | Noted: 2018-10-16

## 2018-10-16 PROBLEM — K31.84 GASTROPARESIS: Status: ACTIVE | Noted: 2018-10-16

## 2018-10-16 PROBLEM — E87.6 HYPOKALEMIA: Status: ACTIVE | Noted: 2018-10-16

## 2018-10-16 PROBLEM — R93.5 ABNORMAL CT OF THE ABDOMEN: Status: ACTIVE | Noted: 2018-10-16

## 2018-10-16 LAB
ANION GAP SERPL CALCULATED.3IONS-SCNC: 17 MMOL/L (ref 4–13)
BACTERIA SPEC AEROBE CULT: NORMAL
BUN BLD-MCNC: <2 MG/DL (ref 5–21)
BUN/CREAT SERPL: ABNORMAL (ref 7–25)
CALCIUM SPEC-SCNC: 9 MG/DL (ref 8.4–10.4)
CHLORIDE SERPL-SCNC: 102 MMOL/L (ref 98–110)
CO2 SERPL-SCNC: 24 MMOL/L (ref 24–31)
CREAT BLD-MCNC: 0.52 MG/DL (ref 0.5–1.4)
GFR SERPL CREATININE-BSD FRML MDRD: 126 ML/MIN/1.73
GLUCOSE BLD-MCNC: 72 MG/DL (ref 70–100)
POTASSIUM BLD-SCNC: 3.4 MMOL/L (ref 3.5–5.3)
SODIUM BLD-SCNC: 143 MMOL/L (ref 135–145)

## 2018-10-16 PROCEDURE — 63710000001 PROMETHAZINE PER 25 MG: Performed by: FAMILY MEDICINE

## 2018-10-16 PROCEDURE — 94799 UNLISTED PULMONARY SVC/PX: CPT

## 2018-10-16 PROCEDURE — 96372 THER/PROPH/DIAG INJ SC/IM: CPT

## 2018-10-16 PROCEDURE — 90661 CCIIV3 VAC ABX FR 0.5 ML IM: CPT | Performed by: FAMILY MEDICINE

## 2018-10-16 PROCEDURE — 96361 HYDRATE IV INFUSION ADD-ON: CPT

## 2018-10-16 PROCEDURE — 80048 BASIC METABOLIC PNL TOTAL CA: CPT | Performed by: INTERNAL MEDICINE

## 2018-10-16 PROCEDURE — 25010000002 INFLUENZA VAC SUBUNIT QUAD 0.5 ML SUSPENSION PREFILLED SYRINGE: Performed by: FAMILY MEDICINE

## 2018-10-16 PROCEDURE — 94760 N-INVAS EAR/PLS OXIMETRY 1: CPT

## 2018-10-16 PROCEDURE — G0008 ADMIN INFLUENZA VIRUS VAC: HCPCS | Performed by: FAMILY MEDICINE

## 2018-10-16 PROCEDURE — G0378 HOSPITAL OBSERVATION PER HR: HCPCS

## 2018-10-16 PROCEDURE — 25010000002 HEPARIN (PORCINE) PER 1000 UNITS: Performed by: FAMILY MEDICINE

## 2018-10-16 RX ORDER — POTASSIUM CHLORIDE 750 MG/1
40 CAPSULE, EXTENDED RELEASE ORAL ONCE
Status: COMPLETED | OUTPATIENT
Start: 2018-10-16 | End: 2018-10-16

## 2018-10-16 RX ORDER — MULTIVIT AND MINERALS-FERROUS GLUCONATE 9 MG IRON/15 ML ORAL LIQUID 9 MG/15 ML
15 LIQUID (ML) ORAL DAILY
Qty: 480 ML | Refills: 1 | Status: ON HOLD | OUTPATIENT
Start: 2018-10-17 | End: 2019-09-15

## 2018-10-16 RX ORDER — PROMETHAZINE HYDROCHLORIDE 25 MG/1
25 SUPPOSITORY RECTAL EVERY 6 HOURS PRN
Qty: 12 SUPPOSITORY | Refills: 0 | Status: ON HOLD | OUTPATIENT
Start: 2018-10-16 | End: 2018-11-07

## 2018-10-16 RX ADMIN — POTASSIUM CHLORIDE 40 MEQ: 750 CAPSULE, EXTENDED RELEASE ORAL at 12:31

## 2018-10-16 RX ADMIN — FLUTICASONE PROPIONATE 2 SPRAY: 50 SPRAY, METERED NASAL at 10:17

## 2018-10-16 RX ADMIN — HEPARIN SODIUM 5000 UNITS: 5000 INJECTION, SOLUTION INTRAVENOUS; SUBCUTANEOUS at 10:17

## 2018-10-16 RX ADMIN — PANTOPRAZOLE SODIUM 40 MG: 40 TABLET, DELAYED RELEASE ORAL at 05:41

## 2018-10-16 RX ADMIN — PROMETHAZINE HYDROCHLORIDE 25 MG: 25 TABLET ORAL at 12:31

## 2018-10-16 RX ADMIN — PROMETHAZINE HYDROCHLORIDE 25 MG: 25 TABLET ORAL at 05:41

## 2018-10-16 RX ADMIN — INFLUENZA A VIRUS A/SINGAPORE/GP1908/2015 IVR-180 (H1N1) ANTIGEN (MDCK CELL DERIVED, PROPIOLACTONE INACTIVATED), INFLUENZA A VIRUS A/NORTH CAROLINA/04/2016 (H3N2) HEMAGGLUTININ ANTIGEN (MDCK CELL DERIVED, PROPIOLACTONE INACTIVATED), INFLUENZA B VIRUS B/IOWA/06/2017 HEMAGGLUTININ ANTIGEN (MDCK CELL DERIVED, PROPIOLACTONE INACTIVATED), INFLUENZA B VIRUS B/SINGAPORE/INFTT-16-0610/2016 HEMAGGLUTININ ANTIGEN (MDCK CELL DERIVED, PROPIOLACTONE INACTIVATED) 0.5 ML: 15; 15; 15; 15 INJECTION, SUSPENSION INTRAMUSCULAR at 14:50

## 2018-10-16 RX ADMIN — ALPRAZOLAM 0.5 MG: 0.5 TABLET ORAL at 10:44

## 2018-10-16 RX ADMIN — ACETAMINOPHEN AND CODEINE PHOSPHATE 1 TABLET: 300; 30 TABLET ORAL at 10:44

## 2018-10-16 RX ADMIN — ASPIRIN 81 MG: 81 TABLET ORAL at 10:18

## 2018-10-16 RX ADMIN — SODIUM CHLORIDE 100 ML/HR: 9 INJECTION, SOLUTION INTRAVENOUS at 03:42

## 2018-10-16 RX ADMIN — ACETAMINOPHEN AND CODEINE PHOSPHATE 1 TABLET: 300; 30 TABLET ORAL at 05:40

## 2018-10-16 RX ADMIN — Medication 3 ML: at 10:17

## 2018-10-16 RX ADMIN — LEVETIRACETAM 250 MG: 250 TABLET, FILM COATED ORAL at 10:17

## 2018-10-16 RX ADMIN — METOPROLOL SUCCINATE 100 MG: 100 TABLET, FILM COATED, EXTENDED RELEASE ORAL at 10:17

## 2018-10-16 RX ADMIN — Medication 15 ML: at 10:17

## 2018-10-16 NOTE — DISCHARGE SUMMARY
[facility] Medicine Services  DISCHARGE SUMMARY       Date of Admission: 10/14/2018  Date of Discharge:  10/16/2018  Primary Care Physician: Provider, No Known    Discharge Diagnoses:  Active Hospital Problems    Diagnosis   • **Nausea and vomiting     Added automatically from request for surgery [id number]     • Hypokalemia   • Dehydration   • Gastroparesis   • Hypercalcemia   • Abnormal CT of the abdomen: Abnormal contour of the kidney    • Moderate protein-calorie malnutrition (CMS/HCC)   • Metabolic acidosis            Presenting Problem/History of Present Illness:  Metabolic acidosis [E87.2]         Hospital Course  Patient is a 48 y.o. female presented with admitted for intractable nausea and vomiting.  She has been found to have small amount of acetone in her blood.  Her blood gas was a venous sample and as expected her PaCO2 will be low.  She was hypokalemic, hypercalcemic and has anion gap metabolic acidosis.    On ct of her abdomen /pelvis a  mild enhancement of the right collecting system which could be seen with pyelitis.  There is a  mildly abnormal left superior renal contour could be due to scarring or underlying mass.  The radiologist recommend nonemergent multiphase CT or MRI with contrast (renal protocol for improved evaluation).  I strongly emphasized the utmost importance to follow up with Dr. Faust and or Dr. Maxwell  to her and her fiance.  They agreed and verbalized adequate understanding.      Patient has been evaluated by Dr. Maxwell on 03/05/2018 for symptoms of dysuria and feeling of incomplete emptying and pelvic pressure.  She has known history of nephrolithiasis.  KUB reportedly has no obvious stones.      Anterior urethra: normal without strictures and without scarring.   Bladder: Normal mucosa, without lesions mild trabeculation no significant prolapse or abnormality seen no papillary tumors or ulcerations.  Ureteral orifice(s) was/were seen  bilateral. Ureteral orifice(s) both sides were in the normal location and both ureteral orifices were effluxing clear urine. Essentially unremarkable cystoscopy.    Review of Dr. Dow office note dating 04/08/2018, she  had diffuse abdominal pain and reportedly had extensive evaluation.  He has had CT angiogram of the aorta to look for mesenteric ischemia.  I also looked at any other reports to compare R abdominopelvic CT scan finding but had not found any.  I emphasized to the patient to speak to his primary care provider for further referral as needed back to Dr. Maxwell.           Procedures Performed:  None    Consults: None    Pertinent Test Results:   Lab Results (last 48 hours)     Procedure Component Value Units Date/Time    Basic Metabolic Panel [[id number]]  (Abnormal) Collected:  10/16/18 0733    Specimen:  Blood Updated:  10/16/18 0814     Glucose 72 mg/dL      BUN <2 (L) mg/dL      Creatinine 0.52 mg/dL      Sodium 143 mmol/L      Potassium 3.4 (L) mmol/L      Chloride 102 mmol/L      CO2 24.0 mmol/L      Calcium 9.0 mg/dL      eGFR Non African Amer 126 mL/min/1.73      BUN/Creatinine Ratio --     Comment: Unable to calculate Bun/Crea Ratio.        Anion Gap 17.0 (H) mmol/L     Narrative:       GFR Normal >60  Chronic Kidney Disease <60  Kidney Failure <15    Urine Culture - Urine, [[id number]]  (Normal) Collected:  10/14/18 1916    Specimen:  Urine from Urine, Clean Catch Updated:  10/16/18 0641     Urine Culture No growth at 2 days    POC Glucose Once [[id number]]  (Abnormal) Collected:  10/15/18 0830    Specimen:  Blood Updated:  10/15/18 0841     Glucose 65 (L) mg/dL      Comment: : [id number] Lamine Wall ID: [id number]       Comprehensive Metabolic Panel [[id number]]  (Abnormal) Collected:  10/15/18 0713    Specimen:  Blood Updated:  10/15/18 0743     Glucose 46 (C) mg/dL      BUN 8 mg/dL      Creatinine 0.65 mg/dL      Sodium 143 mmol/L      Potassium 3.9 mmol/L      Chloride 108  mmol/L      CO2 19.0 (L) mmol/L      Calcium 9.2 mg/dL      Total Protein 7.2 g/dL      Albumin 4.60 g/dL      ALT (SGPT) <15 U/L      AST (SGOT) 13 U/L      Alkaline Phosphatase 81 U/L      Total Bilirubin 0.4 mg/dL      eGFR Non African Amer 98 mL/min/1.73      Globulin 2.6 gm/dL      A/G Ratio 1.8 g/dL      BUN/Creatinine Ratio 12.3     Anion Gap 16.0 (H) mmol/L     Magnesium [[id number]]  (Normal) Collected:  10/15/18 0713    Specimen:  Blood Updated:  10/15/18 0739     Magnesium 1.5 mg/dL     Phosphorus [[id number]]  (Normal) Collected:  10/15/18 0713    Specimen:  Blood Updated:  10/15/18 0739     Phosphorus 2.9 mg/dL     Lactic Acid, Plasma [[id number]]  (Normal) Collected:  10/15/18 0713    Specimen:  Blood Updated:  10/15/18 0737     Lactate 0.8 mmol/L     CBC Auto Differential [[id number]]  (Normal) Collected:  10/15/18 0713    Specimen:  Blood Updated:  10/15/18 0723     WBC 9.39 10*3/mm3      RBC 4.87 10*6/mm3      Hemoglobin 14.7 g/dL      Hematocrit 44.3 %      MCV 91.0 fL      MCH 30.2 pg      MCHC 33.2 g/dL      RDW 14.2 %      RDW-SD 47.4 fl      MPV 10.7 fL      Platelets 303 10*3/mm3      Neutrophil % 52.6 %      Lymphocyte % 41.4 %      Monocyte % 4.8 %      Eosinophil % 0.7 %      Basophil % 0.2 %      Immature Grans % 0.3 %      Neutrophils, Absolute 4.93 10*3/mm3      Lymphocytes, Absolute 3.89 10*3/mm3      Monocytes, Absolute 0.45 10*3/mm3      Eosinophils, Absolute 0.07 10*3/mm3      Basophils, Absolute 0.02 10*3/mm3      Immature Grans, Absolute 0.03 10*3/mm3      nRBC 0.0 /100 WBC     Blood Gas, Arterial [[id number]]  (Abnormal) Collected:  10/15/18 0425    Specimen:  Arterial Blood Updated:  10/15/18 0429     Site Right Radial     Grabiel's Test Positive     pH, Arterial 7.304 (L) pH units      pCO2, Arterial 34.1 (L) mm Hg      pO2, Arterial 94.4 mm Hg      HCO3, Arterial 16.9 (L) mmol/L      Base Excess, Arterial -8.5 (L) mmol/L      O2 Saturation, Arterial 97.1 %      Temperature 37.0 C       Barometric Pressure for Blood Gas 751 mmHg      Modality Room Air     Ventilator Mode NA     Collected by [id number]    Urinalysis With Microscopic If Indicated (No Culture) - Urine, Clean Catch [[id number]]  (Abnormal) Collected:  10/14/18 1916    Specimen:  Urine from Urine, Clean Catch Updated:  10/14/18 1937     Color, UA Yellow     Appearance, UA Clear     pH, UA <=5.0     Specific Gravity, UA >1.030 (H)     Glucose, UA Negative     Ketones, UA 40 mg/dL (2+) (A)     Bilirubin, UA Negative     Blood, UA Negative     Protein, UA Negative     Leuk Esterase, UA Trace (A)     Nitrite, UA Negative     Urobilinogen, UA 0.2 E.U./dL    Urinalysis, Microscopic Only - Urine, Clean Catch [[id number]]  (Abnormal) Collected:  10/14/18 1916    Specimen:  Urine from Urine, Clean Catch Updated:  10/14/18 1937     RBC, UA 3-5 (A) /HPF      WBC, UA 3-5 (A) /HPF      Bacteria, UA None Seen /HPF      Squamous Epithelial Cells, UA 0-2 /HPF      Hyaline Casts, UA 3-6 /LPF      Methodology Automated Microscopy    Acetone [[id number]]  (Abnormal) Collected:  10/14/18 1617    Specimen:  Blood Updated:  10/14/18 1850     Acetone Small (A)    Lactic Acid, Plasma [[id number]]  (Normal) Collected:  10/14/18 1617    Specimen:  Blood Updated:  10/14/18 1817     Lactate 1.8 mmol/L     Blood Gas, Venous [[id number]]  (Abnormal) Collected:  10/14/18 1757    Specimen:  Venous Blood Updated:  10/14/18 1804     Site OTHER     pH, Venous 7.265 (L) pH Units      pCO2, Venous 39.9 (L) mm Hg      pO2, Venous 36.0 mm Hg      HCO3, Venous 18.1 (L) mmol/L      Base Excess, Venous -8.4 (L) mmol/L      O2 Saturation, Venous 64.7 %      Temperature 37.0 C      Barometric Pressure for Blood Gas 749 mmHg      Modality Room Air     Ventilator Mode NA     Collected by [id number]    [place] Draw [[id number]] Collected:  10/14/18 1617    Specimen:  Blood Updated:  10/14/18 1730    Narrative:       The following orders were created for panel order [place] Draw.  Procedure                                Abnormality         Status                     ---------                               -----------         ------                     Light Blue Top[[id number]]                                   Final result               Green Top (Gel)[[id number]]                                  Final result               Lavender Top[[id number]]                                     Final result               Red Top[[id number]]                                          Final result                 Please view results for these tests on the individual orders.    Green Top (Gel) [[id number]] Collected:  10/14/18 1617    Specimen:  Blood Updated:  10/14/18 1730     Extra Tube Hold for add-ons.     Comment: Auto resulted.       Light Blue Top [[id number]] Collected:  10/14/18 1617    Specimen:  Blood Updated:  10/14/18 1730     Extra Tube hold for add-on     Comment: Auto resulted       Lavender Top [[id number]] Collected:  10/14/18 1617    Specimen:  Blood Updated:  10/14/18 1730     Extra Tube hold for add-on     Comment: Auto resulted       Red Top [[id number]] Collected:  10/14/18 1617    Specimen:  Blood Updated:  10/14/18 1730     Extra Tube Hold for add-ons.     Comment: Auto resulted.       Comprehensive Metabolic Panel [[id number]]  (Abnormal) Collected:  10/14/18 1617    Specimen:  Blood Updated:  10/14/18 1702     Glucose 92 mg/dL      BUN 16 mg/dL      Creatinine 1.06 mg/dL      Sodium 141 mmol/L      Potassium 2.9 (C) mmol/L      Chloride 100 mmol/L      CO2 18.0 (L) mmol/L      Calcium 11.3 (H) mg/dL      Total Protein 8.2 g/dL      Albumin 4.90 g/dL      ALT (SGPT) <15 U/L      AST (SGOT) 17 U/L      Alkaline Phosphatase 99 U/L      Total Bilirubin 0.5 mg/dL      eGFR Non African Amer 56 (L) mL/min/1.73      Globulin 3.3 gm/dL      A/G Ratio 1.5 g/dL      BUN/Creatinine Ratio 15.1     Anion Gap 23.0 (H) mmol/L     Lipase [[id number]]  (Normal) Collected:  10/14/18 1617    Specimen:  Blood Updated:  10/14/18  1659     Lipase 52 U/L     CBC & Differential [[id number]] Collected:  10/14/18 1617    Specimen:  Blood Updated:  10/14/18 1643    Narrative:       The following orders were created for panel order CBC & Differential.  Procedure                               Abnormality         Status                     ---------                               -----------         ------                     CBC Auto Differential[[id number]]        Abnormal            Final result                 Please view results for these tests on the individual orders.    CBC Auto Differential [[id number]]  (Abnormal) Collected:  10/14/18 1617    Specimen:  Blood Updated:  10/14/18 1643     WBC 11.49 (H) 10*3/mm3      RBC 5.15 10*6/mm3      Hemoglobin 15.5 g/dL      Hematocrit 45.6 %      MCV 88.5 fL      MCH 30.1 pg      MCHC 34.0 g/dL      RDW 13.8 %      RDW-SD 44.8 fl      MPV 11.3 fL      Platelets 360 10*3/mm3      Neutrophil % 48.3 %      Lymphocyte % 45.2 (H) %      Monocyte % 5.3 %      Eosinophil % 0.6 %      Basophil % 0.3 %      Immature Grans % 0.3 %      Neutrophils, Absolute 5.55 10*3/mm3      Lymphocytes, Absolute 5.19 (H) 10*3/mm3      Monocytes, Absolute 0.61 10*3/mm3      Eosinophils, Absolute 0.07 10*3/mm3      Basophils, Absolute 0.03 10*3/mm3      Immature Grans, Absolute 0.04 (H) 10*3/mm3      nRBC 0.0 /100 WBC         Imaging Results (last 72 hours)     Procedure Component Value Units Date/Time    XR Chest 1 View [[id number]] Collected:  10/15/18 0950     Updated:  10/15/18 0956    Narrative:       Exam:   XR CHEST 1 VW-       Date:  10/15/2018      History:  Female, age  48 years;f/u; E87.2-Acidosis; E87.6-Hypokalemia;  R11.2-Nausea with vomiting, unspecified; E87.2-Acidosis     COMPARISON:  Chest x-ray dated 9/10/2018.     Findings :     The heart and mediastinum are normal in size. Lungs are without focal  infiltrate, mass or effusions.  The bones show no acute pathology.    Left breast implant redemonstrated.     Impression:       Impression:     No interval changes when compared to chest x-ray dated 9/10/2018.     This report was finalized on 10/15/2018 09:52 by Dr. Anel Dougherty MD.    CT Abdomen Pelvis With Contrast [[id number]] Collected:  10/14/18 1845     Updated:  10/14/18 1910    Narrative:       EXAM: CT ABDOMEN PELVIS W CONTRAST- - 10/14/2018 5:31 PM CDT     HISTORY: Abdominal pain, unspecified, nausea, vomiting, history of small  bowel obstruction and gastroparesis. Prior episode of colitis.      COMPARISON: 9/10/2018.      DOSE LENGTH PRODUCT: 187 mGy cm. Automated exposure control was also  utilized to decrease patient radiation dose.     TECHNIQUE: Enhanced  axial images of the abdomen and pelvis obtained  with multiplanar reformats.     FINDINGS:  VISUALIZED CHEST: No pleural or pericardial effusion. Lung bases clear.     LIVER: No focal liver lesion. Patent portal and hepatic veins.      BILIARY: No calcified gallstone. No intrahepatic or extrahepatic bile  duct dilation.      PANCREAS: Normal pancreas contour and enhancement.     SPLEEN: Normal size and contour.      ADRENAL: Normal appearance of the bilateral adrenal glands.     GENITOURINARY:   No hydronephrosis or urolithiasis. Possible mild enhancement of the  right renal collecting system. Left kidney with masslike contour  abnormality at the superior pole measuring 2.2 cm on coronal image 30.  This contour appears changed compared to 2/23/2017 and 12/26/2016.  Urinary bladder is within normal limits.  Uterus absent. No pelvic mass.     PERITONEUM: No free air or ascites.     GI TRACT: Normal configuration of the stomach and duodenum.  No  abnormally dilated loops of bowel or bowel wall thickening.  The  appendix is not visualized, but there are no inflammatory changes in the  right lower quadrant..     VESSELS: Aorta normal in course and caliber with minimal calcified  atherosclerosis. Celiac, superior mesenteric, bilateral renal and  inferior  "mesenteric arteries opacify normally.     RETROPERITONEUM: No mass, lymphadenopathy or hemorrhage.     SOFT TISSUES: Normal appearance of the overlying abdominal soft tissues.        BONES: No acute or aggressive bony lesion.           Impression:       1. Mild enhancement of the right collecting system, could be seen with  pyelitis. Correlate with patient's symptoms.  2. Mildly abnormal left superior renal contour, could be due to scarring  or underlying mass. Limited evaluation due to phase of imaging.  Recommend nonemergent multiphase CT or MRI with contrast (renal  protocol) for improved evaluation.  This report was finalized on 10/14/2018 19:07 by Dr Mercedez Almeida MD.          Condition on Discharge:    Stable    Physical Exam on Discharge:  /73 (BP Location: Right arm, Patient Position: Lying)   Pulse 86   Temp 98.5 °F (36.9 °C) (Temporal Artery )   Resp 16   Ht 170.2 cm (67.01\")   Wt 49.9 kg (110 lb)   LMP  (LMP Unknown)   SpO2 98%   BMI 17.22 kg/m²   Physical Exam   Constitutional: She is oriented to person, place, and time. She appears well-developed. No distress.   HENT:   Head: Normocephalic and atraumatic.   Right Ear: External ear normal.   Left Ear: External ear normal.   Mouth/Throat: Oropharynx is clear and moist.   Eyes: Pupils are equal, round, and reactive to light. Conjunctivae and EOM are normal.   Neck: Normal range of motion. Neck supple. No JVD present. No tracheal deviation present. No thyromegaly present.   Cardiovascular: Normal rate and regular rhythm.  Exam reveals no friction rub.    No murmur heard.  Pulmonary/Chest: Effort normal and breath sounds normal. No respiratory distress. She has no wheezes. She has no rales.   Abdominal: Soft. Bowel sounds are normal.   Musculoskeletal: Normal range of motion. She exhibits no edema or tenderness.   Neurological: She is alert and oriented to person, place, and time. No cranial nerve deficit. She exhibits normal muscle tone. " Coordination normal.   Skin: Skin is warm and dry. Capillary refill takes less than 2 seconds. She is not diaphoretic. No erythema.   Psychiatric: She has a normal mood and affect. Her behavior is normal. Judgment and thought content normal.   Vitals reviewed.  BMI 16.6      Discharge Disposition:  Home or Self Care    Discharge Medications:     Discharge Medications      New Medications      Instructions Start Date   Influenza Vac Subunit Quad 0.5 ML suspension prefilled syringe injection  Commonly known as:  FLUCELVAX   0.5 mL, Intramuscular, Once      multivitamin and minerals liquid liquid   15 mL, Oral, Daily         Changes to Medications      Instructions Start Date   pantoprazole 40 MG EC tablet  Commonly known as:  PROTONIX  What changed:  Another medication with the same name was removed. Continue taking this medication, and follow the directions you see here.   40 mg, Oral, Daily      promethazine 25 MG tablet  Commonly known as:  PHENERGAN  What changed:  Another medication with the same name was added. Make sure you understand how and when to take each.   25 mg, Oral, Every 6 Hours PRN      promethazine 25 MG suppository  Commonly known as:  PHENERGAN  What changed:  You were already taking a medication with the same name, and this prescription was added. Make sure you understand how and when to take each.   25 mg, Rectal, Every 6 Hours PRN         Continue These Medications      Instructions Start Date   acetaminophen-codeine 300-30 MG per tablet  Commonly known as:  TYLENOL #3   1 tablet, Oral, Every 4 Hours PRN      ALPRAZolam 0.5 MG tablet  Commonly known as:  XANAX   0.5 mg, Oral, 3 Times Daily PRN      amitriptyline 50 MG tablet  Commonly known as:  ELAVIL   Nightly      aspirin 81 MG EC tablet   Take 81 mg by mouth daily.      dicyclomine 10 MG capsule  Commonly known as:  BENTYL   10 mg, Oral, 4 Times Daily PRN      fluticasone 50 MCG/ACT nasal spray  Commonly known as:  FLONASE   2 sprays,  Daily      levETIRAcetam 250 MG tablet  Commonly known as:  KEPPRA   Oral, 2 Times Daily      metoprolol succinate  MG 24 hr tablet  Commonly known as:  TOPROL-XL   Nightly         Stop These Medications    hyoscyamine 0.125 MG tablet  Commonly known as:  ANASPAZ,LEVSIN     levoFLOXacin 750 MG tablet  Commonly known as:  LEVAQUIN     metroNIDAZOLE 500 MG tablet  Commonly known as:  FLAGYL            Discharge Diet:   Diet Instructions     Advance Diet As Tolerated                 Activity at Discharge:   Activity Instructions     Activity as Tolerated             Follow-up Appointments: Dr. Faust to facilitate care with Dr. Maxwell regarding abnormal contour of right kidney.   Test Results Pending at Discharge:  None   Peter Calabrese MD  10/16/18  1:27 PM    Time: > 30 mins  Please note that portions of this note may have been completed with a voice recognition program. Efforts were made to edit the dictations, but occasionally words are mistranscribed.

## 2018-10-16 NOTE — PLAN OF CARE
Problem: Patient Care Overview  Goal: Plan of Care Review  Outcome: Ongoing (interventions implemented as appropriate)   10/16/18 0542   Coping/Psychosocial   Plan of Care Reviewed With patient   Plan of Care Review   Progress no change   OTHER   Outcome Summary PRN pain and nausea meds given with relief. IV fluids continue. Tolerating full liquid diet. Safety maintained. Continue to monitor.        Problem: Nausea/Vomiting (Adult)  Goal: Identify Related Risk Factors and Signs and Symptoms  Outcome: Ongoing (interventions implemented as appropriate)   10/16/18 0542   Nausea/Vomiting (Adult)   Related Risk Factors (Nausea/Vomiting) female gender;gastric irritation;gastrointestinal dysfunction;metabolic abnormalities   Signs and Symptoms (Nausea/Vomiting) abdominal discomfort/pain;electrolyte changes;metabolic acidosis;report of emesis;report of queasy sensation;weakness     Goal: Symptom Relief  Outcome: Ongoing (interventions implemented as appropriate)   10/16/18 0542   Nausea/Vomiting (Adult)   Symptom Relief making progress toward outcome     Goal: Adequate Hydration  Outcome: Ongoing (interventions implemented as appropriate)   10/16/18 0542   Nausea/Vomiting (Adult)   Adequate Hydration making progress toward outcome       Problem: Fluid Volume Deficit (Adult)  Goal: Identify Related Risk Factors and Signs and Symptoms  Outcome: Ongoing (interventions implemented as appropriate)   10/16/18 0542   Fluid Volume Deficit (Adult)   Related Risk Factors (Fluid Volume Deficit) vomiting/diarrhea   Signs and Symptoms (Fluid Volume Deficit) lab value changes     Goal: Optimal Fluid Balance  Outcome: Ongoing (interventions implemented as appropriate)   10/16/18 0542   Fluid Volume Deficit (Adult)   Optimal Fluid Balance making progress toward outcome

## 2018-10-16 NOTE — PROGRESS NOTES
Malnutrition Severity Assessment    Patient Name:  Kamryn Oliva  YOB: 1970  MRN: [id number]  Admit Date:  10/14/2018    Patient meets criteria for : Moderate malnutrition    Comments:  If in agreement with malnutrition assessment, please attest documentation. Thanks.     Malnutrition Type: Chronic Illness Malnutrition     Malnutrition Type (last 8 hours)      Malnutrition Severity Assessment     Row Name 10/16/18 1748       Malnutrition Severity Assessment    Malnutrition Type Chronic Illness Malnutrition    Row Name 10/16/18 1748       Physical Signs of Malnutrition (Chronic)    Muscle Wasting Mild   moderate muscle wasting: depression of temporalis; prominent clavicle bone, visible scapular bone and spine with depression between shoulder/spine; depressions of interrous muscle    Fat Loss Mild   moderate fat loss: depression of orbital and buccal fat pads; cheek bone and brow bone prominent; depression between ribs and illiac crest prominent    Row Name 10/16/18 1748       Weight Status (Chronic)    BMI Mod (<17)   17.22    %IBW Mild (<90%)   81.5%    Weight Loss Severe (>10% / 6 mo)   ~16% over past 6-8 months per Pt report    Row Name 10/16/18 1748       Energy Intake Status (Chronic)    Energy Intake Mod (<75% / > or equal to 1 mo)    Row Name 10/16/18 1748       Criteria Met (Must meet criteria for severity in at least 2 of these categories: M Wasting, Fat Loss, Fluid, Secondary Signs, Wt. Status, Intake)    Patient meets criteria for  Moderate malnutrition          Electronically signed by:  Nel Lezama RDN, LD  10/16/18 6:01 PM

## 2018-10-19 ENCOUNTER — OFFICE VISIT (OUTPATIENT)
Dept: PRIMARY CARE CLINIC | Age: 48
End: 2018-10-19
Payer: MEDICARE

## 2018-10-19 VITALS
SYSTOLIC BLOOD PRESSURE: 102 MMHG | HEART RATE: 131 BPM | OXYGEN SATURATION: 99 % | WEIGHT: 102.8 LBS | DIASTOLIC BLOOD PRESSURE: 60 MMHG | TEMPERATURE: 98.2 F | HEIGHT: 68 IN | BODY MASS INDEX: 15.58 KG/M2

## 2018-10-19 DIAGNOSIS — R11.0 NAUSEA: ICD-10-CM

## 2018-10-19 DIAGNOSIS — E44.0 MODERATE PROTEIN-CALORIE MALNUTRITION (HCC): ICD-10-CM

## 2018-10-19 DIAGNOSIS — Z76.89 ENCOUNTER TO ESTABLISH CARE: Primary | ICD-10-CM

## 2018-10-19 DIAGNOSIS — K31.84 GASTROPARESIS: ICD-10-CM

## 2018-10-19 DIAGNOSIS — R63.4 WEIGHT DECREASE: ICD-10-CM

## 2018-10-19 DIAGNOSIS — Z09 HOSPITAL DISCHARGE FOLLOW-UP: ICD-10-CM

## 2018-10-19 DIAGNOSIS — F06.31 DEPRESSION DUE TO PHYSICAL ILLNESS: ICD-10-CM

## 2018-10-19 PROCEDURE — 1111F DSCHRG MED/CURRENT MED MERGE: CPT | Performed by: NURSE PRACTITIONER

## 2018-10-19 PROCEDURE — 99214 OFFICE O/P EST MOD 30 MIN: CPT | Performed by: NURSE PRACTITIONER

## 2018-10-19 PROCEDURE — 1036F TOBACCO NON-USER: CPT | Performed by: NURSE PRACTITIONER

## 2018-10-19 PROCEDURE — G8484 FLU IMMUNIZE NO ADMIN: HCPCS | Performed by: NURSE PRACTITIONER

## 2018-10-19 PROCEDURE — G8419 CALC BMI OUT NRM PARAM NOF/U: HCPCS | Performed by: NURSE PRACTITIONER

## 2018-10-19 PROCEDURE — 99401 PREV MED CNSL INDIV APPRX 15: CPT | Performed by: NURSE PRACTITIONER

## 2018-10-19 PROCEDURE — G8427 DOCREV CUR MEDS BY ELIG CLIN: HCPCS | Performed by: NURSE PRACTITIONER

## 2018-10-19 PROCEDURE — G8431 POS CLIN DEPRES SCRN F/U DOC: HCPCS | Performed by: NURSE PRACTITIONER

## 2018-10-19 RX ORDER — DICYCLOMINE HYDROCHLORIDE 10 MG/1
10 CAPSULE ORAL
COMMUNITY

## 2018-10-19 RX ORDER — PROMETHAZINE HYDROCHLORIDE 25 MG/1
25 TABLET ORAL EVERY 6 HOURS PRN
Qty: 90 TABLET | Refills: 3 | Status: SHIPPED | OUTPATIENT
Start: 2018-10-19

## 2018-10-19 RX ORDER — MEGESTROL ACETATE 40 MG/1
40 TABLET ORAL DAILY
Qty: 30 TABLET | Refills: 3 | Status: SHIPPED | OUTPATIENT
Start: 2018-10-19 | End: 2019-01-25

## 2018-10-19 RX ORDER — INFANT FORM.SOY-IRON,LACT-FREE
1 CONCENTRATE, ORAL ORAL 2 TIMES DAILY
Qty: 30 BOTTLE | Refills: 3 | Status: SHIPPED | OUTPATIENT
Start: 2018-10-19 | End: 2019-01-25

## 2018-10-19 RX ORDER — ALPRAZOLAM 1 MG/1
1 TABLET ORAL 3 TIMES DAILY PRN
COMMUNITY
Start: 2018-02-15 | End: 2018-10-19

## 2018-10-19 ASSESSMENT — ENCOUNTER SYMPTOMS
NAUSEA: 1
VOMITING: 1
RHINORRHEA: 0

## 2018-10-22 PROBLEM — E44.0 MODERATE PROTEIN-CALORIE MALNUTRITION (HCC): Status: ACTIVE | Noted: 2018-10-22

## 2018-10-22 PROBLEM — F06.31 DEPRESSION DUE TO PHYSICAL ILLNESS: Status: ACTIVE | Noted: 2018-10-22

## 2018-10-22 ASSESSMENT — ENCOUNTER SYMPTOMS
EYE PAIN: 0
BACK PAIN: 0
SHORTNESS OF BREATH: 0
ABDOMINAL PAIN: 1
CONSTIPATION: 0
COUGH: 0
DIARRHEA: 0
SINUS PRESSURE: 0
WHEEZING: 0

## 2018-11-06 ENCOUNTER — APPOINTMENT (OUTPATIENT)
Dept: CT IMAGING | Facility: HOSPITAL | Age: 48
End: 2018-11-06

## 2018-11-06 ENCOUNTER — APPOINTMENT (OUTPATIENT)
Dept: GENERAL RADIOLOGY | Facility: HOSPITAL | Age: 48
End: 2018-11-06

## 2018-11-06 ENCOUNTER — HOSPITAL ENCOUNTER (INPATIENT)
Facility: HOSPITAL | Age: 48
LOS: 4 days | Discharge: HOME | End: 2018-11-10
Attending: EMERGENCY MEDICINE | Admitting: SPECIALIST

## 2018-11-06 ENCOUNTER — APPOINTMENT (OUTPATIENT)
Dept: ULTRASOUND IMAGING | Facility: HOSPITAL | Age: 48
End: 2018-11-06

## 2018-11-06 DIAGNOSIS — K82.8 SLUDGE IN GALLBLADDER: ICD-10-CM

## 2018-11-06 DIAGNOSIS — R10.84 ABDOMINAL PAIN, GENERALIZED: ICD-10-CM

## 2018-11-06 DIAGNOSIS — E87.6 HYPOKALEMIA: ICD-10-CM

## 2018-11-06 DIAGNOSIS — K81.9 CHOLECYSTITIS: ICD-10-CM

## 2018-11-06 DIAGNOSIS — E83.42 HYPOMAGNESEMIA: Primary | ICD-10-CM

## 2018-11-06 LAB
ALBUMIN SERPL-MCNC: 4.3 G/DL (ref 3.5–5)
ALBUMIN/GLOB SERPL: 1.4 G/DL (ref 1.1–2.5)
ALP SERPL-CCNC: 85 U/L (ref 24–120)
ALT SERPL W P-5'-P-CCNC: <15 U/L (ref 0–54)
ANION GAP SERPL CALCULATED.3IONS-SCNC: 17 MMOL/L (ref 4–13)
APTT PPP: 28.3 SECONDS (ref 24.1–34.8)
AST SERPL-CCNC: 17 U/L (ref 7–45)
BILIRUB SERPL-MCNC: 0.4 MG/DL (ref 0.1–1)
BILIRUB UR QL STRIP: NEGATIVE
BUN BLD-MCNC: 14 MG/DL (ref 5–21)
BUN/CREAT SERPL: 16.3 (ref 7–25)
CALCIUM SPEC-SCNC: 9.8 MG/DL (ref 8.4–10.4)
CHLORIDE SERPL-SCNC: 95 MMOL/L (ref 98–110)
CLARITY UR: CLEAR
CLUMPED PLATELETS: PRESENT
CO2 SERPL-SCNC: 25 MMOL/L (ref 24–31)
COLOR UR: YELLOW
CREAT BLD-MCNC: 0.86 MG/DL (ref 0.5–1.4)
D DIMER PPP FEU-MCNC: <0.22 MG/L (FEU) (ref 0–0.5)
D-LACTATE SERPL-SCNC: 0.6 MMOL/L (ref 0.5–2)
D-LACTATE SERPL-SCNC: 2.2 MMOL/L (ref 0.5–2)
DEPRECATED RDW RBC AUTO: 43.8 FL (ref 40–54)
EOSINOPHIL # BLD MANUAL: 0.13 10*3/MM3 (ref 0–0.7)
EOSINOPHIL NFR BLD MANUAL: 1.7 % (ref 0–4)
ERYTHROCYTE [DISTWIDTH] IN BLOOD BY AUTOMATED COUNT: 13.5 % (ref 12–15)
GFR SERPL CREATININE-BSD FRML MDRD: 71 ML/MIN/1.73
GIANT PLATELETS: ABNORMAL
GLOBULIN UR ELPH-MCNC: 3.1 GM/DL
GLUCOSE BLD-MCNC: 117 MG/DL (ref 70–100)
GLUCOSE UR STRIP-MCNC: NEGATIVE MG/DL
HCT VFR BLD AUTO: 41.5 % (ref 37–47)
HGB BLD-MCNC: 14.2 G/DL (ref 12–16)
HGB UR QL STRIP.AUTO: NEGATIVE
HOLD SPECIMEN: NORMAL
INR PPP: 0.87 (ref 0.91–1.09)
KETONES UR QL STRIP: NEGATIVE
LEUKOCYTE ESTERASE UR QL STRIP.AUTO: NEGATIVE
LIPASE SERPL-CCNC: 62 U/L (ref 23–203)
LYMPHOCYTES # BLD MANUAL: 3.41 10*3/MM3 (ref 0.72–4.86)
LYMPHOCYTES NFR BLD MANUAL: 2.5 % (ref 4–12)
LYMPHOCYTES NFR BLD MANUAL: 45.4 % (ref 15–45)
MAGNESIUM SERPL-MCNC: 1.2 MG/DL (ref 1.4–2.2)
MCH RBC QN AUTO: 30.4 PG (ref 28–32)
MCHC RBC AUTO-ENTMCNC: 34.2 G/DL (ref 33–36)
MCV RBC AUTO: 88.9 FL (ref 82–98)
MONOCYTES # BLD AUTO: 0.19 10*3/MM3 (ref 0.19–1.3)
NEUTROPHILS # BLD AUTO: 3.02 10*3/MM3 (ref 1.87–8.4)
NEUTROPHILS NFR BLD MANUAL: 40.3 % (ref 39–78)
NITRITE UR QL STRIP: NEGATIVE
PH UR STRIP.AUTO: 6 [PH] (ref 5–8)
PLATELET # BLD AUTO: 390 10*3/MM3 (ref 130–400)
PMV BLD AUTO: 11.3 FL (ref 6–12)
POTASSIUM BLD-SCNC: 2.7 MMOL/L (ref 3.5–5.3)
PROT SERPL-MCNC: 7.4 G/DL (ref 6.3–8.7)
PROT UR QL STRIP: NEGATIVE
PROTHROMBIN TIME: 12.1 SECONDS (ref 11.9–14.6)
RBC # BLD AUTO: 4.67 10*6/MM3 (ref 4.2–5.4)
RBC MORPH BLD: NORMAL
SODIUM BLD-SCNC: 137 MMOL/L (ref 135–145)
SP GR UR STRIP: 1.01 (ref 1–1.03)
TROPONIN I SERPL-MCNC: <0.012 NG/ML (ref 0–0.03)
TROPONIN I SERPL-MCNC: <0.012 NG/ML (ref 0–0.03)
UROBILINOGEN UR QL STRIP: NORMAL
VARIANT LYMPHS NFR BLD MANUAL: 10.1 % (ref 0–5)
WBC MORPH BLD: NORMAL
WBC NRBC COR # BLD: 7.5 10*3/MM3 (ref 4.8–10.8)
WHOLE BLOOD HOLD SPECIMEN: NORMAL

## 2018-11-06 PROCEDURE — 80053 COMPREHEN METABOLIC PANEL: CPT | Performed by: EMERGENCY MEDICINE

## 2018-11-06 PROCEDURE — 25010000002 MAGNESIUM SULFATE IN D5W 1G/100ML (PREMIX) 1-5 GM/100ML-% SOLUTION: Performed by: FAMILY MEDICINE

## 2018-11-06 PROCEDURE — 83735 ASSAY OF MAGNESIUM: CPT | Performed by: EMERGENCY MEDICINE

## 2018-11-06 PROCEDURE — 83690 ASSAY OF LIPASE: CPT | Performed by: EMERGENCY MEDICINE

## 2018-11-06 PROCEDURE — 25010000002 ONDANSETRON PER 1 MG: Performed by: EMERGENCY MEDICINE

## 2018-11-06 PROCEDURE — 83605 ASSAY OF LACTIC ACID: CPT | Performed by: EMERGENCY MEDICINE

## 2018-11-06 PROCEDURE — 99285 EMERGENCY DEPT VISIT HI MDM: CPT

## 2018-11-06 PROCEDURE — 85730 THROMBOPLASTIN TIME PARTIAL: CPT | Performed by: EMERGENCY MEDICINE

## 2018-11-06 PROCEDURE — 85379 FIBRIN DEGRADATION QUANT: CPT | Performed by: EMERGENCY MEDICINE

## 2018-11-06 PROCEDURE — 93005 ELECTROCARDIOGRAM TRACING: CPT | Performed by: EMERGENCY MEDICINE

## 2018-11-06 PROCEDURE — 76705 ECHO EXAM OF ABDOMEN: CPT

## 2018-11-06 PROCEDURE — 85007 BL SMEAR W/DIFF WBC COUNT: CPT | Performed by: EMERGENCY MEDICINE

## 2018-11-06 PROCEDURE — 93010 ELECTROCARDIOGRAM REPORT: CPT | Performed by: INTERNAL MEDICINE

## 2018-11-06 PROCEDURE — 94760 N-INVAS EAR/PLS OXIMETRY 1: CPT

## 2018-11-06 PROCEDURE — 94799 UNLISTED PULMONARY SVC/PX: CPT

## 2018-11-06 PROCEDURE — 25010000002 POTASSIUM CHLORIDE PER 2 MEQ: Performed by: EMERGENCY MEDICINE

## 2018-11-06 PROCEDURE — 81003 URINALYSIS AUTO W/O SCOPE: CPT | Performed by: EMERGENCY MEDICINE

## 2018-11-06 PROCEDURE — 71045 X-RAY EXAM CHEST 1 VIEW: CPT

## 2018-11-06 PROCEDURE — 25010000002 IOPAMIDOL 61 % SOLUTION: Performed by: EMERGENCY MEDICINE

## 2018-11-06 PROCEDURE — 0 IOHEXOL 300 MG/ML SOLUTION: Performed by: EMERGENCY MEDICINE

## 2018-11-06 PROCEDURE — 25010000002 MAGNESIUM SULFATE IN D5W 1G/100ML (PREMIX) 1-5 GM/100ML-% SOLUTION: Performed by: EMERGENCY MEDICINE

## 2018-11-06 PROCEDURE — 85610 PROTHROMBIN TIME: CPT | Performed by: EMERGENCY MEDICINE

## 2018-11-06 PROCEDURE — 74177 CT ABD & PELVIS W/CONTRAST: CPT

## 2018-11-06 PROCEDURE — 84484 ASSAY OF TROPONIN QUANT: CPT | Performed by: EMERGENCY MEDICINE

## 2018-11-06 PROCEDURE — 25010000003 MORPHINE 5 MG/ML SOLUTION: Performed by: EMERGENCY MEDICINE

## 2018-11-06 PROCEDURE — 25010000002 HYDROMORPHONE PER 4 MG: Performed by: EMERGENCY MEDICINE

## 2018-11-06 PROCEDURE — 25010000002 POTASSIUM CHLORIDE PER 2 MEQ: Performed by: FAMILY MEDICINE

## 2018-11-06 PROCEDURE — 85025 COMPLETE CBC W/AUTO DIFF WBC: CPT | Performed by: EMERGENCY MEDICINE

## 2018-11-06 RX ORDER — LEVETIRACETAM 250 MG/1
250 TABLET ORAL EVERY 12 HOURS SCHEDULED
Status: DISCONTINUED | OUTPATIENT
Start: 2018-11-06 | End: 2018-11-10 | Stop reason: HOSPADM

## 2018-11-06 RX ORDER — ASPIRIN 81 MG/1
81 TABLET ORAL DAILY
Status: DISCONTINUED | OUTPATIENT
Start: 2018-11-07 | End: 2018-11-10 | Stop reason: HOSPADM

## 2018-11-06 RX ORDER — SODIUM CHLORIDE 9 MG/ML
50 INJECTION, SOLUTION INTRAVENOUS CONTINUOUS
Status: DISCONTINUED | OUTPATIENT
Start: 2018-11-06 | End: 2018-11-07

## 2018-11-06 RX ORDER — HEPARIN SODIUM 5000 [USP'U]/ML
5000 INJECTION, SOLUTION INTRAVENOUS; SUBCUTANEOUS EVERY 12 HOURS SCHEDULED
Status: DISCONTINUED | OUTPATIENT
Start: 2018-11-06 | End: 2018-11-07

## 2018-11-06 RX ORDER — SODIUM CHLORIDE 0.9 % (FLUSH) 0.9 %
3 SYRINGE (ML) INJECTION EVERY 12 HOURS SCHEDULED
Status: DISCONTINUED | OUTPATIENT
Start: 2018-11-06 | End: 2018-11-10 | Stop reason: HOSPADM

## 2018-11-06 RX ORDER — HYDROMORPHONE HYDROCHLORIDE 1 MG/ML
1 INJECTION, SOLUTION INTRAMUSCULAR; INTRAVENOUS; SUBCUTANEOUS ONCE
Status: DISCONTINUED | OUTPATIENT
Start: 2018-11-06 | End: 2018-11-06

## 2018-11-06 RX ORDER — AMITRIPTYLINE HYDROCHLORIDE 25 MG/1
50 TABLET, FILM COATED ORAL NIGHTLY
Status: DISCONTINUED | OUTPATIENT
Start: 2018-11-06 | End: 2018-11-10 | Stop reason: HOSPADM

## 2018-11-06 RX ORDER — METOPROLOL SUCCINATE 50 MG/1
50 TABLET, EXTENDED RELEASE ORAL NIGHTLY
Status: DISCONTINUED | OUTPATIENT
Start: 2018-11-06 | End: 2018-11-10 | Stop reason: HOSPADM

## 2018-11-06 RX ORDER — SODIUM CHLORIDE 0.9 % (FLUSH) 0.9 %
10 SYRINGE (ML) INJECTION AS NEEDED
Status: DISCONTINUED | OUTPATIENT
Start: 2018-11-06 | End: 2018-11-10 | Stop reason: HOSPADM

## 2018-11-06 RX ORDER — PANTOPRAZOLE SODIUM 40 MG/1
40 TABLET, DELAYED RELEASE ORAL DAILY
Status: DISCONTINUED | OUTPATIENT
Start: 2018-11-07 | End: 2018-11-10 | Stop reason: SDUPTHER

## 2018-11-06 RX ORDER — ONDANSETRON 2 MG/ML
4 INJECTION INTRAMUSCULAR; INTRAVENOUS EVERY 6 HOURS PRN
Status: DISCONTINUED | OUTPATIENT
Start: 2018-11-06 | End: 2018-11-10 | Stop reason: HOSPADM

## 2018-11-06 RX ORDER — POTASSIUM CHLORIDE 14.9 MG/ML
20 INJECTION INTRAVENOUS ONCE
Status: COMPLETED | OUTPATIENT
Start: 2018-11-06 | End: 2018-11-07

## 2018-11-06 RX ORDER — MAGNESIUM SULFATE 1 G/100ML
1 INJECTION INTRAVENOUS ONCE
Status: COMPLETED | OUTPATIENT
Start: 2018-11-06 | End: 2018-11-07

## 2018-11-06 RX ORDER — DICYCLOMINE HYDROCHLORIDE 10 MG/1
10 CAPSULE ORAL 4 TIMES DAILY PRN
Status: DISCONTINUED | OUTPATIENT
Start: 2018-11-06 | End: 2018-11-10 | Stop reason: HOSPADM

## 2018-11-06 RX ORDER — MORPHINE SULFATE 5 MG/ML
4 INJECTION, SOLUTION INTRAMUSCULAR; INTRAVENOUS ONCE
Status: COMPLETED | OUTPATIENT
Start: 2018-11-06 | End: 2018-11-06

## 2018-11-06 RX ORDER — SODIUM CHLORIDE 0.9 % (FLUSH) 0.9 %
3-10 SYRINGE (ML) INJECTION AS NEEDED
Status: DISCONTINUED | OUTPATIENT
Start: 2018-11-06 | End: 2018-11-10 | Stop reason: HOSPADM

## 2018-11-06 RX ORDER — HYDROMORPHONE HYDROCHLORIDE 1 MG/ML
1 INJECTION, SOLUTION INTRAMUSCULAR; INTRAVENOUS; SUBCUTANEOUS ONCE
Status: COMPLETED | OUTPATIENT
Start: 2018-11-06 | End: 2018-11-06

## 2018-11-06 RX ORDER — ONDANSETRON 2 MG/ML
4 INJECTION INTRAMUSCULAR; INTRAVENOUS ONCE
Status: COMPLETED | OUTPATIENT
Start: 2018-11-06 | End: 2018-11-06

## 2018-11-06 RX ORDER — MAGNESIUM SULFATE 1 G/100ML
1 INJECTION INTRAVENOUS ONCE
Status: COMPLETED | OUTPATIENT
Start: 2018-11-06 | End: 2018-11-06

## 2018-11-06 RX ORDER — POTASSIUM CHLORIDE 14.9 MG/ML
20 INJECTION INTRAVENOUS ONCE
Status: COMPLETED | OUTPATIENT
Start: 2018-11-06 | End: 2018-11-06

## 2018-11-06 RX ORDER — FAMOTIDINE 20 MG/1
40 TABLET, FILM COATED ORAL DAILY
Status: DISCONTINUED | OUTPATIENT
Start: 2018-11-07 | End: 2018-11-10 | Stop reason: SDUPTHER

## 2018-11-06 RX ADMIN — ONDANSETRON HYDROCHLORIDE 4 MG: 2 INJECTION INTRAMUSCULAR; INTRAVENOUS at 16:00

## 2018-11-06 RX ADMIN — IOPAMIDOL 85 ML: 612 INJECTION, SOLUTION INTRAVENOUS at 18:43

## 2018-11-06 RX ADMIN — Medication 3 ML: at 23:42

## 2018-11-06 RX ADMIN — MORPHINE SULFATE 4 MG: 5 INJECTION, SOLUTION INTRAMUSCULAR; INTRAVENOUS at 16:00

## 2018-11-06 RX ADMIN — MAGNESIUM SULFATE HEPTAHYDRATE 1 G: 1 INJECTION, SOLUTION INTRAVENOUS at 18:21

## 2018-11-06 RX ADMIN — MAGNESIUM SULFATE HEPTAHYDRATE 1 G: 1 INJECTION, SOLUTION INTRAVENOUS at 23:05

## 2018-11-06 RX ADMIN — POTASSIUM CHLORIDE 20 MEQ: 200 INJECTION, SOLUTION INTRAVENOUS at 23:38

## 2018-11-06 RX ADMIN — IOHEXOL 50 ML: 300 INJECTION, SOLUTION INTRAVENOUS at 16:09

## 2018-11-06 RX ADMIN — HYDROMORPHONE HYDROCHLORIDE 1 MG: 1 INJECTION, SOLUTION INTRAMUSCULAR; INTRAVENOUS; SUBCUTANEOUS at 19:20

## 2018-11-06 RX ADMIN — SODIUM CHLORIDE 1000 ML: 9 INJECTION, SOLUTION INTRAVENOUS at 16:01

## 2018-11-06 RX ADMIN — POTASSIUM CHLORIDE 20 MEQ: 14.9 INJECTION, SOLUTION INTRAVENOUS at 16:54

## 2018-11-06 RX ADMIN — SODIUM CHLORIDE 1000 ML: 9 INJECTION, SOLUTION INTRAVENOUS at 16:50

## 2018-11-06 RX ADMIN — HYDROMORPHONE HYDROCHLORIDE 1 MG: 1 INJECTION, SOLUTION INTRAMUSCULAR; INTRAVENOUS; SUBCUTANEOUS at 17:00

## 2018-11-06 RX ADMIN — MORPHINE SULFATE 4 MG: 5 INJECTION, SOLUTION INTRAMUSCULAR; INTRAVENOUS at 22:04

## 2018-11-06 RX ADMIN — SODIUM CHLORIDE 100 ML/HR: 9 INJECTION, SOLUTION INTRAVENOUS at 23:39

## 2018-11-06 RX ADMIN — ONDANSETRON HYDROCHLORIDE 4 MG: 2 INJECTION INTRAMUSCULAR; INTRAVENOUS at 22:03

## 2018-11-07 LAB
ALBUMIN SERPL-MCNC: 3.2 G/DL (ref 3.5–5)
ALBUMIN/GLOB SERPL: 1.3 G/DL (ref 1.1–2.5)
ALP SERPL-CCNC: 57 U/L (ref 24–120)
ALT SERPL W P-5'-P-CCNC: 16 U/L (ref 0–54)
ANION GAP SERPL CALCULATED.3IONS-SCNC: 9 MMOL/L (ref 4–13)
AST SERPL-CCNC: 17 U/L (ref 7–45)
BASOPHILS # BLD AUTO: 0.03 10*3/MM3 (ref 0–0.2)
BASOPHILS NFR BLD AUTO: 0.4 % (ref 0–2)
BILIRUB SERPL-MCNC: 0.3 MG/DL (ref 0.1–1)
BUN BLD-MCNC: 7 MG/DL (ref 5–21)
BUN/CREAT SERPL: 12.7 (ref 7–25)
CALCIUM SPEC-SCNC: 8.2 MG/DL (ref 8.4–10.4)
CHLORIDE SERPL-SCNC: 106 MMOL/L (ref 98–110)
CO2 SERPL-SCNC: 24 MMOL/L (ref 24–31)
CREAT BLD-MCNC: 0.55 MG/DL (ref 0.5–1.4)
DEPRECATED RDW RBC AUTO: 44.3 FL (ref 40–54)
EOSINOPHIL # BLD AUTO: 0.08 10*3/MM3 (ref 0–0.7)
EOSINOPHIL NFR BLD AUTO: 1.1 % (ref 0–4)
ERYTHROCYTE [DISTWIDTH] IN BLOOD BY AUTOMATED COUNT: 13.5 % (ref 12–15)
GFR SERPL CREATININE-BSD FRML MDRD: 118 ML/MIN/1.73
GLOBULIN UR ELPH-MCNC: 2.4 GM/DL
GLUCOSE BLD-MCNC: 81 MG/DL (ref 70–100)
HCT VFR BLD AUTO: 32.7 % (ref 37–47)
HGB BLD-MCNC: 10.9 G/DL (ref 12–16)
IMM GRANULOCYTES # BLD: 0.05 10*3/MM3 (ref 0–0.03)
IMM GRANULOCYTES NFR BLD: 0.7 % (ref 0–5)
LYMPHOCYTES # BLD AUTO: 4.08 10*3/MM3 (ref 0.72–4.86)
LYMPHOCYTES NFR BLD AUTO: 54.2 % (ref 15–45)
MAGNESIUM SERPL-MCNC: 1.8 MG/DL (ref 1.4–2.2)
MCH RBC QN AUTO: 29.9 PG (ref 28–32)
MCHC RBC AUTO-ENTMCNC: 33.3 G/DL (ref 33–36)
MCV RBC AUTO: 89.6 FL (ref 82–98)
MONOCYTES # BLD AUTO: 0.56 10*3/MM3 (ref 0.19–1.3)
MONOCYTES NFR BLD AUTO: 7.4 % (ref 4–12)
NEUTROPHILS # BLD AUTO: 2.73 10*3/MM3 (ref 1.87–8.4)
NEUTROPHILS NFR BLD AUTO: 36.2 % (ref 39–78)
NRBC BLD MANUAL-RTO: 0 /100 WBC (ref 0–0)
PHOSPHATE SERPL-MCNC: 2.9 MG/DL (ref 2.5–4.5)
PLATELET # BLD AUTO: 271 10*3/MM3 (ref 130–400)
PMV BLD AUTO: 10.8 FL (ref 6–12)
POTASSIUM BLD-SCNC: 3.7 MMOL/L (ref 3.5–5.3)
PROT SERPL-MCNC: 5.6 G/DL (ref 6.3–8.7)
RBC # BLD AUTO: 3.65 10*6/MM3 (ref 4.2–5.4)
SODIUM BLD-SCNC: 139 MMOL/L (ref 135–145)
TROPONIN I SERPL-MCNC: <0.012 NG/ML (ref 0–0.03)
WBC NRBC COR # BLD: 7.53 10*3/MM3 (ref 4.8–10.8)

## 2018-11-07 PROCEDURE — 25010000002 ONDANSETRON PER 1 MG: Performed by: FAMILY MEDICINE

## 2018-11-07 PROCEDURE — 25010000002 HEPARIN (PORCINE) PER 1000 UNITS: Performed by: SPECIALIST

## 2018-11-07 PROCEDURE — 25010000002 CEFOXITIN PER 1 G: Performed by: SPECIALIST

## 2018-11-07 PROCEDURE — 83735 ASSAY OF MAGNESIUM: CPT | Performed by: FAMILY MEDICINE

## 2018-11-07 PROCEDURE — 25010000002 HEPARIN (PORCINE) PER 1000 UNITS: Performed by: FAMILY MEDICINE

## 2018-11-07 PROCEDURE — 84100 ASSAY OF PHOSPHORUS: CPT | Performed by: FAMILY MEDICINE

## 2018-11-07 PROCEDURE — 80053 COMPREHEN METABOLIC PANEL: CPT | Performed by: FAMILY MEDICINE

## 2018-11-07 PROCEDURE — 93010 ELECTROCARDIOGRAM REPORT: CPT | Performed by: INTERNAL MEDICINE

## 2018-11-07 PROCEDURE — 84484 ASSAY OF TROPONIN QUANT: CPT | Performed by: FAMILY MEDICINE

## 2018-11-07 PROCEDURE — 93005 ELECTROCARDIOGRAM TRACING: CPT | Performed by: FAMILY MEDICINE

## 2018-11-07 PROCEDURE — 94760 N-INVAS EAR/PLS OXIMETRY 1: CPT

## 2018-11-07 PROCEDURE — 94799 UNLISTED PULMONARY SVC/PX: CPT

## 2018-11-07 PROCEDURE — 85025 COMPLETE CBC W/AUTO DIFF WBC: CPT | Performed by: FAMILY MEDICINE

## 2018-11-07 RX ORDER — HEPARIN SODIUM 5000 [USP'U]/ML
5000 INJECTION, SOLUTION INTRAVENOUS; SUBCUTANEOUS EVERY 12 HOURS SCHEDULED
Status: DISCONTINUED | OUTPATIENT
Start: 2018-11-07 | End: 2018-11-08

## 2018-11-07 RX ORDER — AMLODIPINE BESYLATE 10 MG/1
10 TABLET ORAL DAILY
COMMUNITY
End: 2019-03-06 | Stop reason: SDUPTHER

## 2018-11-07 RX ORDER — CLONIDINE HYDROCHLORIDE 0.1 MG/1
0.1 TABLET ORAL EVERY 4 HOURS PRN
COMMUNITY
End: 2019-06-07 | Stop reason: SDUPTHER

## 2018-11-07 RX ORDER — HYDROCODONE BITARTRATE AND ACETAMINOPHEN 7.5; 325 MG/1; MG/1
1 TABLET ORAL ONCE
Status: COMPLETED | OUTPATIENT
Start: 2018-11-07 | End: 2018-11-07

## 2018-11-07 RX ORDER — PANTOPRAZOLE SODIUM 40 MG/1
40 TABLET, DELAYED RELEASE ORAL 2 TIMES DAILY
COMMUNITY
End: 2019-06-13 | Stop reason: SDUPTHER

## 2018-11-07 RX ORDER — MEGESTROL ACETATE 40 MG/1
40 TABLET ORAL DAILY
Status: ON HOLD | COMMUNITY
End: 2019-09-15

## 2018-11-07 RX ORDER — MAGNESIUM CARB/ALUMINUM HYDROX 105-160MG
296 TABLET,CHEWABLE ORAL ONCE
Status: COMPLETED | OUTPATIENT
Start: 2018-11-07 | End: 2018-11-07

## 2018-11-07 RX ORDER — ESCITALOPRAM OXALATE 20 MG/1
20 TABLET ORAL DAILY
COMMUNITY
End: 2019-03-06 | Stop reason: SDUPTHER

## 2018-11-07 RX ADMIN — ONDANSETRON HYDROCHLORIDE 4 MG: 2 INJECTION, SOLUTION INTRAMUSCULAR; INTRAVENOUS at 16:50

## 2018-11-07 RX ADMIN — LEVETIRACETAM 250 MG: 250 TABLET, FILM COATED ORAL at 20:37

## 2018-11-07 RX ADMIN — ONDANSETRON HYDROCHLORIDE 4 MG: 2 INJECTION, SOLUTION INTRAMUSCULAR; INTRAVENOUS at 05:52

## 2018-11-07 RX ADMIN — AMITRIPTYLINE HYDROCHLORIDE 50 MG: 25 TABLET, FILM COATED ORAL at 00:21

## 2018-11-07 RX ADMIN — CEFOXITIN SODIUM 1 G: 1 POWDER, FOR SOLUTION INTRAVENOUS at 20:37

## 2018-11-07 RX ADMIN — FAMOTIDINE 40 MG: 20 TABLET, FILM COATED ORAL at 08:09

## 2018-11-07 RX ADMIN — LEVETIRACETAM 250 MG: 250 TABLET, FILM COATED ORAL at 08:09

## 2018-11-07 RX ADMIN — Medication 3 ML: at 20:41

## 2018-11-07 RX ADMIN — AMITRIPTYLINE HYDROCHLORIDE 50 MG: 25 TABLET, FILM COATED ORAL at 20:37

## 2018-11-07 RX ADMIN — METOPROLOL SUCCINATE 50 MG: 50 TABLET, FILM COATED, EXTENDED RELEASE ORAL at 00:21

## 2018-11-07 RX ADMIN — Medication 3 ML: at 08:09

## 2018-11-07 RX ADMIN — LEVETIRACETAM 250 MG: 250 TABLET, FILM COATED ORAL at 00:21

## 2018-11-07 RX ADMIN — ASPIRIN 81 MG: 81 TABLET ORAL at 08:09

## 2018-11-07 RX ADMIN — SODIUM CHLORIDE 100 ML/HR: 9 INJECTION, SOLUTION INTRAVENOUS at 10:21

## 2018-11-07 RX ADMIN — Medication 296 ML: at 18:49

## 2018-11-07 RX ADMIN — HEPARIN SODIUM 5000 UNITS: 5000 INJECTION, SOLUTION INTRAVENOUS; SUBCUTANEOUS at 20:36

## 2018-11-07 RX ADMIN — HYDROCODONE BITARTRATE AND ACETAMINOPHEN 1 TABLET: 7.5; 325 TABLET ORAL at 05:55

## 2018-11-07 RX ADMIN — DICYCLOMINE HYDROCHLORIDE 10 MG: 10 CAPSULE ORAL at 12:41

## 2018-11-07 RX ADMIN — METOPROLOL SUCCINATE 50 MG: 50 TABLET, FILM COATED, EXTENDED RELEASE ORAL at 20:37

## 2018-11-07 RX ADMIN — HEPARIN SODIUM 5000 UNITS: 5000 INJECTION, SOLUTION INTRAVENOUS; SUBCUTANEOUS at 08:10

## 2018-11-07 RX ADMIN — PANTOPRAZOLE SODIUM 40 MG: 40 TABLET, DELAYED RELEASE ORAL at 08:09

## 2018-11-07 RX ADMIN — ONDANSETRON HYDROCHLORIDE 4 MG: 2 INJECTION, SOLUTION INTRAMUSCULAR; INTRAVENOUS at 00:25

## 2018-11-07 RX ADMIN — HEPARIN SODIUM 5000 UNITS: 5000 INJECTION, SOLUTION INTRAVENOUS; SUBCUTANEOUS at 00:21

## 2018-11-07 NOTE — PROGRESS NOTES
Malnutrition Severity Assessment    Patient Name:  Kamryn Oliva  YOB: 1970  MRN: [id number]  Admit Date:  11/6/2018    Patient meets criteria for : Severe malnutrition    Comments:  If in agreement with malnutrition assessment, please attest documentation. Thanks.    Malnutrition Type: Chronic Illness Malnutrition     Malnutrition Type (last 8 hours)      Malnutrition Severity Assessment     Row Name 11/07/18 1518       Malnutrition Severity Assessment    Malnutrition Type Chronic Illness Malnutrition    Row Name 11/07/18 1518       Physical Signs of Malnutrition (Chronic)    Muscle Wasting Severe   prominent clavicals, prominent acromion process, squared shoulders, and prominant patella with muscle loss surrounding knee    Fat Loss Severe   sunken orbital fat loss, buccal fat pad depression with prominant cheek bones; limited excess fat tissue to upper arm; depression between ribs     Fluid Accumulation None    Secondary Physical Signs --    Row Name 11/07/18 1518       Weight Status (Chronic)    BMI Severe (<16)    %IBW Mod <80%   %IBW: 72%    Weight Loss Severe (>20% / 1 yr)    Row Name 11/07/18 1518       Energy Intake Status (Chronic)    Energy Intake Severe (< or equal to 50% / > or equal to 1 mo)    Row Name 11/07/18 1518       Criteria Met (Must meet criteria for severity in at least 2 of these categories: M Wasting, Fat Loss, Fluid, Secondary Signs, Wt. Status, Intake)    Patient meets criteria for  Severe malnutrition          Electronically signed by:  Nel Lezama RDN, LD  11/07/18 4:40 PM

## 2018-11-07 NOTE — PROGRESS NOTES
[facility] Medicine Services  INPATIENT PROGRESS NOTE    Patient Name: Kamryn Oliva  Date of Admission: 11/6/2018  Today's Date: 11/07/18  Length of Stay: 1  Primary Care Physician: Tong Toussaint MD    Subjective   Chief Complaint: Patient having epigastric/ruq pain that radiates to shoulder blade.     HPI   No current n/v.  Was able to eat some jello at lunch but did not eat salad.   Is drinking water.   No shortness of breath.  Has lost 10 over last 4-6 weeks.         Review of Systems     All pertinent negatives and positives are as above. All other systems have been reviewed and are negative unless otherwise stated.     Objective    Temp:  [97.6 °F (36.4 °C)-98.7 °F (37.1 °C)] 98.7 °F (37.1 °C)  Heart Rate:  [] 66  Resp:  [16-20] 16  BP: (113-143)/() 120/80  Physical Exam   Constitutional: She is oriented to person, place, and time. She appears well-developed and well-nourished.   HENT:   Head: Normocephalic and atraumatic.   Eyes: Pupils are equal, round, and reactive to light. Conjunctivae and EOM are normal.   Neck: Neck supple.   Cardiovascular: Normal rate and regular rhythm.  Exam reveals no gallop and no friction rub.    No murmur heard.  Pulmonary/Chest: Effort normal and breath sounds normal.   Abdominal: Soft. Bowel sounds are normal. There is no hepatosplenomegaly. There is tenderness (mild).   Musculoskeletal: Normal range of motion. She exhibits no edema.   Neurological: She is alert and oriented to person, place, and time. No cranial nerve deficit.   Skin: Skin is warm and dry.   Psychiatric: She has a normal mood and affect. Her behavior is normal.   Nursing note and vitals reviewed.          Results Review:  I have reviewed the labs, radiology results, and diagnostic studies.    Laboratory Data:     Results from last 7 days  Lab Units 11/07/18  0815 11/06/18  1514   WBC 10*3/mm3 7.53 7.50   HEMOGLOBIN g/dL 10.9* 14.2   HEMATOCRIT % 32.7*  41.5   PLATELETS 10*3/mm3 271 390          Results from last 7 days  Lab Units 11/07/18  0739 11/06/18  1514   SODIUM mmol/L 139 137   POTASSIUM mmol/L 3.7 2.7*   CHLORIDE mmol/L 106 95*   CO2 mmol/L 24.0 25.0   BUN mg/dL 7 14   CREATININE mg/dL 0.55 0.86   CALCIUM mg/dL 8.2* 9.8   BILIRUBIN mg/dL 0.3 0.4   ALK PHOS U/L 57 85   ALT (SGPT) U/L 16 <15   AST (SGOT) U/L 17 17   GLUCOSE mg/dL 81 117*       Culture Data:        Radiology Data:   Imaging Results (last 24 hours)     Procedure Component Value Units Date/Time    US Abdomen Limited [[id number]] Collected:  11/06/18 2110     Updated:  11/06/18 2114    Narrative:       US ABDOMEN LIMITED-11/6/2018 8:27 PM CST     REASON FOR EXAM: pain, dilated GB on CT       COMPARISON: NONE      TECHNIQUE: Multiple longitudinal and transverse realtime sonographic  images of the right upper quadrant of the abdomen are obtained.      FINDINGS:    Pancreas: Normal in size and echogenicity.      Liver: Normal in size, echogenicity and echotexture. No focal lesion.      Gallbladder: Dilated. Minimal biliary sludge. No pericholecystic fluid.      Bile ducts: The CBD measures 0.2 cm in diameter. There is no  intrahepatic or extrahepatic ductal dilation.      RIGHT kidney: Nonobstructive stone measuring 6 mm in diameter.       Other: The visualized abdominal aorta is normal in caliber.        Impression:          1. Dilated gallbladder with biliary sludge. No stones are identified.        This report was finalized on 11/06/2018 21:11 by Dr. Romie Murcia MD.    CT Abdomen Pelvis With Contrast [[id number]] Collected:  11/06/18 1902     Updated:  11/06/18 1907    Narrative:       CT ABDOMEN PELVIS W CONTRAST- 11/6/2018 6:37 PM CST     HISTORY: Abdominal pain and vomiting       COMPARISON: 10/14/2018.      DOSE LENGTH PRODUCT: 180 mGy cm. Automated exposure control was also  utilized to decrease patient radiation dose.     TECHNIQUE: Following the oral ingestion and intravenous  administration  of contrast, helical CT tomographic images of the abdomen and pelvis  were acquired. Multiplanar reformatted images were provided for review.      FINDINGS:   The lung bases and base of the heart are unremarkable.      LIVER: No focal liver lesion. The hepatic vasculature is patent.      BILIARY SYSTEM: The gallbladder is dilated up to 5 cm. There is no  pericholecystic fluid. No stones are identified. The biliary tree is not  dilated.      PANCREAS: No focal pancreatic lesion.      SPLEEN: Unremarkable.      KIDNEYS AND ADRENALS: Bilateral kidneys and adrenal glands are  unremarkable. The ureters are decompressed and normal in appearance.     RETROPERITONEUM: No mass, lymphadenopathy or hemorrhage.      GI TRACT: No evidence of obstruction or bowel wall thickening. The  appendix is not definitively visualized.     OTHER: There is no mesenteric mass, lymphadenopathy or fluid collection.  The abdominopelvic vasculature is patent. The osseous structures and  soft tissues demonstrate no worrisome lesions.          PELVIS: No mass lesion, fluid collection or significant lymphadenopathy  is seen in the pelvis. The urinary bladder is normal in appearance.       Impression:       1. Mild dilation of the gallbladder without other evidence of acute  cholecystitis.  2. No other findings of acute abdominopelvic process.         This report was finalized on 11/06/2018 19:04 by Dr. Romie Murcia MD.    XR Chest 1 View [[id number]] Collected:  11/06/18 1625     Updated:  11/06/18 1629    Narrative:       EXAMINATION: Chest 1 view 11/6/2018     HISTORY: Chest pain     FINDINGS: Today's exam is compared to previous study of 10/15/2018.  Lungs are clear with no evidence of acute consolidative pneumonia. There  is no effusion or free air present. A breast implant projects over the  left chest. The right breast implant is ruptured and collapsed.       Impression:       . No acute disease.  This report was finalized on  11/06/2018 16:26 by Dr. Jerry Land MD.          I have reviewed the patient's current medications.     Assessment/Plan     Assessment    Nausea with vomiting  Weight loss.   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   Diet gi bland no fat.   DC IVF  Bmp in AM    Discharge Planning: I expect the patient to be discharged to home in 1-2 days.    Salina Masters DO   11/07/18   1:59 PM

## 2018-11-07 NOTE — PLAN OF CARE
Problem: Patient Care Overview  Goal: Plan of Care Review  Outcome: Ongoing (interventions implemented as appropriate)   11/07/18 0500   Coping/Psychosocial   Plan of Care Reviewed With patient   Plan of Care Review   Progress no change   OTHER   Outcome Summary New admission from ER with N/V abdominal pain. IV fluids started at 100 cc/hr. Potassium and magnesium replaced. Zofran given for nausea. Cont to monitor. Recheck labs this AM.        Problem: Fall Risk (Adult)  Goal: Identify Related Risk Factors and Signs and Symptoms  Outcome: Ongoing (interventions implemented as appropriate)    Goal: Absence of Fall  Outcome: Ongoing (interventions implemented as appropriate)      Problem: Nausea/Vomiting (Adult)  Goal: Identify Related Risk Factors and Signs and Symptoms  Outcome: Ongoing (interventions implemented as appropriate)    Goal: Symptom Relief  Outcome: Ongoing (interventions implemented as appropriate)    Goal: Adequate Hydration  Outcome: Ongoing (interventions implemented as appropriate)      Problem: Pain, Acute (Adult)  Goal: Identify Related Risk Factors and Signs and Symptoms  Outcome: Ongoing (interventions implemented as appropriate)    Goal: Acceptable Pain Control/Comfort Level  Outcome: Ongoing (interventions implemented as appropriate)

## 2018-11-07 NOTE — PLAN OF CARE
Problem: Patient Care Overview  Goal: Plan of Care Review  Outcome: Ongoing (interventions implemented as appropriate)   11/07/18 1532   Coping/Psychosocial   Plan of Care Reviewed With patient   OTHER   Outcome Summary Pt reported vomiting has improved since admission, still expierencing nausea and unable to eat adequately. Evaluated for malnutrition. Suggested boost shakes. Will continue to monitor.       11/07/18 1532   Coping/Psychosocial   Plan of Care Reviewed With patient   OTHER   Outcome Summary Pt reported vomiting has improved since admission, still experiencing nausea and unable to eat adequately. Evaluated for malnutrition. Suggested boost shakes. Will continue to monitor.

## 2018-11-07 NOTE — H&P
[facility] Medicine Services  HISTORY AND PHYSICAL    Date of Admission: 2018  Primary Care Physician: Tong Toussaint MD    Subjective     Chief Complaint: Intractable nausea/vomiting    History of Present Illness       [age] female comes to the ER with complaints of intractable nausea/vomiting.  Patient states this started few days ago and it the last 2 days patient has not tolerated any food or water.  Today in the ER lab works noted for hypokalemia and hypomagnesemia.  Patient will be observed overnight for intractable nausea/vomiting.        Review of Systems     Otherwise complete ROS reviewed and negative except as mentioned in the HPI.    Past Medical History:   Past Medical History:   Diagnosis Date   • Acute kidney failure (CMS/HCC)    • Constipation    • Depression    • H/O gastric ulcer    • Headache    • History of transfusion    • Hypertension    • IBS (irritable bowel syndrome)    • Insomnia    • Kidney stone    • Maravilla maravilla disease    • Rapid weight loss    • SBO (small bowel obstruction) (CMS/HCC)    • Stroke (CMS/HCC)     X 2   • UTI (urinary tract infection)     CHRONIC, SEPTIC X2   • Volvulosis      Past Surgical History:  Past Surgical History:   Procedure Laterality Date   • APPENDECTOMY      COMPLICATION OF SEPTIC   • AUGMENTATION MAMMAPLASTY     • BRAIN SURGERY      x2   • BREAST LUMPECTOMY Left 3/9/2017    Procedure: EXCISION LEFT BREAST LESION AND LEFT AXILLARY LYMPH NODE BIOPSY;  Surgeon: Evi Farley MD;  Location: [facility] OR;  Service:    • BREAST SURGERY     •  SECTION     • COLON SURGERY     • COLONOSCOPY  2007    normal   • COLONOSCOPY N/A 11/10/2016    Procedure: COLONOSCOPY WITH ANESTHESIA;  Surgeon: Camilo Hanson MD;  Location: [facility] ENDOSCOPY;  Service:    • COLONOSCOPY N/A 2018    Procedure: COLONOSCOPY WITH ANESTHESIA;  Surgeon: Camilo Hanson MD;  Location: [facility] ENDOSCOPY;  Service:    • ENDOSCOPY   09/2009    antral ulcer   • ENDOSCOPY N/A 10/10/2016    Procedure: ESOPHAGOGASTRODUODENOSCOPY WITH ANESTHESIA;  Surgeon: Camilo Hanson MD;  Location: [facility] ENDOSCOPY;  Service:    • ENDOSCOPY N/A 1/25/2017    Procedure: ESOPHAGOGASTRODUODENOSCOPY;  Surgeon: Camilo Hanson MD;  Location: [facility] ENDOSCOPY;  Service:    • ENDOSCOPY N/A 8/8/2017    Procedure: ESOPHAGOGASTRODUODENOSCOPY WITH ANESTHESIA;  Surgeon: Camilo Hanson MD;  Location: [facility] ENDOSCOPY;  Service:    • ENDOSCOPY N/A 2/13/2018    Procedure: ESOPHAGOGASTRODUODENOSCOPY ;  Surgeon: Camilo Hanson MD;  Location: [facility] ENDOSCOPY;  Service:    • HERNIA REPAIR     • HYSTERECTOMY     • SMALL INTESTINE SURGERY N/A 03/2016   • TONSILLECTOMY       Social History:  reports that she quit smoking about 2 years ago. Her smoking use included Cigarettes and Electronic Cigarette. She has a 15.00 pack-year smoking history. She has never used smokeless tobacco. She reports that she drinks alcohol. She reports that she does not use drugs.    Family History: family history includes Breast cancer in her paternal grandmother; Cancer in her maternal grandfather; No Known Problems in her brother, brother, father, maternal aunt, maternal grandmother, mother, paternal aunt, sister, son, and son.       Allergies:  Allergies   Allergen Reactions   • Anectine [Succinylcholine Chloride] Other (See Comments)     Hard to wake up   • Stadol [Butorphanol] Hives   • Butorphanol Tartrate Rash     Pt states she does not recall being allergic     Medications:  Prior to Admission medications    Medication Sig Start Date End Date Taking? Authorizing Provider   acetaminophen-codeine (TYLENOL #3) 300-30 MG per tablet Take 1 tablet by mouth Every 4 (Four) Hours As Needed for Moderate Pain .    ProviderRamana MD   ALPRAZolam (XANAX) 0.5 MG tablet Take 0.5 mg by mouth 3 (Three) Times a Day As Needed for Anxiety.    Provider, MD Ramana   amitriptyline (ELAVIL) 50 MG  "tablet Every Night. 1/13/17   Ramana Tovar MD   aspirin 81 MG EC tablet Take 81 mg by mouth daily.    Ramana Tovar MD   dicyclomine (BENTYL) 10 MG capsule Take 1 capsule by mouth 4 (Four) Times a Day As Needed (prn abdominal cramps). 11/10/16   Camilo Hanson MD   fluticasone (FLONASE) 50 MCG/ACT nasal spray 2 sprays Daily. 1/23/17   Ramana Tovar MD   levETIRAcetam (KEPPRA) 250 MG tablet Take  by mouth 2 (Two) Times a Day.    Ramana Tovar MD   metoprolol succinate XL (TOPROL-XL) 100 MG 24 hr tablet Every Night. 1/23/17   Ramana Tovar MD   Multiple Vitamins-Minerals (MULTIVITAMIN AND MINERALS) liquid liquid Take 15 mL by mouth Daily. 10/17/18   Peter Calabrese MD   pantoprazole (PROTONIX) 40 MG EC tablet Take 1 tablet by mouth Daily. 7/20/18   Lyubov Nice APRN   promethazine (PHENERGAN) 25 MG suppository Insert 1 suppository into the rectum Every 6 (Six) Hours As Needed for Nausea or Vomiting. 10/16/18   Peter Calabrese MD   promethazine (PHENERGAN) 25 MG tablet Take 1 tablet by mouth Every 6 (Six) Hours As Needed for Nausea or Vomiting. 8/5/17   Inder Modi MD     Objective     Vital Signs: /83   Pulse 79   Temp 98.4 °F (36.9 °C)   Resp 16   Ht 171.5 cm (67.5\")   Wt 40.8 kg (90 lb)   LMP  (LMP Unknown)   SpO2 98%   BMI 13.89 kg/m²   Physical Exam   Constitutional: She appears well-developed.   HENT:   Head: Normocephalic.   Eyes: Pupils are equal, round, and reactive to light.   Neck: Normal range of motion.   Cardiovascular: Normal rate and regular rhythm.    Pulmonary/Chest: Effort normal and breath sounds normal.   Abdominal: Soft. Bowel sounds are normal.   Musculoskeletal: Normal range of motion.   Neurological: She is alert.   Skin: Skin is warm. Capillary refill takes less than 2 seconds.   Psychiatric: She has a normal mood and affect. Her behavior is normal.             Results Reviewed:  Lab Results (last 24 " hours)     Procedure Component Value Units Date/Time    Lactic Acid, Reflex [[id number]]  (Normal) Collected:  11/06/18 1954    Specimen:  Blood Updated:  11/06/18 2010     Lactate 0.6 mmol/L     Troponin [[id number]]  (Normal) Collected:  11/06/18 1917    Specimen:  Blood Updated:  11/06/18 1951     Troponin I <0.012 ng/mL     Lactic Acid, Reflex Timer (This will reflex a repeat order 3-3:15 hours after ordered.) [[id number]] Collected:  11/06/18 1510    Specimen:  Blood Updated:  11/06/18 1930     Extra Tube Hold for add-ons.     Comment: Auto resulted.       Urinalysis With Culture If Indicated - Urine, Clean Catch [[id number]]  (Normal) Collected:  11/06/18 1834    Specimen:  Urine from Urine, Clean Catch Updated:  11/06/18 1846     Color, UA Yellow     Appearance, UA Clear     pH, UA 6.0     Specific Gravity, UA 1.011     Glucose, UA Negative     Ketones, UA Negative     Bilirubin, UA Negative     Blood, UA Negative     Protein, UA Negative     Leuk Esterase, UA Negative     Nitrite, UA Negative     Urobilinogen, UA 0.2 E.U./dL    Narrative:       Urine microscopic not indicated.    Magnesium [[id number]]  (Abnormal) Collected:  11/06/18 1514    Specimen:  Blood Updated:  11/06/18 1800     Magnesium 1.2 (L) mg/dL     CBC & Differential [[id number]] Collected:  11/06/18 1514    Specimen:  Blood Updated:  11/06/18 1625    Narrative:       The following orders were created for panel order CBC & Differential.  Procedure                               Abnormality         Status                     ---------                               -----------         ------                     Manual Differential[[id number]]          Abnormal            Final result               CBC Auto Differential[[id number]]        Normal              Final result                 Please view results for these tests on the individual orders.    CBC Auto Differential [[id number]]  (Normal) Collected:  11/06/18 1514    Specimen:  Blood Updated:   11/06/18 1625     WBC 7.50 10*3/mm3      RBC 4.67 10*6/mm3      Hemoglobin 14.2 g/dL      Hematocrit 41.5 %      MCV 88.9 fL      MCH 30.4 pg      MCHC 34.2 g/dL      RDW 13.5 %      RDW-SD 43.8 fl      MPV 11.3 fL      Platelets 390 10*3/mm3     Manual Differential [[id number]]  (Abnormal) Collected:  11/06/18 1514    Specimen:  Blood Updated:  11/06/18 1625     Neutrophil % 40.3 %      Lymphocyte % 45.4 (H) %      Monocyte % 2.5 (L) %      Eosinophil % 1.7 %      Atypical Lymphocyte % 10.1 (H) %      Neutrophils Absolute 3.02 10*3/mm3      Lymphocytes Absolute 3.41 10*3/mm3      Monocytes Absolute 0.19 10*3/mm3      Eosinophils Absolute 0.13 10*3/mm3      RBC Morphology Normal     WBC Morphology Normal     Clumped Platelets Present     Giant Platelets Mod/2+    Protime-INR [[id number]]  (Abnormal) Collected:  11/06/18 1514    Specimen:  Blood Updated:  11/06/18 1624     Protime 12.1 Seconds      INR 0.87 (L)    aPTT [[id number]]  (Normal) Collected:  11/06/18 1514    Specimen:  Blood Updated:  11/06/18 1624     PTT 28.3 seconds     D-dimer, Quantitative [[id number]]  (Normal) Collected:  11/06/18 1514    Specimen:  Blood Updated:  11/06/18 1624     D-Dimer, Quantitative <0.22 mg/L (FEU)     Narrative:       Reference Range is 0-0.50 mg/L FEU. However, results <0.50 mg/L FEU tends to rule out DVT or PE. Results >0.50 mg/L FEU are not useful in predicting absence or presence of DVT or PE.    Lactic Acid, Plasma [[id number]]  (Abnormal) Collected:  11/06/18 1510    Specimen:  Blood Updated:  11/06/18 1623     Lactate 2.2 (C) mmol/L     Troponin [[id number]]  (Normal) Collected:  11/06/18 1514    Specimen:  Blood Updated:  11/06/18 1616     Troponin I <0.012 ng/mL     [place] Draw [[id number]] Collected:  11/06/18 1510    Specimen:  Blood Updated:  11/06/18 1615    Narrative:       The following orders were created for panel order [place] Draw.  Procedure                               Abnormality         Status                      ---------                               -----------         ------                     Light Blue Top[[id number]]                                   Final result               Green Top (Gel)[[id number]]                                  Final result               Lavender Top[[id number]]                                     Final result               Red Top[[id number]]                                          Final result               Blood Culture Bottle Set[[id number]]                         Final result                 Please view results for these tests on the individual orders.    Light Blue Top [[id number]] Collected:  11/06/18 1514    Specimen:  Blood Updated:  11/06/18 1615     Extra Tube hold for add-on     Comment: Auto resulted       Green Top (Gel) [[id number]] Collected:  11/06/18 1514    Specimen:  Blood Updated:  11/06/18 1615     Extra Tube Hold for add-ons.     Comment: Auto resulted.       Lavender Top [[id number]] Collected:  11/06/18 1514    Specimen:  Blood Updated:  11/06/18 1615     Extra Tube hold for add-on     Comment: Auto resulted       Red Top [[id number]] Collected:  11/06/18 1514    Specimen:  Blood Updated:  11/06/18 1615     Extra Tube Hold for add-ons.     Comment: Auto resulted.       Blood Culture Bottle Set [[id number]] Collected:  11/06/18 1510    Specimen:  Blood from Arm, Right Updated:  11/06/18 1615     Extra Tube Hold for add-ons.     Comment: Auto resulted.       [place] Draw [[id number]] Collected:  11/06/18 1510    Specimen:  Blood Updated:  11/06/18 1615    Narrative:       The following orders were created for panel order [place] Draw.  Procedure                               Abnormality         Status                     ---------                               -----------         ------                     Light Blue Top[[id number]]                                   Final result               Green Top (Gel)[[id number]]                                  Final result                Lavender Top[[id number]]                                     Final result               Red Top[[id number]]                                          Final result                 Please view results for these tests on the individual orders.    Light Blue Top [[id number]] Collected:  11/06/18 1510    Specimen:  Blood Updated:  11/06/18 1615     Extra Tube hold for add-on     Comment: Auto resulted       Green Top (Gel) [[id number]] Collected:  11/06/18 1510    Specimen:  Blood Updated:  11/06/18 1615     Extra Tube Hold for add-ons.     Comment: Auto resulted.       Lavender Top [[id number]] Collected:  11/06/18 1510    Specimen:  Blood Updated:  11/06/18 1615     Extra Tube hold for add-on     Comment: Auto resulted       Red Top [[id number]] Collected:  11/06/18 1510    Specimen:  Blood Updated:  11/06/18 1615     Extra Tube Hold for add-ons.     Comment: Auto resulted.       Comprehensive Metabolic Panel [[id number]]  (Abnormal) Collected:  11/06/18 1514    Specimen:  Blood Updated:  11/06/18 1606     Glucose 117 (H) mg/dL      BUN 14 mg/dL      Creatinine 0.86 mg/dL      Sodium 137 mmol/L      Potassium 2.7 (C) mmol/L      Chloride 95 (L) mmol/L      CO2 25.0 mmol/L      Calcium 9.8 mg/dL      Total Protein 7.4 g/dL      Albumin 4.30 g/dL      ALT (SGPT) <15 U/L      AST (SGOT) 17 U/L      Alkaline Phosphatase 85 U/L      Total Bilirubin 0.4 mg/dL      eGFR Non African Amer 71 mL/min/1.73      Globulin 3.1 gm/dL      A/G Ratio 1.4 g/dL      BUN/Creatinine Ratio 16.3     Anion Gap 17.0 (H) mmol/L     Lipase [[id number]]  (Normal) Collected:  11/06/18 1514    Specimen:  Blood Updated:  11/06/18 1604     Lipase 62 U/L         Imaging Results (last 24 hours)     Procedure Component Value Units Date/Time    US Abdomen Limited [[id number]] Collected:  11/06/18 2110     Updated:  11/06/18 2114    Narrative:       US ABDOMEN LIMITED-11/6/2018 8:27 PM CST     REASON FOR EXAM: pain, dilated GB on CT       COMPARISON: NONE       TECHNIQUE: Multiple longitudinal and transverse realtime sonographic  images of the right upper quadrant of the abdomen are obtained.      FINDINGS:    Pancreas: Normal in size and echogenicity.      Liver: Normal in size, echogenicity and echotexture. No focal lesion.      Gallbladder: Dilated. Minimal biliary sludge. No pericholecystic fluid.      Bile ducts: The CBD measures 0.2 cm in diameter. There is no  intrahepatic or extrahepatic ductal dilation.      RIGHT kidney: Nonobstructive stone measuring 6 mm in diameter.       Other: The visualized abdominal aorta is normal in caliber.        Impression:          1. Dilated gallbladder with biliary sludge. No stones are identified.        This report was finalized on 11/06/2018 21:11 by Dr. Romie Murcia MD.    CT Abdomen Pelvis With Contrast [[id number]] Collected:  11/06/18 1902     Updated:  11/06/18 1907    Narrative:       CT ABDOMEN PELVIS W CONTRAST- 11/6/2018 6:37 PM CST     HISTORY: Abdominal pain and vomiting       COMPARISON: 10/14/2018.      DOSE LENGTH PRODUCT: 180 mGy cm. Automated exposure control was also  utilized to decrease patient radiation dose.     TECHNIQUE: Following the oral ingestion and intravenous administration  of contrast, helical CT tomographic images of the abdomen and pelvis  were acquired. Multiplanar reformatted images were provided for review.      FINDINGS:   The lung bases and base of the heart are unremarkable.      LIVER: No focal liver lesion. The hepatic vasculature is patent.      BILIARY SYSTEM: The gallbladder is dilated up to 5 cm. There is no  pericholecystic fluid. No stones are identified. The biliary tree is not  dilated.      PANCREAS: No focal pancreatic lesion.      SPLEEN: Unremarkable.      KIDNEYS AND ADRENALS: Bilateral kidneys and adrenal glands are  unremarkable. The ureters are decompressed and normal in appearance.     RETROPERITONEUM: No mass, lymphadenopathy or hemorrhage.      GI TRACT: No evidence  of obstruction or bowel wall thickening. The  appendix is not definitively visualized.     OTHER: There is no mesenteric mass, lymphadenopathy or fluid collection.  The abdominopelvic vasculature is patent. The osseous structures and  soft tissues demonstrate no worrisome lesions.          PELVIS: No mass lesion, fluid collection or significant lymphadenopathy  is seen in the pelvis. The urinary bladder is normal in appearance.       Impression:       1. Mild dilation of the gallbladder without other evidence of acute  cholecystitis.  2. No other findings of acute abdominopelvic process.         This report was finalized on 11/06/2018 19:04 by Dr. Romie Murcia MD.    XR Chest 1 View [[id number]] Collected:  11/06/18 1625     Updated:  11/06/18 1629    Narrative:       EXAMINATION: Chest 1 view 11/6/2018     HISTORY: Chest pain     FINDINGS: Today's exam is compared to previous study of 10/15/2018.  Lungs are clear with no evidence of acute consolidative pneumonia. There  is no effusion or free air present. A breast implant projects over the  left chest. The right breast implant is ruptured and collapsed.       Impression:       . No acute disease.  This report was finalized on 11/06/2018 16:26 by Dr. Jerry Land MD.        I have personally reviewed and interpreted the radiology studies and ECG obtained at time of admission.     Assessment / Plan     Assessment:   Active Hospital Problems    Diagnosis   • Hypomagnesemia     1.  Intractable nausea/vomiting  2.  Hypomagnesemia  3.  Hypokalemia    Plan:      -Admitted for observation  -Continue antiemetics  -monitor vitals  -monitor electrolytes  -Replete electrolytes as needed  -Follow-up a.m. Labs  -GI prophylaxis   -DVT prophylaxis        Code Status: Full code     I discussed the patient's findings and my recommendations with the patient's RN    Estimated length of stay 2-3 days    Jesus Saravia MD   11/06/18   10:21 PM

## 2018-11-07 NOTE — PLAN OF CARE
Problem: Patient Care Overview  Goal: Plan of Care Review  Outcome: Ongoing (interventions implemented as appropriate)   11/07/18 5435   Coping/Psychosocial   Plan of Care Reviewed With patient   Plan of Care Review   Progress no change   OTHER   Outcome Summary Patient c/o Nausea and abdominal cramping; PRN medication as prescribed. VSS. Safety Maintained. Will continue to monitor.        Problem: Fall Risk (Adult)  Goal: Absence of Fall  Outcome: Ongoing (interventions implemented as appropriate)      Problem: Nausea/Vomiting (Adult)  Goal: Symptom Relief  Outcome: Ongoing (interventions implemented as appropriate)    Goal: Adequate Hydration  Outcome: Ongoing (interventions implemented as appropriate)      Problem: Pain, Acute (Adult)  Goal: Acceptable Pain Control/Comfort Level  Outcome: Ongoing (interventions implemented as appropriate)

## 2018-11-07 NOTE — ED PROVIDER NOTES
[facility]  emergency department encounter      Pt Name: Kamryn Oliva  MRN: [id number]  Birthdate 1970  Date of evaluation: 2018      CHIEF COMPLAINT       Chief Complaint   Patient presents with   • Nausea   • Vomiting       Nurses Notes reviewed and I agree except as noted in the HPI.      HISTORY OF PRESENT ILLNESS    Kamryn Oliva is a 48 y.o. female who presents to the emergency department complaining of 2 weeks of nausea, vomiting, chest pain, shortness of breath, diarrhea, generalized weakness.  She notes that she has had a MAXIMUM TEMPERATURE of 99°F today.  She has lost 12 pounds in the past 2 weeks without trying.  She notes that she sees Dr. Toussaint for iron deficiency anemia.  She also has a history of gastroparesis, moyamoya, CKD, small bowel obstruction, hysterectomy, appendectomy.  She has a history of hypertension and her uncle passed away of a myocardial infarction at the age of 55.      REVIEW OF SYSTEMS     All systems reviewed and otherwise negative except as listed above in HPI      PAST MEDICAL HISTORY     Past Medical History:   Diagnosis Date   • Acute kidney failure (CMS/HCC)    • Constipation    • Depression    • H/O gastric ulcer    • Headache    • History of transfusion    • Hypertension    • IBS (irritable bowel syndrome)    • Insomnia    • Kidney stone    • Maravilla maravilla disease    • Rapid weight loss    • SBO (small bowel obstruction) (CMS/HCC)    • Stroke (CMS/HCC)     X 2   • UTI (urinary tract infection)     CHRONIC, SEPTIC X2   • Volvulosis        SURGICAL HISTORY      has a past surgical history that includes  section; Breast surgery; Brain surgery; Hysterectomy; Tonsillectomy; Hernia repair; Esophagogastroduodenoscopy (2009); Colonoscopy (2007); Esophagogastroduodenoscopy (N/A, 10/10/2016); Colonoscopy (N/A, 11/10/2016); Small intestine surgery (N/A, 2016); Esophagogastroduodenoscopy (N/A, 2017); Colon surgery; Appendectomy;  Breast lumpectomy (Left, 3/9/2017); Esophagogastroduodenoscopy (N/A, 8/8/2017); Colonoscopy (N/A, 2/2/2018); Augmentation mammaplasty; and Esophagogastroduodenoscopy (N/A, 2/13/2018).    CURRENT MEDICATIONS        Medication List      ASK your doctor about these medications    acetaminophen-codeine 300-30 MG per tablet  Commonly known as:  TYLENOL #3     ALPRAZolam 0.5 MG tablet  Commonly known as:  XANAX     amitriptyline 50 MG tablet  Commonly known as:  ELAVIL     aspirin 81 MG EC tablet     dicyclomine 10 MG capsule  Commonly known as:  BENTYL  Take 1 capsule by mouth 4 (Four) Times a Day As Needed (prn abdominal   cramps).     fluticasone 50 MCG/ACT nasal spray  Commonly known as:  FLONASE     levETIRAcetam 250 MG tablet  Commonly known as:  KEPPRA     metoprolol succinate  MG 24 hr tablet  Commonly known as:  TOPROL-XL     multivitamin and minerals liquid liquid  Take 15 mL by mouth Daily.     pantoprazole 40 MG EC tablet  Commonly known as:  PROTONIX  Take 1 tablet by mouth Daily.     * promethazine 25 MG tablet  Commonly known as:  PHENERGAN  Take 1 tablet by mouth Every 6 (Six) Hours As Needed for Nausea or   Vomiting.     * promethazine 25 MG suppository  Commonly known as:  PHENERGAN  Insert 1 suppository into the rectum Every 6 (Six) Hours As Needed for   Nausea or Vomiting.        * This list has 2 medication(s) that are the same as other medications   prescribed for you. Read the directions carefully, and ask your doctor or   other care provider to review them with you.              ALLERGIES     is allergic to anectine [succinylcholine chloride]; stadol [butorphanol]; and butorphanol tartrate.    FAMILY HISTORY     indicated that her mother is alive. She indicated that her father is alive. She indicated that her sister is alive. She indicated that both of her brothers are alive. She indicated that the status of her maternal grandmother is unknown. She indicated that her maternal grandfather is  ". She indicated that her paternal grandmother is . She indicated that both of her sons are alive. She indicated that the status of her maternal aunt is unknown. She indicated that the status of her paternal aunt is unknown. She indicated that the status of her neg hx is unknown.    family history includes Breast cancer in her paternal grandmother; Cancer in her maternal grandfather; No Known Problems in her brother, brother, father, maternal aunt, maternal grandmother, mother, paternal aunt, sister, son, and son.    SOCIAL HISTORY      reports that she quit smoking about 2 years ago. Her smoking use included Cigarettes and Electronic Cigarette. She has a 15.00 pack-year smoking history. She has never used smokeless tobacco. She reports that she drinks alcohol. She reports that she does not use drugs.    PHYSICAL EXAM     INITIAL VITALS:  height is 171.5 cm (67.5\") and weight is 40.8 kg (90 lb). Her oral temperature is 98.4 °F (36.9 °C). Her blood pressure is 131/97 and her pulse is 109. Her respiration is 17 and oxygen saturation is 100%.    Physical Exam    CONSTITUTIONAL: Uncomfortable appearing, malnourished, not diaphoretic, moderately distressed  HENT: Normocephalic, atraumatic, oropharynx clear and dry  EYES: PERRL, EOM normal, no discharge, no scleral icterus  NECK: ROM normal, supple, no tracheal deviation nor JVD, no stridor  CARDIOVASCULAR: Normal rate and rhythm, heart sounds normal, no rub no gallop, intact distal pulses, normal cap refill  PULMONARY: Normal effort and breath sounds, no distress, no wheezes, rhonchi or rales, no chest tenderness  ABDOMINAL: Soft, RUQ moderately tender, no guarding, no mass, no rebound, no hernia  GENITOURINARY/ANORECTAL: deferred  MUSCULOSKELETAL: ROM normal, no tenderness nor deformity, no edema  NEUROLOGICAL: Alert, oriented x 3, normal tone, sensation normal  SKIN: Warm, dry, no erythema, no rash, normal color  PSYCH: Mood and affect normal, behavior " normal, thought content and judgement normal.    DIAGNOSTIC RESULTS     EKG: All EKG's are interpreted by the Emergency Department Physician who either signs or Co-signs this chart in the absence of a cardiologist.  EKG performed at 1621 shows normal sinus rhythm with a rate of 81 bpm, normal axis, prolonged QT normal of 501 ms, nonspecific ST segment T-wave    EKG performed at 1939 shows normal sinus rhythm with a rate of 81 bpm, low voltage QRS, normal axis, nonspecific ST segments and T waves    RADIOLOGY: non-plain film images(s) such as CT, Ultrasound and MRI are read by the radiologist.  Plain radiographic images are visualized and preliminarily interpreted by the emergency physician unless otherwise stated below.  Ct Abdomen Pelvis With Contrast    Result Date: 11/6/2018  1. Mild dilation of the gallbladder without other evidence of acute cholecystitis. 2. No other findings of acute abdominopelvic process.   This report was finalized on 11/06/2018 19:04 by Dr. Romie Murcia MD.    Xr Chest 1 View    Result Date: 11/6/2018  . No acute disease. This report was finalized on 11/06/2018 16:26 by Dr. Jerry Land MD.    Us Abdomen Limited    Result Date: 11/6/2018   1. Dilated gallbladder with biliary sludge. No stones are identified.   This report was finalized on 11/06/2018 21:11 by Dr. Romie Murcia MD.             LABS:   Lab Results (last 24 hours)     Procedure Component Value Units Date/Time    Blood Culture Bottle Set [[id number]] Collected:  11/06/18 1510    Specimen:  Blood from Arm, Right Updated:  11/06/18 1615     Extra Tube Hold for add-ons.     Comment: Auto resulted.       Lactic Acid, Plasma [[id number]]  (Abnormal) Collected:  11/06/18 1510    Specimen:  Blood Updated:  11/06/18 1623     Lactate 2.2 (C) mmol/L     Lactic Acid, Reflex Timer (This will reflex a repeat order 3-3:15 hours after ordered.) [[id number]] Collected:  11/06/18 1510    Specimen:  Blood Updated:  11/06/18 1930     Extra  Tube Hold for add-ons.     Comment: Auto resulted.       CBC & Differential [[id number]] Collected:  11/06/18 1514    Specimen:  Blood Updated:  11/06/18 1625    Narrative:       The following orders were created for panel order CBC & Differential.  Procedure                               Abnormality         Status                     ---------                               -----------         ------                     Manual Differential[[id number]]          Abnormal            Final result               CBC Auto Differential[[id number]]        Normal              Final result                 Please view results for these tests on the individual orders.    Comprehensive Metabolic Panel [[id number]]  (Abnormal) Collected:  11/06/18 1514    Specimen:  Blood Updated:  11/06/18 1606     Glucose 117 (H) mg/dL      BUN 14 mg/dL      Creatinine 0.86 mg/dL      Sodium 137 mmol/L      Potassium 2.7 (C) mmol/L      Chloride 95 (L) mmol/L      CO2 25.0 mmol/L      Calcium 9.8 mg/dL      Total Protein 7.4 g/dL      Albumin 4.30 g/dL      ALT (SGPT) <15 U/L      AST (SGOT) 17 U/L      Alkaline Phosphatase 85 U/L      Total Bilirubin 0.4 mg/dL      eGFR Non African Amer 71 mL/min/1.73      Globulin 3.1 gm/dL      A/G Ratio 1.4 g/dL      BUN/Creatinine Ratio 16.3     Anion Gap 17.0 (H) mmol/L     Troponin [[id number]]  (Normal) Collected:  11/06/18 1514    Specimen:  Blood Updated:  11/06/18 1616     Troponin I <0.012 ng/mL     Protime-INR [[id number]]  (Abnormal) Collected:  11/06/18 1514    Specimen:  Blood Updated:  11/06/18 1624     Protime 12.1 Seconds      INR 0.87 (L)    Lipase [[id number]]  (Normal) Collected:  11/06/18 1514    Specimen:  Blood Updated:  11/06/18 1604     Lipase 62 U/L     aPTT [[id number]]  (Normal) Collected:  11/06/18 1514    Specimen:  Blood Updated:  11/06/18 1624     PTT 28.3 seconds     D-dimer, Quantitative [[id number]]  (Normal) Collected:  11/06/18 1514    Specimen:  Blood Updated:  11/06/18 1624      D-Dimer, Quantitative <0.22 mg/L (FEU)     Narrative:       Reference Range is 0-0.50 mg/L FEU. However, results <0.50 mg/L FEU tends to rule out DVT or PE. Results >0.50 mg/L FEU are not useful in predicting absence or presence of DVT or PE.    CBC Auto Differential [[id number]]  (Normal) Collected:  11/06/18 1514    Specimen:  Blood Updated:  11/06/18 1625     WBC 7.50 10*3/mm3      RBC 4.67 10*6/mm3      Hemoglobin 14.2 g/dL      Hematocrit 41.5 %      MCV 88.9 fL      MCH 30.4 pg      MCHC 34.2 g/dL      RDW 13.5 %      RDW-SD 43.8 fl      MPV 11.3 fL      Platelets 390 10*3/mm3     Manual Differential [[id number]]  (Abnormal) Collected:  11/06/18 1514    Specimen:  Blood Updated:  11/06/18 1625     Neutrophil % 40.3 %      Lymphocyte % 45.4 (H) %      Monocyte % 2.5 (L) %      Eosinophil % 1.7 %      Atypical Lymphocyte % 10.1 (H) %      Neutrophils Absolute 3.02 10*3/mm3      Lymphocytes Absolute 3.41 10*3/mm3      Monocytes Absolute 0.19 10*3/mm3      Eosinophils Absolute 0.13 10*3/mm3      RBC Morphology Normal     WBC Morphology Normal     Clumped Platelets Present     Giant Platelets Mod/2+    Magnesium [[id number]]  (Abnormal) Collected:  11/06/18 1514    Specimen:  Blood Updated:  11/06/18 1800     Magnesium 1.2 (L) mg/dL     Urinalysis With Culture If Indicated - Urine, Clean Catch [[id number]]  (Normal) Collected:  11/06/18 1834    Specimen:  Urine from Urine, Clean Catch Updated:  11/06/18 1846     Color, UA Yellow     Appearance, UA Clear     pH, UA 6.0     Specific Gravity, UA 1.011     Glucose, UA Negative     Ketones, UA Negative     Bilirubin, UA Negative     Blood, UA Negative     Protein, UA Negative     Leuk Esterase, UA Negative     Nitrite, UA Negative     Urobilinogen, UA 0.2 E.U./dL    Narrative:       Urine microscopic not indicated.    Troponin [[id number]]  (Normal) Collected:  11/06/18 1917    Specimen:  Blood Updated:  11/06/18 1951     Troponin I <0.012 ng/mL     Lactic Acid, Reflex  [[id number]]  (Normal) Collected:  11/06/18 1954    Specimen:  Blood Updated:  11/06/18 2010     Lactate 0.6 mmol/L           EMERGENCY DEPARTMENT COURSE:   Vitals:    Vitals:    11/06/18 1638 11/06/18 1701 11/06/18 1730 11/06/18 1741   BP: 124/95 (!) 143/108 131/97 131/97   Pulse: 75 89 80 109   Resp:    17   Temp:       TempSrc:       SpO2: 100% 100% 97% 100%   Weight:       Height:           The patient was given the following medications:  Medications   sodium chloride 0.9 % flush 10 mL (not administered)   ondansetron (ZOFRAN) injection 4 mg (not administered)   Morphine injection 4 mg (not administered)   sodium chloride 0.9 % bolus 1,000 mL (0 mL Intravenous Stopped 11/6/18 1653)   Morphine injection 4 mg (4 mg Intravenous Given 11/6/18 1600)   ondansetron (ZOFRAN) injection 4 mg (4 mg Intravenous Given 11/6/18 1600)   iohexol (OMNIPAQUE) 300 MG/ML injection 50 mL (50 mL Oral Given 11/6/18 1609)   potassium chloride 20 mEq in 100 mL IVPB (0 mEq Intravenous Stopped 11/6/18 1819)   HYDROmorphone (DILAUDID) injection 1 mg (1 mg Intravenous Given 11/6/18 1700)   sodium chloride 0.9 % bolus 1,000 mL (0 mL Intravenous Stopped 11/6/18 1923)   magnesium sulfate in D5W 1g/100mL (PREMIX) (1 g Intravenous New Bag 11/6/18 1821)   iopamidol (ISOVUE-300) 61 % injection 100 mL (85 mL Intravenous Given 11/6/18 1843)   HYDROmorphone (DILAUDID) injection 1 mg (1 mg Intravenous Given 11/6/18 1920)            CRITICAL CARE:  none    CONSULTS:  none    PROCEDURES:  Procedures        Patient presents to the emergency department with with nausea, vomiting, chest pain, shortness of breath, diarrhea, denies weakness.  She is malnourished appearing and in moderate distress.  Raise and right upper quadrant tenderness.  CT scan of the abdomen and pelvis shows mildly dilated gallbladder.  Chest x-ray is unremarkable.  Ultrasound showed biliary sludge.  EKG ×2 are unremarkable.  Labs show a lactic acid 2.2, potassium 2.7, magnesium 1.2.   Patient received multiple doses of pain medication as well as also receiving IV fluids, anti-emetics, IV magnesium, and IV potassium.  She still felt weak and nauseated.  I spoke to Dr. Saravia who agrees to admit the patient to the hospital.  Patient stable at time of admission and agreeable with plan of care.    FINAL IMPRESSION      1. Hypomagnesemia    2. Hypokalemia    3. Abdominal pain, generalized    4. Sludge in gallbladder          DISPOSITION/PLAN   Admit      PATIENT REFERRED TO:  No follow-up provider specified.    DISCHARGE MEDICATIONS:     Medication List      ASK your doctor about these medications    acetaminophen-codeine 300-30 MG per tablet  Commonly known as:  TYLENOL #3     ALPRAZolam 0.5 MG tablet  Commonly known as:  XANAX     amitriptyline 50 MG tablet  Commonly known as:  ELAVIL     aspirin 81 MG EC tablet     dicyclomine 10 MG capsule  Commonly known as:  BENTYL  Take 1 capsule by mouth 4 (Four) Times a Day As Needed (prn abdominal   cramps).     fluticasone 50 MCG/ACT nasal spray  Commonly known as:  FLONASE     levETIRAcetam 250 MG tablet  Commonly known as:  KEPPRA     metoprolol succinate  MG 24 hr tablet  Commonly known as:  TOPROL-XL     multivitamin and minerals liquid liquid  Take 15 mL by mouth Daily.     pantoprazole 40 MG EC tablet  Commonly known as:  PROTONIX  Take 1 tablet by mouth Daily.     * promethazine 25 MG tablet  Commonly known as:  PHENERGAN  Take 1 tablet by mouth Every 6 (Six) Hours As Needed for Nausea or   Vomiting.     * promethazine 25 MG suppository  Commonly known as:  PHENERGAN  Insert 1 suppository into the rectum Every 6 (Six) Hours As Needed for   Nausea or Vomiting.        * This list has 2 medication(s) that are the same as other medications   prescribed for you. Read the directions carefully, and ask your doctor or   other care provider to review them with you.              (Please note that portions of this note were completed with a voice  recognition program.)    DO Pierre Cortez Jason Paul, DO  11/06/18 9091

## 2018-11-08 ENCOUNTER — ANESTHESIA EVENT (OUTPATIENT)
Dept: PERIOP | Facility: HOSPITAL | Age: 48
End: 2018-11-08

## 2018-11-08 ENCOUNTER — APPOINTMENT (OUTPATIENT)
Dept: GENERAL RADIOLOGY | Facility: HOSPITAL | Age: 48
End: 2018-11-08

## 2018-11-08 ENCOUNTER — ANESTHESIA (OUTPATIENT)
Dept: PERIOP | Facility: HOSPITAL | Age: 48
End: 2018-11-08

## 2018-11-08 LAB
ALBUMIN SERPL-MCNC: 3.6 G/DL (ref 3.5–5)
ALBUMIN/GLOB SERPL: 1.6 G/DL (ref 1.1–2.5)
ALP SERPL-CCNC: 87 U/L (ref 24–120)
ALT SERPL W P-5'-P-CCNC: 19 U/L (ref 0–54)
ANION GAP SERPL CALCULATED.3IONS-SCNC: 9 MMOL/L (ref 4–13)
AST SERPL-CCNC: 19 U/L (ref 7–45)
BILIRUB SERPL-MCNC: 0.3 MG/DL (ref 0.1–1)
BUN BLD-MCNC: 2 MG/DL (ref 5–21)
BUN/CREAT SERPL: 3.5 (ref 7–25)
CALCIUM SPEC-SCNC: 8.9 MG/DL (ref 8.4–10.4)
CHLORIDE SERPL-SCNC: 101 MMOL/L (ref 98–110)
CO2 SERPL-SCNC: 33 MMOL/L (ref 24–31)
CREAT BLD-MCNC: 0.57 MG/DL (ref 0.5–1.4)
DEPRECATED RDW RBC AUTO: 44.4 FL (ref 40–54)
ERYTHROCYTE [DISTWIDTH] IN BLOOD BY AUTOMATED COUNT: 13.6 % (ref 12–15)
ERYTHROCYTE [SEDIMENTATION RATE] IN BLOOD: 1 MM/HR (ref 0–20)
GFR SERPL CREATININE-BSD FRML MDRD: 114 ML/MIN/1.73
GLOBULIN UR ELPH-MCNC: 2.3 GM/DL
GLUCOSE BLD-MCNC: 81 MG/DL (ref 70–100)
HCT VFR BLD AUTO: 34.8 % (ref 37–47)
HGB BLD-MCNC: 12 G/DL (ref 12–16)
MCH RBC QN AUTO: 31 PG (ref 28–32)
MCHC RBC AUTO-ENTMCNC: 34.5 G/DL (ref 33–36)
MCV RBC AUTO: 89.9 FL (ref 82–98)
PLATELET # BLD AUTO: 314 10*3/MM3 (ref 130–400)
PMV BLD AUTO: 11.6 FL (ref 6–12)
POTASSIUM BLD-SCNC: 3.6 MMOL/L (ref 3.5–5.3)
PROT SERPL-MCNC: 5.9 G/DL (ref 6.3–8.7)
RBC # BLD AUTO: 3.87 10*6/MM3 (ref 4.2–5.4)
SODIUM BLD-SCNC: 143 MMOL/L (ref 135–145)
WBC NRBC COR # BLD: 8.47 10*3/MM3 (ref 4.8–10.8)

## 2018-11-08 PROCEDURE — 76000 FLUOROSCOPY <1 HR PHYS/QHP: CPT

## 2018-11-08 PROCEDURE — 85651 RBC SED RATE NONAUTOMATED: CPT | Performed by: SPECIALIST

## 2018-11-08 PROCEDURE — 25010000002 IOPAMIDOL 61 % SOLUTION: Performed by: SPECIALIST

## 2018-11-08 PROCEDURE — 85027 COMPLETE CBC AUTOMATED: CPT | Performed by: SPECIALIST

## 2018-11-08 PROCEDURE — 0FT44ZZ RESECTION OF GALLBLADDER, PERCUTANEOUS ENDOSCOPIC APPROACH: ICD-10-PCS | Performed by: SPECIALIST

## 2018-11-08 PROCEDURE — C1726 CATH, BAL DIL, NON-VASCULAR: HCPCS | Performed by: SPECIALIST

## 2018-11-08 PROCEDURE — 25010000002 MIDAZOLAM PER 1 MG: Performed by: ANESTHESIOLOGY

## 2018-11-08 PROCEDURE — 94799 UNLISTED PULMONARY SVC/PX: CPT

## 2018-11-08 PROCEDURE — 25010000002 CEFOXITIN PER 1 G: Performed by: SPECIALIST

## 2018-11-08 PROCEDURE — 25010000002 DEXAMETHASONE PER 1 MG: Performed by: NURSE ANESTHETIST, CERTIFIED REGISTERED

## 2018-11-08 PROCEDURE — BF101ZZ FLUOROSCOPY OF BILE DUCTS USING LOW OSMOLAR CONTRAST: ICD-10-PCS | Performed by: SPECIALIST

## 2018-11-08 PROCEDURE — 80053 COMPREHEN METABOLIC PANEL: CPT | Performed by: SPECIALIST

## 2018-11-08 PROCEDURE — 25010000002 ONDANSETRON PER 1 MG: Performed by: ANESTHESIOLOGY

## 2018-11-08 PROCEDURE — 25010000002 PROPOFOL 10 MG/ML EMULSION: Performed by: NURSE ANESTHETIST, CERTIFIED REGISTERED

## 2018-11-08 PROCEDURE — 74300 X-RAY BILE DUCTS/PANCREAS: CPT

## 2018-11-08 PROCEDURE — 25010000002 FENTANYL CITRATE (PF) 250 MCG/5ML SOLUTION: Performed by: NURSE ANESTHETIST, CERTIFIED REGISTERED

## 2018-11-08 PROCEDURE — 25010000002 CEFOXITIN PER 1 G: Performed by: NURSE ANESTHETIST, CERTIFIED REGISTERED

## 2018-11-08 PROCEDURE — 25010000002 FENTANYL CITRATE (PF) 100 MCG/2ML SOLUTION: Performed by: ANESTHESIOLOGY

## 2018-11-08 PROCEDURE — 88304 TISSUE EXAM BY PATHOLOGIST: CPT | Performed by: SPECIALIST

## 2018-11-08 RX ORDER — DEXAMETHASONE SODIUM PHOSPHATE 4 MG/ML
INJECTION, SOLUTION INTRA-ARTICULAR; INTRALESIONAL; INTRAMUSCULAR; INTRAVENOUS; SOFT TISSUE AS NEEDED
Status: DISCONTINUED | OUTPATIENT
Start: 2018-11-08 | End: 2018-11-08 | Stop reason: SURG

## 2018-11-08 RX ORDER — LABETALOL HYDROCHLORIDE 5 MG/ML
5 INJECTION, SOLUTION INTRAVENOUS
Status: DISCONTINUED | OUTPATIENT
Start: 2018-11-08 | End: 2018-11-08 | Stop reason: HOSPADM

## 2018-11-08 RX ORDER — CLONIDINE HYDROCHLORIDE 0.1 MG/1
0.1 TABLET ORAL EVERY 4 HOURS PRN
Status: DISCONTINUED | OUTPATIENT
Start: 2018-11-08 | End: 2018-11-10 | Stop reason: HOSPADM

## 2018-11-08 RX ORDER — FENTANYL CITRATE 50 UG/ML
INJECTION, SOLUTION INTRAMUSCULAR; INTRAVENOUS AS NEEDED
Status: DISCONTINUED | OUTPATIENT
Start: 2018-11-08 | End: 2018-11-08 | Stop reason: SURG

## 2018-11-08 RX ORDER — BUPIVACAINE HYDROCHLORIDE AND EPINEPHRINE 5; 5 MG/ML; UG/ML
INJECTION, SOLUTION PERINEURAL AS NEEDED
Status: DISCONTINUED | OUTPATIENT
Start: 2018-11-08 | End: 2018-11-08 | Stop reason: HOSPADM

## 2018-11-08 RX ORDER — FLUMAZENIL 0.1 MG/ML
0.2 INJECTION INTRAVENOUS AS NEEDED
Status: DISCONTINUED | OUTPATIENT
Start: 2018-11-08 | End: 2018-11-08 | Stop reason: HOSPADM

## 2018-11-08 RX ORDER — FENTANYL CITRATE 50 UG/ML
25 INJECTION, SOLUTION INTRAMUSCULAR; INTRAVENOUS AS NEEDED
Status: DISCONTINUED | OUTPATIENT
Start: 2018-11-08 | End: 2018-11-08 | Stop reason: HOSPADM

## 2018-11-08 RX ORDER — MEPERIDINE HYDROCHLORIDE 25 MG/ML
12.5 INJECTION INTRAMUSCULAR; INTRAVENOUS; SUBCUTANEOUS
Status: DISCONTINUED | OUTPATIENT
Start: 2018-11-08 | End: 2018-11-08 | Stop reason: HOSPADM

## 2018-11-08 RX ORDER — MEGESTROL ACETATE 40 MG/1
40 TABLET ORAL DAILY
Status: DISCONTINUED | OUTPATIENT
Start: 2018-11-08 | End: 2018-11-10 | Stop reason: HOSPADM

## 2018-11-08 RX ORDER — PROPOFOL 10 MG/ML
VIAL (ML) INTRAVENOUS AS NEEDED
Status: DISCONTINUED | OUTPATIENT
Start: 2018-11-08 | End: 2018-11-08 | Stop reason: SURG

## 2018-11-08 RX ORDER — SODIUM CHLORIDE 0.9 % (FLUSH) 0.9 %
1-10 SYRINGE (ML) INJECTION AS NEEDED
Status: DISCONTINUED | OUTPATIENT
Start: 2018-11-08 | End: 2018-11-08 | Stop reason: HOSPADM

## 2018-11-08 RX ORDER — FENTANYL CITRATE 50 UG/ML
25 INJECTION, SOLUTION INTRAMUSCULAR; INTRAVENOUS
Status: DISCONTINUED | OUTPATIENT
Start: 2018-11-08 | End: 2018-11-08 | Stop reason: HOSPADM

## 2018-11-08 RX ORDER — METOCLOPRAMIDE HYDROCHLORIDE 5 MG/ML
5 INJECTION INTRAMUSCULAR; INTRAVENOUS
Status: DISCONTINUED | OUTPATIENT
Start: 2018-11-08 | End: 2018-11-08 | Stop reason: HOSPADM

## 2018-11-08 RX ORDER — ONDANSETRON 2 MG/ML
4 INJECTION INTRAMUSCULAR; INTRAVENOUS AS NEEDED
Status: DISCONTINUED | OUTPATIENT
Start: 2018-11-08 | End: 2018-11-08 | Stop reason: HOSPADM

## 2018-11-08 RX ORDER — HYDRALAZINE HYDROCHLORIDE 20 MG/ML
5 INJECTION INTRAMUSCULAR; INTRAVENOUS
Status: DISCONTINUED | OUTPATIENT
Start: 2018-11-08 | End: 2018-11-08 | Stop reason: HOSPADM

## 2018-11-08 RX ORDER — AMLODIPINE BESYLATE 10 MG/1
10 TABLET ORAL DAILY
Status: DISCONTINUED | OUTPATIENT
Start: 2018-11-08 | End: 2018-11-10 | Stop reason: HOSPADM

## 2018-11-08 RX ORDER — PANTOPRAZOLE SODIUM 40 MG/1
40 TABLET, DELAYED RELEASE ORAL 2 TIMES DAILY
Status: DISCONTINUED | OUTPATIENT
Start: 2018-11-08 | End: 2018-11-10 | Stop reason: HOSPADM

## 2018-11-08 RX ORDER — MAGNESIUM HYDROXIDE 1200 MG/15ML
LIQUID ORAL AS NEEDED
Status: DISCONTINUED | OUTPATIENT
Start: 2018-11-08 | End: 2018-11-08 | Stop reason: HOSPADM

## 2018-11-08 RX ORDER — DEXTROSE MONOHYDRATE 25 G/50ML
12.5 INJECTION, SOLUTION INTRAVENOUS AS NEEDED
Status: DISCONTINUED | OUTPATIENT
Start: 2018-11-08 | End: 2018-11-08 | Stop reason: HOSPADM

## 2018-11-08 RX ORDER — MEPERIDINE HYDROCHLORIDE 50 MG/1
50 TABLET ORAL EVERY 4 HOURS PRN
Status: DISCONTINUED | OUTPATIENT
Start: 2018-11-08 | End: 2018-11-10 | Stop reason: HOSPADM

## 2018-11-08 RX ORDER — ROCURONIUM BROMIDE 10 MG/ML
INJECTION, SOLUTION INTRAVENOUS AS NEEDED
Status: DISCONTINUED | OUTPATIENT
Start: 2018-11-08 | End: 2018-11-08 | Stop reason: SURG

## 2018-11-08 RX ORDER — HEPARIN SODIUM 5000 [USP'U]/ML
5000 INJECTION, SOLUTION INTRAVENOUS; SUBCUTANEOUS EVERY 12 HOURS SCHEDULED
Status: DISCONTINUED | OUTPATIENT
Start: 2018-11-08 | End: 2018-11-08 | Stop reason: SDUPTHER

## 2018-11-08 RX ORDER — MIDAZOLAM HYDROCHLORIDE 1 MG/ML
1 INJECTION INTRAMUSCULAR; INTRAVENOUS
Status: DISCONTINUED | OUTPATIENT
Start: 2018-11-08 | End: 2018-11-08 | Stop reason: HOSPADM

## 2018-11-08 RX ORDER — DEXTROSE AND SODIUM CHLORIDE 5; .45 G/100ML; G/100ML
50 INJECTION, SOLUTION INTRAVENOUS CONTINUOUS
Status: DISCONTINUED | OUTPATIENT
Start: 2018-11-08 | End: 2018-11-10 | Stop reason: HOSPADM

## 2018-11-08 RX ORDER — NALOXONE HCL 0.4 MG/ML
0.04 VIAL (ML) INJECTION AS NEEDED
Status: DISCONTINUED | OUTPATIENT
Start: 2018-11-08 | End: 2018-11-08 | Stop reason: HOSPADM

## 2018-11-08 RX ORDER — LIDOCAINE HYDROCHLORIDE 20 MG/ML
INJECTION, SOLUTION INFILTRATION; PERINEURAL AS NEEDED
Status: DISCONTINUED | OUTPATIENT
Start: 2018-11-08 | End: 2018-11-08 | Stop reason: SURG

## 2018-11-08 RX ORDER — SUCRALFATE ORAL 1 G/10ML
1 SUSPENSION ORAL EVERY 6 HOURS SCHEDULED
Status: DISCONTINUED | OUTPATIENT
Start: 2018-11-08 | End: 2018-11-10 | Stop reason: HOSPADM

## 2018-11-08 RX ORDER — PHENYLEPHRINE HCL IN 0.9% NACL 0.8MG/10ML
SYRINGE (ML) INTRAVENOUS AS NEEDED
Status: DISCONTINUED | OUTPATIENT
Start: 2018-11-08 | End: 2018-11-08 | Stop reason: SURG

## 2018-11-08 RX ORDER — ALPRAZOLAM 0.5 MG/1
0.5 TABLET ORAL 3 TIMES DAILY PRN
Status: DISCONTINUED | OUTPATIENT
Start: 2018-11-08 | End: 2018-11-10 | Stop reason: HOSPADM

## 2018-11-08 RX ORDER — OXYCODONE AND ACETAMINOPHEN 10; 325 MG/1; MG/1
1 TABLET ORAL ONCE AS NEEDED
Status: COMPLETED | OUTPATIENT
Start: 2018-11-08 | End: 2018-11-08

## 2018-11-08 RX ORDER — SODIUM CHLORIDE 9 MG/ML
INJECTION, SOLUTION INTRAVENOUS AS NEEDED
Status: DISCONTINUED | OUTPATIENT
Start: 2018-11-08 | End: 2018-11-08 | Stop reason: HOSPADM

## 2018-11-08 RX ORDER — MORPHINE SULFATE 2 MG/ML
2 INJECTION, SOLUTION INTRAMUSCULAR; INTRAVENOUS
Status: DISCONTINUED | OUTPATIENT
Start: 2018-11-08 | End: 2018-11-08 | Stop reason: HOSPADM

## 2018-11-08 RX ORDER — ESCITALOPRAM OXALATE 10 MG/1
20 TABLET ORAL DAILY
Status: DISCONTINUED | OUTPATIENT
Start: 2018-11-08 | End: 2018-11-10 | Stop reason: HOSPADM

## 2018-11-08 RX ORDER — LIDOCAINE HYDROCHLORIDE 40 MG/ML
SOLUTION TOPICAL AS NEEDED
Status: DISCONTINUED | OUTPATIENT
Start: 2018-11-08 | End: 2018-11-08 | Stop reason: SURG

## 2018-11-08 RX ORDER — HEPARIN SODIUM 5000 [USP'U]/ML
5000 INJECTION, SOLUTION INTRAVENOUS; SUBCUTANEOUS EVERY 8 HOURS SCHEDULED
Status: DISCONTINUED | OUTPATIENT
Start: 2018-11-09 | End: 2018-11-10 | Stop reason: HOSPADM

## 2018-11-08 RX ORDER — SODIUM CHLORIDE, SODIUM LACTATE, POTASSIUM CHLORIDE, CALCIUM CHLORIDE 600; 310; 30; 20 MG/100ML; MG/100ML; MG/100ML; MG/100ML
9 INJECTION, SOLUTION INTRAVENOUS CONTINUOUS
Status: DISCONTINUED | OUTPATIENT
Start: 2018-11-08 | End: 2018-11-08

## 2018-11-08 RX ORDER — ONDANSETRON 8 MG/1
8 TABLET, ORALLY DISINTEGRATING ORAL EVERY 6 HOURS PRN
Status: DISCONTINUED | OUTPATIENT
Start: 2018-11-08 | End: 2018-11-10 | Stop reason: HOSPADM

## 2018-11-08 RX ORDER — MIDAZOLAM HYDROCHLORIDE 1 MG/ML
2 INJECTION INTRAMUSCULAR; INTRAVENOUS
Status: DISCONTINUED | OUTPATIENT
Start: 2018-11-08 | End: 2018-11-08 | Stop reason: HOSPADM

## 2018-11-08 RX ORDER — IPRATROPIUM BROMIDE AND ALBUTEROL SULFATE 2.5; .5 MG/3ML; MG/3ML
3 SOLUTION RESPIRATORY (INHALATION) ONCE AS NEEDED
Status: DISCONTINUED | OUTPATIENT
Start: 2018-11-08 | End: 2018-11-08 | Stop reason: HOSPADM

## 2018-11-08 RX ORDER — CEFOXITIN 1 G/1
INJECTION, POWDER, FOR SOLUTION INTRAVENOUS AS NEEDED
Status: DISCONTINUED | OUTPATIENT
Start: 2018-11-08 | End: 2018-11-08 | Stop reason: SURG

## 2018-11-08 RX ADMIN — FAMOTIDINE 40 MG: 20 TABLET, FILM COATED ORAL at 14:38

## 2018-11-08 RX ADMIN — FENTANYL CITRATE 25 MCG: 50 INJECTION INTRAMUSCULAR; INTRAVENOUS at 13:20

## 2018-11-08 RX ADMIN — CEFOXITIN SODIUM 1 G: 1 POWDER, FOR SOLUTION INTRAVENOUS at 11:10

## 2018-11-08 RX ADMIN — AMLODIPINE BESYLATE 10 MG: 10 TABLET ORAL at 14:39

## 2018-11-08 RX ADMIN — ROCURONIUM BROMIDE 30 MG: 10 INJECTION INTRAVENOUS at 11:07

## 2018-11-08 RX ADMIN — SUCRALFATE 1 G: 1 SUSPENSION ORAL at 17:56

## 2018-11-08 RX ADMIN — FENTANYL CITRATE 25 MCG: 50 INJECTION INTRAMUSCULAR; INTRAVENOUS at 13:30

## 2018-11-08 RX ADMIN — ASPIRIN 81 MG: 81 TABLET ORAL at 14:39

## 2018-11-08 RX ADMIN — Medication 400 MCG: at 11:25

## 2018-11-08 RX ADMIN — CEFOXITIN SODIUM 1 G: 1 POWDER, FOR SOLUTION INTRAVENOUS at 20:42

## 2018-11-08 RX ADMIN — PANTOPRAZOLE SODIUM 40 MG: 40 TABLET, DELAYED RELEASE ORAL at 14:40

## 2018-11-08 RX ADMIN — DEXTROSE AND SODIUM CHLORIDE 75 ML/HR: 5; 450 INJECTION, SOLUTION INTRAVENOUS at 14:37

## 2018-11-08 RX ADMIN — METOPROLOL SUCCINATE 50 MG: 50 TABLET, FILM COATED, EXTENDED RELEASE ORAL at 20:39

## 2018-11-08 RX ADMIN — PANTOPRAZOLE SODIUM 40 MG: 40 TABLET, DELAYED RELEASE ORAL at 20:39

## 2018-11-08 RX ADMIN — OXYCODONE HYDROCHLORIDE AND ACETAMINOPHEN 1 TABLET: 10; 325 TABLET ORAL at 13:20

## 2018-11-08 RX ADMIN — MEPERIDINE HYDROCHLORIDE 50 MG: 50 TABLET ORAL at 16:25

## 2018-11-08 RX ADMIN — FENTANYL CITRATE 200 MCG: 50 INJECTION INTRAMUSCULAR; INTRAVENOUS at 11:07

## 2018-11-08 RX ADMIN — LIDOCAINE HYDROCHLORIDE 1 EACH: 40 SOLUTION TOPICAL at 11:07

## 2018-11-08 RX ADMIN — FENTANYL CITRATE 25 MCG: 50 INJECTION INTRAMUSCULAR; INTRAVENOUS at 13:23

## 2018-11-08 RX ADMIN — CEFOXITIN SODIUM 1 G: 1 POWDER, FOR SOLUTION INTRAVENOUS at 06:43

## 2018-11-08 RX ADMIN — FENTANYL CITRATE 25 MCG: 50 INJECTION INTRAMUSCULAR; INTRAVENOUS at 13:25

## 2018-11-08 RX ADMIN — ESCITALOPRAM 20 MG: 10 TABLET, FILM COATED ORAL at 14:39

## 2018-11-08 RX ADMIN — AMITRIPTYLINE HYDROCHLORIDE 50 MG: 25 TABLET, FILM COATED ORAL at 20:39

## 2018-11-08 RX ADMIN — LEVETIRACETAM 250 MG: 250 TABLET, FILM COATED ORAL at 20:39

## 2018-11-08 RX ADMIN — MEPERIDINE HYDROCHLORIDE 50 MG: 50 TABLET ORAL at 20:39

## 2018-11-08 RX ADMIN — LIDOCAINE HYDROCHLORIDE 100 MG: 20 INJECTION, SOLUTION INFILTRATION; PERINEURAL at 11:07

## 2018-11-08 RX ADMIN — DEXAMETHASONE SODIUM PHOSPHATE 4 MG: 4 INJECTION, SOLUTION INTRAMUSCULAR; INTRAVENOUS at 11:40

## 2018-11-08 RX ADMIN — SODIUM CHLORIDE, POTASSIUM CHLORIDE, SODIUM LACTATE AND CALCIUM CHLORIDE 9 ML/HR: 600; 310; 30; 20 INJECTION, SOLUTION INTRAVENOUS at 10:40

## 2018-11-08 RX ADMIN — Medication 400 MCG: at 11:15

## 2018-11-08 RX ADMIN — ONDANSETRON HYDROCHLORIDE 4 MG: 2 SOLUTION INTRAMUSCULAR; INTRAVENOUS at 11:40

## 2018-11-08 RX ADMIN — Medication 3 ML: at 08:57

## 2018-11-08 RX ADMIN — Medication 3 ML: at 20:41

## 2018-11-08 RX ADMIN — CEFOXITIN SODIUM 1 G: 1 POWDER, FOR SOLUTION INTRAVENOUS at 01:17

## 2018-11-08 RX ADMIN — FENTANYL CITRATE 50 MCG: 50 INJECTION INTRAMUSCULAR; INTRAVENOUS at 11:40

## 2018-11-08 RX ADMIN — SUCRALFATE 1 G: 1 SUSPENSION ORAL at 14:40

## 2018-11-08 RX ADMIN — ALPRAZOLAM 0.5 MG: 0.5 TABLET ORAL at 16:25

## 2018-11-08 RX ADMIN — MIDAZOLAM HYDROCHLORIDE 2 MG: 1 INJECTION, SOLUTION INTRAMUSCULAR; INTRAVENOUS at 10:41

## 2018-11-08 RX ADMIN — PROPOFOL 100 MG: 10 INJECTION, EMULSION INTRAVENOUS at 11:07

## 2018-11-08 RX ADMIN — CEFOXITIN SODIUM 1 G: 1 POWDER, FOR SOLUTION INTRAVENOUS at 14:40

## 2018-11-08 RX ADMIN — LEVETIRACETAM 250 MG: 250 TABLET, FILM COATED ORAL at 09:13

## 2018-11-08 NOTE — PROGRESS NOTES
[facility] Medicine Services  INPATIENT PROGRESS NOTE    Patient Name: Kamryn Oliva  Date of Admission: 11/6/2018  Today's Date: 11/08/18  Length of Stay: 2  Primary Care Physician: Tong Toussaint MD    Subjective   Chief Complaint: Tired.   HPI   Still with some abdominal discomfort.   Ready to get galbadder out.   No shortness of breath or chest pain.  Slept fair.         Review of Systems     All pertinent negatives and positives are as above. All other systems have been reviewed and are negative unless otherwise stated.     Objective    Temp:  [97 °F (36.1 °C)-99.4 °F (37.4 °C)] 97 °F (36.1 °C)  Heart Rate:  [59-80] 62  Resp:  [16] 16  BP: ()/(60-90) 97/67  Physical Exam   Constitutional: She is oriented to person, place, and time. She appears well-developed and well-nourished.   HENT:   Head: Normocephalic and atraumatic.   Eyes: Pupils are equal, round, and reactive to light. Conjunctivae and EOM are normal.   Neck: Neck supple.   Cardiovascular: Normal rate and regular rhythm.  Exam reveals no gallop and no friction rub.    No murmur heard.  Pulmonary/Chest: Effort normal and breath sounds normal.   Abdominal: Soft. Bowel sounds are normal. There is no hepatosplenomegaly. There is tenderness (ruq   epigastrium).   Musculoskeletal: Normal range of motion. She exhibits no edema.   Neurological: She is alert and oriented to person, place, and time. No cranial nerve deficit.   Skin: Skin is warm and dry.   Psychiatric: She has a normal mood and affect. Her behavior is normal.   Nursing note and vitals reviewed.          Results Review:  I have reviewed the labs, radiology results, and diagnostic studies.    Laboratory Data:     Results from last 7 days  Lab Units 11/08/18  0558 11/07/18  0815 11/06/18  1514   WBC 10*3/mm3 8.47 7.53 7.50   HEMOGLOBIN g/dL 12.0 10.9* 14.2   HEMATOCRIT % 34.8* 32.7* 41.5   PLATELETS 10*3/mm3 314 271 390          Results from last 7  days  Lab Units 11/08/18  0558 11/07/18  0739 11/06/18  1514   SODIUM mmol/L 143 139 137   POTASSIUM mmol/L 3.6 3.7 2.7*   CHLORIDE mmol/L 101 106 95*   CO2 mmol/L 33.0* 24.0 25.0   BUN mg/dL 2* 7 14   CREATININE mg/dL 0.57 0.55 0.86   CALCIUM mg/dL 8.9 8.2* 9.8   BILIRUBIN mg/dL 0.3 0.3 0.4   ALK PHOS U/L 87 57 85   ALT (SGPT) U/L 19 16 <15   AST (SGOT) U/L 19 17 17   GLUCOSE mg/dL 81 81 117*       Culture Data:        Radiology Data:   Imaging Results (last 24 hours)     Procedure Component Value Units Date/Time    FL Cholangiogram Operative [[id number]] Collected:  11/08/18 1218     Updated:  11/08/18 1222    Narrative:       EXAMINATION:  FL CHOLANGIOGRAM OPERATIVE-  11/8/2018 11:40 AM CST     HISTORY: stones; E83.42-Hypomagnesemia; E87.6-Hypokalemia;  R10.84-Generalized abdominal pain; K82.8-Other specified diseases of  gallbladder; K81.9-Cholecystitis, unspecified      COMPARISON: No comparison study.     TECHNIQUE:      Image number: 9     Fluoroscopy time: 7 seconds     FINDINGS:      C-arm images from an operative cholangiogram demonstrate no filling  defects indicate choledocholithiasis.     The biliary tree is normal in caliber.     Appropriate spillage of contrast material into the duodenum identified.       Impression:       1. Negative operative cholangiogram.  This report was finalized on 11/08/2018 12:19 by Dr. Lior Ludwig MD.    FL C Arm During Surgery [[id number]] Updated:  11/08/18 1200          I have reviewed the patient's current medications.     Assessment/Plan     Assessment    Nausea with vomiting  Weight loss.   Hypokalemia  Hypomagnesemia     Plan    Surgery for gb removal today  Continue supportive care, IVF  Monitor bmp cbc mg in AM      Discharge Planning: I expect the patient to be discharged to home when ok with surgeon.    Salina Masters DO   11/08/18   12:41 PM

## 2018-11-08 NOTE — ANESTHESIA PROCEDURE NOTES
Airway  Urgency: elective    Date/Time: 11/8/2018 11:08 AM  Airway not difficult    General Information and Staff    Patient location during procedure: OR  CRNA: EYE, GARCIA    Indications and Patient Condition  Indications for airway management: airway protection    Preoxygenated: yes  Mask difficulty assessment: 1 - vent by mask    Final Airway Details  Final airway type: endotracheal airway      Successful airway: ETT  Cuffed: yes   Successful intubation technique: direct laryngoscopy  Facilitating devices/methods: intubating stylet  Endotracheal tube insertion site: oral  Blade: Marty  Blade size: 3.5.  ETT size: 7.0 mm  Cormack-Lehane Classification: grade I - full view of glottis  Placement verified by: chest auscultation and capnometry   Cuff volume (mL): 5  Measured from: lips  ETT to lips (cm): 20  Number of attempts at approach: 1

## 2018-11-08 NOTE — OP NOTE
CHOLECYSTECTOMY LAPAROSCOPIC INTRAOPERATIVE CHOLANGIOGRAM  Procedure Note    Kamryn Oliva  11/6/2018 - 11/8/2018    Pre-op Diagnosis:   Chronic cholecystitis  Post-op Diagnosis:     Chronic cholecystitis and biliary sludge    Procedure/CPT® Codes:      Procedure(s):  CHOLECYSTECTOMY LAPAROSCOPIC INTRAOPERATIVE CHOLANGIOGRAM    Surgeon(s):  Antonio Salvador MD    Anesthesia: General    Staff:   Circulator: Delroy Manriquez RN  Scrub Person: Michael Escalera; [place] Ortiz    Estimated Blood Loss: 25 mL    Specimens:                ID Type Source Tests Collected by Time   A : GALLBLADDER AND CONTENTS Tissue Gallbladder TISSUE PATHOLOGY EXAM Antonio Salvador MD 11/8/2018 1146         Drains:  There were no drains    Indications: Kamryn Oliva is a 48-year-old female who has had a multitude of x-rays over the past 10 years multitude of CT scans, she complains that she has some reduced bowel emptying she is been evaluated extensively by [facility] and also sees a specialist at [place] for gastric emptying, she continues with abdominal pain she also states that she has had chronic nausea unable to keep anything down she has become dehydrated she states that she has lost 15 pounds of this intractable nausea and vomiting.  Of left it several ultrasound she has a very distended gallbladder up to 5 cm wide she has had CT scan showing a very distended gallbladder in her right side she has some sludge noted and with this large and chronic abdominal pain and nausea after eating it is not unheard of that she would have this nausea giving her amount of gallbladder distention.  She is had an upper GI in August 2017 which was normal she had upper endoscopy for chronic nausea which was normal she has had multiple CT scans of the pelvis and several endoscopies and colonoscopies and small bowel follow-through that were unremarkable.  She had exploration by Dr. Evi Farley in 2016 with a peritoneal nodule that was  benign previous appendectomy in November 2016 with an appendectomy by Dr. Salvador.  With continued abdominal bloating continued nausea for evaluation with upper and lower endoscopies small bowel follow-through's and the CT scan showing distention of the gallbladder potentials options are to continue current treatment or potentially look toward removing the gallbladder given the slides and given the gallbladder distention.  I do not know whether this will hopefully resolve her discomfort or not but I have no other options that I can suggest for this patient.  She and her son are present we have discussed the options available and with full knowledge of this an apparent understanding she gives her informed consent for laparoscopic exploration cholecystectomy in treatment of the above problem.      Findings: Laparoscopic exploration, cholecystectomy and cholangiogram evaluation the pelvis was completed there was minimal adhesions in the lower abdomen, there is a remnant of the right ovary and appendix stump was unremarkable, there was no remnant of the uterus or the left ovary.      Complications: There were no complications blood loss 25 mL     Procedure: Patient was placed in a supine position in the surgical suite and after a timeout had been completed  the area was scrubbed prepped and draped with alcohol and Betadine a Ioban was applied.  Following this a transverse skin incision was completed in the upper abdomen incising through the skin and subcutaneous taste tissue to the fascia.  Once of the fascia 2-0 Vicryl stay sutures were placed superior and inferior to the planned transverse incision.  An incision was completed with a 15 blade incising through the fascia and out muscle-splitting technique was completed using [place] clamps and dissecting in a muscle-splitting technique down to the peritoneum.  The peritoneum was opened and entered a blunt trocar 10 mm port was placed in this area and insufflation  with CO2 was completed to maintain the abdominal pressure between 10 and 14 mm of pressure.  This accomplished under direct visualization 3  5 mm trochars were placed one in the right mid abdomen and 2 in the upper abdomen.  With the 3, 5 mm ports and one 10 the gallbladders and grasped with the right lateral port and elevated toward the right shoulder.  The adhesions at the paula hepatis was taken down sharply and as well as with Bovie electrocautery and dissection was completed at the paula hepatis.  With dissection completed the critical angle was visualized with the cystic artery and cystic duct with this visualized the cystic artery was then clipped ×3 proximally and incised distally further dissection around the infundibulum was completed and a clip was placed across the infundibulum incision into the infundibulum was completed through which a cholangiogram was brought into the field and cholangiography obtained.  There is free flow into the left and right hepatic radical, hepatic duct, bile duct and free flow into the duodenum.  With this completed no other abnormalities noted the Cholangiocath was removed the cystic duct was then transected the infundibulum was controlled using 1-0 PDS loop and the cystic duct was controlled using 2-0 PDS loops and one clip distal to this.  With this completed the gallbladder was slowly and tediously removed from the infrahepatic substance prominent venous and arterial tributaries were noted and a single clip was applied across these.  Ultimately the gallbladder was able to be fully removed.  She was then placed in a Trendelenburg position we evaluated her pelvis, there is a remnant of the right ovary there was no left ovary permanent and there was no uterine remnant.  The appendiceal stump was unremarkable as well as the terminal ileum and cecum.  With this completed the bed was evaluated there is no bleeding from the bed and full control of the cystic duct and cystic  artery the gallbladder was then grasped and placed in the Endo Catch  and removed from the right midepigastric port.  There is no spillage of bile or stones and this removal and having completed this the excess gas was relieved the right midepigastric port was closed using 4 figure-of-eight sutures of 0 Vicryl the deep dermis was reapproximated using simple inverted interrupted sutures of 3-0 Vicryl and skin was enclosed using running subcuticular suture of 4-0 Vicryl.  The skin was injected with half percent Marcaine with epinephrine a total of 30 mL used the 5 mm trochars sites were then closed using simple inverted interrupted sutures of 4-0 Vicryl and once completed the area was cleaned with saline Mastisol Steri-Strips 2 x 2 and Tegaderm applied.  Patient was then extubated and transferred to the recovery room in good condition and we supplied back pressure over the right midepigastric port until the patient was extubated to reduce any increased tension on the larger trocar site.  Antonio Salvador MD     Date: 11/8/2018  Time: 1:09 PM    EMR Dragon/Transcription disclaimer: Much of this encounter note is an electronic transcription/translation of spoken language to printed text. The electronic translation of spoken language may permit erroneous, or at times, nonsensical words or phrases to be inadvertently transcribed; although I have reviewed the note for such errors, some may still exist.

## 2018-11-08 NOTE — CONSULTS
Patient Care Team:  Tong Toussaint MD as PCP - General (Hematology and Oncology)  Tong Toussaint MD as PCP - Claims Attributed  Camilo Hanson MD as Consulting Physician (Gastroenterology)    Chief complaint chronic nausea and vomiting.    Subjective     Subjective .     History of present illness:  Patient is a 48-year-old female who has had a multitude of x-rays over the past 10 years multitude of CT scans, she complains that she has some reduced bowel emptying she is been evaluated extensively by [facility] and also sees a specialist at [place] for gastric emptying, she continues with abdominal pain she also states that she has had chronic nausea unable to keep anything down she has become dehydrated she states that she has lost 15 pounds of this intractable nausea and vomiting.  Of left it several ultrasound she has a very distended gallbladder up to 5 cm wide she has had CT scan showing a very distended gallbladder in her right side she has some sludge noted and with this large and chronic abdominal pain and nausea after eating it is not unheard of that she would have this nausea giving her amount of gallbladder distention.  She is had an upper GI in August 2017 which was normal she had upper endoscopy for chronic nausea which was normal she has had multiple CT scans of the pelvis and several endoscopies and colonoscopies and small bowel follow-through that were unremarkable.  She had exploration by Dr. Evi Farley in 2016 with a peritoneal nodule that was benign previous appendectomy in November 2016 with an appendectomy by Dr. Salvador.  With continued abdominal bloating continued nausea for evaluation with upper and lower endoscopies small bowel follow-through's and the CT scan showing distention of the gallbladder potentials options are to continue current treatment or potentially look toward removing the gallbladder given the slides and given the gallbladder distention.  I do not know  whether this will hopefully resolve her discomfort or not but I have no other options that I can suggest for this patient.  She and her son are present we have discussed the options available and with full knowledge of this an apparent understanding she gives her informed consent for laparoscopic exploration cholecystectomy in treatment of the above problem.      Surgical history appendectomy, vein surgery on 2 occasions for a maravilla-maravilla   problem in  date this is a arterial blood flow system abnormality.      She has had excisional breast surgery by Dr. Evi Farley, , and exploratory laparotomy for lysis of adhesions by Dr. Farley, Dr. Farley is unavailable today and the first the consult at present.      She has had right and left hernia repair by Dr. Saldana, hysterectomy and tonsillectomy.  As well as multiple colonoscopies and multiple upper endoscopies.      Medications are those listed     Allergies are to connect 10, Stadol,   Butorphanol tartrate     She is a nonsmoker nondrinker she has 2 children she is  she is to work as a  last worked in this situation 20 years prior.  She denies any drug use she denies any opioids.      She lives with her boyfriend her son is in the room at present.        Review of Systems  Pertinent items are noted in HPI, all other systems reviewed and negative    History  Past Medical History:   Diagnosis Date   • Acute kidney failure (CMS/HCC)    • Constipation    • Depression    • H/O gastric ulcer    • Headache    • History of transfusion    • Hypertension    • IBS (irritable bowel syndrome)    • Insomnia    • Kidney stone    • Maravilla maravilla disease    • Rapid weight loss    • SBO (small bowel obstruction) (CMS/HCC)    • Stroke (CMS/HCC)     X 2   • UTI (urinary tract infection)     CHRONIC, SEPTIC X2   • Volvulosis    , Past Surgical History:   Procedure Laterality Date   • APPENDECTOMY      COMPLICATION OF SEPTIC   • AUGMENTATION MAMMAPLASTY      • BRAIN SURGERY      x2   • BREAST LUMPECTOMY Left 3/9/2017    Procedure: EXCISION LEFT BREAST LESION AND LEFT AXILLARY LYMPH NODE BIOPSY;  Surgeon: Evi Farley MD;  Location: [facility] OR;  Service:    • BREAST SURGERY     •  SECTION     • COLON SURGERY     • COLONOSCOPY  2007    normal   • COLONOSCOPY N/A 11/10/2016    Procedure: COLONOSCOPY WITH ANESTHESIA;  Surgeon: Camilo Hanson MD;  Location: [facility] ENDOSCOPY;  Service:    • COLONOSCOPY N/A 2018    Procedure: COLONOSCOPY WITH ANESTHESIA;  Surgeon: Camilo Hanson MD;  Location: [facility] ENDOSCOPY;  Service:    • ENDOSCOPY  2009    antral ulcer   • ENDOSCOPY N/A 10/10/2016    Procedure: ESOPHAGOGASTRODUODENOSCOPY WITH ANESTHESIA;  Surgeon: Camilo Hanson MD;  Location: [facility] ENDOSCOPY;  Service:    • ENDOSCOPY N/A 2017    Procedure: ESOPHAGOGASTRODUODENOSCOPY;  Surgeon: Camilo Hanson MD;  Location: [facility] ENDOSCOPY;  Service:    • ENDOSCOPY N/A 2017    Procedure: ESOPHAGOGASTRODUODENOSCOPY WITH ANESTHESIA;  Surgeon: Camilo Hanson MD;  Location: [facility] ENDOSCOPY;  Service:    • ENDOSCOPY N/A 2018    Procedure: ESOPHAGOGASTRODUODENOSCOPY ;  Surgeon: Camilo Hanson MD;  Location: [facility] ENDOSCOPY;  Service:    • HERNIA REPAIR     • HYSTERECTOMY     • SMALL INTESTINE SURGERY N/A 2016   • TONSILLECTOMY     , Family History   Problem Relation Age of Onset   • Cancer Maternal Grandfather    • No Known Problems Mother    • No Known Problems Father    • No Known Problems Sister    • No Known Problems Brother    • No Known Problems Son    • No Known Problems Brother    • No Known Problems Son    • No Known Problems Maternal Grandmother    • Breast cancer Paternal Grandmother    • No Known Problems Maternal Aunt    • No Known Problems Paternal Aunt    • Colon cancer Neg Hx    • Colon polyps Neg Hx    • BRCA 1/2 Neg Hx    • Endometrial cancer Neg Hx    • Ovarian cancer Neg Hx    , Social History   Substance Use  Topics   • Smoking status: Former Smoker     Packs/day: 1.00     Years: 15.00     Types: Cigarettes, Electronic Cigarette     Quit date: 3/10/2016   • Smokeless tobacco: Never Used   • Alcohol use Yes      Comment: occasional   , Prescriptions Prior to Admission   Medication Sig Dispense Refill Last Dose   • ALPRAZolam (XANAX) 1 MG tablet Take 1 mg by mouth 3 (Three) Times a Day As Needed for Anxiety.   Past Week at Unknown time   • amitriptyline (ELAVIL) 100 MG tablet Take 100 mg by mouth Every Night.  2 Past Week at Unknown time   • amLODIPine (NORVASC) 10 MG tablet Take 10 mg by mouth Daily.   Past Week at Unknown time   • aspirin 81 MG EC tablet Take 81 mg by mouth daily.   Past Week at Unknown time   • CloNIDine (CATAPRES) 0.1 MG tablet Take 0.1 mg by mouth Every 4 (Four) Hours As Needed for High Blood Pressure.   Past Week at Unknown time   • escitalopram (LEXAPRO) 20 MG tablet Take 20 mg by mouth Daily.   Past Week at Unknown time   • fluticasone (FLONASE) 50 MCG/ACT nasal spray 2 sprays into the nostril(s) as directed by provider Daily.   Past Week at Unknown time   • levETIRAcetam (KEPPRA) 500 MG tablet Take 500 mg by mouth 2 (Two) Times a Day.   Past Week at Unknown time   • megestrol (MEGACE) 40 MG tablet Take 40 mg by mouth Daily.   Past Week at Unknown time   • metoprolol succinate XL (TOPROL-XL) 100 MG 24 hr tablet Take 100 mg by mouth Every Night.   Past Week at Unknown time   • Multiple Vitamins-Minerals (MULTIVITAMIN AND MINERALS) liquid liquid Take 15 mL by mouth Daily. 480 mL 1 Past Week at Unknown time   • pantoprazole (PROTONIX) 40 MG EC tablet Take 40 mg by mouth 2 (Two) Times a Day.   Past Week at Unknown time   • promethazine (PHENERGAN) 25 MG tablet Take 1 tablet by mouth Every 6 (Six) Hours As Needed for Nausea or Vomiting. 6 tablet 0 Past Week at Unknown time   , Scheduled Meds:    amitriptyline 50 mg Oral Nightly   aspirin 81 mg Oral Daily   cefOXitin 1 g Intravenous Q6H   famotidine 40  mg Oral Daily   heparin (porcine) 5,000 Units Subcutaneous Q12H   levETIRAcetam 250 mg Oral Q12H   magnesium citrate 296 mL Oral Once   metoprolol succinate XL 50 mg Oral Nightly   pantoprazole 40 mg Oral Daily   sodium chloride 3 mL Intravenous Q12H   , Continuous Infusions:   , PRN Meds:  dicyclomine  •  ondansetron  •  sodium chloride  •  sodium chloride and Allergies:  Anectine [succinylcholine chloride]; Stadol [butorphanol]; and Butorphanol tartrate    Objective      Objective     Vital Signs   Temp:  [97.6 °F (36.4 °C)-99.4 °F (37.4 °C)] 99.4 °F (37.4 °C)  Heart Rate:  [62-83] 69  Resp:  [16-17] 16  BP: (113-132)/(80-90) 130/90    Intake & Output (last 3 days)       11/04 0701 - 11/05 0700 11/05 0701 - 11/06 0700 11/06 0701 - 11/07 0700 11/07 0701 - 11/08 0700    P.O.    480    I.V. (mL/kg)   100 (2.3)     IV Piggyback   2100     Total Intake(mL/kg)   2200 (49.8) 480 (10.9)    Net     +2200 +480            Unmeasured Urine Occurrence   1 x 1 x           Physical Exam:     General Appearance:   She is already, very thin, bony white female in bed speaking normally understanding the situation and discussion about full discussion completed   Head:    Normocephalic, without obvious abnormality, atraumatic   Eyes:            Lids and lashes normal, conjunctivae and sclerae normal, no   icterus, no pallor, corneas clear, PERRLA   Ears:    Ears appear intact with no abnormalities noted   Throat:   No oral lesions, no thrush, oral mucosa moist   Neck:   No adenopathy, supple, trachea midline, no thyromegaly, no   carotid bruit, no JVD   Back:     No kyphosis present, no scoliosis present, no skin lesions,      erythema or scars, no tenderness to percussion or                   palpation,   range of motion normal   Lungs:     Clear to auscultation,respirations regular, even and                  unlabored    Heart:    Regular rhythm and normal rate, normal S1 and S2, no            murmur, no gallop, no rub, no click    Chest Wall:    No abnormalities observed   Abdomen:    LAD abdomen, occasional bowel sounds, tenderness to deep palpation in the right and left side slightly more on the right.  No mass noted.   Rectal:     Deferred   Extremities:   Moves all extremities well, no edema, no cyanosis, no             redness   Pulses:   Pulses palpable and equal bilaterally   Skin:   No bleeding, bruising or rash   Lymph nodes:   No palpable adenopathy   Neurologic:   Cranial nerves 2 - 12 grossly intact, sensation intact, DTR       present and equal bilaterally          Results from last 7 days  Lab Units 11/07/18  0815 11/06/18  1514   WBC 10*3/mm3 7.53 7.50   HEMOGLOBIN g/dL 10.9* 14.2   HEMATOCRIT % 32.7* 41.5   PLATELETS 10*3/mm3 271 390          Results from last 7 days  Lab Units 11/07/18  0739 11/06/18  1514   SODIUM mmol/L 139 137   POTASSIUM mmol/L 3.7 2.7*   CHLORIDE mmol/L 106 95*   CO2 mmol/L 24.0 25.0   BUN mg/dL 7 14   CREATININE mg/dL 0.55 0.86   CALCIUM mg/dL 8.2* 9.8   BILIRUBIN mg/dL 0.3 0.4   ALK PHOS U/L 57 85   ALT (SGPT) U/L 16 <15   AST (SGOT) U/L 17 17   GLUCOSE mg/dL 81 117*         Results Review:   I reviewed the patient's new clinical results.    Assessment/Plan     Assessment/Plan       Hypomagnesemia      Patient with multiple medical problems, chronic abdominal distention, chronic bloating, chronic nausea, gallbladder distention that has continued, previous breast augmentation, hypertension, anxiety, multiple previous colonoscopies upper endoscopies and with abdominal pain and nausea that continues.  Her body mass is 19 BMI.  With the above problem and the gallbladder distention given her the options of continued current treatment or see if cholecystectomy will help assist her.  She desires surgical intervention and plan for laparoscopic cholecystectomy to be completed on November 8.  Risk and benefits discussed she gives her informed consent for surgery   I discussed the patient's findings and my  recommendations with patient, family, nursing staff and primary care team    Antonio Salvador MD  11/07/18  6:16 PM    Time: Time spent with patient 45 minutes     EMR Dragon/Transcription disclaimer: Much of this encounter note is an electronic transcription/translation of spoken language to printed text. The electronic translation of spoken language may permit erroneous, or at times, nonsensical words or phrases to be inadvertently transcribed; although I have reviewed the note for such errors, some may still exist.

## 2018-11-08 NOTE — PLAN OF CARE
Problem: Patient Care Overview  Goal: Plan of Care Review  Outcome: Ongoing (interventions implemented as appropriate)   11/08/18 1533   Coping/Psychosocial   Plan of Care Reviewed With patient   Plan of Care Review   Progress no change   OTHER   Outcome Summary Patient C/O ABD tenderness, PRN pain medication per orders. Lap Coli x4 with scant drainage noted to the gauze and transparent dressing. VSS. Safety Maintained. Will continue to monitor.        Problem: Fall Risk (Adult)  Goal: Absence of Fall  Outcome: Ongoing (interventions implemented as appropriate)      Problem: Nausea/Vomiting (Adult)  Goal: Symptom Relief  Outcome: Ongoing (interventions implemented as appropriate)    Goal: Adequate Hydration  Outcome: Ongoing (interventions implemented as appropriate)      Problem: Pain, Acute (Adult)  Goal: Acceptable Pain Control/Comfort Level  Outcome: Ongoing (interventions implemented as appropriate)

## 2018-11-08 NOTE — ANESTHESIA PREPROCEDURE EVALUATION
Anesthesia Evaluation     Patient summary reviewed   history of anesthetic complications (PSEUDOCHOLINESTERSAE DEFICIENCY):  NPO Solid Status: > 8 hours             Airway   Mallampati: II  TM distance: >3 FB  Neck ROM: full  Dental      Pulmonary    (-) COPD, asthma, sleep apnea, not a smoker  Cardiovascular   Exercise tolerance: excellent (>7 METS)    ECG reviewed  Patient on routine beta blocker and Beta blocker given within 24 hours of surgery    (+) hypertension,   (-) pacemaker, past MI, angina, CHF, cardiac stents      Neuro/Psych  (+) seizures well controlled,     (-) TIA, CVA  GI/Hepatic/Renal/Endo    (+)  GERD,    (-) liver disease, no renal disease, diabetes    Musculoskeletal     Abdominal    Substance History      OB/GYN          Other                        Anesthesia Plan    ASA 2     general     intravenous induction   Anesthetic plan, all risks, benefits, and alternatives have been provided, discussed and informed consent has been obtained with: patient.

## 2018-11-08 NOTE — ANESTHESIA POSTPROCEDURE EVALUATION
"Patient: Kamryn RODRIGUEZ [place]    Procedure Summary     Date:  11/08/18 Room / [facility]  [facility]    Anesthesia Start:  1105 Anesthesia Stop:  1251    Procedure:  CHOLECYSTECTOMY LAPAROSCOPIC INTRAOPERATIVE CHOLANGIOGRAM (N/A Abdomen) Diagnosis:      Surgeon:  Antonio Salvador MD Provider:  Chiquita Gomes CRNA    Anesthesia Type:  general ASA Status:  2          Anesthesia Type: general  Last vitals  BP   126/90 (11/08/18 1447)   Temp   98.1 °F (36.7 °C) (11/08/18 1447)   Pulse   66 (11/08/18 1447)   Resp   16 (11/08/18 1447)     SpO2   98 % (11/08/18 1447)     Post Anesthesia Care and Evaluation    PONV Status: none  Comments: Patient d/c from PACU prior to anes eval based on Marley score.  Please see RN notes for details of d/c criteria.    Blood pressure 126/90, pulse 66, temperature 98.1 °F (36.7 °C), temperature source Temporal Artery , resp. rate 16, height 171.5 cm (67.5\"), weight 44.2 kg (97 lb 6.4 oz), SpO2 98 %, not currently breastfeeding.          "

## 2018-11-08 NOTE — PLAN OF CARE
Problem: Patient Care Overview  Goal: Plan of Care Review  Outcome: Ongoing (interventions implemented as appropriate)   11/08/18 0313   Coping/Psychosocial   Plan of Care Reviewed With patient   Plan of Care Review   Progress no change   OTHER   Outcome Summary Patient has no c/o pain. Denies nausea and abdominal pain. Continues to receive IV antibiotics. VSS. Safety maintained. Will continue to monitor.        Problem: Fall Risk (Adult)  Goal: Identify Related Risk Factors and Signs and Symptoms  Outcome: Outcome(s) achieved Date Met: 11/08/18    Goal: Absence of Fall  Outcome: Ongoing (interventions implemented as appropriate)      Problem: Nausea/Vomiting (Adult)  Goal: Identify Related Risk Factors and Signs and Symptoms  Outcome: Outcome(s) achieved Date Met: 11/08/18    Goal: Symptom Relief  Outcome: Ongoing (interventions implemented as appropriate)    Goal: Adequate Hydration  Outcome: Ongoing (interventions implemented as appropriate)      Problem: Pain, Acute (Adult)  Goal: Identify Related Risk Factors and Signs and Symptoms  Outcome: Outcome(s) achieved Date Met: 11/08/18    Goal: Acceptable Pain Control/Comfort Level  Outcome: Ongoing (interventions implemented as appropriate)

## 2018-11-09 LAB
ALBUMIN SERPL-MCNC: 3.5 G/DL (ref 3.5–5)
ALBUMIN/GLOB SERPL: 1.5 G/DL (ref 1.1–2.5)
ALP SERPL-CCNC: 78 U/L (ref 24–120)
ALT SERPL W P-5'-P-CCNC: 36 U/L (ref 0–54)
ANION GAP SERPL CALCULATED.3IONS-SCNC: 7 MMOL/L (ref 4–13)
AST SERPL-CCNC: 39 U/L (ref 7–45)
BASOPHILS # BLD AUTO: 0.03 10*3/MM3 (ref 0–0.2)
BASOPHILS NFR BLD AUTO: 0.3 % (ref 0–2)
BILIRUB SERPL-MCNC: 0.3 MG/DL (ref 0.1–1)
BUN BLD-MCNC: <2 MG/DL (ref 5–21)
BUN/CREAT SERPL: ABNORMAL (ref 7–25)
CALCIUM SPEC-SCNC: 8.9 MG/DL (ref 8.4–10.4)
CHLORIDE SERPL-SCNC: 96 MMOL/L (ref 98–110)
CO2 SERPL-SCNC: 36 MMOL/L (ref 24–31)
CREAT BLD-MCNC: 0.55 MG/DL (ref 0.5–1.4)
DEPRECATED RDW RBC AUTO: 44.3 FL (ref 40–54)
EOSINOPHIL # BLD AUTO: 0.14 10*3/MM3 (ref 0–0.7)
EOSINOPHIL NFR BLD AUTO: 1.2 % (ref 0–4)
ERYTHROCYTE [DISTWIDTH] IN BLOOD BY AUTOMATED COUNT: 13.5 % (ref 12–15)
GFR SERPL CREATININE-BSD FRML MDRD: 118 ML/MIN/1.73
GLOBULIN UR ELPH-MCNC: 2.4 GM/DL
GLUCOSE BLD-MCNC: 113 MG/DL (ref 70–100)
HCT VFR BLD AUTO: 36.8 % (ref 37–47)
HGB BLD-MCNC: 12.3 G/DL (ref 12–16)
IMM GRANULOCYTES # BLD: 0.03 10*3/MM3 (ref 0–0.03)
IMM GRANULOCYTES NFR BLD: 0.3 % (ref 0–5)
LYMPHOCYTES # BLD AUTO: 2.7 10*3/MM3 (ref 0.72–4.86)
LYMPHOCYTES NFR BLD AUTO: 22.9 % (ref 15–45)
MAGNESIUM SERPL-MCNC: 1.9 MG/DL (ref 1.4–2.2)
MCH RBC QN AUTO: 29.9 PG (ref 28–32)
MCHC RBC AUTO-ENTMCNC: 33.4 G/DL (ref 33–36)
MCV RBC AUTO: 89.3 FL (ref 82–98)
MONOCYTES # BLD AUTO: 0.68 10*3/MM3 (ref 0.19–1.3)
MONOCYTES NFR BLD AUTO: 5.8 % (ref 4–12)
NEUTROPHILS # BLD AUTO: 8.23 10*3/MM3 (ref 1.87–8.4)
NEUTROPHILS NFR BLD AUTO: 69.5 % (ref 39–78)
NRBC BLD MANUAL-RTO: 0 /100 WBC (ref 0–0)
PLATELET # BLD AUTO: 333 10*3/MM3 (ref 130–400)
PMV BLD AUTO: 10.6 FL (ref 6–12)
POTASSIUM BLD-SCNC: 3.2 MMOL/L (ref 3.5–5.3)
PROT SERPL-MCNC: 5.9 G/DL (ref 6.3–8.7)
RBC # BLD AUTO: 4.12 10*6/MM3 (ref 4.2–5.4)
SODIUM BLD-SCNC: 139 MMOL/L (ref 135–145)
WBC NRBC COR # BLD: 11.81 10*3/MM3 (ref 4.8–10.8)

## 2018-11-09 PROCEDURE — 80053 COMPREHEN METABOLIC PANEL: CPT | Performed by: SPECIALIST

## 2018-11-09 PROCEDURE — 85025 COMPLETE CBC W/AUTO DIFF WBC: CPT | Performed by: FAMILY MEDICINE

## 2018-11-09 PROCEDURE — 25010000002 CEFOXITIN PER 1 G: Performed by: SPECIALIST

## 2018-11-09 PROCEDURE — 83735 ASSAY OF MAGNESIUM: CPT | Performed by: FAMILY MEDICINE

## 2018-11-09 PROCEDURE — 25010000002 HEPARIN (PORCINE) PER 1000 UNITS: Performed by: SPECIALIST

## 2018-11-09 RX ADMIN — MEPERIDINE HYDROCHLORIDE 50 MG: 50 TABLET ORAL at 04:14

## 2018-11-09 RX ADMIN — PANTOPRAZOLE SODIUM 40 MG: 40 TABLET, DELAYED RELEASE ORAL at 10:02

## 2018-11-09 RX ADMIN — SUCRALFATE 1 G: 1 SUSPENSION ORAL at 06:08

## 2018-11-09 RX ADMIN — ESCITALOPRAM 20 MG: 10 TABLET, FILM COATED ORAL at 09:50

## 2018-11-09 RX ADMIN — LEVETIRACETAM 250 MG: 250 TABLET, FILM COATED ORAL at 09:51

## 2018-11-09 RX ADMIN — HEPARIN SODIUM 5000 UNITS: 5000 INJECTION, SOLUTION INTRAVENOUS; SUBCUTANEOUS at 17:27

## 2018-11-09 RX ADMIN — MEPERIDINE HYDROCHLORIDE 50 MG: 50 TABLET ORAL at 18:28

## 2018-11-09 RX ADMIN — SUCRALFATE 1 G: 1 SUSPENSION ORAL at 12:11

## 2018-11-09 RX ADMIN — SUCRALFATE 1 G: 1 SUSPENSION ORAL at 18:28

## 2018-11-09 RX ADMIN — CEFOXITIN SODIUM 1 G: 1 POWDER, FOR SOLUTION INTRAVENOUS at 00:35

## 2018-11-09 RX ADMIN — AMITRIPTYLINE HYDROCHLORIDE 50 MG: 25 TABLET, FILM COATED ORAL at 21:07

## 2018-11-09 RX ADMIN — MEPERIDINE HYDROCHLORIDE 50 MG: 50 TABLET ORAL at 10:02

## 2018-11-09 RX ADMIN — CEFOXITIN SODIUM 1 G: 1 POWDER, FOR SOLUTION INTRAVENOUS at 06:08

## 2018-11-09 RX ADMIN — ASPIRIN 81 MG: 81 TABLET ORAL at 12:10

## 2018-11-09 RX ADMIN — MEPERIDINE HYDROCHLORIDE 50 MG: 50 TABLET ORAL at 22:41

## 2018-11-09 RX ADMIN — MEPERIDINE HYDROCHLORIDE 50 MG: 50 TABLET ORAL at 14:06

## 2018-11-09 RX ADMIN — MEGESTROL ACETATE 40 MG: 40 TABLET ORAL at 09:51

## 2018-11-09 RX ADMIN — MEPERIDINE HYDROCHLORIDE 50 MG: 50 TABLET ORAL at 00:35

## 2018-11-09 RX ADMIN — SUCRALFATE 1 G: 1 SUSPENSION ORAL at 00:35

## 2018-11-09 RX ADMIN — ALPRAZOLAM 0.5 MG: 0.5 TABLET ORAL at 21:13

## 2018-11-09 RX ADMIN — DEXTROSE AND SODIUM CHLORIDE 50 ML/HR: 5; 450 INJECTION, SOLUTION INTRAVENOUS at 21:06

## 2018-11-09 RX ADMIN — FAMOTIDINE 40 MG: 20 TABLET, FILM COATED ORAL at 12:10

## 2018-11-09 RX ADMIN — DEXTROSE AND SODIUM CHLORIDE 75 ML/HR: 5; 450 INJECTION, SOLUTION INTRAVENOUS at 04:14

## 2018-11-09 RX ADMIN — Medication 3 ML: at 21:09

## 2018-11-09 RX ADMIN — SUCRALFATE 1 G: 1 SUSPENSION ORAL at 22:41

## 2018-11-09 RX ADMIN — HEPARIN SODIUM 5000 UNITS: 5000 INJECTION, SOLUTION INTRAVENOUS; SUBCUTANEOUS at 21:08

## 2018-11-09 RX ADMIN — PANTOPRAZOLE SODIUM 40 MG: 40 TABLET, DELAYED RELEASE ORAL at 21:13

## 2018-11-09 RX ADMIN — LEVETIRACETAM 250 MG: 250 TABLET, FILM COATED ORAL at 21:07

## 2018-11-09 NOTE — PLAN OF CARE
Problem: Patient Care Overview  Goal: Plan of Care Review  Outcome: Ongoing (interventions implemented as appropriate)   11/09/18 1624   Coping/Psychosocial   Plan of Care Reviewed With significant other   Plan of Care Review   Progress improving   OTHER   Outcome Summary Pt sleeping soundly. Significant other tells me she's tolerated water, sprite, and part of a tuna sandwich today without subsequent n/v. Is drinking boost. RDN will continue to follow.       Problem: Nutrition, Imbalanced: Inadequate Oral Intake (Adult)  Goal: Identify Related Risk Factors and Signs and Symptoms  Outcome: Outcome(s) achieved Date Met: 11/09/18 11/09/18 1624   Nutrition, Imbalanced: Inadequate Oral Intake (Adult)   Related Risk Factors (Nutrition Imbalance, Inadequate Oral Intake) appetite decreased;eating aversion   Signs and Symptoms (Nutrition Imbalance, Inadequate Oral Intake: Signs and Symptoms) nausea and vomiting;weight decreased (percent weight loss, percent usual body weight, body mass index less than 18.5) (Adults);weakness/lethargy;muscle mass/strength decreased;loss of subcutaneous fat     Goal: Improved Oral Intake  Outcome: Ongoing (interventions implemented as appropriate)   11/09/18 1624   Nutrition, Imbalanced: Inadequate Oral Intake (Adult)   Improved Oral Intake making progress toward outcome     Goal: Prevent Further Weight Loss  Outcome: Ongoing (interventions implemented as appropriate)   11/09/18 1624   Nutrition, Imbalanced: Inadequate Oral Intake (Adult)   Prevent Further Weight Loss making progress toward outcome

## 2018-11-09 NOTE — PLAN OF CARE
Problem: Patient Care Overview  Goal: Plan of Care Review  Outcome: Ongoing (interventions implemented as appropriate)   11/09/18 0313   Coping/Psychosocial   Plan of Care Reviewed With patient   Plan of Care Review   Progress no change   OTHER   Outcome Summary Patient c/o pain near incisions, prn medication given every 4 hours per pt's request. Continues to receive IV antibiotics. VSS. Safety maintained. Will continue to monitor.        Problem: Fall Risk (Adult)  Goal: Absence of Fall  Outcome: Ongoing (interventions implemented as appropriate)      Problem: Nausea/Vomiting (Adult)  Goal: Symptom Relief  Outcome: Ongoing (interventions implemented as appropriate)    Goal: Adequate Hydration  Outcome: Ongoing (interventions implemented as appropriate)      Problem: Pain, Acute (Adult)  Goal: Acceptable Pain Control/Comfort Level  Outcome: Ongoing (interventions implemented as appropriate)

## 2018-11-09 NOTE — PROGRESS NOTES
LOS: 3 days   Patient Care Team:  Tong Toussaint MD as PCP - General (Hematology and Oncology)  Tong Toussaint MD as PCP - Claims Attributed  Camilo Hanson MD as Consulting Physician (Gastroenterology)    Chief Complaint:  Central abdominal pain    Subjective     Subjective     Postoperative day #1 following laparoscopic cholecystectomy she has expected incisional discomfort she cannot tell whether her nausea and abdominal pain has resolved from the preoperative baseline.  She looks comfortable she remains afebrile.  She has not had nausea overnight by report.  Objective      Objective     Vital Signs  Temp:  [97.9 °F (36.6 °C)-99.7 °F (37.6 °C)] 98.2 °F (36.8 °C)  Heart Rate:  [62-88] 64  Resp:  [10-18] 16  BP: ()/(61-91) 93/61    Intake & Output (last 3 days)       11/06 0701 - 11/07 0700 11/07 0701 - 11/08 0700 11/08 0701 - 11/09 0700 11/09 0701 - 11/10 0700    P.O.  720 480     I.V. (mL/kg) 100 (2.3)  600 (13.6)     IV Piggyback 2100       Total Intake(mL/kg) 2200 (49.8) 720 (16.3) 1080 (24.4)     Net +2200 +720 +1080              Unmeasured Urine Occurrence 1 x 2 x 2 x     Unmeasured Stool Occurrence  1 x            Physical Exam:     General Appearance:    Alert, cooperative, in no acute distress   Lungs:     Clear to auscultation,respirations regular, even and                  unlabored    Heart:    Regular rhythm and normal rate, normal S1 and S2, no            murmur, no gallop, no rub, no click   Chest Wall:    No abnormalities observed   Abdomen:     Normal bowel sounds, no masses, no organomegaly, soft        non-tender, non-distended,wound clean and dry, no   erythema, no discharge no sign of any infection no seroma or hematoma, white count is 11, hematocrit 37.  Liver functions are normal.          Results Review:     I reviewed the patient's new clinical results.  I reviewed the patient's new imaging results and agree with the interpretation.      Results from last 7  days  Lab Units 11/09/18  0609 11/08/18  0558 11/07/18  0815   WBC 10*3/mm3 11.81* 8.47 7.53   HEMOGLOBIN g/dL 12.3 12.0 10.9*   HEMATOCRIT % 36.8* 34.8* 32.7*   PLATELETS 10*3/mm3 333 314 271          Results from last 7 days  Lab Units 11/09/18  0609 11/08/18  0558 11/07/18  0739   SODIUM mmol/L 139 143 139   POTASSIUM mmol/L 3.2* 3.6 3.7   CHLORIDE mmol/L 96* 101 106   CO2 mmol/L 36.0* 33.0* 24.0   BUN mg/dL <2* 2* 7   CREATININE mg/dL 0.55 0.57 0.55   CALCIUM mg/dL 8.9 8.9 8.2*   BILIRUBIN mg/dL 0.3 0.3 0.3   ALK PHOS U/L 78 87 57   ALT (SGPT) U/L 36 19 16   AST (SGOT) U/L 39 19 17   GLUCOSE mg/dL 113* 81 81       Assessment/Plan     Assessment/Plan       Hypomagnesemia      Currently we will advance her to a regular diet, advance her activity, probable discharge on Saturday if continued improvement occurs.      Antonio Salvador MD  11/09/18  8:36 AM      Time: time spent with patient 15 minutes     EMR Dragon/Transcription disclaimer: Much of this encounter note is an electronic transcription/translation of spoken language to printed text. The electronic translation of spoken language may permit erroneous, or at times, nonsensical words or phrases to be inadvertently transcribed; although I have reviewed the note for such errors, some may still exist.

## 2018-11-09 NOTE — PROGRESS NOTES
[facility] Medicine Services  INPATIENT PROGRESS NOTE    Patient Name: Kamryn Oliva  Date of Admission: 11/6/2018  Today's Date: 11/09/18  Length of Stay: 3  Primary Care Physician: Tong Toussaint MD    Subjective   Chief Complaint: Feels better today.  HPI   Having some abdominal discomfort but improved.  Eating a tuna salad sandwich currently  Passing gas  Has been out of bed.   Middle of back by shoulder blade is sore.        Review of Systems     All pertinent negatives and positives are as above. All other systems have been reviewed and are negative unless otherwise stated.     Objective    Temp:  [97.9 °F (36.6 °C)-99.7 °F (37.6 °C)] 97.9 °F (36.6 °C)  Heart Rate:  [64-88] 66  Resp:  [10-18] 16  BP: ()/(61-91) 103/66  Physical Exam   Constitutional: She is oriented to person, place, and time. She appears well-developed and well-nourished.   Eyes: Pupils are equal, round, and reactive to light. Conjunctivae and EOM are normal.   Neck: Neck supple.   Cardiovascular: Normal rate and regular rhythm.  Exam reveals no gallop and no friction rub.    No murmur heard.  Pulmonary/Chest: Effort normal and breath sounds normal.   Abdominal: Soft. Bowel sounds are normal. There is no hepatosplenomegaly. There is no tenderness.   Musculoskeletal: Normal range of motion. She exhibits no edema.   Neurological: She is alert and oriented to person, place, and time. No cranial nerve deficit.   Skin: Skin is warm and dry.   Psychiatric: She has a normal mood and affect. Her behavior is normal.   Nursing note and vitals reviewed.          Results Review:  I have reviewed the labs, radiology results, and diagnostic studies.    Laboratory Data:     Results from last 7 days  Lab Units 11/09/18  0609 11/08/18  0558 11/07/18  0815   WBC 10*3/mm3 11.81* 8.47 7.53   HEMOGLOBIN g/dL 12.3 12.0 10.9*   HEMATOCRIT % 36.8* 34.8* 32.7*   PLATELETS 10*3/mm3 333 314 271          Results from  last 7 days  Lab Units 11/09/18  0609 11/08/18  0558 11/07/18  0739   SODIUM mmol/L 139 143 139   POTASSIUM mmol/L 3.2* 3.6 3.7   CHLORIDE mmol/L 96* 101 106   CO2 mmol/L 36.0* 33.0* 24.0   BUN mg/dL <2* 2* 7   CREATININE mg/dL 0.55 0.57 0.55   CALCIUM mg/dL 8.9 8.9 8.2*   BILIRUBIN mg/dL 0.3 0.3 0.3   ALK PHOS U/L 78 87 57   ALT (SGPT) U/L 36 19 16   AST (SGOT) U/L 39 19 17   GLUCOSE mg/dL 113* 81 81       Culture Data:        Radiology Data:   Imaging Results (last 24 hours)     Procedure Component Value Units Date/Time    FL Cholangiogram Operative [[id number]] Collected:  11/08/18 1218     Updated:  11/09/18 0733    Narrative:       EXAMINATION:  FL CHOLANGIOGRAM OPERATIVE-  11/8/2018 11:40 AM CST     HISTORY: stones; E83.42-Hypomagnesemia; E87.6-Hypokalemia;  R10.84-Generalized abdominal pain; K82.8-Other specified diseases of  gallbladder; K81.9-Cholecystitis, unspecified      COMPARISON: No comparison study.     TECHNIQUE:      Image number: 9     Fluoroscopy time: 7 seconds     FINDINGS:      C-arm images from an operative cholangiogram demonstrate no filling  defects indicate choledocholithiasis.     The biliary tree is normal in caliber.     Appropriate spillage of contrast material into the duodenum identified.       Impression:       1. Negative operative cholangiogram.  This report was finalized on 11/08/2018 12:19 by Dr. Lior Ludwig MD.    FL C Arm During Surgery [[id number]] Collected:  11/08/18 1218     Updated:  11/09/18 0733    Narrative:       EXAMINATION:  FL CHOLANGIOGRAM OPERATIVE-  11/8/2018 11:40 AM CST     HISTORY: stones; E83.42-Hypomagnesemia; E87.6-Hypokalemia;  R10.84-Generalized abdominal pain; K82.8-Other specified diseases of  gallbladder; K81.9-Cholecystitis, unspecified      COMPARISON: No comparison study.     TECHNIQUE:      Image number: 9     Fluoroscopy time: 7 seconds     FINDINGS:      C-arm images from an operative cholangiogram demonstrate no filling  defects indicate  choledocholithiasis.     The biliary tree is normal in caliber.     Appropriate spillage of contrast material into the duodenum identified.       Impression:       1. Negative operative cholangiogram.  This report was finalized on 11/08/2018 12:19 by Dr. Lior Ludwig MD.          I have reviewed the patient's current medications.     Assessment/Plan     Assessment     Nausea with vomiting  Cholecystitis with sludge POD 1  Weight loss.   Hypokalemia  Hypomagnesemia    Plan    Increase activity  Home tomorrow if continues to tolerate diet.         Discharge Planning: I expect the patient to be discharged to home in 1 day.    Salina Masters DO   11/09/18   12:27 PM

## 2018-11-10 VITALS
OXYGEN SATURATION: 96 % | TEMPERATURE: 98.4 F | DIASTOLIC BLOOD PRESSURE: 75 MMHG | HEART RATE: 73 BPM | WEIGHT: 97.4 LBS | RESPIRATION RATE: 16 BRPM | HEIGHT: 68 IN | SYSTOLIC BLOOD PRESSURE: 109 MMHG | BODY MASS INDEX: 14.76 KG/M2

## 2018-11-10 LAB
ALBUMIN SERPL-MCNC: 3.5 G/DL (ref 3.5–5)
ALBUMIN/GLOB SERPL: 1.5 G/DL (ref 1.1–2.5)
ALP SERPL-CCNC: 77 U/L (ref 24–120)
ALT SERPL W P-5'-P-CCNC: 35 U/L (ref 0–54)
AMYLASE SERPL-CCNC: 39 U/L (ref 30–110)
ANION GAP SERPL CALCULATED.3IONS-SCNC: 8 MMOL/L (ref 4–13)
AST SERPL-CCNC: 32 U/L (ref 7–45)
BILIRUB SERPL-MCNC: 0.3 MG/DL (ref 0.1–1)
BUN BLD-MCNC: 3 MG/DL (ref 5–21)
BUN/CREAT SERPL: 5 (ref 7–25)
CALCIUM SPEC-SCNC: 9.2 MG/DL (ref 8.4–10.4)
CHLORIDE SERPL-SCNC: 99 MMOL/L (ref 98–110)
CO2 SERPL-SCNC: 34 MMOL/L (ref 24–31)
CREAT BLD-MCNC: 0.6 MG/DL (ref 0.5–1.4)
DEPRECATED RDW RBC AUTO: 47.5 FL (ref 40–54)
ERYTHROCYTE [DISTWIDTH] IN BLOOD BY AUTOMATED COUNT: 13.7 % (ref 12–15)
GFR SERPL CREATININE-BSD FRML MDRD: 107 ML/MIN/1.73
GLOBULIN UR ELPH-MCNC: 2.4 GM/DL
GLUCOSE BLD-MCNC: 92 MG/DL (ref 70–100)
HCT VFR BLD AUTO: 38.3 % (ref 37–47)
HGB BLD-MCNC: 12.5 G/DL (ref 12–16)
MCH RBC QN AUTO: 30.6 PG (ref 28–32)
MCHC RBC AUTO-ENTMCNC: 32.6 G/DL (ref 33–36)
MCV RBC AUTO: 93.6 FL (ref 82–98)
PLATELET # BLD AUTO: 295 10*3/MM3 (ref 130–400)
PMV BLD AUTO: 11.3 FL (ref 6–12)
POTASSIUM BLD-SCNC: 3.4 MMOL/L (ref 3.5–5.3)
PROT SERPL-MCNC: 5.9 G/DL (ref 6.3–8.7)
RBC # BLD AUTO: 4.09 10*6/MM3 (ref 4.2–5.4)
SODIUM BLD-SCNC: 141 MMOL/L (ref 135–145)
WBC NRBC COR # BLD: 8.84 10*3/MM3 (ref 4.8–10.8)

## 2018-11-10 PROCEDURE — 80053 COMPREHEN METABOLIC PANEL: CPT | Performed by: SPECIALIST

## 2018-11-10 PROCEDURE — 82150 ASSAY OF AMYLASE: CPT | Performed by: SPECIALIST

## 2018-11-10 PROCEDURE — 25010000002 HEPARIN (PORCINE) PER 1000 UNITS: Performed by: SPECIALIST

## 2018-11-10 PROCEDURE — 85027 COMPLETE CBC AUTOMATED: CPT | Performed by: SPECIALIST

## 2018-11-10 RX ORDER — ONDANSETRON HCL 8 MG
8 TABLET ORAL EVERY 8 HOURS PRN
Qty: 10 TABLET | Refills: 1 | Status: SHIPPED | OUTPATIENT
Start: 2018-11-10 | End: 2019-03-06

## 2018-11-10 RX ORDER — MEPERIDINE HYDROCHLORIDE 50 MG/1
50 TABLET ORAL EVERY 4 HOURS PRN
Qty: 40 TABLET | Refills: 0 | Status: SHIPPED | OUTPATIENT
Start: 2018-11-10 | End: 2019-03-06

## 2018-11-10 RX ORDER — METHYLCELLULOSE 2 G/19G
2 POWDER, FOR SOLUTION ORAL DAILY
Qty: 60 G | Refills: 6 | Status: SHIPPED | OUTPATIENT
Start: 2018-11-10 | End: 2018-12-10

## 2018-11-10 RX ADMIN — HEPARIN SODIUM 5000 UNITS: 5000 INJECTION, SOLUTION INTRAVENOUS; SUBCUTANEOUS at 06:17

## 2018-11-10 RX ADMIN — LEVETIRACETAM 250 MG: 250 TABLET, FILM COATED ORAL at 08:54

## 2018-11-10 RX ADMIN — ESCITALOPRAM 20 MG: 10 TABLET, FILM COATED ORAL at 08:53

## 2018-11-10 RX ADMIN — ASPIRIN 81 MG: 81 TABLET ORAL at 08:53

## 2018-11-10 RX ADMIN — MEPERIDINE HYDROCHLORIDE 50 MG: 50 TABLET ORAL at 04:58

## 2018-11-10 RX ADMIN — PANTOPRAZOLE SODIUM 40 MG: 40 TABLET, DELAYED RELEASE ORAL at 08:53

## 2018-11-10 RX ADMIN — AMLODIPINE BESYLATE 10 MG: 10 TABLET ORAL at 08:53

## 2018-11-10 RX ADMIN — SUCRALFATE 1 G: 1 SUSPENSION ORAL at 06:17

## 2018-11-10 NOTE — PLAN OF CARE
Problem: Patient Care Overview  Goal: Plan of Care Review  Outcome: Ongoing (interventions implemented as appropriate)   11/10/18 2180   Coping/Psychosocial   Plan of Care Reviewed With patient   Plan of Care Review   Progress no change   OTHER   Outcome Summary Patient has required oral pain meds, BS present, has not had a BM this shift. Has requested the bedpan and did not want to ambulate due to her blood pressure being low. Encourage the patient to ambulate with assistance. Continue to monitor. IVF's infusing

## 2018-11-10 NOTE — DISCHARGE SUMMARY
[facility] Medicine Services  DISCHARGE SUMMARY       Date of Admission: 11/6/2018  Date of Discharge:  11/10/2018  Primary Care Physician: Tong Toussaint MD    Presenting Problem/History of Present Illness:  Hypomagnesemia [E83.42]     Final Discharge Diagnoses:  Nausea with vomiting  Cholecystitis with sludge POD 2  Weight loss.   Hypokalemia  Hypomagnesemia        Consults:   Dr Salvador general surgeon    Procedures Performed:   Laparoscopic cholecystectomy    Pertinent Test Results:               11/10/2018 0516  11/10/2018 0557  Comprehensive Metabolic Panel [[id number]]   (Abnormal)   Blood     Final result  Glucose 92 mg/dL   BUN 3 Abnormally low  mg/dL   Creatinine 0.60 mg/dL   Sodium 141 mmol/L   Potassium 3.4 Abnormally low  mmol/L   Chloride 99 mmol/L   CO2 34.0 Abnormally high  mmol/L   Calcium 9.2 mg/dL   Total Protein 5.9 Abnormally low  g/dL   Albumin 3.50 g/dL   ALT (SGPT) 35 U/L   AST (SGOT) 32 U/L   Alkaline Phosphatase 77 U/L   Total Bilirubin 0.3 mg/dL   eGFR Non African Amer 107 mL/min/1.73   Globulin 2.4 gm/dL   A/G Ratio 1.5 g/dL   BUN/Creatinine Ratio 5.0 Abnormally low     Anion Gap 8.0 mmol/L          11/10/2018 0516  11/10/2018 0541  Amylase [[id number]]   Blood     Final result  Amylase 39 U/L          11/10/2018 0441  11/10/2018 0528  CBC (No Diff) [[id number]]   (Abnormal)   Blood     Final result  WBC 8.84 10*3/mm3   RBC 4.09 Abnormally low  10*6/mm3   Hemoglobin 12.5 g/dL   Hematocrit 38.3 %   MCV 93.6 fL   MCH 30.6 pg   MCHC 32.6 Abnormally low  g/dL   RDW 13.7 %   RDW-SD 47.5 fl   MPV 11.3 fL   Platelets 295 10*3/mm3          11/09/2018 0609  11/09/2018 0643  Magnesium [[id number]]   Blood     Final result  Magnesium 1.9 mg/dL          11/09/2018 0609  11/09/2018 0643  Comprehensive Metabolic Panel [[id number]]   (Abnormal)   Blood     Final result  Glucose 113 Abnormally high  mg/dL   BUN <2 Abnormally low  mg/dL   Creatinine 0.55 mg/dL   Sodium  139 mmol/L   Potassium 3.2 Abnormally low  mmol/L   Chloride 96 Abnormally low  mmol/L   CO2 36.0 Abnormally high  mmol/L   Calcium 8.9 mg/dL   Total Protein 5.9 Abnormally low  g/dL   Albumin 3.50 g/dL   ALT (SGPT) 36 U/L   AST (SGOT) 39 U/L   Alkaline Phosphatase 78 U/L   Total Bilirubin 0.3 mg/dL   eGFR Non African Amer 118 mL/min/1.73   Globulin 2.4 gm/dL   A/G Ratio 1.5 g/dL   BUN/Creatinine Ratio     Anion Gap 7.0 mmol/L          11/09/2018 0609  11/09/2018 0618  CBC Auto Differential [[id number]]   (Abnormal)   Blood     Final result  WBC 11.81 Abnormally high  10*3/mm3   RBC 4.12 Abnormally low  10*6/mm3   Hemoglobin 12.3 g/dL   Hematocrit 36.8 Abnormally low  %   MCV 89.3 fL   MCH 29.9 pg   MCHC 33.4 g/dL   RDW 13.5 %   RDW-SD 44.3 fl   MPV 10.6 fL   Platelets 333 10*3/mm3   Neutrophil % 69.5 %   Lymphocyte % 22.9 %   Monocyte % 5.8 %   Eosinophil % 1.2 %   Basophil % 0.3 %   Immature Grans % 0.3 %   Neutrophils, Absolute 8.23 10*3/mm3   Lymphocytes, Absolute 2.70 10*3/mm3   Monocytes, Absolute 0.68 10*3/mm3   Eosinophils, Absolute 0.14 10*3/mm3   Basophils, Absolute 0.03 10*3/mm3   Immature Grans, Absolute 0.03 10*3/mm3   nRBC 0.0 /100 WBC          11/08/2018 0558  11/08/2018 0716  CBC (No Diff) [[id number]]   (Abnormal)   Blood     Final result  WBC 8.47 10*3/mm3   RBC 3.87 Abnormally low  10*6/mm3   Hemoglobin 12.0 g/dL   Hematocrit 34.8 Abnormally low  %   MCV 89.9 fL   MCH 31.0 pg   MCHC 34.5 g/dL   RDW 13.6 %   RDW-SD 44.4 fl   MPV 11.6 fL   Platelets 314 10*3/mm3          11/08/2018 0558  11/08/2018 0703  Comprehensive Metabolic Panel [[id number]]   (Abnormal)   Blood     Final result  Glucose 81 mg/dL   BUN 2 Abnormally low  mg/dL   Creatinine 0.57 mg/dL   Sodium 143 mmol/L   Potassium 3.6 mmol/L   Chloride 101 mmol/L   CO2 33.0 Abnormally high  mmol/L   Calcium 8.9 mg/dL   Total Protein 5.9 Abnormally low  g/dL   Albumin 3.60 g/dL   ALT (SGPT) 19 U/L   AST (SGOT) 19 U/L   Alkaline Phosphatase 87 U/L    Total Bilirubin 0.3 mg/dL   eGFR Non African Amer 114 mL/min/1.73   Globulin 2.3 gm/dL   A/G Ratio 1.6 g/dL   BUN/Creatinine Ratio 3.5 Abnormally low     Anion Gap 9.0 mmol/L          11/08/2018 0558  11/08/2018 0714  Sedimentation Rate [[id number]]   Blood     Final result  Sed Rate 1 mm/hr          11/07/2018 0815  11/07/2018 0830  CBC Auto Differential [[id number]]   (Abnormal)   Blood     Final result  WBC 7.53 10*3/mm3   RBC 3.65 Abnormally low  10*6/mm3   Hemoglobin 10.9 Abnormally low  g/dL   Hematocrit 32.7 Abnormally low  %   MCV 89.6 fL   MCH 29.9 pg   MCHC 33.3 g/dL   RDW 13.5 %   RDW-SD 44.3 fl   MPV 10.8 fL   Platelets 271 10*3/mm3   Neutrophil % 36.2 Abnormally low  %   Lymphocyte % 54.2 Abnormally high  %   Monocyte % 7.4 %   Eosinophil % 1.1 %   Basophil % 0.4 %   Immature Grans % 0.7 %   Neutrophils, Absolute 2.73 10*3/mm3   Lymphocytes, Absolute 4.08 10*3/mm3   Monocytes, Absolute 0.56 10*3/mm3   Eosinophils, Absolute 0.08 10*3/mm3   Basophils, Absolute 0.03 10*3/mm3   Immature Grans, Absolute 0.05 Abnormally high  10*3/mm3   nRBC 0.0 /100 WBC          11/07/2018 0739  11/07/2018 0820  Comprehensive Metabolic Panel [[id number]]   (Abnormal)   Blood     Final result  Glucose 81 mg/dL   BUN 7 mg/dL   Creatinine 0.55 mg/dL   Sodium 139 mmol/L   Potassium 3.7 mmol/L   Chloride 106 mmol/L   CO2 24.0 mmol/L   Calcium 8.2 Abnormally low  mg/dL   Total Protein 5.6 Abnormally low  g/dL   Albumin 3.20 Abnormally low  g/dL   ALT (SGPT) 16 U/L   AST (SGOT) 17 U/L   Alkaline Phosphatase 57 U/L   Total Bilirubin 0.3 mg/dL   eGFR Non African Amer 118 mL/min/1.73   Globulin 2.4 gm/dL   A/G Ratio 1.3 g/dL   BUN/Creatinine Ratio 12.7    Anion Gap 9.0 mmol/L          11/07/2018 0739  11/07/2018 0820  Magnesium [[id number]]   Blood     Final result  Magnesium 1.8 mg/dL          11/07/2018 0739  11/07/2018 0813  Phosphorus [[id number]]   Blood     Final result  Phosphorus 2.9 mg/dL          11/07/2018 0739  11/07/2018  0820  Troponin [[id number]]   Blood     Final result  Troponin I <0.012 ng/mL          11/06/2018 1954 11/06/2018 2010  Lactic Acid, Reflex [[id number]]   Blood     Final result  Lactate, Venous 0.6 mmol/L          11/06/2018 1917  11/06/2018 1951  Troponin [[id number]]   Blood     Final result  Troponin I <0.012 ng/mL          11/06/2018 1834  11/06/2018 1846  Urinalysis With Culture If Indicated - Urine, Clean Catch [[id number]]    Urine, Clean Catch     Final result  Color, UA Yellow   Appearance, UA Clear   pH, UA 6.0   Specific Gravity, UA 1.011   Glucose, UA Negative   Ketones, UA Negative   Bilirubin, UA Negative   Blood, UA Negative   Protein, UA Negative   Leuk Esterase, UA Negative   Nitrite, UA Negative   Urobilinogen, UA 0.2 E.U./dL          11/06/2018 1514  11/06/2018 1606  Comprehensive Metabolic Panel [[id number]]   (Abnormal)   Blood     Final result  Glucose 117 Abnormally high  mg/dL   BUN 14 mg/dL   Creatinine 0.86 mg/dL   Sodium 137 mmol/L   Potassium 2.7 Critically low  mmol/L   Chloride 95 Abnormally low  mmol/L   CO2 25.0 mmol/L   Calcium 9.8 mg/dL   Total Protein 7.4 g/dL   Albumin 4.30 g/dL   ALT (SGPT) <15 U/L   AST (SGOT) 17 U/L   Alkaline Phosphatase 85 U/L   Total Bilirubin 0.4 mg/dL   eGFR Non African Amer 71 mL/min/1.73   Globulin 3.1 gm/dL   A/G Ratio 1.4 g/dL   BUN/Creatinine Ratio 16.3    Anion Gap 17.0 Abnormally high  mmol/L          11/06/2018 1514  11/06/2018 1616  Troponin [[id number]]   Blood     Final result  Troponin I <0.012 ng/mL          11/06/2018 1514  11/06/2018 1624  Protime-INR [[id number]]   (Abnormal)   Blood     Final result  Protime 12.1 Seconds   INR 0.87 Abnormally low            11/06/2018 1514  11/06/2018 1604  Lipase [[id number]]   Blood     Final result  Lipase 62 U/L          11/06/2018 1514  11/06/2018 1624  aPTT [[id number]]   Blood     Final result  aPTT 28.3 seconds          11/06/2018 1514  11/06/2018 1624  D-dimer, Quantitative [[id number]]    Blood     Final  result  D-dimer, Quant <0.22 mg/L (FEU)          11/06/2018 1514  11/06/2018 1625  CBC Auto Differential [[id number]]   Blood     Final result  WBC 7.50 10*3/mm3   RBC 4.67 10*6/mm3   Hemoglobin 14.2 g/dL   Hematocrit 41.5 %   MCV 88.9 fL   MCH 30.4 pg   MCHC 34.2 g/dL   RDW 13.5 %   RDW-SD 43.8 fl   MPV 11.3 fL   Platelets 390 10*3/mm3          11/06/2018 1514  11/06/2018 1625  Manual Differential [[id number]]   (Abnormal)   Blood     Final result  Neutrophil % 40.3 %   Lymphocyte % 45.4 Abnormally high  %   Monocyte % 2.5 Abnormally low  %   Eosinophil % 1.7 %   Atypical Lymphocyte % 10.1 Abnormally high  %   Neutrophils, Absolute 3.02 10*3/mm3   Lymphocytes, Absolute 3.41 10*3/mm3   Monocytes, Absolute 0.19 10*3/mm3   Eosinophils, Absolute 0.13 10*3/mm3   RBC Morphology Normal    WBC Morphology Normal    Clumped Platelets Present    Giant Platelets Mod/2+           11/06/2018 1514  11/06/2018 1800  Magnesium [[id number]]   (Abnormal)   Blood     Final result  Magnesium 1.2 Abnormally low  mg/dL          11/06/2018 1510  11/06/2018 1615  [place] Draw [[id number]]    Blood     Final result  No result data          11/06/2018 1510  11/06/2018 1615  [place] Draw [[id number]]    Blood     Final result  No result data          11/06/2018 1510  11/06/2018 1623  Lactic Acid, Plasma [[id number]]   (Abnormal)   Blood     Final result  Lactate, Venous 2.2 Critically high                   11/08/2018 1218  11/09/2018 0733  FL Cholangiogram Operative [[id number]]   No result data          11/08/2018 1218  11/09/2018 0733  FL C Arm During Surgery [[id number]]   No result data          11/06/2018 2110  11/06/2018 2114  US Abdomen Limited [[id number]]   No result data          11/06/2018 1902  11/06/2018 1907  CT Abdomen Pelvis With Contrast [[id number]]   No result data          11/06/2018 1625  11/06/2018 1629  XR Chest 1 View [[id number]]           Chief Complaint on Day of Discharge:   Feels much better and has even been able to eat  "more than she has been able to eat in the past month    History of Present Illness on Day of Discharge:   Currently no pain.     Hospital Course:  The patient is a 48 y.o. female who presented to [facility] with nausea and vomiting.   Found to have dilated gallbladder and sludge.   Surgeon consulted.   Gallbladder removed.   Diet resumed.  Tolerated well  Home today    Condition on Discharge:  Improved stable.     Physical Exam on Discharge:  /75 (BP Location: Left arm, Patient Position: Lying)   Pulse 73   Temp 98.4 °F (36.9 °C) (Temporal)   Resp 16   Ht 171.5 cm (67.5\")   Wt 44.2 kg (97 lb 6.4 oz)   LMP  (LMP Unknown)   SpO2 96%   BMI 15.03 kg/m²   Physical Exam   Constitutional: She is oriented to person, place, and time. She appears well-developed and well-nourished.   HENT:   Head: Normocephalic and atraumatic.   Eyes: Conjunctivae and EOM are normal. Pupils are equal, round, and reactive to light.   Neck: Neck supple.   Cardiovascular: Normal rate and regular rhythm. Exam reveals no gallop and no friction rub.   No murmur heard.  Pulmonary/Chest: Effort normal and breath sounds normal.   Abdominal: Soft. Bowel sounds are normal. There is no hepatosplenomegaly. There is tenderness (mild tenderness at incisions. ).   Musculoskeletal: Normal range of motion. She exhibits no edema.   Neurological: She is alert and oriented to person, place, and time. No cranial nerve deficit.   Skin: Skin is warm and dry.   Psychiatric: She has a normal mood and affect. Her behavior is normal.   Nursing note and vitals reviewed.        Discharge Disposition:  Home or Self Care    Discharge Medications:     Discharge Medications      New Medications      Instructions Start Date   CITRUCEL oral powder  Generic drug:  methylcellulose   2 g, Oral, Daily      meperidine 50 MG tablet  Commonly known as:  DEMEROL   50 mg, Oral, Every 4 Hours PRN      ZOFRAN 8 MG tablet  Generic drug:  ondansetron   8 mg, Oral, " Every 8 Hours PRN         Continue These Medications      Instructions Start Date   ALPRAZolam 1 MG tablet  Commonly known as:  XANAX   1 mg, Oral, 3 Times Daily PRN      amitriptyline 100 MG tablet  Commonly known as:  ELAVIL   100 mg, Oral, Nightly      amLODIPine 10 MG tablet  Commonly known as:  NORVASC   10 mg, Oral, Daily      aspirin 81 MG EC tablet   Take 81 mg by mouth daily.      CloNIDine 0.1 MG tablet  Commonly known as:  CATAPRES   0.1 mg, Oral, Every 4 Hours PRN      escitalopram 20 MG tablet  Commonly known as:  LEXAPRO   20 mg, Oral, Daily      fluticasone 50 MCG/ACT nasal spray  Commonly known as:  FLONASE   2 sprays, Nasal, Daily      levETIRAcetam 500 MG tablet  Commonly known as:  KEPPRA   500 mg, Oral, 2 Times Daily      megestrol 40 MG tablet  Commonly known as:  MEGACE   40 mg, Oral, Daily      metoprolol succinate  MG 24 hr tablet  Commonly known as:  TOPROL-XL   100 mg, Oral, Nightly      multivitamin and minerals liquid liquid   15 mL, Oral, Daily      pantoprazole 40 MG EC tablet  Commonly known as:  PROTONIX   40 mg, Oral, 2 Times Daily      promethazine 25 MG tablet  Commonly known as:  PHENERGAN   25 mg, Oral, Every 6 Hours PRN         colestid prn diarrhea    Discharge Diet:   Diet Instructions     Advance Diet as Tolerated            Activity at Discharge:   Activity Instructions     Activity as Tolerated      Discharge Activity      1) No driving for the next 2 days, showers are okay, no bathtubs for the next 2 weeks, do not expect a bowel movement until Monday if no bowel movement by Tuesday please take 1 bottle of magnesium citrate.  Prescriptions on chart follow-up with me this coming Friday leave the dressings in place as long as they are dry and waterproof.          Discharge Care Plan/Instructions:   Recommend obtaining a PCP  Discussed at length with patient.       Follow-up Appointments:   Future Appointments   Date Time Provider Department Center   2/6/2019 10:40 AM  PAD BIC [facility] 1 BH PAD MA BI PAD       Test Results Pending at Discharge: none      Salina Masters DO  11/10/18  10:06 AM    Time: 35 minutes     cheung

## 2018-11-10 NOTE — PROGRESS NOTES
LOS: 4 days   Patient Care Team:  Tong Toussaint MD as PCP - General (Hematology and Oncology)  Tong Toussaint MD as PCP - Claims Attributed  Camilo Hanson MD as Consulting Physician (Gastroenterology)    Chief Complaint:  Recurrent nausea vomiting   Subjective     Subjective     Postoperative day #2 following laparoscopic cholecystectomy she looks very good she is increasing her activity she has minimal discomfort, she is taking most of her food she denies any further nausea or vomiting this seems to have worked well for with the cholecystectomy.  No flatus no bowel movements to this point.    Objective      Objective     Vital Signs  Temp:  [97.8 °F (36.6 °C)-98.6 °F (37 °C)] 98.4 °F (36.9 °C)  Heart Rate:  [60-73] 73  Resp:  [16] 16  BP: ()/(58-75) 109/75    Intake & Output (last 3 days)       11/07 0701 - 11/08 0700 11/08 0701 - 11/09 0700 11/09 0701 - 11/10 0700 11/10 0701 - 11/11 0700    P.O. 720 480 480     I.V. (mL/kg)  600 (13.6) 300 (6.8)     IV Piggyback        Total Intake(mL/kg) 720 (16.3) 1080 (24.4) 780 (17.6)     Net +720 +1080 +780             Unmeasured Urine Occurrence 2 x 2 x 4 x     Unmeasured Stool Occurrence 1 x  1 x           Physical Exam:     General Appearance:    Alert, cooperative, in no acute distress   Lungs:     Clear to auscultation,respirations regular, even and                  unlabored    Heart:    Regular rhythm and normal rate, normal S1 and S2, no            murmur, no gallop, no rub, no click   Chest Wall:    No abnormalities observed   Abdomen:     Normal bowel sounds, no masses, no organomegaly, soft        non-tender, non-distended,wound clean and dry, no   erythema, no discharge No sign of any infection well approximated wound her white count is 8 her liver functions are normal.        Results Review:     I reviewed the patient's new clinical results.  I reviewed the patient's new imaging results and agree with the interpretation.    Results from  last 7 days   Lab Units  11/10/18   0441  11/09/18   0609  11/08/18   0558   WBC 10*3/mm3  8.84  11.81*  8.47   HEMOGLOBIN g/dL  12.5  12.3  12.0   HEMATOCRIT %  38.3  36.8*  34.8*   PLATELETS 10*3/mm3  295  333  314        Results from last 7 days   Lab Units  11/10/18   0516  11/09/18   0609  11/08/18   0558   SODIUM mmol/L  141  139  143   POTASSIUM mmol/L  3.4*  3.2*  3.6   CHLORIDE mmol/L  99  96*  101   CO2 mmol/L  34.0*  36.0*  33.0*   BUN mg/dL  3*  <2*  2*   CREATININE mg/dL  0.60  0.55  0.57   CALCIUM mg/dL  9.2  8.9  8.9   BILIRUBIN mg/dL  0.3  0.3  0.3   ALK PHOS U/L  77  78  87   ALT (SGPT) U/L  35  36  19   AST (SGOT) U/L  32  39  19   GLUCOSE mg/dL  92  113*  81       Assessment/Plan     Assessment/Plan       Hypomagnesemia    Status post laparoscopic cholecystectomy doing very well postoperatively okay with me to be discharged to home today follow-up with me this coming Friday prescriptions on chart.  Decision for discharge to be made by hospitalist tadeo Salvador MD  11/10/18  9:12 AM      Time: time spent with patient 15 minutes     EMR Dragon/Transcription disclaimer: Much of this encounter note is an electronic transcription/translation of spoken language to printed text. The electronic translation of spoken language may permit erroneous, or at times, nonsensical words or phrases to be inadvertently transcribed; although I have reviewed the note for such errors, some may still exist.

## 2018-11-10 NOTE — PROGRESS NOTES
.Discharge Planning Assessment  [facility]     Patient Name: Kamryn Oliva  MRN: [id number]  Today's Date: 11/10/2018    Admit Date: 11/6/2018    Discharge Needs Assessment     Row Name 11/10/18 0824       Living Environment    Lives With  alone    Current Living Arrangements  home/apartment/condo    Primary Care Provided by  self    Provides Primary Care For  no one    Family Caregiver if Needed  significant other    Quality of Family Relationships  helpful;involved;supportive    Able to Return to Prior Arrangements  yes       Resource/Environmental Concerns    Resource/Environmental Concerns  none       Transition Planning    Patient/Family Anticipates Transition to  home    Patient/Family Anticipated Services at Transition  none    Transportation Anticipated  family or friend will provide       Discharge Needs Assessment    Readmission Within the Last 30 Days  previous discharge plan unsuccessful    Concerns to be Addressed  no discharge needs identified    Equipment Currently Used at Home  shower chair    Anticipated Changes Related to Illness  none    Equipment Needed After Discharge  none    Discharge Coordination/Progress  Pt has PCP and RX coverage.  Pt can afford medications.        Discharge Plan    No documentation.       Destination      No service coordination in this encounter.      Durable Medical Equipment      No service coordination in this encounter.      Dialysis/Infusion      No service coordination in this encounter.      Home Medical Care      No service coordination in this encounter.      Community Resources      No service coordination in this encounter.          Demographic Summary    No documentation.       Functional Status    No documentation.       Psychosocial    No documentation.       Abuse/Neglect    No documentation.       Legal    No documentation.       Substance Abuse    No documentation.       Patient Forms    No documentation.           CARY Enamorado

## 2018-11-11 ENCOUNTER — READMISSION MANAGEMENT (OUTPATIENT)
Dept: CALL CENTER | Facility: HOSPITAL | Age: 48
End: 2018-11-11

## 2018-11-11 NOTE — OUTREACH NOTE
Prep Survey      Responses   Facility patient discharged from?  [facility]   Is patient eligible?  Yes   Discharge diagnosis  Nausea with vomiting/ Laparascopic Cholecystectomy   Does the patient have one of the following disease processes/diagnoses(primary or secondary)?  General Surgery   Does the patient have Home health ordered?  No   Is there a DME ordered?  No   Prep survey completed?  Yes          Liana Almanza RN

## 2018-11-12 LAB
CYTO UR: NORMAL
LAB AP CASE REPORT: NORMAL
PATH REPORT.FINAL DX SPEC: NORMAL
PATH REPORT.GROSS SPEC: NORMAL

## 2018-11-14 ENCOUNTER — READMISSION MANAGEMENT (OUTPATIENT)
Dept: CALL CENTER | Facility: HOSPITAL | Age: 48
End: 2018-11-14

## 2018-11-14 NOTE — OUTREACH NOTE
General Surgery Week 1 Survey      Responses   Facility patient discharged from?  [facility]   Does the patient have one of the following disease processes/diagnoses(primary or secondary)?  General Surgery   Is there a successful TCM telephone encounter documented?  No   Week 1 attempt successful?  No   Unsuccessful attempts  Attempt 1          Ivette Kim, RN

## 2018-11-15 ENCOUNTER — READMISSION MANAGEMENT (OUTPATIENT)
Dept: CALL CENTER | Facility: HOSPITAL | Age: 48
End: 2018-11-15

## 2018-11-15 NOTE — OUTREACH NOTE
General Surgery Week 1 Survey      Responses   Facility patient discharged from?  [facility]   Does the patient have one of the following disease processes/diagnoses(primary or secondary)?  General Surgery   Is there a successful TCM telephone encounter documented?  No   Week 1 attempt successful?  No   Unsuccessful attempts  Attempt 2          So Martínez RN

## 2018-11-19 ENCOUNTER — READMISSION MANAGEMENT (OUTPATIENT)
Dept: CALL CENTER | Facility: HOSPITAL | Age: 48
End: 2018-11-19

## 2018-11-19 NOTE — OUTREACH NOTE
General Surgery Week 2 Survey      Responses   Facility patient discharged from?  [facility]   Does the patient have one of the following disease processes/diagnoses(primary or secondary)?  General Surgery   Week 2 attempt successful?  No   Unsuccessful attempts  Attempt 1          Sabiha Cardenas RN

## 2018-11-20 ENCOUNTER — READMISSION MANAGEMENT (OUTPATIENT)
Dept: CALL CENTER | Facility: HOSPITAL | Age: 48
End: 2018-11-20

## 2018-11-20 NOTE — OUTREACH NOTE
General Surgery Week 2 Survey      Responses   Facility patient discharged from?  [place] [facility]   Does the patient have one of the following disease processes/diagnoses(primary or secondary)?  General Surgery   Week 2 attempt successful?  No   Unsuccessful attempts  Attempt 2          Mallika Moore RN

## 2018-11-21 ENCOUNTER — READMISSION MANAGEMENT (OUTPATIENT)
Dept: CALL CENTER | Facility: HOSPITAL | Age: 48
End: 2018-11-21

## 2018-11-21 NOTE — OUTREACH NOTE
General Surgery Week 2 Survey      Responses   Facility patient discharged from?  [facility]   Does the patient have one of the following disease processes/diagnoses(primary or secondary)?  General Surgery   Week 2 attempt successful?  Yes   Call start time  1332   Call end time  1341   Discharge diagnosis  Nausea with vomiting/ Laparascopic Cholecystectomy   Meds reviewed with patient/caregiver?  Yes   Is the patient having any side effects they believe may be caused by any medication additions or changes?  No   Does the patient have all medications related to this admission filled (includes all antibiotics, pain medications, etc.)  Yes   Is the patient taking all medications as directed (includes completed medication regime)?  Yes   Does the patient have a follow up appointment scheduled with their surgeon?  Yes   Has the patient kept scheduled appointments due by today?  N/A   Has home health visited the patient within 72 hours of discharge?  N/A   Psychosocial issues?  No   Did the patient receive a copy of their discharge instructions?  Yes   Nursing interventions  Reviewed instructions with patient   What is the patient's perception of their health status since discharge?  Improving   Nursing interventions  Nurse provided patient education   Is the patient /caregiver able to teach back basic post-op care?  Practice 'cough and deep breath', Drive as instructed by MD in discharge instructions, Take showers only when approved by MD-sponge bathe until then, Keep incision areas clean,dry and protected, Do not remove steri-strips   Is the patient/caregiver able to teach back signs and symptoms of incisional infection?  Increased redness, swelling or pain at the incisonal site, Increased drainage or bleeding, Incisional warmth, Pus or odor from incision, Fever   Is the patient/caregiver able to teach back steps to recovery at home?  Rest and rebuild strength, gradually increase activity, Eat a well-balance diet,  Practice good oral hygiene   Additional teach back comments  non smoker. Pt says she has been sleeping a lot. Started eating solid food yesterday. Taking pain med. discussed taking something for constipation. Steri strips in tack. Pt has not moved around a lot, Advised to cough and take deep breaths. Pt is taking temp. no elevation   Week 2 call completed?  Yes          Virginia Olsen RN

## 2018-11-30 ENCOUNTER — APPOINTMENT (OUTPATIENT)
Dept: GENERAL RADIOLOGY | Facility: HOSPITAL | Age: 48
End: 2018-11-30

## 2018-11-30 ENCOUNTER — HOSPITAL ENCOUNTER (EMERGENCY)
Facility: HOSPITAL | Age: 48
Discharge: HOME | End: 2018-11-30
Attending: EMERGENCY MEDICINE | Admitting: EMERGENCY MEDICINE

## 2018-11-30 ENCOUNTER — APPOINTMENT (OUTPATIENT)
Dept: MRI IMAGING | Facility: HOSPITAL | Age: 48
End: 2018-11-30

## 2018-11-30 VITALS
SYSTOLIC BLOOD PRESSURE: 108 MMHG | RESPIRATION RATE: 19 BRPM | WEIGHT: 101 LBS | TEMPERATURE: 98 F | DIASTOLIC BLOOD PRESSURE: 83 MMHG | HEART RATE: 97 BPM | OXYGEN SATURATION: 99 % | BODY MASS INDEX: 15.85 KG/M2 | HEIGHT: 67 IN

## 2018-11-30 DIAGNOSIS — N39.0 ACUTE UTI (URINARY TRACT INFECTION): ICD-10-CM

## 2018-11-30 DIAGNOSIS — F23 ACUTE PSYCHOSIS (HCC): Primary | ICD-10-CM

## 2018-11-30 LAB
ALBUMIN SERPL-MCNC: 4.9 G/DL (ref 3.5–5)
ALBUMIN/GLOB SERPL: 1.4 G/DL (ref 1.1–2.5)
ALP SERPL-CCNC: 110 U/L (ref 24–120)
ALT SERPL W P-5'-P-CCNC: <15 U/L (ref 0–54)
AMPHET+METHAMPHET UR QL: NEGATIVE
ANION GAP SERPL CALCULATED.3IONS-SCNC: 17 MMOL/L (ref 4–13)
ARTERIAL PATENCY WRIST A: ABNORMAL
AST SERPL-CCNC: 22 U/L (ref 7–45)
ATMOSPHERIC PRESS: 750 MMHG
BACTERIA UR QL AUTO: ABNORMAL /HPF
BARBITURATES UR QL SCN: NEGATIVE
BASE EXCESS BLDA CALC-SCNC: 1.1 MMOL/L (ref 0–2)
BASOPHILS # BLD AUTO: 0.04 10*3/MM3 (ref 0–0.2)
BASOPHILS NFR BLD AUTO: 0.3 % (ref 0–2)
BDY SITE: ABNORMAL
BENZODIAZ UR QL SCN: NEGATIVE
BILIRUB SERPL-MCNC: 0.3 MG/DL (ref 0.1–1)
BILIRUB UR QL STRIP: NEGATIVE
BODY TEMPERATURE: 37 C
BUN BLD-MCNC: 14 MG/DL (ref 5–21)
BUN/CREAT SERPL: 18.4 (ref 7–25)
CALCIUM SPEC-SCNC: 10.7 MG/DL (ref 8.4–10.4)
CANNABINOIDS SERPL QL: POSITIVE
CHLORIDE SERPL-SCNC: 100 MMOL/L (ref 98–110)
CLARITY UR: CLEAR
CO2 SERPL-SCNC: 24 MMOL/L (ref 24–31)
COCAINE UR QL: NEGATIVE
COLOR UR: YELLOW
CREAT BLD-MCNC: 0.76 MG/DL (ref 0.5–1.4)
D-LACTATE SERPL-SCNC: 0.6 MMOL/L (ref 0.5–2)
D-LACTATE SERPL-SCNC: 2.8 MMOL/L (ref 0.5–2)
DEPRECATED RDW RBC AUTO: 51.8 FL (ref 40–54)
EOSINOPHIL # BLD AUTO: 0.13 10*3/MM3 (ref 0–0.7)
EOSINOPHIL NFR BLD AUTO: 1 % (ref 0–4)
ERYTHROCYTE [DISTWIDTH] IN BLOOD BY AUTOMATED COUNT: 15 % (ref 12–15)
ETHANOL UR QL: <0.01 %
GFR SERPL CREATININE-BSD FRML MDRD: 82 ML/MIN/1.73
GLOBULIN UR ELPH-MCNC: 3.4 GM/DL
GLUCOSE BLD-MCNC: 122 MG/DL (ref 70–100)
GLUCOSE UR STRIP-MCNC: NEGATIVE MG/DL
HCG SERPL QL: NEGATIVE
HCO3 BLDA-SCNC: 23.4 MMOL/L (ref 20–26)
HCT VFR BLD AUTO: 35.6 % (ref 37–47)
HGB BLD-MCNC: 11.7 G/DL (ref 12–16)
HGB UR QL STRIP.AUTO: NEGATIVE
HOLD SPECIMEN: NORMAL
IMM GRANULOCYTES # BLD: 0.05 10*3/MM3 (ref 0–0.03)
IMM GRANULOCYTES NFR BLD: 0.4 % (ref 0–5)
KETONES UR QL STRIP: NEGATIVE
LEUKOCYTE ESTERASE UR QL STRIP.AUTO: ABNORMAL
LYMPHOCYTES # BLD AUTO: 5.17 10*3/MM3 (ref 0.72–4.86)
LYMPHOCYTES NFR BLD AUTO: 38.7 % (ref 15–45)
Lab: ABNORMAL
MCH RBC QN AUTO: 31 PG (ref 28–32)
MCHC RBC AUTO-ENTMCNC: 32.9 G/DL (ref 33–36)
MCV RBC AUTO: 94.4 FL (ref 82–98)
METHADONE UR QL SCN: NEGATIVE
MODALITY: ABNORMAL
MONOCYTES # BLD AUTO: 0.85 10*3/MM3 (ref 0.19–1.3)
MONOCYTES NFR BLD AUTO: 6.4 % (ref 4–12)
MUCOUS THREADS URNS QL MICRO: ABNORMAL /HPF
NEUTROPHILS # BLD AUTO: 7.13 10*3/MM3 (ref 1.87–8.4)
NEUTROPHILS NFR BLD AUTO: 53.2 % (ref 39–78)
NITRITE UR QL STRIP: NEGATIVE
NRBC BLD MANUAL-RTO: 0 /100 WBC (ref 0–0)
OPIATES UR QL: NEGATIVE
PCO2 BLDA: 28.7 MM HG (ref 35–45)
PCP UR QL SCN: NEGATIVE
PH BLDA: 7.52 PH UNITS (ref 7.35–7.45)
PH UR STRIP.AUTO: 5.5 [PH] (ref 5–8)
PLATELET # BLD AUTO: 522 10*3/MM3 (ref 130–400)
PMV BLD AUTO: 10.1 FL (ref 6–12)
PO2 BLDA: 92.1 MM HG (ref 83–108)
POTASSIUM BLD-SCNC: 3.6 MMOL/L (ref 3.5–5.3)
PROCALCITONIN SERPL-MCNC: <0.25 NG/ML
PROT SERPL-MCNC: 8.3 G/DL (ref 6.3–8.7)
PROT UR QL STRIP: ABNORMAL
RBC # BLD AUTO: 3.77 10*6/MM3 (ref 4.2–5.4)
RBC # UR: ABNORMAL /HPF
REF LAB TEST METHOD: ABNORMAL
SAO2 % BLDCOA: 98.7 % (ref 94–99)
SODIUM BLD-SCNC: 141 MMOL/L (ref 135–145)
SP GR UR STRIP: 1.02 (ref 1–1.03)
SQUAMOUS #/AREA URNS HPF: ABNORMAL /HPF
T4 FREE SERPL-MCNC: 1.08 NG/DL (ref 0.78–2.19)
TSH SERPL DL<=0.05 MIU/L-ACNC: 1.58 MIU/ML (ref 0.47–4.68)
UROBILINOGEN UR QL STRIP: ABNORMAL
VENTILATOR MODE: ABNORMAL
WBC NRBC COR # BLD: 13.37 10*3/MM3 (ref 4.8–10.8)
WBC UR QL AUTO: ABNORMAL /HPF

## 2018-11-30 PROCEDURE — 36600 WITHDRAWAL OF ARTERIAL BLOOD: CPT

## 2018-11-30 PROCEDURE — 80053 COMPREHEN METABOLIC PANEL: CPT | Performed by: EMERGENCY MEDICINE

## 2018-11-30 PROCEDURE — 93010 ELECTROCARDIOGRAM REPORT: CPT | Performed by: INTERNAL MEDICINE

## 2018-11-30 PROCEDURE — 80307 DRUG TEST PRSMV CHEM ANLYZR: CPT | Performed by: EMERGENCY MEDICINE

## 2018-11-30 PROCEDURE — 87040 BLOOD CULTURE FOR BACTERIA: CPT | Performed by: EMERGENCY MEDICINE

## 2018-11-30 PROCEDURE — 84145 PROCALCITONIN (PCT): CPT | Performed by: EMERGENCY MEDICINE

## 2018-11-30 PROCEDURE — 82803 BLOOD GASES ANY COMBINATION: CPT

## 2018-11-30 PROCEDURE — 93005 ELECTROCARDIOGRAM TRACING: CPT | Performed by: EMERGENCY MEDICINE

## 2018-11-30 PROCEDURE — 85025 COMPLETE CBC W/AUTO DIFF WBC: CPT | Performed by: EMERGENCY MEDICINE

## 2018-11-30 PROCEDURE — 70551 MRI BRAIN STEM W/O DYE: CPT

## 2018-11-30 PROCEDURE — 83605 ASSAY OF LACTIC ACID: CPT | Performed by: EMERGENCY MEDICINE

## 2018-11-30 PROCEDURE — 71045 X-RAY EXAM CHEST 1 VIEW: CPT

## 2018-11-30 PROCEDURE — 99284 EMERGENCY DEPT VISIT MOD MDM: CPT

## 2018-11-30 PROCEDURE — 96374 THER/PROPH/DIAG INJ IV PUSH: CPT

## 2018-11-30 PROCEDURE — 81001 URINALYSIS AUTO W/SCOPE: CPT | Performed by: EMERGENCY MEDICINE

## 2018-11-30 PROCEDURE — 25010000002 LORAZEPAM PER 2 MG: Performed by: EMERGENCY MEDICINE

## 2018-11-30 PROCEDURE — 84439 ASSAY OF FREE THYROXINE: CPT | Performed by: EMERGENCY MEDICINE

## 2018-11-30 PROCEDURE — 84443 ASSAY THYROID STIM HORMONE: CPT | Performed by: EMERGENCY MEDICINE

## 2018-11-30 PROCEDURE — 84703 CHORIONIC GONADOTROPIN ASSAY: CPT | Performed by: EMERGENCY MEDICINE

## 2018-11-30 RX ORDER — SULFAMETHOXAZOLE AND TRIMETHOPRIM 800; 160 MG/1; MG/1
1 TABLET ORAL 2 TIMES DAILY
Qty: 10 TABLET | Refills: 0 | Status: SHIPPED | OUTPATIENT
Start: 2018-11-30 | End: 2019-03-06

## 2018-11-30 RX ORDER — LORAZEPAM 2 MG/ML
1 INJECTION INTRAMUSCULAR ONCE
Status: COMPLETED | OUTPATIENT
Start: 2018-11-30 | End: 2018-11-30

## 2018-11-30 RX ADMIN — SODIUM CHLORIDE, POTASSIUM CHLORIDE, SODIUM LACTATE AND CALCIUM CHLORIDE 1000 ML: 600; 310; 30; 20 INJECTION, SOLUTION INTRAVENOUS at 10:08

## 2018-11-30 RX ADMIN — LORAZEPAM 1 MG: 2 INJECTION INTRAMUSCULAR; INTRAVENOUS at 14:50

## 2018-12-01 ENCOUNTER — HOSPITAL ENCOUNTER (EMERGENCY)
Facility: HOSPITAL | Age: 48
Discharge: HOME | End: 2018-12-02
Attending: EMERGENCY MEDICINE | Admitting: EMERGENCY MEDICINE

## 2018-12-01 DIAGNOSIS — M79.10 MYALGIA: Primary | ICD-10-CM

## 2018-12-01 PROCEDURE — 96360 HYDRATION IV INFUSION INIT: CPT

## 2018-12-01 PROCEDURE — 99284 EMERGENCY DEPT VISIT MOD MDM: CPT

## 2018-12-01 RX ORDER — SODIUM CHLORIDE 0.9 % (FLUSH) 0.9 %
10 SYRINGE (ML) INJECTION AS NEEDED
Status: DISCONTINUED | OUTPATIENT
Start: 2018-12-01 | End: 2018-12-02 | Stop reason: HOSPADM

## 2018-12-02 VITALS
WEIGHT: 86.6 LBS | HEART RATE: 83 BPM | RESPIRATION RATE: 16 BRPM | TEMPERATURE: 98.4 F | HEIGHT: 67 IN | OXYGEN SATURATION: 94 % | BODY MASS INDEX: 13.59 KG/M2 | SYSTOLIC BLOOD PRESSURE: 117 MMHG | DIASTOLIC BLOOD PRESSURE: 87 MMHG

## 2018-12-02 LAB
ALBUMIN SERPL-MCNC: 3.9 G/DL (ref 3.5–5)
ALBUMIN/GLOB SERPL: 1.3 G/DL (ref 1.1–2.5)
ALP SERPL-CCNC: 104 U/L (ref 24–120)
ALT SERPL W P-5'-P-CCNC: 16 U/L (ref 0–54)
ANION GAP SERPL CALCULATED.3IONS-SCNC: 10 MMOL/L (ref 4–13)
AST SERPL-CCNC: 20 U/L (ref 7–45)
BASOPHILS # BLD AUTO: 0.03 10*3/MM3 (ref 0–0.2)
BASOPHILS NFR BLD AUTO: 0.4 % (ref 0–2)
BILIRUB SERPL-MCNC: 0.2 MG/DL (ref 0.1–1)
BUN BLD-MCNC: 11 MG/DL (ref 5–21)
BUN/CREAT SERPL: 13.9 (ref 7–25)
CALCIUM SPEC-SCNC: 9.2 MG/DL (ref 8.4–10.4)
CHLORIDE SERPL-SCNC: 106 MMOL/L (ref 98–110)
CO2 SERPL-SCNC: 26 MMOL/L (ref 24–31)
CREAT BLD-MCNC: 0.79 MG/DL (ref 0.5–1.4)
D-LACTATE SERPL-SCNC: 0.8 MMOL/L (ref 0.5–2)
DEPRECATED RDW RBC AUTO: 50.1 FL (ref 40–54)
EOSINOPHIL # BLD AUTO: 0.13 10*3/MM3 (ref 0–0.7)
EOSINOPHIL NFR BLD AUTO: 1.7 % (ref 0–4)
ERYTHROCYTE [DISTWIDTH] IN BLOOD BY AUTOMATED COUNT: 14.7 % (ref 12–15)
GFR SERPL CREATININE-BSD FRML MDRD: 78 ML/MIN/1.73
GLOBULIN UR ELPH-MCNC: 3 GM/DL
GLUCOSE BLD-MCNC: 94 MG/DL (ref 70–100)
HCT VFR BLD AUTO: 32.8 % (ref 37–47)
HGB BLD-MCNC: 10.9 G/DL (ref 12–16)
HOLD SPECIMEN: NORMAL
HOLD SPECIMEN: NORMAL
IMM GRANULOCYTES # BLD: 0.02 10*3/MM3 (ref 0–0.03)
IMM GRANULOCYTES NFR BLD: 0.3 % (ref 0–5)
LIPASE SERPL-CCNC: 82 U/L (ref 23–203)
LYMPHOCYTES # BLD AUTO: 2.7 10*3/MM3 (ref 0.72–4.86)
LYMPHOCYTES NFR BLD AUTO: 36.1 % (ref 15–45)
MAGNESIUM SERPL-MCNC: 1.9 MG/DL (ref 1.4–2.2)
MCH RBC QN AUTO: 30.7 PG (ref 28–32)
MCHC RBC AUTO-ENTMCNC: 33.2 G/DL (ref 33–36)
MCV RBC AUTO: 92.4 FL (ref 82–98)
MONOCYTES # BLD AUTO: 0.49 10*3/MM3 (ref 0.19–1.3)
MONOCYTES NFR BLD AUTO: 6.6 % (ref 4–12)
NEUTROPHILS # BLD AUTO: 4.1 10*3/MM3 (ref 1.87–8.4)
NEUTROPHILS NFR BLD AUTO: 54.9 % (ref 39–78)
NRBC BLD MANUAL-RTO: 0 /100 WBC (ref 0–0)
PLATELET # BLD AUTO: 420 10*3/MM3 (ref 130–400)
PMV BLD AUTO: 9.5 FL (ref 6–12)
POTASSIUM BLD-SCNC: 3.7 MMOL/L (ref 3.5–5.3)
PROT SERPL-MCNC: 6.9 G/DL (ref 6.3–8.7)
RBC # BLD AUTO: 3.55 10*6/MM3 (ref 4.2–5.4)
SODIUM BLD-SCNC: 142 MMOL/L (ref 135–145)
WBC NRBC COR # BLD: 7.47 10*3/MM3 (ref 4.8–10.8)
WHOLE BLOOD HOLD SPECIMEN: NORMAL
WHOLE BLOOD HOLD SPECIMEN: NORMAL

## 2018-12-02 PROCEDURE — 85025 COMPLETE CBC W/AUTO DIFF WBC: CPT | Performed by: EMERGENCY MEDICINE

## 2018-12-02 PROCEDURE — 83690 ASSAY OF LIPASE: CPT | Performed by: EMERGENCY MEDICINE

## 2018-12-02 PROCEDURE — 83605 ASSAY OF LACTIC ACID: CPT | Performed by: EMERGENCY MEDICINE

## 2018-12-02 PROCEDURE — 96360 HYDRATION IV INFUSION INIT: CPT

## 2018-12-02 PROCEDURE — 83735 ASSAY OF MAGNESIUM: CPT | Performed by: EMERGENCY MEDICINE

## 2018-12-02 PROCEDURE — 80053 COMPREHEN METABOLIC PANEL: CPT | Performed by: EMERGENCY MEDICINE

## 2018-12-02 RX ADMIN — SODIUM CHLORIDE 1000 ML: 9 INJECTION, SOLUTION INTRAVENOUS at 00:20

## 2018-12-02 NOTE — ED PROVIDER NOTES
Subjective   Patient c/o diffuse pain and headaches that have bee occurring for at least one year and gradually getting worse.  Aches all over.  Has lost lot of weight over past year.  Has had her gallbladder out and also had bowel obstructions.  Was here yesterday and had MRI of head and says she has moyamoya disease.  Was in hospital several weeks ago and has worsened since then and says she has not been out of bed.  Lot of nausea and poor intake but no vomiting.        History provided by:  Patient   used: No    Illness   Location:  [facility]  Quality:  Achy  Severity:  Severe  Onset quality:  Gradual  Duration:  12 months  Timing:  Constant  Progression:  Worsening  Chronicity:  Recurrent  Associated symptoms: abdominal pain, diarrhea, fatigue, headaches, myalgias and nausea    Associated symptoms: no chest pain, no congestion, no cough, no ear pain, no fever, no loss of consciousness, no rash, no rhinorrhea, no shortness of breath, no sore throat, no vomiting and no wheezing        Review of Systems   Constitutional: Positive for fatigue. Negative for fever.   HENT: Negative for congestion, ear pain, rhinorrhea and sore throat.    Respiratory: Negative.  Negative for cough, shortness of breath and wheezing.    Cardiovascular: Negative for chest pain.   Gastrointestinal: Positive for abdominal pain, diarrhea and nausea. Negative for vomiting.   Genitourinary: Negative.    Musculoskeletal: Positive for myalgias.   Skin: Negative for rash.   Neurological: Positive for headaches. Negative for loss of consciousness.   Hematological: Negative.    Psychiatric/Behavioral: Negative.    All other systems reviewed and are negative.      Past Medical History:   Diagnosis Date   • Acute kidney failure (CMS/HCC)    • Constipation    • Depression    • H/O gastric ulcer    • Headache    • History of transfusion    • Hypertension    • IBS (irritable bowel syndrome)    • Insomnia    • Kidney stone    • Solis  maravilla disease    • Pseudocholinesterase deficiency    • Rapid weight loss    • SBO (small bowel obstruction) (CMS/HCC)    • Stroke (CMS/HCC)     X 2   • UTI (urinary tract infection)     CHRONIC, SEPTIC X2   • Volvulosis        Allergies   Allergen Reactions   • Anectine [Succinylcholine Chloride] Other (See Comments) and Rash     Hard to wake up   • Stadol [Butorphanol] Hives   • Butorphanol Tartrate Rash     Pt states she does not recall being allergic       Past Surgical History:   Procedure Laterality Date   • APPENDECTOMY      COMPLICATION OF SEPTIC   • AUGMENTATION MAMMAPLASTY     • BRAIN SURGERY      x2   • BREAST SURGERY     •  SECTION     • COLON SURGERY     • COLONOSCOPY  2007    normal   • ENDOSCOPY  2009    antral ulcer   • HERNIA REPAIR     • HYSTERECTOMY     • SMALL INTESTINE SURGERY N/A 2016   • TONSILLECTOMY         Family History   Problem Relation Age of Onset   • Cancer Maternal Grandfather    • No Known Problems Mother    • No Known Problems Father    • No Known Problems Sister    • No Known Problems Brother    • No Known Problems Son    • No Known Problems Brother    • No Known Problems Son    • No Known Problems Maternal Grandmother    • Breast cancer Paternal Grandmother    • No Known Problems Maternal Aunt    • No Known Problems Paternal Aunt    • Colon cancer Neg Hx    • Colon polyps Neg Hx    • BRCA 1/2 Neg Hx    • Endometrial cancer Neg Hx    • Ovarian cancer Neg Hx        Social History     Socioeconomic History   • Marital status:      Spouse name: Not on file   • Number of children: Not on file   • Years of education: Not on file   • Highest education level: Not on file   Tobacco Use   • Smoking status: Former Smoker     Packs/day: 1.00     Years: 15.00     Pack years: 15.00     Types: Cigarettes, Electronic Cigarette     Last attempt to quit: 3/10/2016     Years since quittin.7   • Smokeless tobacco: Never Used   Substance and Sexual Activity   • Alcohol  use: Yes     Comment: occasional   • Drug use: No   • Sexual activity: Defer       Prior to Admission medications    Medication Sig Start Date End Date Taking? Authorizing Provider   ALPRAZolam (XANAX) 1 MG tablet Take 1 mg by mouth 3 (Three) Times a Day As Needed for Anxiety.    Ramana Tovar MD   amitriptyline (ELAVIL) 100 MG tablet Take 100 mg by mouth Every Night. 1/13/17   Ramana Tovar MD   amLODIPine (NORVASC) 10 MG tablet Take 10 mg by mouth Daily.    Ramana Tovar MD   aspirin 81 MG EC tablet Take 81 mg by mouth daily.    Ramana Tovar MD   CITRUCEL oral powder Take 2 application by mouth Daily for 30 doses. 11/10/18 12/10/18  Antonio Salvador MD   CloNIDine (CATAPRES) 0.1 MG tablet Take 0.1 mg by mouth Every 4 (Four) Hours As Needed for High Blood Pressure.    Ramana Tovar MD   escitalopram (LEXAPRO) 20 MG tablet Take 20 mg by mouth Daily.    Ramana Tovar MD   fluticasone (FLONASE) 50 MCG/ACT nasal spray 2 sprays into the nostril(s) as directed by provider Daily. 1/23/17   Ramana Tovar MD   levETIRAcetam (KEPPRA) 500 MG tablet Take 500 mg by mouth 2 (Two) Times a Day.    Ramana Tovar MD   megestrol (MEGACE) 40 MG tablet Take 40 mg by mouth Daily.    Ramana Tovar MD   meperidine (DEMEROL) 50 MG tablet Take 1 tablet by mouth Every 4 (Four) Hours As Needed for Moderate Pain  for up to 40 doses. 11/10/18   Antonio Salvador MD   metoprolol succinate XL (TOPROL-XL) 100 MG 24 hr tablet Take 100 mg by mouth Every Night. 1/23/17   Ramana Tovar MD   Multiple Vitamins-Minerals (MULTIVITAMIN AND MINERALS) liquid liquid Take 15 mL by mouth Daily. 10/17/18   Peter Calabrese MD   pantoprazole (PROTONIX) 40 MG EC tablet Take 40 mg by mouth 2 (Two) Times a Day.    Ramana Tovar MD   promethazine (PHENERGAN) 25 MG tablet Take 1 tablet by mouth Every 6 (Six) Hours As Needed for Nausea or Vomiting. 8/5/17   Inder Modi  MD Camilo   sulfamethoxazole-trimethoprim (BACTRIM DS,SEPTRA DS) 800-160 MG per tablet Take 1 tablet by mouth 2 (Two) Times a Day. 11/30/18   Reza Reyes MD   ZOFRAN 8 MG tablet Take 1 tablet by mouth Every 8 (Eight) Hours As Needed for Nausea or Vomiting for up to 10 doses. 11/10/18   Antonio Salvador MD       Medications   sodium chloride 0.9 % flush 10 mL (not administered)   sodium chloride 0.9 % bolus 1,000 mL (1,000 mL Intravenous New Bag 12/2/18 0020)       Vitals:    12/02/18 0031   BP: 113/83   Pulse: 74   Resp:    Temp:    SpO2: 99%         Objective   Physical Exam   Constitutional: She is oriented to person, place, and time. She appears well-developed and well-nourished.   HENT:   Head: Normocephalic and atraumatic.   Dry mouth   Neck: Normal range of motion. Neck supple.   Cardiovascular: Normal rate and regular rhythm.   Pulmonary/Chest: Effort normal and breath sounds normal.   Abdominal: Soft. Bowel sounds are normal.   Musculoskeletal: Normal range of motion. She exhibits tenderness.   Diffusely tender in musculature   Neurological: She is alert and oriented to person, place, and time.   Skin: Skin is warm and dry.   Psychiatric: Her behavior is normal.   Flat affect   Nursing note and vitals reviewed.      Procedures         Lab Results (last 24 hours)     Procedure Component Value Units Date/Time    CBC & Differential [[id number]] Collected:  12/02/18 0019    Specimen:  Blood Updated:  12/02/18 0027    Narrative:       The following orders were created for panel order CBC & Differential.  Procedure                               Abnormality         Status                     ---------                               -----------         ------                     CBC Auto Differential[[id number]]        Abnormal            Final result                 Please view results for these tests on the individual orders.    Comprehensive Metabolic Panel [[id number]] Collected:  12/02/18 0019    Specimen:   Blood Updated:  12/02/18 0039     Glucose 94 mg/dL      BUN 11 mg/dL      Creatinine 0.79 mg/dL      Sodium 142 mmol/L      Potassium 3.7 mmol/L      Chloride 106 mmol/L      CO2 26.0 mmol/L      Calcium 9.2 mg/dL      Total Protein 6.9 g/dL      Albumin 3.90 g/dL      ALT (SGPT) 16 U/L      AST (SGOT) 20 U/L      Alkaline Phosphatase 104 U/L      Total Bilirubin 0.2 mg/dL      eGFR Non African Amer 78 mL/min/1.73      Globulin 3.0 gm/dL      A/G Ratio 1.3 g/dL      BUN/Creatinine Ratio 13.9     Anion Gap 10.0 mmol/L     Lipase [[id number]]  (Normal) Collected:  12/02/18 0019    Specimen:  Blood Updated:  12/02/18 0039     Lipase 82 U/L     Lactic Acid, Plasma [[id number]]  (Normal) Collected:  12/02/18 0019    Specimen:  Blood Updated:  12/02/18 0036     Lactate 0.8 mmol/L     Magnesium [[id number]]  (Normal) Collected:  12/02/18 0019    Specimen:  Blood Updated:  12/02/18 0039     Magnesium 1.9 mg/dL     CBC Auto Differential [[id number]]  (Abnormal) Collected:  12/02/18 0019    Specimen:  Blood Updated:  12/02/18 0027     WBC 7.47 10*3/mm3      RBC 3.55 10*6/mm3      Hemoglobin 10.9 g/dL      Hematocrit 32.8 %      MCV 92.4 fL      MCH 30.7 pg      MCHC 33.2 g/dL      RDW 14.7 %      RDW-SD 50.1 fl      MPV 9.5 fL      Platelets 420 10*3/mm3      Neutrophil % 54.9 %      Lymphocyte % 36.1 %      Monocyte % 6.6 %      Eosinophil % 1.7 %      Basophil % 0.4 %      Immature Grans % 0.3 %      Neutrophils, Absolute 4.10 10*3/mm3      Lymphocytes, Absolute 2.70 10*3/mm3      Monocytes, Absolute 0.49 10*3/mm3      Eosinophils, Absolute 0.13 10*3/mm3      Basophils, Absolute 0.03 10*3/mm3      Immature Grans, Absolute 0.02 10*3/mm3      nRBC 0.0 /100 WBC           No orders to display       ED Course  ED Course as of Dec 02 0059   Sun Dec 02, 2018   0056 Told patient all labs were okay.  I do not have explanation for her pains and she needs to follow up with her PCP for further evaluation.    [TR]      ED Course User  Index  [TR] Romaine Baum Jr., MD          MDM  Number of Diagnoses or Management Options  Myalgia: established and worsening     Amount and/or Complexity of Data Reviewed  Clinical lab tests: ordered and reviewed    Risk of Complications, Morbidity, and/or Mortality  Presenting problems: moderate  Diagnostic procedures: moderate  Management options: moderate    Patient Progress  Patient progress: stable      Final diagnoses:   Myalgia          Romaine Baum Jr., MD  12/02/18 0059

## 2018-12-05 LAB
BACTERIA SPEC AEROBE CULT: NORMAL
BACTERIA SPEC AEROBE CULT: NORMAL

## 2019-01-25 ENCOUNTER — OFFICE VISIT (OUTPATIENT)
Dept: UROLOGY | Age: 49
End: 2019-01-25
Payer: MEDICARE

## 2019-01-25 ENCOUNTER — HOSPITAL ENCOUNTER (OUTPATIENT)
Dept: CT IMAGING | Age: 49
Discharge: HOME | End: 2019-01-25
Payer: MEDICARE

## 2019-01-25 VITALS — HEIGHT: 68 IN | TEMPERATURE: 98.1 F | WEIGHT: 109 LBS | BODY MASS INDEX: 16.52 KG/M2

## 2019-01-25 DIAGNOSIS — R10.84 GENERALIZED ABDOMINAL PAIN: Primary | ICD-10-CM

## 2019-01-25 DIAGNOSIS — R35.0 URINARY FREQUENCY: ICD-10-CM

## 2019-01-25 DIAGNOSIS — R30.0 DYSURIA: ICD-10-CM

## 2019-01-25 DIAGNOSIS — R10.9 FLANK PAIN: ICD-10-CM

## 2019-01-25 DIAGNOSIS — R93.422 ABNORMAL FINDING ON DIAGNOSTIC IMAGING OF LEFT KIDNEY: ICD-10-CM

## 2019-01-25 LAB
BILIRUBIN, POC: NORMAL
BLOOD URINE, POC: NORMAL
CLARITY, POC: CLEAR
COLOR, POC: YELLOW
GLUCOSE URINE, POC: NORMAL
KETONES, POC: NORMAL
LEUKOCYTE EST, POC: NORMAL
NITRITE, POC: NORMAL
PH, POC: 7.5
PROTEIN, POC: NORMAL
SPECIFIC GRAVITY, POC: 1.02
UROBILINOGEN, POC: 0.2

## 2019-01-25 PROCEDURE — 81003 URINALYSIS AUTO W/O SCOPE: CPT | Performed by: PHYSICIAN ASSISTANT

## 2019-01-25 PROCEDURE — G8427 DOCREV CUR MEDS BY ELIG CLIN: HCPCS | Performed by: PHYSICIAN ASSISTANT

## 2019-01-25 PROCEDURE — 74176 CT ABD & PELVIS W/O CONTRAST: CPT

## 2019-01-25 PROCEDURE — 99214 OFFICE O/P EST MOD 30 MIN: CPT | Performed by: PHYSICIAN ASSISTANT

## 2019-01-25 PROCEDURE — G8484 FLU IMMUNIZE NO ADMIN: HCPCS | Performed by: PHYSICIAN ASSISTANT

## 2019-01-25 PROCEDURE — 1036F TOBACCO NON-USER: CPT | Performed by: PHYSICIAN ASSISTANT

## 2019-01-25 PROCEDURE — 51798 US URINE CAPACITY MEASURE: CPT | Performed by: PHYSICIAN ASSISTANT

## 2019-01-25 PROCEDURE — G8419 CALC BMI OUT NRM PARAM NOF/U: HCPCS | Performed by: PHYSICIAN ASSISTANT

## 2019-01-25 RX ORDER — KETOROLAC TROMETHAMINE 10 MG/1
10 TABLET, FILM COATED ORAL EVERY 6 HOURS PRN
Qty: 20 TABLET | Refills: 0 | Status: SHIPPED | OUTPATIENT
Start: 2019-01-25 | End: 2019-01-30

## 2019-01-25 RX ORDER — METHENAMINE, SODIUM PHOSPHATE MONOBASIC, PHENYL SALICYLATE, METHYLENE BLUE, HYOSCYAMINE SULFATE 81.6; 40.8; 36.2; 10.8; .12 MG/1; MG/1; MG/1; MG/1; MG/1
1 TABLET ORAL 4 TIMES DAILY
Qty: 28 TABLET | Refills: 0 | Status: SHIPPED | OUTPATIENT
Start: 2019-01-25 | End: 2019-02-01

## 2019-01-25 RX ORDER — ALPRAZOLAM 1 MG/1
1 TABLET ORAL
COMMUNITY

## 2019-01-25 ASSESSMENT — ENCOUNTER SYMPTOMS
EYE PAIN: 0
EYE DISCHARGE: 0
ABDOMINAL DISTENTION: 0
SORE THROAT: 0
NAUSEA: 0
RHINORRHEA: 0
EYE REDNESS: 0
DIARRHEA: 0
WHEEZING: 0
VOMITING: 0
SINUS PRESSURE: 0
BACK PAIN: 0
COUGH: 0
COLOR CHANGE: 0
BLOOD IN STOOL: 0
ABDOMINAL PAIN: 1
CONSTIPATION: 0
FACIAL SWELLING: 0
SHORTNESS OF BREATH: 0

## 2019-01-28 ENCOUNTER — TELEPHONE (OUTPATIENT)
Dept: UROLOGY | Age: 49
End: 2019-01-28

## 2019-01-28 DIAGNOSIS — N32.89 BLADDER SPASM: Primary | ICD-10-CM

## 2019-01-28 RX ORDER — PHENAZOPYRIDINE HYDROCHLORIDE 100 MG/1
100 TABLET, FILM COATED ORAL 3 TIMES DAILY PRN
Qty: 21 TABLET | Refills: 0 | Status: SHIPPED | OUTPATIENT
Start: 2019-01-28 | End: 2019-02-04

## 2019-01-31 ENCOUNTER — APPOINTMENT (OUTPATIENT)
Dept: CT IMAGING | Facility: HOSPITAL | Age: 49
End: 2019-01-31

## 2019-01-31 ENCOUNTER — HOSPITAL ENCOUNTER (EMERGENCY)
Facility: HOSPITAL | Age: 49
Discharge: HOME | End: 2019-01-31
Attending: EMERGENCY MEDICINE | Admitting: EMERGENCY MEDICINE

## 2019-01-31 ENCOUNTER — TELEPHONE (OUTPATIENT)
Dept: UROLOGY | Age: 49
End: 2019-01-31

## 2019-01-31 VITALS
HEIGHT: 67 IN | HEART RATE: 70 BPM | WEIGHT: 105 LBS | TEMPERATURE: 97.9 F | BODY MASS INDEX: 16.48 KG/M2 | RESPIRATION RATE: 16 BRPM | OXYGEN SATURATION: 100 % | DIASTOLIC BLOOD PRESSURE: 95 MMHG | SYSTOLIC BLOOD PRESSURE: 155 MMHG

## 2019-01-31 DIAGNOSIS — R10.84 GENERALIZED ABDOMINAL PAIN: Primary | ICD-10-CM

## 2019-01-31 DIAGNOSIS — K59.00 CONSTIPATION, UNSPECIFIED CONSTIPATION TYPE: ICD-10-CM

## 2019-01-31 LAB
ALBUMIN SERPL-MCNC: 4.4 G/DL (ref 3.5–5)
ALBUMIN/GLOB SERPL: 1.9 G/DL (ref 1.1–2.5)
ALP SERPL-CCNC: 82 U/L (ref 24–120)
ALT SERPL W P-5'-P-CCNC: 20 U/L (ref 0–54)
ANION GAP SERPL CALCULATED.3IONS-SCNC: 6 MMOL/L (ref 4–13)
AST SERPL-CCNC: 19 U/L (ref 7–45)
BASOPHILS # BLD AUTO: 0.03 10*3/MM3 (ref 0–0.2)
BASOPHILS NFR BLD AUTO: 0.3 % (ref 0–2)
BILIRUB SERPL-MCNC: 0.2 MG/DL (ref 0.1–1)
BILIRUB UR QL STRIP: NEGATIVE
BUN BLD-MCNC: 17 MG/DL (ref 5–21)
BUN/CREAT SERPL: 26.2 (ref 7–25)
CALCIUM SPEC-SCNC: 9.7 MG/DL (ref 8.4–10.4)
CHLORIDE SERPL-SCNC: 95 MMOL/L (ref 98–110)
CLARITY UR: CLEAR
CO2 SERPL-SCNC: 33 MMOL/L (ref 24–31)
COLOR UR: YELLOW
CREAT BLD-MCNC: 0.65 MG/DL (ref 0.5–1.4)
DEPRECATED RDW RBC AUTO: 43.8 FL (ref 40–54)
EOSINOPHIL # BLD AUTO: 0.05 10*3/MM3 (ref 0–0.7)
EOSINOPHIL NFR BLD AUTO: 0.5 % (ref 0–4)
ERYTHROCYTE [DISTWIDTH] IN BLOOD BY AUTOMATED COUNT: 12.5 % (ref 12–15)
GFR SERPL CREATININE-BSD FRML MDRD: 97 ML/MIN/1.73
GLOBULIN UR ELPH-MCNC: 2.3 GM/DL
GLUCOSE BLD-MCNC: 85 MG/DL (ref 70–100)
GLUCOSE UR STRIP-MCNC: NEGATIVE MG/DL
HCT VFR BLD AUTO: 32.6 % (ref 37–47)
HGB BLD-MCNC: 10.8 G/DL (ref 12–16)
HGB UR QL STRIP.AUTO: NEGATIVE
IMM GRANULOCYTES # BLD AUTO: 0.07 10*3/MM3 (ref 0–0.03)
IMM GRANULOCYTES NFR BLD AUTO: 0.7 % (ref 0–5)
KETONES UR QL STRIP: NEGATIVE
LEUKOCYTE ESTERASE UR QL STRIP.AUTO: NEGATIVE
LIPASE SERPL-CCNC: 61 U/L (ref 23–203)
LYMPHOCYTES # BLD AUTO: 2.57 10*3/MM3 (ref 0.72–4.86)
LYMPHOCYTES NFR BLD AUTO: 25.6 % (ref 15–45)
MCH RBC QN AUTO: 31.5 PG (ref 28–32)
MCHC RBC AUTO-ENTMCNC: 33.1 G/DL (ref 33–36)
MCV RBC AUTO: 95 FL (ref 82–98)
MONOCYTES # BLD AUTO: 0.58 10*3/MM3 (ref 0.19–1.3)
MONOCYTES NFR BLD AUTO: 5.8 % (ref 4–12)
NEUTROPHILS # BLD AUTO: 6.72 10*3/MM3 (ref 1.87–8.4)
NEUTROPHILS NFR BLD AUTO: 67.1 % (ref 39–78)
NITRITE UR QL STRIP: NEGATIVE
NRBC BLD AUTO-RTO: 0 /100 WBC (ref 0–0)
PH UR STRIP.AUTO: 8.5 [PH] (ref 5–8)
PLATELET # BLD AUTO: 295 10*3/MM3 (ref 130–400)
PMV BLD AUTO: 10.7 FL (ref 6–12)
POTASSIUM BLD-SCNC: 4.3 MMOL/L (ref 3.5–5.3)
PROT SERPL-MCNC: 6.7 G/DL (ref 6.3–8.7)
PROT UR QL STRIP: NEGATIVE
RBC # BLD AUTO: 3.43 10*6/MM3 (ref 4.2–5.4)
SODIUM BLD-SCNC: 134 MMOL/L (ref 135–145)
SP GR UR STRIP: 1.01 (ref 1–1.03)
UROBILINOGEN UR QL STRIP: ABNORMAL
WBC NRBC COR # BLD: 10.02 10*3/MM3 (ref 4.8–10.8)

## 2019-01-31 PROCEDURE — 96375 TX/PRO/DX INJ NEW DRUG ADDON: CPT

## 2019-01-31 PROCEDURE — 25010000002 IOPAMIDOL 61 % SOLUTION: Performed by: EMERGENCY MEDICINE

## 2019-01-31 PROCEDURE — 99285 EMERGENCY DEPT VISIT HI MDM: CPT

## 2019-01-31 PROCEDURE — 81003 URINALYSIS AUTO W/O SCOPE: CPT | Performed by: EMERGENCY MEDICINE

## 2019-01-31 PROCEDURE — 83690 ASSAY OF LIPASE: CPT | Performed by: EMERGENCY MEDICINE

## 2019-01-31 PROCEDURE — 96374 THER/PROPH/DIAG INJ IV PUSH: CPT

## 2019-01-31 PROCEDURE — 80053 COMPREHEN METABOLIC PANEL: CPT | Performed by: EMERGENCY MEDICINE

## 2019-01-31 PROCEDURE — P9612 CATHETERIZE FOR URINE SPEC: HCPCS

## 2019-01-31 PROCEDURE — 25010000002 MORPHINE PER 10 MG: Performed by: EMERGENCY MEDICINE

## 2019-01-31 PROCEDURE — 25010000002 ONDANSETRON PER 1 MG: Performed by: EMERGENCY MEDICINE

## 2019-01-31 PROCEDURE — 74177 CT ABD & PELVIS W/CONTRAST: CPT

## 2019-01-31 PROCEDURE — 85025 COMPLETE CBC W/AUTO DIFF WBC: CPT | Performed by: EMERGENCY MEDICINE

## 2019-01-31 RX ORDER — CLONIDINE HYDROCHLORIDE 0.1 MG/1
0.1 TABLET ORAL ONCE
Status: COMPLETED | OUTPATIENT
Start: 2019-01-31 | End: 2019-01-31

## 2019-01-31 RX ORDER — POLYETHYLENE GLYCOL 3350 17 G/17G
17 POWDER, FOR SOLUTION ORAL DAILY
Qty: 30 EACH | Refills: 0 | Status: SHIPPED | OUTPATIENT
Start: 2019-01-31 | End: 2019-03-06

## 2019-01-31 RX ORDER — SODIUM CHLORIDE 0.9 % (FLUSH) 0.9 %
10 SYRINGE (ML) INJECTION AS NEEDED
Status: DISCONTINUED | OUTPATIENT
Start: 2019-01-31 | End: 2019-01-31 | Stop reason: HOSPADM

## 2019-01-31 RX ORDER — ONDANSETRON 2 MG/ML
4 INJECTION INTRAMUSCULAR; INTRAVENOUS ONCE
Status: COMPLETED | OUTPATIENT
Start: 2019-01-31 | End: 2019-01-31

## 2019-01-31 RX ADMIN — HYDROMORPHONE HYDROCHLORIDE 1 MG: 1 INJECTION, SOLUTION INTRAMUSCULAR; INTRAVENOUS; SUBCUTANEOUS at 16:08

## 2019-01-31 RX ADMIN — ONDANSETRON HYDROCHLORIDE 4 MG: 2 INJECTION, SOLUTION INTRAMUSCULAR; INTRAVENOUS at 12:50

## 2019-01-31 RX ADMIN — SODIUM CHLORIDE 1000 ML: 9 INJECTION, SOLUTION INTRAVENOUS at 12:48

## 2019-01-31 RX ADMIN — MORPHINE SULFATE 4 MG: 4 INJECTION INTRAVENOUS at 12:50

## 2019-01-31 RX ADMIN — CLONIDINE HYDROCHLORIDE 0.1 MG: 0.1 TABLET ORAL at 16:35

## 2019-01-31 RX ADMIN — IOPAMIDOL 100 ML: 612 INJECTION, SOLUTION INTRAVENOUS at 14:48

## 2019-02-01 ENCOUNTER — TRANSCRIBE ORDERS (OUTPATIENT)
Dept: ONCOLOGY | Facility: CLINIC | Age: 49
End: 2019-02-01

## 2019-02-01 DIAGNOSIS — D64.9 ANEMIA, UNSPECIFIED TYPE: Primary | ICD-10-CM

## 2019-02-06 ENCOUNTER — LAB (OUTPATIENT)
Dept: LAB | Facility: HOSPITAL | Age: 49
End: 2019-02-06
Attending: INTERNAL MEDICINE

## 2019-02-06 DIAGNOSIS — D64.9 ANEMIA, UNSPECIFIED TYPE: ICD-10-CM

## 2019-02-06 LAB
BASOPHILS # BLD AUTO: 0.02 10*3/MM3 (ref 0–0.2)
BASOPHILS NFR BLD AUTO: 0.2 % (ref 0–2)
DEPRECATED RDW RBC AUTO: 45.9 FL (ref 40–54)
EOSINOPHIL # BLD AUTO: 0.13 10*3/MM3 (ref 0–0.7)
EOSINOPHIL NFR BLD AUTO: 1.1 % (ref 0–4)
ERYTHROCYTE [DISTWIDTH] IN BLOOD BY AUTOMATED COUNT: 12.8 % (ref 12–15)
FERRITIN SERPL-MCNC: 149 NG/ML (ref 6.24–137)
HCT VFR BLD AUTO: 39.1 % (ref 37–47)
HGB BLD-MCNC: 12.6 G/DL (ref 12–16)
IMM GRANULOCYTES # BLD AUTO: 0.04 10*3/MM3 (ref 0–0.03)
IMM GRANULOCYTES NFR BLD AUTO: 0.3 % (ref 0–5)
IRON 24H UR-MRATE: 85 MCG/DL (ref 42–180)
IRON SATN MFR SERPL: 25 % (ref 20–45)
LYMPHOCYTES # BLD AUTO: 3.59 10*3/MM3 (ref 0.72–4.86)
LYMPHOCYTES NFR BLD AUTO: 31.3 % (ref 15–45)
MCH RBC QN AUTO: 31.5 PG (ref 28–32)
MCHC RBC AUTO-ENTMCNC: 32.2 G/DL (ref 33–36)
MCV RBC AUTO: 97.8 FL (ref 82–98)
MONOCYTES # BLD AUTO: 0.57 10*3/MM3 (ref 0.19–1.3)
MONOCYTES NFR BLD AUTO: 5 % (ref 4–12)
NEUTROPHILS # BLD AUTO: 7.13 10*3/MM3 (ref 1.87–8.4)
NEUTROPHILS NFR BLD AUTO: 62.1 % (ref 39–78)
NRBC BLD AUTO-RTO: 0 /100 WBC (ref 0–0)
PLATELET # BLD AUTO: 327 10*3/MM3 (ref 130–400)
PMV BLD AUTO: 10.6 FL (ref 6–12)
RBC # BLD AUTO: 4 10*6/MM3 (ref 4.2–5.4)
TIBC SERPL-MCNC: 340 MCG/DL (ref 225–420)
WBC NRBC COR # BLD: 11.48 10*3/MM3 (ref 4.8–10.8)

## 2019-02-06 PROCEDURE — 36415 COLL VENOUS BLD VENIPUNCTURE: CPT

## 2019-02-06 PROCEDURE — 82728 ASSAY OF FERRITIN: CPT

## 2019-02-06 PROCEDURE — 83540 ASSAY OF IRON: CPT

## 2019-02-06 PROCEDURE — 83550 IRON BINDING TEST: CPT

## 2019-02-06 PROCEDURE — 85025 COMPLETE CBC W/AUTO DIFF WBC: CPT

## 2019-02-08 ENCOUNTER — HOSPITAL ENCOUNTER (OUTPATIENT)
Dept: CT IMAGING | Age: 49
Discharge: HOME | End: 2019-02-08
Payer: MEDICARE

## 2019-02-08 ENCOUNTER — TELEPHONE (OUTPATIENT)
Dept: UROLOGY | Age: 49
End: 2019-02-08

## 2019-02-08 ENCOUNTER — OFFICE VISIT (OUTPATIENT)
Dept: UROLOGY | Age: 49
End: 2019-02-08
Payer: MEDICARE

## 2019-02-08 VITALS — TEMPERATURE: 96.1 F | HEIGHT: 67 IN | BODY MASS INDEX: 17.58 KG/M2 | WEIGHT: 112 LBS

## 2019-02-08 DIAGNOSIS — N20.0 RIGHT KIDNEY STONE: Primary | ICD-10-CM

## 2019-02-08 DIAGNOSIS — R10.84 GENERALIZED ABDOMINAL PAIN: ICD-10-CM

## 2019-02-08 DIAGNOSIS — R93.422 ABNORMAL FINDING ON DIAGNOSTIC IMAGING OF LEFT KIDNEY: ICD-10-CM

## 2019-02-08 PROCEDURE — 1036F TOBACCO NON-USER: CPT | Performed by: PHYSICIAN ASSISTANT

## 2019-02-08 PROCEDURE — G8419 CALC BMI OUT NRM PARAM NOF/U: HCPCS | Performed by: PHYSICIAN ASSISTANT

## 2019-02-08 PROCEDURE — G8484 FLU IMMUNIZE NO ADMIN: HCPCS | Performed by: PHYSICIAN ASSISTANT

## 2019-02-08 PROCEDURE — 6360000004 HC RX CONTRAST MEDICATION: Performed by: NURSE PRACTITIONER

## 2019-02-08 PROCEDURE — 99214 OFFICE O/P EST MOD 30 MIN: CPT | Performed by: PHYSICIAN ASSISTANT

## 2019-02-08 PROCEDURE — G8428 CUR MEDS NOT DOCUMENT: HCPCS | Performed by: PHYSICIAN ASSISTANT

## 2019-02-08 PROCEDURE — 74178 CT ABD&PLV WO CNTR FLWD CNTR: CPT

## 2019-02-08 RX ADMIN — IOPAMIDOL 75 ML: 755 INJECTION, SOLUTION INTRAVENOUS at 10:07

## 2019-02-08 ASSESSMENT — ENCOUNTER SYMPTOMS
NAUSEA: 0
COLOR CHANGE: 0
FACIAL SWELLING: 0
COUGH: 0
BACK PAIN: 0
BLOOD IN STOOL: 0
EYE REDNESS: 0
CONSTIPATION: 0
DIARRHEA: 0
ABDOMINAL PAIN: 0
VOMITING: 0
RHINORRHEA: 0
EYE PAIN: 0
WHEEZING: 0
EYE DISCHARGE: 0
ABDOMINAL DISTENTION: 0
SINUS PRESSURE: 0
SORE THROAT: 0
SHORTNESS OF BREATH: 0

## 2019-02-19 ENCOUNTER — APPOINTMENT (OUTPATIENT)
Dept: LAB | Facility: HOSPITAL | Age: 49
End: 2019-02-19

## 2019-02-19 ENCOUNTER — TRANSCRIBE ORDERS (OUTPATIENT)
Dept: ADMINISTRATIVE | Facility: HOSPITAL | Age: 49
End: 2019-02-19

## 2019-02-19 DIAGNOSIS — D64.9 NORMOCYTIC ANEMIA: Primary | ICD-10-CM

## 2019-02-19 LAB
BILIRUB UR QL STRIP: NEGATIVE
CLARITY UR: CLEAR
COLOR UR: YELLOW
GLUCOSE UR STRIP-MCNC: NEGATIVE MG/DL
HGB UR QL STRIP.AUTO: NEGATIVE
KETONES UR QL STRIP: ABNORMAL
LEUKOCYTE ESTERASE UR QL STRIP.AUTO: NEGATIVE
NITRITE UR QL STRIP: NEGATIVE
PH UR STRIP.AUTO: 5.5 [PH] (ref 5–8)
PROT UR QL STRIP: NEGATIVE
SP GR UR STRIP: 1.03 (ref 1–1.03)
UROBILINOGEN UR QL STRIP: ABNORMAL

## 2019-02-19 PROCEDURE — 81003 URINALYSIS AUTO W/O SCOPE: CPT | Performed by: INTERNAL MEDICINE

## 2019-02-19 PROCEDURE — 87086 URINE CULTURE/COLONY COUNT: CPT | Performed by: INTERNAL MEDICINE

## 2019-02-22 LAB — BACTERIA SPEC AEROBE CULT: NORMAL

## 2019-03-06 ENCOUNTER — OFFICE VISIT (OUTPATIENT)
Dept: INTERNAL MEDICINE | Facility: CLINIC | Age: 49
End: 2019-03-06

## 2019-03-06 ENCOUNTER — LAB (OUTPATIENT)
Dept: LAB | Facility: HOSPITAL | Age: 49
End: 2019-03-06

## 2019-03-06 VITALS
RESPIRATION RATE: 12 BRPM | SYSTOLIC BLOOD PRESSURE: 128 MMHG | OXYGEN SATURATION: 99 % | TEMPERATURE: 98.3 F | HEIGHT: 67 IN | HEART RATE: 70 BPM | DIASTOLIC BLOOD PRESSURE: 76 MMHG | WEIGHT: 120.31 LBS | BODY MASS INDEX: 18.88 KG/M2

## 2019-03-06 DIAGNOSIS — F32.A ANXIETY AND DEPRESSION: ICD-10-CM

## 2019-03-06 DIAGNOSIS — I10 ESSENTIAL HYPERTENSION: Primary | Chronic | ICD-10-CM

## 2019-03-06 DIAGNOSIS — Z13.220 LIPID SCREENING: ICD-10-CM

## 2019-03-06 DIAGNOSIS — R30.0 DYSURIA: ICD-10-CM

## 2019-03-06 DIAGNOSIS — I67.5 MOYA MOYA DISEASE: Chronic | ICD-10-CM

## 2019-03-06 DIAGNOSIS — R51.9 NONINTRACTABLE HEADACHE, UNSPECIFIED CHRONICITY PATTERN, UNSPECIFIED HEADACHE TYPE: ICD-10-CM

## 2019-03-06 DIAGNOSIS — K21.9 GASTROESOPHAGEAL REFLUX DISEASE, ESOPHAGITIS PRESENCE NOT SPECIFIED: ICD-10-CM

## 2019-03-06 DIAGNOSIS — G40.909 SEIZURE DISORDER (HCC): ICD-10-CM

## 2019-03-06 DIAGNOSIS — F41.9 ANXIETY AND DEPRESSION: ICD-10-CM

## 2019-03-06 DIAGNOSIS — K31.84 GASTROPARESIS: ICD-10-CM

## 2019-03-06 LAB
ARTICHOKE IGE QN: 97 MG/DL (ref 0–99)
BILIRUB UR QL STRIP: NEGATIVE
CHOLEST SERPL-MCNC: 202 MG/DL (ref 130–200)
CLARITY UR: CLEAR
COLOR UR: YELLOW
GLUCOSE UR STRIP-MCNC: NEGATIVE MG/DL
HDLC SERPL-MCNC: 78 MG/DL
HGB UR QL STRIP.AUTO: NEGATIVE
KETONES UR QL STRIP: NEGATIVE
LDLC/HDLC SERPL: 1.3 {RATIO}
LEUKOCYTE ESTERASE UR QL STRIP.AUTO: NEGATIVE
NITRITE UR QL STRIP: NEGATIVE
PH UR STRIP.AUTO: <=5 [PH] (ref 5–8)
PROT UR QL STRIP: NEGATIVE
SP GR UR STRIP: 1.02 (ref 1–1.03)
TRIGL SERPL-MCNC: 114 MG/DL (ref 0–149)
UROBILINOGEN UR QL STRIP: NORMAL

## 2019-03-06 PROCEDURE — 80061 LIPID PANEL: CPT | Performed by: NURSE PRACTITIONER

## 2019-03-06 PROCEDURE — 36415 COLL VENOUS BLD VENIPUNCTURE: CPT

## 2019-03-06 PROCEDURE — 81003 URINALYSIS AUTO W/O SCOPE: CPT | Performed by: NURSE PRACTITIONER

## 2019-03-06 PROCEDURE — 99214 OFFICE O/P EST MOD 30 MIN: CPT | Performed by: NURSE PRACTITIONER

## 2019-03-06 RX ORDER — VENLAFAXINE HYDROCHLORIDE 75 MG/1
75 CAPSULE, EXTENDED RELEASE ORAL DAILY
Qty: 30 CAPSULE | Refills: 2 | Status: SHIPPED | OUTPATIENT
Start: 2019-03-06 | End: 2019-06-07 | Stop reason: SDUPTHER

## 2019-03-06 RX ORDER — BUTALBITAL, ACETAMINOPHEN AND CAFFEINE 50; 325; 40 MG/1; MG/1; MG/1
1 TABLET ORAL EVERY 6 HOURS PRN
Qty: 30 TABLET | Refills: 0 | Status: ON HOLD | OUTPATIENT
Start: 2019-03-06 | End: 2019-09-15

## 2019-03-06 RX ORDER — ACETAMINOPHEN, ASPIRIN AND CAFFEINE 250; 250; 65 MG/1; MG/1; MG/1
1 TABLET, FILM COATED ORAL EVERY 6 HOURS PRN
COMMUNITY
End: 2019-09-18 | Stop reason: HOSPADM

## 2019-03-06 RX ORDER — BUSPIRONE HYDROCHLORIDE 7.5 MG/1
7.5 TABLET ORAL 3 TIMES DAILY
Qty: 90 TABLET | Refills: 2 | Status: SHIPPED | OUTPATIENT
Start: 2019-03-06 | End: 2019-06-07 | Stop reason: SDUPTHER

## 2019-03-06 RX ORDER — LEVETIRACETAM 500 MG/1
500 TABLET ORAL 2 TIMES DAILY
Qty: 60 TABLET | Refills: 2 | Status: SHIPPED | OUTPATIENT
Start: 2019-03-06 | End: 2019-06-13 | Stop reason: SDUPTHER

## 2019-03-06 RX ORDER — RANITIDINE 150 MG/1
150 TABLET ORAL 2 TIMES DAILY
Qty: 60 TABLET | Refills: 2 | Status: SHIPPED | OUTPATIENT
Start: 2019-03-06 | End: 2019-06-07 | Stop reason: SDUPTHER

## 2019-03-06 RX ORDER — PROMETHAZINE HYDROCHLORIDE 25 MG/1
25 TABLET ORAL EVERY 6 HOURS PRN
Qty: 30 TABLET | Refills: 0 | Status: SHIPPED | OUTPATIENT
Start: 2019-03-06 | End: 2019-03-20 | Stop reason: SDUPTHER

## 2019-03-06 RX ORDER — VENLAFAXINE HYDROCHLORIDE 37.5 MG/1
37.5 CAPSULE, EXTENDED RELEASE ORAL DAILY
Qty: 7 CAPSULE | Refills: 0 | Status: SHIPPED | OUTPATIENT
Start: 2019-03-06 | End: 2019-06-07

## 2019-03-06 RX ORDER — ESCITALOPRAM OXALATE 10 MG/1
10 TABLET ORAL DAILY
Qty: 7 TABLET | Refills: 0 | Status: SHIPPED | OUTPATIENT
Start: 2019-03-06 | End: 2019-06-18

## 2019-03-06 RX ORDER — PROMETHAZINE HYDROCHLORIDE 25 MG/1
25 TABLET ORAL EVERY 6 HOURS PRN
Qty: 6 TABLET | Refills: 0 | Status: SHIPPED | OUTPATIENT
Start: 2019-03-06 | End: 2019-03-06 | Stop reason: SDUPTHER

## 2019-03-06 RX ORDER — METOPROLOL SUCCINATE 100 MG/1
100 TABLET, EXTENDED RELEASE ORAL NIGHTLY
Qty: 30 TABLET | Refills: 5 | Status: SHIPPED | OUTPATIENT
Start: 2019-03-06 | End: 2019-09-19 | Stop reason: SDUPTHER

## 2019-03-06 RX ORDER — AMLODIPINE BESYLATE 10 MG/1
10 TABLET ORAL DAILY
Qty: 30 TABLET | Refills: 5 | Status: SHIPPED | OUTPATIENT
Start: 2019-03-06 | End: 2019-06-18 | Stop reason: SDUPTHER

## 2019-03-06 NOTE — PROGRESS NOTES
CC: establish care - Solis Solis, anxiety and depression, gastroparesis      History:  Kamryn Oliva is a 49 y.o. female who presents today for evaluation of the above problems.      Had GB out in 11/18.  Didn't recover well and was bedridden for about 2 months.  Had a bowel obstruction in 2017 and is now suffering from gastroparesis.  Struggles with nausea from this.  Phenergan does help. Does see Dr. Arzate at [facility] for this. Takes amitritpyline nightly.   Increased anxiety as a result of chronic health issues.  Doesn't feel that Lexapro is working well for her. Has been taking this for about a year and a half.  Believes she has taken Paxil in the past as well. Denies SI.  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 Had two CVAs in 2007. These were occlusive, not hemorrhagic.   Has issues with GERD daily.  Takes Protonix but has significant breakthrough symptoms.   Has frequent kidney stones. Sees urology for this.     Keppra for seizures. Last known seizure activity was about a year ago.   Has been out of all medications for a couple of months, except clonidine. Does have this at home and has been taking to control BP.    Sees hem/onc for anemia.   Complains of pain from constipation and kidney stones as well as headaches. Will be having MRI with perfusion and DSA at [facility] per recommendation of [facility].     ROS:  Review of Systems   Respiratory: Negative for shortness of breath.    Cardiovascular: Positive for chest pain.   Gastrointestinal: Positive for constipation and nausea. Negative for vomiting.   Genitourinary: Positive for dysuria.   Neurological: Positive for dizziness, light-headedness and headaches.   Psychiatric/Behavioral: The patient is nervous/anxious.        Allergies   Allergen Reactions   • Anectine [Succinylcholine Chloride] Other (See Comments) and Rash     Hard to wake up   • Stadol [Butorphanol] Hives   • Butorphanol Tartrate Rash     Pt states she does not  recall being allergic     Past Medical History:   Diagnosis Date   • Acute kidney failure (CMS/HCC)    • Anxiety    • Bowel obstruction (CMS/HCC)    • Constipation    • Depression    • GERD (gastroesophageal reflux disease)    • H/O gastric ulcer    • Headache    • History of transfusion    • Hypertension    • IBS (irritable bowel syndrome)    • Insomnia    • Kidney stone    • Maravilla maravilla disease    • Pseudocholinesterase deficiency    • Rapid weight loss    • SBO (small bowel obstruction) (CMS/HCC)    • Stroke (CMS/HCC)     X 2   • UTI (urinary tract infection)     CHRONIC, SEPTIC X2   • Volvulosis      Past Surgical History:   Procedure Laterality Date   • APPENDECTOMY      COMPLICATION OF SEPTIC   • AUGMENTATION MAMMAPLASTY     • BRAIN SURGERY      x2   • BREAST AUGMENTATION BILATERAL MASTOPEXY     • BREAST LUMPECTOMY Left 3/9/2017    Procedure: EXCISION LEFT BREAST LESION AND LEFT AXILLARY LYMPH NODE BIOPSY;  Surgeon: Evi Farley MD;  Location: [facility] OR;  Service:    • BREAST SURGERY     •  SECTION     • CHOLECYSTECTOMY     • CHOLECYSTECTOMY WITH INTRAOPERATIVE CHOLANGIOGRAM N/A 2018    Procedure: CHOLECYSTECTOMY LAPAROSCOPIC INTRAOPERATIVE CHOLANGIOGRAM;  Surgeon: Antonio Salvador MD;  Location: [facility] OR;  Service: General   • COLON SURGERY     • COLONOSCOPY  2007    normal   • COLONOSCOPY N/A 11/10/2016    Procedure: COLONOSCOPY WITH ANESTHESIA;  Surgeon: Camilo Hanson MD;  Location: [facility] ENDOSCOPY;  Service:    • COLONOSCOPY N/A 2018    Procedure: COLONOSCOPY WITH ANESTHESIA;  Surgeon: Camilo Hanson MD;  Location: [facility] ENDOSCOPY;  Service:    • ENDOSCOPY  2009    antral ulcer   • ENDOSCOPY N/A 10/10/2016    Procedure: ESOPHAGOGASTRODUODENOSCOPY WITH ANESTHESIA;  Surgeon: Camilo Hanson MD;  Location: [facility] ENDOSCOPY;  Service:    • ENDOSCOPY N/A 2017    Procedure: ESOPHAGOGASTRODUODENOSCOPY;  Surgeon: Camilo Hanson MD;  Location: [facility] ENDOSCOPY;   Service:    • ENDOSCOPY N/A 8/8/2017    Procedure: ESOPHAGOGASTRODUODENOSCOPY WITH ANESTHESIA;  Surgeon: Camilo Hanson MD;  Location: [facility] ENDOSCOPY;  Service:    • ENDOSCOPY N/A 2/13/2018    Procedure: ESOPHAGOGASTRODUODENOSCOPY ;  Surgeon: Camilo Hanson MD;  Location: [facility] ENDOSCOPY;  Service:    • HERNIA REPAIR     • HYSTERECTOMY     • SMALL INTESTINE SURGERY N/A 03/2016   • TONSILLECTOMY       Family History   Problem Relation Age of Onset   • Cancer Maternal Grandfather    • Hypertension Mother    • Dementia Mother    • No Known Problems Father    • No Known Problems Sister    • No Known Problems Brother    • No Known Problems Son    • No Known Problems Brother    • No Known Problems Son    • No Known Problems Maternal Grandmother    • Breast cancer Paternal Grandmother    • Dementia Paternal Grandmother    • No Known Problems Maternal Aunt    • No Known Problems Paternal Aunt    • Cancer Paternal Grandfather    • Colon cancer Neg Hx    • Colon polyps Neg Hx    • BRCA 1/2 Neg Hx    • Endometrial cancer Neg Hx    • Ovarian cancer Neg Hx       reports that she quit smoking about 2 years ago. Her smoking use included cigarettes and electronic cigarette. She has a 15.00 pack-year smoking history. she has never used smokeless tobacco. She reports that she does not drink alcohol or use drugs.      Current Outpatient Medications:   •  aspirin 81 MG EC tablet, Take 81 mg by mouth daily., Disp: , Rfl:   •  aspirin-acetaminophen-caffeine (EXCEDRIN MIGRAINE) 250-250-65 MG per tablet, Take 1 tablet by mouth Every 6 (Six) Hours As Needed for Headache., Disp: , Rfl:   •  CloNIDine (CATAPRES) 0.1 MG tablet, Take 0.1 mg by mouth Every 4 (Four) Hours As Needed for High Blood Pressure., Disp: , Rfl:   •  escitalopram (LEXAPRO) 10 MG tablet, Take 1 tablet by mouth Daily., Disp: 7 tablet, Rfl: 0  •  fluticasone (FLONASE) 50 MCG/ACT nasal spray, 2 sprays into the nostril(s) as directed by provider Daily., Disp: , Rfl:  "  •  megestrol (MEGACE) 40 MG tablet, Take 40 mg by mouth Daily., Disp: , Rfl:   •  Multiple Vitamins-Minerals (MULTIVITAMIN AND MINERALS) liquid liquid, Take 15 mL by mouth Daily., Disp: 480 mL, Rfl: 1  •  pantoprazole (PROTONIX) 40 MG EC tablet, Take 40 mg by mouth 2 (Two) Times a Day., Disp: , Rfl:   •  amitriptyline (ELAVIL) 100 MG tablet, Take 100 mg by mouth Every Night., Disp: , Rfl: 2  •  amLODIPine (NORVASC) 10 MG tablet, Take 1 tablet by mouth Daily., Disp: 30 tablet, Rfl: 5  •  busPIRone (BUSPAR) 7.5 MG tablet, Take 1 tablet by mouth 3 (Three) Times a Day., Disp: 90 tablet, Rfl: 2  •  butalbital-acetaminophen-caffeine (ESGIC) -40 MG per tablet, Take 1 tablet by mouth Every 6 (Six) Hours As Needed for Headache., Disp: 30 tablet, Rfl: 0  •  levETIRAcetam (KEPPRA) 500 MG tablet, Take 1 tablet by mouth 2 (Two) Times a Day., Disp: 60 tablet, Rfl: 2  •  metoprolol succinate XL (TOPROL-XL) 100 MG 24 hr tablet, Take 1 tablet by mouth Every Night., Disp: 30 tablet, Rfl: 5  •  promethazine (PHENERGAN) 25 MG tablet, Take 1 tablet by mouth Every 6 (Six) Hours As Needed for Nausea or Vomiting., Disp: 30 tablet, Rfl: 0  •  raNITIdine (ZANTAC) 150 MG tablet, Take 1 tablet by mouth 2 (Two) Times a Day., Disp: 60 tablet, Rfl: 2  •  venlafaxine XR (EFFEXOR XR) 37.5 MG 24 hr capsule, Take 1 capsule by mouth Daily., Disp: 7 capsule, Rfl: 0  •  venlafaxine XR (EFFEXOR XR) 75 MG 24 hr capsule, Take 1 capsule by mouth Daily., Disp: 30 capsule, Rfl: 2    OBJECTIVE:  /76 (BP Location: Left arm, Patient Position: Sitting, Cuff Size: Adult)   Pulse 70   Temp 98.3 °F (36.8 °C) (Temporal)   Resp 12   Ht 170.2 cm (67\")   Wt 54.6 kg (120 lb 5 oz)   LMP  (LMP Unknown)   SpO2 99%   BMI 18.84 kg/m²    Physical Exam   Constitutional: She is oriented to person, place, and time. Vital signs are normal. She appears well-developed and well-nourished.   Cardiovascular: Normal rate.   Pulmonary/Chest: Effort normal. "   Neurological: She is alert and oriented to person, place, and time.   Psychiatric: She has a normal mood and affect. Her behavior is normal.   Vitals reviewed.      Assessment/Plan    Kamryn was seen today for establish care.    Diagnoses and all orders for this visit:    Essential hypertension  -     amLODIPine (NORVASC) 10 MG tablet; Take 1 tablet by mouth Daily.  -     metoprolol succinate XL (TOPROL-XL) 100 MG 24 hr tablet; Take 1 tablet by mouth Every Night.  Advised to only take clonidine for BP >160/110.  Return with BP log in 2 weeks for BP check.  Goal is to get her off of clonidine.     Maravilla maravilla disease  Continue to follow with [facility].  Advised that I would be starting a statin medication for prevention of heart attack and stroke.  Will evaluate lipid panel to determine dosage prior to ordering.     Gastroparesis  -     promethazine (PHENERGAN) 25 MG tablet; Take 1 tablet by mouth Every 6 (Six) Hours As Needed for Nausea or Vomiting.  Continue to follow with [facility].    Anxiety and depression  -     escitalopram (LEXAPRO) 10 MG tablet; Take 1 tablet by mouth Daily.  -     venlafaxine XR (EFFEXOR XR) 75 MG 24 hr capsule; Take 1 capsule by mouth Daily.  -     venlafaxine XR (EFFEXOR XR) 37.5 MG 24 hr capsule; Take 1 capsule by mouth Daily.  -     busPIRone (BUSPAR) 7.5 MG tablet; Take 1 tablet by mouth 3 (Three) Times a Day.  Will wean lexapro and start effexor.  Add buspirone to replace xanax, which she has not had in several months.     Lipid screening  -     Lipid panel; Future    Gastroesophageal reflux disease, esophagitis presence not specified  -     raNITIdine (ZANTAC) 150 MG tablet; Take 1 tablet by mouth 2 (Two) Times a Day.    Seizure disorder (CMS/HCC)  -     levETIRAcetam (KEPPRA) 500 MG tablet; Take 1 tablet by mouth 2 (Two) Times a Day.  History here is unclear, however, it is evident through Care Everywhere records that she has been on this medication in the past through Dr. Faust.   Last seizure was approximately a year and a half ago.     Dysuria  -     Urinalysis With Culture If Indicated - Urine, Clean Catch; Future    Nonintractable headache, unspecified chronicity pattern, unspecified headache type  -     butalbital-acetaminophen-caffeine (ESGIC) -40 MG per tablet; Take 1 tablet by mouth Every 6 (Six) Hours As Needed for Headache.  Will give 30 tablets of fioricet for headache, however, she is strongly advised to use this sparingly and carry through with scans as advised by [facility] as it would be counterproductive to only mask her symptoms without further evaluation given her history.       An After Visit Summary was printed and given to the patient at discharge.  Return in about 2 weeks (around 3/20/2019) for Nurse visit - BP check  6 week follow up visit.         Kristine Burton, YOUNG  3/6/2019

## 2019-03-07 ENCOUNTER — TELEPHONE (OUTPATIENT)
Dept: INTERNAL MEDICINE | Facility: CLINIC | Age: 49
End: 2019-03-07

## 2019-03-07 DIAGNOSIS — I67.5 MOYA MOYA DISEASE: Chronic | ICD-10-CM

## 2019-03-07 DIAGNOSIS — E78.2 MIXED HYPERLIPIDEMIA: Primary | ICD-10-CM

## 2019-03-07 PROBLEM — R10.84 GENERALIZED ABDOMINAL PAIN: Status: RESOLVED | Noted: 2017-08-04 | Resolved: 2019-03-07

## 2019-03-07 PROBLEM — E87.6 HYPOKALEMIA: Status: RESOLVED | Noted: 2018-10-16 | Resolved: 2019-03-07

## 2019-03-07 PROBLEM — E86.0 DEHYDRATION: Status: RESOLVED | Noted: 2018-10-16 | Resolved: 2019-03-07

## 2019-03-07 PROBLEM — R63.4 WEIGHT LOSS: Status: RESOLVED | Noted: 2017-08-04 | Resolved: 2019-03-07

## 2019-03-07 PROBLEM — E87.20 METABOLIC ACIDOSIS: Status: RESOLVED | Noted: 2018-10-14 | Resolved: 2019-03-07

## 2019-03-07 PROBLEM — R55 SYNCOPE: Status: RESOLVED | Noted: 2018-09-11 | Resolved: 2019-03-07

## 2019-03-07 PROBLEM — R77.8 ELEVATED TROPONIN: Status: RESOLVED | Noted: 2017-02-26 | Resolved: 2019-03-07

## 2019-03-07 PROBLEM — E83.52 HYPERCALCEMIA: Status: RESOLVED | Noted: 2018-10-16 | Resolved: 2019-03-07

## 2019-03-07 PROBLEM — E83.42 HYPOMAGNESEMIA: Status: RESOLVED | Noted: 2018-11-06 | Resolved: 2019-03-07

## 2019-03-07 PROBLEM — R11.2 NAUSEA AND VOMITING: Status: RESOLVED | Noted: 2017-08-03 | Resolved: 2019-03-07

## 2019-03-07 RX ORDER — ATORVASTATIN CALCIUM 40 MG/1
40 TABLET, FILM COATED ORAL DAILY
Qty: 30 TABLET | Refills: 5 | Status: SHIPPED | OUTPATIENT
Start: 2019-03-07 | End: 2019-09-18 | Stop reason: HOSPADM

## 2019-03-07 NOTE — TELEPHONE ENCOUNTER
----- Message from YOUNG Osuna sent at 3/7/2019  1:34 PM CST -----  Please advise that I have sent cholesterol medication to her pharmacy.  Level was mildly elevated.  Urine showed no infection or blood.

## 2019-03-07 NOTE — PROGRESS NOTES
Please advise that I have sent cholesterol medication to her pharmacy.  Level was mildly elevated.  Urine showed no infection or blood.

## 2019-03-20 ENCOUNTER — TRANSCRIBE ORDERS (OUTPATIENT)
Dept: ONCOLOGY | Facility: CLINIC | Age: 49
End: 2019-03-20

## 2019-03-20 DIAGNOSIS — K31.84 GASTROPARESIS: ICD-10-CM

## 2019-03-20 DIAGNOSIS — D64.9 NORMOCYTIC ANEMIA: Primary | ICD-10-CM

## 2019-03-20 RX ORDER — PROMETHAZINE HYDROCHLORIDE 25 MG/1
25 TABLET ORAL EVERY 6 HOURS PRN
Qty: 30 TABLET | Refills: 0 | Status: SHIPPED | OUTPATIENT
Start: 2019-03-20 | End: 2019-04-08 | Stop reason: SDUPTHER

## 2019-04-05 DIAGNOSIS — K31.84 GASTROPARESIS: ICD-10-CM

## 2019-04-08 RX ORDER — PROMETHAZINE HYDROCHLORIDE 25 MG/1
TABLET ORAL
Qty: 30 TABLET | Refills: 0 | Status: SHIPPED | OUTPATIENT
Start: 2019-04-08 | End: 2019-06-07 | Stop reason: SDUPTHER

## 2019-05-31 ENCOUNTER — APPOINTMENT (OUTPATIENT)
Dept: LAB | Facility: HOSPITAL | Age: 49
End: 2019-05-31

## 2019-06-05 ENCOUNTER — APPOINTMENT (OUTPATIENT)
Dept: LAB | Facility: HOSPITAL | Age: 49
End: 2019-06-05

## 2019-06-07 ENCOUNTER — APPOINTMENT (OUTPATIENT)
Dept: LAB | Facility: HOSPITAL | Age: 49
End: 2019-06-07

## 2019-06-07 DIAGNOSIS — F41.9 ANXIETY AND DEPRESSION: ICD-10-CM

## 2019-06-07 DIAGNOSIS — F32.A ANXIETY AND DEPRESSION: ICD-10-CM

## 2019-06-07 DIAGNOSIS — K31.84 GASTROPARESIS: ICD-10-CM

## 2019-06-07 DIAGNOSIS — K21.9 GASTROESOPHAGEAL REFLUX DISEASE, ESOPHAGITIS PRESENCE NOT SPECIFIED: ICD-10-CM

## 2019-06-07 LAB
BASOPHILS # BLD AUTO: 0.03 10*3/MM3 (ref 0–0.2)
BASOPHILS NFR BLD AUTO: 0.3 % (ref 0–2)
DEPRECATED RDW RBC AUTO: 45.7 FL (ref 40–54)
EOSINOPHIL # BLD AUTO: 0.08 10*3/MM3 (ref 0–0.7)
EOSINOPHIL NFR BLD AUTO: 0.7 % (ref 0–4)
ERYTHROCYTE [DISTWIDTH] IN BLOOD BY AUTOMATED COUNT: 13.5 % (ref 12–15)
FERRITIN SERPL-MCNC: 172 NG/ML (ref 6.24–137)
HCT VFR BLD AUTO: 39.3 % (ref 37–47)
HGB BLD-MCNC: 12.7 G/DL (ref 12–16)
IMM GRANULOCYTES # BLD AUTO: 0.05 10*3/MM3 (ref 0–0.05)
IMM GRANULOCYTES NFR BLD AUTO: 0.4 % (ref 0–5)
IRON 24H UR-MRATE: 92 MCG/DL (ref 42–180)
IRON SATN MFR SERPL: 32 % (ref 20–45)
LYMPHOCYTES # BLD AUTO: 4.61 10*3/MM3 (ref 0.72–4.86)
LYMPHOCYTES NFR BLD AUTO: 39 % (ref 15–45)
MCH RBC QN AUTO: 29.6 PG (ref 28–32)
MCHC RBC AUTO-ENTMCNC: 32.3 G/DL (ref 33–36)
MCV RBC AUTO: 91.6 FL (ref 82–98)
MONOCYTES # BLD AUTO: 0.54 10*3/MM3 (ref 0.19–1.3)
MONOCYTES NFR BLD AUTO: 4.6 % (ref 4–12)
NEUTROPHILS # BLD AUTO: 6.52 10*3/MM3 (ref 1.87–8.4)
NEUTROPHILS NFR BLD AUTO: 55 % (ref 39–78)
NRBC BLD AUTO-RTO: 0 /100 WBC (ref 0–0.2)
PLATELET # BLD AUTO: 363 10*3/MM3 (ref 130–400)
PMV BLD AUTO: 10.7 FL (ref 6–12)
RBC # BLD AUTO: 4.29 10*6/MM3 (ref 4.2–5.4)
TIBC SERPL-MCNC: 288 MCG/DL (ref 225–420)
WBC NRBC COR # BLD: 11.83 10*3/MM3 (ref 4.8–10.8)

## 2019-06-07 PROCEDURE — 83550 IRON BINDING TEST: CPT | Performed by: INTERNAL MEDICINE

## 2019-06-07 PROCEDURE — 83540 ASSAY OF IRON: CPT | Performed by: INTERNAL MEDICINE

## 2019-06-07 PROCEDURE — 85025 COMPLETE CBC W/AUTO DIFF WBC: CPT | Performed by: INTERNAL MEDICINE

## 2019-06-07 PROCEDURE — 82728 ASSAY OF FERRITIN: CPT | Performed by: INTERNAL MEDICINE

## 2019-06-07 PROCEDURE — 36415 COLL VENOUS BLD VENIPUNCTURE: CPT | Performed by: INTERNAL MEDICINE

## 2019-06-07 RX ORDER — PROMETHAZINE HYDROCHLORIDE 25 MG/1
25 TABLET ORAL EVERY 6 HOURS PRN
Qty: 30 TABLET | Refills: 0 | Status: SHIPPED | OUTPATIENT
Start: 2019-06-07 | End: 2019-07-02 | Stop reason: SDUPTHER

## 2019-06-07 RX ORDER — VENLAFAXINE HYDROCHLORIDE 75 MG/1
75 CAPSULE, EXTENDED RELEASE ORAL DAILY
Qty: 30 CAPSULE | Refills: 0 | Status: SHIPPED | OUTPATIENT
Start: 2019-06-07 | End: 2019-06-18 | Stop reason: SDUPTHER

## 2019-06-07 RX ORDER — RANITIDINE 150 MG/1
150 TABLET ORAL 2 TIMES DAILY
Qty: 60 TABLET | Refills: 0 | Status: SHIPPED | OUTPATIENT
Start: 2019-06-07 | End: 2019-06-16

## 2019-06-07 RX ORDER — BUSPIRONE HYDROCHLORIDE 7.5 MG/1
7.5 TABLET ORAL 3 TIMES DAILY
Qty: 90 TABLET | Refills: 0 | Status: SHIPPED | OUTPATIENT
Start: 2019-06-07 | End: 2019-07-22 | Stop reason: SDUPTHER

## 2019-06-07 RX ORDER — CLONIDINE HYDROCHLORIDE 0.1 MG/1
0.1 TABLET ORAL EVERY 4 HOURS PRN
Qty: 30 TABLET | Refills: 0 | Status: SHIPPED | OUTPATIENT
Start: 2019-06-07 | End: 2019-07-02 | Stop reason: SDUPTHER

## 2019-06-07 NOTE — TELEPHONE ENCOUNTER
PLEASE CALL IN EFFEXOR 75 MG 24 HR CAPSULE 1 DAILY, CLONIDINE 0.1 MG TABLET, BUSPAR 7.5 MG TABLET, AND ZANTAC 150 MG TABLET 2 TIMES DAILY TO [facility] ON [address] AND PHENERGAN 25 MG TABLET

## 2019-06-08 DIAGNOSIS — F41.9 ANXIETY AND DEPRESSION: ICD-10-CM

## 2019-06-08 DIAGNOSIS — F32.A ANXIETY AND DEPRESSION: ICD-10-CM

## 2019-06-08 DIAGNOSIS — K21.9 GASTROESOPHAGEAL REFLUX DISEASE, ESOPHAGITIS PRESENCE NOT SPECIFIED: ICD-10-CM

## 2019-06-08 DIAGNOSIS — G40.909 SEIZURE DISORDER (HCC): ICD-10-CM

## 2019-06-08 RX ORDER — LEVETIRACETAM 500 MG/1
TABLET ORAL
Qty: 60 TABLET | Refills: 2 | Status: CANCELLED | OUTPATIENT
Start: 2019-06-08

## 2019-06-10 DIAGNOSIS — F41.9 ANXIETY AND DEPRESSION: ICD-10-CM

## 2019-06-10 DIAGNOSIS — K21.9 GASTROESOPHAGEAL REFLUX DISEASE, ESOPHAGITIS PRESENCE NOT SPECIFIED: ICD-10-CM

## 2019-06-10 DIAGNOSIS — F32.A ANXIETY AND DEPRESSION: ICD-10-CM

## 2019-06-10 DIAGNOSIS — G40.909 SEIZURE DISORDER (HCC): ICD-10-CM

## 2019-06-10 RX ORDER — RANITIDINE 150 MG/1
150 TABLET ORAL 2 TIMES DAILY
Qty: 180 TABLET | Refills: 3 | OUTPATIENT
Start: 2019-06-10

## 2019-06-10 RX ORDER — RANITIDINE 150 MG/1
TABLET ORAL
Qty: 60 TABLET | Refills: 2 | OUTPATIENT
Start: 2019-06-10

## 2019-06-10 RX ORDER — BUSPIRONE HYDROCHLORIDE 15 MG/1
TABLET ORAL
Qty: 45 TABLET | Refills: 2 | OUTPATIENT
Start: 2019-06-10

## 2019-06-10 RX ORDER — LEVETIRACETAM 500 MG/1
500 TABLET ORAL 2 TIMES DAILY
Qty: 180 TABLET | Refills: 3 | OUTPATIENT
Start: 2019-06-10

## 2019-06-10 RX ORDER — BUSPIRONE HYDROCHLORIDE 7.5 MG/1
7.5 TABLET ORAL 3 TIMES DAILY
Qty: 270 TABLET | Refills: 3 | OUTPATIENT
Start: 2019-06-10

## 2019-06-10 NOTE — TELEPHONE ENCOUNTER
Patient cancelled with me in April and no-showed Dr. Jones today.  Must schedule visit.  Will fill until visit only and if she misses no additional refills.

## 2019-06-13 DIAGNOSIS — G40.909 SEIZURE DISORDER (HCC): ICD-10-CM

## 2019-06-13 NOTE — TELEPHONE ENCOUNTER
Needing refills for pantoprazole 40 MG EC tablet, levetiracetam 500 MG tablet, and fluticasone 50 MCG/ACT nasal spray sent to [facility], please.

## 2019-06-16 RX ORDER — FLUTICASONE PROPIONATE 50 MCG
2 SPRAY, SUSPENSION (ML) NASAL DAILY
Qty: 1 BOTTLE | Refills: 3 | Status: SHIPPED | OUTPATIENT
Start: 2019-06-16

## 2019-06-16 RX ORDER — LEVETIRACETAM 500 MG/1
500 TABLET ORAL 2 TIMES DAILY
Qty: 60 TABLET | Refills: 2 | Status: SHIPPED | OUTPATIENT
Start: 2019-06-16 | End: 2019-10-17 | Stop reason: SDUPTHER

## 2019-06-16 RX ORDER — PANTOPRAZOLE SODIUM 40 MG/1
40 TABLET, DELAYED RELEASE ORAL 2 TIMES DAILY
Qty: 30 TABLET | Refills: 5 | Status: SHIPPED | OUTPATIENT
Start: 2019-06-16 | End: 2019-06-18 | Stop reason: SDUPTHER

## 2019-06-18 ENCOUNTER — OFFICE VISIT (OUTPATIENT)
Dept: INTERNAL MEDICINE | Facility: CLINIC | Age: 49
End: 2019-06-18

## 2019-06-18 VITALS
WEIGHT: 119.44 LBS | SYSTOLIC BLOOD PRESSURE: 124 MMHG | RESPIRATION RATE: 12 BRPM | DIASTOLIC BLOOD PRESSURE: 86 MMHG | BODY MASS INDEX: 18.75 KG/M2 | HEIGHT: 67 IN | HEART RATE: 70 BPM | OXYGEN SATURATION: 99 % | TEMPERATURE: 98.4 F

## 2019-06-18 DIAGNOSIS — K21.9 GASTROESOPHAGEAL REFLUX DISEASE, ESOPHAGITIS PRESENCE NOT SPECIFIED: Primary | ICD-10-CM

## 2019-06-18 DIAGNOSIS — F41.9 ANXIETY AND DEPRESSION: ICD-10-CM

## 2019-06-18 DIAGNOSIS — M99.00 SOMATIC DYSFUNCTION OF HEAD REGION: ICD-10-CM

## 2019-06-18 DIAGNOSIS — K31.84 GASTROPARESIS: ICD-10-CM

## 2019-06-18 DIAGNOSIS — F32.A ANXIETY AND DEPRESSION: ICD-10-CM

## 2019-06-18 DIAGNOSIS — M99.09 SEGMENTAL AND SOMATIC DYSFUNCTION OF ABDOMEN AND OTHER REGIONS: ICD-10-CM

## 2019-06-18 DIAGNOSIS — I10 ESSENTIAL HYPERTENSION: Chronic | ICD-10-CM

## 2019-06-18 PROCEDURE — 98925 OSTEOPATH MANJ 1-2 REGIONS: CPT | Performed by: FAMILY MEDICINE

## 2019-06-18 PROCEDURE — 99214 OFFICE O/P EST MOD 30 MIN: CPT | Performed by: FAMILY MEDICINE

## 2019-06-18 RX ORDER — VENLAFAXINE HYDROCHLORIDE 150 MG/1
150 CAPSULE, EXTENDED RELEASE ORAL DAILY
Qty: 30 CAPSULE | Refills: 1 | Status: SHIPPED | OUTPATIENT
Start: 2019-06-18 | End: 2019-08-16 | Stop reason: SDUPTHER

## 2019-06-18 RX ORDER — PANTOPRAZOLE SODIUM 40 MG/1
40 TABLET, DELAYED RELEASE ORAL 2 TIMES DAILY
Qty: 180 TABLET | Refills: 3 | Status: ON HOLD | OUTPATIENT
Start: 2019-06-18 | End: 2020-03-09

## 2019-06-18 RX ORDER — AMITRIPTYLINE HYDROCHLORIDE 100 MG/1
100 TABLET, FILM COATED ORAL NIGHTLY
Qty: 90 TABLET | Refills: 1 | Status: SHIPPED | OUTPATIENT
Start: 2019-06-18 | End: 2019-12-18 | Stop reason: SDUPTHER

## 2019-06-18 RX ORDER — AMLODIPINE BESYLATE 10 MG/1
10 TABLET ORAL DAILY
Qty: 90 TABLET | Refills: 3 | Status: SHIPPED | OUTPATIENT
Start: 2019-06-18 | End: 2020-03-16 | Stop reason: SDUPTHER

## 2019-06-18 NOTE — PROGRESS NOTES
CC:   Chief Complaint   Patient presents with   • Follow-up   • Med Refill   • Hypertension       History:  Kamryn Oliva is a 49 y.o. female who presents today for follow-up for evaluation of the above:    Needs refills of   Amitriptyline for insomnia as well as chronic abdominal pain  protonix for GERD (has occasional stomach upset related to foods, also has gastroparesis)  Weight is up from 86 lbs in December to 119 today (120 in March)  Amlodipine for well controlled HTN    Reports worsening depression and anxiety with difficulty sleeping, waking up with worrying thoughts    She is taking fioricet for general pain, was previously on Tylenol #3 for general myalgias and headache by primary physician. Currently her greatest pain is her stomach. Currently only taking excedrin.       ROS:  Review of Systems   Gastrointestinal: Positive for abdominal pain.   Musculoskeletal: Positive for myalgias.   Neurological: Positive for headaches.   Psychiatric/Behavioral: Positive for behavioral problems, decreased concentration and sleep disturbance. The patient is nervous/anxious.        Ms. Oliva  reports that she quit smoking about 3 years ago. Her smoking use included cigarettes and electronic cigarette. She has a 15.00 pack-year smoking history. She has never used smokeless tobacco. She reports that she does not drink alcohol or use drugs.      Current Outpatient Medications:   •  amLODIPine (NORVASC) 10 MG tablet, Take 1 tablet by mouth Daily., Disp: 90 tablet, Rfl: 3  •  aspirin 81 MG EC tablet, Take 81 mg by mouth daily., Disp: , Rfl:   •  aspirin-acetaminophen-caffeine (EXCEDRIN MIGRAINE) 250-250-65 MG per tablet, Take 1 tablet by mouth Every 6 (Six) Hours As Needed for Headache., Disp: , Rfl:   •  atorvastatin (LIPITOR) 40 MG tablet, Take 1 tablet by mouth Daily., Disp: 30 tablet, Rfl: 5  •  busPIRone (BUSPAR) 7.5 MG tablet, Take 1 tablet by mouth 3 (Three) Times a Day., Disp: 90 tablet, Rfl: 0  •  CloNIDine  "(CATAPRES) 0.1 MG tablet, Take 1 tablet by mouth Every 4 (Four) Hours As Needed for High Blood Pressure., Disp: 30 tablet, Rfl: 0  •  levETIRAcetam (KEPPRA) 500 MG tablet, Take 1 tablet by mouth 2 (Two) Times a Day., Disp: 60 tablet, Rfl: 2  •  metoprolol succinate XL (TOPROL-XL) 100 MG 24 hr tablet, Take 1 tablet by mouth Every Night., Disp: 30 tablet, Rfl: 5  •  Multiple Vitamins-Minerals (MULTIVITAMIN AND MINERALS) liquid liquid, Take 15 mL by mouth Daily., Disp: 480 mL, Rfl: 1  •  pantoprazole (PROTONIX) 40 MG EC tablet, Take 1 tablet by mouth 2 (Two) Times a Day., Disp: 180 tablet, Rfl: 3  •  promethazine (PHENERGAN) 25 MG tablet, Take 1 tablet by mouth Every 6 (Six) Hours As Needed for Nausea or Vomiting., Disp: 30 tablet, Rfl: 0  •  venlafaxine XR (EFFEXOR-XR) 150 MG 24 hr capsule, Take 1 capsule by mouth Daily., Disp: 30 capsule, Rfl: 1  •  amitriptyline (ELAVIL) 100 MG tablet, Take 1 tablet by mouth Every Night., Disp: 90 tablet, Rfl: 1  •  butalbital-acetaminophen-caffeine (ESGIC) -40 MG per tablet, Take 1 tablet by mouth Every 6 (Six) Hours As Needed for Headache., Disp: 30 tablet, Rfl: 0  •  fluticasone (FLONASE) 50 MCG/ACT nasal spray, 2 sprays into the nostril(s) as directed by provider Daily., Disp: 1 bottle, Rfl: 3  •  megestrol (MEGACE) 40 MG tablet, Take 40 mg by mouth Daily., Disp: , Rfl:       OBJECTIVE:  /86 (BP Location: Left arm, Patient Position: Sitting, Cuff Size: Adult)   Pulse 70   Temp 98.4 °F (36.9 °C) (Temporal)   Resp 12   Ht 170.2 cm (67\")   Wt 54.2 kg (119 lb 7 oz)   LMP  (LMP Unknown)   SpO2 99%   BMI 18.71 kg/m²    Physical Exam   Constitutional: She is oriented to person, place, and time. No distress.       HENT:   Head: Normocephalic and atraumatic.   Nose: Nose normal.   Eyes: Conjunctivae are normal. Right eye exhibits no discharge. Left eye exhibits no discharge. No scleral icterus.   Neck: No tracheal deviation present.   Cardiovascular: Normal rate, " regular rhythm and normal heart sounds. Exam reveals no gallop and no friction rub.   No murmur heard.  Pulmonary/Chest: Effort normal and breath sounds normal. No respiratory distress. She has no wheezes. She has no rales.   Abdominal: Tenderness: mild epigastric discomfort.   Neurological: She is alert and oriented to person, place, and time.   Skin: Skin is warm and dry. She is not diaphoretic. No pallor.   Psychiatric: She has a normal mood and affect. Her behavior is normal. Judgment and thought content normal.   Nursing note and vitals reviewed.    Osteopathic Structural Exam  Procedure Note for Osteopathic Manipulative Treatment    Pre-procedure diagnoses: Somatic dysfunctions as listed below.  Consent: Oral consent given for Osteopathic Treatment  Post-procedure diagnoses: same  Complications of procedure: none, patient tolerated procedure well    The evaluation including the history, physical exam and the management decisions, indicate than an appropriate intervention on this date of service is osteopathic manipulative treatment. Oral permission for the osteopathic procedure was obtained. The following treatment methods and the responses for each body region are listed below.        Region Somatic Dysfunction Severity OMT technique Response      Head SBS compression  R OM suture compression Moderate Osteopathy in the cranial field Improved      Abdomen & Other Sites  Celiac ganglion fascial restriction Moderate  Myofascial Release Improved          Assessment/Plan     Diagnosis Plan   1. Gastroesophageal reflux disease, esophagitis presence not specified  pantoprazole (PROTONIX) 40 MG EC tablet   2. Anxiety and depression  amitriptyline (ELAVIL) 100 MG tablet    venlafaxine XR (EFFEXOR-XR) 150 MG 24 hr capsule   3. Essential hypertension  amLODIPine (NORVASC) 10 MG tablet   4. Gastroparesis     5. Somatic dysfunction of head region     6. Segmental and somatic dysfunction of abdomen and other regions      -refill amitriptyline and protonix  -increase effexor, continue buspirone  -OMT to balance autonomic tone, improve fascial symmetry and respiratory/circulatory/lymphatic compliance  -refill amlodipine  -f/u 4 weeks    An After Visit Summary was printed and given to the patient at discharge.  Return in about 4 weeks (around 7/16/2019). Sooner if problems arise.         Kory Jones D.O.  Family Medicine  Osteopathic Neuromusculoskeletal Medicine

## 2019-06-18 NOTE — PATIENT INSTRUCTIONS
Increase Effexor to 150 mg, keep taking amitriptyline and buspirone  Follow up 4 weeks    What is Osteopathic Medicine  Osteopathic medicine provides all of the benefits of modern medicine including prescription drugs, surgery, and the use of technology to diagnose disease and evaluate injury. It also offers the added benefit of hands-on diagnosis and treatment through a system of therapy known as osteopathic manipulative medicine. Osteopathic medicine emphasizes helping each person achieve a high level of wellness by focusing on health promotion and disease prevention. ([email or web address])     What is a DO  Doctor's of Osteopathy (DO) are fully trained physicians, capable of practicing the entire scope of medicine. In addition to conventional medical practice, DO’s are trained to use their hands to palpate (feel) tissue function and dysfunction. Gentle manipulative techniques are applied, restoring optimal function (motion).     4 Principles define Osteopathic Medicine  • The body is a fully integrated being of body, mind and spirit  • The body is capable of self-regulation, self-healing, and health maintenance  • Structure and function are interrelated  • Rational treatment is based upon an understanding of the basic principles of body unity, self-regulation, and the relationship of structure and function     Osteopathic Manipulative Medicine (OMM)   OMM encompasses a wide range of techniques addressing problems in joints, ligaments, muscles, tendons, and fascia (tissue surrounding muscles and other organs) that may cause pain or interfere with the body’s function. When there are restrictions within the structure of the body it does not function properly, often times causing pain. Using different techniques (listed below) the restrictions in the body are relieved so the body can function at optimal health. Patients often experience a sense of deep relaxation, tingling, fluid flows and relief of pain. These changes may be  experienced immediately as they occur or later after the treatment. OMM may be referred to as Osteopathic Manipulative Treatment (OMT).     Common Treatment Modalities  Osteopathy in the Cranial Field- a system of treatment that utilizes the intrinsic motion of the cranial and neurological system to treat the whole body     Myofascial Release - used to treat restrictions of muscle and fascia     Counterstrain - focused on specific tender points on the body that are held in a position of comfort for 90 seconds, after which the tenderness is relieved     Muscle Energy - uses the relaxation after a muscle is contracted to stretch muscles and increase range of motion     Balanced Ligamentous Tension - ligaments or joints are placed into a state of balanced until the tension is relieved     Facilitated Positional Release - patient’s spine is placed at neutral position while the isolated segment for treatment is placed at ease. Compression or traction is then added to release the tension in the muscle, fascia, and/or joints     High Velocity Low Amplitude (HVLA)- use of fast, short thrusts through restricted joints; a technique with which most people are familiar (also known as the “cracking” or “popping” technique)     Who would benefit  Osteopathy treats the patient, not the disease. Our intention is to find and restore health as well as structural integrity and fluid continuity.      Some of the problems that typically respond to osteopathic treatment:  · SOMATIC PAIN  · Back, neck, and joint pain  · Sciatica  · Headaches/Migraines  · Temporal Mandibular Joint Dysfunction (TMJ)     · TRAUMATIC INJURIES  · Head trauma  · Post Concussion Syndrome  · Overuse Syndromes  · Whiplash Syndromes     · CHRONIC CONDITIONS UNRESPONSIVE TO CONVENTIAL TREATMENT  · Neurologic disorders  · Gastrointestinal disorders  · Genitourinary disorders  · Respiratory Disorders     · WOMEN DURING PREGNANCY can be made more comfortable, their  labor and delivery eased considerably by providing freedom to the ligamentous support of the uterus and pelvis.     ADDITIONAL RESOURCES  www.osteopathic.org  www.osteodoc.com  http://www.do-[place].com/aboutosteopathy/research/ (Research articles)  Book: Dr. Thomas’s Touch of Life by Inder Thomas DO     Information gathered from [email or web address],  aacom.org, A Brief Guide to Osteopathic Medicine.

## 2019-07-02 DIAGNOSIS — K31.84 GASTROPARESIS: ICD-10-CM

## 2019-07-02 RX ORDER — PROMETHAZINE HYDROCHLORIDE 25 MG/1
25 TABLET ORAL EVERY 6 HOURS PRN
Qty: 30 TABLET | Refills: 0 | Status: SHIPPED | OUTPATIENT
Start: 2019-07-02 | End: 2019-07-22 | Stop reason: SDUPTHER

## 2019-07-02 RX ORDER — CLONIDINE HYDROCHLORIDE 0.1 MG/1
0.1 TABLET ORAL EVERY 4 HOURS PRN
Qty: 30 TABLET | Refills: 0 | Status: SHIPPED | OUTPATIENT
Start: 2019-07-02 | End: 2019-07-22 | Stop reason: SDUPTHER

## 2019-07-02 NOTE — TELEPHONE ENCOUNTER
Needing refills on the following medications, sent to [facility]:     Clonidine 0.1 MG tablet    Phenergan 25 MG tablet

## 2019-07-15 ENCOUNTER — APPOINTMENT (OUTPATIENT)
Dept: LAB | Facility: HOSPITAL | Age: 49
End: 2019-07-15

## 2019-07-15 ENCOUNTER — TRANSCRIBE ORDERS (OUTPATIENT)
Dept: ADMINISTRATIVE | Facility: HOSPITAL | Age: 49
End: 2019-07-15

## 2019-07-15 DIAGNOSIS — N17.9 ACUTE RENAL FAILURE, UNSPECIFIED ACUTE RENAL FAILURE TYPE (HCC): Primary | ICD-10-CM

## 2019-07-15 DIAGNOSIS — D64.9 NORMOCYTIC ANEMIA: ICD-10-CM

## 2019-07-15 LAB
BASOPHILS # BLD AUTO: 0.02 10*3/MM3 (ref 0–0.2)
BASOPHILS NFR BLD AUTO: 0.2 % (ref 0–2)
DEPRECATED RDW RBC AUTO: 45.1 FL (ref 40–54)
EOSINOPHIL # BLD AUTO: 0.08 10*3/MM3 (ref 0–0.7)
EOSINOPHIL NFR BLD AUTO: 1 % (ref 0–4)
ERYTHROCYTE [DISTWIDTH] IN BLOOD BY AUTOMATED COUNT: 13.4 % (ref 12–15)
FERRITIN SERPL-MCNC: 209 NG/ML (ref 6.24–137)
HCT VFR BLD AUTO: 37.7 % (ref 37–47)
HGB BLD-MCNC: 12.2 G/DL (ref 12–16)
IMM GRANULOCYTES # BLD AUTO: 0.02 10*3/MM3 (ref 0–0.05)
IMM GRANULOCYTES NFR BLD AUTO: 0.2 % (ref 0–5)
IRON 24H UR-MRATE: 66 MCG/DL (ref 42–180)
IRON SATN MFR SERPL: 18 % (ref 20–45)
LYMPHOCYTES # BLD AUTO: 3.27 10*3/MM3 (ref 0.72–4.86)
LYMPHOCYTES NFR BLD AUTO: 39.5 % (ref 15–45)
MCH RBC QN AUTO: 29.5 PG (ref 28–32)
MCHC RBC AUTO-ENTMCNC: 32.4 G/DL (ref 33–36)
MCV RBC AUTO: 91.1 FL (ref 82–98)
MONOCYTES # BLD AUTO: 0.42 10*3/MM3 (ref 0.19–1.3)
MONOCYTES NFR BLD AUTO: 5.1 % (ref 4–12)
NEUTROPHILS # BLD AUTO: 4.47 10*3/MM3 (ref 1.87–8.4)
NEUTROPHILS NFR BLD AUTO: 54 % (ref 39–78)
NRBC BLD AUTO-RTO: 0 /100 WBC (ref 0–0.2)
PLATELET # BLD AUTO: 351 10*3/MM3 (ref 130–400)
PMV BLD AUTO: 10.1 FL (ref 6–12)
RBC # BLD AUTO: 4.14 10*6/MM3 (ref 4.2–5.4)
TIBC SERPL-MCNC: 361 MCG/DL (ref 225–420)
WBC NRBC COR # BLD: 8.28 10*3/MM3 (ref 4.8–10.8)

## 2019-07-15 PROCEDURE — 83540 ASSAY OF IRON: CPT | Performed by: INTERNAL MEDICINE

## 2019-07-15 PROCEDURE — 83550 IRON BINDING TEST: CPT | Performed by: INTERNAL MEDICINE

## 2019-07-15 PROCEDURE — 36415 COLL VENOUS BLD VENIPUNCTURE: CPT | Performed by: INTERNAL MEDICINE

## 2019-07-15 PROCEDURE — 85025 COMPLETE CBC W/AUTO DIFF WBC: CPT | Performed by: INTERNAL MEDICINE

## 2019-07-15 PROCEDURE — 82728 ASSAY OF FERRITIN: CPT | Performed by: INTERNAL MEDICINE

## 2019-07-17 ENCOUNTER — OFFICE VISIT (OUTPATIENT)
Dept: INTERNAL MEDICINE | Facility: CLINIC | Age: 49
End: 2019-07-17

## 2019-07-17 ENCOUNTER — APPOINTMENT (OUTPATIENT)
Dept: GENERAL RADIOLOGY | Facility: HOSPITAL | Age: 49
End: 2019-07-17

## 2019-07-17 ENCOUNTER — HOSPITAL ENCOUNTER (EMERGENCY)
Facility: HOSPITAL | Age: 49
Discharge: HOME | End: 2019-07-17
Admitting: EMERGENCY MEDICINE

## 2019-07-17 VITALS
DIASTOLIC BLOOD PRESSURE: 84 MMHG | OXYGEN SATURATION: 97 % | RESPIRATION RATE: 16 BRPM | BODY MASS INDEX: 18.7 KG/M2 | SYSTOLIC BLOOD PRESSURE: 112 MMHG | WEIGHT: 119.13 LBS | HEART RATE: 104 BPM | TEMPERATURE: 98.1 F | HEIGHT: 67 IN

## 2019-07-17 VITALS
OXYGEN SATURATION: 100 % | TEMPERATURE: 98.1 F | WEIGHT: 119.13 LBS | HEIGHT: 67 IN | HEART RATE: 80 BPM | SYSTOLIC BLOOD PRESSURE: 131 MMHG | DIASTOLIC BLOOD PRESSURE: 95 MMHG | RESPIRATION RATE: 16 BRPM | BODY MASS INDEX: 18.7 KG/M2

## 2019-07-17 DIAGNOSIS — K31.84 GASTROPARESIS: ICD-10-CM

## 2019-07-17 DIAGNOSIS — E86.0 DEHYDRATION: Primary | ICD-10-CM

## 2019-07-17 DIAGNOSIS — E86.9 VOLUME DEPLETION: ICD-10-CM

## 2019-07-17 DIAGNOSIS — K21.9 GASTROESOPHAGEAL REFLUX DISEASE, ESOPHAGITIS PRESENCE NOT SPECIFIED: ICD-10-CM

## 2019-07-17 DIAGNOSIS — K31.84 GASTROPARESIS: Primary | ICD-10-CM

## 2019-07-17 DIAGNOSIS — R11.2 NAUSEA AND VOMITING, INTRACTABILITY OF VOMITING NOT SPECIFIED, UNSPECIFIED VOMITING TYPE: ICD-10-CM

## 2019-07-17 LAB
ALBUMIN SERPL-MCNC: 4.5 G/DL (ref 3.5–5)
ALBUMIN/GLOB SERPL: 1.5 G/DL (ref 1.1–2.5)
ALP SERPL-CCNC: 101 U/L (ref 24–120)
ALT SERPL W P-5'-P-CCNC: 34 U/L (ref 0–54)
ANION GAP SERPL CALCULATED.3IONS-SCNC: 11 MMOL/L (ref 4–13)
AST SERPL-CCNC: 27 U/L (ref 7–45)
BACTERIA UR QL AUTO: ABNORMAL /HPF
BASOPHILS # BLD AUTO: 0.01 10*3/MM3 (ref 0–0.2)
BASOPHILS NFR BLD AUTO: 0.1 % (ref 0–2)
BILIRUB SERPL-MCNC: 0.4 MG/DL (ref 0.1–1)
BILIRUB UR QL STRIP: NEGATIVE
BUN BLD-MCNC: 19 MG/DL (ref 5–21)
BUN/CREAT SERPL: 19.8 (ref 7–25)
CALCIUM SPEC-SCNC: 9.5 MG/DL (ref 8.4–10.4)
CHLORIDE SERPL-SCNC: 106 MMOL/L (ref 98–110)
CLARITY UR: CLEAR
CO2 SERPL-SCNC: 25 MMOL/L (ref 24–31)
COLOR UR: YELLOW
CREAT BLD-MCNC: 0.96 MG/DL (ref 0.5–1.4)
DEPRECATED RDW RBC AUTO: 46.7 FL (ref 40–54)
EOSINOPHIL # BLD AUTO: 0.07 10*3/MM3 (ref 0–0.7)
EOSINOPHIL NFR BLD AUTO: 0.7 % (ref 0–4)
ERYTHROCYTE [DISTWIDTH] IN BLOOD BY AUTOMATED COUNT: 13.9 % (ref 12–15)
GFR SERPL CREATININE-BSD FRML MDRD: 62 ML/MIN/1.73
GLOBULIN UR ELPH-MCNC: 3 GM/DL
GLUCOSE BLD-MCNC: 87 MG/DL (ref 70–100)
GLUCOSE UR STRIP-MCNC: NEGATIVE MG/DL
HCT VFR BLD AUTO: 35.6 % (ref 37–47)
HGB BLD-MCNC: 11.6 G/DL (ref 12–16)
HGB UR QL STRIP.AUTO: NEGATIVE
HYALINE CASTS UR QL AUTO: ABNORMAL /LPF
IMM GRANULOCYTES # BLD AUTO: 0.03 10*3/MM3 (ref 0–0.05)
IMM GRANULOCYTES NFR BLD AUTO: 0.3 % (ref 0–5)
KETONES UR QL STRIP: ABNORMAL
LEUKOCYTE ESTERASE UR QL STRIP.AUTO: ABNORMAL
LIPASE SERPL-CCNC: 26 U/L (ref 23–203)
LYMPHOCYTES # BLD AUTO: 3.49 10*3/MM3 (ref 0.72–4.86)
LYMPHOCYTES NFR BLD AUTO: 36.1 % (ref 15–45)
MCH RBC QN AUTO: 29.8 PG (ref 28–32)
MCHC RBC AUTO-ENTMCNC: 32.6 G/DL (ref 33–36)
MCV RBC AUTO: 91.5 FL (ref 82–98)
MONOCYTES # BLD AUTO: 0.54 10*3/MM3 (ref 0.19–1.3)
MONOCYTES NFR BLD AUTO: 5.6 % (ref 4–12)
NEUTROPHILS # BLD AUTO: 5.54 10*3/MM3 (ref 1.87–8.4)
NEUTROPHILS NFR BLD AUTO: 57.2 % (ref 39–78)
NITRITE UR QL STRIP: NEGATIVE
NRBC BLD AUTO-RTO: 0 /100 WBC (ref 0–0.2)
PH UR STRIP.AUTO: 5.5 [PH] (ref 5–8)
PLATELET # BLD AUTO: 338 10*3/MM3 (ref 130–400)
PMV BLD AUTO: 10 FL (ref 6–12)
POTASSIUM BLD-SCNC: 3.3 MMOL/L (ref 3.5–5.3)
PROT SERPL-MCNC: 7.5 G/DL (ref 6.3–8.7)
PROT UR QL STRIP: NEGATIVE
RBC # BLD AUTO: 3.89 10*6/MM3 (ref 4.2–5.4)
RBC # UR: ABNORMAL /HPF
REF LAB TEST METHOD: ABNORMAL
SODIUM BLD-SCNC: 142 MMOL/L (ref 135–145)
SP GR UR STRIP: 1.03 (ref 1–1.03)
SQUAMOUS #/AREA URNS HPF: ABNORMAL /HPF
UROBILINOGEN UR QL STRIP: ABNORMAL
WBC NRBC COR # BLD: 9.68 10*3/MM3 (ref 4.8–10.8)
WBC UR QL AUTO: ABNORMAL /HPF

## 2019-07-17 PROCEDURE — 83690 ASSAY OF LIPASE: CPT | Performed by: NURSE PRACTITIONER

## 2019-07-17 PROCEDURE — 96374 THER/PROPH/DIAG INJ IV PUSH: CPT

## 2019-07-17 PROCEDURE — 99214 OFFICE O/P EST MOD 30 MIN: CPT | Performed by: FAMILY MEDICINE

## 2019-07-17 PROCEDURE — 96376 TX/PRO/DX INJ SAME DRUG ADON: CPT

## 2019-07-17 PROCEDURE — 99284 EMERGENCY DEPT VISIT MOD MDM: CPT

## 2019-07-17 PROCEDURE — 85025 COMPLETE CBC W/AUTO DIFF WBC: CPT | Performed by: NURSE PRACTITIONER

## 2019-07-17 PROCEDURE — 81001 URINALYSIS AUTO W/SCOPE: CPT | Performed by: NURSE PRACTITIONER

## 2019-07-17 PROCEDURE — 74019 RADEX ABDOMEN 2 VIEWS: CPT

## 2019-07-17 PROCEDURE — 80053 COMPREHEN METABOLIC PANEL: CPT | Performed by: NURSE PRACTITIONER

## 2019-07-17 PROCEDURE — 25010000002 ONDANSETRON PER 1 MG: Performed by: NURSE PRACTITIONER

## 2019-07-17 PROCEDURE — 96375 TX/PRO/DX INJ NEW DRUG ADDON: CPT

## 2019-07-17 RX ORDER — ONDANSETRON 2 MG/ML
4 INJECTION INTRAMUSCULAR; INTRAVENOUS ONCE
Status: COMPLETED | OUTPATIENT
Start: 2019-07-17 | End: 2019-07-17

## 2019-07-17 RX ORDER — ONDANSETRON 4 MG/1
4 TABLET, ORALLY DISINTEGRATING ORAL EVERY 6 HOURS PRN
Qty: 12 TABLET | Refills: 0 | Status: SHIPPED | OUTPATIENT
Start: 2019-07-17 | End: 2019-07-18 | Stop reason: SDUPTHER

## 2019-07-17 RX ADMIN — SODIUM CHLORIDE 1000 ML: 9 INJECTION, SOLUTION INTRAVENOUS at 12:52

## 2019-07-17 RX ADMIN — ONDANSETRON HYDROCHLORIDE 4 MG: 2 SOLUTION INTRAMUSCULAR; INTRAVENOUS at 15:18

## 2019-07-17 RX ADMIN — ONDANSETRON HYDROCHLORIDE 4 MG: 2 SOLUTION INTRAMUSCULAR; INTRAVENOUS at 12:52

## 2019-07-17 RX ADMIN — HYDROMORPHONE HYDROCHLORIDE 1 MG: 1 INJECTION, SOLUTION INTRAMUSCULAR; INTRAVENOUS; SUBCUTANEOUS at 15:18

## 2019-07-17 NOTE — PROGRESS NOTES
CC:   Chief Complaint   Patient presents with   • Follow-up     Patient thinks she is dehydrated, achey and shakey all over.  No improvement in GERD   • Heartburn   • Nausea   • Abdominal Pain       History:  Kamryn Oliva is a 49 y.o. female who presents today for follow-up for evaluation of the above:    Persistent nausea with poor p.o. intake for the past 2 days, she has felt fatigued and shaky as well as having dry mouth.  She has been taking Phenergan, does not have Zofran refills.  She is still having reflux as well as throat irritation and heartburn    ROS:  Review of Systems   Constitutional: Positive for fatigue. Negative for chills and fever.   Gastrointestinal: Positive for abdominal pain, nausea and vomiting.   Psychiatric/Behavioral: The patient is nervous/anxious.        Ms. Oliva  reports that she quit smoking about 3 years ago. Her smoking use included cigarettes and electronic cigarette. She has a 15.00 pack-year smoking history. She has never used smokeless tobacco. She reports that she does not drink alcohol or use drugs.      Current Outpatient Medications:   •  amitriptyline (ELAVIL) 100 MG tablet, Take 1 tablet by mouth Every Night., Disp: 90 tablet, Rfl: 1  •  amLODIPine (NORVASC) 10 MG tablet, Take 1 tablet by mouth Daily., Disp: 90 tablet, Rfl: 3  •  aspirin 81 MG EC tablet, Take 81 mg by mouth daily., Disp: , Rfl:   •  atorvastatin (LIPITOR) 40 MG tablet, Take 1 tablet by mouth Daily., Disp: 30 tablet, Rfl: 5  •  busPIRone (BUSPAR) 7.5 MG tablet, Take 1 tablet by mouth 3 (Three) Times a Day., Disp: 90 tablet, Rfl: 0  •  CloNIDine (CATAPRES) 0.1 MG tablet, Take 1 tablet by mouth Every 4 (Four) Hours As Needed for High Blood Pressure., Disp: 30 tablet, Rfl: 0  •  fluticasone (FLONASE) 50 MCG/ACT nasal spray, 2 sprays into the nostril(s) as directed by provider Daily., Disp: 1 bottle, Rfl: 3  •  levETIRAcetam (KEPPRA) 500 MG tablet, Take 1 tablet by mouth 2 (Two) Times a Day., Disp: 60  "tablet, Rfl: 2  •  megestrol (MEGACE) 40 MG tablet, Take 40 mg by mouth Daily., Disp: , Rfl:   •  metoprolol succinate XL (TOPROL-XL) 100 MG 24 hr tablet, Take 1 tablet by mouth Every Night., Disp: 30 tablet, Rfl: 5  •  Multiple Vitamins-Minerals (MULTIVITAMIN AND MINERALS) liquid liquid, Take 15 mL by mouth Daily., Disp: 480 mL, Rfl: 1  •  pantoprazole (PROTONIX) 40 MG EC tablet, Take 1 tablet by mouth 2 (Two) Times a Day., Disp: 180 tablet, Rfl: 3  •  promethazine (PHENERGAN) 25 MG tablet, Take 1 tablet by mouth Every 6 (Six) Hours As Needed for Nausea or Vomiting., Disp: 30 tablet, Rfl: 0  •  venlafaxine XR (EFFEXOR-XR) 150 MG 24 hr capsule, Take 1 capsule by mouth Daily., Disp: 30 capsule, Rfl: 1  •  aspirin-acetaminophen-caffeine (EXCEDRIN MIGRAINE) 250-250-65 MG per tablet, Take 1 tablet by mouth Every 6 (Six) Hours As Needed for Headache., Disp: , Rfl:   •  butalbital-acetaminophen-caffeine (ESGIC) -40 MG per tablet, Take 1 tablet by mouth Every 6 (Six) Hours As Needed for Headache., Disp: 30 tablet, Rfl: 0  •  ondansetron ODT (ZOFRAN-ODT) 4 MG disintegrating tablet, Take 1-2 tablets by mouth Every 8 (Eight) Hours As Needed for Nausea., Disp: 30 tablet, Rfl: 6  No current facility-administered medications for this visit.       OBJECTIVE:  /84 (BP Location: Left arm, Patient Position: Sitting, Cuff Size: Adult)   Pulse 104   Temp 98.1 °F (36.7 °C) (Temporal)   Resp 16   Ht 170.2 cm (67\")   Wt 54 kg (119 lb 2 oz)   LMP  (LMP Unknown)   SpO2 97%   BMI 18.66 kg/m²    Physical Exam   Constitutional: She is oriented to person, place, and time. No distress.   Thin, cachectic, chronic ill appearance   HENT:   Head: Normocephalic and atraumatic.   Nose: Nose normal.   Dry mucous membranes   Eyes: Conjunctivae are normal. Right eye exhibits no discharge. Left eye exhibits no discharge. No scleral icterus.   Neck: No tracheal deviation present.   Cardiovascular:   Regular tachycardia without murmur "   Pulmonary/Chest: Effort normal.   Abdominal: Soft. She exhibits no distension. Tenderness: epigastric.   Neurological: She is alert and oriented to person, place, and time.   Skin: Skin is warm and dry. She is not diaphoretic. No pallor.   Psychiatric: She has a normal mood and affect. Her behavior is normal. Judgment and thought content normal.   Nursing note and vitals reviewed.    Assessment/Plan     Diagnosis Plan   1. Dehydration     2. Gastroparesis     3. Nausea and vomiting, intractability of vomiting not specified, unspecified vomiting type  ondansetron ODT (ZOFRAN-ODT) 4 MG disintegrating tablet   4. Gastroesophageal reflux disease, esophagitis presence not specified     -Encourage patient to follow back up with GI  -Discussed Zofran in addition to Phenergan with p.o. sips trial at home versus ED visit for IV fluids, patient would rather be seen in ED so she was referred there today with warm handoff to ED physician      An After Visit Summary was printed and given to the patient at discharge.  Return if symptoms worsen or fail to improve. Sooner if problems arise.         Kory Jones D.O.  Family Medicine  Osteopathic Neuromusculoskeletal Medicine

## 2019-07-18 RX ORDER — ONDANSETRON 4 MG/1
4-8 TABLET, ORALLY DISINTEGRATING ORAL EVERY 8 HOURS PRN
Qty: 30 TABLET | Refills: 6 | Status: SHIPPED | OUTPATIENT
Start: 2019-07-18 | End: 2019-07-22 | Stop reason: SDUPTHER

## 2019-07-22 DIAGNOSIS — R11.2 NAUSEA AND VOMITING, INTRACTABILITY OF VOMITING NOT SPECIFIED, UNSPECIFIED VOMITING TYPE: ICD-10-CM

## 2019-07-22 DIAGNOSIS — K31.84 GASTROPARESIS: ICD-10-CM

## 2019-07-22 DIAGNOSIS — F32.A ANXIETY AND DEPRESSION: ICD-10-CM

## 2019-07-22 DIAGNOSIS — F41.9 ANXIETY AND DEPRESSION: ICD-10-CM

## 2019-07-22 RX ORDER — PROMETHAZINE HYDROCHLORIDE 25 MG/1
25 TABLET ORAL EVERY 6 HOURS PRN
Qty: 30 TABLET | Refills: 0 | Status: SHIPPED | OUTPATIENT
Start: 2019-07-22 | End: 2019-08-01 | Stop reason: SDUPTHER

## 2019-07-22 RX ORDER — CLONIDINE HYDROCHLORIDE 0.1 MG/1
0.1 TABLET ORAL EVERY 4 HOURS PRN
Qty: 30 TABLET | Refills: 0 | Status: SHIPPED | OUTPATIENT
Start: 2019-07-22 | End: 2019-08-01 | Stop reason: SDUPTHER

## 2019-07-22 RX ORDER — BUSPIRONE HYDROCHLORIDE 7.5 MG/1
7.5 TABLET ORAL 3 TIMES DAILY
Qty: 90 TABLET | Refills: 0 | Status: SHIPPED | OUTPATIENT
Start: 2019-07-22 | End: 2019-08-16 | Stop reason: SDUPTHER

## 2019-07-22 RX ORDER — ONDANSETRON 4 MG/1
4-8 TABLET, ORALLY DISINTEGRATING ORAL EVERY 8 HOURS PRN
Qty: 30 TABLET | Refills: 6 | Status: SHIPPED | OUTPATIENT
Start: 2019-07-22 | End: 2020-04-03 | Stop reason: SDUPTHER

## 2019-08-01 DIAGNOSIS — K31.84 GASTROPARESIS: ICD-10-CM

## 2019-08-01 RX ORDER — CLONIDINE HYDROCHLORIDE 0.1 MG/1
0.1 TABLET ORAL EVERY 4 HOURS PRN
Qty: 30 TABLET | Refills: 1 | Status: SHIPPED | OUTPATIENT
Start: 2019-08-01 | End: 2019-09-06 | Stop reason: SDUPTHER

## 2019-08-01 RX ORDER — PROMETHAZINE HYDROCHLORIDE 25 MG/1
25 TABLET ORAL EVERY 6 HOURS PRN
Qty: 30 TABLET | Refills: 1 | Status: SHIPPED | OUTPATIENT
Start: 2019-08-01 | End: 2019-09-06 | Stop reason: SDUPTHER

## 2019-08-01 NOTE — TELEPHONE ENCOUNTER
Pt called requesting refills on     CloNIDine (CATAPRES) 0.1 MG tablet    promethazine (PHENERGAN) 25 MG tablet    ZANTAC

## 2019-08-16 DIAGNOSIS — F41.9 ANXIETY AND DEPRESSION: ICD-10-CM

## 2019-08-16 DIAGNOSIS — F32.A ANXIETY AND DEPRESSION: ICD-10-CM

## 2019-08-16 RX ORDER — VENLAFAXINE HYDROCHLORIDE 150 MG/1
150 CAPSULE, EXTENDED RELEASE ORAL DAILY
Qty: 30 CAPSULE | Refills: 5 | Status: ON HOLD | OUTPATIENT
Start: 2019-08-16 | End: 2019-09-15

## 2019-08-16 RX ORDER — BUSPIRONE HYDROCHLORIDE 7.5 MG/1
TABLET ORAL
Qty: 90 TABLET | Refills: 5 | Status: ON HOLD | OUTPATIENT
Start: 2019-08-16 | End: 2019-09-15

## 2019-09-06 DIAGNOSIS — K31.84 GASTROPARESIS: ICD-10-CM

## 2019-09-06 RX ORDER — PROMETHAZINE HYDROCHLORIDE 25 MG/1
TABLET ORAL
Qty: 30 TABLET | Refills: 5 | Status: ON HOLD | OUTPATIENT
Start: 2019-09-06 | End: 2019-09-15

## 2019-09-06 RX ORDER — CLONIDINE HYDROCHLORIDE 0.1 MG/1
TABLET ORAL
Qty: 30 TABLET | Refills: 5 | Status: ON HOLD | OUTPATIENT
Start: 2019-09-06 | End: 2019-09-15

## 2019-09-15 ENCOUNTER — APPOINTMENT (OUTPATIENT)
Dept: CT IMAGING | Facility: HOSPITAL | Age: 49
End: 2019-09-15

## 2019-09-15 ENCOUNTER — HOSPITAL ENCOUNTER (INPATIENT)
Facility: HOSPITAL | Age: 49
LOS: 3 days | Discharge: HOME | End: 2019-09-18
Attending: FAMILY MEDICINE | Admitting: FAMILY MEDICINE

## 2019-09-15 DIAGNOSIS — N17.9 AKI (ACUTE KIDNEY INJURY) (HCC): Primary | ICD-10-CM

## 2019-09-15 DIAGNOSIS — N39.0 URINARY TRACT INFECTION WITHOUT HEMATURIA, SITE UNSPECIFIED: ICD-10-CM

## 2019-09-15 DIAGNOSIS — E44.0 MODERATE PROTEIN-CALORIE MALNUTRITION (HCC): ICD-10-CM

## 2019-09-15 LAB
ALBUMIN SERPL-MCNC: 4.7 G/DL (ref 3.5–5.2)
ALBUMIN/GLOB SERPL: 1.7 G/DL
ALP SERPL-CCNC: 85 U/L (ref 39–117)
ALT SERPL W P-5'-P-CCNC: 9 U/L (ref 1–33)
ANION GAP SERPL CALCULATED.3IONS-SCNC: 21 MMOL/L (ref 5–15)
AST SERPL-CCNC: 12 U/L (ref 1–32)
BACTERIA UR QL AUTO: ABNORMAL /HPF
BILIRUB SERPL-MCNC: 0.2 MG/DL (ref 0.2–1.2)
BILIRUB UR QL STRIP: NEGATIVE
BUN BLD-MCNC: 31 MG/DL (ref 6–20)
BUN/CREAT SERPL: 12.9 (ref 7–25)
BURR CELLS BLD QL SMEAR: ABNORMAL
CALCIUM SPEC-SCNC: 9.4 MG/DL (ref 8.6–10.5)
CHLORIDE SERPL-SCNC: 105 MMOL/L (ref 98–107)
CLARITY UR: ABNORMAL
CO2 SERPL-SCNC: 17 MMOL/L (ref 22–29)
COLOR UR: YELLOW
CREAT BLD-MCNC: 2.41 MG/DL (ref 0.57–1)
DEPRECATED RDW RBC AUTO: 38.9 FL (ref 37–54)
ERYTHROCYTE [DISTWIDTH] IN BLOOD BY AUTOMATED COUNT: 12.4 % (ref 12.3–15.4)
GFR SERPL CREATININE-BSD FRML MDRD: 21 ML/MIN/1.73
GLOBULIN UR ELPH-MCNC: 2.8 GM/DL
GLUCOSE BLD-MCNC: 105 MG/DL (ref 65–99)
GLUCOSE UR STRIP-MCNC: NEGATIVE MG/DL
HCT VFR BLD AUTO: 44.1 % (ref 34–46.6)
HGB BLD-MCNC: 15.3 G/DL (ref 12–15.9)
HGB UR QL STRIP.AUTO: ABNORMAL
HYALINE CASTS UR QL AUTO: ABNORMAL /LPF
KETONES UR QL STRIP: ABNORMAL
LEUKOCYTE ESTERASE UR QL STRIP.AUTO: ABNORMAL
LYMPHOCYTES # BLD MANUAL: 3.92 10*3/MM3 (ref 0.7–3.1)
LYMPHOCYTES NFR BLD MANUAL: 34 % (ref 19.6–45.3)
LYMPHOCYTES NFR BLD MANUAL: 5 % (ref 5–12)
MCH RBC QN AUTO: 29.8 PG (ref 26.6–33)
MCHC RBC AUTO-ENTMCNC: 34.7 G/DL (ref 31.5–35.7)
MCV RBC AUTO: 86 FL (ref 79–97)
MONOCYTES # BLD AUTO: 0.58 10*3/MM3 (ref 0.1–0.9)
NEUTROPHILS # BLD AUTO: 6.8 10*3/MM3 (ref 1.7–7)
NEUTROPHILS NFR BLD MANUAL: 59 % (ref 42.7–76)
NITRITE UR QL STRIP: NEGATIVE
PH UR STRIP.AUTO: 5.5 [PH] (ref 5–8)
PLAT MORPH BLD: NORMAL
PLATELET # BLD AUTO: 290 10*3/MM3 (ref 140–450)
PMV BLD AUTO: 11.2 FL (ref 6–12)
POIKILOCYTOSIS BLD QL SMEAR: ABNORMAL
POTASSIUM BLD-SCNC: 3.6 MMOL/L (ref 3.5–5.2)
PROT SERPL-MCNC: 7.5 G/DL (ref 6–8.5)
PROT UR QL STRIP: ABNORMAL
RBC # BLD AUTO: 5.13 10*6/MM3 (ref 3.77–5.28)
RBC # UR: ABNORMAL /HPF
REF LAB TEST METHOD: ABNORMAL
SMUDGE CELLS BLD QL SMEAR: ABNORMAL
SODIUM BLD-SCNC: 143 MMOL/L (ref 136–145)
SP GR UR STRIP: 1.02 (ref 1–1.03)
SQUAMOUS #/AREA URNS HPF: ABNORMAL /HPF
UROBILINOGEN UR QL STRIP: ABNORMAL
VARIANT LYMPHS NFR BLD MANUAL: 2 % (ref 0–5)
WBC NRBC COR # BLD: 11.53 10*3/MM3 (ref 3.4–10.8)
WBC UR QL AUTO: ABNORMAL /HPF
YEAST URNS QL MICRO: ABNORMAL /HPF

## 2019-09-15 PROCEDURE — 83036 HEMOGLOBIN GLYCOSYLATED A1C: CPT | Performed by: INTERNAL MEDICINE

## 2019-09-15 PROCEDURE — 85025 COMPLETE CBC W/AUTO DIFF WBC: CPT | Performed by: FAMILY MEDICINE

## 2019-09-15 PROCEDURE — 94760 N-INVAS EAR/PLS OXIMETRY 1: CPT

## 2019-09-15 PROCEDURE — 25010000002 CEFTRIAXONE PER 250 MG: Performed by: FAMILY MEDICINE

## 2019-09-15 PROCEDURE — 36415 COLL VENOUS BLD VENIPUNCTURE: CPT

## 2019-09-15 PROCEDURE — 81001 URINALYSIS AUTO W/SCOPE: CPT | Performed by: FAMILY MEDICINE

## 2019-09-15 PROCEDURE — 25010000002 HYDROMORPHONE PER 4 MG: Performed by: INTERNAL MEDICINE

## 2019-09-15 PROCEDURE — 74176 CT ABD & PELVIS W/O CONTRAST: CPT

## 2019-09-15 PROCEDURE — 87086 URINE CULTURE/COLONY COUNT: CPT | Performed by: FAMILY MEDICINE

## 2019-09-15 PROCEDURE — 25010000002 ONDANSETRON PER 1 MG: Performed by: FAMILY MEDICINE

## 2019-09-15 PROCEDURE — 80053 COMPREHEN METABOLIC PANEL: CPT | Performed by: FAMILY MEDICINE

## 2019-09-15 PROCEDURE — 99285 EMERGENCY DEPT VISIT HI MDM: CPT

## 2019-09-15 PROCEDURE — 85007 BL SMEAR W/DIFF WBC COUNT: CPT | Performed by: FAMILY MEDICINE

## 2019-09-15 PROCEDURE — 94799 UNLISTED PULMONARY SVC/PX: CPT

## 2019-09-15 PROCEDURE — 25010000002 PROMETHAZINE PER 50 MG: Performed by: INTERNAL MEDICINE

## 2019-09-15 RX ORDER — AMLODIPINE BESYLATE 10 MG/1
10 TABLET ORAL DAILY
Status: DISCONTINUED | OUTPATIENT
Start: 2019-09-16 | End: 2019-09-18 | Stop reason: HOSPADM

## 2019-09-15 RX ORDER — SODIUM CHLORIDE 9 MG/ML
125 INJECTION, SOLUTION INTRAVENOUS CONTINUOUS
Status: DISCONTINUED | OUTPATIENT
Start: 2019-09-15 | End: 2019-09-16

## 2019-09-15 RX ORDER — ONDANSETRON 2 MG/ML
4 INJECTION INTRAMUSCULAR; INTRAVENOUS ONCE
Status: COMPLETED | OUTPATIENT
Start: 2019-09-15 | End: 2019-09-15

## 2019-09-15 RX ORDER — METOPROLOL SUCCINATE 100 MG/1
100 TABLET, EXTENDED RELEASE ORAL NIGHTLY
Status: DISCONTINUED | OUTPATIENT
Start: 2019-09-15 | End: 2019-09-18 | Stop reason: HOSPADM

## 2019-09-15 RX ORDER — BUSPIRONE HYDROCHLORIDE 5 MG/1
7.5 TABLET ORAL 3 TIMES DAILY
Status: DISCONTINUED | OUTPATIENT
Start: 2019-09-15 | End: 2019-09-18 | Stop reason: HOSPADM

## 2019-09-15 RX ORDER — CLONIDINE HYDROCHLORIDE 0.1 MG/1
0.1 TABLET ORAL EVERY 4 HOURS PRN
COMMUNITY
End: 2020-01-14 | Stop reason: SDUPTHER

## 2019-09-15 RX ORDER — SODIUM CHLORIDE 450 MG/100ML
100 INJECTION, SOLUTION INTRAVENOUS CONTINUOUS
Status: DISCONTINUED | OUTPATIENT
Start: 2019-09-15 | End: 2019-09-16

## 2019-09-15 RX ORDER — FAMOTIDINE 10 MG/ML
20 INJECTION, SOLUTION INTRAVENOUS EVERY 12 HOURS SCHEDULED
Status: DISCONTINUED | OUTPATIENT
Start: 2019-09-15 | End: 2019-09-18 | Stop reason: HOSPADM

## 2019-09-15 RX ORDER — PROMETHAZINE HYDROCHLORIDE 25 MG/ML
25 INJECTION, SOLUTION INTRAMUSCULAR; INTRAVENOUS EVERY 6 HOURS PRN
Status: DISCONTINUED | OUTPATIENT
Start: 2019-09-15 | End: 2019-09-18 | Stop reason: HOSPADM

## 2019-09-15 RX ORDER — SUCRALFATE ORAL 1 G/10ML
1 SUSPENSION ORAL
Status: DISCONTINUED | OUTPATIENT
Start: 2019-09-15 | End: 2019-09-18 | Stop reason: HOSPADM

## 2019-09-15 RX ORDER — MULTIVIT,CALC,MINS/IRON/FOLIC 9MG-400MCG
1 TABLET ORAL DAILY
Status: DISCONTINUED | OUTPATIENT
Start: 2019-09-16 | End: 2019-09-18 | Stop reason: HOSPADM

## 2019-09-15 RX ORDER — HYDRALAZINE HYDROCHLORIDE 20 MG/ML
10 INJECTION INTRAMUSCULAR; INTRAVENOUS EVERY 6 HOURS PRN
Status: DISCONTINUED | OUTPATIENT
Start: 2019-09-15 | End: 2019-09-18 | Stop reason: HOSPADM

## 2019-09-15 RX ORDER — SODIUM CHLORIDE 0.9 % (FLUSH) 0.9 %
10 SYRINGE (ML) INJECTION EVERY 12 HOURS SCHEDULED
Status: DISCONTINUED | OUTPATIENT
Start: 2019-09-15 | End: 2019-09-18 | Stop reason: HOSPADM

## 2019-09-15 RX ORDER — VENLAFAXINE HYDROCHLORIDE 75 MG/1
150 CAPSULE, EXTENDED RELEASE ORAL DAILY
Status: DISCONTINUED | OUTPATIENT
Start: 2019-09-16 | End: 2019-09-18 | Stop reason: HOSPADM

## 2019-09-15 RX ORDER — PROMETHAZINE HYDROCHLORIDE 25 MG/1
25 TABLET ORAL EVERY 6 HOURS PRN
COMMUNITY
End: 2019-12-19 | Stop reason: SDUPTHER

## 2019-09-15 RX ORDER — SODIUM CHLORIDE 0.9 % (FLUSH) 0.9 %
10 SYRINGE (ML) INJECTION AS NEEDED
Status: DISCONTINUED | OUTPATIENT
Start: 2019-09-15 | End: 2019-09-18 | Stop reason: HOSPADM

## 2019-09-15 RX ORDER — ENALAPRILAT 2.5 MG/2ML
1.25 INJECTION INTRAVENOUS EVERY 6 HOURS PRN
Status: DISCONTINUED | OUTPATIENT
Start: 2019-09-15 | End: 2019-09-18 | Stop reason: HOSPADM

## 2019-09-15 RX ORDER — HYDROMORPHONE HYDROCHLORIDE 1 MG/ML
0.5 INJECTION, SOLUTION INTRAMUSCULAR; INTRAVENOUS; SUBCUTANEOUS EVERY 4 HOURS PRN
Status: DISCONTINUED | OUTPATIENT
Start: 2019-09-15 | End: 2019-09-18 | Stop reason: HOSPADM

## 2019-09-15 RX ORDER — MEGESTROL ACETATE 40 MG/1
40 TABLET ORAL DAILY
Status: DISCONTINUED | OUTPATIENT
Start: 2019-09-16 | End: 2019-09-18 | Stop reason: HOSPADM

## 2019-09-15 RX ORDER — ONDANSETRON 2 MG/ML
4 INJECTION INTRAMUSCULAR; INTRAVENOUS EVERY 6 HOURS PRN
Status: DISCONTINUED | OUTPATIENT
Start: 2019-09-15 | End: 2019-09-18 | Stop reason: HOSPADM

## 2019-09-15 RX ORDER — VENLAFAXINE HYDROCHLORIDE 150 MG/1
150 CAPSULE, EXTENDED RELEASE ORAL DAILY
COMMUNITY
End: 2020-03-18

## 2019-09-15 RX ORDER — LEVETIRACETAM 500 MG/1
500 TABLET ORAL 2 TIMES DAILY
Status: DISCONTINUED | OUTPATIENT
Start: 2019-09-15 | End: 2019-09-18 | Stop reason: HOSPADM

## 2019-09-15 RX ORDER — CLONIDINE HYDROCHLORIDE 0.1 MG/1
0.1 TABLET ORAL EVERY 6 HOURS SCHEDULED
Status: DISCONTINUED | OUTPATIENT
Start: 2019-09-16 | End: 2019-09-18 | Stop reason: HOSPADM

## 2019-09-15 RX ORDER — BUSPIRONE HYDROCHLORIDE 7.5 MG/1
7.5 TABLET ORAL 3 TIMES DAILY
COMMUNITY
End: 2020-03-19

## 2019-09-15 RX ADMIN — LEVETIRACETAM 500 MG: 500 TABLET, FILM COATED ORAL at 22:42

## 2019-09-15 RX ADMIN — SODIUM CHLORIDE 125 ML/HR: 9 INJECTION, SOLUTION INTRAVENOUS at 21:27

## 2019-09-15 RX ADMIN — SODIUM CHLORIDE 1000 ML: 9 INJECTION, SOLUTION INTRAVENOUS at 17:07

## 2019-09-15 RX ADMIN — CLONIDINE HYDROCHLORIDE 0.1 MG: 0.1 TABLET ORAL at 23:34

## 2019-09-15 RX ADMIN — SODIUM CHLORIDE, PRESERVATIVE FREE 10 ML: 5 INJECTION INTRAVENOUS at 22:44

## 2019-09-15 RX ADMIN — METOPROLOL SUCCINATE 100 MG: 100 TABLET, FILM COATED, EXTENDED RELEASE ORAL at 22:42

## 2019-09-15 RX ADMIN — HYDROMORPHONE HYDROCHLORIDE 0.5 MG: 1 INJECTION, SOLUTION INTRAMUSCULAR; INTRAVENOUS; SUBCUTANEOUS at 23:30

## 2019-09-15 RX ADMIN — SODIUM CHLORIDE 100 ML/HR: 4.5 INJECTION, SOLUTION INTRAVENOUS at 22:41

## 2019-09-15 RX ADMIN — FAMOTIDINE 20 MG: 10 INJECTION, SOLUTION INTRAVENOUS at 22:41

## 2019-09-15 RX ADMIN — PROMETHAZINE HYDROCHLORIDE 25 MG: 25 INJECTION INTRAMUSCULAR; INTRAVENOUS at 23:30

## 2019-09-15 RX ADMIN — ONDANSETRON HYDROCHLORIDE 4 MG: 2 SOLUTION INTRAMUSCULAR; INTRAVENOUS at 17:07

## 2019-09-15 RX ADMIN — CEFTRIAXONE SODIUM 1 G: 1 INJECTION, POWDER, FOR SOLUTION INTRAMUSCULAR; INTRAVENOUS at 21:27

## 2019-09-15 RX ADMIN — BUSPIRONE HYDROCHLORIDE 7.5 MG: 5 TABLET ORAL at 22:41

## 2019-09-15 RX ADMIN — HYDROMORPHONE HYDROCHLORIDE 1 MG: 1 INJECTION, SOLUTION INTRAMUSCULAR; INTRAVENOUS; SUBCUTANEOUS at 17:07

## 2019-09-15 RX ADMIN — HYDROMORPHONE HYDROCHLORIDE 1 MG: 1 INJECTION, SOLUTION INTRAMUSCULAR; INTRAVENOUS; SUBCUTANEOUS at 18:37

## 2019-09-15 RX ADMIN — SUCRALFATE 1 G: 1 SUSPENSION ORAL at 22:44

## 2019-09-15 RX ADMIN — ONDANSETRON HYDROCHLORIDE 4 MG: 2 SOLUTION INTRAMUSCULAR; INTRAVENOUS at 18:37

## 2019-09-16 ENCOUNTER — ANESTHESIA (OUTPATIENT)
Dept: GASTROENTEROLOGY | Facility: HOSPITAL | Age: 49
End: 2019-09-16

## 2019-09-16 ENCOUNTER — ANESTHESIA EVENT (OUTPATIENT)
Dept: GASTROENTEROLOGY | Facility: HOSPITAL | Age: 49
End: 2019-09-16

## 2019-09-16 PROBLEM — R10.9 ABDOMINAL PAIN: Status: ACTIVE | Noted: 2017-08-04

## 2019-09-16 PROBLEM — R62.7 FAILURE TO THRIVE IN ADULT: Status: ACTIVE | Noted: 2019-09-16

## 2019-09-16 LAB
ALBUMIN SERPL-MCNC: 4.4 G/DL (ref 3.5–5.2)
ALBUMIN/GLOB SERPL: 1.8 G/DL
ALP SERPL-CCNC: 76 U/L (ref 39–117)
ALT SERPL W P-5'-P-CCNC: 8 U/L (ref 1–33)
ANION GAP SERPL CALCULATED.3IONS-SCNC: 14 MMOL/L (ref 5–15)
AST SERPL-CCNC: 14 U/L (ref 1–32)
BACTERIA SPEC AEROBE CULT: NO GROWTH
BILIRUB SERPL-MCNC: 0.2 MG/DL (ref 0.2–1.2)
BUN BLD-MCNC: 21 MG/DL (ref 6–20)
BUN/CREAT SERPL: 15.1 (ref 7–25)
CALCIUM SPEC-SCNC: 8.4 MG/DL (ref 8.6–10.5)
CHLORIDE SERPL-SCNC: 106 MMOL/L (ref 98–107)
CHOLEST SERPL-MCNC: 217 MG/DL (ref 0–200)
CLUMPED PLATELETS: PRESENT
CO2 SERPL-SCNC: 19 MMOL/L (ref 22–29)
CREAT BLD-MCNC: 1.39 MG/DL (ref 0.57–1)
DACRYOCYTES BLD QL SMEAR: ABNORMAL
DEPRECATED RDW RBC AUTO: 41.6 FL (ref 37–54)
EOSINOPHIL # BLD MANUAL: 0.1 10*3/MM3 (ref 0–0.4)
EOSINOPHIL NFR BLD MANUAL: 1.1 % (ref 0.3–6.2)
ERYTHROCYTE [DISTWIDTH] IN BLOOD BY AUTOMATED COUNT: 12.7 % (ref 12.3–15.4)
GFR SERPL CREATININE-BSD FRML MDRD: 40 ML/MIN/1.73
GLOBULIN UR ELPH-MCNC: 2.5 GM/DL
GLUCOSE BLD-MCNC: 67 MG/DL (ref 65–99)
HBA1C MFR BLD: 5.6 % (ref 4.8–5.6)
HCT VFR BLD AUTO: 41.5 % (ref 34–46.6)
HDLC SERPL-MCNC: 36 MG/DL (ref 40–60)
HGB BLD-MCNC: 13.8 G/DL (ref 12–15.9)
LDLC SERPL CALC-MCNC: 139 MG/DL (ref 0–100)
LDLC/HDLC SERPL: 3.87 {RATIO}
LIPASE SERPL-CCNC: 18 U/L (ref 13–60)
LYMPHOCYTES # BLD MANUAL: 2.98 10*3/MM3 (ref 0.7–3.1)
LYMPHOCYTES NFR BLD MANUAL: 3.2 % (ref 5–12)
LYMPHOCYTES NFR BLD MANUAL: 33.7 % (ref 19.6–45.3)
MAGNESIUM SERPL-MCNC: 1.7 MG/DL (ref 1.6–2.6)
MCH RBC QN AUTO: 29.8 PG (ref 26.6–33)
MCHC RBC AUTO-ENTMCNC: 33.3 G/DL (ref 31.5–35.7)
MCV RBC AUTO: 89.6 FL (ref 79–97)
MONOCYTES # BLD AUTO: 0.28 10*3/MM3 (ref 0.1–0.9)
NEUTROPHILS # BLD AUTO: 4.28 10*3/MM3 (ref 1.7–7)
NEUTROPHILS NFR BLD MANUAL: 48.4 % (ref 42.7–76)
OVALOCYTES BLD QL SMEAR: ABNORMAL
PHOSPHATE SERPL-MCNC: 2.1 MG/DL (ref 2.5–4.5)
PLATELET # BLD AUTO: 210 10*3/MM3 (ref 140–450)
PMV BLD AUTO: 11.3 FL (ref 6–12)
POIKILOCYTOSIS BLD QL SMEAR: ABNORMAL
POTASSIUM BLD-SCNC: 3.9 MMOL/L (ref 3.5–5.2)
PROT SERPL-MCNC: 6.9 G/DL (ref 6–8.5)
RBC # BLD AUTO: 4.63 10*6/MM3 (ref 3.77–5.28)
SODIUM BLD-SCNC: 139 MMOL/L (ref 136–145)
TRIGL SERPL-MCNC: 209 MG/DL (ref 0–150)
TSH SERPL DL<=0.05 MIU/L-ACNC: 3.23 UIU/ML (ref 0.27–4.2)
VARIANT LYMPHS NFR BLD MANUAL: 13.7 % (ref 0–5)
VLDLC SERPL-MCNC: 41.8 MG/DL
WBC MORPH BLD: NORMAL
WBC NRBC COR # BLD: 8.84 10*3/MM3 (ref 3.4–10.8)

## 2019-09-16 PROCEDURE — 84100 ASSAY OF PHOSPHORUS: CPT | Performed by: INTERNAL MEDICINE

## 2019-09-16 PROCEDURE — 88305 TISSUE EXAM BY PATHOLOGIST: CPT | Performed by: INTERNAL MEDICINE

## 2019-09-16 PROCEDURE — 84443 ASSAY THYROID STIM HORMONE: CPT | Performed by: INTERNAL MEDICINE

## 2019-09-16 PROCEDURE — 83690 ASSAY OF LIPASE: CPT | Performed by: INTERNAL MEDICINE

## 2019-09-16 PROCEDURE — 25010000002 THIAMINE PER 100 MG: Performed by: FAMILY MEDICINE

## 2019-09-16 PROCEDURE — 25010000002 PROMETHAZINE PER 50 MG: Performed by: INTERNAL MEDICINE

## 2019-09-16 PROCEDURE — 25010000002 PROPOFOL 10 MG/ML EMULSION: Performed by: NURSE ANESTHETIST, CERTIFIED REGISTERED

## 2019-09-16 PROCEDURE — 43239 EGD BIOPSY SINGLE/MULTIPLE: CPT | Performed by: INTERNAL MEDICINE

## 2019-09-16 PROCEDURE — 25010000002 POTASSIUM CHLORIDE PER 2 MEQ OF POTASSIUM: Performed by: FAMILY MEDICINE

## 2019-09-16 PROCEDURE — 99222 1ST HOSP IP/OBS MODERATE 55: CPT | Performed by: INTERNAL MEDICINE

## 2019-09-16 PROCEDURE — 0DB68ZX EXCISION OF STOMACH, VIA NATURAL OR ARTIFICIAL OPENING ENDOSCOPIC, DIAGNOSTIC: ICD-10-PCS | Performed by: INTERNAL MEDICINE

## 2019-09-16 PROCEDURE — 85007 BL SMEAR W/DIFF WBC COUNT: CPT | Performed by: INTERNAL MEDICINE

## 2019-09-16 PROCEDURE — 80053 COMPREHEN METABOLIC PANEL: CPT | Performed by: INTERNAL MEDICINE

## 2019-09-16 PROCEDURE — 94799 UNLISTED PULMONARY SVC/PX: CPT

## 2019-09-16 PROCEDURE — 80061 LIPID PANEL: CPT | Performed by: INTERNAL MEDICINE

## 2019-09-16 PROCEDURE — 85025 COMPLETE CBC W/AUTO DIFF WBC: CPT | Performed by: INTERNAL MEDICINE

## 2019-09-16 PROCEDURE — 83735 ASSAY OF MAGNESIUM: CPT | Performed by: INTERNAL MEDICINE

## 2019-09-16 PROCEDURE — 25010000002 HYDROMORPHONE PER 4 MG: Performed by: INTERNAL MEDICINE

## 2019-09-16 PROCEDURE — 94760 N-INVAS EAR/PLS OXIMETRY 1: CPT

## 2019-09-16 PROCEDURE — 25010000002 CEFTRIAXONE: Performed by: INTERNAL MEDICINE

## 2019-09-16 PROCEDURE — 25010000002 ONDANSETRON PER 1 MG: Performed by: INTERNAL MEDICINE

## 2019-09-16 RX ORDER — SODIUM CHLORIDE 0.9 % (FLUSH) 0.9 %
3 SYRINGE (ML) INJECTION EVERY 12 HOURS SCHEDULED
Status: DISCONTINUED | OUTPATIENT
Start: 2019-09-16 | End: 2019-09-16 | Stop reason: HOSPADM

## 2019-09-16 RX ORDER — SODIUM CHLORIDE, SODIUM LACTATE, POTASSIUM CHLORIDE, CALCIUM CHLORIDE 600; 310; 30; 20 MG/100ML; MG/100ML; MG/100ML; MG/100ML
100 INJECTION, SOLUTION INTRAVENOUS CONTINUOUS
Status: DISCONTINUED | OUTPATIENT
Start: 2019-09-16 | End: 2019-09-18 | Stop reason: HOSPADM

## 2019-09-16 RX ORDER — SODIUM CHLORIDE 9 MG/ML
100 INJECTION, SOLUTION INTRAVENOUS CONTINUOUS
Status: DISCONTINUED | OUTPATIENT
Start: 2019-09-16 | End: 2019-09-18 | Stop reason: HOSPADM

## 2019-09-16 RX ORDER — SODIUM CHLORIDE 0.9 % (FLUSH) 0.9 %
3-10 SYRINGE (ML) INJECTION AS NEEDED
Status: DISCONTINUED | OUTPATIENT
Start: 2019-09-16 | End: 2019-09-16 | Stop reason: HOSPADM

## 2019-09-16 RX ORDER — PROPOFOL 10 MG/ML
VIAL (ML) INTRAVENOUS AS NEEDED
Status: DISCONTINUED | OUTPATIENT
Start: 2019-09-16 | End: 2019-09-16 | Stop reason: SURG

## 2019-09-16 RX ORDER — ROSUVASTATIN CALCIUM 10 MG/1
10 TABLET, COATED ORAL NIGHTLY
Status: DISCONTINUED | OUTPATIENT
Start: 2019-09-16 | End: 2019-09-18 | Stop reason: HOSPADM

## 2019-09-16 RX ADMIN — LEVETIRACETAM 500 MG: 500 TABLET, FILM COATED ORAL at 20:53

## 2019-09-16 RX ADMIN — ONDANSETRON HYDROCHLORIDE 4 MG: 2 INJECTION INTRAMUSCULAR; INTRAVENOUS at 17:26

## 2019-09-16 RX ADMIN — ROSUVASTATIN CALCIUM 10 MG: 10 TABLET, FILM COATED ORAL at 20:53

## 2019-09-16 RX ADMIN — BUSPIRONE HYDROCHLORIDE 7.5 MG: 5 TABLET ORAL at 20:52

## 2019-09-16 RX ADMIN — HYDROMORPHONE HYDROCHLORIDE 0.5 MG: 1 INJECTION, SOLUTION INTRAMUSCULAR; INTRAVENOUS; SUBCUTANEOUS at 04:16

## 2019-09-16 RX ADMIN — BUSPIRONE HYDROCHLORIDE 7.5 MG: 5 TABLET ORAL at 17:25

## 2019-09-16 RX ADMIN — SODIUM CHLORIDE 100 ML/HR: 9 INJECTION, SOLUTION INTRAVENOUS at 14:28

## 2019-09-16 RX ADMIN — SUCRALFATE 1 G: 1 SUSPENSION ORAL at 06:28

## 2019-09-16 RX ADMIN — SODIUM CHLORIDE, PRESERVATIVE FREE 10 ML: 5 INJECTION INTRAVENOUS at 08:44

## 2019-09-16 RX ADMIN — CEFTRIAXONE 1 G: 1 INJECTION, POWDER, FOR SOLUTION INTRAMUSCULAR; INTRAVENOUS at 21:05

## 2019-09-16 RX ADMIN — PROMETHAZINE HYDROCHLORIDE 25 MG: 25 INJECTION INTRAMUSCULAR; INTRAVENOUS at 08:45

## 2019-09-16 RX ADMIN — SODIUM CHLORIDE 100 ML/HR: 4.5 INJECTION, SOLUTION INTRAVENOUS at 08:43

## 2019-09-16 RX ADMIN — HYDROMORPHONE HYDROCHLORIDE 0.5 MG: 1 INJECTION, SOLUTION INTRAMUSCULAR; INTRAVENOUS; SUBCUTANEOUS at 08:45

## 2019-09-16 RX ADMIN — FOLIC ACID 100 ML/HR: 5 INJECTION, SOLUTION INTRAMUSCULAR; INTRAVENOUS; SUBCUTANEOUS at 12:55

## 2019-09-16 RX ADMIN — ONDANSETRON HYDROCHLORIDE 4 MG: 2 INJECTION INTRAMUSCULAR; INTRAVENOUS at 04:16

## 2019-09-16 RX ADMIN — FAMOTIDINE 20 MG: 10 INJECTION, SOLUTION INTRAVENOUS at 20:59

## 2019-09-16 RX ADMIN — LIDOCAINE HYDROCHLORIDE 60 MG: 20 INJECTION, SOLUTION INTRAVENOUS at 15:10

## 2019-09-16 RX ADMIN — FAMOTIDINE 20 MG: 10 INJECTION, SOLUTION INTRAVENOUS at 08:45

## 2019-09-16 RX ADMIN — PROPOFOL 350 MG: 10 INJECTION, EMULSION INTRAVENOUS at 15:10

## 2019-09-16 RX ADMIN — SUCRALFATE 1 G: 1 SUSPENSION ORAL at 17:25

## 2019-09-16 RX ADMIN — SUCRALFATE 1 G: 1 SUSPENSION ORAL at 20:53

## 2019-09-16 RX ADMIN — HYDROMORPHONE HYDROCHLORIDE 0.5 MG: 1 INJECTION, SOLUTION INTRAMUSCULAR; INTRAVENOUS; SUBCUTANEOUS at 17:26

## 2019-09-16 NOTE — PROGRESS NOTES
[facility] Medicine Services  INPATIENT PROGRESS NOTE    Length of Stay: 1  Date of Admission: 9/15/2019  Primary Care Physician: Kory Jones DO    Subjective   Chief Complaint: Acute kidney failure.  Failure to thrive.  Dysphasia.  UTI.    HPI   Patient denies any chest pain or shortness of breath.  Patient continue have weight loss.  GI has been consulted.  Plan for EGD this evening.    Review of Systems   Constitutional: Positive for activity change, appetite change and fatigue. Negative for chills and fever.   HENT: Negative for hearing loss, nosebleeds, tinnitus and trouble swallowing.    Eyes: Negative for visual disturbance.   Respiratory: Negative for cough, chest tightness, shortness of breath and wheezing.    Cardiovascular: Negative for chest pain, palpitations and leg swelling.   Gastrointestinal: Positive for diarrhea and nausea. Negative for abdominal distention, abdominal pain, blood in stool, constipation and vomiting.   Endocrine: Negative for cold intolerance, heat intolerance, polydipsia, polyphagia and polyuria.   Genitourinary: Negative for decreased urine volume, difficulty urinating, dysuria, flank pain, frequency and hematuria.   Musculoskeletal: Positive for arthralgias, gait problem and myalgias. Negative for joint swelling.   Skin: Negative for rash.   Allergic/Immunologic: Negative for immunocompromised state.   Neurological: Positive for weakness. Negative for dizziness, syncope, light-headedness and headaches.   Hematological: Negative for adenopathy. Does not bruise/bleed easily.   Psychiatric/Behavioral: Negative for confusion and sleep disturbance. The patient is not nervous/anxious.           All pertinent negatives and positives are as above. All other systems have been reviewed and are negative unless otherwise stated.     Objective    Temp:  [97.6 °F (36.4 °C)-98.7 °F (37.1 °C)] 98 °F (36.7 °C)  Heart Rate:  [] 54  Resp:  [16-20]  16  BP: ()/(68-92) 104/69    Intake/Output Summary (Last 24 hours) at 9/16/2019 1108  Last data filed at 9/16/2019 0842  Gross per 24 hour   Intake 1612.84 ml   Output 500 ml   Net 1112.84 ml     Physical Exam   Constitutional: She is oriented to person, place, and time. She appears well-developed.   HENT:   Head: Normocephalic and atraumatic.   Eyes: Conjunctivae are normal. Pupils are equal, round, and reactive to light.   Neck: Neck supple. No JVD present. No thyromegaly present.   Cardiovascular: Normal rate, regular rhythm, normal heart sounds and intact distal pulses. Exam reveals no gallop and no friction rub.   No murmur heard.  Pulmonary/Chest: Effort normal and breath sounds normal. No respiratory distress. She has no wheezes. She has no rales. She exhibits no tenderness.   Abdominal: Soft. Bowel sounds are normal. She exhibits no distension. There is tenderness. There is guarding. There is no rebound.   Epigastric discomfort.   Musculoskeletal: Normal range of motion. She exhibits no edema, tenderness or deformity.   Lymphadenopathy:     She has no cervical adenopathy.   Neurological: She is alert and oriented to person, place, and time. She displays normal reflexes. No cranial nerve deficit. She exhibits abnormal muscle tone. Coordination abnormal.   Skin: Skin is warm and dry. No rash noted.   Psychiatric: She has a normal mood and affect. Her behavior is normal. Judgment and thought content normal.       Results Review:  Lab Results (last 24 hours)     Procedure Component Value Units Date/Time    Hemoglobin A1c [[id number]]  (Normal) Collected:  09/15/19 1700    Specimen:  Blood Updated:  09/16/19 0852     Hemoglobin A1C 5.60 %     Narrative:       Hemoglobin A1C Ranges:    Increased Risk for Diabetes  5.7% to 6.4%  Diabetes                     >= 6.5%  Diabetic Goal                < 7.0%    Manual Differential [[id number]]  (Abnormal) Collected:  09/16/19 0741    Specimen:  Blood Updated:   09/16/19 0839     Neutrophil % 48.4 %      Lymphocyte % 33.7 %      Monocyte % 3.2 %      Eosinophil % 1.1 %      Atypical Lymphocyte % 13.7 %      Neutrophils Absolute 4.28 10*3/mm3      Lymphocytes Absolute 2.98 10*3/mm3      Monocytes Absolute 0.28 10*3/mm3      Eosinophils Absolute 0.10 10*3/mm3      Dacrocytes Slight/1+     Ovalocytes Mod/2+     Poikilocytes Mod/2+     WBC Morphology Normal     Clumped Platelets Present    CBC Auto Differential [[id number]]  (Normal) Collected:  09/16/19 0741    Specimen:  Blood Updated:  09/16/19 0838     WBC 8.84 10*3/mm3      RBC 4.63 10*6/mm3      Hemoglobin 13.8 g/dL      Hematocrit 41.5 %      MCV 89.6 fL      MCH 29.8 pg      MCHC 33.3 g/dL      RDW 12.7 %      RDW-SD 41.6 fl      MPV 11.3 fL      Platelets 210 10*3/mm3     Comprehensive Metabolic Panel [[id number]]  (Abnormal) Collected:  09/16/19 0741    Specimen:  Blood Updated:  09/16/19 0819     Glucose 67 mg/dL      BUN 21 mg/dL      Creatinine 1.39 mg/dL      Sodium 139 mmol/L      Potassium 3.9 mmol/L      Chloride 106 mmol/L      CO2 19.0 mmol/L      Calcium 8.4 mg/dL      Total Protein 6.9 g/dL      Albumin 4.40 g/dL      ALT (SGPT) 8 U/L      AST (SGOT) 14 U/L      Alkaline Phosphatase 76 U/L      Total Bilirubin 0.2 mg/dL      eGFR Non African Amer 40 mL/min/1.73      Globulin 2.5 gm/dL      A/G Ratio 1.8 g/dL      BUN/Creatinine Ratio 15.1     Anion Gap 14.0 mmol/L     Narrative:       GFR Normal >60  Chronic Kidney Disease <60  Kidney Failure <15    Lipase [[id number]]  (Normal) Collected:  09/16/19 0741    Specimen:  Blood Updated:  09/16/19 0819     Lipase 18 U/L     Lipid Panel [[id number]]  (Abnormal) Collected:  09/16/19 0741    Specimen:  Blood Updated:  09/16/19 0819     Total Cholesterol 217 mg/dL      Triglycerides 209 mg/dL      HDL Cholesterol 36 mg/dL      LDL Cholesterol  139 mg/dL      VLDL Cholesterol 41.8 mg/dL      LDL/HDL Ratio 3.87    Narrative:       Cholesterol Reference Ranges  (U.S[place]  Department of Health and Human Services ATP III Classifications)    Desirable          <200 mg/dL  Borderline High    200-239 mg/dL  High Risk          >240 mg/dL      Triglyceride Reference Ranges  (U.S. Department of Health and Human Services ATP III Classifications)    Normal           <150 mg/dL  Borderline High  150-199 mg/dL  High             200-499 mg/dL  Very High        >500 mg/dL    HDL Reference Ranges  (U.S. Department of Health and Human Services ATP III Classifcations)    Low     <40 mg/dl (major risk factor for CHD)  High    >60 mg/dl ('negative' risk factor for CHD)        LDL Reference Ranges  (U.S. Department of Health and Human Services ATP III Classifcations)    Optimal          <100 mg/dL  Near Optimal     100-129 mg/dL  Borderline High  130-159 mg/dL  High             160-189 mg/dL  Very High        >189 mg/dL    Magnesium [[id number]]  (Normal) Collected:  09/16/19 0741    Specimen:  Blood Updated:  09/16/19 0819     Magnesium 1.7 mg/dL     Phosphorus [[id number]]  (Abnormal) Collected:  09/16/19 0741    Specimen:  Blood Updated:  09/16/19 0819     Phosphorus 2.1 mg/dL     TSH [[id number]]  (Normal) Collected:  09/16/19 0741    Specimen:  Blood Updated:  09/16/19 0819     TSH 3.230 uIU/mL     Urinalysis, Microscopic Only - Urine, Clean Catch [[id number]]  (Abnormal) Collected:  09/15/19 1837    Specimen:  Urine, Clean Catch Updated:  09/15/19 1917     RBC, UA 3-5 /HPF      WBC, UA 21-30 /HPF      Bacteria, UA 2+ /HPF      Squamous Epithelial Cells, UA 3-6 /HPF      Yeast, UA Small/1+ Budding Yeast /HPF      Hyaline Casts, UA None Seen /LPF      Methodology Manual Light Microscopy    Urine Culture - Urine, Urine, Clean Catch [[id number]] Collected:  09/15/19 1837    Specimen:  Urine, Clean Catch Updated:  09/15/19 1917    Urinalysis With Culture If Indicated - Urine, Clean Catch [[id number]]  (Abnormal) Collected:  09/15/19 1837    Specimen:  Urine, Clean Catch Updated:  09/15/19 1902     Color, UA  Yellow     Appearance, UA Turbid     pH, UA 5.5     Specific Gravity, UA 1.019     Glucose, UA Negative     Ketones, UA 15 mg/dL (1+)     Bilirubin, UA Negative     Blood, UA Small (1+)     Protein, UA 30 mg/dL (1+)     Leuk Esterase, UA Small (1+)     Nitrite, UA Negative     Urobilinogen, UA 0.2 E.U./dL    Manual Differential [[id number]]  (Abnormal) Collected:  09/15/19 1700    Specimen:  Blood Updated:  09/15/19 1756     Neutrophil % 59.0 %      Lymphocyte % 34.0 %      Monocyte % 5.0 %      Atypical Lymphocyte % 2.0 %      Neutrophils Absolute 6.80 10*3/mm3      Lymphocytes Absolute 3.92 10*3/mm3      Monocytes Absolute 0.58 10*3/mm3      Crenated RBC's Mod/2+     Poikilocytes Large/3+     Smudge Cells Slight/1+     Platelet Morphology Normal    Comprehensive Metabolic Panel [[id number]]  (Abnormal) Collected:  09/15/19 1719    Specimen:  Blood Updated:  09/15/19 1741     Glucose 105 mg/dL      BUN 31 mg/dL      Creatinine 2.41 mg/dL      Sodium 143 mmol/L      Potassium 3.6 mmol/L      Chloride 105 mmol/L      CO2 17.0 mmol/L      Calcium 9.4 mg/dL      Total Protein 7.5 g/dL      Albumin 4.70 g/dL      ALT (SGPT) 9 U/L      AST (SGOT) 12 U/L      Alkaline Phosphatase 85 U/L      Total Bilirubin 0.2 mg/dL      eGFR Non African Amer 21 mL/min/1.73      Globulin 2.8 gm/dL      A/G Ratio 1.7 g/dL      BUN/Creatinine Ratio 12.9     Anion Gap 21.0 mmol/L     Narrative:       GFR Normal >60  Chronic Kidney Disease <60  Kidney Failure <15    CBC & Differential [[id number]] Collected:  09/15/19 1700    Specimen:  Blood Updated:  09/15/19 1723    Narrative:       The following orders were created for panel order CBC & Differential.  Procedure                               Abnormality         Status                     ---------                               -----------         ------                     CBC Auto Differential[[id number]]        Abnormal            Final result                 Please view results for these  tests on the individual orders.    CBC Auto Differential [[id number]]  (Abnormal) Collected:  09/15/19 1700    Specimen:  Blood Updated:  09/15/19 1723     WBC 11.53 10*3/mm3      RBC 5.13 10*6/mm3      Hemoglobin 15.3 g/dL      Hematocrit 44.1 %      MCV 86.0 fL      MCH 29.8 pg      MCHC 34.7 g/dL      RDW 12.4 %      RDW-SD 38.9 fl      MPV 11.2 fL      Platelets 290 10*3/mm3            Cultures:       Radiology Data:    Imaging Results (last 24 hours)     Procedure Component Value Units Date/Time    CT Abdomen Pelvis Without Contrast [[id number]] Collected:  09/15/19 1811     Updated:  09/15/19 1825    Narrative:       EXAMINATION: CT ABDOMEN PELVIS WO CONTRAST-  9/15/2019 6:11 PM CDT     HISTORY: Abdominal pain     COMPARISON:01/31/2019 abdomen and pelvis CT     TECHNIQUE:  Radiation dose equals  mGy-cm.  Automated exposure  control dose reduction technique was implemented.     Thin section axial imaging was obtained. 2-D sagittal and coronal  reconstruction images were generated.     Intravenous contrast was not administered.     Oral contrast was not ingested.     FINDINGS:     The lung bases are clear without acute infiltrates.     Pectus excavatum anterior chest wall deformity noted inferiorly.     The gallbladder surgically absent. There is no biliary ductal  dilatation.     The spleen is not enlarged.     There are no adrenal masses.     There are no focal pancreatic abnormalities observed.     There are small nonobstructing centrally located renal calculi. There is  no pelvocaliectasis hydroureter, ureterolithiasis or obstructive  uropathy changes. Urinary bladder is decompressed without focal bladder  wall abnormality.     Hysterectomy changes observed. There are no adnexal masses.     There is stool and gas identified in the colon without dilatation. The  appendix is surgically absent. There is no CT evidence of significant  diverticular disease. Bowel surgery changes noted in the right  lower  quadrant.     The small bowel is not dilated.     The duodenal sweep imaged appropriately.     The stomach is not distended.     There is no ascites or pneumoperitoneum.     There are no pathologically enlarged lymph nodes.     There is mild atherosclerotic aortoiliofemoral calcifications.     There are no acute osseous abnormalities observed.       Impression:       1. No CT evidence of acute intra-abdominal/pelvic pathological process.  2. Nonobstructing bilateral renal calculi.  3. Postsurgical changes.  This report was finalized on 09/15/2019 18:22 by Dr. Lior Ludwig MD.          Allergies   Allergen Reactions   • Anectine [Succinylcholine Chloride] Other (See Comments) and Rash     Hard to wake up   • Stadol [Butorphanol] Hives   • Butorphanol Tartrate Rash     Pt states she does not recall being allergic       Scheduled meds:     amLODIPine 10 mg Oral Daily   busPIRone 7.5 mg Oral TID   ceftriaxone 1 g Intravenous Q24H   CloNIDine 0.1 mg Oral Q6H   famotidine 20 mg Intravenous Q12H   levETIRAcetam 500 mg Oral BID   megestrol 40 mg Oral Daily   metoprolol succinate  mg Oral Nightly   IV Fluids 1000 mL + additives 100 mL/hr Intravenous Daily   multivitamin w/minerals 1 tablet Oral Daily   sodium chloride 10 mL Intravenous Q12H   sucralfate 1 g Oral 4x Daily AC & at Bedtime   venlafaxine  mg Oral Daily       PRN meds:  enalaprilat  •  hydrALAZINE  •  HYDROmorphone  •  ondansetron  •  promethazine  •  sodium chloride    Assessment/Plan       Acute UTI (urinary tract infection)    KRISHNA (acute kidney injury) (CMS/HCC)    HTN (hypertension)    Abdominal pain    Failure to thrive in adult      Plan:  Failure to thrive.    Abdomen pain.  Chronic ongoing issue abdomen pain.  Followed by Dr. Hanson outpatient.  GI consult.  CT scan abdomen pelvic-no CT evidence of acute intra-abdominal/pelvic pathological process, nonobstructing bilateral renal calculi, postsurgical changes.     UTI.  Continue  Rocephin.     Acute renal failure.  Improving.  Continue IV fluid.    Reflux.  Carafate.  Pepcid.  Zofran.    History of bowel obstruction.  Last bowel movement 3 days ago.    Hyperlipidemia.  Start Crestor.    Hypertension.  Continue Norvasc.  Continue Catapres.  Continue Toprol.    Seizure.  Continue Keppra.    Anxiety/depression.  Continue Effexor.  Continue BuSpar.    Weight loss.  Lost 20 pounds in 2 months.      Nutrition.  Anorexia.  Nutrition consult.  Continue Megace.. Banana bag.    Deconditioning.  PT and OT consult.    Discharge Plannin to 4 days.    Gilberto Mcguire MD   19   11:08 AM

## 2019-09-16 NOTE — ANESTHESIA PREPROCEDURE EVALUATION
Anesthesia Evaluation     Patient summary reviewed   history of anesthetic complications (PSEUDOCHOLINESTERSAE DEFICIENCY):  NPO Solid Status: > 8 hours             Airway   Mallampati: II  TM distance: >3 FB  Neck ROM: full  Dental      Pulmonary    (-) COPD, asthma, sleep apnea, not a smoker  Cardiovascular   Exercise tolerance: excellent (>7 METS)    ECG reviewed  Patient on routine beta blocker and Beta blocker given within 24 hours of surgery    (+) hypertension, hyperlipidemia,   (-) pacemaker, past MI, angina, CHF, cardiac stents      Neuro/Psych  (+) seizures well controlled,     (-) TIA, CVA  GI/Hepatic/Renal/Endo    (+)  GERD,    (-) liver disease, no renal disease, diabetes    Musculoskeletal     Abdominal    Substance History      OB/GYN          Other                          Anesthesia Plan    ASA 2     MAC     Anesthetic plan, all risks, benefits, and alternatives have been provided, discussed and informed consent has been obtained with: patient.

## 2019-09-16 NOTE — ED PROVIDER NOTES
Subjective   The patient is a 41-year-old female with a profoundly complex medical history.  She comes in with approximately 20 pound weight loss over the last few weeks, inability to take p.o. she states that she spits up everything that she tries to eat or drink.  She states she only urinates 1 time a day.  She denies fever.  She describes abdominal pain, diffuse in nature.  She denies chest pain or shortness of breath.  She takes NSAIDs relatively frequently.  She feels like she might have had some burning of urine recently.            Review of Systems   Constitutional: Positive for activity change and unexpected weight change.   Gastrointestinal: Positive for abdominal pain.   Genitourinary: Positive for dysuria.   All other systems reviewed and are negative.      Past Medical History:   Diagnosis Date   • Acute kidney failure (CMS/HCC)    • Anxiety    • Bowel obstruction (CMS/HCC)    • Constipation    • Depression    • GERD (gastroesophageal reflux disease)    • H/O gastric ulcer    • Headache    • History of transfusion    • Hypertension    • IBS (irritable bowel syndrome)    • Insomnia    • Kidney stone    • Maravilla maravilla disease    • Pseudocholinesterase deficiency    • Rapid weight loss    • SBO (small bowel obstruction) (CMS/HCC)    • Stroke (CMS/HCC)     X 2   • UTI (urinary tract infection)     CHRONIC, SEPTIC X2   • Volvulosis        Allergies   Allergen Reactions   • Anectine [Succinylcholine Chloride] Other (See Comments) and Rash     Hard to wake up   • Stadol [Butorphanol] Hives   • Butorphanol Tartrate Rash     Pt states she does not recall being allergic       Past Surgical History:   Procedure Laterality Date   • APPENDECTOMY      COMPLICATION OF SEPTIC   • AUGMENTATION MAMMAPLASTY     • BRAIN SURGERY      x2   • BREAST AUGMENTATION BILATERAL MASTOPEXY     • BREAST LUMPECTOMY Left 3/9/2017    Procedure: EXCISION LEFT BREAST LESION AND LEFT AXILLARY LYMPH NODE BIOPSY;  Surgeon: Evi Farley MD;   Location: [facility] OR;  Service:    • BREAST SURGERY     •  SECTION     • CHOLECYSTECTOMY     • CHOLECYSTECTOMY WITH INTRAOPERATIVE CHOLANGIOGRAM N/A 2018    Procedure: CHOLECYSTECTOMY LAPAROSCOPIC INTRAOPERATIVE CHOLANGIOGRAM;  Surgeon: Antonio Salvador MD;  Location: [facility] OR;  Service: General   • COLON SURGERY     • COLONOSCOPY  2007    normal   • COLONOSCOPY N/A 11/10/2016    Procedure: COLONOSCOPY WITH ANESTHESIA;  Surgeon: Camilo Hanson MD;  Location: [facility] ENDOSCOPY;  Service:    • COLONOSCOPY N/A 2018    Procedure: COLONOSCOPY WITH ANESTHESIA;  Surgeon: Camilo Hanson MD;  Location: [facility] ENDOSCOPY;  Service:    • ENDOSCOPY  2009    antral ulcer   • ENDOSCOPY N/A 10/10/2016    Procedure: ESOPHAGOGASTRODUODENOSCOPY WITH ANESTHESIA;  Surgeon: Camilo Hanson MD;  Location: [facility] ENDOSCOPY;  Service:    • ENDOSCOPY N/A 2017    Procedure: ESOPHAGOGASTRODUODENOSCOPY;  Surgeon: Camilo Hanson MD;  Location: [facility] ENDOSCOPY;  Service:    • ENDOSCOPY N/A 2017    Procedure: ESOPHAGOGASTRODUODENOSCOPY WITH ANESTHESIA;  Surgeon: Camilo Hanson MD;  Location: [facility] ENDOSCOPY;  Service:    • ENDOSCOPY N/A 2018    Procedure: ESOPHAGOGASTRODUODENOSCOPY ;  Surgeon: Camilo Hanson MD;  Location: [facility] ENDOSCOPY;  Service:    • HERNIA REPAIR     • HYSTERECTOMY     • SMALL INTESTINE SURGERY N/A 2016   • TONSILLECTOMY         Family History   Problem Relation Age of Onset   • Cancer Maternal Grandfather    • Hypertension Mother    • Dementia Mother    • No Known Problems Father    • No Known Problems Sister    • No Known Problems Brother    • No Known Problems Son    • No Known Problems Brother    • No Known Problems Son    • No Known Problems Maternal Grandmother    • Breast cancer Paternal Grandmother    • Dementia Paternal Grandmother    • No Known Problems Maternal Aunt    • No Known Problems Paternal Aunt    • Cancer Paternal Grandfather    • Colon cancer  Neg Hx    • Colon polyps Neg Hx    • BRCA 1/2 Neg Hx    • Endometrial cancer Neg Hx    • Ovarian cancer Neg Hx        Social History     Socioeconomic History   • Marital status:      Spouse name: Not on file   • Number of children: Not on file   • Years of education: Not on file   • Highest education level: Not on file   Tobacco Use   • Smoking status: Former Smoker     Packs/day: 1.00     Years: 15.00     Pack years: 15.00     Types: Cigarettes, Electronic Cigarette     Last attempt to quit: 3/10/2016     Years since quitting: 3.5   • Smokeless tobacco: Never Used   Substance and Sexual Activity   • Alcohol use: No     Frequency: Never     Comment: occasional   • Drug use: No   • Sexual activity: No     Partners: Male     Birth control/protection: Surgical           Objective   Physical Exam   Constitutional: She is oriented to person, place, and time. She appears well-developed. She has a sickly appearance.   HENT:   Head: Normocephalic and atraumatic.   Mouth/Throat: Oropharynx is clear and moist.   Eyes: Conjunctivae and EOM are normal.   Neck: Normal range of motion. Neck supple.   Cardiovascular: Normal rate, regular rhythm, normal heart sounds and intact distal pulses.   Pulmonary/Chest: Effort normal and breath sounds normal.   Abdominal: Soft. Bowel sounds are normal. There is generalized tenderness. There is no rigidity, no rebound and no guarding.   Musculoskeletal: Normal range of motion.   Neurological: She is alert and oriented to person, place, and time.   Skin: Skin is warm and dry. Capillary refill takes less than 2 seconds.   Psychiatric: Her behavior is normal. Judgment and thought content normal.   Nursing note and vitals reviewed.      Procedures           ED Course                  MDM  Number of Diagnoses or Management Options     Amount and/or Complexity of Data Reviewed  Clinical lab tests: reviewed and ordered  Tests in the radiology section of CPT®: reviewed and ordered  Decide to  obtain previous medical records or to obtain history from someone other than the patient: yes    Critical Care  Total time providing critical care: < 30 minutes    Patient Progress  Patient progress: stable      Final diagnoses:   KRISHNA (acute kidney injury) (CMS/[facility])   Urinary tract infection without hematuria, site unspecified       Patient has acute kidney injury with a creatinine of 2.4 and a UA that suggests urinary tract infection.  Needs admission for IV hydration and IV antibiotics and observation of her renal function.       Rick Fermin MD  09/15/19 2038

## 2019-09-16 NOTE — CONSULTS
"        [facility]astroenterology  Inpatient Consult Note  Today's date:  09/16/19    Kamryn Oliva  1970       Referring Provider: Marisela Hanley DO  Primary Physician: Kory Jones DO   Primary Gastroenterologist: Dr. Camilo Hanson    Date of Admission: 9/15/2019  Date of Service:  09/16/19    Reason for Consultation/Chief Complaint: Not eating    History of present illness: This is a very pleasant 49-year-old female who tells me that \"I am having the same symptoms that have always had of nausea, vomiting, epigastric pain and heartburn.    The patient was admitted to [facility] on 9/15/2019 for complaints of weight loss.  Patient states this is been a problem for about 4 years.  She states that her weight has been significantly changing over the last month.  Patient does carry a history of small bowel obstruction.  She states her last bowel movement was 3 days ago.  She states that if she has anything to drink or swallows \"it just comes back up.\"  The patient was seen by her PCP about 5 to 6 weeks ago and received fluids in the ER at that time.  She states that her throat feels very painful when she swallows.  She states she has \"terrible pain after eating.\"  She states that within the past 2 months she has lost approximately 20 pounds.  She has been treated for GERD in the past and at home has been taking Protonix.  On admission the patient was found to be in acute renal failure with dehydration.      EGD on 2/13/2018 per Dr. Camilo Hanson for complaints of abdominal bloating was normal.  Negative H. pylori.  The patient also underwent colonoscopy on 2/2/2018 for change in bowel habits and constipation.  One 6 mm polyp was removed.  Internal hemorrhoids otherwise normal.  The patient underwent an EGD on 8/8/2017 for nausea found to be normal.  Dr. Hanson noted that the patient had had multiple CT scans along with 3 endoscopies and a colonoscopy small bowel follow-through within the " last 8 months.  She was instructed to follow-up with her PCP for non-GI causes of chronic nausea.  It was noted that the patient was smoking marijuana to try to gain weight.  She was advised not to do this.    The patient denies any  dysphagia,  or hematemesis.  The patient denies any fever or chills.  Denies any melena or hematochezia.  She denies any chronic NSAID use.  She states she has not been smoking any marijuana recently    Labs and imaging: CMP reveals creatinine of 1.39, BUN 21, hypocalcemia, phosphorus 2.1.  Lipase within normal limits CBC unremarkable.  History of positive THC in the past 2018    9/15/2019 51.12 kg 112 lb 11.2 oz Bed scale -   9/15/2019 48.988 kg 108 lb - -   7/17/2019 54.035 kg 119 lb 2 oz - Report   7/17/2019 54.035 kg 119 lb 2 oz - Report   6/18/2019 54.176 kg 119 lb 7 oz - Report   3/6/2019 54.573 kg 120 lb 5 oz - Report   1/31/2019 47.628 kg 105 lb - -   12/1/2018 39.282 kg 86 lb 9.6 oz - -   11/30/2018 45.813 kg 101 lb Bed scale -   11/6/2018 44.18 kg 97 lb 6.4 oz Standing scale -   11/6/2018 40.824 kg 90 lb - -   10/15/2018 49.896 kg 110 lb Bed scale -   10/15/2018 48.2 kg 106 lb 4.2 oz - -   10/14/2018 48.172 kg 106 lb 3.2 oz Bed scale -   10/14/2018 45.36 kg 100 lb         EXAMINATION: CT ABDOMEN PELVIS WO CONTRAST-  9/15/2019 6:11 PM CDT     HISTORY: Abdominal pain     COMPARISON:01/31/2019 abdomen and pelvis CT  IMPRESSION:  1. No CT evidence of acute intra-abdominal/pelvic pathological process.  2. Nonobstructing bilateral renal calculi.  3. Postsurgical changes.  This report was finalized on 09/15/2019 18:22 by Dr. Lior Ludwig MD.    Past Medical History:   Diagnosis Date   • Acute kidney failure (CMS/HCC)    • Anxiety    • Bowel obstruction (CMS/HCC)    • Constipation    • Depression    • GERD (gastroesophageal reflux disease)    • H/O gastric ulcer    • Headache    • History of transfusion    • Hypertension    • IBS (irritable bowel syndrome)    • Insomnia    • Kidney  stone    • Maravilla maravilla disease    • Pseudocholinesterase deficiency    • Rapid weight loss    • SBO (small bowel obstruction) (CMS/HCC)    • Stroke (CMS/HCC)     X 2   • UTI (urinary tract infection)     CHRONIC, SEPTIC X2   • Volvulosis        Past Surgical History:   Procedure Laterality Date   • APPENDECTOMY      COMPLICATION OF SEPTIC   • AUGMENTATION MAMMAPLASTY     • BRAIN SURGERY      x2   • BREAST AUGMENTATION BILATERAL MASTOPEXY     • BREAST LUMPECTOMY Left 3/9/2017    Procedure: EXCISION LEFT BREAST LESION AND LEFT AXILLARY LYMPH NODE BIOPSY;  Surgeon: Evi Farley MD;  Location: [facility] OR;  Service:    • BREAST SURGERY     •  SECTION     • CHOLECYSTECTOMY     • CHOLECYSTECTOMY WITH INTRAOPERATIVE CHOLANGIOGRAM N/A 2018    Procedure: CHOLECYSTECTOMY LAPAROSCOPIC INTRAOPERATIVE CHOLANGIOGRAM;  Surgeon: Antonio Salvador MD;  Location: [facility] OR;  Service: General   • COLON SURGERY     • COLONOSCOPY  2007    normal   • COLONOSCOPY N/A 11/10/2016    Procedure: COLONOSCOPY WITH ANESTHESIA;  Surgeon: Camilo Hanson MD;  Location: [facility] ENDOSCOPY;  Service:    • COLONOSCOPY N/A 2018    Procedure: COLONOSCOPY WITH ANESTHESIA;  Surgeon: Camilo Hanson MD;  Location: [facility] ENDOSCOPY;  Service:    • ENDOSCOPY  2009    antral ulcer   • ENDOSCOPY N/A 10/10/2016    Procedure: ESOPHAGOGASTRODUODENOSCOPY WITH ANESTHESIA;  Surgeon: Camilo Hanson MD;  Location: [facility] ENDOSCOPY;  Service:    • ENDOSCOPY N/A 2017    Procedure: ESOPHAGOGASTRODUODENOSCOPY;  Surgeon: Camilo Hanson MD;  Location: [facility] ENDOSCOPY;  Service:    • ENDOSCOPY N/A 2017    Procedure: ESOPHAGOGASTRODUODENOSCOPY WITH ANESTHESIA;  Surgeon: Camilo Hanson MD;  Location: [facility] ENDOSCOPY;  Service:    • ENDOSCOPY N/A 2018    Procedure: ESOPHAGOGASTRODUODENOSCOPY ;  Surgeon: Camilo Hanson MD;  Location: [facility] ENDOSCOPY;  Service:    • HERNIA REPAIR     • HYSTERECTOMY     • SMALL INTESTINE  SURGERY N/A 03/2016   • TONSILLECTOMY          Allergies   Allergen Reactions   • Anectine [Succinylcholine Chloride] Other (See Comments) and Rash     Hard to wake up   • Stadol [Butorphanol] Hives   • Butorphanol Tartrate Rash     Pt states she does not recall being allergic       Medications Prior to Admission   Medication Sig Dispense Refill Last Dose   • amitriptyline (ELAVIL) 100 MG tablet Take 1 tablet by mouth Every Night. 90 tablet 1 Taking   • amLODIPine (NORVASC) 10 MG tablet Take 1 tablet by mouth Daily. 90 tablet 3 Taking   • aspirin 81 MG EC tablet Take 81 mg by mouth daily.   Taking   • aspirin-acetaminophen-caffeine (EXCEDRIN MIGRAINE) 250-250-65 MG per tablet Take 1 tablet by mouth Every 6 (Six) Hours As Needed for Headache.   Not Taking   • atorvastatin (LIPITOR) 40 MG tablet Take 1 tablet by mouth Daily. 30 tablet 5 Taking   • busPIRone (BUSPAR) 7.5 MG tablet Take 7.5 mg by mouth 3 (Three) Times a Day.      • CloNIDine (CATAPRES) 0.1 MG tablet Take 0.1 mg by mouth Every 4 (Four) Hours As Needed for High Blood Pressure.      • fluticasone (FLONASE) 50 MCG/ACT nasal spray 2 sprays into the nostril(s) as directed by provider Daily. 1 bottle 3 Taking   • levETIRAcetam (KEPPRA) 500 MG tablet Take 1 tablet by mouth 2 (Two) Times a Day. 60 tablet 2 Taking   • metoprolol succinate XL (TOPROL-XL) 100 MG 24 hr tablet Take 1 tablet by mouth Every Night. 30 tablet 5 Taking   • ondansetron ODT (ZOFRAN-ODT) 4 MG disintegrating tablet Take 1-2 tablets by mouth Every 8 (Eight) Hours As Needed for Nausea. 30 tablet 6    • pantoprazole (PROTONIX) 40 MG EC tablet Take 1 tablet by mouth 2 (Two) Times a Day. 180 tablet 3 Taking   • promethazine (PHENERGAN) 25 MG tablet Take 25 mg by mouth Every 6 (Six) Hours As Needed for Nausea or Vomiting.      • venlafaxine XR (EFFEXOR-XR) 150 MG 24 hr capsule Take 150 mg by mouth Daily.          Hospital Medications (active)       Dose Frequency Start End    amLODIPine (NORVASC)  tablet 10 mg 10 mg Daily 9/16/2019     Sig - Route: Take 1 tablet by mouth Daily. - Oral    busPIRone (BUSPAR) tablet 7.5 mg 7.5 mg 3 Times Daily 9/15/2019     Sig - Route: Take 1.5 tablets by mouth 3 (Three) Times a Day. - Oral    cefTRIAXone (ROCEPHIN) 1 g/100 mL 0.9% NS (MBP) 1 g Once 9/15/2019 9/15/2019    Sig - Route: Infuse 100 mL into a venous catheter 1 (One) Time. - Intravenous    cefTRIAXone (ROCEPHIN) 1 g/100 mL 0.9% NS (MBP) 1 g Every 24 Hours 9/16/2019 9/23/2019    Sig - Route: Infuse 100 mL into a venous catheter Daily. - Intravenous    CloNIDine (CATAPRES) tablet 0.1 mg 0.1 mg Every 6 Hours Scheduled 9/16/2019     Sig - Route: Take 1 tablet by mouth Every 6 (Six) Hours. - Oral    enalaprilat (VASOTEC) injection 1.25 mg 1.25 mg Every 6 Hours PRN 9/15/2019     Sig - Route: Infuse 1 mL into a venous catheter Every 6 (Six) Hours As Needed for High Blood Pressure (SBP > 160). - Intravenous    famotidine (PEPCID) injection 20 mg 20 mg Every 12 Hours Scheduled 9/15/2019     Sig - Route: Infuse 2 mL into a venous catheter Every 12 (Twelve) Hours. - Intravenous    hydrALAZINE (APRESOLINE) injection 10 mg 10 mg Every 6 Hours PRN 9/15/2019     Sig - Route: Infuse 0.5 mL into a venous catheter Every 6 (Six) Hours As Needed for High Blood Pressure. - Intravenous    HYDROmorphone (DILAUDID) injection 0.5 mg 0.5 mg Every 4 Hours PRN 9/15/2019 9/19/2019    Sig - Route: Infuse 0.5 mL into a venous catheter Every 4 (Four) Hours As Needed for Severe Pain . - Intravenous    HYDROmorphone (DILAUDID) injection solution 1 mg 1 mg Once 9/15/2019 9/15/2019    Sig - Route: Infuse 1 mL into a venous catheter 1 (One) Time. - Intravenous    HYDROmorphone (DILAUDID) injection solution 1 mg 1 mg Once 9/15/2019 9/15/2019    Sig - Route: Infuse 1 mL into a venous catheter 1 (One) Time. - Intravenous    levETIRAcetam (KEPPRA) tablet 500 mg 500 mg 2 Times Daily 9/15/2019     Sig - Route: Take 1 tablet by mouth 2 (Two) Times a Day. -  Oral    megestrol (MEGACE) tablet 40 mg 40 mg Daily 9/16/2019     Sig - Route: Take 1 tablet by mouth Daily. - Oral    metoprolol succinate XL (TOPROL-XL) 24 hr tablet 100 mg 100 mg Nightly 9/15/2019     Sig - Route: Take 1 tablet by mouth Every Night. - Oral    multivitamin w/minerals tablet 1 tablet 1 tablet Daily 9/16/2019     Sig - Route: Take 1 tablet by mouth Daily. - Oral    ondansetron (ZOFRAN) injection 4 mg 4 mg Once 9/15/2019 9/15/2019    Sig - Route: Infuse 2 mL into a venous catheter 1 (One) Time. - Intravenous    ondansetron (ZOFRAN) injection 4 mg 4 mg Once 9/15/2019 9/15/2019    Sig - Route: Infuse 2 mL into a venous catheter 1 (One) Time. - Intravenous    ondansetron (ZOFRAN) injection 4 mg 4 mg Every 6 Hours PRN 9/15/2019     Sig - Route: Infuse 2 mL into a venous catheter Every 6 (Six) Hours As Needed for Nausea or Vomiting. - Intravenous    promethazine (PHENERGAN) injection 25 mg 25 mg Every 6 Hours PRN 9/15/2019     Sig - Route: Inject 1 mL into the appropriate muscle as directed by prescriber Every 6 (Six) Hours As Needed for Nausea or Vomiting. - Intramuscular    sodium chloride 0.45 % infusion 100 mL/hr Continuous 9/15/2019     Sig - Route: Infuse 100 mL/hr into a venous catheter Continuous. - Intravenous    sodium chloride 0.9 % bolus 1,000 mL 1,000 mL Once 9/15/2019 9/15/2019    Sig - Route: Infuse 1,000 mL into a venous catheter 1 (One) Time. - Intravenous    sodium chloride 0.9 % flush 10 mL 10 mL Every 12 Hours Scheduled 9/15/2019     Sig - Route: Infuse 10 mL into a venous catheter Every 12 (Twelve) Hours. - Intravenous    sodium chloride 0.9 % flush 10 mL 10 mL As Needed 9/15/2019     Sig - Route: Infuse 10 mL into a venous catheter As Needed for Line Care. - Intravenous    sodium chloride 0.9 % infusion 125 mL/hr Continuous 9/15/2019     Sig - Route: Infuse 125 mL/hr into a venous catheter Continuous. - Intravenous    sucralfate (CARAFATE) 1 GM/10ML suspension 1 g 1 g 4 Times Daily  Before Meals & Nightly 9/15/2019     Sig - Route: Take 10 mL by mouth 4 (Four) Times a Day Before Meals & at Bedtime. - Oral    venlafaxine XR (EFFEXOR-XR) 24 hr capsule 150 mg 150 mg Daily 9/16/2019     Sig - Route: Take 2 capsules by mouth Daily. - Oral          Social History     Tobacco Use   • Smoking status: Former Smoker     Packs/day: 1.00     Years: 15.00     Pack years: 15.00     Types: Cigarettes, Electronic Cigarette     Last attempt to quit: 3/10/2016     Years since quitting: 3.5   • Smokeless tobacco: Never Used   Substance Use Topics   • Alcohol use: No     Frequency: Never     Comment: occasional        Past Family History:  Family History   Problem Relation Age of Onset   • Cancer Maternal Grandfather    • Hypertension Mother    • Dementia Mother    • No Known Problems Father    • No Known Problems Sister    • No Known Problems Brother    • No Known Problems Son    • No Known Problems Brother    • No Known Problems Son    • No Known Problems Maternal Grandmother    • Breast cancer Paternal Grandmother    • Dementia Paternal Grandmother    • No Known Problems Maternal Aunt    • No Known Problems Paternal Aunt    • Cancer Paternal Grandfather    • Colon cancer Neg Hx    • Colon polyps Neg Hx    • BRCA 1/2 Neg Hx    • Endometrial cancer Neg Hx    • Ovarian cancer Neg Hx        Review of Systems:  Review of Systems   Constitutional: Negative for fever and unexpected weight change.   HENT: Negative for hearing loss.    Eyes: Negative for visual disturbance.   Respiratory: Negative for cough.    Cardiovascular: Negative for chest pain.   Gastrointestinal:        See HPI   Endocrine: Negative for cold intolerance and heat intolerance.   Genitourinary: Negative for dysuria.   Musculoskeletal: Negative for arthralgias.   Skin: Negative for rash.   Neurological: Negative for seizures.   Psychiatric/Behavioral: Negative for hallucinations.       Physical Exam:  Temp:  [97.6 °F (36.4 °C)-98.7 °F (37.1 °C)] 98  °F (36.7 °C)  Heart Rate:  [] 54  Resp:  [16-20] 16  BP: ()/(68-92) 104/69  Body mass index is 17.65 kg/m².    Intake/Output Summary (Last 24 hours) at 9/16/2019 0907  Last data filed at 9/16/2019 0842  Gross per 24 hour   Intake 1612.84 ml   Output 500 ml   Net 1112.84 ml     I/O this shift:  In: 402 [I.V.:402]  Out: -   Physical Exam   Constitutional: She is oriented to person, place, and time. She appears well-developed and well-nourished.   HENT:   Mouth/Throat: Oropharynx is clear and moist.   Eyes: EOM are normal.   Cardiovascular: Normal rate, regular rhythm and normal heart sounds. Exam reveals no gallop and no friction rub.   No murmur heard.  Pulmonary/Chest: Effort normal and breath sounds normal. She has no wheezes. She has no rales.   Abdominal: Soft. Bowel sounds are normal. She exhibits no distension. There is no hepatosplenomegaly. There is no tenderness (GILBERT). There is no rigidity, no rebound and no guarding.   Musculoskeletal: Normal range of motion. She exhibits no edema, tenderness or deformity.   Neurological: She is alert and oriented to person, place, and time.   Skin: Skin is warm and dry. No rash noted.   Psychiatric: She has a normal mood and affect. Her behavior is normal. Judgment and thought content normal.   Vitals reviewed.      Results Review:  Lab Results (last 24 hours)     Procedure Component Value Units Date/Time    Hemoglobin A1c [[id number]]  (Normal) Collected:  09/15/19 1700    Specimen:  Blood Updated:  09/16/19 0852     Hemoglobin A1C 5.60 %     Narrative:       Hemoglobin A1C Ranges:    Increased Risk for Diabetes  5.7% to 6.4%  Diabetes                     >= 6.5%  Diabetic Goal                < 7.0%    Manual Differential [[id number]]  (Abnormal) Collected:  09/16/19 0741    Specimen:  Blood Updated:  09/16/19 0839     Neutrophil % 48.4 %      Lymphocyte % 33.7 %      Monocyte % 3.2 %      Eosinophil % 1.1 %      Atypical Lymphocyte % 13.7 %      Neutrophils  Absolute 4.28 10*3/mm3      Lymphocytes Absolute 2.98 10*3/mm3      Monocytes Absolute 0.28 10*3/mm3      Eosinophils Absolute 0.10 10*3/mm3      Dacrocytes Slight/1+     Ovalocytes Mod/2+     Poikilocytes Mod/2+     WBC Morphology Normal     Clumped Platelets Present    CBC Auto Differential [[id number]]  (Normal) Collected:  09/16/19 0741    Specimen:  Blood Updated:  09/16/19 0838     WBC 8.84 10*3/mm3      RBC 4.63 10*6/mm3      Hemoglobin 13.8 g/dL      Hematocrit 41.5 %      MCV 89.6 fL      MCH 29.8 pg      MCHC 33.3 g/dL      RDW 12.7 %      RDW-SD 41.6 fl      MPV 11.3 fL      Platelets 210 10*3/mm3     Comprehensive Metabolic Panel [[id number]]  (Abnormal) Collected:  09/16/19 0741    Specimen:  Blood Updated:  09/16/19 0819     Glucose 67 mg/dL      BUN 21 mg/dL      Creatinine 1.39 mg/dL      Sodium 139 mmol/L      Potassium 3.9 mmol/L      Chloride 106 mmol/L      CO2 19.0 mmol/L      Calcium 8.4 mg/dL      Total Protein 6.9 g/dL      Albumin 4.40 g/dL      ALT (SGPT) 8 U/L      AST (SGOT) 14 U/L      Alkaline Phosphatase 76 U/L      Total Bilirubin 0.2 mg/dL      eGFR Non African Amer 40 mL/min/1.73      Globulin 2.5 gm/dL      A/G Ratio 1.8 g/dL      BUN/Creatinine Ratio 15.1     Anion Gap 14.0 mmol/L     Narrative:       GFR Normal >60  Chronic Kidney Disease <60  Kidney Failure <15    Lipase [[id number]]  (Normal) Collected:  09/16/19 0741    Specimen:  Blood Updated:  09/16/19 0819     Lipase 18 U/L     Lipid Panel [[id number]]  (Abnormal) Collected:  09/16/19 0741    Specimen:  Blood Updated:  09/16/19 0819     Total Cholesterol 217 mg/dL      Triglycerides 209 mg/dL      HDL Cholesterol 36 mg/dL      LDL Cholesterol  139 mg/dL      VLDL Cholesterol 41.8 mg/dL      LDL/HDL Ratio 3.87    Narrative:       Cholesterol Reference Ranges  (U.S. Department of Health and Human Services ATP III Classifications)    Desirable          <200 mg/dL  Borderline High    200-239 mg/dL  High Risk          >240  mg/dL      Triglyceride Reference Ranges  (U.S. Department of Health and Human Services ATP III Classifications)    Normal           <150 mg/dL  Borderline High  150-199 mg/dL  High             200-499 mg/dL  Very High        >500 mg/dL    HDL Reference Ranges  (U.S. Department of Health and Human Services ATP III Classifcations)    Low     <40 mg/dl (major risk factor for CHD)  High    >60 mg/dl ('negative' risk factor for CHD)        LDL Reference Ranges  (U.S. Department of Health and Human Services ATP III Classifcations)    Optimal          <100 mg/dL  Near Optimal     100-129 mg/dL  Borderline High  130-159 mg/dL  High             160-189 mg/dL  Very High        >189 mg/dL    Magnesium [[id number]]  (Normal) Collected:  09/16/19 0741    Specimen:  Blood Updated:  09/16/19 0819     Magnesium 1.7 mg/dL     Phosphorus [[id number]]  (Abnormal) Collected:  09/16/19 0741    Specimen:  Blood Updated:  09/16/19 0819     Phosphorus 2.1 mg/dL     TSH [[id number]]  (Normal) Collected:  09/16/19 0741    Specimen:  Blood Updated:  09/16/19 0819     TSH 3.230 uIU/mL     Urinalysis, Microscopic Only - Urine, Clean Catch [[id number]]  (Abnormal) Collected:  09/15/19 1837    Specimen:  Urine, Clean Catch Updated:  09/15/19 1917     RBC, UA 3-5 /HPF      WBC, UA 21-30 /HPF      Bacteria, UA 2+ /HPF      Squamous Epithelial Cells, UA 3-6 /HPF      Yeast, UA Small/1+ Budding Yeast /HPF      Hyaline Casts, UA None Seen /LPF      Methodology Manual Light Microscopy    Urine Culture - Urine, Urine, Clean Catch [[id number]] Collected:  09/15/19 1837    Specimen:  Urine, Clean Catch Updated:  09/15/19 1917    Urinalysis With Culture If Indicated - Urine, Clean Catch [[id number]]  (Abnormal) Collected:  09/15/19 1837    Specimen:  Urine, Clean Catch Updated:  09/15/19 1902     Color, UA Yellow     Appearance, UA Turbid     pH, UA 5.5     Specific Gravity, UA 1.019     Glucose, UA Negative     Ketones, UA 15 mg/dL (1+)     Bilirubin, UA  Negative     Blood, UA Small (1+)     Protein, UA 30 mg/dL (1+)     Leuk Esterase, UA Small (1+)     Nitrite, UA Negative     Urobilinogen, UA 0.2 E.U./dL    Manual Differential [[id number]]  (Abnormal) Collected:  09/15/19 1700    Specimen:  Blood Updated:  09/15/19 1756     Neutrophil % 59.0 %      Lymphocyte % 34.0 %      Monocyte % 5.0 %      Atypical Lymphocyte % 2.0 %      Neutrophils Absolute 6.80 10*3/mm3      Lymphocytes Absolute 3.92 10*3/mm3      Monocytes Absolute 0.58 10*3/mm3      Crenated RBC's Mod/2+     Poikilocytes Large/3+     Smudge Cells Slight/1+     Platelet Morphology Normal    Comprehensive Metabolic Panel [[id number]]  (Abnormal) Collected:  09/15/19 1719    Specimen:  Blood Updated:  09/15/19 1741     Glucose 105 mg/dL      BUN 31 mg/dL      Creatinine 2.41 mg/dL      Sodium 143 mmol/L      Potassium 3.6 mmol/L      Chloride 105 mmol/L      CO2 17.0 mmol/L      Calcium 9.4 mg/dL      Total Protein 7.5 g/dL      Albumin 4.70 g/dL      ALT (SGPT) 9 U/L      AST (SGOT) 12 U/L      Alkaline Phosphatase 85 U/L      Total Bilirubin 0.2 mg/dL      eGFR Non African Amer 21 mL/min/1.73      Globulin 2.8 gm/dL      A/G Ratio 1.7 g/dL      BUN/Creatinine Ratio 12.9     Anion Gap 21.0 mmol/L     Narrative:       GFR Normal >60  Chronic Kidney Disease <60  Kidney Failure <15    CBC & Differential [[id number]] Collected:  09/15/19 1700    Specimen:  Blood Updated:  09/15/19 1723    Narrative:       The following orders were created for panel order CBC & Differential.  Procedure                               Abnormality         Status                     ---------                               -----------         ------                     CBC Auto Differential[[id number]]        Abnormal            Final result                 Please view results for these tests on the individual orders.    CBC Auto Differential [[id number]]  (Abnormal) Collected:  09/15/19 1700    Specimen:  Blood Updated:  09/15/19 1723      WBC 11.53 10*3/mm3      RBC 5.13 10*6/mm3      Hemoglobin 15.3 g/dL      Hematocrit 44.1 %      MCV 86.0 fL      MCH 29.8 pg      MCHC 34.7 g/dL      RDW 12.4 %      RDW-SD 38.9 fl      MPV 11.2 fL      Platelets 290 10*3/mm3           Radiology Review:  Imaging Results (last 72 hours)     Procedure Component Value Units Date/Time    CT Abdomen Pelvis Without Contrast [[id number]] Collected:  09/15/19 1811     Updated:  09/15/19 1825    Narrative:       EXAMINATION: CT ABDOMEN PELVIS WO CONTRAST-  9/15/2019 6:11 PM CDT     HISTORY: Abdominal pain     COMPARISON:01/31/2019 abdomen and pelvis CT     TECHNIQUE:  Radiation dose equals  mGy-cm.  Automated exposure  control dose reduction technique was implemented.     Thin section axial imaging was obtained. 2-D sagittal and coronal  reconstruction images were generated.     Intravenous contrast was not administered.     Oral contrast was not ingested.     FINDINGS:     The lung bases are clear without acute infiltrates.     Pectus excavatum anterior chest wall deformity noted inferiorly.     The gallbladder surgically absent. There is no biliary ductal  dilatation.     The spleen is not enlarged.     There are no adrenal masses.     There are no focal pancreatic abnormalities observed.     There are small nonobstructing centrally located renal calculi. There is  no pelvocaliectasis hydroureter, ureterolithiasis or obstructive  uropathy changes. Urinary bladder is decompressed without focal bladder  wall abnormality.     Hysterectomy changes observed. There are no adnexal masses.     There is stool and gas identified in the colon without dilatation. The  appendix is surgically absent. There is no CT evidence of significant  diverticular disease. Bowel surgery changes noted in the right lower  quadrant.     The small bowel is not dilated.     The duodenal sweep imaged appropriately.     The stomach is not distended.     There is no ascites or pneumoperitoneum.    "  There are no pathologically enlarged lymph nodes.     There is mild atherosclerotic aortoiliofemoral calcifications.     There are no acute osseous abnormalities observed.       Impression:       1. No CT evidence of acute intra-abdominal/pelvic pathological process.  2. Nonobstructing bilateral renal calculi.  3. Postsurgical changes.  This report was finalized on 09/15/2019 18:22 by Dr. Lior Ludwig MD.          Impression/Plan:  Patient Active Problem List   Diagnosis Code   • KRISHNA (acute kidney injury) (CMS/[facility]) N17.9   • Essential hypertension I10   • Irritable bowel syndrome with both constipation and diarrhea K58.2   • Maravilla maravilla disease I67.5   • Anemia D64.9   • Constipation K59.00   • Gastroesophageal reflux disease K21.9   • Gastroparesis K31.84   • Abnormal CT of the abdomen R93.5   • Moderate protein-calorie malnutrition (CMS/[facility]) E44.0   • Anxiety and depression F41.9, F32.9   • Seizure disorder (CMS/[facility]) G40.909   • Mixed hyperlipidemia E78.2     Weight loss/epigastric pain/nausea vomiting--chronic   Weight loss is not supported upon chart review.  Her complaints have been evaluated with endoscopy multiple occasions by Dr. Hanson.  Multiple CT imaging also unremarkable.  Will attempt endoscopy once again to see if there is anything obviously wrong.  Dr. Hanson has previously advised primary to refer her to a tertiary care center if symptoms persist.  Patient claims that she is \"dying.  I can't eat.\"  I reviewed data with her including her stable weight which does fluctuate but is holding.  Labs and CT unremarkable.  She was not happy with me that I stated that I don't see any evidence that she is dying.  \"I've had 3 near death experiences!!\"      The risks, benefits, and alternatives of endoscopy were reviewed with the patient today.  Risks including perforation, with or without dilation, possibly requiring surgery.  Additional risks include risk of bleeding.  There is also the risk of a drug " reaction or problems with anesthesia.  This will be discussed with the further by the anesthesia team on the day of the procedure. The benefits include the diagnosis and management of disease of the upper digestive tract.  Alternatives to endoscopy include upper GI series, laboratory testing, radiographic evaluation, or no intervention.  The patient verbalizes understanding and agrees.    In accordance with requirements under the Affordable Care Act, [facility] has provided pricing for all hospital services and items on each of its websites. However, a patient's actual cost may differ based on the services the patient receives to meet individual healthcare needs and based on the benefits provided under the patient’s insurance coverage.        YOUNG Gross  09/16/19   9:07 AM     Patient Seen, Chart, Consults, Notes, Labs, Radiology studies reviewed    I have seen and examined patient personally, performing a face-to-face diagnostic evaluation with plan of care reviewed and developed with APRN and nursing staff. I have addended and/or modified the above history of present illness, physical examination, and assessment and plan to reflect my findings and impressions. Essential elements of the care plan were discussed with APRN above.  Agree with findings and assessment/plan as documented above    Katarina Davis MD  [facility]  09/16/19  3:04 PM    Much of this encounter note is an electronic transcription/translation of spoken language to printed text. The electronic translation of spoken language may permit erroneous, or at times, nonsensical words or phrases to be inadvertently transcribed; although I have reviewed the note for such errors, some may still exist.

## 2019-09-16 NOTE — PLAN OF CARE
Problem: Patient Care Overview  Goal: Plan of Care Review  Outcome: Ongoing (interventions implemented as appropriate)   09/16/19 0513   Coping/Psychosocial   Plan of Care Reviewed With patient   Plan of Care Review   Progress no change   OTHER   Outcome Summary Received patient last night from ER with acute kidney injury. Has had 20 lb weight loss over last few weeks. States her esophagus feels swelled. Has constant chronic pain to back and abdomen, constant nausea. Medicated with prn iv pain meds and nausea meds, with some relief noted. Patient is anorexic. A&Ox4. Up ad edgard. NPO since midnight. Scd's on. . GI consult. Continue to monitor. Patient is a privacy patient.      Goal: Individualization and Mutuality  Outcome: Ongoing (interventions implemented as appropriate)      Problem: Pain, Chronic (Adult)  Goal: Identify Related Risk Factors and Signs and Symptoms  Outcome: Ongoing (interventions implemented as appropriate)    Goal: Acceptable Pain/Comfort Level and Functional Ability  Outcome: Ongoing (interventions implemented as appropriate)      Problem: Nausea/Vomiting (Adult)  Goal: Identify Related Risk Factors and Signs and Symptoms  Outcome: Ongoing (interventions implemented as appropriate)    Goal: Symptom Relief  Outcome: Ongoing (interventions implemented as appropriate)    Goal: Adequate Hydration  Outcome: Ongoing (interventions implemented as appropriate)      Problem: Renal Failure/Kidney Injury, Acute (Adult)  Goal: Signs and Symptoms of Listed Potential Problems Will be Absent, Minimized or Managed (Renal Failure/Kidney Injury, Acute)  Outcome: Ongoing (interventions implemented as appropriate)

## 2019-09-16 NOTE — H&P
"    [facility] Medicine Services  HISTORY AND PHYSICAL    Date of Admission: 9/15/2019  Primary Care Physician: Kory Jones, DO    Subjective     Chief Complaint: \"I just can't do it on my own anymore.\"    History of Present Illness  49-year-old  female who presents to the emergency department with complaints of weight loss.  This appears to be a problem that is been ongoing for 4 years.  She is seen Dr. Hanson for 2 years.  She states that her progress has been deteriorating significantly over the last month.  She states she does have history of bowel obstruction.  She states her last bowel movement was 3 days ago.  She did not notice any blood in it.  She states she is unable to drink or swallow without it coming back up.  She says\" I just cannot do it on my own.\"  She saw her PCP about 5 to 6 weeks ago and got fluids in the emergency department at that time.  She states she feels like she has knives in her throat when she swallows.  She has unbearable pain once food gets into her stomach.  Her previous creatinine was 0.96 and today is 2.41.  She states she has history of iron deficiency anemia.  She states in the past 2 months she is lost about 20 pounds.  She states she is only urinating about once a day.        Review of Systems   Anorexia  Abdominal pain    Otherwise complete ROS reviewed and negative except as mentioned in the HPI.    Past Medical History:   Past Medical History:   Diagnosis Date   • Acute kidney failure (CMS/HCC)    • Anxiety    • Bowel obstruction (CMS/HCC)    • Constipation    • Depression    • GERD (gastroesophageal reflux disease)    • H/O gastric ulcer    • Headache    • History of transfusion    • Hypertension    • IBS (irritable bowel syndrome)    • Insomnia    • Kidney stone    • Maravilla maravilla disease    • Pseudocholinesterase deficiency    • Rapid weight loss    • SBO (small bowel obstruction) (CMS/HCC)    • Stroke (CMS/HCC)     X 2   • " UTI (urinary tract infection)     CHRONIC, SEPTIC X2   • Volvulosis      Past Surgical History:  Past Surgical History:   Procedure Laterality Date   • APPENDECTOMY      COMPLICATION OF SEPTIC   • AUGMENTATION MAMMAPLASTY     • BRAIN SURGERY      x2   • BREAST AUGMENTATION BILATERAL MASTOPEXY     • BREAST LUMPECTOMY Left 3/9/2017    Procedure: EXCISION LEFT BREAST LESION AND LEFT AXILLARY LYMPH NODE BIOPSY;  Surgeon: Evi Farley MD;  Location: [facility] OR;  Service:    • BREAST SURGERY     •  SECTION     • CHOLECYSTECTOMY     • CHOLECYSTECTOMY WITH INTRAOPERATIVE CHOLANGIOGRAM N/A 2018    Procedure: CHOLECYSTECTOMY LAPAROSCOPIC INTRAOPERATIVE CHOLANGIOGRAM;  Surgeon: Antonio Salvador MD;  Location: [facility] OR;  Service: General   • COLON SURGERY     • COLONOSCOPY  2007    normal   • COLONOSCOPY N/A 11/10/2016    Procedure: COLONOSCOPY WITH ANESTHESIA;  Surgeon: Camilo Hanson MD;  Location: [facility] ENDOSCOPY;  Service:    • COLONOSCOPY N/A 2018    Procedure: COLONOSCOPY WITH ANESTHESIA;  Surgeon: Camilo Hanson MD;  Location: [facility] ENDOSCOPY;  Service:    • ENDOSCOPY  2009    antral ulcer   • ENDOSCOPY N/A 10/10/2016    Procedure: ESOPHAGOGASTRODUODENOSCOPY WITH ANESTHESIA;  Surgeon: Camilo Hanson MD;  Location: [facility] ENDOSCOPY;  Service:    • ENDOSCOPY N/A 2017    Procedure: ESOPHAGOGASTRODUODENOSCOPY;  Surgeon: Camilo Hanson MD;  Location: [facility] ENDOSCOPY;  Service:    • ENDOSCOPY N/A 2017    Procedure: ESOPHAGOGASTRODUODENOSCOPY WITH ANESTHESIA;  Surgeon: Camilo Hanson MD;  Location: [facility] ENDOSCOPY;  Service:    • ENDOSCOPY N/A 2018    Procedure: ESOPHAGOGASTRODUODENOSCOPY ;  Surgeon: Camilo Hanson MD;  Location: [facility] ENDOSCOPY;  Service:    • HERNIA REPAIR     • HYSTERECTOMY     • SMALL INTESTINE SURGERY N/A 2016   • TONSILLECTOMY       Social History:  reports that she quit smoking about 3 years ago. Her smoking use included cigarettes  and electronic cigarette. She has a 15.00 pack-year smoking history. She has never used smokeless tobacco. She reports that she does not drink alcohol or use drugs.    Family History: family history includes Breast cancer in her paternal grandmother; Cancer in her maternal grandfather and paternal grandfather; Dementia in her mother and paternal grandmother; Hypertension in her mother; No Known Problems in her brother, brother, father, maternal aunt, maternal grandmother, paternal aunt, sister, son, and son.      Allergies:  Allergies   Allergen Reactions   • Anectine [Succinylcholine Chloride] Other (See Comments) and Rash     Hard to wake up   • Stadol [Butorphanol] Hives   • Butorphanol Tartrate Rash     Pt states she does not recall being allergic     Medications:  Prior to Admission medications    Medication Sig Start Date End Date Taking? Authorizing Provider   amitriptyline (ELAVIL) 100 MG tablet Take 1 tablet by mouth Every Night. 6/18/19   Kory Jones, DO   amLODIPine (NORVASC) 10 MG tablet Take 1 tablet by mouth Daily. 6/18/19   Kory Jones, DO   aspirin 81 MG EC tablet Take 81 mg by mouth daily.    Provider, MD Ramana   aspirin-acetaminophen-caffeine (EXCEDRIN MIGRAINE) 250-250-65 MG per tablet Take 1 tablet by mouth Every 6 (Six) Hours As Needed for Headache.    Provider, MD Ramana   atorvastatin (LIPITOR) 40 MG tablet Take 1 tablet by mouth Daily. 3/7/19   Kristine Burton APRN   busPIRone (BUSPAR) 7.5 MG tablet TAKE ONE TABLET BY MOUTH THREE TIMES A DAY. 8/16/19   Kory Jones DO   butalbital-acetaminophen-caffeine (ESGIC) -40 MG per tablet Take 1 tablet by mouth Every 6 (Six) Hours As Needed for Headache. 3/6/19   Kristine Burton APRN   CloNIDine (CATAPRES) 0.1 MG tablet TAKE 1 TABLET BY MOUTH EVERY 4 HOURS AS NEEDED FOR HIGH BLOOD PRESSURE. 9/6/19   Kory Jones DO   fluticasone (FLONASE) 50 MCG/ACT nasal spray 2 sprays into the nostril(s) as directed by  "provider Daily. 6/16/19   Kory Jones DO   levETIRAcetam (KEPPRA) 500 MG tablet Take 1 tablet by mouth 2 (Two) Times a Day. 6/16/19   Kory Jones DO   megestrol (MEGACE) 40 MG tablet Take 40 mg by mouth Daily.    Provider, MD Ramana   metoprolol succinate XL (TOPROL-XL) 100 MG 24 hr tablet Take 1 tablet by mouth Every Night. 3/6/19   Kristine Burton APRN   Multiple Vitamins-Minerals (MULTIVITAMIN AND MINERALS) liquid liquid Take 15 mL by mouth Daily. 10/17/18   Peter Calabrese MD   ondansetron ODT (ZOFRAN-ODT) 4 MG disintegrating tablet Take 1-2 tablets by mouth Every 8 (Eight) Hours As Needed for Nausea. 7/22/19   Kory Jones DO   pantoprazole (PROTONIX) 40 MG EC tablet Take 1 tablet by mouth 2 (Two) Times a Day. 6/18/19   Kory Jones DO   promethazine (PHENERGAN) 25 MG tablet TAKE 1 TABLET BY MOUTH EVERY 6 HOURS AS NEEDED FOR NAUSEA OR VOMITING. 9/6/19   Kory Jones DO   venlafaxine XR (EFFEXOR-XR) 150 MG 24 hr capsule TAKE 1 CAPSULE BY MOUTH DAILY. 8/16/19   Kory Jones DO     Objective     Vital Signs: /81   Pulse 80   Temp 98.7 °F (37.1 °C)   Resp 16   Ht 171.5 cm (67.5\")   Wt 49 kg (108 lb)   LMP  (LMP Unknown)   SpO2 98%   BMI 16.67 kg/m²   Physical Exam   Constitutional:   Cachectic    HENT:   Head: Normocephalic and atraumatic.   Nose: Nose normal.   Mouth/Throat: Oropharynx is clear and moist.   Eyes: Conjunctivae and EOM are normal.   Neck: Normal range of motion. Neck supple.   Cardiovascular: Normal rate, regular rhythm and normal heart sounds.   Pulmonary/Chest: Effort normal and breath sounds normal.   Abdominal: Soft. Bowel sounds are normal. She exhibits no distension.   Musculoskeletal: She exhibits no edema or tenderness.   Neurological: She is alert. No cranial nerve deficit.   Skin: Skin is warm and dry.   Several tattoos   Psychiatric: She has a normal mood and affect.   Vitals reviewed.          Results Reviewed:  Lab " Results (last 24 hours)     Procedure Component Value Units Date/Time    Urinalysis, Microscopic Only - Urine, Clean Catch [[id number]]  (Abnormal) Collected:  09/15/19 1837    Specimen:  Urine, Clean Catch Updated:  09/15/19 1917     RBC, UA 3-5 /HPF      WBC, UA 21-30 /HPF      Bacteria, UA 2+ /HPF      Squamous Epithelial Cells, UA 3-6 /HPF      Yeast, UA Small/1+ Budding Yeast /HPF      Hyaline Casts, UA None Seen /LPF      Methodology Manual Light Microscopy    Urine Culture - Urine, Urine, Clean Catch [[id number]] Collected:  09/15/19 1837    Specimen:  Urine, Clean Catch Updated:  09/15/19 1917    Urinalysis With Culture If Indicated - Urine, Clean Catch [[id number]]  (Abnormal) Collected:  09/15/19 1837    Specimen:  Urine, Clean Catch Updated:  09/15/19 1902     Color, UA Yellow     Appearance, UA Turbid     pH, UA 5.5     Specific Gravity, UA 1.019     Glucose, UA Negative     Ketones, UA 15 mg/dL (1+)     Bilirubin, UA Negative     Blood, UA Small (1+)     Protein, UA 30 mg/dL (1+)     Leuk Esterase, UA Small (1+)     Nitrite, UA Negative     Urobilinogen, UA 0.2 E.U./dL    Manual Differential [[id number]]  (Abnormal) Collected:  09/15/19 1700    Specimen:  Blood Updated:  09/15/19 1756     Neutrophil % 59.0 %      Lymphocyte % 34.0 %      Monocyte % 5.0 %      Atypical Lymphocyte % 2.0 %      Neutrophils Absolute 6.80 10*3/mm3      Lymphocytes Absolute 3.92 10*3/mm3      Monocytes Absolute 0.58 10*3/mm3      Crenated RBC's Mod/2+     Poikilocytes Large/3+     Smudge Cells Slight/1+     Platelet Morphology Normal    Comprehensive Metabolic Panel [[id number]]  (Abnormal) Collected:  09/15/19 1719    Specimen:  Blood Updated:  09/15/19 1741     Glucose 105 mg/dL      BUN 31 mg/dL      Creatinine 2.41 mg/dL      Sodium 143 mmol/L      Potassium 3.6 mmol/L      Chloride 105 mmol/L      CO2 17.0 mmol/L      Calcium 9.4 mg/dL      Total Protein 7.5 g/dL      Albumin 4.70 g/dL      ALT (SGPT) 9 U/L      AST (SGOT) 12  U/L      Alkaline Phosphatase 85 U/L      Total Bilirubin 0.2 mg/dL      eGFR Non African Amer 21 mL/min/1.73      Globulin 2.8 gm/dL      A/G Ratio 1.7 g/dL      BUN/Creatinine Ratio 12.9     Anion Gap 21.0 mmol/L     Narrative:       GFR Normal >60  Chronic Kidney Disease <60  Kidney Failure <15    CBC & Differential [[id number]] Collected:  09/15/19 1700    Specimen:  Blood Updated:  09/15/19 1723    Narrative:       The following orders were created for panel order CBC & Differential.  Procedure                               Abnormality         Status                     ---------                               -----------         ------                     CBC Auto Differential[[id number]]        Abnormal            Final result                 Please view results for these tests on the individual orders.    CBC Auto Differential [[id number]]  (Abnormal) Collected:  09/15/19 1700    Specimen:  Blood Updated:  09/15/19 1723     WBC 11.53 10*3/mm3      RBC 5.13 10*6/mm3      Hemoglobin 15.3 g/dL      Hematocrit 44.1 %      MCV 86.0 fL      MCH 29.8 pg      MCHC 34.7 g/dL      RDW 12.4 %      RDW-SD 38.9 fl      MPV 11.2 fL      Platelets 290 10*3/mm3         Imaging Results (last 24 hours)     Procedure Component Value Units Date/Time    CT Abdomen Pelvis Without Contrast [[id number]] Collected:  09/15/19 1811     Updated:  09/15/19 1825    Narrative:       EXAMINATION: CT ABDOMEN PELVIS WO CONTRAST-  9/15/2019 6:11 PM CDT     HISTORY: Abdominal pain     COMPARISON:01/31/2019 abdomen and pelvis CT     TECHNIQUE:  Radiation dose equals  mGy-cm.  Automated exposure  control dose reduction technique was implemented.     Thin section axial imaging was obtained. 2-D sagittal and coronal  reconstruction images were generated.     Intravenous contrast was not administered.     Oral contrast was not ingested.     FINDINGS:     The lung bases are clear without acute infiltrates.     Pectus excavatum anterior chest wall  deformity noted inferiorly.     The gallbladder surgically absent. There is no biliary ductal  dilatation.     The spleen is not enlarged.     There are no adrenal masses.     There are no focal pancreatic abnormalities observed.     There are small nonobstructing centrally located renal calculi. There is  no pelvocaliectasis hydroureter, ureterolithiasis or obstructive  uropathy changes. Urinary bladder is decompressed without focal bladder  wall abnormality.     Hysterectomy changes observed. There are no adnexal masses.     There is stool and gas identified in the colon without dilatation. The  appendix is surgically absent. There is no CT evidence of significant  diverticular disease. Bowel surgery changes noted in the right lower  quadrant.     The small bowel is not dilated.     The duodenal sweep imaged appropriately.     The stomach is not distended.     There is no ascites or pneumoperitoneum.     There are no pathologically enlarged lymph nodes.     There is mild atherosclerotic aortoiliofemoral calcifications.     There are no acute osseous abnormalities observed.       Impression:       1. No CT evidence of acute intra-abdominal/pelvic pathological process.  2. Nonobstructing bilateral renal calculi.  3. Postsurgical changes.  This report was finalized on 09/15/2019 18:22 by Dr. Lior Ludwig MD.        I have personally reviewed and interpreted the radiology studies and ECG obtained at time of admission.     Assessment / Plan     Assessment:   Active Hospital Problems    Diagnosis   • KRISHNA (acute kidney injury) (CMS/[facility])     Impression:  1. KRISHNA/Dehydration  2. Chronic abdominal issues  3. Hypertension  4. UTI  5. Metabolic acidosis  6. Anorexia       Plan:   1.  IV fluids half-normal saline  2.  Consult GI  3.  Add Carafate  4.  IV Pepcid  5.  N.p.o. after midnight  6.  IV blood pressure control once n.p.o.  7.  Rocephin  8.  Labs in the morning    Code Status: Full     I discussed the patient's findings  and my recommendations with the patient and family at bedside    Estimated length of stay 2-3 days      Marisela Hanley DO   09/15/19   8:43 PM

## 2019-09-17 LAB
ALBUMIN SERPL-MCNC: 3.6 G/DL (ref 3.5–5.2)
ALBUMIN/GLOB SERPL: 1.7 G/DL
ALP SERPL-CCNC: 81 U/L (ref 39–117)
ALT SERPL W P-5'-P-CCNC: 8 U/L (ref 1–33)
ANION GAP SERPL CALCULATED.3IONS-SCNC: 8 MMOL/L (ref 5–15)
AST SERPL-CCNC: 15 U/L (ref 1–32)
BASOPHILS # BLD AUTO: 0.03 10*3/MM3 (ref 0–0.2)
BASOPHILS NFR BLD AUTO: 0.4 % (ref 0–1.5)
BILIRUB SERPL-MCNC: <0.2 MG/DL (ref 0.2–1.2)
BUN BLD-MCNC: 9 MG/DL (ref 6–20)
BUN/CREAT SERPL: 11.7 (ref 7–25)
CALCIUM SPEC-SCNC: 8.5 MG/DL (ref 8.6–10.5)
CHLORIDE SERPL-SCNC: 113 MMOL/L (ref 98–107)
CO2 SERPL-SCNC: 23 MMOL/L (ref 22–29)
CREAT BLD-MCNC: 0.77 MG/DL (ref 0.57–1)
DEPRECATED RDW RBC AUTO: 41 FL (ref 37–54)
EOSINOPHIL # BLD AUTO: 0.09 10*3/MM3 (ref 0–0.4)
EOSINOPHIL NFR BLD AUTO: 1.2 % (ref 0.3–6.2)
ERYTHROCYTE [DISTWIDTH] IN BLOOD BY AUTOMATED COUNT: 12.8 % (ref 12.3–15.4)
GFR SERPL CREATININE-BSD FRML MDRD: 80 ML/MIN/1.73
GLOBULIN UR ELPH-MCNC: 2.1 GM/DL
GLUCOSE BLD-MCNC: 98 MG/DL (ref 65–99)
HCT VFR BLD AUTO: 37.2 % (ref 34–46.6)
HGB BLD-MCNC: 12.6 G/DL (ref 12–15.9)
LYMPHOCYTES # BLD AUTO: 4.25 10*3/MM3 (ref 0.7–3.1)
LYMPHOCYTES NFR BLD AUTO: 56 % (ref 19.6–45.3)
MAGNESIUM SERPL-MCNC: 1.5 MG/DL (ref 1.6–2.6)
MCH RBC QN AUTO: 29.9 PG (ref 26.6–33)
MCHC RBC AUTO-ENTMCNC: 33.9 G/DL (ref 31.5–35.7)
MCV RBC AUTO: 88.2 FL (ref 79–97)
MONOCYTES # BLD AUTO: 0.46 10*3/MM3 (ref 0.1–0.9)
MONOCYTES NFR BLD AUTO: 6.1 % (ref 5–12)
NEUTROPHILS # BLD AUTO: 2.73 10*3/MM3 (ref 1.7–7)
NEUTROPHILS NFR BLD AUTO: 35.9 % (ref 42.7–76)
PLATELET # BLD AUTO: 189 10*3/MM3 (ref 140–450)
PMV BLD AUTO: 12.1 FL (ref 6–12)
POTASSIUM BLD-SCNC: 3.3 MMOL/L (ref 3.5–5.2)
PROT SERPL-MCNC: 5.7 G/DL (ref 6–8.5)
RBC # BLD AUTO: 4.22 10*6/MM3 (ref 3.77–5.28)
SODIUM BLD-SCNC: 144 MMOL/L (ref 136–145)
WBC NRBC COR # BLD: 7.59 10*3/MM3 (ref 3.4–10.8)

## 2019-09-17 PROCEDURE — 25010000002 THIAMINE PER 100 MG: Performed by: FAMILY MEDICINE

## 2019-09-17 PROCEDURE — 25010000002 MAGNESIUM SULFATE 2 GM/50ML SOLUTION: Performed by: FAMILY MEDICINE

## 2019-09-17 PROCEDURE — 94799 UNLISTED PULMONARY SVC/PX: CPT

## 2019-09-17 PROCEDURE — 83735 ASSAY OF MAGNESIUM: CPT | Performed by: FAMILY MEDICINE

## 2019-09-17 PROCEDURE — 80053 COMPREHEN METABOLIC PANEL: CPT | Performed by: FAMILY MEDICINE

## 2019-09-17 PROCEDURE — 85025 COMPLETE CBC W/AUTO DIFF WBC: CPT | Performed by: FAMILY MEDICINE

## 2019-09-17 PROCEDURE — 25010000002 PROMETHAZINE PER 50 MG: Performed by: INTERNAL MEDICINE

## 2019-09-17 PROCEDURE — 25010000002 CEFTRIAXONE PER 250 MG: Performed by: INTERNAL MEDICINE

## 2019-09-17 PROCEDURE — 25010000002 HYDROMORPHONE PER 4 MG: Performed by: INTERNAL MEDICINE

## 2019-09-17 PROCEDURE — 25010000002 POTASSIUM CHLORIDE PER 2 MEQ OF POTASSIUM: Performed by: FAMILY MEDICINE

## 2019-09-17 PROCEDURE — 94760 N-INVAS EAR/PLS OXIMETRY 1: CPT

## 2019-09-17 RX ORDER — MAGNESIUM SULFATE HEPTAHYDRATE 40 MG/ML
2 INJECTION, SOLUTION INTRAVENOUS ONCE
Status: COMPLETED | OUTPATIENT
Start: 2019-09-17 | End: 2019-09-17

## 2019-09-17 RX ORDER — POTASSIUM CHLORIDE 750 MG/1
40 CAPSULE, EXTENDED RELEASE ORAL 2 TIMES DAILY
Status: COMPLETED | OUTPATIENT
Start: 2019-09-17 | End: 2019-09-17

## 2019-09-17 RX ORDER — ASPIRIN 81 MG/1
81 TABLET ORAL DAILY
COMMUNITY

## 2019-09-17 RX ADMIN — LEVETIRACETAM 500 MG: 500 TABLET, FILM COATED ORAL at 09:27

## 2019-09-17 RX ADMIN — AMLODIPINE BESYLATE 10 MG: 10 TABLET ORAL at 09:26

## 2019-09-17 RX ADMIN — HYDROMORPHONE HYDROCHLORIDE 0.5 MG: 1 INJECTION, SOLUTION INTRAMUSCULAR; INTRAVENOUS; SUBCUTANEOUS at 00:56

## 2019-09-17 RX ADMIN — FAMOTIDINE 20 MG: 10 INJECTION, SOLUTION INTRAVENOUS at 09:26

## 2019-09-17 RX ADMIN — CLONIDINE HYDROCHLORIDE 0.1 MG: 0.1 TABLET ORAL at 13:53

## 2019-09-17 RX ADMIN — SUCRALFATE 1 G: 1 SUSPENSION ORAL at 18:16

## 2019-09-17 RX ADMIN — PROMETHAZINE HYDROCHLORIDE 25 MG: 25 INJECTION INTRAMUSCULAR; INTRAVENOUS at 13:57

## 2019-09-17 RX ADMIN — POTASSIUM CHLORIDE 40 MEQ: 750 CAPSULE, EXTENDED RELEASE ORAL at 15:10

## 2019-09-17 RX ADMIN — POTASSIUM CHLORIDE 40 MEQ: 750 CAPSULE, EXTENDED RELEASE ORAL at 21:35

## 2019-09-17 RX ADMIN — MAGNESIUM SULFATE HEPTAHYDRATE 2 G: 40 INJECTION, SOLUTION INTRAVENOUS at 15:10

## 2019-09-17 RX ADMIN — HYDROMORPHONE HYDROCHLORIDE 0.5 MG: 1 INJECTION, SOLUTION INTRAMUSCULAR; INTRAVENOUS; SUBCUTANEOUS at 05:47

## 2019-09-17 RX ADMIN — Medication 1 TABLET: at 09:26

## 2019-09-17 RX ADMIN — BUSPIRONE HYDROCHLORIDE 7.5 MG: 5 TABLET ORAL at 09:27

## 2019-09-17 RX ADMIN — BUSPIRONE HYDROCHLORIDE 7.5 MG: 5 TABLET ORAL at 21:35

## 2019-09-17 RX ADMIN — ROSUVASTATIN CALCIUM 10 MG: 10 TABLET, FILM COATED ORAL at 21:35

## 2019-09-17 RX ADMIN — BUSPIRONE HYDROCHLORIDE 7.5 MG: 5 TABLET ORAL at 15:10

## 2019-09-17 RX ADMIN — VENLAFAXINE HYDROCHLORIDE 150 MG: 75 CAPSULE, EXTENDED RELEASE ORAL at 09:26

## 2019-09-17 RX ADMIN — SUCRALFATE 1 G: 1 SUSPENSION ORAL at 05:45

## 2019-09-17 RX ADMIN — CLONIDINE HYDROCHLORIDE 0.1 MG: 0.1 TABLET ORAL at 18:16

## 2019-09-17 RX ADMIN — FOLIC ACID 100 ML/HR: 5 INJECTION, SOLUTION INTRAMUSCULAR; INTRAVENOUS; SUBCUTANEOUS at 10:54

## 2019-09-17 RX ADMIN — HYDROMORPHONE HYDROCHLORIDE 0.5 MG: 1 INJECTION, SOLUTION INTRAMUSCULAR; INTRAVENOUS; SUBCUTANEOUS at 09:54

## 2019-09-17 RX ADMIN — LEVETIRACETAM 500 MG: 500 TABLET, FILM COATED ORAL at 21:35

## 2019-09-17 RX ADMIN — FAMOTIDINE 20 MG: 10 INJECTION, SOLUTION INTRAVENOUS at 21:34

## 2019-09-17 RX ADMIN — CEFTRIAXONE 1 G: 1 INJECTION, POWDER, FOR SOLUTION INTRAMUSCULAR; INTRAVENOUS at 21:34

## 2019-09-17 RX ADMIN — SODIUM CHLORIDE 100 ML/HR: 9 INJECTION, SOLUTION INTRAVENOUS at 05:42

## 2019-09-17 RX ADMIN — PROMETHAZINE HYDROCHLORIDE 25 MG: 25 INJECTION INTRAMUSCULAR; INTRAVENOUS at 05:48

## 2019-09-17 RX ADMIN — CLONIDINE HYDROCHLORIDE 0.1 MG: 0.1 TABLET ORAL at 05:45

## 2019-09-17 RX ADMIN — HYDROMORPHONE HYDROCHLORIDE 0.5 MG: 1 INJECTION, SOLUTION INTRAMUSCULAR; INTRAVENOUS; SUBCUTANEOUS at 13:57

## 2019-09-17 RX ADMIN — METOPROLOL SUCCINATE 100 MG: 100 TABLET, FILM COATED, EXTENDED RELEASE ORAL at 21:35

## 2019-09-17 RX ADMIN — HYDROMORPHONE HYDROCHLORIDE 0.5 MG: 1 INJECTION, SOLUTION INTRAMUSCULAR; INTRAVENOUS; SUBCUTANEOUS at 18:18

## 2019-09-17 RX ADMIN — SUCRALFATE 1 G: 1 SUSPENSION ORAL at 13:53

## 2019-09-17 RX ADMIN — SUCRALFATE 1 G: 1 SUSPENSION ORAL at 21:35

## 2019-09-17 RX ADMIN — SODIUM CHLORIDE, PRESERVATIVE FREE 10 ML: 5 INJECTION INTRAVENOUS at 09:32

## 2019-09-17 NOTE — PROGRESS NOTES
[facility]  INPATIENT PROGRESS NOTE    Length of Stay: 2  Date of Admission: 9/15/2019  Primary Care Physician: Kory Jones DO    Subjective   Chief Complaint: Acute kidney failure.  Failure to thrive.  Dysphasia.  UTI.    HPI   Patient denies any chest pain or shortness of breath.  EGD the results discussed with patient.  Plan to replace magnesium and potassium today.    Review of Systems   Constitutional: Positive for activity change, appetite change and fatigue. Negative for chills and fever.   HENT: Negative for hearing loss, nosebleeds, tinnitus and trouble swallowing.    Eyes: Negative for visual disturbance.   Respiratory: Negative for cough, chest tightness, shortness of breath and wheezing.    Cardiovascular: Negative for chest pain, palpitations and leg swelling.   Gastrointestinal: Negative for abdominal distention, abdominal pain, blood in stool, constipation, diarrhea, nausea and vomiting.   Endocrine: Negative for cold intolerance, heat intolerance, polydipsia, polyphagia and polyuria.   Genitourinary: Negative for decreased urine volume, difficulty urinating, dysuria, flank pain, frequency and hematuria.   Musculoskeletal: Positive for gait problem and myalgias. Negative for arthralgias and joint swelling.   Skin: Negative for rash.   Allergic/Immunologic: Negative for immunocompromised state.   Neurological: Positive for weakness. Negative for dizziness, syncope, light-headedness and headaches.   Hematological: Negative for adenopathy. Does not bruise/bleed easily.   Psychiatric/Behavioral: Negative for confusion and sleep disturbance. The patient is not nervous/anxious.           All pertinent negatives and positives are as above. All other systems have been reviewed and are negative unless otherwise stated.     Objective    Temp:  [96.7 °F (35.9 °C)-98.6 °F (37 °C)] 98.6 °F (37 °C)  Heart Rate:  [51-70] 70  Resp:  [12-18] 16  BP:  ()/(41-82) 101/75    Intake/Output Summary (Last 24 hours) at 9/17/2019 1053  Last data filed at 9/17/2019 0300  Gross per 24 hour   Intake 620 ml   Output 500 ml   Net 120 ml     Physical Exam   Constitutional: She is oriented to person, place, and time. She appears well-developed.   HENT:   Head: Normocephalic and atraumatic.   Eyes: Conjunctivae are normal. Pupils are equal, round, and reactive to light.   Neck: Neck supple. No JVD present. No thyromegaly present.   Cardiovascular: Normal rate, regular rhythm, normal heart sounds and intact distal pulses. Exam reveals no gallop and no friction rub.   No murmur heard.  Pulmonary/Chest: Effort normal and breath sounds normal. No respiratory distress. She has no wheezes. She has no rales. She exhibits no tenderness.   Decrease in breath sound bilateral, clear.   Abdominal: Soft. Bowel sounds are normal. She exhibits no distension. There is no tenderness. There is no rebound and no guarding.   Musculoskeletal: Normal range of motion. She exhibits no edema, tenderness or deformity.   Lymphadenopathy:     She has no cervical adenopathy.   Neurological: She is alert and oriented to person, place, and time. She displays normal reflexes. No cranial nerve deficit. She exhibits abnormal muscle tone. Coordination abnormal.   Skin: Skin is warm and dry. Capillary refill takes 2 to 3 seconds. No rash noted.   Psychiatric: She has a normal mood and affect. Her behavior is normal. Judgment and thought content normal.   Nursing note and vitals reviewed.      Results Review:  Lab Results (last 24 hours)     Procedure Component Value Units Date/Time    Comprehensive Metabolic Panel [[id number]]  (Abnormal) Collected:  09/17/19 0608    Specimen:  Blood Updated:  09/17/19 0719     Glucose 98 mg/dL      BUN 9 mg/dL      Creatinine 0.77 mg/dL      Sodium 144 mmol/L      Potassium 3.3 mmol/L      Chloride 113 mmol/L      CO2 23.0 mmol/L      Calcium 8.5 mg/dL      Total Protein 5.7  g/dL      Albumin 3.60 g/dL      ALT (SGPT) 8 U/L      AST (SGOT) 15 U/L      Comment: Specimen hemolyzed.  Results may be affected.        Alkaline Phosphatase 81 U/L      Total Bilirubin <0.2 mg/dL      eGFR Non African Amer 80 mL/min/1.73      Globulin 2.1 gm/dL      A/G Ratio 1.7 g/dL      BUN/Creatinine Ratio 11.7     Anion Gap 8.0 mmol/L     Narrative:       GFR Normal >60  Chronic Kidney Disease <60  Kidney Failure <15    CBC & Differential [[id number]] Collected:  09/17/19 0608    Specimen:  Blood Updated:  09/17/19 0652    Narrative:       The following orders were created for panel order CBC & Differential.  Procedure                               Abnormality         Status                     ---------                               -----------         ------                     CBC Auto Differential[[id number]]        Abnormal            Final result                 Please view results for these tests on the individual orders.    CBC Auto Differential [[id number]]  (Abnormal) Collected:  09/17/19 0608    Specimen:  Blood Updated:  09/17/19 0652     WBC 7.59 10*3/mm3      RBC 4.22 10*6/mm3      Hemoglobin 12.6 g/dL      Hematocrit 37.2 %      MCV 88.2 fL      MCH 29.9 pg      MCHC 33.9 g/dL      RDW 12.8 %      RDW-SD 41.0 fl      MPV 12.1 fL      Platelets 189 10*3/mm3      Neutrophil % 35.9 %      Lymphocyte % 56.0 %      Monocyte % 6.1 %      Eosinophil % 1.2 %      Basophil % 0.4 %      Neutrophils, Absolute 2.73 10*3/mm3      Lymphocytes, Absolute 4.25 10*3/mm3      Monocytes, Absolute 0.46 10*3/mm3      Eosinophils, Absolute 0.09 10*3/mm3      Basophils, Absolute 0.03 10*3/mm3     Urine Culture - Urine, Urine, Clean Catch [[id number]]  (Normal) Collected:  09/15/19 1837    Specimen:  Urine, Clean Catch Updated:  09/16/19 1849     Urine Culture No growth    Tissue Pathology Exam [[id number]] Collected:  09/16/19 1508    Specimen:  Tissue from Small Intestine; Tissue from Gastric, Antrum; Tissue from  Gastric, Body; Tissue from Gastric, Fundus Updated:  09/16/19 1542           Cultures:  Urine Culture   Date Value Ref Range Status   09/15/2019 No growth  Final       Radiology Data:    Imaging Results (last 24 hours)     ** No results found for the last 24 hours. **          Allergies   Allergen Reactions   • Anectine [Succinylcholine Chloride] Other (See Comments) and Rash     Hard to wake up   • Stadol [Butorphanol] Hives   • Butorphanol Tartrate Rash     Pt states she does not recall being allergic       Scheduled meds:     amLODIPine 10 mg Oral Daily   busPIRone 7.5 mg Oral TID   ceftriaxone 1 g Intravenous Q24H   CloNIDine 0.1 mg Oral Q6H   famotidine 20 mg Intravenous Q12H   levETIRAcetam 500 mg Oral BID   megestrol 40 mg Oral Daily   metoprolol succinate  mg Oral Nightly   IV Fluids 1000 mL + additives 100 mL/hr Intravenous Daily   multivitamin w/minerals 1 tablet Oral Daily   rosuvastatin 10 mg Oral Nightly   sodium chloride 10 mL Intravenous Q12H   sucralfate 1 g Oral 4x Daily AC & at Bedtime   venlafaxine  mg Oral Daily       PRN meds:  enalaprilat  •  hydrALAZINE  •  HYDROmorphone  •  ondansetron  •  promethazine  •  sodium chloride    Assessment/Plan       Moderate protein-calorie malnutrition (CMS/HCC)    Acute UTI (urinary tract infection)    KRISHNA (acute kidney injury) (CMS/HCC)    HTN (hypertension)    Abdominal pain    Failure to thrive in adult      Plan:  Failure to thrive.     Abdomen pain.  Chronic ongoing issue abdomen pain.  Followed by Dr. Hanson outpatient.  GI consult.  CT scan abdomen pelvic-no CT evidence of acute intra-abdominal/pelvic pathological process, nonobstructing bilateral renal calculi, postsurgical changes.   EGD-normal esophagus, non-erosive gastritis-biopsied, normal examined duodenum-biopsied.  GI sign off.    Hypokalemia- po potassium.    UTI.  Continue Rocephin.      Acute renal failure.    Resolved. Continue IV fluid.     Reflux.  Carafate.  Pepcid.   Zofran.     History of bowel obstruction.  Last bowel movement 3 days ago.     Hyperlipidemia.  Start Crestor.     Hypertension.  Continue Norvasc.  Continue Catapres.  Continue Toprol.     Seizure.  Continue Keppra.     Anxiety/depression.  Continue Effexor.  Continue BuSpar.     Weight loss.  Lost 20 pounds in 2 months.       Nutrition.  Anorexia.  Nutrition consult.  Continue Megace.. Banana bag.     Deconditioning.  PT and OT consult.    Urine culture shows no growth.     Discharge Plannin-2 days.    Gilberto Mcguire MD   19   10:53 AM

## 2019-09-17 NOTE — PLAN OF CARE
Problem: Patient Care Overview  Goal: Plan of Care Review  Outcome: Ongoing (interventions implemented as appropriate)   09/17/19 0335   Coping/Psychosocial   Plan of Care Reviewed With patient   Plan of Care Review   Progress no change   OTHER   Outcome Summary Pt able to eat lasagna for dinner tonight, tolrated well. Denies nausea. Co pain, rests between care, prn Dilaudid when awake.        Problem: Pain, Chronic (Adult)  Goal: Acceptable Pain/Comfort Level and Functional Ability  Outcome: Ongoing (interventions implemented as appropriate)      Problem: Nausea/Vomiting (Adult)  Goal: Symptom Relief  Outcome: Ongoing (interventions implemented as appropriate)    Goal: Adequate Hydration  Outcome: Ongoing (interventions implemented as appropriate)      Problem: Renal Failure/Kidney Injury, Acute (Adult)  Goal: Signs and Symptoms of Listed Potential Problems Will be Absent, Minimized or Managed (Renal Failure/Kidney Injury, Acute)  Outcome: Ongoing (interventions implemented as appropriate)

## 2019-09-17 NOTE — PROGRESS NOTES
Adult Nutrition  Assessment/PES    Patient Name:  Kamryn Oliva  YOB: 1970  MRN: [id number]  Admit Date:  9/15/2019    Assessment Date:  9/17/2019    Comments:  Pt currently receiving banana bag of fluids, Lactated Ringers ordered, as well as Na Cl- infusion. Pt currently on clear liquid diet. If unable to advance diet due to GI intolerance may benefit from peripheral parenteral nutrition to provide additional calories and protein if clinically appropriate.     Reason for Assessment     Row Name 09/17/19 1423          Reason for Assessment    Reason For Assessment  other (see comments) MSA assessment         Nutrition/Diet History     Row Name 09/17/19 1423          Nutrition/Diet History    Typical Food/Fluid Intake  RDN visit with pt; pt reports she has had a metallic taste in her mouth, and difficulty swallowing recenlty. also reports sore throat.     Food Preferences  no specific     Meal/Snack Patterns  agreeable to oral supplment     Typical Activity Patterns  sedentary     Functional Status  ambulatory;able to purchase food;able to prepare meals     Factors Affecting Nutritional Intake  difficulty/impaired swallowing;anorexia;taste altered         Anthropometrics     Row Name 09/17/19 1438          Ideal Body Weight (IBW)    % of Ideal Body Weight Assessment  less than 70%: severe deficit        Usual Body Weight (UBW)    Usual Body Weight  -- 120 lbs 4 yrs ago per pt. (avg of last 8 wt's per weight report 111 lbs )     Weight Loss  unintentional     Weight Loss Time Frame  6 months        Body Mass Index (BMI)    BMI Assessment  BMI 17-18.4: protein-energy malnutrition grade I           Physical Findings     Row Name 09/17/19 1445          Physical Findings    Overall Physical Appearance  generalized wasting;loss of subcutaneous fat;loss of muscle mass           Malnutrition Severity Assessment     Row Name 09/17/19 1446          Malnutrition Severity Assessment    Malnutrition Type  Chronic  Disease - Related Malnutrition        Insufficient Energy Intake     Insufficient Energy Intake   <75% of est. energy requirement for > or equal to 1 month        Unintentional Weight Loss     Unintentional Weight Loss   -- wt loss of 6.6% over the past 6 months; 5.8% over the past 3 months        Muscle Loss    Loss of Muscle Mass Findings  Severe     [place] Region  Moderate - slight depression     Clavicle Bone Region  Severe - protruding prominent bone     Acromion Bone Region  Moderate - acromion may slightly protrude     Scapular Bone Region  Severe - prominent bones, depressions easily visible between ribs, scapula, spine, shoulders     Dorsal Hand Region  Moderate - slight depression     Patellar Region  Severe - prominent bone, square looking, very little muscle definition     Anterior Thigh Region  Severe - line/depression along thigh, obviously thin     Posterior Calf Region  Severe - thin with very little definition/firmness        Fat Loss    Subcutaneous Fat Loss Findings  Moderate     Orbital Region   Moderate -  somewhat hollowness, slightly dark circles     Upper Arm Region  Moderate - some fat tissue, not ample loose skin appearance     Thoracic & Lumbar Region  Moderate - ribs visible with mild depressions, iliac crest somewhat prominent        Criteria Met (Must meet criteria for severity in at least 2 of these categories: M Wasting, Fat Loss, Fluid, Secondary Signs, Wt. Status, Intake)    Patient meets criteria for   Severe Malnutrition generalized weakness,poor appetite,fatigue         Problem/Interventions:  Problem 1     Row Name 09/17/19 1506          Nutrition Diagnoses Problem 1    Problem 1  Malnutrition     Etiology (related to)  Medical Diagnosis     Gastrointestinal  Nausea;Vomiting;Diarrhea;Other (comment) (hx of IBS); chronic abdominal pain     Infectious Disease  UTI     Metabolic/ICU  Metabolic acidosis;Hypertriglyceridemia;Hypercholesterolemia     Signs/Symptoms (evidenced by)   BMI;Biochemical;NPO;Report of Mnimal PO Intake;Unintended Weight Change     BMI  17 - 17.9     Unintended Weight Change  Loss     Number of Pounds Lost  6.6% over past 6 months per wt reports; 5.8% wt loss over past 3 months per wt report     Weight loss time period  3-6 months     Labs Reviewed  Done     Specific Labs Noted  Cholesterol;Triglyceride;Phosphorus;GFR;BUN;Creatinine           Intervention Goal     Row Name 09/17/19 1507          Intervention Goal    General  Improved nutrition related lab(s);Disease management/therapy;Meet nutritional needs for age/condition;Reduce/improve symptoms     PO  Tolerate PO;Increase intake;Advance diet;Meet estimated needs     Weight  Appropriate weight gain         Nutrition Intervention     Row Name 09/17/19 1507          Nutrition Intervention    RD/Tech Action  Interview for preference;Encourage intake;Follow Tx progress;Care plan reviewd;Recommend/ordered     Recommended/Ordered  Supplement         Nutrition Prescription     Row Name 09/17/19 1507          Nutrition Prescription PO    PO Prescription  Begin/change supplement     Supplement  Boost Breeze     Supplement Frequency  3 times a day         Education/Evaluation     Row Name 09/17/19 1507          Education    Education  No discharge needs identified at this time        Monitor/Evaluation    Monitor  Per protocol         Electronically signed by:  Yasmin Potter MS,RDN,LD  09/17/19 3:09 PM

## 2019-09-17 NOTE — PLAN OF CARE
Problem: Patient Care Overview  Goal: Plan of Care Review  Outcome: Ongoing (interventions implemented as appropriate)   09/17/19 1320   Coping/Psychosocial   Plan of Care Reviewed With patient;other (see comments)  (daughter in law)   Plan of Care Review   Progress no change   OTHER   Outcome Summary PT eval attempted. Pt and daughter in law adamently refused to be seen by therapy. PT will sign off orders, reconsult if status changes.

## 2019-09-17 NOTE — PLAN OF CARE
Problem: Patient Care Overview  Goal: Plan of Care Review  Outcome: Outcome(s) achieved Date Met: 09/17/19 09/17/19 6390   Coping/Psychosocial   Plan of Care Reviewed With patient;family   Plan of Care Review   Progress no change   OTHER   Outcome Summary OT eval attempted. Pt and dtr-in-law refuse therapy services at this time, stating she does not want or need therapy. OT will sign off, reconsult if pt status changes.

## 2019-09-17 NOTE — PROGRESS NOTES
Malnutrition Severity Assessment    Patient Name:  Kamryn Oliva  YOB: 1970  MRN: [id number]  Admit Date:  9/15/2019    Patient meets criteria for : Severe Malnutrition(generalized weakness,poor appetite,fatigue)    Comments: Please attest if in agreement.    Malnutrition Severity Assessment  Malnutrition Type: Chronic Disease - Related Malnutrition     Malnutrition Type (last 8 hours)      Malnutrition Severity Assessment     Row Name 09/17/19 1446       Malnutrition Severity Assessment    Malnutrition Type  Chronic Disease - Related Malnutrition    Row Name 09/17/19 1446       Insufficient Energy Intake     Insufficient Energy Intake   <75% of est. energy requirement for > or equal to 1 month    Row Name 09/17/19 1446       Unintentional Weight Loss     Unintentional Weight Loss   -- wt loss of 6.6% over the past 6 months; 5.8% over the past 3 months    Row Name 09/17/19 1446       Muscle Loss    Loss of Muscle Mass Findings  Severe    [place] Region  Moderate - slight depression    Clavicle Bone Region  Severe - protruding prominent bone    Acromion Bone Region  Moderate - acromion may slightly protrude    Scapular Bone Region  Severe - prominent bones, depressions easily visible between ribs, scapula, spine, shoulders    Dorsal Hand Region  Moderate - slight depression    Patellar Region  Severe - prominent bone, square looking, very little muscle definition    Anterior Thigh Region  Severe - line/depression along thigh, obviously thin    Posterior Calf Region  Severe - thin with very little definition/firmness    Row Name 09/17/19 1446       Fat Loss    Subcutaneous Fat Loss Findings  Moderate    Orbital Region   Moderate -  somewhat hollowness, slightly dark circles    Upper Arm Region  Moderate - some fat tissue, not ample loose skin appearance    Thoracic & Lumbar Region  Moderate - ribs visible with mild depressions, iliac crest somewhat prominent    Row Name 09/17/19 1446       Criteria Met  (Must meet criteria for severity in at least 2 of these categories: M Wasting, Fat Loss, Fluid, Secondary Signs, Wt. Status, Intake)    Patient meets criteria for   Severe Malnutrition generalized weakness,poor appetite,fatigue      Electronically signed by:  Yasmin Biggs RDN, LD  09/17/19 3:08 PM

## 2019-09-17 NOTE — PLAN OF CARE
Problem: Patient Care Overview  Goal: Plan of Care Review  Outcome: Ongoing (interventions implemented as appropriate)   09/16/19 1800   Coping/Psychosocial   Plan of Care Reviewed With patient   Plan of Care Review   Progress no change   OTHER   Outcome Summary Patient contiues to c/o back/abdominal pain, medicated per orders with relief. Had endo completed today with no findings reported. Nauseous with prn medications given. BP continues to run lower       Problem: Pain, Chronic (Adult)  Goal: Identify Related Risk Factors and Signs and Symptoms  Outcome: Outcome(s) achieved Date Met: 09/16/19    Goal: Acceptable Pain/Comfort Level and Functional Ability  Outcome: Ongoing (interventions implemented as appropriate)      Problem: Nausea/Vomiting (Adult)  Goal: Identify Related Risk Factors and Signs and Symptoms  Outcome: Outcome(s) achieved Date Met: 09/16/19    Goal: Symptom Relief  Outcome: Ongoing (interventions implemented as appropriate)    Goal: Adequate Hydration  Outcome: Ongoing (interventions implemented as appropriate)      Problem: Renal Failure/Kidney Injury, Acute (Adult)  Goal: Signs and Symptoms of Listed Potential Problems Will be Absent, Minimized or Managed (Renal Failure/Kidney Injury, Acute)  Outcome: Ongoing (interventions implemented as appropriate)

## 2019-09-17 NOTE — ANESTHESIA POSTPROCEDURE EVALUATION
"Patient: Kamryn Oliva    Procedure Summary     Date:  09/16/19 Room / Location:   [facility] /  [facility]    Anesthesia Start:  1507 Anesthesia Stop:  1529    Procedure:  ESOPHAGOGASTRODUODENOSCOPY WITH ANESTHESIA (N/A ) Diagnosis:       Moderate protein-calorie malnutrition (CMS/HCC)      (Moderate protein-calorie malnutrition (CMS/HCC) [E44.0])    Surgeon:  Katarina Davis MD Provider:  Amanda Grimaldo CRNA    Anesthesia Type:  MAC ASA Status:  2          Anesthesia Type: MAC  Last vitals  BP   146/82 (09/17/19 0300)   Temp   98 °F (36.7 °C) (09/17/19 0300)   Pulse   51 (09/17/19 0300)   Resp   18 (09/17/19 0300)     SpO2   100 % (09/17/19 0300)     Post Anesthesia Care and Evaluation    Patient location during evaluation: PACU  Patient participation: complete - patient participated  Level of consciousness: awake and alert  Pain management: adequate  Airway patency: patent  Anesthetic complications: No anesthetic complications    Cardiovascular status: acceptable  Respiratory status: acceptable  Hydration status: acceptable    Comments: Blood pressure 146/82, pulse 51, temperature 98 °F (36.7 °C), temperature source Oral, resp. rate 18, height 170.2 cm (67\"), weight 51.1 kg (112 lb 11.2 oz), SpO2 100 %, not currently breastfeeding.    Pt discharged from PACU based on veronica score >8      "

## 2019-09-18 ENCOUNTER — TELEPHONE (OUTPATIENT)
Dept: GASTROENTEROLOGY | Age: 49
End: 2019-09-18

## 2019-09-18 VITALS
SYSTOLIC BLOOD PRESSURE: 136 MMHG | WEIGHT: 112.7 LBS | DIASTOLIC BLOOD PRESSURE: 88 MMHG | RESPIRATION RATE: 16 BRPM | HEIGHT: 67 IN | HEART RATE: 66 BPM | OXYGEN SATURATION: 100 % | TEMPERATURE: 97.5 F | BODY MASS INDEX: 17.69 KG/M2

## 2019-09-18 LAB
CYTO UR: NORMAL
LAB AP CASE REPORT: NORMAL
LAB AP CLINICAL INFORMATION: NORMAL
MAGNESIUM SERPL-MCNC: 1.6 MG/DL (ref 1.6–2.6)
PATH REPORT.FINAL DX SPEC: NORMAL
PATH REPORT.GROSS SPEC: NORMAL

## 2019-09-18 PROCEDURE — 83735 ASSAY OF MAGNESIUM: CPT | Performed by: FAMILY MEDICINE

## 2019-09-18 PROCEDURE — 25010000002 HYDROMORPHONE PER 4 MG: Performed by: INTERNAL MEDICINE

## 2019-09-18 PROCEDURE — 25010000002 PROMETHAZINE PER 50 MG: Performed by: INTERNAL MEDICINE

## 2019-09-18 RX ORDER — MEGESTROL ACETATE 40 MG/1
40 TABLET ORAL DAILY
Qty: 30 TABLET | Refills: 2 | Status: ON HOLD | OUTPATIENT
Start: 2019-09-18 | End: 2020-03-09 | Stop reason: SDDI

## 2019-09-18 RX ORDER — ROSUVASTATIN CALCIUM 10 MG/1
10 TABLET, COATED ORAL NIGHTLY
Qty: 30 TABLET | Refills: 2 | Status: SHIPPED | OUTPATIENT
Start: 2019-09-18

## 2019-09-18 RX ORDER — POTASSIUM CHLORIDE 750 MG/1
40 CAPSULE, EXTENDED RELEASE ORAL ONCE
Status: COMPLETED | OUTPATIENT
Start: 2019-09-18 | End: 2019-09-18

## 2019-09-18 RX ORDER — SUCRALFATE ORAL 1 G/10ML
2 SUSPENSION ORAL NIGHTLY
Qty: 60 EACH | Refills: 2 | Status: ON HOLD | OUTPATIENT
Start: 2019-09-18 | End: 2020-03-09

## 2019-09-18 RX ADMIN — POTASSIUM CHLORIDE 40 MEQ: 10 CAPSULE, COATED, EXTENDED RELEASE ORAL at 08:43

## 2019-09-18 RX ADMIN — MEGESTROL ACETATE 40 MG: 40 TABLET ORAL at 08:40

## 2019-09-18 RX ADMIN — BUSPIRONE HYDROCHLORIDE 7.5 MG: 5 TABLET ORAL at 08:40

## 2019-09-18 RX ADMIN — VENLAFAXINE HYDROCHLORIDE 150 MG: 75 CAPSULE, EXTENDED RELEASE ORAL at 08:40

## 2019-09-18 RX ADMIN — Medication 1 TABLET: at 09:26

## 2019-09-18 RX ADMIN — CLONIDINE HYDROCHLORIDE 0.1 MG: 0.1 TABLET ORAL at 06:49

## 2019-09-18 RX ADMIN — SUCRALFATE 1 G: 1 SUSPENSION ORAL at 06:49

## 2019-09-18 RX ADMIN — HYDROMORPHONE HYDROCHLORIDE 0.5 MG: 1 INJECTION, SOLUTION INTRAMUSCULAR; INTRAVENOUS; SUBCUTANEOUS at 06:50

## 2019-09-18 RX ADMIN — AMLODIPINE BESYLATE 10 MG: 10 TABLET ORAL at 08:40

## 2019-09-18 RX ADMIN — LEVETIRACETAM 500 MG: 500 TABLET, FILM COATED ORAL at 08:40

## 2019-09-18 RX ADMIN — PROMETHAZINE HYDROCHLORIDE 25 MG: 25 INJECTION INTRAMUSCULAR; INTRAVENOUS at 06:49

## 2019-09-18 NOTE — PLAN OF CARE
Problem: Patient Care Overview  Goal: Plan of Care Review  Outcome: Ongoing (interventions implemented as appropriate)   09/18/19 0427   Coping/Psychosocial   Plan of Care Reviewed With patient   Plan of Care Review   Progress no change   OTHER   Outcome Summary Pt sleeping most of this shift, except for assessments and medications. Asked for pain meds but fell back asleep. Had many snacks at bedside, including items that needed to be refrigerated. Denies nausea. Cont to monitor.        Problem: Pain, Chronic (Adult)  Goal: Acceptable Pain/Comfort Level and Functional Ability  Outcome: Ongoing (interventions implemented as appropriate)      Problem: Nausea/Vomiting (Adult)  Goal: Symptom Relief  Outcome: Ongoing (interventions implemented as appropriate)    Goal: Adequate Hydration  Outcome: Ongoing (interventions implemented as appropriate)      Problem: Renal Failure/Kidney Injury, Acute (Adult)  Goal: Signs and Symptoms of Listed Potential Problems Will be Absent, Minimized or Managed (Renal Failure/Kidney Injury, Acute)  Outcome: Ongoing (interventions implemented as appropriate)

## 2019-09-18 NOTE — DISCHARGE SUMMARY
"    [facility]  DISCHARGE SUMMARY       Date of Admission: 9/15/2019  Date of Discharge:  9/18/2019  Primary Care Physician: Kory Jones DO    Presenting Problem/History of Present Illness:  KRISHNA (acute kidney injury) (CMS/Formerly [facility]) [N17.9]     Final Discharge Diagnoses:  Active Hospital Problems    Diagnosis   • **Moderate protein-calorie malnutrition (CMS/HCC)   • Failure to thrive in adult   • Abdominal pain   • HTN (hypertension)   • KRISHNA (acute kidney injury) (CMS/Formerly [facility])   • Acute UTI (urinary tract infection)       Consults: GI.    Procedures Performed:   EGD.  - Normal esophagus.  - Non-erosive gastritis. Biopsied.  - Normal examined duodenum. Biopsied.    Pertinent Test Results:   IMPRESSION: CT scan abdomen pelvic.  1. No CT evidence of acute intra-abdominal/pelvic pathological process.  2. Nonobstructing bilateral renal calculi.  3. Postsurgical changes.    Chief Complaint on Day of Discharge: none    History of Present Illness on Day of Discharge:   49-year-old  female who presents to the emergency department with complaints of weight loss.  This appears to be a problem that is been ongoing for 4 years.  She is seen Dr. Hanson for 2 years.  She states that her progress has been deteriorating significantly over the last month.  She states she does have history of bowel obstruction.  She states her last bowel movement was 3 days ago.  She did not notice any blood in it.  She states she is unable to drink or swallow without it coming back up.  She says\" I just cannot do it on my own.\"  She saw her PCP about 5 to 6 weeks ago and got fluids in the emergency department at that time.  She states she feels like she has knives in her throat when she swallows.  She has unbearable pain once food gets into her stomach.  Her previous creatinine was 0.96 and today is 2.41.  She states she has history of iron deficiency anemia.  She states in the past 2 months " "she is lost about 20 pounds.  She states she is only urinating about once a day.     Hospital Course:  The patient is a 49 y.o. female who presented to [facility] with failure to thrive/abdomen pain/UTI/acute renal failure.      Failure to thrive.  Patient will need to follow with PCP 1 week.     Abdomen pain.  Chronic ongoing issue abdomen pain.  Followed by Dr. Hanson outpatient.  GI consult.  CT scan abdomen pelvic-no CT evidence of acute intra-abdominal/pelvic pathological process, nonobstructing bilateral renal calculi, postsurgical changes.   EGD-normal esophagus, non-erosive gastritis-biopsied, normal examined duodenum-biopsied.  GI sign off.  We will schedule patient to follow-up with [facility] upon patient request outpatient.     Hypokalemia- Stable.      UTI.  Patient started on Rocephin.  Patient had 3 days of Rocephin.  Urine culture shows no growth.     Acute renal failure.    Resolved with  IV fluid.     Reflux.  Patient continue Pepcid.     Hyperlipidemia.  Patient to continue Crestor.     Hypertension.  Patient to continue Norvasc, Catapres, Toprol.     Seizure.  Pt to Continue Keppra.     Anxiety/depression.  Pt to Continue Effexor, BuSpar.     Weight loss.  Lost 20 pounds in 2 months.  This will be outpatient work-up by her primary care doctor discussed with patient.     Vital signs stable, afebrile.  Patient follow-up with PCP 1 week.  Will be schedule patient see GI at [facility] upon patient request.    Condition on Discharge:  stable    Physical Exam on Discharge:  /80 (BP Location: Right arm, Patient Position: Lying)   Pulse 54   Temp 97.7 °F (36.5 °C) (Oral)   Resp 16   Ht 170.2 cm (67\")   Wt 51.1 kg (112 lb 11.2 oz)   LMP  (LMP Unknown)   SpO2 98%   BMI 17.65 kg/m²   Physical Exam  Constitutional: She is oriented to person, place, and time. She appears well-developed.   HENT:   Head: Normocephalic and atraumatic.   Eyes: Conjunctivae are normal. Pupils are " equal, round, and reactive to light.   Neck: Neck supple. No JVD present. No thyromegaly present.   Cardiovascular: Normal rate, regular rhythm, normal heart sounds and intact distal pulses. Exam reveals no gallop and no friction rub.   No murmur heard.  Pulmonary/Chest: Effort normal and breath sounds normal. No respiratory distress. She has no wheezes. She has no rales. She exhibits no tenderness.   Decrease in breath sound bilateral, clear.   Abdominal: Soft. Bowel sounds are normal. She exhibits no distension. There is no tenderness. There is no rebound and no guarding.   Musculoskeletal: Normal range of motion. She exhibits no edema, tenderness or deformity.   Lymphadenopathy:     She has no cervical adenopathy.   Neurological: She is alert and oriented to person, place, and time. She displays normal reflexes. No cranial nerve deficit. She exhibits abnormal muscle tone. Coordination abnormal.   Skin: Skin is warm and dry. Capillary refill takes 2 to 3 seconds. No rash noted.   Psychiatric: She has a normal mood and affect. Her behavior is normal. Judgment and thought content normal.   Nursing note and vitals reviewed.       Discharge Disposition: Discharge home with family.      Discharge Medications:     Discharge Medications      New Medications      Instructions Start Date   rosuvastatin 10 MG tablet  Commonly known as:  CRESTOR   10 mg, Oral, Nightly      sucralfate 1 GM/10ML suspension  Commonly known as:  CARAFATE   2 g, Oral, Nightly         Continue These Medications      Instructions Start Date   amitriptyline 100 MG tablet  Commonly known as:  ELAVIL   100 mg, Oral, Nightly      amLODIPine 10 MG tablet  Commonly known as:  NORVASC   10 mg, Oral, Daily      aspirin 81 MG EC tablet   81 mg, Oral, Daily      busPIRone 7.5 MG tablet  Commonly known as:  BUSPAR   7.5 mg, Oral, 3 Times Daily      CloNIDine 0.1 MG tablet  Commonly known as:  CATAPRES   0.1 mg, Oral, Every 4 Hours PRN      fluticasone 50  MCG/ACT nasal spray  Commonly known as:  FLONASE   2 sprays, Nasal, Daily      levETIRAcetam 500 MG tablet  Commonly known as:  KEPPRA   500 mg, Oral, 2 Times Daily      megestrol 40 MG tablet  Commonly known as:  MEGACE   40 mg, Oral, Daily      metoprolol succinate  MG 24 hr tablet  Commonly known as:  TOPROL-XL   100 mg, Oral, Nightly      ondansetron ODT 4 MG disintegrating tablet  Commonly known as:  ZOFRAN-ODT   4-8 mg, Oral, Every 8 Hours PRN      pantoprazole 40 MG EC tablet  Commonly known as:  PROTONIX   40 mg, Oral, 2 Times Daily      promethazine 25 MG tablet  Commonly known as:  PHENERGAN   25 mg, Oral, Every 6 Hours PRN      venlafaxine  MG 24 hr capsule  Commonly known as:  EFFEXOR-XR   150 mg, Oral, Daily         Stop These Medications    aspirin-acetaminophen-caffeine 250-250-65 MG per tablet  Commonly known as:  EXCEDRIN MIGRAINE     atorvastatin 40 MG tablet  Commonly known as:  LIPITOR            Discharge Diet:   Diet Instructions     Advance Diet As Tolerated            Activity at Discharge:   Activity Instructions     Activity as Tolerated            Discharge Care Plan/Instructions:     Follow-up Appointments:   Follow with PCP 1 week. We will schedule a pt to see gi specialist at [facility].     Gilberto Mcguire MD  09/18/19  8:12 AM    Time: Greater than 30 minutes.

## 2019-09-19 ENCOUNTER — READMISSION MANAGEMENT (OUTPATIENT)
Dept: CALL CENTER | Facility: HOSPITAL | Age: 49
End: 2019-09-19

## 2019-09-19 DIAGNOSIS — I10 ESSENTIAL HYPERTENSION: Chronic | ICD-10-CM

## 2019-09-19 NOTE — OUTREACH NOTE
Prep Survey      Responses   Facility patient discharged from?  [facility]   Is patient eligible?  Yes   Discharge diagnosis  Moderate protein-calorie malnutrition,  failure to thrive in adult   Does the patient have one of the following disease processes/diagnoses(primary or secondary)?  Other   Does the patient have Home health ordered?  No   Is there a DME ordered?  No   Comments regarding appointments  see AVS   Prep survey completed?  Yes          Charisse Cardenas RN

## 2019-09-20 ENCOUNTER — READMISSION MANAGEMENT (OUTPATIENT)
Dept: CALL CENTER | Facility: HOSPITAL | Age: 49
End: 2019-09-20

## 2019-09-20 RX ORDER — METOPROLOL SUCCINATE 100 MG/1
100 TABLET, EXTENDED RELEASE ORAL NIGHTLY
Qty: 90 TABLET | Refills: 3 | Status: SHIPPED | OUTPATIENT
Start: 2019-09-20 | End: 2020-03-16 | Stop reason: SDUPTHER

## 2019-09-20 NOTE — OUTREACH NOTE
Medical Week 1 Survey      Responses   Facility patient discharged from?  [place]   Does the patient have one of the following disease processes/diagnoses(primary or secondary)?  Other   Is there a successful TCM telephone encounter documented?  No   Week 1 attempt successful?  No   Unsuccessful attempts  Attempt 1          Mallika Moore RN

## 2019-09-24 ENCOUNTER — READMISSION MANAGEMENT (OUTPATIENT)
Dept: CALL CENTER | Facility: HOSPITAL | Age: 49
End: 2019-09-24

## 2019-09-24 NOTE — OUTREACH NOTE
Medical Week 1 Survey      Responses   Facility patient discharged from?  [place]   Does the patient have one of the following disease processes/diagnoses(primary or secondary)?  Other   Is there a successful TCM telephone encounter documented?  No   Week 1 attempt successful?  No   Unsuccessful attempts  Attempt 2          Dc Palacios RN

## 2019-09-26 ENCOUNTER — READMISSION MANAGEMENT (OUTPATIENT)
Dept: CALL CENTER | Facility: HOSPITAL | Age: 49
End: 2019-09-26

## 2019-09-26 NOTE — OUTREACH NOTE
Medical Week 2 Survey      Responses   Facility patient discharged from?  [place]   Does the patient have one of the following disease processes/diagnoses(primary or secondary)?  Other   Week 2 attempt successful?  No   Unsuccessful attempts  Attempt 1          Benita Williamson RN

## 2019-09-27 ENCOUNTER — READMISSION MANAGEMENT (OUTPATIENT)
Dept: CALL CENTER | Facility: HOSPITAL | Age: 49
End: 2019-09-27

## 2019-09-27 NOTE — OUTREACH NOTE
Medical Week 2 Survey      Responses   Facility patient discharged from?  [place]   Does the patient have one of the following disease processes/diagnoses(primary or secondary)?  Other   Week 2 attempt successful?  No   Unsuccessful attempts  Attempt 2          Maki Flores RN

## 2019-10-17 DIAGNOSIS — G40.909 SEIZURE DISORDER (HCC): ICD-10-CM

## 2019-10-17 RX ORDER — LEVETIRACETAM 500 MG/1
500 TABLET ORAL 2 TIMES DAILY
Qty: 60 TABLET | Refills: 2 | Status: SHIPPED | OUTPATIENT
Start: 2019-10-17 | End: 2019-12-06 | Stop reason: SDUPTHER

## 2019-12-05 RX ORDER — CLONIDINE HYDROCHLORIDE 0.1 MG/1
TABLET ORAL
Qty: 30 TABLET | Refills: 5 | Status: ON HOLD | OUTPATIENT
Start: 2019-12-05 | End: 2020-03-09 | Stop reason: SDUPTHER

## 2019-12-06 DIAGNOSIS — G40.909 SEIZURE DISORDER (HCC): ICD-10-CM

## 2019-12-07 RX ORDER — LEVETIRACETAM 500 MG/1
500 TABLET ORAL 2 TIMES DAILY
Qty: 180 TABLET | Refills: 1 | Status: SHIPPED | OUTPATIENT
Start: 2019-12-07 | End: 2020-03-16 | Stop reason: SDUPTHER

## 2019-12-18 DIAGNOSIS — F32.A ANXIETY AND DEPRESSION: ICD-10-CM

## 2019-12-18 DIAGNOSIS — F41.9 ANXIETY AND DEPRESSION: ICD-10-CM

## 2019-12-18 RX ORDER — AMITRIPTYLINE HYDROCHLORIDE 100 MG/1
100 TABLET, FILM COATED ORAL NIGHTLY
Qty: 90 TABLET | Refills: 1 | Status: SHIPPED | OUTPATIENT
Start: 2019-12-18 | End: 2020-03-16 | Stop reason: SDUPTHER

## 2019-12-20 RX ORDER — PROMETHAZINE HYDROCHLORIDE 25 MG/1
25 TABLET ORAL EVERY 6 HOURS PRN
Qty: 45 TABLET | Refills: 0 | Status: SHIPPED | OUTPATIENT
Start: 2019-12-20 | End: 2020-01-14 | Stop reason: SDUPTHER

## 2020-01-01 DIAGNOSIS — F32.A ANXIETY AND DEPRESSION: ICD-10-CM

## 2020-01-01 DIAGNOSIS — I10 ESSENTIAL HYPERTENSION: ICD-10-CM

## 2020-01-01 DIAGNOSIS — F41.9 ANXIETY AND DEPRESSION: ICD-10-CM

## 2020-01-01 DIAGNOSIS — R11.0 NAUSEA: ICD-10-CM

## 2020-01-01 RX ORDER — PROMETHAZINE HYDROCHLORIDE 25 MG/1
25 TABLET ORAL EVERY 6 HOURS PRN
Qty: 30 TABLET | Refills: 3 | OUTPATIENT
Start: 2020-01-01

## 2020-01-01 RX ORDER — CLONIDINE HYDROCHLORIDE 0.1 MG/1
0.1 TABLET ORAL 2 TIMES DAILY
Qty: 60 TABLET | Refills: 2 | OUTPATIENT
Start: 2020-01-01

## 2020-01-01 RX ORDER — BUSPIRONE HYDROCHLORIDE 7.5 MG/1
TABLET ORAL
Qty: 90 TABLET | Refills: 5 | Status: SHIPPED | OUTPATIENT
Start: 2020-01-01

## 2020-01-01 RX ORDER — AMITRIPTYLINE HYDROCHLORIDE 100 MG/1
100 TABLET, FILM COATED ORAL NIGHTLY
Qty: 90 TABLET | Refills: 1 | Status: SHIPPED | OUTPATIENT
Start: 2020-01-01

## 2020-01-01 RX ORDER — VENLAFAXINE HYDROCHLORIDE 150 MG/1
CAPSULE, EXTENDED RELEASE ORAL
Qty: 30 CAPSULE | Refills: 5 | Status: SHIPPED | OUTPATIENT
Start: 2020-01-01

## 2020-01-14 RX ORDER — CLONIDINE HYDROCHLORIDE 0.1 MG/1
0.1 TABLET ORAL EVERY 4 HOURS PRN
Qty: 45 TABLET | Refills: 0 | Status: SHIPPED | OUTPATIENT
Start: 2020-01-14 | End: 2020-03-13 | Stop reason: HOSPADM

## 2020-01-14 RX ORDER — PROMETHAZINE HYDROCHLORIDE 25 MG/1
25 TABLET ORAL EVERY 6 HOURS PRN
Qty: 45 TABLET | Refills: 0 | Status: SHIPPED | OUTPATIENT
Start: 2020-01-14 | End: 2020-01-31

## 2020-01-31 RX ORDER — PROMETHAZINE HYDROCHLORIDE 25 MG/1
TABLET ORAL
Qty: 45 TABLET | Refills: 0 | Status: ON HOLD | OUTPATIENT
Start: 2020-01-31 | End: 2020-03-09

## 2020-02-19 ENCOUNTER — APPOINTMENT (OUTPATIENT)
Dept: GENERAL RADIOLOGY | Facility: HOSPITAL | Age: 50
End: 2020-02-19

## 2020-02-19 ENCOUNTER — APPOINTMENT (OUTPATIENT)
Dept: CT IMAGING | Facility: HOSPITAL | Age: 50
End: 2020-02-19

## 2020-02-19 ENCOUNTER — HOSPITAL ENCOUNTER (OUTPATIENT)
Facility: HOSPITAL | Age: 50
Setting detail: OBSERVATION
LOS: 1 days | Discharge: HOME | End: 2020-02-20
Attending: INTERNAL MEDICINE | Admitting: FAMILY MEDICINE

## 2020-02-19 DIAGNOSIS — R11.2 NAUSEA AND VOMITING, INTRACTABILITY OF VOMITING NOT SPECIFIED, UNSPECIFIED VOMITING TYPE: ICD-10-CM

## 2020-02-19 DIAGNOSIS — R10.84 GENERALIZED ABDOMINAL PAIN: Primary | ICD-10-CM

## 2020-02-19 DIAGNOSIS — E87.6 HYPOKALEMIA: ICD-10-CM

## 2020-02-19 DIAGNOSIS — E86.9 VOLUME DEPLETION: ICD-10-CM

## 2020-02-19 PROBLEM — F50.00 ANOREXIA NERVOSA: Status: ACTIVE | Noted: 2017-08-24

## 2020-02-19 PROBLEM — G89.29 CHRONIC BACK PAIN: Status: ACTIVE | Noted: 2020-02-19

## 2020-02-19 PROBLEM — Z87.442 HISTORY OF KIDNEY STONES: Status: ACTIVE | Noted: 2018-01-23

## 2020-02-19 PROBLEM — M47.816 LUMBAR FACET ARTHROPATHY: Status: ACTIVE | Noted: 2017-07-13

## 2020-02-19 PROBLEM — M54.9 CHRONIC BACK PAIN: Status: ACTIVE | Noted: 2020-02-19

## 2020-02-19 PROBLEM — R13.19 OTHER DYSPHAGIA: Status: ACTIVE | Noted: 2018-06-05

## 2020-02-19 LAB
ALBUMIN SERPL-MCNC: 5.1 G/DL (ref 3.5–5.2)
ALBUMIN/GLOB SERPL: 1.5 G/DL
ALP SERPL-CCNC: 113 U/L (ref 39–117)
ALT SERPL W P-5'-P-CCNC: 23 U/L (ref 1–33)
AMYLASE SERPL-CCNC: 56 U/L (ref 28–100)
ANION GAP SERPL CALCULATED.3IONS-SCNC: 22 MMOL/L (ref 5–15)
AST SERPL-CCNC: 20 U/L (ref 1–32)
BACTERIA UR QL AUTO: ABNORMAL /HPF
BASOPHILS # BLD AUTO: 0.04 10*3/MM3 (ref 0–0.2)
BASOPHILS NFR BLD AUTO: 0.3 % (ref 0–1.5)
BILIRUB SERPL-MCNC: 0.2 MG/DL (ref 0.2–1.2)
BILIRUB UR QL STRIP: NEGATIVE
BUN BLD-MCNC: 30 MG/DL (ref 6–20)
BUN/CREAT SERPL: 18.8 (ref 7–25)
CALCIUM SPEC-SCNC: 10.1 MG/DL (ref 8.6–10.5)
CHLORIDE SERPL-SCNC: 101 MMOL/L (ref 98–107)
CK SERPL-CCNC: 41 U/L (ref 20–180)
CLARITY UR: CLEAR
CO2 SERPL-SCNC: 15 MMOL/L (ref 22–29)
COLOR UR: YELLOW
CREAT BLD-MCNC: 1.6 MG/DL (ref 0.57–1)
D-LACTATE SERPL-SCNC: 0.8 MMOL/L (ref 0.5–2)
D-LACTATE SERPL-SCNC: 2.1 MMOL/L (ref 0.5–2)
DEPRECATED RDW RBC AUTO: 40.3 FL (ref 37–54)
EOSINOPHIL # BLD AUTO: 0.09 10*3/MM3 (ref 0–0.4)
EOSINOPHIL NFR BLD AUTO: 0.7 % (ref 0.3–6.2)
ERYTHROCYTE [DISTWIDTH] IN BLOOD BY AUTOMATED COUNT: 12.7 % (ref 12.3–15.4)
GFR SERPL CREATININE-BSD FRML MDRD: 34 ML/MIN/1.73
GLOBULIN UR ELPH-MCNC: 3.3 GM/DL
GLUCOSE BLD-MCNC: 134 MG/DL (ref 65–99)
GLUCOSE UR STRIP-MCNC: NEGATIVE MG/DL
HCT VFR BLD AUTO: 43.9 % (ref 34–46.6)
HGB BLD-MCNC: 15.2 G/DL (ref 12–15.9)
HGB UR QL STRIP.AUTO: NEGATIVE
HOLD SPECIMEN: NORMAL
HOLD SPECIMEN: NORMAL
HYALINE CASTS UR QL AUTO: ABNORMAL /LPF
IMM GRANULOCYTES # BLD AUTO: 0.05 10*3/MM3 (ref 0–0.05)
IMM GRANULOCYTES NFR BLD AUTO: 0.4 % (ref 0–0.5)
INR PPP: 0.98 (ref 0.91–1.09)
KETONES UR QL STRIP: ABNORMAL
LACTATE HOLD SPECIMEN: NORMAL
LEUKOCYTE ESTERASE UR QL STRIP.AUTO: NEGATIVE
LIPASE SERPL-CCNC: 17 U/L (ref 13–60)
LYMPHOCYTES # BLD AUTO: 4.71 10*3/MM3 (ref 0.7–3.1)
LYMPHOCYTES NFR BLD AUTO: 35.7 % (ref 19.6–45.3)
MAGNESIUM SERPL-MCNC: 1.7 MG/DL (ref 1.6–2.6)
MCH RBC QN AUTO: 29.9 PG (ref 26.6–33)
MCHC RBC AUTO-ENTMCNC: 34.6 G/DL (ref 31.5–35.7)
MCV RBC AUTO: 86.2 FL (ref 79–97)
MONOCYTES # BLD AUTO: 0.82 10*3/MM3 (ref 0.1–0.9)
MONOCYTES NFR BLD AUTO: 6.2 % (ref 5–12)
NEUTROPHILS # BLD AUTO: 7.47 10*3/MM3 (ref 1.7–7)
NEUTROPHILS NFR BLD AUTO: 56.7 % (ref 42.7–76)
NITRITE UR QL STRIP: NEGATIVE
NRBC BLD AUTO-RTO: 0 /100 WBC (ref 0–0.2)
PH UR STRIP.AUTO: <=5 [PH] (ref 5–8)
PHOSPHATE SERPL-MCNC: 3 MG/DL (ref 2.5–4.5)
PLATELET # BLD AUTO: 399 10*3/MM3 (ref 140–450)
PMV BLD AUTO: 9.8 FL (ref 6–12)
POTASSIUM BLD-SCNC: 2.8 MMOL/L (ref 3.5–5.2)
PROT SERPL-MCNC: 8.4 G/DL (ref 6–8.5)
PROT UR QL STRIP: ABNORMAL
PROTHROMBIN TIME: 13.3 SECONDS (ref 11.9–14.6)
RBC # BLD AUTO: 5.09 10*6/MM3 (ref 3.77–5.28)
RBC # UR: ABNORMAL /HPF
REF LAB TEST METHOD: ABNORMAL
SODIUM BLD-SCNC: 138 MMOL/L (ref 136–145)
SP GR UR STRIP: 1.02 (ref 1–1.03)
SQUAMOUS #/AREA URNS HPF: ABNORMAL /HPF
T4 FREE SERPL-MCNC: 1.05 NG/DL (ref 0.93–1.7)
TROPONIN T SERPL-MCNC: <0.01 NG/ML (ref 0–0.03)
TSH SERPL DL<=0.05 MIU/L-ACNC: 1.57 UIU/ML (ref 0.27–4.2)
UROBILINOGEN UR QL STRIP: ABNORMAL
WBC NRBC COR # BLD: 13.18 10*3/MM3 (ref 3.4–10.8)
WBC UR QL AUTO: ABNORMAL /HPF
WHOLE BLOOD HOLD SPECIMEN: NORMAL
WHOLE BLOOD HOLD SPECIMEN: NORMAL

## 2020-02-19 PROCEDURE — 85025 COMPLETE CBC W/AUTO DIFF WBC: CPT | Performed by: NURSE PRACTITIONER

## 2020-02-19 PROCEDURE — 81001 URINALYSIS AUTO W/SCOPE: CPT | Performed by: NURSE PRACTITIONER

## 2020-02-19 PROCEDURE — 74176 CT ABD & PELVIS W/O CONTRAST: CPT

## 2020-02-19 PROCEDURE — 82150 ASSAY OF AMYLASE: CPT | Performed by: NURSE PRACTITIONER

## 2020-02-19 PROCEDURE — 84100 ASSAY OF PHOSPHORUS: CPT | Performed by: NURSE PRACTITIONER

## 2020-02-19 PROCEDURE — 84443 ASSAY THYROID STIM HORMONE: CPT | Performed by: INTERNAL MEDICINE

## 2020-02-19 PROCEDURE — 87040 BLOOD CULTURE FOR BACTERIA: CPT | Performed by: NURSE PRACTITIONER

## 2020-02-19 PROCEDURE — 96375 TX/PRO/DX INJ NEW DRUG ADDON: CPT

## 2020-02-19 PROCEDURE — 99285 EMERGENCY DEPT VISIT HI MDM: CPT

## 2020-02-19 PROCEDURE — 25010000003 HYDROMORPHONE 1 MG/ML SOLUTION: Performed by: NURSE PRACTITIONER

## 2020-02-19 PROCEDURE — 85610 PROTHROMBIN TIME: CPT | Performed by: NURSE PRACTITIONER

## 2020-02-19 PROCEDURE — 51798 US URINE CAPACITY MEASURE: CPT

## 2020-02-19 PROCEDURE — 96376 TX/PRO/DX INJ SAME DRUG ADON: CPT

## 2020-02-19 PROCEDURE — 25010000002 ONDANSETRON PER 1 MG: Performed by: NURSE PRACTITIONER

## 2020-02-19 PROCEDURE — 83605 ASSAY OF LACTIC ACID: CPT | Performed by: NURSE PRACTITIONER

## 2020-02-19 PROCEDURE — 83690 ASSAY OF LIPASE: CPT | Performed by: NURSE PRACTITIONER

## 2020-02-19 PROCEDURE — 93010 ELECTROCARDIOGRAM REPORT: CPT | Performed by: INTERNAL MEDICINE

## 2020-02-19 PROCEDURE — 83874 ASSAY OF MYOGLOBIN: CPT | Performed by: NURSE PRACTITIONER

## 2020-02-19 PROCEDURE — 96361 HYDRATE IV INFUSION ADD-ON: CPT

## 2020-02-19 PROCEDURE — 84484 ASSAY OF TROPONIN QUANT: CPT | Performed by: NURSE PRACTITIONER

## 2020-02-19 PROCEDURE — 80053 COMPREHEN METABOLIC PANEL: CPT | Performed by: NURSE PRACTITIONER

## 2020-02-19 PROCEDURE — 84439 ASSAY OF FREE THYROXINE: CPT | Performed by: INTERNAL MEDICINE

## 2020-02-19 PROCEDURE — 82550 ASSAY OF CK (CPK): CPT | Performed by: NURSE PRACTITIONER

## 2020-02-19 PROCEDURE — 93005 ELECTROCARDIOGRAM TRACING: CPT | Performed by: NURSE PRACTITIONER

## 2020-02-19 PROCEDURE — 25010000002 POTASSIUM CHLORIDE PER 2 MEQ: Performed by: NURSE PRACTITIONER

## 2020-02-19 PROCEDURE — 71045 X-RAY EXAM CHEST 1 VIEW: CPT

## 2020-02-19 PROCEDURE — 83735 ASSAY OF MAGNESIUM: CPT | Performed by: NURSE PRACTITIONER

## 2020-02-19 PROCEDURE — P9612 CATHETERIZE FOR URINE SPEC: HCPCS

## 2020-02-19 PROCEDURE — 25010000002 PROMETHAZINE PER 50 MG: Performed by: NURSE PRACTITIONER

## 2020-02-19 RX ORDER — PROMETHAZINE HYDROCHLORIDE 25 MG/1
25 TABLET ORAL EVERY 6 HOURS PRN
Status: DISCONTINUED | OUTPATIENT
Start: 2020-02-19 | End: 2020-02-20 | Stop reason: HOSPADM

## 2020-02-19 RX ORDER — SODIUM CHLORIDE AND POTASSIUM CHLORIDE 150; 900 MG/100ML; MG/100ML
125 INJECTION, SOLUTION INTRAVENOUS CONTINUOUS
Status: DISCONTINUED | OUTPATIENT
Start: 2020-02-20 | End: 2020-02-20 | Stop reason: HOSPADM

## 2020-02-19 RX ORDER — PROMETHAZINE HYDROCHLORIDE 25 MG/ML
12.5 INJECTION, SOLUTION INTRAMUSCULAR; INTRAVENOUS ONCE
Status: COMPLETED | OUTPATIENT
Start: 2020-02-19 | End: 2020-02-19

## 2020-02-19 RX ORDER — POTASSIUM CHLORIDE 7.45 MG/ML
10 INJECTION INTRAVENOUS
Status: COMPLETED | OUTPATIENT
Start: 2020-02-20 | End: 2020-02-20

## 2020-02-19 RX ORDER — HYDROMORPHONE HYDROCHLORIDE 1 MG/ML
0.5 INJECTION, SOLUTION INTRAMUSCULAR; INTRAVENOUS; SUBCUTANEOUS EVERY 4 HOURS PRN
Status: DISCONTINUED | OUTPATIENT
Start: 2020-02-19 | End: 2020-02-20

## 2020-02-19 RX ORDER — ACETAMINOPHEN 325 MG/1
650 TABLET ORAL EVERY 4 HOURS PRN
Status: DISCONTINUED | OUTPATIENT
Start: 2020-02-19 | End: 2020-02-20 | Stop reason: HOSPADM

## 2020-02-19 RX ORDER — SODIUM CHLORIDE 0.9 % (FLUSH) 0.9 %
10 SYRINGE (ML) INJECTION AS NEEDED
Status: DISCONTINUED | OUTPATIENT
Start: 2020-02-19 | End: 2020-02-20 | Stop reason: HOSPADM

## 2020-02-19 RX ORDER — SODIUM CHLORIDE 0.9 % (FLUSH) 0.9 %
10 SYRINGE (ML) INJECTION EVERY 12 HOURS SCHEDULED
Status: DISCONTINUED | OUTPATIENT
Start: 2020-02-20 | End: 2020-02-20 | Stop reason: HOSPADM

## 2020-02-19 RX ORDER — MEGESTROL ACETATE 40 MG/1
40 TABLET ORAL DAILY
Status: DISCONTINUED | OUTPATIENT
Start: 2020-02-20 | End: 2020-02-20 | Stop reason: HOSPADM

## 2020-02-19 RX ORDER — CLONIDINE HYDROCHLORIDE 0.1 MG/1
0.1 TABLET ORAL EVERY 4 HOURS PRN
Status: DISCONTINUED | OUTPATIENT
Start: 2020-02-19 | End: 2020-02-20 | Stop reason: HOSPADM

## 2020-02-19 RX ORDER — PROMETHAZINE HYDROCHLORIDE 25 MG/ML
12.5 INJECTION, SOLUTION INTRAMUSCULAR; INTRAVENOUS EVERY 6 HOURS PRN
Status: DISCONTINUED | OUTPATIENT
Start: 2020-02-19 | End: 2020-02-20 | Stop reason: HOSPADM

## 2020-02-19 RX ORDER — ONDANSETRON 4 MG/1
4 TABLET, FILM COATED ORAL EVERY 6 HOURS PRN
Status: DISCONTINUED | OUTPATIENT
Start: 2020-02-19 | End: 2020-02-20 | Stop reason: HOSPADM

## 2020-02-19 RX ORDER — METOPROLOL SUCCINATE 100 MG/1
100 TABLET, EXTENDED RELEASE ORAL NIGHTLY
Status: DISCONTINUED | OUTPATIENT
Start: 2020-02-20 | End: 2020-02-20 | Stop reason: HOSPADM

## 2020-02-19 RX ORDER — ROSUVASTATIN CALCIUM 10 MG/1
10 TABLET, COATED ORAL NIGHTLY
Status: DISCONTINUED | OUTPATIENT
Start: 2020-02-20 | End: 2020-02-20 | Stop reason: HOSPADM

## 2020-02-19 RX ORDER — POTASSIUM CHLORIDE 14.9 MG/ML
20 INJECTION INTRAVENOUS ONCE
Status: COMPLETED | OUTPATIENT
Start: 2020-02-19 | End: 2020-02-19

## 2020-02-19 RX ORDER — CLONIDINE HYDROCHLORIDE 0.1 MG/1
0.1 TABLET ORAL EVERY 4 HOURS PRN
Status: DISCONTINUED | OUTPATIENT
Start: 2020-02-19 | End: 2020-02-19 | Stop reason: SDUPTHER

## 2020-02-19 RX ORDER — AMLODIPINE BESYLATE 10 MG/1
10 TABLET ORAL DAILY
Status: DISCONTINUED | OUTPATIENT
Start: 2020-02-20 | End: 2020-02-20 | Stop reason: HOSPADM

## 2020-02-19 RX ORDER — PANTOPRAZOLE SODIUM 40 MG/1
40 TABLET, DELAYED RELEASE ORAL 2 TIMES DAILY
Status: DISCONTINUED | OUTPATIENT
Start: 2020-02-20 | End: 2020-02-20 | Stop reason: HOSPADM

## 2020-02-19 RX ORDER — SUCRALFATE ORAL 1 G/10ML
2 SUSPENSION ORAL NIGHTLY
Status: DISCONTINUED | OUTPATIENT
Start: 2020-02-20 | End: 2020-02-20 | Stop reason: HOSPADM

## 2020-02-19 RX ORDER — VENLAFAXINE HYDROCHLORIDE 75 MG/1
150 CAPSULE, EXTENDED RELEASE ORAL DAILY
Status: DISCONTINUED | OUTPATIENT
Start: 2020-02-20 | End: 2020-02-20 | Stop reason: HOSPADM

## 2020-02-19 RX ORDER — ONDANSETRON 8 MG/1
8 TABLET, ORALLY DISINTEGRATING ORAL EVERY 8 HOURS PRN
Status: DISCONTINUED | OUTPATIENT
Start: 2020-02-19 | End: 2020-02-20 | Stop reason: HOSPADM

## 2020-02-19 RX ORDER — ONDANSETRON 2 MG/ML
4 INJECTION INTRAMUSCULAR; INTRAVENOUS ONCE
Status: COMPLETED | OUTPATIENT
Start: 2020-02-19 | End: 2020-02-19

## 2020-02-19 RX ORDER — ASPIRIN 81 MG/1
81 TABLET ORAL DAILY
Status: DISCONTINUED | OUTPATIENT
Start: 2020-02-20 | End: 2020-02-20 | Stop reason: HOSPADM

## 2020-02-19 RX ORDER — ACETAMINOPHEN 160 MG/5ML
650 SOLUTION ORAL EVERY 4 HOURS PRN
Status: DISCONTINUED | OUTPATIENT
Start: 2020-02-19 | End: 2020-02-20 | Stop reason: HOSPADM

## 2020-02-19 RX ORDER — AMITRIPTYLINE HYDROCHLORIDE 100 MG/1
100 TABLET, FILM COATED ORAL NIGHTLY
Status: DISCONTINUED | OUTPATIENT
Start: 2020-02-20 | End: 2020-02-20 | Stop reason: HOSPADM

## 2020-02-19 RX ORDER — ACETAMINOPHEN 650 MG/1
650 SUPPOSITORY RECTAL EVERY 4 HOURS PRN
Status: DISCONTINUED | OUTPATIENT
Start: 2020-02-19 | End: 2020-02-20 | Stop reason: HOSPADM

## 2020-02-19 RX ORDER — FLUTICASONE PROPIONATE 50 MCG
2 SPRAY, SUSPENSION (ML) NASAL DAILY
Status: DISCONTINUED | OUTPATIENT
Start: 2020-02-20 | End: 2020-02-20 | Stop reason: HOSPADM

## 2020-02-19 RX ORDER — LEVETIRACETAM 500 MG/1
500 TABLET ORAL 2 TIMES DAILY
Status: DISCONTINUED | OUTPATIENT
Start: 2020-02-20 | End: 2020-02-20 | Stop reason: HOSPADM

## 2020-02-19 RX ORDER — BUSPIRONE HYDROCHLORIDE 5 MG/1
7.5 TABLET ORAL 3 TIMES DAILY
Status: DISCONTINUED | OUTPATIENT
Start: 2020-02-20 | End: 2020-02-20 | Stop reason: HOSPADM

## 2020-02-19 RX ORDER — ONDANSETRON 2 MG/ML
4 INJECTION INTRAMUSCULAR; INTRAVENOUS EVERY 6 HOURS PRN
Status: DISCONTINUED | OUTPATIENT
Start: 2020-02-19 | End: 2020-02-20 | Stop reason: HOSPADM

## 2020-02-19 RX ADMIN — PROMETHAZINE HYDROCHLORIDE 12.5 MG: 25 INJECTION INTRAMUSCULAR; INTRAVENOUS at 22:45

## 2020-02-19 RX ADMIN — HYDROMORPHONE HYDROCHLORIDE 1 MG: 1 INJECTION, SOLUTION INTRAMUSCULAR; INTRAVENOUS; SUBCUTANEOUS at 22:30

## 2020-02-19 RX ADMIN — SODIUM CHLORIDE 1000 ML: 9 INJECTION, SOLUTION INTRAVENOUS at 22:30

## 2020-02-19 RX ADMIN — POTASSIUM CHLORIDE 20 MEQ: 14.9 INJECTION, SOLUTION INTRAVENOUS at 20:45

## 2020-02-19 RX ADMIN — HYDROMORPHONE HYDROCHLORIDE 1 MG: 1 INJECTION, SOLUTION INTRAMUSCULAR; INTRAVENOUS; SUBCUTANEOUS at 20:18

## 2020-02-19 RX ADMIN — SODIUM CHLORIDE 1458 ML: 9 INJECTION, SOLUTION INTRAVENOUS at 19:42

## 2020-02-19 RX ADMIN — ONDANSETRON HYDROCHLORIDE 4 MG: 2 SOLUTION INTRAMUSCULAR; INTRAVENOUS at 20:18

## 2020-02-20 VITALS
BODY MASS INDEX: 16.83 KG/M2 | TEMPERATURE: 98 F | OXYGEN SATURATION: 98 % | SYSTOLIC BLOOD PRESSURE: 114 MMHG | HEART RATE: 68 BPM | WEIGHT: 107.2 LBS | HEIGHT: 67 IN | RESPIRATION RATE: 16 BRPM | DIASTOLIC BLOOD PRESSURE: 82 MMHG

## 2020-02-20 LAB
ALBUMIN SERPL-MCNC: 3.6 G/DL (ref 3.5–5.2)
ALBUMIN/GLOB SERPL: 1.6 G/DL
ALP SERPL-CCNC: 74 U/L (ref 39–117)
ALT SERPL W P-5'-P-CCNC: 14 U/L (ref 1–33)
ANION GAP SERPL CALCULATED.3IONS-SCNC: 13 MMOL/L (ref 5–15)
ANION GAP SERPL CALCULATED.3IONS-SCNC: 9 MMOL/L (ref 5–15)
AST SERPL-CCNC: 13 U/L (ref 1–32)
BASOPHILS # BLD AUTO: 0.02 10*3/MM3 (ref 0–0.2)
BASOPHILS NFR BLD AUTO: 0.2 % (ref 0–1.5)
BILIRUB SERPL-MCNC: 0.3 MG/DL (ref 0.2–1.2)
BUN BLD-MCNC: 20 MG/DL (ref 6–20)
BUN BLD-MCNC: 24 MG/DL (ref 6–20)
BUN/CREAT SERPL: 22 (ref 7–25)
BUN/CREAT SERPL: 22.7 (ref 7–25)
CALCIUM SPEC-SCNC: 7.7 MG/DL (ref 8.6–10.5)
CALCIUM SPEC-SCNC: 7.8 MG/DL (ref 8.6–10.5)
CHLORIDE SERPL-SCNC: 112 MMOL/L (ref 98–107)
CHLORIDE SERPL-SCNC: 113 MMOL/L (ref 98–107)
CO2 SERPL-SCNC: 16 MMOL/L (ref 22–29)
CO2 SERPL-SCNC: 17 MMOL/L (ref 22–29)
CREAT BLD-MCNC: 0.88 MG/DL (ref 0.57–1)
CREAT BLD-MCNC: 1.09 MG/DL (ref 0.57–1)
DEPRECATED RDW RBC AUTO: 43.5 FL (ref 37–54)
EOSINOPHIL # BLD AUTO: 0.09 10*3/MM3 (ref 0–0.4)
EOSINOPHIL NFR BLD AUTO: 1.1 % (ref 0.3–6.2)
ERYTHROCYTE [DISTWIDTH] IN BLOOD BY AUTOMATED COUNT: 13 % (ref 12.3–15.4)
GFR SERPL CREATININE-BSD FRML MDRD: 53 ML/MIN/1.73
GFR SERPL CREATININE-BSD FRML MDRD: 68 ML/MIN/1.73
GLOBULIN UR ELPH-MCNC: 2.2 GM/DL
GLUCOSE BLD-MCNC: 75 MG/DL (ref 65–99)
GLUCOSE BLD-MCNC: 92 MG/DL (ref 65–99)
HCT VFR BLD AUTO: 32.1 % (ref 34–46.6)
HGB BLD-MCNC: 10.6 G/DL (ref 12–15.9)
IMM GRANULOCYTES # BLD AUTO: 0.02 10*3/MM3 (ref 0–0.05)
IMM GRANULOCYTES NFR BLD AUTO: 0.2 % (ref 0–0.5)
LYMPHOCYTES # BLD AUTO: 4.46 10*3/MM3 (ref 0.7–3.1)
LYMPHOCYTES NFR BLD AUTO: 53.9 % (ref 19.6–45.3)
MCH RBC QN AUTO: 30.3 PG (ref 26.6–33)
MCHC RBC AUTO-ENTMCNC: 33 G/DL (ref 31.5–35.7)
MCV RBC AUTO: 91.7 FL (ref 79–97)
MONOCYTES # BLD AUTO: 0.52 10*3/MM3 (ref 0.1–0.9)
MONOCYTES NFR BLD AUTO: 6.3 % (ref 5–12)
MYOGLOBIN SERPL-MCNC: 49.3 NG/ML (ref 25–58)
NEUTROPHILS # BLD AUTO: 3.16 10*3/MM3 (ref 1.7–7)
NEUTROPHILS NFR BLD AUTO: 38.3 % (ref 42.7–76)
NRBC BLD AUTO-RTO: 0 /100 WBC (ref 0–0.2)
PLATELET # BLD AUTO: 231 10*3/MM3 (ref 140–450)
PMV BLD AUTO: 10.3 FL (ref 6–12)
POTASSIUM BLD-SCNC: 3.3 MMOL/L (ref 3.5–5.2)
POTASSIUM BLD-SCNC: 3.9 MMOL/L (ref 3.5–5.2)
PROT SERPL-MCNC: 5.8 G/DL (ref 6–8.5)
RBC # BLD AUTO: 3.5 10*6/MM3 (ref 3.77–5.28)
SODIUM BLD-SCNC: 138 MMOL/L (ref 136–145)
SODIUM BLD-SCNC: 142 MMOL/L (ref 136–145)
WBC NRBC COR # BLD: 8.27 10*3/MM3 (ref 3.4–10.8)

## 2020-02-20 PROCEDURE — 96365 THER/PROPH/DIAG IV INF INIT: CPT

## 2020-02-20 PROCEDURE — 25010000002 HYDROMORPHONE PER 4 MG: Performed by: INTERNAL MEDICINE

## 2020-02-20 PROCEDURE — 25010000003 POTASSIUM CHLORIDE 10 MEQ/100ML SOLUTION: Performed by: INTERNAL MEDICINE

## 2020-02-20 PROCEDURE — 96376 TX/PRO/DX INJ SAME DRUG ADON: CPT

## 2020-02-20 PROCEDURE — 85025 COMPLETE CBC W/AUTO DIFF WBC: CPT | Performed by: INTERNAL MEDICINE

## 2020-02-20 PROCEDURE — G0378 HOSPITAL OBSERVATION PER HR: HCPCS

## 2020-02-20 PROCEDURE — 25810000003 SODIUM CHLORIDE 0.9 % WITH KCL 20 MEQ 20-0.9 MEQ/L-% SOLUTION: Performed by: INTERNAL MEDICINE

## 2020-02-20 PROCEDURE — 96366 THER/PROPH/DIAG IV INF ADDON: CPT

## 2020-02-20 PROCEDURE — 80053 COMPREHEN METABOLIC PANEL: CPT | Performed by: INTERNAL MEDICINE

## 2020-02-20 PROCEDURE — 25010000002 ONDANSETRON PER 1 MG: Performed by: INTERNAL MEDICINE

## 2020-02-20 PROCEDURE — 25010000002 PROMETHAZINE PER 50 MG: Performed by: INTERNAL MEDICINE

## 2020-02-20 RX ORDER — POTASSIUM CHLORIDE 750 MG/1
40 CAPSULE, EXTENDED RELEASE ORAL AS NEEDED
Status: DISCONTINUED | OUTPATIENT
Start: 2020-02-20 | End: 2020-02-20 | Stop reason: HOSPADM

## 2020-02-20 RX ORDER — POTASSIUM CHLORIDE 1.5 G/1.77G
40 POWDER, FOR SOLUTION ORAL AS NEEDED
Status: DISCONTINUED | OUTPATIENT
Start: 2020-02-20 | End: 2020-02-20 | Stop reason: HOSPADM

## 2020-02-20 RX ORDER — METOCLOPRAMIDE 10 MG/1
10 TABLET ORAL
Qty: 120 TABLET | Refills: 2 | Status: ON HOLD | OUTPATIENT
Start: 2020-02-20 | End: 2020-03-09 | Stop reason: ALTCHOICE

## 2020-02-20 RX ORDER — POTASSIUM CHLORIDE 7.45 MG/ML
10 INJECTION INTRAVENOUS
Status: DISCONTINUED | OUTPATIENT
Start: 2020-02-20 | End: 2020-02-20 | Stop reason: HOSPADM

## 2020-02-20 RX ORDER — HYDROCODONE BITARTRATE AND ACETAMINOPHEN 5; 325 MG/1; MG/1
1 TABLET ORAL EVERY 6 HOURS PRN
Status: DISCONTINUED | OUTPATIENT
Start: 2020-02-20 | End: 2020-02-20 | Stop reason: HOSPADM

## 2020-02-20 RX ADMIN — AMITRIPTYLINE HYDROCHLORIDE 100 MG: 100 TABLET, FILM COATED ORAL at 00:08

## 2020-02-20 RX ADMIN — SODIUM CHLORIDE 1000 ML: 9 INJECTION, SOLUTION INTRAVENOUS at 08:18

## 2020-02-20 RX ADMIN — PROMETHAZINE HYDROCHLORIDE 12.5 MG: 25 INJECTION INTRAMUSCULAR; INTRAVENOUS at 10:56

## 2020-02-20 RX ADMIN — POTASSIUM CHLORIDE 10 MEQ: 7.46 INJECTION, SOLUTION INTRAVENOUS at 02:25

## 2020-02-20 RX ADMIN — LEVETIRACETAM 500 MG: 500 TABLET ORAL at 00:08

## 2020-02-20 RX ADMIN — HYDROMORPHONE HYDROCHLORIDE 0.5 MG: 1 INJECTION, SOLUTION INTRAMUSCULAR; INTRAVENOUS; SUBCUTANEOUS at 10:55

## 2020-02-20 RX ADMIN — POTASSIUM CHLORIDE 10 MEQ: 7.46 INJECTION, SOLUTION INTRAVENOUS at 00:08

## 2020-02-20 RX ADMIN — BUSPIRONE HYDROCHLORIDE 7.5 MG: 5 TABLET ORAL at 08:16

## 2020-02-20 RX ADMIN — POTASSIUM CHLORIDE AND SODIUM CHLORIDE 125 ML/HR: 900; 150 INJECTION, SOLUTION INTRAVENOUS at 08:15

## 2020-02-20 RX ADMIN — VENLAFAXINE HYDROCHLORIDE 150 MG: 75 CAPSULE, EXTENDED RELEASE ORAL at 08:16

## 2020-02-20 RX ADMIN — ROSUVASTATIN CALCIUM 10 MG: 10 TABLET, FILM COATED ORAL at 00:08

## 2020-02-20 RX ADMIN — SUCRALFATE 2 G: 1 SUSPENSION ORAL at 00:08

## 2020-02-20 RX ADMIN — LEVETIRACETAM 500 MG: 500 TABLET ORAL at 08:16

## 2020-02-20 RX ADMIN — METOPROLOL SUCCINATE 100 MG: 100 TABLET, FILM COATED, EXTENDED RELEASE ORAL at 00:08

## 2020-02-20 RX ADMIN — SODIUM CHLORIDE, PRESERVATIVE FREE 10 ML: 5 INJECTION INTRAVENOUS at 00:09

## 2020-02-20 RX ADMIN — ASPIRIN 81 MG: 81 TABLET ORAL at 08:16

## 2020-02-20 RX ADMIN — POTASSIUM CHLORIDE 10 MEQ: 7.46 INJECTION, SOLUTION INTRAVENOUS at 04:15

## 2020-02-20 RX ADMIN — PANTOPRAZOLE SODIUM 40 MG: 40 TABLET, DELAYED RELEASE ORAL at 00:12

## 2020-02-20 RX ADMIN — HYDROMORPHONE HYDROCHLORIDE 0.5 MG: 1 INJECTION, SOLUTION INTRAMUSCULAR; INTRAVENOUS; SUBCUTANEOUS at 00:57

## 2020-02-20 RX ADMIN — HYDROMORPHONE HYDROCHLORIDE 0.5 MG: 1 INJECTION, SOLUTION INTRAMUSCULAR; INTRAVENOUS; SUBCUTANEOUS at 06:17

## 2020-02-20 RX ADMIN — ONDANSETRON HYDROCHLORIDE 4 MG: 2 SOLUTION INTRAMUSCULAR; INTRAVENOUS at 00:57

## 2020-02-20 RX ADMIN — POTASSIUM CHLORIDE AND SODIUM CHLORIDE 125 ML/HR: 900; 150 INJECTION, SOLUTION INTRAVENOUS at 00:08

## 2020-02-20 RX ADMIN — BUSPIRONE HYDROCHLORIDE 7.5 MG: 5 TABLET ORAL at 00:08

## 2020-02-20 RX ADMIN — ONDANSETRON HYDROCHLORIDE 4 MG: 2 SOLUTION INTRAMUSCULAR; INTRAVENOUS at 06:17

## 2020-02-20 RX ADMIN — POTASSIUM CHLORIDE 10 MEQ: 7.46 INJECTION, SOLUTION INTRAVENOUS at 05:47

## 2020-02-20 RX ADMIN — PANTOPRAZOLE SODIUM 40 MG: 40 TABLET, DELAYED RELEASE ORAL at 08:16

## 2020-02-20 NOTE — PROGRESS NOTES
Malnutrition Severity Assessment    Patient Name:  Kamryn Oliva  YOB: 1970  MRN: [id number]  Admit Date:  2/19/2020    Patient meets criteria for : Severe Malnutrition(generalized weakness)    Comments:  Please attest this note if you agree with this assessment. Thanks!    Malnutrition Severity Assessment  Malnutrition Type: Chronic Disease - Related Malnutrition     Malnutrition Type (last 8 hours)      Malnutrition Severity Assessment     Row Name 02/20/20 1420       Malnutrition Severity Assessment    Malnutrition Type  Chronic Disease - Related Malnutrition    Row Name 02/20/20 1420       Insufficient Energy Intake     Insufficient Energy Intake   <75% of est. energy requirement for > or equal to 3 months    Row Name 02/20/20 1420       Unintentional Weight Loss     Unintentional Weight Loss   Weight loss of 10% in six months    Row Name 02/20/20 1420       Muscle Loss    Loss of Muscle Mass Findings  Severe    [place] Region  Severe - deep hollowing/scooping, lack of muscle to touch, facial bones well defined    Clavicle Bone Region  Severe - protruding prominent bone    Acromion Bone Region  Severe - squared shoulders, bones, and acromion process protrusion prominent    Scapular Bone Region  Severe - prominent bones, depressions easily visible between ribs, scapula, spine, shoulders    Dorsal Hand Region  Moderate - slight depression    Patellar Region  Severe - prominent bone, square looking, very little muscle definition    Row Name 02/20/20 1420       Fat Loss    Subcutaneous Fat Loss Findings  Severe    Orbital Region   Severe - pronounced hollowness/depression, dark circles, loose saggy skin    Upper Arm Region  Moderate - some fat tissue, not ample    Thoracic & Lumbar Region  Severe - ribs visible with prominent depressions, iliac crest very prominent    Row Name 02/20/20 1420       Criteria Met (Must meet criteria for severity in at least 2 of these categories: M Wasting, Fat Loss,  Fluid, Secondary Signs, Wt. Status, Intake)    Patient meets criteria for   Severe Malnutrition generalized weakness          Electronically signed by:  Rebecca Haas RDN, ANDI  02/20/20 2:30 PM

## 2020-02-20 NOTE — PLAN OF CARE
Problem: Patient Care Overview  Goal: Plan of Care Review  Outcome: Ongoing (interventions implemented as appropriate)  Flowsheets (Taken 2/20/2020 1430)  Progress: no change  Plan of Care Reviewed With: patient  Outcome Summary: Diet has been advanced to full liquids. At the time of RD visit, she had not received a tray yet. She reports to this RD that she is not really feeling any better. She reports that she has steadily been losing weight over the last 4 years. Per weight records, she has lost ~12# in the last 6-7 months. She has chronic abdominal pain r/t gastroparesis. She has been having N/V/and constipation. She does use oral nutrition supplements at home. Will send Boost Plus BID. Provided pt with coupoons for Boost to use at home. Malnutrition assessment completed and sent to MD. She is underweight with a BMI of 16.79. Will cont to follow for nutrition needs. She was also advised of alternate food selections as needed.

## 2020-02-20 NOTE — DISCHARGE SUMMARY
[facility] Medicine [facility]  DISCHARGE SUMMARY       Date of Admission: 2/19/2020  Date of Discharge:  2/20/2020  Primary Care Physician: Kory Jones, DO    Discharge Diagnoses:  Active Hospital Problems    Diagnosis   • **Abdominal pain   • Gastroparesis   • Hypokalemia   • Lumbar facet arthropathy   • Irritable bowel syndrome with both constipation and diarrhea   • HTN (hypertension)   • KRISHNA (acute kidney injury) (CMS/[facility])         Presenting Problem/History of Present Illness:  Generalized abdominal pain [R10.84]  Generalized abdominal pain [R10.84]     Chief Complaint on Day of Discharge:   Abdominal discomfort    History of Present Illness on Day of Discharge:   The patient's abdominal discomfort is significantly improved particularly after having had a bowel movement.  Lactic acid level has returned to normal.  CBC is unremarkable.  Calcium is slightly low but her corrected calcium is 8.5 which is within normal limits.  Hemoglobin 10.6 and CBC otherwise within normal limits.  Creatinine is improved to 0.88.  Patient suffers from gastroparesis.  She has not had a GI transit study to confirm that diagnosis.  I have recommended that she follow-up next week with Dr. Jones for further work-up.  I suspect she would need a formal GI transit study to establish the diagnosis of gastroparesis.  If that is negative then a CT angiogram of the intra-abdominal vasculature to assess mesenteric artery patency might be in order as the patient does express food fear.  Patient is stable for discharge home with an outpatient work-up with Dr. Jones.    Hospital Course  Patient is a 50-year-old female past medical history of chronic abdominal pain gastroparesis listed as IBS as well presents with a two-week history of intermittent persistent nausea and vomiting as well as constipation.  She states that over the last 48 hours she has been feeling significantly worse with more episodes of  nausea vomiting she does state that she took laxatives and had a large hard stool.  She states this was followed by bright red blood when she wiped she also has a history of internal hemorrhoids.  She states that her pain is more on the right side than anywhere else.  She has not had any fevers or chills.  She also has a history of renal failure when she gets into 1 of these episodes which she states happens about every 6 months.  She had labs obtained which do show a slightly elevated white count without any left shift, her lactic acid is slightly elevated at 2.1, potassium is low at 2.8 anion gap is slightly elevated at 22 and her bicarb is low at 15 magnesium and phosphorus are normal creatinine is elevated at 1.6 with a BUN of 30.  CT scan of the abdomen was obtained which shows no acute findings we have been asked to admit her for further evaluation management she has been bolused IV fluids as well as given IV potassium in the ER.   1.  Acute kidney injury due to persistent nausea vomiting plan is to admit the patient n.p.o. status IV fluids for hydration we will replace her potassium as well IV.  She probably needs a bicarbonate however I am going to recheck her labs after not she is gotten fluid boluses and see if this is improved if not will switch her over to a bicarb drip with IV potassium supplementation separate.  Otherwise saline with 20 of K as her IV fluids.  Recheck labs in a.m.  2. Persistent nausea vomiting abdominal pain n.p.o. IV fluids bowel rest consider upper GI with small bowel follow-through if symptoms persist and/or GI or surgical consultation.  I do not see anything currently acute that I think would warrant these consultations.  Defer to ultimate admitting team.  3.  Hypertension blood pressure is significantly elevated at times I believe this is in a large degree due to pain will resume her home medications monitor BP opiates currently for pain control for the next 24 hours adjust blood  pressure medications if needed.  4.  Lactic acidosis probably related to prolonged nausea and vomiting I see no evidence of an infection she did screen sepsis positive I do not believe the patient is sepsis does not warrant antibiotics will trend lactic acids after fluid challenges.      Pertinent Test Results:   Lab Results (last 7 days)     Procedure Component Value Units Date/Time    Myoglobin, Serum [[id number]]  (Normal) Collected:  02/19/20 1937    Specimen:  Blood Updated:  02/20/20 1331     Myoglobin 49.3 ng/mL     Narrative:       Results may be falsely decreased if patient taking Biotin.      Blood Culture - Blood, Arm, Left [[id number]] Collected:  02/19/20 1930    Specimen:  Blood from Arm, Left Updated:  02/20/20 0845     Blood Culture No growth at less than 24 hours    Blood Culture - Blood, Arm, Right [[id number]] Collected:  02/19/20 2043    Specimen:  Blood from Arm, Right Updated:  02/20/20 0845     Blood Culture No growth at less than 24 hours    Comprehensive Metabolic Panel [[id number]]  (Abnormal) Collected:  02/20/20 0700    Specimen:  Blood Updated:  02/20/20 0828     Glucose 75 mg/dL      BUN 20 mg/dL      Creatinine 0.88 mg/dL      Sodium 138 mmol/L      Potassium 3.9 mmol/L      Chloride 112 mmol/L      CO2 17.0 mmol/L      Calcium 7.7 mg/dL      Total Protein 5.8 g/dL      Albumin 3.60 g/dL      ALT (SGPT) 14 U/L      AST (SGOT) 13 U/L      Alkaline Phosphatase 74 U/L      Total Bilirubin 0.3 mg/dL      eGFR Non African Amer 68 mL/min/1.73      Globulin 2.2 gm/dL      A/G Ratio 1.6 g/dL      BUN/Creatinine Ratio 22.7     Anion Gap 9.0 mmol/L     Narrative:       GFR Normal >60  Chronic Kidney Disease <60  Kidney Failure <15      CBC Auto Differential [[id number]]  (Abnormal) Collected:  02/20/20 0700    Specimen:  Blood Updated:  02/20/20 0811     WBC 8.27 10*3/mm3      RBC 3.50 10*6/mm3      Hemoglobin 10.6 g/dL      Hematocrit 32.1 %      MCV 91.7 fL      MCH 30.3 pg      MCHC 33.0 g/dL       RDW 13.0 %      RDW-SD 43.5 fl      MPV 10.3 fL      Platelets 231 10*3/mm3      Neutrophil % 38.3 %      Lymphocyte % 53.9 %      Monocyte % 6.3 %      Eosinophil % 1.1 %      Basophil % 0.2 %      Immature Grans % 0.2 %      Neutrophils, Absolute 3.16 10*3/mm3      Lymphocytes, Absolute 4.46 10*3/mm3      Monocytes, Absolute 0.52 10*3/mm3      Eosinophils, Absolute 0.09 10*3/mm3      Basophils, Absolute 0.02 10*3/mm3      Immature Grans, Absolute 0.02 10*3/mm3      nRBC 0.0 /100 WBC     Basic Metabolic Panel [[id number]]  (Abnormal) Collected:  02/20/20 0015    Specimen:  Blood Updated:  02/20/20 0039     Glucose 92 mg/dL      BUN 24 mg/dL      Creatinine 1.09 mg/dL      Sodium 142 mmol/L      Potassium 3.3 mmol/L      Chloride 113 mmol/L      CO2 16.0 mmol/L      Calcium 7.8 mg/dL      eGFR Non African Amer 53 mL/min/1.73      BUN/Creatinine Ratio 22.0     Anion Gap 13.0 mmol/L     Narrative:       GFR Normal >60  Chronic Kidney Disease <60  Kidney Failure <15      Lactic Acid, Reflex [[id number]]  (Normal) Collected:  02/19/20 2326    Specimen:  Blood Updated:  02/19/20 2344     Lactate 0.8 mmol/L     TSH [[id number]]  (Normal) Collected:  02/19/20 1937    Specimen:  Blood Updated:  02/19/20 2342     TSH 1.570 uIU/mL     T4, Free [[id number]]  (Normal) Collected:  02/19/20 1937    Specimen:  Blood Updated:  02/19/20 2342     Free T4 1.05 ng/dL     Narrative:       Results may be falsely increased if patient taking Biotin.      Lactic Acid, Reflex Timer (This will reflex a repeat order 3-3:15 hours after ordered.) [[id number]] Collected:  02/19/20 1937    Specimen:  Blood Updated:  02/19/20 2315     Hold Tube Hold for add-ons.     Comment: Auto resulted.       Urinalysis, Microscopic Only - Urine, Catheter In/Out [[id number]]  (Abnormal) Collected:  02/19/20 2023    Specimen:  Urine, Catheter In/Out Updated:  02/19/20 2102     RBC, UA None Seen /HPF      WBC, UA 3-5 /HPF      Bacteria, UA Trace /HPF      Squamous  Epithelial Cells, UA 3-6 /HPF      Hyaline Casts, UA None Seen /LPF      Methodology Manual Light Microscopy    Urinalysis With Culture If Indicated - Urine, Catheter In/Out [[id number]]  (Abnormal) Collected:  02/19/20 2023    Specimen:  Urine, Catheter In/Out Updated:  02/19/20 2046     Color, UA Yellow     Appearance, UA Clear     pH, UA <=5.0     Specific Gravity, UA 1.023     Glucose, UA Negative     Ketones, UA 15 mg/dL (1+)     Bilirubin, UA Negative     Blood, UA Negative     Protein, UA 30 mg/dL (1+)     Leuk Esterase, UA Negative     Nitrite, UA Negative     Urobilinogen, UA 0.2 E.U./dL    [place] Draw [[id number]] Collected:  02/19/20 1937    Specimen:  Blood Updated:  02/19/20 2045    Narrative:       The following orders were created for panel order [place] Draw.  Procedure                               Abnormality         Status                     ---------                               -----------         ------                     Light Blue Top[[id number]]                                   Final result               Green Top (Gel)[[id number]]                                  Final result               Lavender Top[[id number]]                                     Final result               Red Top[[id number]]                                          Final result                 Please view results for these tests on the individual orders.    Green Top (Gel) [[id number]] Collected:  02/19/20 1937    Specimen:  Blood Updated:  02/19/20 2045     Extra Tube Hold for add-ons.     Comment: Auto resulted.       Light Blue Top [[id number]] Collected:  02/19/20 1937    Specimen:  Blood Updated:  02/19/20 2045     Extra Tube hold for add-on     Comment: Auto resulted       Lavender Top [[id number]] Collected:  02/19/20 1937    Specimen:  Blood Updated:  02/19/20 2045     Extra Tube hold for add-on     Comment: Auto resulted       Red Top [[id number]] Collected:  02/19/20 1937    Specimen:  Blood Updated:  02/19/20 2045      Extra Tube Hold for add-ons.     Comment: Auto resulted.       Comprehensive Metabolic Panel [[id number]]  (Abnormal) Collected:  02/19/20 1937    Specimen:  Blood Updated:  02/19/20 2013     Glucose 134 mg/dL      BUN 30 mg/dL      Creatinine 1.60 mg/dL      Sodium 138 mmol/L      Potassium 2.8 mmol/L      Chloride 101 mmol/L      CO2 15.0 mmol/L      Calcium 10.1 mg/dL      Total Protein 8.4 g/dL      Albumin 5.10 g/dL      ALT (SGPT) 23 U/L      AST (SGOT) 20 U/L      Alkaline Phosphatase 113 U/L      Total Bilirubin 0.2 mg/dL      eGFR Non African Amer 34 mL/min/1.73      Globulin 3.3 gm/dL      A/G Ratio 1.5 g/dL      BUN/Creatinine Ratio 18.8     Anion Gap 22.0 mmol/L     Narrative:       GFR Normal >60  Chronic Kidney Disease <60  Kidney Failure <15      Lipase [[id number]]  (Normal) Collected:  02/19/20 1937    Specimen:  Blood Updated:  02/19/20 2013     Lipase 17 U/L     Amylase [[id number]]  (Normal) Collected:  02/19/20 1937    Specimen:  Blood Updated:  02/19/20 2013     Amylase 56 U/L     Troponin [[id number]]  (Normal) Collected:  02/19/20 1937    Specimen:  Blood Updated:  02/19/20 2013     Troponin T <0.010 ng/mL     Narrative:       Troponin T Reference Range:  <= 0.03 ng/mL-   Negative for AMI  >0.03 ng/mL-     Abnormal for myocardial necrosis.  Clinicians would have to utilize clinical acumen, EKG, Troponin and serial changes to determine if it is an Acute Myocardial Infarction or myocardial injury due to an underlying chronic condition.       Results may be falsely decreased if patient taking Biotin.      CK [[id number]]  (Normal) Collected:  02/19/20 1937    Specimen:  Blood Updated:  02/19/20 2013     Creatine Kinase 41 U/L     Magnesium [[id number]]  (Normal) Collected:  02/19/20 1937    Specimen:  Blood Updated:  02/19/20 2013     Magnesium 1.7 mg/dL     Phosphorus [[id number]]  (Normal) Collected:  02/19/20 1937    Specimen:  Blood Updated:  02/19/20 2013     Phosphorus 3.0 mg/dL     Lactic  Acid, Plasma [[id number]]  (Abnormal) Collected:  02/19/20 1937    Specimen:  Blood Updated:  02/19/20 2007     Lactate 2.1 mmol/L     Protime-INR [[id number]]  (Normal) Collected:  02/19/20 1937    Specimen:  Blood Updated:  02/19/20 1956     Protime 13.3 Seconds      INR 0.98    CBC & Differential [[id number]] Collected:  02/19/20 1937    Specimen:  Blood Updated:  02/19/20 1948    Narrative:       The following orders were created for panel order CBC & Differential.  Procedure                               Abnormality         Status                     ---------                               -----------         ------                     CBC Auto Differential[[id number]]        Abnormal            Final result                 Please view results for these tests on the individual orders.    CBC Auto Differential [[id number]]  (Abnormal) Collected:  02/19/20 1937    Specimen:  Blood Updated:  02/19/20 1948     WBC 13.18 10*3/mm3      RBC 5.09 10*6/mm3      Hemoglobin 15.2 g/dL      Hematocrit 43.9 %      MCV 86.2 fL      MCH 29.9 pg      MCHC 34.6 g/dL      RDW 12.7 %      RDW-SD 40.3 fl      MPV 9.8 fL      Platelets 399 10*3/mm3      Neutrophil % 56.7 %      Lymphocyte % 35.7 %      Monocyte % 6.2 %      Eosinophil % 0.7 %      Basophil % 0.3 %      Immature Grans % 0.4 %      Neutrophils, Absolute 7.47 10*3/mm3      Lymphocytes, Absolute 4.71 10*3/mm3      Monocytes, Absolute 0.82 10*3/mm3      Eosinophils, Absolute 0.09 10*3/mm3      Basophils, Absolute 0.04 10*3/mm3      Immature Grans, Absolute 0.05 10*3/mm3      nRBC 0.0 /100 WBC         Imaging Results (Last 7 Days)     Procedure Component Value Units Date/Time    CT Abdomen Pelvis Without Contrast [[id number]] Collected:  02/19/20 2118     Updated:  02/19/20 2123    Narrative:       EXAMINATION: CT ABDOMEN PELVIS WO CONTRAST-      2/19/2020 9:07 PM CST     HISTORY: abd pain/ right flank pain/ vomiting     In order to have a CT radiation dose as low as reasonably  "achievable  Automated Exposure Control was utilized for adjustment of the mA and/or  KV according to patient size.     DLP in mGycm= 218.     Noncontrast abdomen pelvis CT.     Comparison is made with 09/15/2019.     Normal heart size.  No acute abnormality at the lung bases.     Cholecystectomy clips.  Normal liver, pancreas, and spleen.     Normal and symmetric adrenal glands and kidneys.  Small nonobstructing stones noted within each kidney. No hydronephrosis  or ureteral stone.     No bowel dilation.     No pelvic mass or fluid.     Summary:  1. No acute abnormality.                                   This report was finalized on 02/19/2020 21:20 by Dr. Hunter Avendano MD.    XR Chest 1 View [[id number]] Collected:  02/19/20 1952     Updated:  02/19/20 1955    Narrative:       EXAMINATION: XR CHEST 1 VW-     2/19/2020 7:50 PM CST     HISTORY: generalized weakness     1 view chest x-ray compared with 11/30/2018.     Heart size is normal.  The mediastinum is within normal limits.      The lungs are normally expanded with no pneumonia or pneumothorax.     No congestive failure changes.                                                                       Impression:       1. No acute disease.        This report was finalized on 02/19/2020 19:52 by Dr. Hunter Avendano MD.            Condition on Discharge:    Stable    Physical Exam on Discharge:  /82 (BP Location: Right arm, Patient Position: Lying)   Pulse 68   Temp 98 °F (36.7 °C) (Oral)   Resp 16   Ht 170.2 cm (67\")   Wt 48.6 kg (107 lb 3.2 oz)   LMP  (LMP Unknown)   SpO2 98%   BMI 16.79 kg/m²   Physical Exam  Gen.:  Well-nourished well-developed white female in no acute distress  HEENT: Atraumatic, normocephalic.  Pupils equal, round, and reactive to light. Extraocular movements intact.  Sclerae anicteric.  TMs negative.  Mucous membranes moist.  Neck is supple without lymphadenopathy.  No JVD is noted.  No carotid bruits are auscultated.  Oropharynx is " without erythema or exudate.   Chest: Clear to auscultation and percussion.  CV: Regular rate and rhythm.  Normal S1-S2.  No gallops, murmurs. or rubs.  Abdomen: Soft, nontender, nondistended.  Active bowel sounds,  No hepatosplenomegaly.  No masses.  No hernias.  Extremities: No clubbing, edema, or cyanosis.  Capillary refill is normal.  Pulses are 2+ and symmetric at radial and dorsalis pedis.  Posterior tibial pulses are intact and 2+ palpable.  Neuro: Patient is awake, alert, and oriented ×3.  Cranial nerves II through XII are grossly intact.  Motor is 5 out of 5 bilaterally.  DTRs are 2+ and symmetric bilaterally. Sensory exam is nonfocal  Skin: Warm, dry, and intact.  No evidence of breakdown.  Sensorium: Normal thought and affect    Discharge Disposition:  Home or Self Care    Discharge Medications:     Discharge Medications      New Medications      Instructions Start Date   metoclopramide 10 MG tablet  Commonly known as:  REGLAN   10 mg, Oral, 4 Times Daily Before Meals & Nightly         Continue These Medications      Instructions Start Date   amitriptyline 100 MG tablet  Commonly known as:  ELAVIL   100 mg, Oral, Nightly      amLODIPine 10 MG tablet  Commonly known as:  NORVASC   10 mg, Oral, Daily      aspirin 81 MG EC tablet   81 mg, Oral, Daily      busPIRone 7.5 MG tablet  Commonly known as:  BUSPAR   7.5 mg, Oral, 3 Times Daily      cloNIDine 0.1 MG tablet  Commonly known as:  CATAPRES   TAKE 1 TABLET BY MOUTH EVERY 4 HOURS AS NEEDED FOR HIGH BLOOD PRESSURE.      cloNIDine 0.1 MG tablet  Commonly known as:  CATAPRES   0.1 mg, Oral, Every 4 Hours PRN      fluticasone 50 MCG/ACT nasal spray  Commonly known as:  FLONASE   2 sprays, Nasal, Daily      levETIRAcetam 500 MG tablet  Commonly known as:  KEPPRA   500 mg, Oral, 2 Times Daily      megestrol 40 MG tablet  Commonly known as:  MEGACE   40 mg, Oral, Daily      metoprolol succinate  MG 24 hr tablet  Commonly known as:  TOPROL-XL   100 mg, Oral,  Nightly      ondansetron ODT 4 MG disintegrating tablet  Commonly known as:  ZOFRAN-ODT   4-8 mg, Oral, Every 8 Hours PRN      pantoprazole 40 MG EC tablet  Commonly known as:  PROTONIX   40 mg, Oral, 2 Times Daily      promethazine 25 MG tablet  Commonly known as:  PHENERGAN   TAKE 1 TABLET BY MOUTH EVERY 6 HOURS AS NEEDED FOR NAUSEA OR VOMITING.*PATIENT NEEDS AN APPT FOR FURTHER REFILLS*      rosuvastatin 10 MG tablet  Commonly known as:  CRESTOR   10 mg, Oral, Nightly      sucralfate 1 GM/10ML suspension  Commonly known as:  CARAFATE   2 g, Oral, Nightly      venlafaxine  MG 24 hr capsule  Commonly known as:  EFFEXOR-XR   150 mg, Oral, Daily             Discharge Diet:   Diet Instructions     Advance Diet As Tolerated            Discharge Care Plan / Instructions:   Discharge home    Activity at Discharge:   Activity Instructions     Activity as Tolerated            Follow-up Appointments:  Follow-up with Dr. Jones next week for further work-up       Ziggy Sharif DO  02/20/20  4:23 PM    Time: Discharge less than 30 min    Please note that part of this note may be an electronic transcription/translation of spoken language to printed text using the Dragon Dictation System. Efforts were made to edit the dictations, but occasionally words are mistranscribed.

## 2020-02-20 NOTE — H&P
[facility] Medicine Services  HISTORY AND PHYSICAL    Date of Admission: 2/19/2020  Primary Care Physician: Kory Joens DO    Subjective     Chief Complaint: Abdominal pain nausea vomiting    History of Present Illness  Patient is a 50-year-old female past medical history of chronic abdominal pain gastroparesis listed as IBS as well presents with a two-week history of intermittent persistent nausea and vomiting as well as constipation.  She states that over the last 48 hours she has been feeling significantly worse with more episodes of nausea vomiting she does state that she took laxatives and had a large hard stool.  She states this was followed by bright red blood when she wiped she also has a history of internal hemorrhoids.  She states that her pain is more on the right side than anywhere else.  She has not had any fevers or chills.  She also has a history of renal failure when she gets into 1 of these episodes which she states happens about every 6 months.  She had labs obtained which do show a slightly elevated white count without any left shift, her lactic acid is slightly elevated at 2.1, potassium is low at 2.8 anion gap is slightly elevated at 22 and her bicarb is low at 15 magnesium and phosphorus are normal creatinine is elevated at 1.6 with a BUN of 30.  CT scan of the abdomen was obtained which shows no acute findings we have been asked to admit her for further evaluation management she has been bolused IV fluids as well as given IV potassium in the ER.      Review of Systems   14 point review of systems negative except for as per HPI    Otherwise complete ROS reviewed and negative except as mentioned in the HPI.    Past Medical History:   Past Medical History:   Diagnosis Date   • Acute kidney failure (CMS/HCC)    • Anxiety    • Bowel obstruction (CMS/HCC)    • Constipation    • Depression    • GERD (gastroesophageal reflux disease)    • H/O gastric ulcer      • Headache    • History of transfusion    • Hypertension    • IBS (irritable bowel syndrome)    • Insomnia    • Kidney stone    • Maravilla maravilla disease    • Pseudocholinesterase deficiency    • Rapid weight loss    • SBO (small bowel obstruction) (CMS/HCC)    • Stroke (CMS/HCC)     X 2   • UTI (urinary tract infection)     CHRONIC, SEPTIC X2   • Volvulosis      Past Surgical History:  Past Surgical History:   Procedure Laterality Date   • APPENDECTOMY      COMPLICATION OF SEPTIC   • AUGMENTATION MAMMAPLASTY     • BRAIN SURGERY      x2   • BREAST AUGMENTATION BILATERAL MASTOPEXY     • BREAST LUMPECTOMY Left 3/9/2017    Procedure: EXCISION LEFT BREAST LESION AND LEFT AXILLARY LYMPH NODE BIOPSY;  Surgeon: Evi Farley MD;  Location: [facility] OR;  Service:    • BREAST SURGERY     •  SECTION     • CHOLECYSTECTOMY     • CHOLECYSTECTOMY WITH INTRAOPERATIVE CHOLANGIOGRAM N/A 2018    Procedure: CHOLECYSTECTOMY LAPAROSCOPIC INTRAOPERATIVE CHOLANGIOGRAM;  Surgeon: Antonio Salvador MD;  Location: [facility] OR;  Service: General   • COLON SURGERY     • COLONOSCOPY  2007    normal   • COLONOSCOPY N/A 11/10/2016    Procedure: COLONOSCOPY WITH ANESTHESIA;  Surgeon: Camilo Hanson MD;  Location: [facility] ENDOSCOPY;  Service:    • COLONOSCOPY N/A 2018    Procedure: COLONOSCOPY WITH ANESTHESIA;  Surgeon: Camilo Hanson MD;  Location: [facility] ENDOSCOPY;  Service:    • ENDOSCOPY  2009    antral ulcer   • ENDOSCOPY N/A 10/10/2016    Procedure: ESOPHAGOGASTRODUODENOSCOPY WITH ANESTHESIA;  Surgeon: Camilo Hanson MD;  Location: [facility] ENDOSCOPY;  Service:    • ENDOSCOPY N/A 2017    Procedure: ESOPHAGOGASTRODUODENOSCOPY;  Surgeon: Camilo Hanson MD;  Location: [facility] ENDOSCOPY;  Service:    • ENDOSCOPY N/A 2017    Procedure: ESOPHAGOGASTRODUODENOSCOPY WITH ANESTHESIA;  Surgeon: Camilo Hanson MD;  Location: [facility] ENDOSCOPY;  Service:    • ENDOSCOPY N/A 2018    Procedure:  ESOPHAGOGASTRODUODENOSCOPY ;  Surgeon: Camilo Hanson MD;  Location: [facility] ENDOSCOPY;  Service:    • ENDOSCOPY N/A 9/16/2019    Procedure: ESOPHAGOGASTRODUODENOSCOPY WITH ANESTHESIA;  Surgeon: Katarina Davis MD;  Location: [facility] ENDOSCOPY;  Service: Gastroenterology   • HERNIA REPAIR     • HYSTERECTOMY     • SMALL INTESTINE SURGERY N/A 03/2016   • TONSILLECTOMY       Social History:  reports that she quit smoking about 3 years ago. Her smoking use included cigarettes and electronic cigarette. She has a 15.00 pack-year smoking history. She has never used smokeless tobacco. She reports that she does not drink alcohol or use drugs.    Family History: family history includes Breast cancer in her paternal grandmother; Cancer in her maternal grandfather and paternal grandfather; Dementia in her mother and paternal grandmother; Hypertension in her mother; No Known Problems in her brother, brother, father, maternal aunt, maternal grandmother, paternal aunt, sister, son, and son.       Allergies:  Allergies   Allergen Reactions   • Anectine [Succinylcholine Chloride] Other (See Comments) and Rash     Hard to wake up   • Stadol [Butorphanol] Hives   • Butorphanol Tartrate Rash     Pt states she does not recall being allergic     Medications:  Prior to Admission medications    Medication Sig Start Date End Date Taking? Authorizing Provider   amitriptyline (ELAVIL) 100 MG tablet Take 1 tablet by mouth Every Night. 12/18/19   Kory Jones, DO   amLODIPine (NORVASC) 10 MG tablet Take 1 tablet by mouth Daily. 6/18/19   Kory Jones, DO   aspirin 81 MG EC tablet Take 81 mg by mouth Daily.    ProviderRamana MD   busPIRone (BUSPAR) 7.5 MG tablet Take 7.5 mg by mouth 3 (Three) Times a Day.    ProviderRamana MD   cloNIDine (CATAPRES) 0.1 MG tablet TAKE 1 TABLET BY MOUTH EVERY 4 HOURS AS NEEDED FOR HIGH BLOOD PRESSURE. 12/5/19   Kory Jones DO   cloNIDine (CATAPRES) 0.1 MG tablet Take 1 tablet by  "mouth Every 4 (Four) Hours As Needed for High Blood Pressure. 1/14/20   Kory Jones, DO   fluticasone (FLONASE) 50 MCG/ACT nasal spray 2 sprays into the nostril(s) as directed by provider Daily. 6/16/19   Kory Jones DO   levETIRAcetam (KEPPRA) 500 MG tablet Take 1 tablet by mouth 2 (Two) Times a Day. 12/7/19   Kory Jones DO   megestrol (MEGACE) 40 MG tablet Take 1 tablet by mouth Daily. 9/18/19   Gilberto Mcguire MD   metoprolol succinate XL (TOPROL-XL) 100 MG 24 hr tablet TAKE 1 TABLET BY MOUTH EVERY NIGHT. 9/20/19   Kory Jones DO   ondansetron ODT (ZOFRAN-ODT) 4 MG disintegrating tablet Take 1-2 tablets by mouth Every 8 (Eight) Hours As Needed for Nausea. 7/22/19   Kory Jones DO   pantoprazole (PROTONIX) 40 MG EC tablet Take 1 tablet by mouth 2 (Two) Times a Day. 6/18/19   Kory Jones DO   promethazine (PHENERGAN) 25 MG tablet TAKE 1 TABLET BY MOUTH EVERY 6 HOURS AS NEEDED FOR NAUSEA OR VOMITING.*PATIENT NEEDS AN APPT FOR FURTHER REFILLS* 1/31/20   Kory Jones DO   rosuvastatin (CRESTOR) 10 MG tablet Take 1 tablet by mouth Every Night. 9/18/19   Gilberto Mcguire MD   sucralfate (CARAFATE) 1 GM/10ML suspension Take 20 mL by mouth Every Night. 9/18/19   Gilberto Mcguire MD   venlafaxine XR (EFFEXOR-XR) 150 MG 24 hr capsule Take 150 mg by mouth Daily.    Provider, MD Ramana     Objective     Vital Signs: BP (!) 143/105 (BP Location: Left arm, Patient Position: Lying) Comment: nurse notified  Pulse 116   Temp 98.8 °F (37.1 °C) (Oral)   Resp 16   Ht 170.2 cm (67\")   Wt 48.6 kg (107 lb 3.2 oz)   LMP  (LMP Unknown)   SpO2 100%   BMI 16.79 kg/m²   Physical Exam  Gen.:  Well-nourished well-developed white female in no acute distress  HEENT: Atraumatic, normocephalic.  Pupils equal, round, and reactive to light. Extraocular movements intact.  Sclerae anicteric.  TMs negative.  Mucous membranes moist.  Neck is supple without lymphadenopathy.  No JVD is noted.  No " carotid bruits are auscultated.  Oropharynx is without erythema or exudate.   Chest: Clear to auscultation and percussion.  CV: Regular rate and rhythm.  Normal S1-S2.  No gallops, murmurs. or rubs.  Abdomen: Soft, nontender, nondistended.  Active bowel sounds,  No hepatosplenomegaly.  No masses.  No hernias.  Extremities: No clubbing, edema, or cyanosis.  Capillary refill is normal.  Pulses are 2+ and symmetric at radial and dorsalis pedis.  Posterior tibial pulses are intact and 2+ palpable.  Neuro: Patient is awake, alert, and oriented ×3.  Cranial nerves II through XII are grossly intact.  Motor is 5 out of 5 bilaterally.  DTRs are 2+ and symmetric bilaterally. Sensory exam is nonfocal  Skin: Warm, dry, and intact.  No evidence of breakdown.  Sensorium: Normal thought and affect    Nursing notes and vital signs reviewedsorium: Normal thought and affect    Nursing notes and vital signs reviewed        Results Reviewed:  Lab Results (last 24 hours)     Procedure Component Value Units Date/Time    Lactic Acid, Reflex [[id number]]  (Normal) Collected:  02/19/20 2326    Specimen:  Blood Updated:  02/19/20 2344     Lactate 0.8 mmol/L     TSH [[id number]]  (Normal) Collected:  02/19/20 1937    Specimen:  Blood Updated:  02/19/20 2342     TSH 1.570 uIU/mL     T4, Free [[id number]]  (Normal) Collected:  02/19/20 1937    Specimen:  Blood Updated:  02/19/20 2342     Free T4 1.05 ng/dL     Narrative:       Results may be falsely increased if patient taking Biotin.      Lactic Acid, Reflex Timer (This will reflex a repeat order 3-3:15 hours after ordered.) [[id number]] Collected:  02/19/20 1937    Specimen:  Blood Updated:  02/19/20 2315     Hold Tube Hold for add-ons.     Comment: Auto resulted.       Urinalysis, Microscopic Only - Urine, Catheter In/Out [[id number]]  (Abnormal) Collected:  02/19/20 2023    Specimen:  Urine, Catheter In/Out Updated:  02/19/20 2102     RBC, UA None Seen /HPF      WBC, UA 3-5 /HPF      Bacteria,  UA Trace /HPF      Squamous Epithelial Cells, UA 3-6 /HPF      Hyaline Casts, UA None Seen /LPF      Methodology Manual Light Microscopy    Urinalysis With Culture If Indicated - Urine, Catheter In/Out [[id number]]  (Abnormal) Collected:  02/19/20 2023    Specimen:  Urine, Catheter In/Out Updated:  02/19/20 2046     Color, UA Yellow     Appearance, UA Clear     pH, UA <=5.0     Specific Gravity, UA 1.023     Glucose, UA Negative     Ketones, UA 15 mg/dL (1+)     Bilirubin, UA Negative     Blood, UA Negative     Protein, UA 30 mg/dL (1+)     Leuk Esterase, UA Negative     Nitrite, UA Negative     Urobilinogen, UA 0.2 E.U./dL    [place] Draw [[id number]] Collected:  02/19/20 1937    Specimen:  Blood Updated:  02/19/20 2045    Narrative:       The following orders were created for panel order [place] Draw.  Procedure                               Abnormality         Status                     ---------                               -----------         ------                     Light Blue Top[[id number]]                                   Final result               Green Top (Gel)[[id number]]                                  Final result               Lavender Top[[id number]]                                     Final result               Red Top[[id number]]                                          Final result                 Please view results for these tests on the individual orders.    Green Top (Gel) [[id number]] Collected:  02/19/20 1937    Specimen:  Blood Updated:  02/19/20 2045     Extra Tube Hold for add-ons.     Comment: Auto resulted.       Light Blue Top [[id number]] Collected:  02/19/20 1937    Specimen:  Blood Updated:  02/19/20 2045     Extra Tube hold for add-on     Comment: Auto resulted       Lavender Top [[id number]] Collected:  02/19/20 1937    Specimen:  Blood Updated:  02/19/20 2045     Extra Tube hold for add-on     Comment: Auto resulted       Red Top [[id number]] Collected:  02/19/20 1937    Specimen:   Blood Updated:  02/19/20 2045     Extra Tube Hold for add-ons.     Comment: Auto resulted.       Blood Culture - Blood, Arm, Right [[id number]] Collected:  02/19/20 2043    Specimen:  Blood from Arm, Right Updated:  02/19/20 2036    Blood Culture - Blood, Arm, Left [[id number]] Collected:  02/19/20 1930    Specimen:  Blood from Arm, Left Updated:  02/19/20 2036    Comprehensive Metabolic Panel [[id number]]  (Abnormal) Collected:  02/19/20 1937    Specimen:  Blood Updated:  02/19/20 2013     Glucose 134 mg/dL      BUN 30 mg/dL      Creatinine 1.60 mg/dL      Sodium 138 mmol/L      Potassium 2.8 mmol/L      Chloride 101 mmol/L      CO2 15.0 mmol/L      Calcium 10.1 mg/dL      Total Protein 8.4 g/dL      Albumin 5.10 g/dL      ALT (SGPT) 23 U/L      AST (SGOT) 20 U/L      Alkaline Phosphatase 113 U/L      Total Bilirubin 0.2 mg/dL      eGFR Non African Amer 34 mL/min/1.73      Globulin 3.3 gm/dL      A/G Ratio 1.5 g/dL      BUN/Creatinine Ratio 18.8     Anion Gap 22.0 mmol/L     Narrative:       GFR Normal >60  Chronic Kidney Disease <60  Kidney Failure <15      Lipase [[id number]]  (Normal) Collected:  02/19/20 1937    Specimen:  Blood Updated:  02/19/20 2013     Lipase 17 U/L     Amylase [[id number]]  (Normal) Collected:  02/19/20 1937    Specimen:  Blood Updated:  02/19/20 2013     Amylase 56 U/L     Troponin [[id number]]  (Normal) Collected:  02/19/20 1937    Specimen:  Blood Updated:  02/19/20 2013     Troponin T <0.010 ng/mL     Narrative:       Troponin T Reference Range:  <= 0.03 ng/mL-   Negative for AMI  >0.03 ng/mL-     Abnormal for myocardial necrosis.  Clinicians would have to utilize clinical acumen, EKG, Troponin and serial changes to determine if it is an Acute Myocardial Infarction or myocardial injury due to an underlying chronic condition.       Results may be falsely decreased if patient taking Biotin.      CK [[id number]]  (Normal) Collected:  02/19/20 1937    Specimen:  Blood Updated:  02/19/20 2013      Creatine Kinase 41 U/L     Magnesium [[id number]]  (Normal) Collected:  02/19/20 1937    Specimen:  Blood Updated:  02/19/20 2013     Magnesium 1.7 mg/dL     Phosphorus [[id number]]  (Normal) Collected:  02/19/20 1937    Specimen:  Blood Updated:  02/19/20 2013     Phosphorus 3.0 mg/dL     Lactic Acid, Plasma [[id number]]  (Abnormal) Collected:  02/19/20 1937    Specimen:  Blood Updated:  02/19/20 2007     Lactate 2.1 mmol/L     Protime-INR [[id number]]  (Normal) Collected:  02/19/20 1937    Specimen:  Blood Updated:  02/19/20 1956     Protime 13.3 Seconds      INR 0.98    CBC & Differential [[id number]] Collected:  02/19/20 1937    Specimen:  Blood Updated:  02/19/20 1948    Narrative:       The following orders were created for panel order CBC & Differential.  Procedure                               Abnormality         Status                     ---------                               -----------         ------                     CBC Auto Differential[[id number]]        Abnormal            Final result                 Please view results for these tests on the individual orders.    CBC Auto Differential [[id number]]  (Abnormal) Collected:  02/19/20 1937    Specimen:  Blood Updated:  02/19/20 1948     WBC 13.18 10*3/mm3      RBC 5.09 10*6/mm3      Hemoglobin 15.2 g/dL      Hematocrit 43.9 %      MCV 86.2 fL      MCH 29.9 pg      MCHC 34.6 g/dL      RDW 12.7 %      RDW-SD 40.3 fl      MPV 9.8 fL      Platelets 399 10*3/mm3      Neutrophil % 56.7 %      Lymphocyte % 35.7 %      Monocyte % 6.2 %      Eosinophil % 0.7 %      Basophil % 0.3 %      Immature Grans % 0.4 %      Neutrophils, Absolute 7.47 10*3/mm3      Lymphocytes, Absolute 4.71 10*3/mm3      Monocytes, Absolute 0.82 10*3/mm3      Eosinophils, Absolute 0.09 10*3/mm3      Basophils, Absolute 0.04 10*3/mm3      Immature Grans, Absolute 0.05 10*3/mm3      nRBC 0.0 /100 WBC     Myoglobin, Serum [[id number]] Collected:  02/19/20 1937    Specimen:  Blood Updated:   02/19/20 1944        Imaging Results (Last 24 Hours)     Procedure Component Value Units Date/Time    CT Abdomen Pelvis Without Contrast [[id number]] Collected:  02/19/20 2118     Updated:  02/19/20 2123    Narrative:       EXAMINATION: CT ABDOMEN PELVIS WO CONTRAST-      2/19/2020 9:07 PM CST     HISTORY: abd pain/ right flank pain/ vomiting     In order to have a CT radiation dose as low as reasonably achievable  Automated Exposure Control was utilized for adjustment of the mA and/or  KV according to patient size.     DLP in mGycm= 218.     Noncontrast abdomen pelvis CT.     Comparison is made with 09/15/2019.     Normal heart size.  No acute abnormality at the lung bases.     Cholecystectomy clips.  Normal liver, pancreas, and spleen.     Normal and symmetric adrenal glands and kidneys.  Small nonobstructing stones noted within each kidney. No hydronephrosis  or ureteral stone.     No bowel dilation.     No pelvic mass or fluid.     Summary:  1. No acute abnormality.                                   This report was finalized on 02/19/2020 21:20 by Dr. Hunter Avendano MD.    XR Chest 1 View [[id number]] Collected:  02/19/20 1952     Updated:  02/19/20 1955    Narrative:       EXAMINATION: XR CHEST 1 VW-     2/19/2020 7:50 PM CST     HISTORY: generalized weakness     1 view chest x-ray compared with 11/30/2018.     Heart size is normal.  The mediastinum is within normal limits.      The lungs are normally expanded with no pneumonia or pneumothorax.     No congestive failure changes.                                                                       Impression:       1. No acute disease.        This report was finalized on 02/19/2020 19:52 by Dr. Hunter Avendano MD.        I have personally reviewed and interpreted the radiology studies and ECG obtained at time of admission.     Assessment / Plan     Assessment:   Active Hospital Problems    Diagnosis   • **Abdominal pain   • Hypokalemia   • Gastroparesis   • Lumbar  facet arthropathy   • Irritable bowel syndrome with both constipation and diarrhea   • HTN (hypertension)   • KRISHNA (acute kidney injury) (CMS/HCC)   1.  Acute kidney injury due to persistent nausea vomiting plan is to admit the patient n.p.o. status IV fluids for hydration we will replace her potassium as well IV.  She probably needs a bicarbonate however I am going to recheck her labs after not she is gotten fluid boluses and see if this is improved if not will switch her over to a bicarb drip with IV potassium supplementation separate.  Otherwise saline with 20 of K as her IV fluids.  Recheck labs in a.m.  2. Persistent nausea vomiting abdominal pain n.p.o. IV fluids bowel rest consider upper GI with small bowel follow-through if symptoms persist and/or GI or surgical consultation.  I do not see anything currently acute that I think would warrant these consultations.  Defer to ultimate admitting team.  3.  Hypertension blood pressure is significantly elevated at times I believe this is in a large degree due to pain will resume her home medications monitor BP opiates currently for pain control for the next 24 hours adjust blood pressure medications if needed.  4.  Lactic acidosis probably related to prolonged nausea and vomiting I see no evidence of an infection she did screen sepsis positive I do not believe the patient is sepsis does not warrant antibiotics will trend lactic acids after fluid challenges.    Patient seen prior to midnight:          Code Status: DNR/DNI     I discussed the patient's findings and my recommendations with the patient.  She designates her son's girlfriend as her surrogate healthcare decision maker.    Estimated length of stay 1-2 nights.    Patient seen and examined by me on February 19, 2020 at 10:40 PM.    Abdi Vickers MD   02/19/20   11:45 PM

## 2020-02-20 NOTE — PLAN OF CARE
Problem: Patient Care Overview  Goal: Plan of Care Review  Outcome: Ongoing (interventions implemented as appropriate)  Flowsheets (Taken 2/20/2020 6743)  Progress: no change  Plan of Care Reviewed With: patient  Outcome Summary:     Pt admitted this shift with c/o abd pain and n/v per hospitalist. IV pain meds given x1 since arrival to the floor and dose of Zofran given. Cont pulse ox placed. IVF initiated per order. Potassium runs x4 given on floor and x1 per ER for K+ of 2.8. Up ad edgard. Voiding per BRP. Tele in place running NSR. BP inially elevated upon arrival, much better this am. NPO except sips and chips. VSS. Will cont to monitor.

## 2020-02-20 NOTE — ED PROVIDER NOTES
Subjective   Patient is a 50-year-old white female presents emergency department abdominal pain, vomiting, and decreased urination for the past 2 weeks.  Patient states that her symptoms have been much worse over the past 2 days.  She states she was constipated and took laxatives and had a large stool and had bright red blood which has resolved at this time.  She does have a history of renal insufficiency and states she was hospitalized in September 2019 with [facility].  She states that she has noticed that she has had decreased urination over the past 2 weeks as well.  He states today that her right flank pain become much worse.  She states she is vomited about 5 times today.  She does have a history of gastroparesis, small bowel obstruction and ulcer disease.  Patient states that she has not seen her PCP for the symptoms recently.  She states that she came to the hospital because she was much worse over the past few days.  She denies any known fever.      History provided by:  Patient   used: No        Review of Systems   Constitutional: Negative.    HENT: Negative.    Eyes: Negative.    Respiratory: Negative.    Cardiovascular: Negative.    Gastrointestinal:        Patient is a 50-year-old white female presents emergency department abdominal pain, vomiting, and decreased urination for the past 2 weeks.  Patient states that her symptoms have been much worse over the past 2 days.  She states she was constipated and took laxatives and had a large stool and had bright red blood which has resolved at this time.  She does have a history of renal insufficiency and states she was hospitalized in September 2019 with [facility].  She states that she has noticed that she has had decreased urination over the past 2 weeks as well.  He states today that her right flank pain become much worse.  She states she is vomited about 5 times today.  She does have a history of gastroparesis, small bowel obstruction and ulcer  disease.  Patient states that she has not seen her PCP for the symptoms recently.  She states that she came to the hospital because she was much worse over the past few days.  She denies any known fever.     Endocrine: Negative.    Genitourinary: Negative.    Musculoskeletal: Negative.    Skin: Negative.    Allergic/Immunologic: Negative.    Neurological: Negative.    Hematological: Negative.    Psychiatric/Behavioral: Negative.    All other systems reviewed and are negative.      Past Medical History:   Diagnosis Date   • Acute kidney failure (CMS/HCC)    • Anxiety    • Bowel obstruction (CMS/HCC)    • Constipation    • Depression    • GERD (gastroesophageal reflux disease)    • H/O gastric ulcer    • Headache    • History of transfusion    • Hypertension    • IBS (irritable bowel syndrome)    • Insomnia    • Kidney stone    • Maravilla maravilla disease    • Pseudocholinesterase deficiency    • Rapid weight loss    • SBO (small bowel obstruction) (CMS/HCC)    • Stroke (CMS/HCC)     X 2   • UTI (urinary tract infection)     CHRONIC, SEPTIC X2   • Volvulosis        Allergies   Allergen Reactions   • Anectine [Succinylcholine Chloride] Other (See Comments) and Rash     Hard to wake up   • Stadol [Butorphanol] Hives   • Butorphanol Tartrate Rash     Pt states she does not recall being allergic       Past Surgical History:   Procedure Laterality Date   • APPENDECTOMY      COMPLICATION OF SEPTIC   • AUGMENTATION MAMMAPLASTY     • BRAIN SURGERY      x2   • BREAST AUGMENTATION BILATERAL MASTOPEXY     • BREAST LUMPECTOMY Left 3/9/2017    Procedure: EXCISION LEFT BREAST LESION AND LEFT AXILLARY LYMPH NODE BIOPSY;  Surgeon: Evi Farley MD;  Location: [facility] OR;  Service:    • BREAST SURGERY     •  SECTION     • CHOLECYSTECTOMY     • CHOLECYSTECTOMY WITH INTRAOPERATIVE CHOLANGIOGRAM N/A 2018    Procedure: CHOLECYSTECTOMY LAPAROSCOPIC INTRAOPERATIVE CHOLANGIOGRAM;  Surgeon: Antonio Salvador MD;  Location: [facility] OR;   Service: General   • COLON SURGERY     • COLONOSCOPY  02/28/2007    normal   • COLONOSCOPY N/A 11/10/2016    Procedure: COLONOSCOPY WITH ANESTHESIA;  Surgeon: Camilo Hanson MD;  Location: [facility] ENDOSCOPY;  Service:    • COLONOSCOPY N/A 2/2/2018    Procedure: COLONOSCOPY WITH ANESTHESIA;  Surgeon: Camilo Hanson MD;  Location: [facility] ENDOSCOPY;  Service:    • ENDOSCOPY  09/2009    antral ulcer   • ENDOSCOPY N/A 10/10/2016    Procedure: ESOPHAGOGASTRODUODENOSCOPY WITH ANESTHESIA;  Surgeon: Camilo Hanson MD;  Location: [facility] ENDOSCOPY;  Service:    • ENDOSCOPY N/A 1/25/2017    Procedure: ESOPHAGOGASTRODUODENOSCOPY;  Surgeon: Camilo Hanson MD;  Location: [facility] ENDOSCOPY;  Service:    • ENDOSCOPY N/A 8/8/2017    Procedure: ESOPHAGOGASTRODUODENOSCOPY WITH ANESTHESIA;  Surgeon: aCmilo Hanson MD;  Location: [facility] ENDOSCOPY;  Service:    • ENDOSCOPY N/A 2/13/2018    Procedure: ESOPHAGOGASTRODUODENOSCOPY ;  Surgeon: Camilo Hanson MD;  Location: [facility] ENDOSCOPY;  Service:    • ENDOSCOPY N/A 9/16/2019    Procedure: ESOPHAGOGASTRODUODENOSCOPY WITH ANESTHESIA;  Surgeon: Katarina Davis MD;  Location: [facility] ENDOSCOPY;  Service: Gastroenterology   • HERNIA REPAIR     • HYSTERECTOMY     • SMALL INTESTINE SURGERY N/A 03/2016   • TONSILLECTOMY         Family History   Problem Relation Age of Onset   • Cancer Maternal Grandfather    • Hypertension Mother    • Dementia Mother    • No Known Problems Father    • No Known Problems Sister    • No Known Problems Brother    • No Known Problems Son    • No Known Problems Brother    • No Known Problems Son    • No Known Problems Maternal Grandmother    • Breast cancer Paternal Grandmother    • Dementia Paternal Grandmother    • No Known Problems Maternal Aunt    • No Known Problems Paternal Aunt    • Cancer Paternal Grandfather    • Colon cancer Neg Hx    • Colon polyps Neg Hx    • BRCA 1/2 Neg Hx    • Endometrial cancer Neg Hx    • Ovarian cancer Neg Hx               Social History     Socioeconomic History   • Marital status:      Spouse name: Not on file   • Number of children: Not on file   • Years of education: Not on file   • Highest education level: Not on file   Tobacco Use   • Smoking status: Former Smoker     Packs/day: 1.00     Years: 15.00     Pack years: 15.00     Types: Cigarettes, Electronic Cigarette     Last attempt to quit: 3/10/2016     Years since quitting: 3.9   • Smokeless tobacco: Never Used   Substance and Sexual Activity   • Alcohol use: No     Frequency: Never     Comment: occasional   • Drug use: No   • Sexual activity: Never     Partners: Male     Birth control/protection: Surgical       Prior to Admission medications    Medication Sig Start Date End Date Taking? Authorizing Provider   amitriptyline (ELAVIL) 100 MG tablet Take 1 tablet by mouth Every Night. 12/18/19   Kory Jones, DO   amLODIPine (NORVASC) 10 MG tablet Take 1 tablet by mouth Daily. 6/18/19   Kory Jones, DO   aspirin 81 MG EC tablet Take 81 mg by mouth Daily.    ProviderRamana MD   busPIRone (BUSPAR) 7.5 MG tablet Take 7.5 mg by mouth 3 (Three) Times a Day.    Provider, MD Ramana   cloNIDine (CATAPRES) 0.1 MG tablet TAKE 1 TABLET BY MOUTH EVERY 4 HOURS AS NEEDED FOR HIGH BLOOD PRESSURE. 12/5/19   Kory Jones DO   cloNIDine (CATAPRES) 0.1 MG tablet Take 1 tablet by mouth Every 4 (Four) Hours As Needed for High Blood Pressure. 1/14/20   Kory Jones DO   fluticasone (FLONASE) 50 MCG/ACT nasal spray 2 sprays into the nostril(s) as directed by provider Daily. 6/16/19   Kory Jones DO   levETIRAcetam (KEPPRA) 500 MG tablet Take 1 tablet by mouth 2 (Two) Times a Day. 12/7/19   Kory Jones DO   megestrol (MEGACE) 40 MG tablet Take 1 tablet by mouth Daily. 9/18/19   Gilberto Mcguire MD   metoprolol succinate XL (TOPROL-XL) 100 MG 24 hr tablet TAKE 1 TABLET BY MOUTH EVERY NIGHT. 9/20/19   Kory Jones DO   ondansetron ODT  "(ZOFRAN-ODT) 4 MG disintegrating tablet Take 1-2 tablets by mouth Every 8 (Eight) Hours As Needed for Nausea. 7/22/19   Kory Jones DO   pantoprazole (PROTONIX) 40 MG EC tablet Take 1 tablet by mouth 2 (Two) Times a Day. 6/18/19   Kory Jones DO   promethazine (PHENERGAN) 25 MG tablet TAKE 1 TABLET BY MOUTH EVERY 6 HOURS AS NEEDED FOR NAUSEA OR VOMITING.*PATIENT NEEDS AN APPT FOR FURTHER REFILLS* 1/31/20   Kory Jones DO   rosuvastatin (CRESTOR) 10 MG tablet Take 1 tablet by mouth Every Night. 9/18/19   Gilberto Mcguire MD   sucralfate (CARAFATE) 1 GM/10ML suspension Take 20 mL by mouth Every Night. 9/18/19   Gilberto Mcguire MD   venlafaxine XR (EFFEXOR-XR) 150 MG 24 hr capsule Take 150 mg by mouth Daily.    Provider, MD Ramana       BP (!) 143/105 (BP Location: Left arm, Patient Position: Lying) Comment: nurse notified  Pulse 116   Temp 98.8 °F (37.1 °C) (Oral)   Resp 16   Ht 170.2 cm (67\")   Wt 48.6 kg (107 lb 3.2 oz)   LMP  (LMP Unknown)   SpO2 100%   BMI 16.79 kg/m²     Objective   Physical Exam   Constitutional: She is oriented to person, place, and time. She appears well-developed and well-nourished.   Appears ill.  Patient appears to be in pain.  She is pale.  Her heart rate is 133.   HENT:   Head: Normocephalic and atraumatic.   Eyes: Pupils are equal, round, and reactive to light. Conjunctivae and EOM are normal.   Neck: Normal range of motion. Neck supple. No tracheal deviation present. No thyromegaly present.   Cardiovascular: Normal rate, regular rhythm, normal heart sounds and intact distal pulses.   Pulmonary/Chest: Effort normal and breath sounds normal. No respiratory distress. She has no wheezes. She has no rales. She exhibits no tenderness.   Abdominal: Soft. Bowel sounds are normal.   Moderate amount of right lower quadrant tenderness on palpation as well as right CVA tenderness.  No guarding or rebound is noted.   Musculoskeletal: Normal range of motion.   "   Neurological: She is alert and oriented to person, place, and time. She has normal reflexes. No cranial nerve deficit.   Skin: Skin is warm and dry.   Mild pallor noted   Psychiatric: She has a normal mood and affect. Her behavior is normal. Judgment and thought content normal.   Nursing note and vitals reviewed.      Procedures         Lab Results (last 24 hours)     Procedure Component Value Units Date/Time    Blood Culture - Blood, Arm, Left [[id number]] Collected:  02/19/20 1930    Specimen:  Blood from Arm, Left Updated:  02/19/20 2036    CBC & Differential [[id number]] Collected:  02/19/20 1937    Specimen:  Blood Updated:  02/19/20 1948    Narrative:       The following orders were created for panel order CBC & Differential.  Procedure                               Abnormality         Status                     ---------                               -----------         ------                     CBC Auto Differential[[id number]]        Abnormal            Final result                 Please view results for these tests on the individual orders.    Comprehensive Metabolic Panel [[id number]]  (Abnormal) Collected:  02/19/20 1937    Specimen:  Blood Updated:  02/19/20 2013     Glucose 134 mg/dL      BUN 30 mg/dL      Creatinine 1.60 mg/dL      Sodium 138 mmol/L      Potassium 2.8 mmol/L      Chloride 101 mmol/L      CO2 15.0 mmol/L      Calcium 10.1 mg/dL      Total Protein 8.4 g/dL      Albumin 5.10 g/dL      ALT (SGPT) 23 U/L      AST (SGOT) 20 U/L      Alkaline Phosphatase 113 U/L      Total Bilirubin 0.2 mg/dL      eGFR Non African Amer 34 mL/min/1.73      Globulin 3.3 gm/dL      A/G Ratio 1.5 g/dL      BUN/Creatinine Ratio 18.8     Anion Gap 22.0 mmol/L     Narrative:       GFR Normal >60  Chronic Kidney Disease <60  Kidney Failure <15      Protime-INR [[id number]]  (Normal) Collected:  02/19/20 1937    Specimen:  Blood Updated:  02/19/20 1956     Protime 13.3 Seconds      INR 0.98    Lipase [[id number]]   (Normal) Collected:  02/19/20 1937    Specimen:  Blood Updated:  02/19/20 2013     Lipase 17 U/L     Amylase [[id number]]  (Normal) Collected:  02/19/20 1937    Specimen:  Blood Updated:  02/19/20 2013     Amylase 56 U/L     Lactic Acid, Plasma [[id number]]  (Abnormal) Collected:  02/19/20 1937    Specimen:  Blood Updated:  02/19/20 2007     Lactate 2.1 mmol/L     Troponin [[id number]]  (Normal) Collected:  02/19/20 1937    Specimen:  Blood Updated:  02/19/20 2013     Troponin T <0.010 ng/mL     Narrative:       Troponin T Reference Range:  <= 0.03 ng/mL-   Negative for AMI  >0.03 ng/mL-     Abnormal for myocardial necrosis.  Clinicians would have to utilize clinical acumen, EKG, Troponin and serial changes to determine if it is an Acute Myocardial Infarction or myocardial injury due to an underlying chronic condition.       Results may be falsely decreased if patient taking Biotin.      CK [[id number]]  (Normal) Collected:  02/19/20 1937    Specimen:  Blood Updated:  02/19/20 2013     Creatine Kinase 41 U/L     Myoglobin, Serum [[id number]] Collected:  02/19/20 1937    Specimen:  Blood Updated:  02/19/20 1944    Magnesium [[id number]]  (Normal) Collected:  02/19/20 1937    Specimen:  Blood Updated:  02/19/20 2013     Magnesium 1.7 mg/dL     Phosphorus [[id number]]  (Normal) Collected:  02/19/20 1937    Specimen:  Blood Updated:  02/19/20 2013     Phosphorus 3.0 mg/dL     CBC Auto Differential [[id number]]  (Abnormal) Collected:  02/19/20 1937    Specimen:  Blood Updated:  02/19/20 1948     WBC 13.18 10*3/mm3      RBC 5.09 10*6/mm3      Hemoglobin 15.2 g/dL      Hematocrit 43.9 %      MCV 86.2 fL      MCH 29.9 pg      MCHC 34.6 g/dL      RDW 12.7 %      RDW-SD 40.3 fl      MPV 9.8 fL      Platelets 399 10*3/mm3      Neutrophil % 56.7 %      Lymphocyte % 35.7 %      Monocyte % 6.2 %      Eosinophil % 0.7 %      Basophil % 0.3 %      Immature Grans % 0.4 %      Neutrophils, Absolute 7.47 10*3/mm3      Lymphocytes, Absolute  4.71 10*3/mm3      Monocytes, Absolute 0.82 10*3/mm3      Eosinophils, Absolute 0.09 10*3/mm3      Basophils, Absolute 0.04 10*3/mm3      Immature Grans, Absolute 0.05 10*3/mm3      nRBC 0.0 /100 WBC     Lactic Acid, Reflex Timer (This will reflex a repeat order 3-3:15 hours after ordered.) [[id number]] Collected:  02/19/20 1937    Specimen:  Blood Updated:  02/19/20 2315     Hold Tube Hold for add-ons.     Comment: Auto resulted.       TSH [[id number]]  (Normal) Collected:  02/19/20 1937    Specimen:  Blood Updated:  02/19/20 2342     TSH 1.570 uIU/mL     T4, Free [[id number]]  (Normal) Collected:  02/19/20 1937    Specimen:  Blood Updated:  02/19/20 2342     Free T4 1.05 ng/dL     Narrative:       Results may be falsely increased if patient taking Biotin.      Urinalysis With Culture If Indicated - Urine, Catheter In/Out [[id number]]  (Abnormal) Collected:  02/19/20 2023    Specimen:  Urine, Catheter In/Out Updated:  02/19/20 2046     Color, UA Yellow     Appearance, UA Clear     pH, UA <=5.0     Specific Gravity, UA 1.023     Glucose, UA Negative     Ketones, UA 15 mg/dL (1+)     Bilirubin, UA Negative     Blood, UA Negative     Protein, UA 30 mg/dL (1+)     Leuk Esterase, UA Negative     Nitrite, UA Negative     Urobilinogen, UA 0.2 E.U./dL    Urinalysis, Microscopic Only - Urine, Catheter In/Out [[id number]]  (Abnormal) Collected:  02/19/20 2023    Specimen:  Urine, Catheter In/Out Updated:  02/19/20 2102     RBC, UA None Seen /HPF      WBC, UA 3-5 /HPF      Bacteria, UA Trace /HPF      Squamous Epithelial Cells, UA 3-6 /HPF      Hyaline Casts, UA None Seen /LPF      Methodology Manual Light Microscopy    Blood Culture - Blood, Arm, Right [[id number]] Collected:  02/19/20 2043    Specimen:  Blood from Arm, Right Updated:  02/19/20 2036    Lactic Acid, Reflex [[id number]]  (Normal) Collected:  02/19/20 2326    Specimen:  Blood Updated:  02/19/20 2344     Lactate 0.8 mmol/L     Basic Metabolic Panel [[id number]]  Collected:  02/20/20 0015    Specimen:  Blood Updated:  02/20/20 0020          CT Abdomen Pelvis Without Contrast   Final Result      XR Chest 1 View   Final Result   1. No acute disease.           This report was finalized on 02/19/2020 19:52 by Dr. Hunter Avendano MD.          ED Course  ED Course as of Feb 20 0031   Wed Feb 19, 2020   2155 Patient is doing better at this time.  She states she still having nausea but has had no further vomiting since being in the emergency department.  She was found to be hypokalemic and potassium has been ordered.  Her repeat vitals at this time her blood pressure is 129/90, heart rates 94, respirations 14, O2 sat is 98% on room air.  Call has been placed to hospitalist at this time for further.    [CW]   2216 Patient states that she has started have pain and nausea again at this time.  Have ordered repeat pain medication and Phenergan for nausea.  Have ordered a repeat fluid bolus as well.  Have spoken with Dr. Vickers hospitalist on call.  Has accepted for admission.  Patient will be admitted shortly in stable condition.although pt screened sepsis positive, feel that lactic was elevated due to multiple episodes of vomiting. Tachycardia responded to ivf hydration. Atbs have not been administered in the er - do not feel that this pt is septic     [CW]      ED Course User Index  [CW] Mabel Tomas, YOUNG          MDM  Number of Diagnoses or Management Options  Generalized abdominal pain: established and worsening  Hypokalemia: new and requires workup  Nausea and vomiting, intractability of vomiting not specified, unspecified vomiting type: established and worsening  Volume depletion: new and requires workup     Amount and/or Complexity of Data Reviewed  Clinical lab tests: ordered and reviewed  Tests in the radiology section of CPT®: ordered and reviewed  Decide to obtain previous medical records or to obtain history from someone other than the patient: yes    Patient  Progress  Patient progress: stable      Final diagnoses:   Generalized abdominal pain   Volume depletion   Nausea and vomiting, intractability of vomiting not specified, unspecified vomiting type   Hypokalemia          Mabel Tomas, APRN  02/20/20 0031

## 2020-02-24 LAB
BACTERIA SPEC AEROBE CULT: NORMAL
BACTERIA SPEC AEROBE CULT: NORMAL

## 2020-03-09 ENCOUNTER — APPOINTMENT (OUTPATIENT)
Dept: GENERAL RADIOLOGY | Facility: HOSPITAL | Age: 50
End: 2020-03-09

## 2020-03-09 ENCOUNTER — HOSPITAL ENCOUNTER (INPATIENT)
Facility: HOSPITAL | Age: 50
LOS: 4 days | Discharge: HOME HEALTH | End: 2020-03-13
Attending: EMERGENCY MEDICINE | Admitting: INTERNAL MEDICINE

## 2020-03-09 ENCOUNTER — DOCUMENTATION (OUTPATIENT)
Dept: ORTHOPEDIC SURGERY | Facility: HOSPITAL | Age: 50
End: 2020-03-09

## 2020-03-09 ENCOUNTER — APPOINTMENT (OUTPATIENT)
Dept: CT IMAGING | Facility: HOSPITAL | Age: 50
End: 2020-03-09

## 2020-03-09 DIAGNOSIS — I67.5 MOYA MOYA DISEASE: Chronic | ICD-10-CM

## 2020-03-09 DIAGNOSIS — M16.12 OSTEOARTHRITIS OF LEFT HIP: ICD-10-CM

## 2020-03-09 DIAGNOSIS — Z78.9 DECREASED ACTIVITIES OF DAILY LIVING (ADL): ICD-10-CM

## 2020-03-09 DIAGNOSIS — K58.2 IRRITABLE BOWEL SYNDROME WITH BOTH CONSTIPATION AND DIARRHEA: Chronic | ICD-10-CM

## 2020-03-09 DIAGNOSIS — S22.000A COMPRESSION FRACTURE OF THORACIC VERTEBRA, INITIAL ENCOUNTER, UNSPECIFIED THORACIC VERTEBRAL LEVEL: ICD-10-CM

## 2020-03-09 DIAGNOSIS — S72.002A CLOSED LEFT HIP FRACTURE, INITIAL ENCOUNTER (HCC): Primary | ICD-10-CM

## 2020-03-09 PROBLEM — S22.080A CLOSED WEDGE COMPRESSION FRACTURE OF T11 VERTEBRA (HCC): Status: ACTIVE | Noted: 2020-03-09

## 2020-03-09 LAB
ABO GROUP BLD: NORMAL
ALBUMIN SERPL-MCNC: 3.5 G/DL (ref 3.5–5.2)
ALBUMIN/GLOB SERPL: 1.3 G/DL
ALP SERPL-CCNC: 118 U/L (ref 39–117)
ALT SERPL W P-5'-P-CCNC: 11 U/L (ref 1–33)
AMPHET+METHAMPHET UR QL: NEGATIVE
AMPHETAMINES UR QL: NEGATIVE
ANION GAP SERPL CALCULATED.3IONS-SCNC: 19 MMOL/L (ref 5–15)
APTT PPP: 34.7 SECONDS (ref 24.1–35)
AST SERPL-CCNC: 19 U/L (ref 1–32)
BACTERIA UR QL AUTO: ABNORMAL /HPF
BARBITURATES UR QL SCN: NEGATIVE
BASOPHILS # BLD AUTO: 0.02 10*3/MM3 (ref 0–0.2)
BASOPHILS NFR BLD AUTO: 0.2 % (ref 0–1.5)
BENZODIAZ UR QL SCN: NEGATIVE
BILIRUB SERPL-MCNC: <0.2 MG/DL (ref 0.2–1.2)
BILIRUB UR QL STRIP: NEGATIVE
BLD GP AB SCN SERPL QL: NEGATIVE
BUN BLD-MCNC: 10 MG/DL (ref 6–20)
BUN/CREAT SERPL: 13.9 (ref 7–25)
BUPRENORPHINE SERPL-MCNC: NEGATIVE NG/ML
CALCIUM SPEC-SCNC: 8.5 MG/DL (ref 8.6–10.5)
CANNABINOIDS SERPL QL: NEGATIVE
CHLORIDE SERPL-SCNC: 99 MMOL/L (ref 98–107)
CLARITY UR: CLEAR
CO2 SERPL-SCNC: 20 MMOL/L (ref 22–29)
COCAINE UR QL: NEGATIVE
COLOR UR: YELLOW
CREAT BLD-MCNC: 0.72 MG/DL (ref 0.57–1)
DEPRECATED RDW RBC AUTO: 44 FL (ref 37–54)
EOSINOPHIL # BLD AUTO: 0.06 10*3/MM3 (ref 0–0.4)
EOSINOPHIL NFR BLD AUTO: 0.5 % (ref 0.3–6.2)
ERYTHROCYTE [DISTWIDTH] IN BLOOD BY AUTOMATED COUNT: 13.4 % (ref 12.3–15.4)
FERRITIN SERPL-MCNC: 547.9 NG/ML (ref 13–150)
GFR SERPL CREATININE-BSD FRML MDRD: 86 ML/MIN/1.73
GLOBULIN UR ELPH-MCNC: 2.8 GM/DL
GLUCOSE BLD-MCNC: 89 MG/DL (ref 65–99)
GLUCOSE UR STRIP-MCNC: NEGATIVE MG/DL
HCT VFR BLD AUTO: 30.2 % (ref 34–46.6)
HGB BLD-MCNC: 10.2 G/DL (ref 12–15.9)
HGB UR QL STRIP.AUTO: NEGATIVE
HOLD SPECIMEN: NORMAL
HYALINE CASTS UR QL AUTO: ABNORMAL /LPF
IMM GRANULOCYTES # BLD AUTO: 0.06 10*3/MM3 (ref 0–0.05)
IMM GRANULOCYTES NFR BLD AUTO: 0.5 % (ref 0–0.5)
INR PPP: 1 (ref 0.91–1.09)
IRON 24H UR-MRATE: 27 MCG/DL (ref 37–145)
IRON SATN MFR SERPL: 13 % (ref 20–50)
KETONES UR QL STRIP: ABNORMAL
LEUKOCYTE ESTERASE UR QL STRIP.AUTO: ABNORMAL
LIPASE SERPL-CCNC: 7 U/L (ref 13–60)
LYMPHOCYTES # BLD AUTO: 2 10*3/MM3 (ref 0.7–3.1)
LYMPHOCYTES NFR BLD AUTO: 17.4 % (ref 19.6–45.3)
MCH RBC QN AUTO: 30.1 PG (ref 26.6–33)
MCHC RBC AUTO-ENTMCNC: 33.8 G/DL (ref 31.5–35.7)
MCV RBC AUTO: 89.1 FL (ref 79–97)
METHADONE UR QL SCN: NEGATIVE
MONOCYTES # BLD AUTO: 0.72 10*3/MM3 (ref 0.1–0.9)
MONOCYTES NFR BLD AUTO: 6.3 % (ref 5–12)
NEUTROPHILS # BLD AUTO: 8.61 10*3/MM3 (ref 1.7–7)
NEUTROPHILS NFR BLD AUTO: 75.1 % (ref 42.7–76)
NITRITE UR QL STRIP: NEGATIVE
NRBC BLD AUTO-RTO: 0 /100 WBC (ref 0–0.2)
OPIATES UR QL: POSITIVE
OXYCODONE UR QL SCN: NEGATIVE
PCP UR QL SCN: NEGATIVE
PH UR STRIP.AUTO: 6.5 [PH] (ref 5–8)
PLATELET # BLD AUTO: 388 10*3/MM3 (ref 140–450)
PMV BLD AUTO: 9.9 FL (ref 6–12)
POTASSIUM BLD-SCNC: 2.7 MMOL/L (ref 3.5–5.2)
PROPOXYPH UR QL: NEGATIVE
PROT SERPL-MCNC: 6.3 G/DL (ref 6–8.5)
PROT UR QL STRIP: NEGATIVE
PROTHROMBIN TIME: 13.1 SECONDS (ref 11.9–14.6)
RBC # BLD AUTO: 3.39 10*6/MM3 (ref 3.77–5.28)
RBC # UR: ABNORMAL /HPF
REF LAB TEST METHOD: ABNORMAL
RETICS # AUTO: 0.06 10*6/MM3 (ref 0.02–0.13)
RETICS/RBC NFR AUTO: 1.84 % (ref 0.7–1.9)
RH BLD: POSITIVE
SODIUM BLD-SCNC: 138 MMOL/L (ref 136–145)
SP GR UR STRIP: 1.02 (ref 1–1.03)
SQUAMOUS #/AREA URNS HPF: ABNORMAL /HPF
T&S EXPIRATION DATE: NORMAL
TIBC SERPL-MCNC: 212 MCG/DL (ref 298–536)
TRANSFERRIN SERPL-MCNC: 142 MG/DL (ref 200–360)
TRICYCLICS UR QL SCN: POSITIVE
UROBILINOGEN UR QL STRIP: ABNORMAL
WBC NRBC COR # BLD: 11.47 10*3/MM3 (ref 3.4–10.8)
WBC UR QL AUTO: ABNORMAL /HPF
WHOLE BLOOD HOLD SPECIMEN: NORMAL
WHOLE BLOOD HOLD SPECIMEN: NORMAL

## 2020-03-09 PROCEDURE — 86850 RBC ANTIBODY SCREEN: CPT | Performed by: PHYSICIAN ASSISTANT

## 2020-03-09 PROCEDURE — 74177 CT ABD & PELVIS W/CONTRAST: CPT

## 2020-03-09 PROCEDURE — 80053 COMPREHEN METABOLIC PANEL: CPT | Performed by: EMERGENCY MEDICINE

## 2020-03-09 PROCEDURE — 73560 X-RAY EXAM OF KNEE 1 OR 2: CPT

## 2020-03-09 PROCEDURE — 83690 ASSAY OF LIPASE: CPT | Performed by: EMERGENCY MEDICINE

## 2020-03-09 PROCEDURE — 99285 EMERGENCY DEPT VISIT HI MDM: CPT

## 2020-03-09 PROCEDURE — 63710000001 PROMETHAZINE PER 25 MG: Performed by: NURSE PRACTITIONER

## 2020-03-09 PROCEDURE — 93005 ELECTROCARDIOGRAM TRACING: CPT | Performed by: EMERGENCY MEDICINE

## 2020-03-09 PROCEDURE — 85730 THROMBOPLASTIN TIME PARTIAL: CPT | Performed by: EMERGENCY MEDICINE

## 2020-03-09 PROCEDURE — 83540 ASSAY OF IRON: CPT | Performed by: NURSE PRACTITIONER

## 2020-03-09 PROCEDURE — 94799 UNLISTED PULMONARY SVC/PX: CPT

## 2020-03-09 PROCEDURE — 25010000002 ONDANSETRON PER 1 MG: Performed by: EMERGENCY MEDICINE

## 2020-03-09 PROCEDURE — 85045 AUTOMATED RETICULOCYTE COUNT: CPT | Performed by: NURSE PRACTITIONER

## 2020-03-09 PROCEDURE — 25010000002 IOPAMIDOL 61 % SOLUTION: Performed by: EMERGENCY MEDICINE

## 2020-03-09 PROCEDURE — 25010000002 POTASSIUM CHLORIDE PER 2 MEQ: Performed by: EMERGENCY MEDICINE

## 2020-03-09 PROCEDURE — 86900 BLOOD TYPING SEROLOGIC ABO: CPT | Performed by: PHYSICIAN ASSISTANT

## 2020-03-09 PROCEDURE — 81001 URINALYSIS AUTO W/SCOPE: CPT | Performed by: EMERGENCY MEDICINE

## 2020-03-09 PROCEDURE — 82607 VITAMIN B-12: CPT | Performed by: NURSE PRACTITIONER

## 2020-03-09 PROCEDURE — 85014 HEMATOCRIT: CPT | Performed by: NURSE PRACTITIONER

## 2020-03-09 PROCEDURE — 25810000003 SODIUM CHLORIDE 0.9 % WITH KCL 20 MEQ 20-0.9 MEQ/L-% SOLUTION: Performed by: NURSE PRACTITIONER

## 2020-03-09 PROCEDURE — 86923 COMPATIBILITY TEST ELECTRIC: CPT

## 2020-03-09 PROCEDURE — 86901 BLOOD TYPING SEROLOGIC RH(D): CPT | Performed by: PHYSICIAN ASSISTANT

## 2020-03-09 PROCEDURE — 85025 COMPLETE CBC W/AUTO DIFF WBC: CPT | Performed by: EMERGENCY MEDICINE

## 2020-03-09 PROCEDURE — 73502 X-RAY EXAM HIP UNI 2-3 VIEWS: CPT

## 2020-03-09 PROCEDURE — 84466 ASSAY OF TRANSFERRIN: CPT | Performed by: NURSE PRACTITIONER

## 2020-03-09 PROCEDURE — 80306 DRUG TEST PRSMV INSTRMNT: CPT | Performed by: NURSE PRACTITIONER

## 2020-03-09 PROCEDURE — 93010 ELECTROCARDIOGRAM REPORT: CPT | Performed by: INTERNAL MEDICINE

## 2020-03-09 PROCEDURE — 25010000002 HYDROMORPHONE PER 4 MG: Performed by: NURSE PRACTITIONER

## 2020-03-09 PROCEDURE — 82728 ASSAY OF FERRITIN: CPT | Performed by: NURSE PRACTITIONER

## 2020-03-09 PROCEDURE — 85018 HEMOGLOBIN: CPT | Performed by: NURSE PRACTITIONER

## 2020-03-09 PROCEDURE — 85610 PROTHROMBIN TIME: CPT | Performed by: EMERGENCY MEDICINE

## 2020-03-09 PROCEDURE — 25010000002 FENTANYL CITRATE (PF) 100 MCG/2ML SOLUTION: Performed by: EMERGENCY MEDICINE

## 2020-03-09 RX ORDER — PROMETHAZINE HYDROCHLORIDE 12.5 MG/1
12.5 SUPPOSITORY RECTAL EVERY 6 HOURS PRN
Status: DISCONTINUED | OUTPATIENT
Start: 2020-03-09 | End: 2020-03-13 | Stop reason: HOSPADM

## 2020-03-09 RX ORDER — BISACODYL 10 MG
10 SUPPOSITORY, RECTAL RECTAL DAILY PRN
Status: DISCONTINUED | OUTPATIENT
Start: 2020-03-09 | End: 2020-03-13 | Stop reason: HOSPADM

## 2020-03-09 RX ORDER — ONDANSETRON 2 MG/ML
4 INJECTION INTRAMUSCULAR; INTRAVENOUS ONCE
Status: COMPLETED | OUTPATIENT
Start: 2020-03-09 | End: 2020-03-09

## 2020-03-09 RX ORDER — POTASSIUM CHLORIDE 750 MG/1
20 CAPSULE, EXTENDED RELEASE ORAL
Status: COMPLETED | OUTPATIENT
Start: 2020-03-09 | End: 2020-03-09

## 2020-03-09 RX ORDER — ACETAMINOPHEN 650 MG/1
650 SUPPOSITORY RECTAL EVERY 4 HOURS PRN
Status: DISCONTINUED | OUTPATIENT
Start: 2020-03-09 | End: 2020-03-11

## 2020-03-09 RX ORDER — PROMETHAZINE HYDROCHLORIDE 25 MG/1
25 TABLET ORAL EVERY 6 HOURS PRN
COMMUNITY
End: 2020-03-13 | Stop reason: HOSPADM

## 2020-03-09 RX ORDER — SODIUM CHLORIDE 0.9 % (FLUSH) 0.9 %
10 SYRINGE (ML) INJECTION EVERY 12 HOURS SCHEDULED
Status: DISCONTINUED | OUTPATIENT
Start: 2020-03-09 | End: 2020-03-13 | Stop reason: HOSPADM

## 2020-03-09 RX ORDER — SODIUM CHLORIDE AND POTASSIUM CHLORIDE 150; 900 MG/100ML; MG/100ML
50 INJECTION, SOLUTION INTRAVENOUS CONTINUOUS
Status: DISCONTINUED | OUTPATIENT
Start: 2020-03-09 | End: 2020-03-11

## 2020-03-09 RX ORDER — METOPROLOL SUCCINATE 100 MG/1
100 TABLET, EXTENDED RELEASE ORAL NIGHTLY
Status: DISCONTINUED | OUTPATIENT
Start: 2020-03-09 | End: 2020-03-13 | Stop reason: HOSPADM

## 2020-03-09 RX ORDER — PANTOPRAZOLE SODIUM 40 MG/1
40 TABLET, DELAYED RELEASE ORAL 2 TIMES DAILY
Status: DISCONTINUED | OUTPATIENT
Start: 2020-03-09 | End: 2020-03-09

## 2020-03-09 RX ORDER — PROMETHAZINE HYDROCHLORIDE 25 MG/ML
12.5 INJECTION, SOLUTION INTRAMUSCULAR; INTRAVENOUS EVERY 6 HOURS PRN
Status: DISCONTINUED | OUTPATIENT
Start: 2020-03-09 | End: 2020-03-13 | Stop reason: HOSPADM

## 2020-03-09 RX ORDER — FENTANYL CITRATE 50 UG/ML
1 INJECTION, SOLUTION INTRAMUSCULAR; INTRAVENOUS ONCE
Status: COMPLETED | OUTPATIENT
Start: 2020-03-09 | End: 2020-03-09

## 2020-03-09 RX ORDER — AMITRIPTYLINE HYDROCHLORIDE 100 MG/1
100 TABLET, FILM COATED ORAL NIGHTLY
Status: DISCONTINUED | OUTPATIENT
Start: 2020-03-09 | End: 2020-03-13 | Stop reason: HOSPADM

## 2020-03-09 RX ORDER — BUSPIRONE HYDROCHLORIDE 5 MG/1
7.5 TABLET ORAL 3 TIMES DAILY
Status: DISCONTINUED | OUTPATIENT
Start: 2020-03-09 | End: 2020-03-13 | Stop reason: HOSPADM

## 2020-03-09 RX ORDER — SODIUM CHLORIDE 0.9 % (FLUSH) 0.9 %
10 SYRINGE (ML) INJECTION AS NEEDED
Status: DISCONTINUED | OUTPATIENT
Start: 2020-03-09 | End: 2020-03-13 | Stop reason: HOSPADM

## 2020-03-09 RX ORDER — NALOXONE HCL 0.4 MG/ML
0.4 VIAL (ML) INJECTION
Status: DISCONTINUED | OUTPATIENT
Start: 2020-03-09 | End: 2020-03-13 | Stop reason: HOSPADM

## 2020-03-09 RX ORDER — LEVETIRACETAM 500 MG/1
500 TABLET ORAL 2 TIMES DAILY
Status: DISCONTINUED | OUTPATIENT
Start: 2020-03-09 | End: 2020-03-13 | Stop reason: HOSPADM

## 2020-03-09 RX ORDER — ACETAMINOPHEN 160 MG/5ML
650 SOLUTION ORAL EVERY 4 HOURS PRN
Status: DISCONTINUED | OUTPATIENT
Start: 2020-03-09 | End: 2020-03-11

## 2020-03-09 RX ORDER — AMLODIPINE BESYLATE 10 MG/1
10 TABLET ORAL DAILY
Status: DISCONTINUED | OUTPATIENT
Start: 2020-03-09 | End: 2020-03-13 | Stop reason: HOSPADM

## 2020-03-09 RX ORDER — ACETAMINOPHEN 325 MG/1
650 TABLET ORAL EVERY 4 HOURS PRN
Status: DISCONTINUED | OUTPATIENT
Start: 2020-03-09 | End: 2020-03-11

## 2020-03-09 RX ORDER — POTASSIUM CHLORIDE 14.9 MG/ML
20 INJECTION INTRAVENOUS ONCE
Status: COMPLETED | OUTPATIENT
Start: 2020-03-09 | End: 2020-03-09

## 2020-03-09 RX ORDER — HYDROMORPHONE HYDROCHLORIDE 1 MG/ML
0.5 INJECTION, SOLUTION INTRAMUSCULAR; INTRAVENOUS; SUBCUTANEOUS
Status: DISCONTINUED | OUTPATIENT
Start: 2020-03-09 | End: 2020-03-12

## 2020-03-09 RX ORDER — OXYCODONE AND ACETAMINOPHEN 7.5; 325 MG/1; MG/1
1 TABLET ORAL EVERY 4 HOURS PRN
Status: DISCONTINUED | OUTPATIENT
Start: 2020-03-09 | End: 2020-03-13 | Stop reason: HOSPADM

## 2020-03-09 RX ORDER — PROMETHAZINE HYDROCHLORIDE 25 MG/1
12.5 TABLET ORAL EVERY 6 HOURS PRN
Status: DISCONTINUED | OUTPATIENT
Start: 2020-03-09 | End: 2020-03-13 | Stop reason: HOSPADM

## 2020-03-09 RX ORDER — ROSUVASTATIN CALCIUM 10 MG/1
10 TABLET, COATED ORAL NIGHTLY
Status: DISCONTINUED | OUTPATIENT
Start: 2020-03-09 | End: 2020-03-13 | Stop reason: HOSPADM

## 2020-03-09 RX ORDER — METOCLOPRAMIDE 10 MG/1
10 TABLET ORAL
Status: DISCONTINUED | OUTPATIENT
Start: 2020-03-09 | End: 2020-03-09

## 2020-03-09 RX ORDER — VENLAFAXINE HYDROCHLORIDE 75 MG/1
150 CAPSULE, EXTENDED RELEASE ORAL DAILY
Status: DISCONTINUED | OUTPATIENT
Start: 2020-03-09 | End: 2020-03-13 | Stop reason: HOSPADM

## 2020-03-09 RX ORDER — FLUTICASONE PROPIONATE 50 MCG
2 SPRAY, SUSPENSION (ML) NASAL DAILY
Status: DISCONTINUED | OUTPATIENT
Start: 2020-03-09 | End: 2020-03-13 | Stop reason: HOSPADM

## 2020-03-09 RX ADMIN — FENTANYL CITRATE 48.5 MCG: 50 INJECTION INTRAMUSCULAR; INTRAVENOUS at 11:33

## 2020-03-09 RX ADMIN — AMITRIPTYLINE HYDROCHLORIDE 100 MG: 100 TABLET, FILM COATED ORAL at 20:51

## 2020-03-09 RX ADMIN — POTASSIUM CHLORIDE 20 MEQ: 750 CAPSULE, EXTENDED RELEASE ORAL at 20:51

## 2020-03-09 RX ADMIN — METOPROLOL SUCCINATE 100 MG: 100 TABLET, EXTENDED RELEASE ORAL at 20:51

## 2020-03-09 RX ADMIN — METOCLOPRAMIDE 10 MG: 10 TABLET ORAL at 17:14

## 2020-03-09 RX ADMIN — POTASSIUM CHLORIDE AND SODIUM CHLORIDE 100 ML/HR: 900; 150 INJECTION, SOLUTION INTRAVENOUS at 17:15

## 2020-03-09 RX ADMIN — POTASSIUM CHLORIDE 20 MEQ: 750 CAPSULE, EXTENDED RELEASE ORAL at 17:14

## 2020-03-09 RX ADMIN — POTASSIUM CHLORIDE 20 MEQ: 14.9 INJECTION, SOLUTION INTRAVENOUS at 15:00

## 2020-03-09 RX ADMIN — POLYETHYLENE GLYCOL 3350 17 G: 17 POWDER, FOR SOLUTION ORAL at 17:15

## 2020-03-09 RX ADMIN — BUSPIRONE HYDROCHLORIDE 7.5 MG: 5 TABLET ORAL at 20:51

## 2020-03-09 RX ADMIN — PROMETHAZINE HYDROCHLORIDE 12.5 MG: 25 TABLET ORAL at 21:33

## 2020-03-09 RX ADMIN — BUSPIRONE HYDROCHLORIDE 7.5 MG: 5 TABLET ORAL at 17:15

## 2020-03-09 RX ADMIN — ROSUVASTATIN CALCIUM 10 MG: 10 TABLET, FILM COATED ORAL at 20:51

## 2020-03-09 RX ADMIN — ONDANSETRON HYDROCHLORIDE 4 MG: 2 SOLUTION INTRAMUSCULAR; INTRAVENOUS at 11:33

## 2020-03-09 RX ADMIN — POTASSIUM CHLORIDE 20 MEQ: 750 CAPSULE, EXTENDED RELEASE ORAL at 23:19

## 2020-03-09 RX ADMIN — HYDROMORPHONE HYDROCHLORIDE 0.5 MG: 1 INJECTION, SOLUTION INTRAMUSCULAR; INTRAVENOUS; SUBCUTANEOUS at 19:32

## 2020-03-09 RX ADMIN — SODIUM CHLORIDE 1000 ML: 9 INJECTION, SOLUTION INTRAVENOUS at 15:00

## 2020-03-09 RX ADMIN — OXYCODONE HYDROCHLORIDE AND ACETAMINOPHEN 1 TABLET: 7.5; 325 TABLET ORAL at 21:06

## 2020-03-09 RX ADMIN — HYDROMORPHONE HYDROCHLORIDE 0.5 MG: 1 INJECTION, SOLUTION INTRAMUSCULAR; INTRAVENOUS; SUBCUTANEOUS at 17:15

## 2020-03-09 RX ADMIN — HYDROMORPHONE HYDROCHLORIDE 0.5 MG: 1 INJECTION, SOLUTION INTRAMUSCULAR; INTRAVENOUS; SUBCUTANEOUS at 23:19

## 2020-03-09 RX ADMIN — SODIUM CHLORIDE, PRESERVATIVE FREE 10 ML: 5 INJECTION INTRAVENOUS at 20:50

## 2020-03-09 RX ADMIN — LEVETIRACETAM 500 MG: 500 TABLET, FILM COATED ORAL at 20:50

## 2020-03-09 RX ADMIN — VENLAFAXINE HYDROCHLORIDE 150 MG: 75 CAPSULE, EXTENDED RELEASE ORAL at 17:14

## 2020-03-09 RX ADMIN — SODIUM CHLORIDE 1000 ML: 9 INJECTION, SOLUTION INTRAVENOUS at 11:33

## 2020-03-09 RX ADMIN — IOPAMIDOL 100 ML: 612 INJECTION, SOLUTION INTRAVENOUS at 13:17

## 2020-03-09 RX ADMIN — AMLODIPINE BESYLATE 10 MG: 10 TABLET ORAL at 17:15
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
## 2020-03-09 NOTE — PLAN OF CARE
Pt AAO x4. Bedrest. Medicated prn for c/o pain. Bruising to R elbow and bilateral knees. NPO at midnight for L hip replacement tomorrow. VSS. Staff unable to get straight story from patient related to injuries. Safety maintained. Will continue to monitor.  Problem: Patient Care Overview  Goal: Plan of Care Review  Outcome: Ongoing (interventions implemented as appropriate)  Flowsheets  Taken 3/9/2020 1848  Progress: no change  Taken 3/9/2020 1510  Plan of Care Reviewed With: patient

## 2020-03-09 NOTE — CONSULTS
"  Consult  [facility]      Kamryn Oliva (1970)  3/9/2020    Reason for Consult: Left hip pain  Requesting Physician: Rey Malloy MD      CHIEF COMPLAINT:  Left hip pain    History Obtained From: patient chart     HISTORY OF PRESENT ILLNESS:                The patient is a 50 y.o. female who presents with above chief complaint. The patient presented to the emergency department today complaining of abdominal pain and nausea and vomiting.  She states she has had trouble with dizziness and nausea/vomiting which she attributes to metabolic imbalances.  She was admitted to the hospital two weeks ago for possible sepsis.  She was not found to have sepsis and was discharged.  She states that since that time she has had trouble walking, feeling like she's going to \"black out\" and left sided abdominal and leg pain.  She denies any known history of trauma, abuse, or falls.  She was complaining of knee pain in the ER.  On imaging she was found to have a left hip fracture.   She has been admitted to the hospital.    Orthopedics was consulted on this patient.    Past Medical History:    Past Medical History:   Diagnosis Date   • Acute kidney failure (CMS/HCC)    • Anxiety    • Bowel obstruction (CMS/HCC)    • Constipation    • Depression    • GERD (gastroesophageal reflux disease)    • H/O gastric ulcer    • Headache    • History of transfusion    • Hypertension    • IBS (irritable bowel syndrome)    • Insomnia    • Kidney stone    • Maravilla maravilla disease    • Pseudocholinesterase deficiency    • Rapid weight loss    • SBO (small bowel obstruction) (CMS/HCC)    • Stroke (CMS/HCC)     X 2   • UTI (urinary tract infection)     CHRONIC, SEPTIC X2   • Volvulosis        Past Surgical History:    Past Surgical History:   Procedure Laterality Date   • APPENDECTOMY      COMPLICATION OF SEPTIC   • AUGMENTATION MAMMAPLASTY     • BRAIN SURGERY      x2   • BREAST AUGMENTATION BILATERAL MASTOPEXY     • " BREAST LUMPECTOMY Left 3/9/2017    Procedure: EXCISION LEFT BREAST LESION AND LEFT AXILLARY LYMPH NODE BIOPSY;  Surgeon: Evi Farley MD;  Location: [facility] OR;  Service:    • BREAST SURGERY     •  SECTION     • CHOLECYSTECTOMY     • CHOLECYSTECTOMY WITH INTRAOPERATIVE CHOLANGIOGRAM N/A 2018    Procedure: CHOLECYSTECTOMY LAPAROSCOPIC INTRAOPERATIVE CHOLANGIOGRAM;  Surgeon: Antonio Salvador MD;  Location: [facility] OR;  Service: General   • COLON SURGERY     • COLONOSCOPY  2007    normal   • COLONOSCOPY N/A 11/10/2016    Procedure: COLONOSCOPY WITH ANESTHESIA;  Surgeon: Camilo Hanson MD;  Location: [facility] ENDOSCOPY;  Service:    • COLONOSCOPY N/A 2018    Procedure: COLONOSCOPY WITH ANESTHESIA;  Surgeon: Camilo Hanson MD;  Location: [facility] ENDOSCOPY;  Service:    • ENDOSCOPY  2009    antral ulcer   • ENDOSCOPY N/A 10/10/2016    Procedure: ESOPHAGOGASTRODUODENOSCOPY WITH ANESTHESIA;  Surgeon: Camilo Hanson MD;  Location: [facility] ENDOSCOPY;  Service:    • ENDOSCOPY N/A 2017    Procedure: ESOPHAGOGASTRODUODENOSCOPY;  Surgeon: Camilo Hanson MD;  Location: [facility] ENDOSCOPY;  Service:    • ENDOSCOPY N/A 2017    Procedure: ESOPHAGOGASTRODUODENOSCOPY WITH ANESTHESIA;  Surgeon: Camilo Hanson MD;  Location: [facility] ENDOSCOPY;  Service:    • ENDOSCOPY N/A 2018    Procedure: ESOPHAGOGASTRODUODENOSCOPY ;  Surgeon: Camilo Hanson MD;  Location: [facility] ENDOSCOPY;  Service:    • ENDOSCOPY N/A 2019    Procedure: ESOPHAGOGASTRODUODENOSCOPY WITH ANESTHESIA;  Surgeon: Katarina Davis MD;  Location: [facility] ENDOSCOPY;  Service: Gastroenterology   • HERNIA REPAIR     • HYSTERECTOMY     • SMALL INTESTINE SURGERY N/A 2016   • TONSILLECTOMY         Current Medications:     Current Facility-Administered Medications:   •  acetaminophen (TYLENOL) tablet 650 mg, 650 mg, Oral, Q4H PRN **OR** acetaminophen (TYLENOL) 160 MG/5ML solution 650 mg, 650 mg, Oral, Q4H PRN **OR**  acetaminophen (TYLENOL) suppository 650 mg, 650 mg, Rectal, Q4H PRN, Antonieta Espinoza, APRN  •  amitriptyline (ELAVIL) tablet 100 mg, 100 mg, Oral, Nightly, Antonieta Espinoza, APRN  •  amLODIPine (NORVASC) tablet 10 mg, 10 mg, Oral, Daily, Antonieta Espinoza, APRN  •  bisacodyl (DULCOLAX) suppository 10 mg, 10 mg, Rectal, Daily PRN, Antonieta Espinoza, APRN  •  busPIRone (BUSPAR) tablet 7.5 mg, 7.5 mg, Oral, TID, Atnonieta Espinoza, APRN  •  fluticasone (FLONASE) 50 MCG/ACT nasal spray 2 spray, 2 spray, Nasal, Daily, Antonieta Espinoza, APRN  •  HYDROmorphone (DILAUDID) injection 0.5 mg, 0.5 mg, Intravenous, Q2H PRN **AND** naloxone (NARCAN) injection 0.4 mg, 0.4 mg, Intravenous, Q5 Min PRN, Antonieta Espinoza, APRN  •  levETIRAcetam (KEPPRA) tablet 500 mg, 500 mg, Oral, BID, Antonieta Espinoza, APRN  •  metoclopramide (REGLAN) tablet 10 mg, 10 mg, Oral, 4x Daily AC & at Bedtime, Antonieta Espinoza, APRN  •  metoprolol succinate XL (TOPROL-XL) 24 hr tablet 100 mg, 100 mg, Oral, Nightly, Antonieta Espinoza, APRN  •  oxyCODONE-acetaminophen (PERCOCET) 7.5-325 MG per tablet 1 tablet, 1 tablet, Oral, Q4H PRN, Antonieta Espinoza, APRN  •  pantoprazole (PROTONIX) EC tablet 40 mg, 40 mg, Oral, BID, Antonieta Espinoza, APRN  •  polyethylene glycol 3350 powder (packet), 17 g, Oral, Daily, Antonieta Espinoza, APRN  •  potassium chloride (MICRO-K) CR capsule 20 mEq, 20 mEq, Oral, Q3H, Antonieta Espinoza, APRN  •  promethazine (PHENERGAN) tablet 12.5 mg, 12.5 mg, Oral, Q6H PRN **OR** promethazine (PHENERGAN) injection 12.5 mg, 12.5 mg, Intramuscular, Q6H PRN **OR** promethazine (PHENERGAN) injection 12.5 mg, 12.5 mg, Intravenous, Q6H PRN **OR** promethazine (PHENERGAN) suppository 12.5 mg, 12.5 mg, Rectal, Q6H PRN, Antonieta Espinoza, APRN  •  rosuvastatin (CRESTOR) tablet 10 mg, 10 mg, Oral, Nightly, Antonieta Espinoza, APRN  •  [COMPLETED] Insert peripheral IV, , , Once **AND** sodium chloride 0.9 % flush 10 mL, 10 mL, Intravenous, PRN,  Antonieta Espinoza APRN  •  sodium chloride 0.9 % flush 10 mL, 10 mL, Intravenous, Q12H, Antonieta Espinoza APRN  •  sodium chloride 0.9 % flush 10 mL, 10 mL, Intravenous, PRN, Antonieta Espinoza APRN  •  sodium chloride 0.9 % with KCl 20 mEq/L infusion, 100 mL/hr, Intravenous, Continuous, Antonieta Espinoza APRN  •  venlafaxine XR (EFFEXOR-XR) 24 hr capsule 150 mg, 150 mg, Oral, Daily, Antonieta Espinoza APRN    Allergies:  Anectine [succinylcholine chloride]; Stadol [butorphanol]; and Butorphanol tartrate    Social History:   Social History     Socioeconomic History   • Marital status:      Spouse name: Not on file   • Number of children: Not on file   • Years of education: Not on file   • Highest education level: Not on file   Tobacco Use   • Smoking status: Former Smoker     Packs/day: 1.00     Years: 15.00     Pack years: 15.00     Types: Cigarettes, Electronic Cigarette     Last attempt to quit: 3/10/2016     Years since quittin.0   • Smokeless tobacco: Never Used   Substance and Sexual Activity   • Alcohol use: No     Frequency: Never     Comment: occasional   • Drug use: No   • Sexual activity: Never     Partners: Male     Birth control/protection: Surgical       Family History:   Family History   Problem Relation Age of Onset   • Cancer Maternal Grandfather    • Hypertension Mother    • Dementia Mother    • No Known Problems Father    • No Known Problems Sister    • No Known Problems Brother    • No Known Problems Son    • No Known Problems Brother    • No Known Problems Son    • No Known Problems Maternal Grandmother    • Breast cancer Paternal Grandmother    • Dementia Paternal Grandmother    • No Known Problems Maternal Aunt    • No Known Problems Paternal Aunt    • Cancer Paternal Grandfather    • Colon cancer Neg Hx    • Colon polyps Neg Hx    • BRCA 1/2 Neg Hx    • Endometrial cancer Neg Hx    • Ovarian cancer Neg Hx        REVIEW OF SYSTEMS:    All systems were reviewed and negative  except for:  Gastrointestinal: positive for  nausea and vomiting  Musculoskeletal: positive for  bone pain, joint pain, joint stiffness and See HPI    PHYSICAL EXAM:        General Appearance:    Alert, cooperative, in no acute distress   Head:    Normocephalic, without obvious abnormality, atraumatic   Eyes:            Vision intact, EOM intact     Ears:    Ears appear intact with no abnormalities noted   Neck:   No adenopathy, supple, trachea midline, no thyromegaly   Back:     Tender lumbar spine, pain with ROM.  No kyphosis present, no scoliosis present, no skin lesions,      erythema or scars, no tenderness to palpation,   range of motion normal   Lungs:     Clear, respirations regular, even and unlabored    Heart:    Regular rhythm and normal rate, no edema   Chest Wall:    No abnormalities observed   Abdomen:     Normal bowel sounds, no masses, no organomegaly, soft        non-tender, non-distended, no guarding   Rectal:     Deferred   Extremities:   Severe pain in left hip and groin with ROM of left lower extremity.  Tender left hip.  Otherwise, Moves all extremities well, no edema, no cyanosis, no redness   Pulses:   Pulses palpable and equal bilaterally   Skin:   No bleeding, bruising or rash   Lymph nodes:   No palpable adenopathy   Neurologic:   Cranial nerves 2 - 12 grossly intact, alert and oriented times 3.         DATA:    Lab Results (last 24 hours)     Procedure Component Value Units Date/Time    Vitamin B12 [[id number]] Collected:  03/09/20 1130    Specimen:  Blood Updated:  03/09/20 1706    Iron Profile [[id number]] Collected:  03/09/20 1130    Specimen:  Blood Updated:  03/09/20 1702    Ferritin [[id number]] Collected:  03/09/20 1130    Specimen:  Blood Updated:  03/09/20 1702    Urine Drug Screen - Urine, Clean Catch [[id number]]  (Abnormal) Collected:  03/09/20 1142    Specimen:  Urine, Clean Catch Updated:  03/09/20 1701     THC, Screen, Urine Negative     Phencyclidine (PCP), Urine Negative      Cocaine Screen, Urine Negative     Methamphetamine, Ur Negative     Opiate Screen Positive     Amphetamine Screen, Urine Negative     Benzodiazepine Screen, Urine Negative     Tricyclic Antidepressants Screen Positive     Methadone Screen, Urine Negative     Barbiturates Screen, Urine Negative     Oxycodone Screen, Urine Negative     Propoxyphene Screen Negative     Buprenorphine, Screen, Urine Negative    Narrative:       Cutoff For Drugs Screened:    Amphetamines               500 ng/ml  Barbiturates               200 ng/ml  Benzodiazepines            150 ng/ml  Cocaine                    150 ng/ml  Methadone                  200 ng/ml  Opiates                    100 ng/ml  Phencyclidine               25 ng/ml  THC                            50 ng/ml  Methamphetamine            500 ng/ml  Tricyclic Antidepressants  300 ng/ml  Oxycodone                  100 ng/ml  Propoxyphene               300 ng/ml  Buprenorphine               10 ng/ml    The normal value for all drugs tested is negative. This report includes unconfirmed screening results, with the cutoff values listed, to be used for medical treatment purposes only.  Unconfirmed results must not be used for non-medical purposes such as employment or legal testing.  Clinical consideration should be applied to any drug of abuse test, particularly when unconfirmed results are used.      [place] Draw [[id number]] Collected:  03/09/20 1130    Specimen:  Blood Updated:  03/09/20 1230    Narrative:       The following orders were created for panel order [place] Draw.  Procedure                               Abnormality         Status                     ---------                               -----------         ------                     Light Blue Top[[id number]]                                   Final result               Green Top (Gel)[[id number]]                                  Final result               Lavender Top[[id number]]                                      Final result               Red Top[[id number]]                                          Final result               [facility] Blood Culture Pool...[[id number]]                      Final result               Gray Top - Ice[[id number]]                                   Final result                 Please view results for these tests on the individual orders.    Light Blue Top [[id number]] Collected:  03/09/20 1130    Specimen:  Blood Updated:  03/09/20 1230     Extra Tube hold for add-on     Comment: Auto resulted       Green Top (Gel) [[id number]] Collected:  03/09/20 1130    Specimen:  Blood Updated:  03/09/20 1230     Extra Tube Hold for add-ons.     Comment: Auto resulted.       Lavender Top [[id number]] Collected:  03/09/20 1130    Specimen:  Blood Updated:  03/09/20 1230     Extra Tube hold for add-on     Comment: Auto resulted       Red Top [[id number]] Collected:  03/09/20 1130    Specimen:  Blood Updated:  03/09/20 1230     Extra Tube Hold for add-ons.     Comment: Auto resulted.       [facility] Blood Culture Bottle Set [[id number]] Collected:  03/09/20 1130    Specimen:  Blood from Arm, Left Updated:  03/09/20 1230     Extra Tube Hold for add-ons.     Comment: Auto resulted.       Gray Top - Ice [[id number]] Collected:  03/09/20 1130    Specimen:  Blood Updated:  03/09/20 1230     Extra Tube Hold for add-ons.     Comment: Auto resulted.       Urinalysis With Culture If Indicated - Urine, Clean Catch [[id number]]  (Abnormal) Collected:  03/09/20 1142    Specimen:  Urine, Clean Catch Updated:  03/09/20 1222     Color, UA Yellow     Appearance, UA Clear     pH, UA 6.5     Specific Gravity, UA 1.018     Glucose, UA Negative     Ketones, UA 80 mg/dL (3+)     Bilirubin, UA Negative     Blood, UA Negative     Protein, UA Negative     Leuk Esterase, UA Trace     Nitrite, UA Negative     Urobilinogen, UA 1.0 E.U./dL    Urinalysis, Microscopic Only - Urine, Clean Catch [[id number]]  (Abnormal) Collected:  03/09/20 1142    Specimen:   Urine, Clean Catch Updated:  03/09/20 1222     RBC, UA None Seen /HPF      WBC, UA 3-5 /HPF      Bacteria, UA None Seen /HPF      Squamous Epithelial Cells, UA 0-2 /HPF      Hyaline Casts, UA None Seen /LPF      Methodology Manual Light Microscopy    Comprehensive Metabolic Panel [[id number]]  (Abnormal) Collected:  03/09/20 1130    Specimen:  Blood Updated:  03/09/20 1159     Glucose 89 mg/dL      BUN 10 mg/dL      Creatinine 0.72 mg/dL      Sodium 138 mmol/L      Potassium 2.7 mmol/L      Chloride 99 mmol/L      CO2 20.0 mmol/L      Calcium 8.5 mg/dL      Total Protein 6.3 g/dL      Albumin 3.50 g/dL      ALT (SGPT) 11 U/L      AST (SGOT) 19 U/L      Alkaline Phosphatase 118 U/L      Total Bilirubin <0.2 mg/dL      eGFR Non African Amer 86 mL/min/1.73      Globulin 2.8 gm/dL      A/G Ratio 1.3 g/dL      BUN/Creatinine Ratio 13.9     Anion Gap 19.0 mmol/L     Narrative:       GFR Normal >60  Chronic Kidney Disease <60  Kidney Failure <15      Lipase [[id number]]  (Abnormal) Collected:  03/09/20 1130    Specimen:  Blood Updated:  03/09/20 1159     Lipase 7 U/L     aPTT [[id number]]  (Normal) Collected:  03/09/20 1130    Specimen:  Blood Updated:  03/09/20 1150     PTT 34.7 seconds     Protime-INR [[id number]]  (Normal) Collected:  03/09/20 1130    Specimen:  Blood Updated:  03/09/20 1150     Protime 13.1 Seconds      INR 1.00    CBC & Differential [[id number]] Collected:  03/09/20 1130    Specimen:  Blood Updated:  03/09/20 1141    Narrative:       The following orders were created for panel order CBC & Differential.  Procedure                               Abnormality         Status                     ---------                               -----------         ------                     CBC Auto Differential[[id number]]        Abnormal            Final result                 Please view results for these tests on the individual orders.    CBC Auto Differential [[id number]]  (Abnormal) Collected:  03/09/20 1130     Specimen:  Blood Updated:  03/09/20 1141     WBC 11.47 10*3/mm3      RBC 3.39 10*6/mm3      Hemoglobin 10.2 g/dL      Hematocrit 30.2 %      MCV 89.1 fL      MCH 30.1 pg      MCHC 33.8 g/dL      RDW 13.4 %      RDW-SD 44.0 fl      MPV 9.9 fL      Platelets 388 10*3/mm3      Neutrophil % 75.1 %      Lymphocyte % 17.4 %      Monocyte % 6.3 %      Eosinophil % 0.5 %      Basophil % 0.2 %      Immature Grans % 0.5 %      Neutrophils, Absolute 8.61 10*3/mm3      Lymphocytes, Absolute 2.00 10*3/mm3      Monocytes, Absolute 0.72 10*3/mm3      Eosinophils, Absolute 0.06 10*3/mm3      Basophils, Absolute 0.02 10*3/mm3      Immature Grans, Absolute 0.06 10*3/mm3      nRBC 0.0 /100 WBC           Radiology:   Imaging Results (Last 7 Days)     Procedure Component Value Units Date/Time    XR Hip With or Without Pelvis 2 - 3 View Left [[id number]] Collected:  03/09/20 1545     Updated:  03/09/20 1551    Narrative:       EXAMINATION:  XR HIP W OR WO PELVIS 2-3 VIEW LEFT-  3/9/2020 3:33 PM CDT     HISTORY: Left hip fracture.     COMPARISON: No comparison study.     TECHNIQUE: AP and frog-leg views of the left hip were supplemented with  an AP image of the pelvis.     FINDINGS: There is a displaced subcapital left femoral neck fracture.  There is superior and lateral displacement of the distal fragment. The  pelvis is intact. The right hip and left hip joint spaces are intact.  There is contrast in the urinary bladder related to recent CT.       Impression:       Displaced subcapital left femoral neck fracture, as  described.  This report was finalized on 03/09/2020 15:48 by Dr. Russ Galan MD.    XR Knee 1 or 2 View Left [[id number]] Collected:  03/09/20 1538     Updated:  03/09/20 1542    Narrative:       EXAMINATION:  XR KNEE 1 OR 2 VW LEFT-  3/9/2020 3:32 PM CDT     HISTORY: S72.002A-Fracture of unspecified part of neck of left femur,  initial encounter for closed fracture; S22.000A-Wedge compression  fracture of unspecified  thoracic vertebra, initial encounter for closed  fracture. The patient fell. Left knee pain.     COMPARISON: No comparison study.     TECHNIQUE: 2 views were obtained.     FINDINGS:  There is mild narrowing of the medial compartment. There is  no spurring. There is no fracture identified. No significant joint  effusion is seen.       Impression:       No acute bony abnormality.     This report was finalized on 03/09/2020 15:39 by Dr. Russ Galan MD.    XR Knee 1 or 2 View Right [[id number]] Collected:  03/09/20 1537     Updated:  03/09/20 1541    Narrative:       EXAMINATION:  XR KNEE 1 OR 2 VW RIGHT-  3/9/2020 3:32 PM CDT     HISTORY: S72.002A-Fracture of unspecified part of neck of left femur,  initial encounter for closed fracture; S22.000A-Wedge compression  fracture of unspecified thoracic vertebra, initial encounter for closed  fracture. The patient fell. Right knee pain.     COMPARISON: No comparison study.     TECHNIQUE: 2 views were obtained.     FINDINGS:  There is no fracture or joint effusion. The joint spaces are  well maintained.       Impression:       No acute findings.     This report was finalized on 03/09/2020 15:38 by Dr. Russ Galan MD.    CT Abdomen Pelvis With Contrast [[id number]] Collected:  03/09/20 1332     Updated:  03/09/20 1350    Narrative:       CT abdomen pelvis with IV contrast     Indication: abdominal pain, possible diverticulitis.     Comparison: 02/19/2020     DOSE LENGTH PRODUCT: 260 mGy cm. Automated exposure control was also  utilized to decrease patient radiation dose.     Findings:     Displaced/impacted LEFT femoral neck fracture. Surrounding soft tissue  edema and fat stranding. Partially imaged 2 column T11 compression  fracture with 20% height loss. Both of these findings are new when  compared to the 02/19/2020 exam.     No acute lung base finding. No suspicious liver lesion. Cholecystectomy.  No biliary ductal dilatation. Pancreas, spleen, and RIGHT adrenal  gland  are unremarkable. Tiny LEFT adrenal gland nodule, stable and likely  adenoma. Punctate nonobstructing bilateral renal calculi. No  hydronephrosis. No focal urinary bladder wall thickening.     Appendix is surgically absent. No abnormal bowel distention or adjacent  inflammation. No ascites or free pelvic fluid. No pelvic mass.     Normal caliber abdominal aorta with scattered atherosclerotic  calcification. No enlarged retroperitoneal, mesenteric, pelvic, or  inguinal lymph nodes.       Impression:       Impression:     1.  Displaced impacted LEFT femoral neck fracture. Surrounding soft  tissue edema and fat stranding, new from prior.  2.  T11 compression fracture with 20% height loss, new from prior.  3.  No evidence of active bowel inflammation.  This report was finalized on 03/09/2020 13:47 by Dr. Mike Miguel MD.          Imaging was brought up and reviewed and I agree with the radiology findings.    IMPRESSION/RECOMMENDATIONS:      Closed displaced fracture of left femoral neck (CMS/HCC)    HTN (hypertension)    Irritable bowel syndrome with both constipation and diarrhea    Anemia    Constipation    Gastroesophageal reflux disease    Hypokalemia    Gastroparesis    Anxiety and depression    Seizure disorder (CMS/HCC)    Closed wedge compression fracture of T11 vertebra (CMS/HCC)      Assessment: Closed displaced left femoral neck fracture in a patient with multiple medical problems.    Closed compression fracture of T11    Plan:  1) The patient will need a left anterior total hip replacement for fixation of this fracture. The risks and benefits of this procedure were discussed with the patient and she would like to proceed.        Electronically signed by JAZMIN Luciano5:12 PM3/9/2020
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
## 2020-03-10 ENCOUNTER — APPOINTMENT (OUTPATIENT)
Dept: GENERAL RADIOLOGY | Facility: HOSPITAL | Age: 50
End: 2020-03-10

## 2020-03-10 PROBLEM — E43 SEVERE PROTEIN-CALORIE MALNUTRITION (HCC): Status: ACTIVE | Noted: 2018-10-16

## 2020-03-10 LAB
ANION GAP SERPL CALCULATED.3IONS-SCNC: 15 MMOL/L (ref 5–15)
APTT PPP: 36.4 SECONDS (ref 24.1–35)
BUN BLD-MCNC: 6 MG/DL (ref 6–20)
BUN/CREAT SERPL: 10.7 (ref 7–25)
CALCIUM SPEC-SCNC: 9 MG/DL (ref 8.6–10.5)
CHLORIDE SERPL-SCNC: 108 MMOL/L (ref 98–107)
CO2 SERPL-SCNC: 18 MMOL/L (ref 22–29)
CREAT BLD-MCNC: 0.56 MG/DL (ref 0.57–1)
DEPRECATED RDW RBC AUTO: 45.1 FL (ref 37–54)
ERYTHROCYTE [DISTWIDTH] IN BLOOD BY AUTOMATED COUNT: 13.8 % (ref 12.3–15.4)
GFR SERPL CREATININE-BSD FRML MDRD: 115 ML/MIN/1.73
GLUCOSE BLD-MCNC: 71 MG/DL (ref 65–99)
HCT VFR BLD AUTO: 29.8 % (ref 34–46.6)
HCT VFR BLD AUTO: 30 % (ref 34–46.6)
HGB BLD-MCNC: 9.9 G/DL (ref 12–15.9)
HGB BLD-MCNC: 9.9 G/DL (ref 12–15.9)
INR PPP: 1.02 (ref 0.91–1.09)
MCH RBC QN AUTO: 29.6 PG (ref 26.6–33)
MCHC RBC AUTO-ENTMCNC: 33.2 G/DL (ref 31.5–35.7)
MCV RBC AUTO: 89.2 FL (ref 79–97)
PLATELET # BLD AUTO: 408 10*3/MM3 (ref 140–450)
PMV BLD AUTO: 9.6 FL (ref 6–12)
POTASSIUM BLD-SCNC: 3.5 MMOL/L (ref 3.5–5.2)
PROTHROMBIN TIME: 13.3 SECONDS (ref 11.9–14.6)
RBC # BLD AUTO: 3.34 10*6/MM3 (ref 3.77–5.28)
SODIUM BLD-SCNC: 141 MMOL/L (ref 136–145)
VIT B12 BLD-MCNC: 790 PG/ML (ref 211–946)
WBC NRBC COR # BLD: 9.93 10*3/MM3 (ref 3.4–10.8)

## 2020-03-10 PROCEDURE — 25010000002 ENOXAPARIN PER 10 MG: Performed by: INTERNAL MEDICINE

## 2020-03-10 PROCEDURE — 25810000003 SODIUM CHLORIDE 0.9 % WITH KCL 20 MEQ 20-0.9 MEQ/L-% SOLUTION: Performed by: NURSE PRACTITIONER

## 2020-03-10 PROCEDURE — 25810000003 SODIUM CHLORIDE 0.9 % WITH KCL 20 MEQ 20-0.9 MEQ/L-% SOLUTION: Performed by: INTERNAL MEDICINE

## 2020-03-10 PROCEDURE — 85730 THROMBOPLASTIN TIME PARTIAL: CPT | Performed by: NURSE PRACTITIONER

## 2020-03-10 PROCEDURE — 25010000002 HYDROMORPHONE PER 4 MG: Performed by: NURSE PRACTITIONER

## 2020-03-10 PROCEDURE — 85027 COMPLETE CBC AUTOMATED: CPT | Performed by: NURSE PRACTITIONER

## 2020-03-10 PROCEDURE — 71045 X-RAY EXAM CHEST 1 VIEW: CPT

## 2020-03-10 PROCEDURE — 99222 1ST HOSP IP/OBS MODERATE 55: CPT | Performed by: NEUROLOGICAL SURGERY

## 2020-03-10 PROCEDURE — 85610 PROTHROMBIN TIME: CPT | Performed by: NURSE PRACTITIONER

## 2020-03-10 PROCEDURE — 80048 BASIC METABOLIC PNL TOTAL CA: CPT | Performed by: NURSE PRACTITIONER

## 2020-03-10 RX ADMIN — OXYCODONE HYDROCHLORIDE AND ACETAMINOPHEN 1 TABLET: 7.5; 325 TABLET ORAL at 11:48

## 2020-03-10 RX ADMIN — AMLODIPINE BESYLATE 10 MG: 10 TABLET ORAL at 08:43

## 2020-03-10 RX ADMIN — FLUTICASONE PROPIONATE 2 SPRAY: 50 SPRAY, METERED NASAL at 08:43

## 2020-03-10 RX ADMIN — METOPROLOL SUCCINATE 100 MG: 100 TABLET, EXTENDED RELEASE ORAL at 20:10

## 2020-03-10 RX ADMIN — POTASSIUM CHLORIDE AND SODIUM CHLORIDE 50 ML/HR: 900; 150 INJECTION, SOLUTION INTRAVENOUS at 18:01

## 2020-03-10 RX ADMIN — LEVETIRACETAM 500 MG: 500 TABLET, FILM COATED ORAL at 20:10

## 2020-03-10 RX ADMIN — AMITRIPTYLINE HYDROCHLORIDE 100 MG: 100 TABLET, FILM COATED ORAL at 20:10

## 2020-03-10 RX ADMIN — VENLAFAXINE HYDROCHLORIDE 150 MG: 75 CAPSULE, EXTENDED RELEASE ORAL at 08:43

## 2020-03-10 RX ADMIN — SODIUM CHLORIDE, PRESERVATIVE FREE 10 ML: 5 INJECTION INTRAVENOUS at 20:09

## 2020-03-10 RX ADMIN — SODIUM CHLORIDE, PRESERVATIVE FREE 10 ML: 5 INJECTION INTRAVENOUS at 08:43

## 2020-03-10 RX ADMIN — BUSPIRONE HYDROCHLORIDE 7.5 MG: 5 TABLET ORAL at 16:11

## 2020-03-10 RX ADMIN — HYDROMORPHONE HYDROCHLORIDE 0.5 MG: 1 INJECTION, SOLUTION INTRAMUSCULAR; INTRAVENOUS; SUBCUTANEOUS at 04:40

## 2020-03-10 RX ADMIN — OXYCODONE HYDROCHLORIDE AND ACETAMINOPHEN 1 TABLET: 7.5; 325 TABLET ORAL at 22:10

## 2020-03-10 RX ADMIN — ROSUVASTATIN CALCIUM 10 MG: 10 TABLET, FILM COATED ORAL at 20:10

## 2020-03-10 RX ADMIN — HYDROMORPHONE HYDROCHLORIDE 0.5 MG: 1 INJECTION, SOLUTION INTRAMUSCULAR; INTRAVENOUS; SUBCUTANEOUS at 02:12

## 2020-03-10 RX ADMIN — POTASSIUM CHLORIDE AND SODIUM CHLORIDE 100 ML/HR: 900; 150 INJECTION, SOLUTION INTRAVENOUS at 03:18

## 2020-03-10 RX ADMIN — HYDROMORPHONE HYDROCHLORIDE 0.5 MG: 1 INJECTION, SOLUTION INTRAMUSCULAR; INTRAVENOUS; SUBCUTANEOUS at 13:43

## 2020-03-10 RX ADMIN — HYDROMORPHONE HYDROCHLORIDE 0.5 MG: 1 INJECTION, SOLUTION INTRAMUSCULAR; INTRAVENOUS; SUBCUTANEOUS at 20:10

## 2020-03-10 RX ADMIN — BUSPIRONE HYDROCHLORIDE 7.5 MG: 5 TABLET ORAL at 20:10

## 2020-03-10 RX ADMIN — LEVETIRACETAM 500 MG: 500 TABLET, FILM COATED ORAL at 08:43

## 2020-03-10 RX ADMIN — OXYCODONE HYDROCHLORIDE AND ACETAMINOPHEN 1 TABLET: 7.5; 325 TABLET ORAL at 05:58

## 2020-03-10 RX ADMIN — ENOXAPARIN SODIUM 30 MG: 30 INJECTION SUBCUTANEOUS at 11:14

## 2020-03-10 RX ADMIN — OXYCODONE HYDROCHLORIDE AND ACETAMINOPHEN 1 TABLET: 7.5; 325 TABLET ORAL at 16:54

## 2020-03-10 RX ADMIN — POLYETHYLENE GLYCOL 3350 17 G: 17 POWDER, FOR SOLUTION ORAL at 08:43

## 2020-03-10 RX ADMIN — BUSPIRONE HYDROCHLORIDE 7.5 MG: 5 TABLET ORAL at 08:43

## 2020-03-10 NOTE — PROGRESS NOTES
[facility] Medicine Services  INPATIENT PROGRESS NOTE    Patient Name: Kamryn Oliva  Date of Admission: 3/9/2020  Today's Date: 03/10/20  Length of Stay: 1  Primary Care Physician: Kory Jones DO    Subjective   Chief Complaint: left hip pain   HPI     Patient was seen and examined at bedside.  Patient indicates that she has very little left hip pain as long she is sitting still.  Patient denies any more small amounts of blood per rectum.  Patient indicates that her abdominal pain is currently at its baseline.  The patient denies any nausea vomiting or diarrhea.  Patient denies any fever or chills.  Patient indicates that she was told that she will go to the OR tomorrow.        Review of Systems   Constitutional: Negative for activity change, appetite change, chills, diaphoresis, fatigue and fever.   Respiratory: Positive for cough. Negative for shortness of breath.    Gastrointestinal: Negative for abdominal distention, abdominal pain, constipation, diarrhea, nausea and vomiting.   Musculoskeletal: Positive for arthralgias and myalgias.        All pertinent negatives and positives are as above. All other systems have been reviewed and are negative unless otherwise stated.     Objective    Temp:  [97.5 °F (36.4 °C)-98.8 °F (37.1 °C)] 98.8 °F (37.1 °C)  Heart Rate:  [102-129] 104  Resp:  [16-18] 18  BP: (113-156)/(77-99) 128/87  Physical Exam   Constitutional: She is oriented to person, place, and time. No distress.   HENT:   Head: Normocephalic and atraumatic.   Eyes: Conjunctivae are normal. No scleral icterus.   Neck: Neck supple. No JVD present.   Cardiovascular: Exam reveals no friction rub.   No murmur heard.  Mild tachycardia   Pulmonary/Chest: Effort normal and breath sounds normal. No stridor. No respiratory distress.   Abdominal: Soft. Bowel sounds are normal. She exhibits no distension. There is no tenderness. There is no guarding.   Musculoskeletal: She  exhibits no edema.   Neurological: She is alert and oriented to person, place, and time.   Skin: She is not diaphoretic.   Psychiatric: She has a normal mood and affect. Her behavior is normal.   Nursing note and vitals reviewed.          Results Review:  I have reviewed the labs, radiology results, and diagnostic studies.    Laboratory Data:   Results from last 7 days   Lab Units 03/10/20  0600 03/09/20  2339 03/09/20  1130   WBC 10*3/mm3 9.93  --  11.47*   HEMOGLOBIN g/dL 9.9* 9.9* 10.2*   HEMATOCRIT % 29.8* 30.0* 30.2*   PLATELETS 10*3/mm3 408  --  388        Results from last 7 days   Lab Units 03/10/20  0600 03/09/20  1130   SODIUM mmol/L 141 138   POTASSIUM mmol/L 3.5 2.7*   CHLORIDE mmol/L 108* 99   CO2 mmol/L 18.0* 20.0*   BUN mg/dL 6 10   CREATININE mg/dL 0.56* 0.72   CALCIUM mg/dL 9.0 8.5*   BILIRUBIN mg/dL  --  <0.2*   ALK PHOS U/L  --  118*   ALT (SGPT) U/L  --  11   AST (SGOT) U/L  --  19   GLUCOSE mg/dL 71 89       Culture Data:   No results found for: BLOODCX, URINECX, WOUNDCX, MRSACX, RESPCX, STOOLCX    Radiology Data:   Imaging Results (Last 24 Hours)     Procedure Component Value Units Date/Time    XR Hip With or Without Pelvis 2 - 3 View Left [[id number]] Collected:  03/09/20 1545     Updated:  03/09/20 1551    Narrative:       EXAMINATION:  XR HIP W OR WO PELVIS 2-3 VIEW LEFT-  3/9/2020 3:33 PM CDT     HISTORY: Left hip fracture.     COMPARISON: No comparison study.     TECHNIQUE: AP and frog-leg views of the left hip were supplemented with  an AP image of the pelvis.     FINDINGS: There is a displaced subcapital left femoral neck fracture.  There is superior and lateral displacement of the distal fragment. The  pelvis is intact. The right hip and left hip joint spaces are intact.  There is contrast in the urinary bladder related to recent CT.       Impression:       Displaced subcapital left femoral neck fracture, as  described.  This report was finalized on 03/09/2020 15:48 by Dr. Russ Galan,  MD.    XR Knee 1 or 2 View Left [[id number]] Collected:  03/09/20 1538     Updated:  03/09/20 1542    Narrative:       EXAMINATION:  XR KNEE 1 OR 2 VW LEFT-  3/9/2020 3:32 PM CDT     HISTORY: S72.002A-Fracture of unspecified part of neck of left femur,  initial encounter for closed fracture; S22.000A-Wedge compression  fracture of unspecified thoracic vertebra, initial encounter for closed  fracture. The patient fell. Left knee pain.     COMPARISON: No comparison study.     TECHNIQUE: 2 views were obtained.     FINDINGS:  There is mild narrowing of the medial compartment. There is  no spurring. There is no fracture identified. No significant joint  effusion is seen.       Impression:       No acute bony abnormality.     This report was finalized on 03/09/2020 15:39 by Dr. Russ Galan MD.    XR Knee 1 or 2 View Right [[id number]] Collected:  03/09/20 1537     Updated:  03/09/20 1541    Narrative:       EXAMINATION:  XR KNEE 1 OR 2 VW RIGHT-  3/9/2020 3:32 PM CDT     HISTORY: S72.002A-Fracture of unspecified part of neck of left femur,  initial encounter for closed fracture; S22.000A-Wedge compression  fracture of unspecified thoracic vertebra, initial encounter for closed  fracture. The patient fell. Right knee pain.     COMPARISON: No comparison study.     TECHNIQUE: 2 views were obtained.     FINDINGS:  There is no fracture or joint effusion. The joint spaces are  well maintained.       Impression:       No acute findings.     This report was finalized on 03/09/2020 15:38 by Dr. Russ Galan MD.    CT Abdomen Pelvis With Contrast [[id number]] Collected:  03/09/20 1332     Updated:  03/09/20 1350    Narrative:       CT abdomen pelvis with IV contrast     Indication: abdominal pain, possible diverticulitis.     Comparison: 02/19/2020     DOSE LENGTH PRODUCT: 260 mGy cm. Automated exposure control was also  utilized to decrease patient radiation dose.     Findings:     Displaced/impacted LEFT femoral neck fracture.  Surrounding soft tissue  edema and fat stranding. Partially imaged 2 column T11 compression  fracture with 20% height loss. Both of these findings are new when  compared to the 02/19/2020 exam.     No acute lung base finding. No suspicious liver lesion. Cholecystectomy.  No biliary ductal dilatation. Pancreas, spleen, and RIGHT adrenal gland  are unremarkable. Tiny LEFT adrenal gland nodule, stable and likely  adenoma. Punctate nonobstructing bilateral renal calculi. No  hydronephrosis. No focal urinary bladder wall thickening.     Appendix is surgically absent. No abnormal bowel distention or adjacent  inflammation. No ascites or free pelvic fluid. No pelvic mass.     Normal caliber abdominal aorta with scattered atherosclerotic  calcification. No enlarged retroperitoneal, mesenteric, pelvic, or  inguinal lymph nodes.       Impression:       Impression:     1.  Displaced impacted LEFT femoral neck fracture. Surrounding soft  tissue edema and fat stranding, new from prior.  2.  T11 compression fracture with 20% height loss, new from prior.  3.  No evidence of active bowel inflammation.  This report was finalized on 03/09/2020 13:47 by Dr. Mike Miguel MD.          I have reviewed the patient's current medications.     Assessment/Plan     Active Hospital Problems    Diagnosis   • **Closed displaced fracture of left femoral neck (CMS/HCC)   • Closed wedge compression fracture of T11 vertebra (CMS/HCC)   • Seizure disorder (CMS/HCC)   • Anxiety and depression   • Gastroparesis   • Hypokalemia   • Gastroesophageal reflux disease   • Constipation   • HTN (hypertension)   • Irritable bowel syndrome with both constipation and diarrhea   • Anemia       Plan:  1.  Incentive spirometer  2.  Neurosurgery note reviewed, no surgical intervention, brace only.  Patient needs follow-up at tertiary care center for her [facility] after discharge  3.  Orthopedics to fix left hip tomorrow, NPO at [facility]  4.  Lovenox for DVT PPx 30 mg  q12h  5.  Resume home medications as appropriate   6.  D/C serial hemoglobins   7.  IVF with NS with 20 KCl meq/L, change rate to 50 mL/hr  8.  Fecal occult blood pending, low suspicion for anaya GI bleed   9.  PT/OT post-operatively  10.  AM CBC and BMP              Discharge Planning: I expect the patient to be discharged to ? in ? days.    Rey Malloy MD   03/10/20   10:19

## 2020-03-10 NOTE — PLAN OF CARE
Problem: Patient Care Overview  Goal: Plan of Care Review  Outcome: Ongoing (interventions implemented as appropriate)  Flowsheets (Taken 3/10/2020 1524)  Progress: improving  Plan of Care Reviewed With: patient  Outcome Summary: Ntn assessment complete. Pt reports fair appetite. Pt does report to unintentional wt loss over the past few months. Regular diet, oral intake 25% of one meal. Encouraged oral intake with meals. Adding Boost Plus BID. Cont to follow.

## 2020-03-10 NOTE — CONSULTS
Reason for consult compression fracture    Chief Complaint   Patient presents with   • Abdominal Pain         Referring provider: Mellissa  HPI: 50-year-old female with a very complicated past medical history and an exceptionally poor historian.  Does not initially admit to any sort of fall or trauma related to her injuries.  She is very nonspecific and not stating that she just did not feel well and has had strokes in the past and felt she needed to be checked out.  Upon further discussion and questioning she does relate what are likely to be several falls.  She initially does not admit to any pain and then admits that the reason she came in was because of pain.  She gives an extensive history of moyamoya disease and bilateral EC IC intracranial bypass procedures at [facility] in .  She is very unclear as to what her follow-up is been since then.  She says that Dr. Jones is her primary care provider but Dr. Jones has no documented patient visits since 2019.  She complains of back pain and extensive bruising and pain on the left side from the head to the toe.    Review of Systems   Musculoskeletal: Positive for arthralgias and back pain.   All other systems reviewed and are negative.       Past Medical History:  has a past medical history of Acute kidney failure (CMS/HCC), Anxiety, Bowel obstruction (CMS/HCC), Constipation, Depression, GERD (gastroesophageal reflux disease), H/O gastric ulcer, Headache, History of transfusion, Hypertension, IBS (irritable bowel syndrome), Insomnia, Kidney stone, Maravilla maravilla disease, Pseudocholinesterase deficiency, Rapid weight loss, SBO (small bowel obstruction) (CMS/HCC), Stroke (CMS/HCC), UTI (urinary tract infection), and Volvulosis.    Past Surgical History:  has a past surgical history that includes  section; Breast surgery; Brain surgery; Hysterectomy; Tonsillectomy; Hernia repair; Esophagogastroduodenoscopy (2009); Colonoscopy (2007);  Esophagogastroduodenoscopy (N/A, 10/10/2016); Colonoscopy (N/A, 11/10/2016); Small intestine surgery (N/A, 03/2016); Esophagogastroduodenoscopy (N/A, 1/25/2017); Colon surgery; Appendectomy; Breast lumpectomy (Left, 3/9/2017); Esophagogastroduodenoscopy (N/A, 8/8/2017); Colonoscopy (N/A, 2/2/2018); Augmentation mammaplasty; Esophagogastroduodenoscopy (N/A, 2/13/2018); cholecystectomy with intraoperative cholangiogram (N/A, 11/8/2018); breast augmentation bilateral mastopexy; Cholecystectomy; and Esophagogastroduodenoscopy (N/A, 9/16/2019).    Family History: family history includes Breast cancer in her paternal grandmother; Cancer in her maternal grandfather and paternal grandfather; Dementia in her mother and paternal grandmother; Hypertension in her mother; No Known Problems in her brother, brother, father, maternal aunt, maternal grandmother, paternal aunt, sister, son, and son.    Social History:  reports that she quit smoking about 4 years ago. Her smoking use included cigarettes and electronic cigarette. She has a 15.00 pack-year smoking history. She has never used smokeless tobacco. She reports that she does not drink alcohol or use drugs.    Allergies: Anectine [succinylcholine chloride]; Stadol [butorphanol]; and Butorphanol tartrate    Medications: Scheduled Meds:  amitriptyline 100 mg Oral Nightly   amLODIPine 10 mg Oral Daily   busPIRone 7.5 mg Oral TID   fluticasone 2 spray Nasal Daily   levETIRAcetam 500 mg Oral BID   metoprolol succinate  mg Oral Nightly   polyethylene glycol 17 g Oral Daily   rosuvastatin 10 mg Oral Nightly   sodium chloride 10 mL Intravenous Q12H   venlafaxine  mg Oral Daily     Continuous Infusions:  sodium chloride 0.9 % with KCl 20 mEq 100 mL/hr Last Rate: 100 mL/hr (03/10/20 0318)     PRN Meds:.•  acetaminophen **OR** acetaminophen **OR** acetaminophen  •  bisacodyl  •  HYDROmorphone **AND** naloxone  •  oxyCODONE-acetaminophen  •  promethazine **OR** promethazine  "**OR** promethazine **OR** promethazine  •  [COMPLETED] Insert peripheral IV **AND** sodium chloride  •  sodium chloride     Objective:  General Appearance:  In no acute distress and comfortable.    Vital signs: (most recent): Blood pressure 128/87, pulse 104, temperature 98.8 °F (37.1 °C), temperature source Oral, resp. rate 18, height 171.4 cm (67.48\"), weight 55.9 kg (123 lb 4.8 oz), SpO2 96 %, not currently breastfeeding.    Lungs:  Normal effort and normal respiratory rate.  She is not in respiratory distress.    Heart: Normal rate.  Regular rhythm.  S1 normal and S2 normal.    Abdomen: Abdomen is soft and non-distended.  There is no abdominal tenderness.     Patient appears older than stated age    Neurologic Exam     Mental Status   Oriented to person, place, and time.   Speech: speech is normal   Level of consciousness: alert  Tangential disjointed conversation.     Cranial Nerves   Cranial nerves II through XII intact.     Motor Exam   Muscle bulk: normal  Overall muscle tone: normal  Right arm pronator drift: absent  Left arm pronator drift: absent    Strength   Strength 5/5 except as noted. Pain with left hip active and passive motion.  But no objective weakness.     Sensory Exam   Light touch normal.   Pinprick normal.     Gait, Coordination, and Reflexes     Coordination   Finger to nose coordination: normal    Tremor   Resting tremor: absent  Intention tremor: absent  Action tremor: absent    Reflexes   Reflexes 2+ except as noted.       Vital Signs  Temp:  [97.5 °F (36.4 °C)-98.8 °F (37.1 °C)] 98.8 °F (37.1 °C)  Heart Rate:  [102-129] 104  Resp:  [16-18] 18  BP: (113-156)/(77-99) 128/87    Physical Exam   Constitutional: She is oriented to person, place, and time.   Cardiovascular: Normal rate, regular rhythm, S1 normal and S2 normal.   Pulmonary/Chest: Effort normal. No respiratory distress.   Abdominal: Soft. She exhibits no distension.   Neurological: She is oriented to person, place, and time. She " has a normal Finger-Nose-Finger Test.   Psychiatric: Her speech is normal.   No bruising ecchymosis or discoloration on the left side of the shoulders hip flank or leg      Results Review:   I reviewed the patient's new clinical results.  I reviewed the patient's new imaging results and agree with the interpretation.  I reviewed the patient's other test results and agree with the interpretation          Lab Results (last 24 hours)     Procedure Component Value Units Date/Time    Basic Metabolic Panel [[id number]]  (Abnormal) Collected:  03/10/20 0600    Specimen:  Blood Updated:  03/10/20 0635     Glucose 71 mg/dL      BUN 6 mg/dL      Creatinine 0.56 mg/dL      Sodium 141 mmol/L      Potassium 3.5 mmol/L      Chloride 108 mmol/L      CO2 18.0 mmol/L      Calcium 9.0 mg/dL      eGFR Non African Amer 115 mL/min/1.73      BUN/Creatinine Ratio 10.7     Anion Gap 15.0 mmol/L     Narrative:       GFR Normal >60  Chronic Kidney Disease <60  Kidney Failure <15      Protime-INR [[id number]]  (Normal) Collected:  03/10/20 0600    Specimen:  Blood Updated:  03/10/20 0626     Protime 13.3 Seconds      INR 1.02    aPTT [[id number]]  (Abnormal) Collected:  03/10/20 0600    Specimen:  Blood Updated:  03/10/20 0626     PTT 36.4 seconds     CBC (No Diff) [[id number]]  (Abnormal) Collected:  03/10/20 0600    Specimen:  Blood Updated:  03/10/20 0616     WBC 9.93 10*3/mm3      RBC 3.34 10*6/mm3      Hemoglobin 9.9 g/dL      Hematocrit 29.8 %      MCV 89.2 fL      MCH 29.6 pg      MCHC 33.2 g/dL      RDW 13.8 %      RDW-SD 45.1 fl      MPV 9.6 fL      Platelets 408 10*3/mm3     Vitamin B12 [[id number]]  (Normal) Collected:  03/09/20 1130    Specimen:  Blood Updated:  03/10/20 0156     Vitamin B-12 790 pg/mL     Narrative:       Results may be falsely increased if patient taking Biotin.      Hemoglobin & Hematocrit, Blood [[id number]]  (Abnormal) Collected:  03/09/20 2339    Specimen:  Blood Updated:  03/10/20 0006     Hemoglobin 9.9 g/dL       Hematocrit 30.0 %     Reticulocytes [[id number]]  (Normal) Collected:  03/09/20 1130    Specimen:  Blood Updated:  03/09/20 1734     Reticulocyte % 1.84 %      Reticulocyte Absolute 0.0622 10*6/mm3     Iron Profile [[id number]]  (Abnormal) Collected:  03/09/20 1130    Specimen:  Blood Updated:  03/09/20 1727     Iron 27 mcg/dL      Iron Saturation 13 %      Transferrin 142 mg/dL      TIBC 212 mcg/dL     Ferritin [[id number]]  (Abnormal) Collected:  03/09/20 1130    Specimen:  Blood Updated:  03/09/20 1727     Ferritin 547.90 ng/mL     Narrative:       Results may be falsely decreased if patient taking Biotin.      Urine Drug Screen - Urine, Clean Catch [[id number]]  (Abnormal) Collected:  03/09/20 1142    Specimen:  Urine, Clean Catch Updated:  03/09/20 1701     THC, Screen, Urine Negative     Phencyclidine (PCP), Urine Negative     Cocaine Screen, Urine Negative     Methamphetamine, Ur Negative     Opiate Screen Positive     Amphetamine Screen, Urine Negative     Benzodiazepine Screen, Urine Negative     Tricyclic Antidepressants Screen Positive     Methadone Screen, Urine Negative     Barbiturates Screen, Urine Negative     Oxycodone Screen, Urine Negative     Propoxyphene Screen Negative     Buprenorphine, Screen, Urine Negative    Narrative:       Cutoff For Drugs Screened:    Amphetamines               500 ng/ml  Barbiturates               200 ng/ml  Benzodiazepines            150 ng/ml  Cocaine                    150 ng/ml  Methadone                  200 ng/ml  Opiates                    100 ng/ml  Phencyclidine               25 ng/ml  THC                            50 ng/ml  Methamphetamine            500 ng/ml  Tricyclic Antidepressants  300 ng/ml  Oxycodone                  100 ng/ml  Propoxyphene               300 ng/ml  Buprenorphine               10 ng/ml    The normal value for all drugs tested is negative. This report includes unconfirmed screening results, with the cutoff values listed, to be used for  medical treatment purposes only.  Unconfirmed results must not be used for non-medical purposes such as employment or legal testing.  Clinical consideration should be applied to any drug of abuse test, particularly when unconfirmed results are used.      [place] Draw [[id number]] Collected:  03/09/20 1130    Specimen:  Blood Updated:  03/09/20 1230    Narrative:       The following orders were created for panel order [place] Draw.  Procedure                               Abnormality         Status                     ---------                               -----------         ------                     Light Blue Top[[id number]]                                   Final result               Green Top (Gel)[[id number]]                                  Final result               Lavender Top[[id number]]                                     Final result               Red Top[[id number]]                                          Final result               [place] Blood Culture Pool...[[id number]]                      Final result               Gray Top - Ice[[id number]]                                   Final result                 Please view results for these tests on the individual orders.    Light Blue Top [[id number]] Collected:  03/09/20 1130    Specimen:  Blood Updated:  03/09/20 1230     Extra Tube hold for add-on     Comment: Auto resulted       Green Top (Gel) [[id number]] Collected:  03/09/20 1130    Specimen:  Blood Updated:  03/09/20 1230     Extra Tube Hold for add-ons.     Comment: Auto resulted.       Lavender Top [[id number]] Collected:  03/09/20 1130    Specimen:  Blood Updated:  03/09/20 1230     Extra Tube hold for add-on     Comment: Auto resulted       Red Top [[id number]] Collected:  03/09/20 1130    Specimen:  Blood Updated:  03/09/20 1230     Extra Tube Hold for add-ons.     Comment: Auto resulted.       [place] Blood Culture Bottle Set [[id number]] Collected:  03/09/20 1130    Specimen:  Blood from Arm, Left  Updated:  03/09/20 1230     Extra Tube Hold for add-ons.     Comment: Auto resulted.       Gray Top - Ice [[id number]] Collected:  03/09/20 1130    Specimen:  Blood Updated:  03/09/20 1230     Extra Tube Hold for add-ons.     Comment: Auto resulted.       Urinalysis With Culture If Indicated - Urine, Clean Catch [[id number]]  (Abnormal) Collected:  03/09/20 1142    Specimen:  Urine, Clean Catch Updated:  03/09/20 1222     Color, UA Yellow     Appearance, UA Clear     pH, UA 6.5     Specific Gravity, UA 1.018     Glucose, UA Negative     Ketones, UA 80 mg/dL (3+)     Bilirubin, UA Negative     Blood, UA Negative     Protein, UA Negative     Leuk Esterase, UA Trace     Nitrite, UA Negative     Urobilinogen, UA 1.0 E.U./dL    Urinalysis, Microscopic Only - Urine, Clean Catch [[id number]]  (Abnormal) Collected:  03/09/20 1142    Specimen:  Urine, Clean Catch Updated:  03/09/20 1222     RBC, UA None Seen /HPF      WBC, UA 3-5 /HPF      Bacteria, UA None Seen /HPF      Squamous Epithelial Cells, UA 0-2 /HPF      Hyaline Casts, UA None Seen /LPF      Methodology Manual Light Microscopy    Comprehensive Metabolic Panel [[id number]]  (Abnormal) Collected:  03/09/20 1130    Specimen:  Blood Updated:  03/09/20 1159     Glucose 89 mg/dL      BUN 10 mg/dL      Creatinine 0.72 mg/dL      Sodium 138 mmol/L      Potassium 2.7 mmol/L      Chloride 99 mmol/L      CO2 20.0 mmol/L      Calcium 8.5 mg/dL      Total Protein 6.3 g/dL      Albumin 3.50 g/dL      ALT (SGPT) 11 U/L      AST (SGOT) 19 U/L      Alkaline Phosphatase 118 U/L      Total Bilirubin <0.2 mg/dL      eGFR Non African Amer 86 mL/min/1.73      Globulin 2.8 gm/dL      A/G Ratio 1.3 g/dL      BUN/Creatinine Ratio 13.9     Anion Gap 19.0 mmol/L     Narrative:       GFR Normal >60  Chronic Kidney Disease <60  Kidney Failure <15      Lipase [[id number]]  (Abnormal) Collected:  03/09/20 1130    Specimen:  Blood Updated:  03/09/20 1159     Lipase 7 U/L     aPTT [[id number]]   (Normal) Collected:  03/09/20 1130    Specimen:  Blood Updated:  03/09/20 1150     PTT 34.7 seconds     Protime-INR [[id number]]  (Normal) Collected:  03/09/20 1130    Specimen:  Blood Updated:  03/09/20 1150     Protime 13.1 Seconds      INR 1.00    CBC & Differential [[id number]] Collected:  03/09/20 1130    Specimen:  Blood Updated:  03/09/20 1141    Narrative:       The following orders were created for panel order CBC & Differential.  Procedure                               Abnormality         Status                     ---------                               -----------         ------                     CBC Auto Differential[[id number]]        Abnormal            Final result                 Please view results for these tests on the individual orders.    CBC Auto Differential [[id number]]  (Abnormal) Collected:  03/09/20 1130    Specimen:  Blood Updated:  03/09/20 1141     WBC 11.47 10*3/mm3      RBC 3.39 10*6/mm3      Hemoglobin 10.2 g/dL      Hematocrit 30.2 %      MCV 89.1 fL      MCH 30.1 pg      MCHC 33.8 g/dL      RDW 13.4 %      RDW-SD 44.0 fl      MPV 9.9 fL      Platelets 388 10*3/mm3      Neutrophil % 75.1 %      Lymphocyte % 17.4 %      Monocyte % 6.3 %      Eosinophil % 0.5 %      Basophil % 0.2 %      Immature Grans % 0.5 %      Neutrophils, Absolute 8.61 10*3/mm3      Lymphocytes, Absolute 2.00 10*3/mm3      Monocytes, Absolute 0.72 10*3/mm3      Eosinophils, Absolute 0.06 10*3/mm3      Basophils, Absolute 0.02 10*3/mm3      Immature Grans, Absolute 0.06 10*3/mm3      nRBC 0.0 /100 WBC           Closed displaced fracture of left femoral neck (CMS/HCC)    HTN (hypertension)    Irritable bowel syndrome with both constipation and diarrhea    Anemia    Constipation    Gastroesophageal reflux disease    Hypokalemia    Gastroparesis    Anxiety and depression    Seizure disorder (CMS/HCC)    Closed wedge compression fracture of T11 vertebra (CMS/HCC)      Assessment/Plan:   1.  Moyamoya disease.  Patient  has a very unusual vascular condition that predisposes her to stroke.  She is very unclear what her follow-up has been.  Care everywhere charting documents a telephone message left by [facility] February 21, 2019 regarding follow-up instructions for this condition and her complicated intracranial bypass procedure.  There is no documentation that this is been properly followed up.  This facility is not equipped to follow this type of condition I would recommend referral to a tertiary care center soon as possible after discharge for appropriate follow-up regarding this.    2.  T11 compression fracture.  CT scan of the abdomen pelvis and sagittal sections show a new superior endplate compression fracture of T11.  This requires no surgical intervention I would recommend LSO bracing and mobilization when she is cleared from her hip injury.    I discussed the patients findings and my recommendations with patient    Romaine Quinonez MD  03/10/20  08:17    Time: More than 50% of time spent in counseling and coordination of care:  Total face-to-face/floor time 62 min.  Time spent in counseling 45 min. Counseling included the following topics: Diagnosis, condition, treatment, and plan

## 2020-03-10 NOTE — PLAN OF CARE
Problem: Patient Care Overview  Goal: Plan of Care Review  Outcome: Ongoing (interventions implemented as appropriate)  Flowsheets  Taken 3/10/2020 1445  Progress: no change  Taken 3/10/2020 0840  Plan of Care Reviewed With: patient  Note:   A&O X4. C/o tingling at left hip. No neurovascular changes. Safety maintained. Bed check. PRN PO and IV pain  meds given with good relief. , room air. IVF. Call Jr tomorrow to fit patient for LSO brace, currently bedrest. F/c placed today. NPO at midnight for surgery tomorrow. Will continue to monitor.

## 2020-03-10 NOTE — PROGRESS NOTES
Malnutrition Severity Assessment    Patient Name:  Kamryn Oliva  YOB: 1970  MRN: [id number]  Admit Date:  3/9/2020    Patient meets criteria for : Severe Malnutrition    Comments: Please sign if in agreement. Thank you!    Malnutrition Severity Assessment  Malnutrition Type: Chronic Disease - Related Malnutrition     Malnutrition Type (last 8 hours)      Malnutrition Severity Assessment     Row Name 03/10/20 1515       Malnutrition Severity Assessment    Malnutrition Type  Chronic Disease - Related Malnutrition    Row Name 03/10/20 1515       Muscle Loss    Loss of Muscle Mass Findings  Severe    [place] Region  Severe - deep hollowing/scooping, lack of muscle to touch, facial bones well defined    Clavicle Bone Region  Severe - protruding prominent bone    Acromion Bone Region  Moderate - acromion may slightly protrude    Scapular Bone Region  Severe - prominent bones, depressions easily visible between ribs, scapula, spine, shoulders    Dorsal Hand Region  Moderate - slight depression    Patellar Region  Severe - prominent bone, square looking, very little muscle definition    Anterior Thigh Region  Severe - line/depression along thigh, obviously thin    Posterior Calf Region  Severe - thin with very little definition/firmness    Row Name 03/10/20 1515       Fat Loss    Subcutaneous Fat Loss Findings  Severe    Orbital Region   Severe - pronounced hollowness/depression, dark circles, loose saggy skin    Upper Arm Region  Severe - mostly skin, very little space between folds, fingers touch    Thoracic & Lumbar Region  Severe - ribs visible with prominent depressions, iliac crest very prominent    Row Name 03/10/20 1515       Criteria Met (Must meet criteria for severity in at least 2 of these categories: M Wasting, Fat Loss, Fluid, Secondary Signs, Wt. Status, Intake)    Patient meets criteria for   Severe Malnutrition        Electronically signed by:  Yasmin Potter MS,RDN,LD  03/10/20 15:28

## 2020-03-10 NOTE — PLAN OF CARE
Problem: Patient Care Overview  Goal: Plan of Care Review  Outcome: Ongoing (interventions implemented as appropriate)  Flowsheets  Taken 3/10/2020 0246  Progress: no change  Taken 3/9/2020 2002  Plan of Care Reviewed With: patient  Note:   A&O x4. C/o hip pain. PRN pain meds given with some relief. Pt place on 1L O2 during sleep.  NPO at midnight for surgery on 3/10. VSS. Safety maintained. Will continue to monitor.

## 2020-03-11 ENCOUNTER — ANESTHESIA EVENT (OUTPATIENT)
Dept: PERIOP | Facility: HOSPITAL | Age: 50
End: 2020-03-11

## 2020-03-11 ENCOUNTER — APPOINTMENT (OUTPATIENT)
Dept: GENERAL RADIOLOGY | Facility: HOSPITAL | Age: 50
End: 2020-03-11

## 2020-03-11 ENCOUNTER — ANESTHESIA (OUTPATIENT)
Dept: PERIOP | Facility: HOSPITAL | Age: 50
End: 2020-03-11

## 2020-03-11 LAB
ANION GAP SERPL CALCULATED.3IONS-SCNC: 8 MMOL/L (ref 5–15)
BUN BLD-MCNC: 3 MG/DL (ref 6–20)
BUN/CREAT SERPL: 7.7 (ref 7–25)
CALCIUM SPEC-SCNC: 9 MG/DL (ref 8.6–10.5)
CHLORIDE SERPL-SCNC: 107 MMOL/L (ref 98–107)
CO2 SERPL-SCNC: 24 MMOL/L (ref 22–29)
CREAT BLD-MCNC: 0.39 MG/DL (ref 0.57–1)
DEPRECATED RDW RBC AUTO: 45.7 FL (ref 37–54)
ERYTHROCYTE [DISTWIDTH] IN BLOOD BY AUTOMATED COUNT: 13.8 % (ref 12.3–15.4)
GFR SERPL CREATININE-BSD FRML MDRD: >150 ML/MIN/1.73
GLUCOSE BLD-MCNC: 106 MG/DL (ref 65–99)
HCT VFR BLD AUTO: 28.5 % (ref 34–46.6)
HGB BLD-MCNC: 9.3 G/DL (ref 12–15.9)
MCH RBC QN AUTO: 29.8 PG (ref 26.6–33)
MCHC RBC AUTO-ENTMCNC: 32.6 G/DL (ref 31.5–35.7)
MCV RBC AUTO: 91.3 FL (ref 79–97)
PLATELET # BLD AUTO: 439 10*3/MM3 (ref 140–450)
PMV BLD AUTO: 9.1 FL (ref 6–12)
POTASSIUM BLD-SCNC: 3.8 MMOL/L (ref 3.5–5.2)
RBC # BLD AUTO: 3.12 10*6/MM3 (ref 3.77–5.28)
SODIUM BLD-SCNC: 139 MMOL/L (ref 136–145)
WBC NRBC COR # BLD: 8.95 10*3/MM3 (ref 3.4–10.8)

## 2020-03-11 PROCEDURE — 25010000002 PROPOFOL 10 MG/ML EMULSION: Performed by: NURSE ANESTHETIST, CERTIFIED REGISTERED

## 2020-03-11 PROCEDURE — 80048 BASIC METABOLIC PNL TOTAL CA: CPT | Performed by: INTERNAL MEDICINE

## 2020-03-11 PROCEDURE — 73502 X-RAY EXAM HIP UNI 2-3 VIEWS: CPT

## 2020-03-11 PROCEDURE — 25010000002 FENTANYL CITRATE (PF) 100 MCG/2ML SOLUTION: Performed by: ANESTHESIOLOGY

## 2020-03-11 PROCEDURE — C1776 JOINT DEVICE (IMPLANTABLE): HCPCS | Performed by: ORTHOPAEDIC SURGERY

## 2020-03-11 PROCEDURE — 25010000002 DEXAMETHASONE PER 1 MG: Performed by: ANESTHESIOLOGY

## 2020-03-11 PROCEDURE — 25010000002 NEOSTIGMINE 10 MG/10ML SOLUTION: Performed by: NURSE ANESTHETIST, CERTIFIED REGISTERED

## 2020-03-11 PROCEDURE — 88311 DECALCIFY TISSUE: CPT | Performed by: ORTHOPAEDIC SURGERY

## 2020-03-11 PROCEDURE — 88304 TISSUE EXAM BY PATHOLOGIST: CPT | Performed by: ORTHOPAEDIC SURGERY

## 2020-03-11 PROCEDURE — 25010000002 ONDANSETRON PER 1 MG: Performed by: NURSE ANESTHETIST, CERTIFIED REGISTERED

## 2020-03-11 PROCEDURE — 25010000003 CEFAZOLIN PER 500 MG: Performed by: NURSE ANESTHETIST, CERTIFIED REGISTERED

## 2020-03-11 PROCEDURE — 25010000002 ONDANSETRON PER 1 MG: Performed by: ANESTHESIOLOGY

## 2020-03-11 PROCEDURE — 76000 FLUOROSCOPY <1 HR PHYS/QHP: CPT

## 2020-03-11 PROCEDURE — 85027 COMPLETE CBC AUTOMATED: CPT | Performed by: INTERNAL MEDICINE

## 2020-03-11 PROCEDURE — 63710000001 PROMETHAZINE PER 25 MG: Performed by: ORTHOPAEDIC SURGERY

## 2020-03-11 PROCEDURE — 25010000002 FENTANYL CITRATE (PF) 100 MCG/2ML SOLUTION: Performed by: NURSE ANESTHETIST, CERTIFIED REGISTERED

## 2020-03-11 PROCEDURE — 25010000002 MORPHINE SULFATE (PF) 2 MG/ML SOLUTION: Performed by: ANESTHESIOLOGY

## 2020-03-11 PROCEDURE — 25010000002 MIDAZOLAM PER 1 MG: Performed by: ANESTHESIOLOGY

## 2020-03-11 PROCEDURE — 25010000002 HYDROMORPHONE PER 4 MG: Performed by: ORTHOPAEDIC SURGERY

## 2020-03-11 PROCEDURE — 25010000002 HYDROMORPHONE PER 4 MG: Performed by: NURSE PRACTITIONER

## 2020-03-11 PROCEDURE — 25010000002 ENOXAPARIN PER 10 MG: Performed by: INTERNAL MEDICINE

## 2020-03-11 PROCEDURE — 25010000002 ROPIVACAINE PER 1 MG: Performed by: ANESTHESIOLOGY

## 2020-03-11 PROCEDURE — 0SRB04A REPLACEMENT OF LEFT HIP JOINT WITH CERAMIC ON POLYETHYLENE SYNTHETIC SUBSTITUTE, UNCEMENTED, OPEN APPROACH: ICD-10-PCS | Performed by: ORTHOPAEDIC SURGERY

## 2020-03-11 PROCEDURE — 25010000003 CEFAZOLIN 1-4 GM/50ML-% SOLUTION: Performed by: ORTHOPAEDIC SURGERY

## 2020-03-11 DEVICE — CORAIL HIP SYSTEM CEMENTLESS FEMORAL STEM 12/14 AMT 135 DEGREES KA SIZE 12 HA COATED STANDARD COLLAR
Type: IMPLANTABLE DEVICE | Status: FUNCTIONAL
Brand: CORAIL

## 2020-03-11 DEVICE — TOTL HIP COP DEPUY 9641334: Type: IMPLANTABLE DEVICE | Status: FUNCTIONAL

## 2020-03-11 DEVICE — BIOLOX DELTA CERAMIC FEMORAL HEAD 32MM DIA +1 12/14 TAPER
Type: IMPLANTABLE DEVICE | Status: FUNCTIONAL
Brand: BIOLOX DELTA

## 2020-03-11 DEVICE — TOTL HIP M/H GRIPTION CUP DEPUY UPCHRG: Type: IMPLANTABLE DEVICE | Status: FUNCTIONAL

## 2020-03-11 DEVICE — PINNACLE CANCELLOUS BONE SCREW 6.5MM X 15MM
Type: IMPLANTABLE DEVICE | Status: FUNCTIONAL
Brand: PINNACLE

## 2020-03-11 DEVICE — PINNACLE CANCELLOUS BONE SCREW 6.5MM X 20MM
Type: IMPLANTABLE DEVICE | Status: FUNCTIONAL
Brand: PINNACLE

## 2020-03-11 DEVICE — PINNACLE HIP SOLUTIONS ALTRX POLYETHYLENE ACETABULAR LINER NEUTRAL 32MM ID 48MM OD
Type: IMPLANTABLE DEVICE | Status: FUNCTIONAL
Brand: PINNACLE ALTRX

## 2020-03-11 DEVICE — PINNACLE GRIPTION ACETABULAR SHELL MULTI-HOLE 48MM OD
Type: IMPLANTABLE DEVICE | Status: FUNCTIONAL
Brand: PINNACLE GRIPTION

## 2020-03-11 RX ORDER — OXYCODONE AND ACETAMINOPHEN 10; 325 MG/1; MG/1
1 TABLET ORAL ONCE AS NEEDED
Status: DISCONTINUED | OUTPATIENT
Start: 2020-03-11 | End: 2020-03-11 | Stop reason: HOSPADM

## 2020-03-11 RX ORDER — NALOXONE HCL 0.4 MG/ML
0.04 VIAL (ML) INJECTION AS NEEDED
Status: DISCONTINUED | OUTPATIENT
Start: 2020-03-11 | End: 2020-03-11 | Stop reason: HOSPADM

## 2020-03-11 RX ORDER — CEFAZOLIN SODIUM 1 G/50ML
1 INJECTION, SOLUTION INTRAVENOUS EVERY 8 HOURS
Status: DISCONTINUED | OUTPATIENT
Start: 2020-03-11 | End: 2020-03-13 | Stop reason: HOSPADM

## 2020-03-11 RX ORDER — SODIUM CHLORIDE 0.9 % (FLUSH) 0.9 %
3 SYRINGE (ML) INJECTION EVERY 12 HOURS SCHEDULED
Status: DISCONTINUED | OUTPATIENT
Start: 2020-03-11 | End: 2020-03-11 | Stop reason: HOSPADM

## 2020-03-11 RX ORDER — DEXAMETHASONE SODIUM PHOSPHATE 4 MG/ML
4 INJECTION, SOLUTION INTRA-ARTICULAR; INTRALESIONAL; INTRAMUSCULAR; INTRAVENOUS; SOFT TISSUE ONCE AS NEEDED
Status: COMPLETED | OUTPATIENT
Start: 2020-03-11 | End: 2020-03-11

## 2020-03-11 RX ORDER — GLYCOPYRROLATE 0.2 MG/ML
INJECTION INTRAMUSCULAR; INTRAVENOUS AS NEEDED
Status: DISCONTINUED | OUTPATIENT
Start: 2020-03-11 | End: 2020-03-11 | Stop reason: SURG

## 2020-03-11 RX ORDER — CEFAZOLIN SODIUM 1 G/3ML
INJECTION, POWDER, FOR SOLUTION INTRAMUSCULAR; INTRAVENOUS AS NEEDED
Status: DISCONTINUED | OUTPATIENT
Start: 2020-03-11 | End: 2020-03-11 | Stop reason: SURG

## 2020-03-11 RX ORDER — ONDANSETRON 2 MG/ML
INJECTION INTRAMUSCULAR; INTRAVENOUS AS NEEDED
Status: DISCONTINUED | OUTPATIENT
Start: 2020-03-11 | End: 2020-03-11 | Stop reason: SURG

## 2020-03-11 RX ORDER — LIDOCAINE HYDROCHLORIDE 10 MG/ML
0.5 INJECTION, SOLUTION EPIDURAL; INFILTRATION; INTRACAUDAL; PERINEURAL ONCE AS NEEDED
Status: DISCONTINUED | OUTPATIENT
Start: 2020-03-11 | End: 2020-03-11 | Stop reason: HOSPADM

## 2020-03-11 RX ORDER — PHENYLEPHRINE HCL IN 0.9% NACL 0.8MG/10ML
SYRINGE (ML) INTRAVENOUS AS NEEDED
Status: DISCONTINUED | OUTPATIENT
Start: 2020-03-11 | End: 2020-03-11 | Stop reason: SURG

## 2020-03-11 RX ORDER — ONDANSETRON 2 MG/ML
4 INJECTION INTRAMUSCULAR; INTRAVENOUS AS NEEDED
Status: DISCONTINUED | OUTPATIENT
Start: 2020-03-11 | End: 2020-03-11 | Stop reason: HOSPADM

## 2020-03-11 RX ORDER — MIDAZOLAM HYDROCHLORIDE 1 MG/ML
1 INJECTION INTRAMUSCULAR; INTRAVENOUS
Status: DISCONTINUED | OUTPATIENT
Start: 2020-03-11 | End: 2020-03-11 | Stop reason: HOSPADM

## 2020-03-11 RX ORDER — ROCURONIUM BROMIDE 10 MG/ML
INJECTION, SOLUTION INTRAVENOUS AS NEEDED
Status: DISCONTINUED | OUTPATIENT
Start: 2020-03-11 | End: 2020-03-11 | Stop reason: SURG

## 2020-03-11 RX ORDER — SODIUM CHLORIDE, SODIUM LACTATE, POTASSIUM CHLORIDE, CALCIUM CHLORIDE 600; 310; 30; 20 MG/100ML; MG/100ML; MG/100ML; MG/100ML
100 INJECTION, SOLUTION INTRAVENOUS CONTINUOUS
Status: DISCONTINUED | OUTPATIENT
Start: 2020-03-11 | End: 2020-03-11

## 2020-03-11 RX ORDER — FENTANYL CITRATE 50 UG/ML
25 INJECTION, SOLUTION INTRAMUSCULAR; INTRAVENOUS
Status: DISCONTINUED | OUTPATIENT
Start: 2020-03-11 | End: 2020-03-11 | Stop reason: HOSPADM

## 2020-03-11 RX ORDER — PROPOFOL 10 MG/ML
VIAL (ML) INTRAVENOUS AS NEEDED
Status: DISCONTINUED | OUTPATIENT
Start: 2020-03-11 | End: 2020-03-11 | Stop reason: SURG

## 2020-03-11 RX ORDER — SODIUM CHLORIDE 0.9 % (FLUSH) 0.9 %
3-10 SYRINGE (ML) INJECTION AS NEEDED
Status: DISCONTINUED | OUTPATIENT
Start: 2020-03-11 | End: 2020-03-11 | Stop reason: HOSPADM

## 2020-03-11 RX ORDER — SODIUM CHLORIDE, SODIUM LACTATE, POTASSIUM CHLORIDE, CALCIUM CHLORIDE 600; 310; 30; 20 MG/100ML; MG/100ML; MG/100ML; MG/100ML
50 INJECTION, SOLUTION INTRAVENOUS CONTINUOUS
Status: DISCONTINUED | OUTPATIENT
Start: 2020-03-11 | End: 2020-03-12

## 2020-03-11 RX ORDER — NEOSTIGMINE METHYLSULFATE 1 MG/ML
INJECTION, SOLUTION INTRAVENOUS AS NEEDED
Status: DISCONTINUED | OUTPATIENT
Start: 2020-03-11 | End: 2020-03-11 | Stop reason: SURG

## 2020-03-11 RX ORDER — FLUMAZENIL 0.1 MG/ML
0.2 INJECTION INTRAVENOUS AS NEEDED
Status: DISCONTINUED | OUTPATIENT
Start: 2020-03-11 | End: 2020-03-11 | Stop reason: HOSPADM

## 2020-03-11 RX ORDER — ROPIVACAINE HYDROCHLORIDE 2 MG/ML
INJECTION, SOLUTION EPIDURAL; INFILTRATION; PERINEURAL
Status: COMPLETED | OUTPATIENT
Start: 2020-03-11 | End: 2020-03-11

## 2020-03-11 RX ORDER — ROPIVACAINE HYDROCHLORIDE 5 MG/ML
INJECTION, SOLUTION EPIDURAL; INFILTRATION; PERINEURAL
Status: COMPLETED | OUTPATIENT
Start: 2020-03-11 | End: 2020-03-11

## 2020-03-11 RX ORDER — LABETALOL HYDROCHLORIDE 5 MG/ML
5 INJECTION, SOLUTION INTRAVENOUS
Status: DISCONTINUED | OUTPATIENT
Start: 2020-03-11 | End: 2020-03-11 | Stop reason: HOSPADM

## 2020-03-11 RX ORDER — OXYCODONE AND ACETAMINOPHEN 7.5; 325 MG/1; MG/1
2 TABLET ORAL ONCE AS NEEDED
Status: DISCONTINUED | OUTPATIENT
Start: 2020-03-11 | End: 2020-03-11 | Stop reason: HOSPADM

## 2020-03-11 RX ORDER — ACETAMINOPHEN 500 MG
1000 TABLET ORAL ONCE
Status: COMPLETED | OUTPATIENT
Start: 2020-03-11 | End: 2020-03-11

## 2020-03-11 RX ORDER — MAGNESIUM HYDROXIDE 1200 MG/15ML
LIQUID ORAL AS NEEDED
Status: DISCONTINUED | OUTPATIENT
Start: 2020-03-11 | End: 2020-03-11 | Stop reason: HOSPADM

## 2020-03-11 RX ORDER — IPRATROPIUM BROMIDE AND ALBUTEROL SULFATE 2.5; .5 MG/3ML; MG/3ML
3 SOLUTION RESPIRATORY (INHALATION) ONCE AS NEEDED
Status: DISCONTINUED | OUTPATIENT
Start: 2020-03-11 | End: 2020-03-11 | Stop reason: HOSPADM

## 2020-03-11 RX ORDER — MIDAZOLAM HYDROCHLORIDE 1 MG/ML
2 INJECTION INTRAMUSCULAR; INTRAVENOUS
Status: DISCONTINUED | OUTPATIENT
Start: 2020-03-11 | End: 2020-03-11 | Stop reason: HOSPADM

## 2020-03-11 RX ORDER — FENTANYL CITRATE 50 UG/ML
INJECTION, SOLUTION INTRAMUSCULAR; INTRAVENOUS AS NEEDED
Status: DISCONTINUED | OUTPATIENT
Start: 2020-03-11 | End: 2020-03-11 | Stop reason: SURG

## 2020-03-11 RX ORDER — MORPHINE SULFATE 2 MG/ML
2 INJECTION, SOLUTION INTRAMUSCULAR; INTRAVENOUS
Status: DISCONTINUED | OUTPATIENT
Start: 2020-03-11 | End: 2020-03-11 | Stop reason: HOSPADM

## 2020-03-11 RX ADMIN — ENOXAPARIN SODIUM 30 MG: 30 INJECTION SUBCUTANEOUS at 00:03

## 2020-03-11 RX ADMIN — MORPHINE SULFATE 2 MG: 2 INJECTION, SOLUTION INTRAMUSCULAR; INTRAVENOUS at 13:32

## 2020-03-11 RX ADMIN — PROMETHAZINE HYDROCHLORIDE 12.5 MG: 25 TABLET ORAL at 15:59

## 2020-03-11 RX ADMIN — BUSPIRONE HYDROCHLORIDE 7.5 MG: 5 TABLET ORAL at 21:03

## 2020-03-11 RX ADMIN — SODIUM CHLORIDE, PRESERVATIVE FREE 10 ML: 5 INJECTION INTRAVENOUS at 08:54

## 2020-03-11 RX ADMIN — Medication 80 MCG: at 12:18

## 2020-03-11 RX ADMIN — FENTANYL CITRATE 50 MCG: 50 INJECTION, SOLUTION INTRAMUSCULAR; INTRAVENOUS at 11:56

## 2020-03-11 RX ADMIN — ACETAMINOPHEN 1000 MG: 500 TABLET, FILM COATED ORAL at 10:10

## 2020-03-11 RX ADMIN — OXYCODONE HYDROCHLORIDE AND ACETAMINOPHEN 1 TABLET: 7.5; 325 TABLET ORAL at 21:02

## 2020-03-11 RX ADMIN — NEOSTIGMINE METHYLSULFATE 3.5 MG: 1 INJECTION INTRAVENOUS at 12:49

## 2020-03-11 RX ADMIN — FENTANYL CITRATE 50 MCG: 50 INJECTION, SOLUTION INTRAMUSCULAR; INTRAVENOUS at 10:51

## 2020-03-11 RX ADMIN — LEVETIRACETAM 500 MG: 500 TABLET, FILM COATED ORAL at 21:03

## 2020-03-11 RX ADMIN — Medication 80 MCG: at 11:48

## 2020-03-11 RX ADMIN — PROPOFOL 150 MG: 10 INJECTION, EMULSION INTRAVENOUS at 10:55

## 2020-03-11 RX ADMIN — ROCURONIUM BROMIDE 50 MG: 10 INJECTION INTRAVENOUS at 10:55

## 2020-03-11 RX ADMIN — OXYCODONE HYDROCHLORIDE AND ACETAMINOPHEN 1 TABLET: 7.5; 325 TABLET ORAL at 05:22

## 2020-03-11 RX ADMIN — ONDANSETRON HYDROCHLORIDE 4 MG: 2 SOLUTION INTRAMUSCULAR; INTRAVENOUS at 13:32

## 2020-03-11 RX ADMIN — ONDANSETRON HYDROCHLORIDE 4 MG: 2 SOLUTION INTRAMUSCULAR; INTRAVENOUS at 12:42

## 2020-03-11 RX ADMIN — METOPROLOL SUCCINATE 100 MG: 100 TABLET, EXTENDED RELEASE ORAL at 21:03

## 2020-03-11 RX ADMIN — FENTANYL CITRATE 50 MCG: 50 INJECTION, SOLUTION INTRAMUSCULAR; INTRAVENOUS at 10:24

## 2020-03-11 RX ADMIN — ROSUVASTATIN CALCIUM 10 MG: 10 TABLET, FILM COATED ORAL at 21:02

## 2020-03-11 RX ADMIN — DEXAMETHASONE SODIUM PHOSPHATE 4 MG: 4 INJECTION, SOLUTION INTRAMUSCULAR; INTRAVENOUS at 10:10

## 2020-03-11 RX ADMIN — ROPIVACAINE HYDROCHLORIDE 20 ML: 5 INJECTION, SOLUTION EPIDURAL; INFILTRATION; PERINEURAL at 10:29

## 2020-03-11 RX ADMIN — Medication 80 MCG: at 11:03

## 2020-03-11 RX ADMIN — ROPIVACAINE HYDROCHLORIDE 20 ML: 2 INJECTION, SOLUTION EPIDURAL; INFILTRATION at 10:29

## 2020-03-11 RX ADMIN — GLYCOPYRROLATE 0.3 MG: 0.2 INJECTION, SOLUTION INTRAMUSCULAR; INTRAVENOUS at 12:49

## 2020-03-11 RX ADMIN — AMITRIPTYLINE HYDROCHLORIDE 100 MG: 100 TABLET, FILM COATED ORAL at 21:03

## 2020-03-11 RX ADMIN — Medication 80 MCG: at 11:39

## 2020-03-11 RX ADMIN — BUSPIRONE HYDROCHLORIDE 7.5 MG: 5 TABLET ORAL at 16:00

## 2020-03-11 RX ADMIN — MIDAZOLAM 1 MG: 1 INJECTION INTRAMUSCULAR; INTRAVENOUS at 10:24

## 2020-03-11 RX ADMIN — SODIUM CHLORIDE, PRESERVATIVE FREE 10 ML: 5 INJECTION INTRAVENOUS at 21:04

## 2020-03-11 RX ADMIN — HYDROMORPHONE HYDROCHLORIDE 0.5 MG: 1 INJECTION, SOLUTION INTRAMUSCULAR; INTRAVENOUS; SUBCUTANEOUS at 00:03

## 2020-03-11 RX ADMIN — CEFAZOLIN 1 G: 330 INJECTION, POWDER, FOR SOLUTION INTRAMUSCULAR; INTRAVENOUS at 11:12

## 2020-03-11 RX ADMIN — HYDROMORPHONE HYDROCHLORIDE 0.5 MG: 1 INJECTION, SOLUTION INTRAMUSCULAR; INTRAVENOUS; SUBCUTANEOUS at 04:17

## 2020-03-11 RX ADMIN — SODIUM CHLORIDE, POTASSIUM CHLORIDE, SODIUM LACTATE AND CALCIUM CHLORIDE 100 ML/HR: 600; 310; 30; 20 INJECTION, SOLUTION INTRAVENOUS at 13:52

## 2020-03-11 RX ADMIN — SODIUM CHLORIDE, POTASSIUM CHLORIDE, SODIUM LACTATE AND CALCIUM CHLORIDE 100 ML/HR: 600; 310; 30; 20 INJECTION, SOLUTION INTRAVENOUS at 10:09

## 2020-03-11 RX ADMIN — CEFAZOLIN SODIUM 1 G: 1 INJECTION, SOLUTION INTRAVENOUS at 17:21

## 2020-03-11 RX ADMIN — HYDROMORPHONE HYDROCHLORIDE 0.5 MG: 1 INJECTION, SOLUTION INTRAMUSCULAR; INTRAVENOUS; SUBCUTANEOUS at 14:56

## 2020-03-11 RX ADMIN — SODIUM CHLORIDE, POTASSIUM CHLORIDE, SODIUM LACTATE AND CALCIUM CHLORIDE 50 ML/HR: 600; 310; 30; 20 INJECTION, SOLUTION INTRAVENOUS at 14:38

## 2020-03-11 NOTE — ANESTHESIA PROCEDURE NOTES
Peripheral Block    Pre-sedation assessment completed: 3/11/2020 10:11 AM    Patient reassessed immediately prior to procedure    Patient location during procedure: holding area  Start time: 3/11/2020 10:26 AM  Stop time: 3/11/2020 10:29 AM  Reason for block: procedure for pain, at surgeon's request, post-op pain management and Requested by Dr. Farley  Preanesthetic Checklist  Completed: patient identified, site marked, surgical consent, pre-op evaluation, timeout performed, IV checked, risks and benefits discussed and monitors and equipment checked  Prep:  Pt Position: supine  Sterile barriers:cap and gloves  Prep: ChloraPrep  Patient monitoring: blood pressure monitoring, continuous pulse oximetry and EKG  Procedure  Sedation:yes    Guidance:ultrasound guided and fascial plane identified and local anesthetic infiltrated in iliaca compartment  ULTRASOUND INTERPRETATION. Using ultrasound guidance a 20 G gauge needle was placed in close proximity to the nerve, at which point, under ultrasound guidance anesthetic was injected in the area of the nerve and spread of the anesthesia was seen on ultrasound in close proximity thereto.  There were no abnormalities seen on ultrasound; a digital image was taken; and the patient tolerated the procedure with no complications. Images:still images obtained, printed/placed on chart (picture printed and placed in patients chart)    Laterality:left  Block Type:fascia iliaca compartment  Injection Technique:single-shot  Needle Type:echogenic  Needle Gauge:20 G  Resistance on Injection: none    Medications Used: ropivacaine (NAROPIN) injection 0.5 %, 20 mL  ropivacaine (NAROPIN) 0.2% injection, 20 mL  Med admintered at 3/11/2020 10:29 AM      Post Assessment  Injection Assessment: negative aspiration for heme, no paresthesia on injection and incremental injection  Patient Tolerance:comfortable throughout block  Complications:no

## 2020-03-11 NOTE — PLAN OF CARE
A&Ox4. Can be forgetful at times. C/o pain. Prn pain medication given with good relief. Prn antiemetic given with good relief. L hip sx performed today. Jose is CDI. HV. PPP. Denies n/t at this time. . Room air. Wan cath in place. VSS. Safety maintained. Very pleasant. Family at bedside. Will continue to monitor.

## 2020-03-11 NOTE — PROGRESS NOTES
[facility]  INPATIENT PROGRESS NOTE    Patient Name: Kamryn Oliva  Date of Admission: 3/9/2020  Today's Date: 03/11/20  Length of Stay: 2  Primary Care Physician: Kory Jones DO    Subjective   Chief Complaint: left hip pain   HPI     Patient was seen and examined at bedside.  Patient indicates that she tolerated the procedure well.  Pain has been minimal, and is responded well to the IV Dilaudid.  Patient was resting comfortably in the room with her son at bedside.  Patient to work with physical therapy outpatient therapy tomorrow.        Review of Systems   Constitutional: Negative for activity change, appetite change, chills, diaphoresis and fatigue.   Respiratory: Negative for cough and shortness of breath.    Gastrointestinal: Negative for abdominal distention, abdominal pain, constipation, diarrhea, nausea and vomiting.   Musculoskeletal: Positive for arthralgias and myalgias.        All pertinent negatives and positives are as above. All other systems have been reviewed and are negative unless otherwise stated.     Objective    Temp:  [98.1 °F (36.7 °C)-100.1 °F (37.8 °C)] 98.6 °F (37 °C)  Heart Rate:  [71-95] 71  Resp:  [12-18] 16  BP: (104-148)/(68-98) 105/68  Physical Exam   Constitutional: She is oriented to person, place, and time. No distress.   HENT:   Head: Normocephalic and atraumatic.   Eyes: Conjunctivae are normal. No scleral icterus.   Neck: Neck supple. No JVD present.   Cardiovascular: Normal rate and regular rhythm. Exam reveals no friction rub.   No murmur heard.  Pulmonary/Chest: Effort normal and breath sounds normal. No stridor. No respiratory distress.   Abdominal: Soft. Bowel sounds are normal. She exhibits no distension. There is no tenderness. There is no guarding.   Musculoskeletal: She exhibits no edema.   Neurological: She is alert and oriented to person, place, and time.   lethargic   Skin: Skin is warm and dry. She  is not diaphoretic. No erythema. There is pallor.   Psychiatric: She has a normal mood and affect. Her behavior is normal.   Nursing note and vitals reviewed.          Results Review:  I have reviewed the labs, radiology results, and diagnostic studies.    Laboratory Data:   Results from last 7 days   Lab Units 03/11/20  0544 03/10/20  0600 03/09/20  2339 03/09/20  1130   WBC 10*3/mm3 8.95 9.93  --  11.47*   HEMOGLOBIN g/dL 9.3* 9.9* 9.9* 10.2*   HEMATOCRIT % 28.5* 29.8* 30.0* 30.2*   PLATELETS 10*3/mm3 439 408  --  388        Results from last 7 days   Lab Units 03/11/20  0544 03/10/20  0600 03/09/20  1130   SODIUM mmol/L 139 141 138   POTASSIUM mmol/L 3.8 3.5 2.7*   CHLORIDE mmol/L 107 108* 99   CO2 mmol/L 24.0 18.0* 20.0*   BUN mg/dL 3* 6 10   CREATININE mg/dL 0.39* 0.56* 0.72   CALCIUM mg/dL 9.0 9.0 8.5*   BILIRUBIN mg/dL  --   --  <0.2*   ALK PHOS U/L  --   --  118*   ALT (SGPT) U/L  --   --  11   AST (SGOT) U/L  --   --  19   GLUCOSE mg/dL 106* 71 89       Culture Data:   No results found for: BLOODCX, URINECX, WOUNDCX, MRSACX, RESPCX, STOOLCX    Radiology Data:   Imaging Results (Last 24 Hours)     Procedure Component Value Units Date/Time    XR Hip With or Without Pelvis 2 - 3 View Left [[id number]] Collected:  03/11/20 1401     Updated:  03/11/20 1405    Narrative:       INTRAOPERATIVE FLUOROSCOPIC GUIDANCE 3/11/2020      INDICATION: Intraoperative fluoroscopic guidance. Left hip arthroplasty      TECHNIQUE: 3 fluoroscopic images from the operating room were submitted  for evaluation. Please note, no radiologist was in attendance for  acquisition of these images. These images are available for future  reference to the attending surgeon.     Radiation: 18 seconds.  1.67 mGy.     FINDINGS: Intraoperative images related to left hip arthroplasty.        Impression:       1. Intraoperative fluoroscopic guidance as described.   2. Please refer to real-time fluoroscopy and operative report for full  details.  This  report was finalized on 03/11/2020 14:02 by Dr. Anel Dougherty MD.    FL C Arm During Surgery [[id number]] Resulted:  03/11/20 1114     Updated:  03/11/20 1303    XR Chest 1 View [[id number]] Collected:  03/10/20 2056     Updated:  03/10/20 2100    Narrative:       EXAMINATION: XR CHEST 1 VW-. 3/10/2020 8:56 PM CDT     CHEST, ONE VIEW:     HISTORY: Hypertension     COMPARISON: 02/19/2020, 11/30/2018 and 11/6/2018 chest radiography     A single frontal chest radiograph was obtained.     FINDINGS:     Postsurgical changes identified in the left chest.     There is scoliosis of the thoracic spine.     The lungs are clear without acute infiltrates.     The heart is normal in size, without heart failure.     No acute osseous abnormalities observed.     Radiographically, chest is unchanged.                                     Impression:       1. No acute cardiopulmonary process.     This report was finalized on 03/10/2020 20:57 by Dr. Lior Ludwig MD.          I have reviewed the patient's current medications.     Assessment/Plan     Active Hospital Problems    Diagnosis   • **Closed displaced fracture of left femoral neck (CMS/HCC)   • Closed wedge compression fracture of T11 vertebra (CMS/HCC)   • Seizure disorder (CMS/HCC)   • Anxiety and depression   • Gastroparesis   • Hypokalemia   • Severe protein-calorie malnutrition (CMS/HCC)   • Gastroesophageal reflux disease   • Constipation   • HTN (hypertension)   • Irritable bowel syndrome with both constipation and diarrhea   • Anemia   • Maravilla maravilla disease       Plan:  1.  Incentive spirometer  2.  Neurosurgery note reviewed, no surgical intervention, brace only.  Patient needs follow-up at tertiary care center for her MoyaMoya after discharge  3.  S/p left total hip arthroplasty on 3/11/2020 by Dr. Jorge Farley   4.  Apixaban 2.5 mg BID starting tomorrow for DVT PPx for 28 days  5.  PRN IV dilaudid 0.5 mg q2h PRN, percocet 7.5 mg   6.  PRN anti-emetics   7.  LR at 50  mL/hr  8.  PT/OT  9.  Suspect patient will need placement             Discharge Planning: I expect the patient to be discharged to ? in ? days.    Rey Malloy MD   03/11/20   16:55

## 2020-03-11 NOTE — ANESTHESIA PROCEDURE NOTES
Airway  Urgency: elective    Date/Time: 3/11/2020 10:57 AM  Airway not difficult    General Information and Staff    Patient location during procedure: OR  CRNA: Virgilio Trammell CRNA    Indications and Patient Condition  Indications for airway management: airway protection    Preoxygenated: yes  MILS maintained throughout  Mask difficulty assessment: 1 - vent by mask    Final Airway Details  Final airway type: endotracheal airway      Successful airway: ETT  Cuffed: yes   Successful intubation technique: direct laryngoscopy  Endotracheal tube insertion site: oral  Blade: Adams  Blade size: 2  ETT size (mm): 7.0  Cormack-Lehane Classification: grade I - full view of glottis  Placement verified by: chest auscultation and capnometry   Cuff volume (mL): 5  Measured from: gums  ETT/EBT to gums (cm): 20  Number of attempts at approach: 1  Assessment: lips, teeth, and gum same as pre-op and atraumatic intubation

## 2020-03-11 NOTE — OP NOTE
[facility]  OP NOTE    Patient Name: Kamryn Oliva  Procedure Date: 3/11/2020     PREOPERATIVE DIAGNOSIS:  RIGHT FEMORAL NECK FRACTURE     POSTOPERATIVE DIAGNOSIS:  Same.     PROCEDURE PERFORMED:  Left total hip arthroplasty.      SURGEON:  Jorge Farley MD     INDICATIONS:  Kamryn Oliva is 50 y.o. female. Kamryn Oliva sustained a displaced subcapital femoral neck fracture on the left.  It was felt that a total hip arthroplasty was indicated. It was felt that a total hip arthroplasty was indicated. The patient understands the risks and benefits of the procedure, including the risks of infection, bleeding and anesthetic problems. Kamryn Oliva accepted those risks and asked me to proceed.       IMPLANTS: At surgery, the [employer] system was used, utilizing a 48 mm outside diameter multihole Gription [place] cup with one 6.5 mm cancellous fixation screw with a neutral 32 mm inside diameter [place] Ultrex polyethylene liner, with a KA size 12 collared [place]ail femoral component with a 32 x 1 mm ceramic femoral head with all components placed in cementless fashion.     DESCRIPTION OF PROCEDURE:  After an adequate level of general anesthesia, the patient was placed on the [place] table in the supine position. The left hip was prepped and draped in the usual fashion. An anterolateral incision was made and carried down and through the fascia of the tensor fascia trinidad muscle. Blunt dissection was then carried around the medial border of the tensor muscle down to the anterior femoral neck. The anterior circumflex vessels were then identified, cauterized and transected. The tissue was then elevated off of the anterior hip capsule. Using cautery, an incision was made through the capsule in line with the femoral neck up the Cardenas-Parker interval. Medial and lateral flaps were created distally. Retractors were then placed intra-articularly around the femoral neck. A femoral neck osteotomy was then  performed, followed by a napkin ring cut. The napkin ring of bone was removed and then the femoral head was removed with a corkscrew device. The extremity was placed in external rotation and an inferior capsulotomy was carried out. The extremity was then placed back into slight external rotation with slight distraction. Retractors were placed around the acetabulum and under fluoroscopic control, reaming of the acetabulum was carried out to the appropriate depth and size. The wound was then thoroughly irrigated and suctioned dry. The definitive acetabular cup was then impacted into position attempting 40° of abduction and 25° of anteversion. The fixation screws were then placed followed by impaction of the polyethylene liner into the cup. The traction was then removed and the extremity was placed in external rotation and extension. This patient was quite tight on the femoral side and required extensive capsular releases to access the femoral canal. Bone beneath the greater trochanter was removed with a rongeur and a box osteotome. The bone was quite thick and the canal was quite tight therefore the flexible reamers were utilized to prior broaching, Broaches were then utilized in the usual fashion, followed by use of the calcar planer. Trial reductions were then carried out, and the definitive components were chosen and opened. The hip was dislocated and the trial femoral components were removed and the wound was irrigated and suctioned dry. The definitive femoral component was then impacted into position and the femoral head was impacted onto the femoral component and the construct was reduced and noted to be stable. Limb lengths and component sizing appeared excellent. The wound was then irrigated and suctioned dry and closure over a deep Hemovac drain was carried out using chromic suture on the fascia of the tensor fascia trinidad muscle and the subcutaneous tissue, followed by nylon in an interrupted vertical mattress  fashion on the skin. Dressing was then applied. The patient tolerated the procedure well and was transferred to [facility] in stable condition.      Estimated Blood Loss: 150 cc    Complications: None     Jorge Farley MD    Date: 3/11/2020 Time: 12:56

## 2020-03-11 NOTE — ANESTHESIA POSTPROCEDURE EVALUATION
"Patient: Kamryn Oliva    Procedure Summary     Date:  03/11/20 Room / Location:  [facility] OR  /  [facility] OR    Anesthesia Start:  1050 Anesthesia Stop:  1307    Procedure:  TOTAL HIP REPLACEMENT (Left Hip) Diagnosis:  (RIGHT FEMORAL NECK FRACTURE)    Surgeon:  Jorge Farley MD Provider:  Virgilio Trammell CRNA    Anesthesia Type:  general with block ASA Status:  3          Anesthesia Type: general with block    Vitals  Vitals Value Taken Time   /85 3/11/2020  2:20 PM   Temp 99.3 °F (37.4 °C) 3/11/2020  2:20 PM   Pulse 84 3/11/2020  2:20 PM   Resp 16 3/11/2020  2:20 PM   SpO2 94 % 3/11/2020  2:20 PM           Post Anesthesia Care and Evaluation    Patient location during evaluation: PACU  Patient participation: complete - patient participated  Level of consciousness: awake and alert  Pain management: adequate  Airway patency: patent  Anesthetic complications: No anesthetic complications    Cardiovascular status: acceptable  Respiratory status: acceptable  Hydration status: acceptable    Comments: Blood pressure 118/85, pulse 84, temperature 99.3 °F (37.4 °C), resp. rate 16, height 171.4 cm (67.48\"), weight 55.9 kg (123 lb 4.8 oz), SpO2 94 %, not currently breastfeeding.    Pt discharged from PACU based on veronica score >8      "

## 2020-03-11 NOTE — PLAN OF CARE
Problem: Patient Care Overview  Goal: Plan of Care Review  Outcome: Ongoing (interventions implemented as appropriate)  Flowsheets  Taken 3/10/2020 1524 by Yasmin Potter  Progress: improving  Taken 3/10/2020 2010 by Sylvie Mancia, RN  Plan of Care Reviewed With: patient  Taken 3/11/2020 0312 by Sylvie Mancia, RN  Outcome Summary: Pt c/o moderate to severe pain, PRN PO and IV pain medication given as presribed, with relief noted.  Bedrest maintained, safety maintained.  NPO at midnight.  VSS, PPP, will continue to monitor.

## 2020-03-11 NOTE — ANESTHESIA PREPROCEDURE EVALUATION
Anesthesia Evaluation     Patient summary reviewed   history of anesthetic complications (pseudocholinesterase deficiency):  NPO Solid Status: > 8 hours  NPO Liquid Status: > 8 hours           Airway   Mallampati: I  TM distance: >3 FB  Neck ROM: full  Dental    (+) poor dentition        Pulmonary    (-) asthma, sleep apnea, not a smoker  Cardiovascular   Exercise tolerance: good (4-7 METS)    ECG reviewed  Patient on routine beta blocker and Beta blocker given within 24 hours of surgery    (+) hypertension, hyperlipidemia,   (-) pacemaker, past MI, angina, cardiac stents      Neuro/Psych  (+) seizures well controlled, CVA (moyamoya disease), weakness (left sided), poor historian.,     (-) numbness  GI/Hepatic/Renal/Endo    (+)  GERD,    (-) liver disease, no renal disease, diabetes    ROS Comment: gastroparesis    Musculoskeletal     (+) back pain (compression fracture),   Abdominal    Substance History      OB/GYN          Other                        Anesthesia Plan    ASA 3     general with block     intravenous induction     Anesthetic plan, all risks, benefits, and alternatives have been provided, discussed and informed consent has been obtained with: patient.

## 2020-03-12 LAB
ABO + RH BLD: NORMAL
ABO + RH BLD: NORMAL
ALBUMIN SERPL-MCNC: 2.8 G/DL (ref 3.5–5.2)
ALBUMIN/GLOB SERPL: 1 G/DL
ALP SERPL-CCNC: 127 U/L (ref 39–117)
ALT SERPL W P-5'-P-CCNC: 11 U/L (ref 1–33)
ANION GAP SERPL CALCULATED.3IONS-SCNC: 8 MMOL/L (ref 5–15)
AST SERPL-CCNC: 16 U/L (ref 1–32)
BH BB BLOOD EXPIRATION DATE: NORMAL
BH BB BLOOD EXPIRATION DATE: NORMAL
BH BB BLOOD TYPE BARCODE: 7300
BH BB BLOOD TYPE BARCODE: 7300
BH BB DISPENSE STATUS: NORMAL
BH BB DISPENSE STATUS: NORMAL
BH BB PRODUCT CODE: NORMAL
BH BB PRODUCT CODE: NORMAL
BH BB UNIT NUMBER: NORMAL
BH BB UNIT NUMBER: NORMAL
BILIRUB SERPL-MCNC: <0.2 MG/DL (ref 0.2–1.2)
BUN BLD-MCNC: 8 MG/DL (ref 6–20)
BUN/CREAT SERPL: 14.8 (ref 7–25)
CALCIUM SPEC-SCNC: 8.7 MG/DL (ref 8.6–10.5)
CHLORIDE SERPL-SCNC: 105 MMOL/L (ref 98–107)
CO2 SERPL-SCNC: 24 MMOL/L (ref 22–29)
CREAT BLD-MCNC: 0.54 MG/DL (ref 0.57–1)
DEPRECATED RDW RBC AUTO: 44.3 FL (ref 37–54)
ERYTHROCYTE [DISTWIDTH] IN BLOOD BY AUTOMATED COUNT: 13.6 % (ref 12.3–15.4)
GFR SERPL CREATININE-BSD FRML MDRD: 120 ML/MIN/1.73
GLOBULIN UR ELPH-MCNC: 2.7 GM/DL
GLUCOSE BLD-MCNC: 138 MG/DL (ref 65–99)
HCT VFR BLD AUTO: 28 % (ref 34–46.6)
HGB BLD-MCNC: 9.3 G/DL (ref 12–15.9)
MCH RBC QN AUTO: 30 PG (ref 26.6–33)
MCHC RBC AUTO-ENTMCNC: 33.2 G/DL (ref 31.5–35.7)
MCV RBC AUTO: 90.3 FL (ref 79–97)
PLATELET # BLD AUTO: 506 10*3/MM3 (ref 140–450)
PMV BLD AUTO: 9 FL (ref 6–12)
POTASSIUM BLD-SCNC: 3.9 MMOL/L (ref 3.5–5.2)
PROT SERPL-MCNC: 5.5 G/DL (ref 6–8.5)
RBC # BLD AUTO: 3.1 10*6/MM3 (ref 3.77–5.28)
SODIUM BLD-SCNC: 137 MMOL/L (ref 136–145)
UNIT  ABO: NORMAL
UNIT  ABO: NORMAL
UNIT  RH: NORMAL
UNIT  RH: NORMAL
WBC NRBC COR # BLD: 12.95 10*3/MM3 (ref 3.4–10.8)

## 2020-03-12 PROCEDURE — 80053 COMPREHEN METABOLIC PANEL: CPT | Performed by: INTERNAL MEDICINE

## 2020-03-12 PROCEDURE — 85027 COMPLETE CBC AUTOMATED: CPT | Performed by: INTERNAL MEDICINE

## 2020-03-12 PROCEDURE — 97167 OT EVAL HIGH COMPLEX 60 MIN: CPT | Performed by: OCCUPATIONAL THERAPIST

## 2020-03-12 PROCEDURE — 25010000003 CEFAZOLIN 1-4 GM/50ML-% SOLUTION: Performed by: ORTHOPAEDIC SURGERY

## 2020-03-12 PROCEDURE — 97530 THERAPEUTIC ACTIVITIES: CPT

## 2020-03-12 PROCEDURE — 97163 PT EVAL HIGH COMPLEX 45 MIN: CPT

## 2020-03-12 RX ADMIN — SODIUM CHLORIDE, PRESERVATIVE FREE 10 ML: 5 INJECTION INTRAVENOUS at 10:06

## 2020-03-12 RX ADMIN — APIXABAN 2.5 MG: 2.5 TABLET, FILM COATED ORAL at 10:06

## 2020-03-12 RX ADMIN — APIXABAN 2.5 MG: 2.5 TABLET, FILM COATED ORAL at 22:35

## 2020-03-12 RX ADMIN — BUSPIRONE HYDROCHLORIDE 7.5 MG: 5 TABLET ORAL at 15:05

## 2020-03-12 RX ADMIN — OXYCODONE HYDROCHLORIDE AND ACETAMINOPHEN 1 TABLET: 7.5; 325 TABLET ORAL at 15:04

## 2020-03-12 RX ADMIN — BUSPIRONE HYDROCHLORIDE 7.5 MG: 5 TABLET ORAL at 10:04

## 2020-03-12 RX ADMIN — FLUTICASONE PROPIONATE 2 SPRAY: 50 SPRAY, METERED NASAL at 10:04

## 2020-03-12 RX ADMIN — CEFAZOLIN SODIUM 1 G: 1 INJECTION, SOLUTION INTRAVENOUS at 22:39

## 2020-03-12 RX ADMIN — SODIUM CHLORIDE, POTASSIUM CHLORIDE, SODIUM LACTATE AND CALCIUM CHLORIDE 50 ML/HR: 600; 310; 30; 20 INJECTION, SOLUTION INTRAVENOUS at 10:23

## 2020-03-12 RX ADMIN — AMITRIPTYLINE HYDROCHLORIDE 100 MG: 100 TABLET, FILM COATED ORAL at 22:35

## 2020-03-12 RX ADMIN — OXYCODONE HYDROCHLORIDE AND ACETAMINOPHEN 1 TABLET: 7.5; 325 TABLET ORAL at 01:40

## 2020-03-12 RX ADMIN — METOPROLOL SUCCINATE 100 MG: 100 TABLET, EXTENDED RELEASE ORAL at 22:35

## 2020-03-12 RX ADMIN — CEFAZOLIN SODIUM 1 G: 1 INJECTION, SOLUTION INTRAVENOUS at 12:15

## 2020-03-12 RX ADMIN — OXYCODONE HYDROCHLORIDE AND ACETAMINOPHEN 1 TABLET: 7.5; 325 TABLET ORAL at 21:04

## 2020-03-12 RX ADMIN — CEFAZOLIN SODIUM 1 G: 1 INJECTION, SOLUTION INTRAVENOUS at 03:47

## 2020-03-12 RX ADMIN — VENLAFAXINE HYDROCHLORIDE 150 MG: 75 CAPSULE, EXTENDED RELEASE ORAL at 10:05

## 2020-03-12 RX ADMIN — OXYCODONE HYDROCHLORIDE AND ACETAMINOPHEN 1 TABLET: 7.5; 325 TABLET ORAL at 05:32

## 2020-03-12 RX ADMIN — OXYCODONE HYDROCHLORIDE AND ACETAMINOPHEN 1 TABLET: 7.5; 325 TABLET ORAL at 10:22

## 2020-03-12 RX ADMIN — SODIUM CHLORIDE, PRESERVATIVE FREE 10 ML: 5 INJECTION INTRAVENOUS at 22:39

## 2020-03-12 RX ADMIN — BUSPIRONE HYDROCHLORIDE 7.5 MG: 5 TABLET ORAL at 22:35

## 2020-03-12 RX ADMIN — ROSUVASTATIN CALCIUM 10 MG: 10 TABLET, FILM COATED ORAL at 22:35

## 2020-03-12 RX ADMIN — LEVETIRACETAM 500 MG: 500 TABLET, FILM COATED ORAL at 22:35

## 2020-03-12 RX ADMIN — LEVETIRACETAM 500 MG: 500 TABLET, FILM COATED ORAL at 10:05

## 2020-03-12 NOTE — PLAN OF CARE
Problem: Patient Care Overview  Goal: Plan of Care Review  Flowsheets  Taken 3/12/2020 1012  Progress: no change  Taken 3/12/2020 0806  Plan of Care Reviewed With: patient;family  Note:   OT eval completed.  Ms. Mascorro presents in high pain & is a poor historian regarding her PLOF & med hx. She required Min A x 2 for bed mobility to maintain safety at her L hip & follow spinal precautions.  [employer] rep fitted pt. For LSO during OT eval & OTR edu'd pt./Family in spinal precautions at EOB.  She was able to NEY her R sock but required total assist for L sock 2' pain.  Upon attempting standing pt. Became very dizzy & she requested to be assisted to supine.  Ms. Mascorro would benefit from cont'd OT tx to improve her indep in self care & fxl mobility.  Anticipate DC to acute rehab vs. SNF.       administ

## 2020-03-12 NOTE — PROGRESS NOTES
Continued Stay Note  [facility]     Patient Name: Kamryn Oliva  MRN: [id number]  Today's Date: 3/12/2020    Admit Date: 3/9/2020    Discharge Plan     Row Name 03/12/20 1972       Plan    Plan Comments  [facility] HAS DECLINED. [facility] DID OFFER A BED AND WILL COME AND SPEAK TO PT TODAY OR IN AM    Final Discharge Disposition Code  03 - skilled nursing facility (SNF)    Row Name 03/12/20 9045       Plan    Plan  REFERRALS PENDING TO [facility] AND [facility]        Discharge Codes    No documentation.             PEREZ Newell

## 2020-03-12 NOTE — PROGRESS NOTES
Discharge Planning Assessment  [facility]     Patient Name: Kamryn Oliva  MRN: [id number]  Today's Date: 3/12/2020    Admit Date: 3/9/2020    Discharge Needs Assessment     Row Name 03/12/20 1448       Living Environment    Lives With  child(ranjana), adult    Current Living Arrangements  home/apartment/condo    Primary Care Provided by  self    Provides Primary Care For  no one    Family Caregiver if Needed  child(ranjana), adult    Family Caregiver Names  SON AND DTR    Quality of Family Relationships  helpful;involved    Able to Return to Prior Arrangements  other (see comments)    Living Arrangement Comments  MAKING REFERRAL TO [facility] FOR SHORT TERM REHAB POST DC       Resource/Environmental Concerns    Resource/Environmental Concerns  none    Transportation Concerns  car, none       Transition Planning    Patient/Family Anticipates Transition to  inpatient rehabilitation facility    Patient/Family Anticipated Services at Transition  skilled nursing    Transportation Anticipated  family or friend will provide;health plan transportation       Discharge Needs Assessment    Readmission Within the Last 30 Days  no previous admission in last 30 days    Concerns to be Addressed  denies needs/concerns at this time    Equipment Currently Used at Home  none    Anticipated Changes Related to Illness  inability to care for self    Equipment Needed After Discharge  other (see comments) SNF WILL PROVIDE DME    Outpatient/Agency/Support Group Needs  skilled nursing facility    Discharge Facility/Level of Care Needs  nursing facility, skilled    Provided Post Acute Provider List?  Yes    Post Acute Provider List  Nursing Home    Delivered To  Patient    Method of Delivery  In person    Discharge Coordination/Progress  SPOKE TO PT ABOUT DC PLANS AND SHORT TERM REHAB. PT LIVES IN [place]. PROVIDED CHOICES FOR [place] AND PT PREFERS EITHER [facility] OR [facility]. MADE REFERRALS TO BOTH, AWAIT ANSWERS.          Discharge Plan     Row Name 03/12/20 1450       Plan    Plan  REFERRALS PENDING TO [facility]      Coordination has not been started for this encounter.      Durable Medical Equipment      Coordination has not been started for this encounter.      Dialysis/Infusion      Coordination has not been started for this encounter.      Home Medical Care      Coordination has not been started for this encounter.      Therapy      Coordination has not been started for this encounter.      Community Resources      Coordination has not been started for this encounter.          Demographic Summary    No documentation.       Functional Status    No documentation.       Psychosocial    No documentation.       Abuse/Neglect    No documentation.       Legal    No documentation.       Substance Abuse    No documentation.       Patient Forms    No documentation.           PEREZ Newell

## 2020-03-12 NOTE — PROGRESS NOTES
[facility] Medicine Services  INPATIENT PROGRESS NOTE    Patient Name: Kamryn Oliva  Date of Admission: 3/9/2020  Today's Date: 03/12/20  Length of Stay: 3  Primary Care Physician: Kory Jones DO    Subjective   Chief Complaint: left hip pain   HPI     Patient was seen and examined at bedside.  The patient overall is doing well.  The patient denies any significant issues this morning she was unable to completely stand up with therapy, but otherwise she feels as though she did well.  She will need rehab placement.        Review of Systems   Constitutional: Negative for activity change, appetite change, chills, diaphoresis and fatigue.   Respiratory: Negative for cough and shortness of breath.    Cardiovascular: Negative for chest pain and palpitations.   Gastrointestinal: Negative for abdominal distention, abdominal pain, constipation, diarrhea, nausea and vomiting.   Musculoskeletal: Positive for arthralgias. Negative for myalgias.        All pertinent negatives and positives are as above. All other systems have been reviewed and are negative unless otherwise stated.     Objective    Temp:  [96.8 °F (36 °C)-99.3 °F (37.4 °C)] 98.7 °F (37.1 °C)  Heart Rate:  [71-94] 82  Resp:  [12-16] 16  BP: ()/(56-95) 106/69  Physical Exam   Constitutional: She is oriented to person, place, and time. No distress.   Laying in bed resting   HENT:   Head: Normocephalic and atraumatic.   Eyes: Conjunctivae are normal. No scleral icterus.   Neck: Neck supple. No JVD present.   Cardiovascular: Normal rate and regular rhythm. Exam reveals no friction rub.   No murmur heard.  Pulmonary/Chest: Effort normal and breath sounds normal. No stridor. No respiratory distress.   Abdominal: Soft. Bowel sounds are normal. She exhibits no distension. There is no tenderness. There is no guarding.   Musculoskeletal: She exhibits no edema.   Neurological: She is alert and oriented to person, place, and  time.   Skin: Skin is warm and dry. She is not diaphoretic. No erythema. There is pallor.   Psychiatric: She has a normal mood and affect. Her behavior is normal.   Nursing note and vitals reviewed.          Results Review:  I have reviewed the labs, radiology results, and diagnostic studies.    Laboratory Data:   Results from last 7 days   Lab Units 03/12/20  0510 03/11/20  0544 03/10/20  0600   WBC 10*3/mm3 12.95* 8.95 9.93   HEMOGLOBIN g/dL 9.3* 9.3* 9.9*   HEMATOCRIT % 28.0* 28.5* 29.8*   PLATELETS 10*3/mm3 506* 439 408        Results from last 7 days   Lab Units 03/12/20  0510 03/11/20  0544 03/10/20  0600 03/09/20  1130   SODIUM mmol/L 137 139 141 138   POTASSIUM mmol/L 3.9 3.8 3.5 2.7*   CHLORIDE mmol/L 105 107 108* 99   CO2 mmol/L 24.0 24.0 18.0* 20.0*   BUN mg/dL 8 3* 6 10   CREATININE mg/dL 0.54* 0.39* 0.56* 0.72   CALCIUM mg/dL 8.7 9.0 9.0 8.5*   BILIRUBIN mg/dL <0.2*  --   --  <0.2*   ALK PHOS U/L 127*  --   --  118*   ALT (SGPT) U/L 11  --   --  11   AST (SGOT) U/L 16  --   --  19   GLUCOSE mg/dL 138* 106* 71 89       Culture Data:   No results found for: BLOODCX, URINECX, WOUNDCX, MRSACX, RESPCX, STOOLCX    Radiology Data:   Imaging Results (Last 24 Hours)     Procedure Component Value Units Date/Time    XR Hip With or Without Pelvis 2 - 3 View Left [[id number]] Collected:  03/11/20 1401     Updated:  03/12/20 0652    Narrative:       INTRAOPERATIVE FLUOROSCOPIC GUIDANCE 3/11/2020      INDICATION: Intraoperative fluoroscopic guidance. Left hip arthroplasty      TECHNIQUE: 3 fluoroscopic images from the operating room were submitted  for evaluation. Please note, no radiologist was in attendance for  acquisition of these images. These images are available for future  reference to the attending surgeon.     Radiation: 18 seconds.  1.67 mGy.     FINDINGS: Intraoperative images related to left hip arthroplasty.        Impression:       1. Intraoperative fluoroscopic guidance as described.   2. Please refer  to real-time fluoroscopy and operative report for full  details.  This report was finalized on 03/11/2020 14:02 by Dr. Anel Dougherty MD.    FL C Arm During Surgery [[id number]] Collected:  03/11/20 1401     Updated:  03/12/20 0652    Narrative:       INTRAOPERATIVE FLUOROSCOPIC GUIDANCE 3/11/2020      INDICATION: Intraoperative fluoroscopic guidance. Left hip arthroplasty      TECHNIQUE: 3 fluoroscopic images from the operating room were submitted  for evaluation. Please note, no radiologist was in attendance for  acquisition of these images. These images are available for future  reference to the attending surgeon.     Radiation: 18 seconds.  1.67 mGy.     FINDINGS: Intraoperative images related to left hip arthroplasty.        Impression:       1. Intraoperative fluoroscopic guidance as described.   2. Please refer to real-time fluoroscopy and operative report for full  details.  This report was finalized on 03/11/2020 14:02 by Dr. Anel Dougherty MD.          I have reviewed the patient's current medications.     Assessment/Plan     Active Hospital Problems    Diagnosis   • **Closed displaced fracture of left femoral neck (CMS/HCC)   • Closed wedge compression fracture of T11 vertebra (CMS/HCC)   • Seizure disorder (CMS/HCC)   • Anxiety and depression   • Gastroparesis   • Hypokalemia   • Severe protein-calorie malnutrition (CMS/HCC)   • Gastroesophageal reflux disease   • Constipation   • HTN (hypertension)   • Irritable bowel syndrome with both constipation and diarrhea   • Anemia   • Maravilla maravilla disease       Plan:  1.  Incentive spirometer  2.  Neurosurgery note reviewed, no surgical intervention, brace only.  Patient needs follow-up at tertiary care center for her Moya[facility]oya after discharge  3.  S/p left total hip arthroplasty on 3/11/2020 by Dr. Jorge Farley   4.  Apixaban 2.5 mg BID for DVT PPx for 28 days  5.  PRN IV dilaudid 0.5 mg q2h PRN, percocet 7.5 mg   6.  PRN anti-emetics   7.  LR at 50 mL/hr  8.   PT/OT  9.  Discussed with social work, patient will need placement, prefers [facility] or [facility]            Discharge Planning: I expect the patient to be discharged to rehab in ? days.    Rey Malloy MD   03/12/20   13:15

## 2020-03-12 NOTE — PROGRESS NOTES
Patient 1 day post left total hip arthroplasty.  She is progressing satisfactorily.  Plan discontinue Hemovac change dressing discontinue IV narcotics continue with physical therapy

## 2020-03-12 NOTE — PLAN OF CARE
Problem: Patient Care Overview  Goal: Plan of Care Review  Outcome: Ongoing (interventions implemented as appropriate)  Flowsheets (Taken 3/12/2020 1023)  Progress: no change (Pended)  Plan of Care Reviewed With: patient;family (Pended)  Outcome Summary: Pt eval completed. A&O 4x, can be forgetful. Pt we given education on precautions for spine and anterior hip, pt was able verbally confirm those, but needed verbal cues as a reminder to follow them. Pt reported 8/10 dizziness when going from sitting edge of bed to standing and was sat back down, then laid supine in bed to reduce symptoms. Pt is recommended for PT in this setting, pt eventually wants to go home, but with current condition PT is recommending to be placed in a SNF or acute inpatient rehab.   (Pended)

## 2020-03-12 NOTE — THERAPY EVALUATION
Acute Care - Occupational Therapy Initial Evaluation  [facility]     Patient Name: Kamryn Oliva  : 1970  MRN: [id number]  Today's Date: 3/12/2020  Onset of Illness/Injury or Date of Surgery: 20  Date of Referral to OT: 20  Referring Physician: Dr. Farley    Admit Date: 3/9/2020       ICD-10-CM ICD-9-CM   1. Closed left hip fracture, initial encounter (CMS/[facility]) S72.002A 820.8   2. Compression fracture of thoracic vertebra, initial encounter, unspecified thoracic vertebral level (CMS/[facility]) S22.000A 805.2   3. Osteoarthritis of left hip M16.12 715.95   4. Decreased activities of daily living (ADL) Z78.9 V49.89     Patient Active Problem List   Diagnosis   • Acute UTI (urinary tract infection)   • KRISHNA (acute kidney injury) (CMS/[facility])   • HTN (hypertension)   • Irritable bowel syndrome with both constipation and diarrhea   • Maravilla maravilla disease   • Anemia   • Abdominal pain   • Constipation   • Gastroesophageal reflux disease   • Hypokalemia   • Gastroparesis   • Abnormal CT of the abdomen   • Severe protein-calorie malnutrition (CMS/[facility])   • Anxiety and depression   • Seizure disorder (CMS/[facility])   • Mixed hyperlipidemia   • Failure to thrive in adult   • Anorexia nervosa   • Chronic back pain   • History of kidney stones   • Lumbar facet arthropathy   • Other dysphagia   • Status post abdominal hysterectomy   • Closed displaced fracture of left femoral neck (CMS/[facility])   • Closed wedge compression fracture of T11 vertebra (CMS/[facility])     Past Medical History:   Diagnosis Date   • Acute kidney failure (CMS/[facility])    • Anxiety    • Bowel obstruction (CMS/[facility])    • Constipation    • Depression    • GERD (gastroesophageal reflux disease)    • H/O gastric ulcer    • Headache    • History of transfusion    • Hypertension    • IBS (irritable bowel syndrome)    • Insomnia    • Kidney stone    • Maravilla maravilla disease    • Pseudocholinesterase deficiency    • Rapid weight loss    • SBO (small bowel obstruction) (CMS/[facility])     • Stroke (CMS/HCC)     X 2   • UTI (urinary tract infection)     CHRONIC, SEPTIC X2   • Volvulosis      Past Surgical History:   Procedure Laterality Date   • APPENDECTOMY      COMPLICATION OF SEPTIC   • AUGMENTATION MAMMAPLASTY     • BRAIN SURGERY      x2   • BREAST AUGMENTATION BILATERAL MASTOPEXY     • BREAST LUMPECTOMY Left 3/9/2017    Procedure: EXCISION LEFT BREAST LESION AND LEFT AXILLARY LYMPH NODE BIOPSY;  Surgeon: Evi Farley MD;  Location: [facility] OR;  Service:    • BREAST SURGERY     •  SECTION     • CHOLECYSTECTOMY     • CHOLECYSTECTOMY WITH INTRAOPERATIVE CHOLANGIOGRAM N/A 2018    Procedure: CHOLECYSTECTOMY LAPAROSCOPIC INTRAOPERATIVE CHOLANGIOGRAM;  Surgeon: Antonio Salvador MD;  Location: [facility] OR;  Service: General   • COLON SURGERY     • COLONOSCOPY  2007    normal   • COLONOSCOPY N/A 11/10/2016    Procedure: COLONOSCOPY WITH ANESTHESIA;  Surgeon: Camilo Hanson MD;  Location: [facility] ENDOSCOPY;  Service:    • COLONOSCOPY N/A 2018    Procedure: COLONOSCOPY WITH ANESTHESIA;  Surgeon: Camilo Hanson MD;  Location: [facility] ENDOSCOPY;  Service:    • ENDOSCOPY  2009    antral ulcer   • ENDOSCOPY N/A 10/10/2016    Procedure: ESOPHAGOGASTRODUODENOSCOPY WITH ANESTHESIA;  Surgeon: Camilo Hanson MD;  Location: [facility] ENDOSCOPY;  Service:    • ENDOSCOPY N/A 2017    Procedure: ESOPHAGOGASTRODUODENOSCOPY;  Surgeon: Camilo Hanson MD;  Location: [facility] ENDOSCOPY;  Service:    • ENDOSCOPY N/A 2017    Procedure: ESOPHAGOGASTRODUODENOSCOPY WITH ANESTHESIA;  Surgeon: Camilo Hanson MD;  Location: [facility] ENDOSCOPY;  Service:    • ENDOSCOPY N/A 2018    Procedure: ESOPHAGOGASTRODUODENOSCOPY ;  Surgeon: Camilo Hanson MD;  Location: [facility] ENDOSCOPY;  Service:    • ENDOSCOPY N/A 2019    Procedure: ESOPHAGOGASTRODUODENOSCOPY WITH ANESTHESIA;  Surgeon: Katarina Davis MD;  Location: [facility] ENDOSCOPY;  Service: Gastroenterology   • HERNIA REPAIR     •  HYSTERECTOMY     • SMALL INTESTINE SURGERY N/A 03/2016   • TONSILLECTOMY     • TOTAL HIP ARTHROPLASTY Left 3/11/2020    Procedure: TOTAL HIP REPLACEMENT;  Surgeon: Jorge Farley MD;  Location: [facility] OR;  Service: Orthopedics;  Laterality: Left;          OT ASSESSMENT FLOWSHEET (last 12 hours)      Occupational Therapy Evaluation     Row Name 03/12/20 0806                   OT Evaluation Time/Intention    Subjective Information  (S) complains of;pain;dizziness Pt. became extremely dizzy after sit to stand   -        Document Type  evaluation  -        Mode of Treatment  occupational therapy  -        Comment  0917  -           General Information    Patient Profile Reviewed?  yes  -        Onset of Illness/Injury or Date of Surgery  03/09/20  -        Referring Physician  Dr. Farley  -        Patient Observations  alert;cooperative;agree to therapy  -        Patient/Family Observations  family present  -        General Observations of Patient  fowlers, hemandiec, IVs  -        Prior Level of Function  independent:;all household mobility;ADL's;dependent:;driving;shopping  -        Equipment Currently Used at Home  rollator  -        Pertinent History of Current Functional Problem  Closed displaced left femoral neck fracture s/p   -        Existing Precautions/Restrictions  fall;hip, anterior;spinal  -        Risks Reviewed  patient and family:;LOB;increased discomfort  -        Benefits Reviewed  patient and family:;improve function;increase independence;increase strength;increase balance  -           Relationship/Environment    Primary Source of Support/Comfort  child(ranjana)  -        Lives With  child(ranjana), adult  -           Resource/Environmental Concerns    Current Living Arrangements  home/apartment/condo  -           Home Main Entrance    Number of Stairs, Main Entrance  five  -        Stair Railings, Main Entrance  railings on both sides of stairs  -            Cognitive Assessment/Interventions    Additional Documentation  Cognitive Assessment/Intervention (Group)  -           Cognitive Assessment/Intervention- PT/OT    Affect/Mental Status (Cognitive)  WNL  -        Orientation Status (Cognition)  oriented x 4  -CH        Follows Commands (Cognition)  WNL  -        Cognitive Function (Cognitive)  WNL  -        Personal Safety Interventions  fall prevention program maintained;gait belt;nonskid shoes/slippers when out of bed;muscle strengthening facilitated;supervised activity  -           Safety Issues, Functional Mobility    Impairments Affecting Function (Mobility)  balance;pain  -           Bed Mobility Assessment/Treatment    Bed Mobility Assessment/Treatment  rolling right;sidelying-sit  -        Rolling Right [place] (Bed Mobility)  contact guard;2 person assist  -        Sidelying-Sit [place] (Bed Mobility)  minimum assist (75% patient effort);2 person assist;verbal cues  -        Bed Mobility, Safety Issues  decreased use of legs for bridging/pushing  -           Functional Mobility    Functional Mobility- Comment  pt. not able to attempt to ambulate   -           Transfer Assessment/Treatment    Transfer Assessment/Treatment  sit-stand transfer;stand-sit transfer  -        Comment (Transfers)  Pt. not able to achieve fully erect standing 2' to dizziness   -           Sit-Stand Transfer    Sit-Stand [place] (Transfers)  moderate assist (50% patient effort);maximum assist (25% patient effort);2 person assist;verbal cues  -        Assistive Device (Sit-Stand Transfers)  walker, front-wheeled  -           Stand-Sit Transfer    Stand-Sit [place] (Transfers)  moderate assist (50% patient effort);2 person assist;verbal cues  -           ADL Assessment/Intervention    BADL Assessment/Intervention  upper body dressing;lower body dressing  -           Upper Body Dressing Assessment/Training    Upper Body Dressing  [place] Level  maximum assist (25% patient effort);don  -CH        Upper Body Dressing Position  edge of bed sitting  -        Comment (Upper Body Dressing)  LSO  -           Lower Body Dressing Assessment/Training    Lower Body Dressing [place] Level  moderate assist (50% patient effort);doff;socks  -        Lower Body Dressing Position  edge of bed sitting  -        Comment (Lower Body Dressing)  Pt. able to NEY R sock, needed total assist with L sock  -CH           BADL Safety/Performance    Impairments, BADL Safety/Performance  balance;pain;endurance/activity tolerance;strength;trunk/postural control  -           General ROM    GENERAL ROM COMMENTS  BUE AROM WFL   -           MMT (Manual Muscle Testing)    General MMT Comments  no formal rating taken at BUEs 2' to lifting restriction obs good fxl strength   -           Motor Assessment/Interventions    Additional Documentation  Balance (Group)  -           Balance    Balance  static sitting balance  -           Static Sitting Balance    Level of [place] (Unsupported Sitting, Static Balance)  conditional independence  -           Positioning and Restraints    Pre-Treatment Position  in bed  -CH        Post Treatment Position  bed  -        In Bed  fowlers;call light within reach;encouraged to call for assist;with family/caregiver;side rails up x2  -CH           Pain Assessment    Additional Documentation  Pain Scale: Numbers Pre/Post-Treatment (Group)  -           Pain Scale: Numbers Pre/Post-Treatment    Pain Scale: Numbers, Pretreatment  7/10  -CH        Pain Scale: Numbers, Post-Treatment  7/10  -CH        Pain Location - Side  Left  -        Pain Location  hip  -           Orthotics & Prosthetics Management    Orthosis Location  spinal orthosis  -        Additional Documentation  Orthosis Location (Row)  -           Spinal Orthosis Management    Type (Spinal Orthosis)  LSO (lumbar sacral orthosis)  -         Functional Design (Spinal Orthosis)  static orthosis  -        Therapeutic Indications (Spinal Orthosis)  post-op positioning/protection;pain management;stabilization and support  -        Wearing Schedule (Spinal Orthosis)  wear when out of bed only  -        Orthosis Training (Spinal Orthosis)  patient and caregiver;all orthosis skills  -           Wound 03/11/20 1254 Left anterior greater trochanter Incision    Wound - Properties Group Date first assessed: 03/11/20  - Time first assessed: 1254  -SH Side: Left  -SH Orientation: anterior  -SH Location: greater trochanter  -SH Primary Wound Type: Incision  -SH Additional Comments: adaptic 4x4 tape  -       Plan of Care Review    Plan of Care Reviewed With  patient;family  -        Progress  improving  -           Clinical Impression (OT)    Date of Referral to OT  03/12/20  -        OT Diagnosis  decline in ADLs  -        Patient/Family Goals Statement (OT Eval)  improve pain & fxn   -        Criteria for Skilled Therapeutic Interventions Met (OT Eval)  yes;treatment indicated  -        Rehab Potential (OT Eval)  fair, will monitor progress closely  -        Therapy Frequency (OT Eval)  5 times/wk  -        Predicted Duration of Therapy Intervention (Therapy Eval)  until DC from this facility  -        Care Plan Review (OT)  evaluation/treatment results reviewed;care plan/treatment goals reviewed;risks/benefits reviewed;current/potential barriers reviewed;patient/other agree to care plan  -        Care Plan Review, Other Participant (OT Eval)  son;daughter  -        Anticipated Equipment Needs at Discharge (OT)  front wheeled walker;bedside commode  -        Anticipated Discharge Disposition (OT)  skilled nursing facility;inpatient rehabilitation facility  -           Planned OT Interventions    Planned Therapy Interventions (OT Eval)  activity tolerance training;adaptive equipment training;BADL retraining;functional balance  retraining;occupation/activity based interventions;patient/caregiver education/training;ROM/therapeutic exercise;strengthening exercise;transfer/mobility retraining;orthotic fabrication/fitting/training  -           OT Goals    Transfer Goal Selection (OT)  transfer, OT goal 1  -CH        Toileting Goal Selection (OT)  toileting, OT goal 1  -CH           Transfer Goal 1 (OT)    Activity/Assistive Device (Transfer Goal 1, OT)  sit-to-stand/stand-to-sit;bed-to-chair/chair-to-bed;commode, 3-in-1;walker, rolling  -CH        [place] Level/Cues Needed (Transfer Goal 1, OT)  moderate assist (50-74% patient effort);verbal cues required  -CH        Time Frame (Transfer Goal 1, OT)  long term goal (LTG);by discharge  -CH        Barriers (Transfers Goal 1, OT)  .  -CH        Progress/Outcome (Transfer Goal 1, OT)  goal ongoing  -           Toileting Goal 1 (OT)    Activity/Device (Toileting Goal 1, OT)  toileting skills, all;adjust/manage clothing;perform perineal hygiene;commode, 3-in-1  -CH        [place] Level/Cues Needed (Toileting Goal 1, OT)  maximum assist (25-49% patient effort);1 person assist  -CH        Time Frame (Toileting Goal 1, OT)  long term goal (LTG);by discharge  -CH        Barriers (Toileting Goal 1, OT)  .  -CH        Progress/Outcome (Toileting Goal 1, OT)  goal ongoing  -CH           Patient Education Goal (OT)    Activity (Patient Education Goal, OT)  Pt. will verbalize 3/3 spinal precautions to demo good knowledge of safety  -        [place]/Cues/Accuracy (Memory Goal 2, OT)  verbalizes understanding  -CH        Time Frame (Patient Education Goal, OT)  long term goal (LTG);by discharge  -CH        Barriers (Patient Education Goal, OT)  .  -CH        Progress/Outcome (Patient Education Goal, OT)  goal ongoing  -           Living Environment    Home Accessibility  stairs to enter home  -          User Key  (r) = Recorded By, (t) = Taken By, (c) = Cosigned By    Initials Name  Effective Dates     Bety Castaneda OTR/L 08/02/16 -      Lucilel Vallecillo RN 08/02/16 -                OT Recommendation and Plan  Outcome Summary/Treatment Plan (OT)  Anticipated Equipment Needs at Discharge (OT): front wheeled walker, bedside commode  Anticipated Discharge Disposition (OT): skilled nursing facility, inpatient rehabilitation facility  Planned Therapy Interventions (OT Eval): activity tolerance training, adaptive equipment training, BADL retraining, functional balance retraining, occupation/activity based interventions, patient/caregiver education/training, ROM/therapeutic exercise, strengthening exercise, transfer/mobility retraining, orthotic fabrication/fitting/training  Therapy Frequency (OT Eval): 5 times/wk  Plan of Care Review  Plan of Care Reviewed With: patient, family  Plan of Care Reviewed With: patient, family    Outcome Measures     Row Name 03/12/20 1000             Functional Assessment    Outcome Measure Options  AM-PAC 6 Clicks Daily Activity (OT)  -        User Key  (r) = Recorded By, (t) = Taken By, (c) = Cosigned By    Initials Name Provider Type     Bety Castaneda OTR/L Occupational Therapist          Time Calculation:   Time Calculation- OT     Row Name 03/12/20 0917             Time Calculation- OT    OT Start Time  0917 add 10 for chart review  -      OT Stop Time  0953  -      OT Time Calculation (min)  36 min  -      OT Received On  03/12/20  -      OT Goal Re-Cert Due Date  03/22/20  -        User Key  (r) = Recorded By, (t) = Taken By, (c) = Cosigned By    Initials Name Provider Type     Bety Castaneda OTR/L Occupational Therapist        Therapy Charges for Today     Code Description Service Date Service Provider Modifiers Qty    [id number]  OT EVAL HIGH COMPLEXITY 3 3/12/2020 Bety Castaneda OTR/L GO 1               Bety Castaneda OTR/MIMI  3/12/2020

## 2020-03-12 NOTE — PLAN OF CARE
Pain controlled with PRN Percocet, dressing cdi, Hemovac had 30ml output, PPP, BP low (pt. asymptomatic), drowsy at times, Hgb 9.3 this AM, safety maintained, will continue to monitor.   Problem: Patient Care Overview  Goal: Plan of Care Review  Outcome: Ongoing (interventions implemented as appropriate)  Flowsheets (Taken 3/12/2020 6826)  Progress: improving  Plan of Care Reviewed With: patient

## 2020-03-12 NOTE — THERAPY TREATMENT NOTE
Acute Care - Physical Therapy Treatment Note  [facility]     Patient Name: Kamryn Oliva  : 1970  MRN: [id number]  Today's Date: 3/12/2020  Onset of Illness/Injury or Date of Surgery: 20     Referring Physician: Dr. Farley    Admit Date: 3/9/2020    Visit Dx:    ICD-10-CM ICD-9-CM   1. Closed left hip fracture, initial encounter (CMS/HCC) S72.002A 820.8   2. Compression fracture of thoracic vertebra, initial encounter, unspecified thoracic vertebral level (CMS/[facility]) S22.000A 805.2   3. Osteoarthritis of left hip M16.12 715.95   4. Decreased activities of daily living (ADL) Z78.9 V49.89     Patient Active Problem List   Diagnosis   • Acute UTI (urinary tract infection)   • KRISHNA (acute kidney injury) (CMS/[facility])   • HTN (hypertension)   • Irritable bowel syndrome with both constipation and diarrhea   • Maravilla maravilla disease   • Anemia   • Abdominal pain   • Constipation   • Gastroesophageal reflux disease   • Hypokalemia   • Gastroparesis   • Abnormal CT of the abdomen   • Severe protein-calorie malnutrition (CMS/[facility])   • Anxiety and depression   • Seizure disorder (CMS/[facility])   • Mixed hyperlipidemia   • Failure to thrive in adult   • Anorexia nervosa   • Chronic back pain   • History of kidney stones   • Lumbar facet arthropathy   • Other dysphagia   • Status post abdominal hysterectomy   • Closed displaced fracture of left femoral neck (CMS/[facility])   • Closed wedge compression fracture of T11 vertebra (CMS/[facility])       Therapy Treatment    Rehabilitation Treatment Summary     Row Name 20 1324             Treatment Time/Intention    Discipline  physical therapy assistant  -KJ      Document Type  therapy note (daily note)  -KJ2      Subjective Information  complains of;pain  -KJ2      Mode of Treatment  physical therapy  -KJ2      Patient Effort  good  -KJ2      Existing Precautions/Restrictions  fall;brace worn when out of bed;left;hip, anterior;spinal  -KJ2      Treatment Considerations/Comments  LSO  -KJ2       Recorded by [KJ] Silvia Pierce, PTA 03/12/20 1325  [KJ2] Silvia Pierce, PTA 03/12/20 1344      Row Name 03/12/20 1324             Bed Mobility Assessment/Treatment    Sidelying-Sit Iron (Bed Mobility)  verbal cues;minimum assist (75% patient effort)  -KJ      Assistive Device (Bed Mobility)  bed rails  -KJ      Recorded by [KJ] Silvia Pierce, PTA 03/12/20 1344      Row Name 03/12/20 1324             Sit-Stand Transfer    Sit-Stand Iron (Transfers)  verbal cues;minimum assist (75% patient effort)  -KJ      Assistive Device (Sit-Stand Transfers)  walker, front-wheeled  -KJ      Recorded by [KJ] Silvia Pierce PTA 03/12/20 1344      Row Name 03/12/20 1324             Stand-Sit Transfer    Stand-Sit Iron (Transfers)  verbal cues;contact guard;minimum assist (75% patient effort)  -KJ      Recorded by [KJ] Silvia Pierce, PTA 03/12/20 1344      Row Name 03/12/20 1324             Gait/Stairs Assessment/Training    Gait/Stairs Assessment/Training  gait/ambulation independence  -KJ      [place] Level (Gait)  verbal cues;minimum assist (75% patient effort)  -KJ      Assistive Device (Gait)  walker, front-wheeled  -KJ      Distance in Feet (Gait)  20  -KJ      Pattern (Gait)  step-to  -KJ      Deviations/Abnormal Patterns (Gait)  antalgic;base of support, narrow;stride length decreased;gait speed decreased  -KJ      Bilateral Gait Deviations  forward flexed posture  -KJ      Comment (Gait/Stairs)  cues for gait mechanics  -KJ      Recorded by [KJ] Silvia Pierce, PTA 03/12/20 1344      Row Name 03/12/20 1324             Positioning and Restraints    Pre-Treatment Position  in bed  -KJ      Post Treatment Position  chair  -KJ      Recorded by [KJ] Silvia Pierce, PTA 03/12/20 1344      Row Name 03/12/20 1324             Pain Scale: Numbers Pre/Post-Treatment    Pain Scale: Numbers, Pretreatment  5/10  -KJ      Pain Scale: Numbers, Post-Treatment  8/10  -KJ      Pain Location - Side   Left  -KJ      Pain Location  hip  -KJ      Recorded by [KJ] Silvia Pierce, PTA 03/12/20 1344      Row Name 03/12/20 1324             Spinal Orthosis Management    Type (Spinal Orthosis)  LSO (lumbar sacral orthosis)  -KJ      Wearing Schedule (Spinal Orthosis)  wear when out of bed only  -KJ      Recorded by [KJ] iSlvia Pierce, PTA 03/12/20 1344      Row Name                Wound 03/11/20 1254 Left anterior greater trochanter Incision    Wound - Properties Group Date first assessed: 03/11/20 [SH] Time first assessed: 1254 [SH] Side: Left [SH] Orientation: anterior [SH] Location: greater trochanter [SH] Primary Wound Type: Incision [SH] Additional Comments: adaptic 4x4 tape [] Recorded by:  [SH] Lucille Vallecillo RN 03/11/20 1354      User Key  (r) = Recorded By, (t) = Taken By, (c) = Cosigned By    Initials Name Effective Dates Discipline    KJ Silvia Pierce, MARION 08/02/16 -  PT    SH Lucille Vallecillo RN 08/02/16 -  Nurse          Wound 03/11/20 1254 Left anterior greater trochanter Incision (Active)   Dressing Appearance dry;intact;no drainage 3/12/2020  9:25 AM   Closure PARMINDER 3/12/2020  9:25 AM   Base dressing in place, unable to visualize 3/12/2020  9:25 AM   Periwound edematous 3/12/2020  9:25 AM   Drainage Amount none 3/12/2020  9:25 AM   Dressing Care, Wound gauze;low-adherent 3/12/2020  9:25 AM       Rehab Goal Summary     Row Name 03/12/20 1037 03/12/20 0806          Bed Mobility Goal 1 (PT)    Activity/Assistive Device (Bed Mobility Goal 1, PT)  rolling to right;sidelying to sit  -MS (r) ET (t) MS (c)  --     [place] Level/Cues Needed (Bed Mobility Goal 1, PT)  minimum assist (75% or more patient effort);supervision required  -MS (r) ET (t) MS (c)  --     Time Frame (Bed Mobility Goal 1, PT)  long term goal (LTG);by discharge  -MS (r) ET (t) MS (c)  --     Progress/Outcomes (Bed Mobility Goal 1, PT)  continuing progress toward goal  -MS (r) ET (t) MS (c)  --        Transfer Goal 1 (PT)     Activity/Assistive Device (Transfer Goal 1, PT)  sit-to-stand/stand-to-sit;bed-to-chair/chair-to-bed;walker, rolling  -MS (r) ET (t) MS (c)  --     [place] Level/Cues Needed (Transfer Goal 1, PT)  minimum assist (75% or more patient effort);supervision required  -MS (r) ET (t) MS (c)  --     Time Frame (Transfer Goal 1, PT)  long term goal (LTG);by discharge  -MS (r) ET (t) MS (c)  --     Progress/Outcome (Transfer Goal 1, PT)  continuing progress toward goal  -MS (r) ET (t) MS (c)  --        Gait Training Goal 1 (PT)    Activity/Assistive Device (Gait Training Goal 1, PT)  gait (walking locomotion);increase endurance/gait distance;increase energy conservation;walker, rolling  -MS (r) ET (t) MS (c)  --     [place] Level (Gait Training Goal 1, PT)  supervision required;minimum assist (75% or more patient effort)  -MS (r) ET (t) MS (c)  --     Distance (Gait Goal 1, PT)  50 ft  -MS (r) ET (t) MS (c)  --     Time Frame (Gait Training Goal 1, PT)  long term goal (LTG);by discharge  -MS (r) ET (t) MS (c)  --     Progress/Outcome (Gait Training Goal 1, PT)  continuing progress toward goal  -MS (r) ET (t) MS (c)  --        ROM Goal 1 (PT)    Time Frame (ROM Goal 1, PT)  long term goal (LTG);by discharge  -MS (r) ET (t) MS (c)  --     Progress/Outcome (ROM Goal 1, PT)  continuing progress toward goal  -MS (r) ET (t) MS (c)  --        Stairs Goal 1 (PT)    Activity/Assistive Device (Stairs Goal 1, PT)  ascending stairs;descending stairs;using handrail, left  -MS (r) ET (t) MS (c)  --     [place] Level/Cues Needed (Stairs Goal 1, PT)  minimum assist (75% or more patient effort)  -MS (r) ET (t) MS (c)  --     Time Frame (Stairs Goal 1, PT)  long term goal (LTG);by discharge  -MS (r) ET (t) MS (c)  --     Progress/Outcome (Stairs Goal 1, PT)  continuing progress toward goal  -MS (r) ET (t) MS (c)  --        Patient Education Goal (PT)    Activity (Patient Education Goal, PT)  Pt educated on safely transferring  sit to stand, stand to sit, supine to sit, sit to supine while maintaining precautions.  Ambulating while maintaining anterior hip precautions.   -MS (r) ET (t) MS (c)  --     [place]/Cues/Accuracy (Memory Goal 2, PT)  demonstrates adequately  -MS (r) ET (t) MS (c)  --     Time Frame (Patient Education Goal, PT)  long term goal (LTG);by discharge  -MS (r) ET (t) MS (c)  --     Barriers (Patient Education Goal, PT)  Cognition  -MS (r) ET (t) MS (c)  --     Progress/Outcome (Patient Education Goal, PT)  continuing progress toward goal  -MS (r) ET (t) MS (c)  --        Occupational Therapy Goals    Transfer Goal Selection (OT)  --  transfer, OT goal 1  -     Toileting Goal Selection (OT)  --  toileting, OT goal 1  -        Transfer Goal 1 (OT)    Activity/Assistive Device (Transfer Goal 1, OT)  --  sit-to-stand/stand-to-sit;bed-to-chair/chair-to-bed;commode, 3-in-1;walker, rolling  -     [place] Level/Cues Needed (Transfer Goal 1, OT)  --  moderate assist (50-74% patient effort);verbal cues required  -     Time Frame (Transfer Goal 1, OT)  --  long term goal (LTG);by discharge  -     Barriers (Transfers Goal 1, OT)  --  .  -     Progress/Outcome (Transfer Goal 1, OT)  --  goal ongoing  -        Toileting Goal 1 (OT)    Activity/Device (Toileting Goal 1, OT)  --  toileting skills, all;adjust/manage clothing;perform perineal hygiene;commode, 3-in-1  -     [place] Level/Cues Needed (Toileting Goal 1, OT)  --  maximum assist (25-49% patient effort);1 person assist  -     Time Frame (Toileting Goal 1, OT)  --  long term goal (LTG);by discharge  -     Barriers (Toileting Goal 1, OT)  --  .  -     Progress/Outcome (Toileting Goal 1, OT)  --  goal ongoing  -        Patient Education Goal (OT)    Activity (Patient Education Goal, OT)  --  Pt. will verbalize 3/3 spinal precautions to demo good knowledge of safety  -     [place]/Cues/Accuracy (Memory Goal 2, OT)  --  verbalizes  understanding  -CH     Time Frame (Patient Education Goal, OT)  --  long term goal (LTG);by discharge  -CH     Barriers (Patient Education Goal, OT)  --  .  -CH     Progress/Outcome (Patient Education Goal, OT)  --  goal ongoing  -CH       User Key  (r) = Recorded By, (t) = Taken By, (c) = Cosigned By    Initials Name Provider Type Discipline    Bety Gorman, OTR/L Occupational Therapist OT    Sally Cantu, PT, DPT, NCS Physical Therapist PT    ET TunellMango, PT Student PT Student PT              PT Recommendation and Plan        Outcome Measures     Row Name 03/12/20 1000             Functional Assessment    Outcome Measure Options  AM-PAC 6 Clicks Daily Activity (OT)  -CH        User Key  (r) = Recorded By, (t) = Taken By, (c) = Cosigned By    Initials Name Provider Type    Bety Gorman, OTR/L Occupational Therapist         Time Calculation:   PT Charges     Row Name 03/12/20 1344 03/12/20 1046          Time Calculation    Start Time  1324  -KJ  0917 Chart review [id number]  -MS (r) ET (t) MS (c)     Stop Time  1347  -KJ  0953  -MS (r) ET (t) MS (c)     Time Calculation (min)  23 min  -KJ  36 min  -MS (r) ET (t)     PT Received On  --  03/12/20  -MS (r) ET (t) MS (c)     PT Goal Re-Cert Due Date  --  03/22/20  -MS (r) ET (t) MS (c)        Time Calculation- PT    Total Timed Code Minutes- PT  23 minute(s)  -KJ  --       User Key  (r) = Recorded By, (t) = Taken By, (c) = Cosigned By    Initials Name Provider Type    KJ Silvia Pierce, PTA Physical Therapy Assistant    MS Sally Mckeon, PT, DPT, NCS Physical Therapist    ET Tunell, Mango, PT Student PT Student        Therapy Charges for Today     Code Description Service Date Service Provider Modifiers Qty    [id number] HC PT THERAPEUTIC ACT EA 15 MIN 3/12/2020 Silvia Pierce PTA GP 2          PT G-Codes  Outcome Measure Options: AM-PAC 6 Clicks Basic Mobility (PT)  AM-PAC 6 Clicks Score (PT): 13    Silvia Pierce PTA  3/12/2020

## 2020-03-12 NOTE — THERAPY EVALUATION
Patient Name: Kamryn Oliva  : 1970    MRN: [id number]                              Today's Date: 3/12/2020       Admit Date: 3/9/2020    Visit Dx:     ICD-10-CM ICD-9-CM   1. Closed left hip fracture, initial encounter (CMS/[facility]) S72.002A 820.8   2. Compression fracture of thoracic vertebra, initial encounter, unspecified thoracic vertebral level (CMS/[facility]) S22.000A 805.2   3. Osteoarthritis of left hip M16.12 715.95   4. Decreased activities of daily living (ADL) Z78.9 V49.89     Patient Active Problem List   Diagnosis   • Acute UTI (urinary tract infection)   • KRISHNA (acute kidney injury) (CMS/[facility])   • HTN (hypertension)   • Irritable bowel syndrome with both constipation and diarrhea   • Maravilla maravilla disease   • Anemia   • Abdominal pain   • Constipation   • Gastroesophageal reflux disease   • Hypokalemia   • Gastroparesis   • Abnormal CT of the abdomen   • Severe protein-calorie malnutrition (CMS/[facility])   • Anxiety and depression   • Seizure disorder (CMS/[facility])   • Mixed hyperlipidemia   • Failure to thrive in adult   • Anorexia nervosa   • Chronic back pain   • History of kidney stones   • Lumbar facet arthropathy   • Other dysphagia   • Status post abdominal hysterectomy   • Closed displaced fracture of left femoral neck (CMS/[facility])   • Closed wedge compression fracture of T11 vertebra (CMS/[facility])     Past Medical History:   Diagnosis Date   • Acute kidney failure (CMS/[facility])    • Anxiety    • Bowel obstruction (CMS/[facility])    • Constipation    • Depression    • GERD (gastroesophageal reflux disease)    • H/O gastric ulcer    • Headache    • History of transfusion    • Hypertension    • IBS (irritable bowel syndrome)    • Insomnia    • Kidney stone    • Maravilla maravilla disease    • Pseudocholinesterase deficiency    • Rapid weight loss    • SBO (small bowel obstruction) (CMS/[facility])    • Stroke (CMS/[facility])     X 2   • UTI (urinary tract infection)     CHRONIC, SEPTIC X2   • Volvulosis      Past Surgical History:   Procedure  Laterality Date   • APPENDECTOMY      COMPLICATION OF SEPTIC   • AUGMENTATION MAMMAPLASTY     • BRAIN SURGERY      x2   • BREAST AUGMENTATION BILATERAL MASTOPEXY     • BREAST LUMPECTOMY Left 3/9/2017    Procedure: EXCISION LEFT BREAST LESION AND LEFT AXILLARY LYMPH NODE BIOPSY;  Surgeon: Evi Farley MD;  Location: [facility] OR;  Service:    • BREAST SURGERY     •  SECTION     • CHOLECYSTECTOMY     • CHOLECYSTECTOMY WITH INTRAOPERATIVE CHOLANGIOGRAM N/A 2018    Procedure: CHOLECYSTECTOMY LAPAROSCOPIC INTRAOPERATIVE CHOLANGIOGRAM;  Surgeon: Antonio Salvador MD;  Location: [facility] OR;  Service: General   • COLON SURGERY     • COLONOSCOPY  2007    normal   • COLONOSCOPY N/A 11/10/2016    Procedure: COLONOSCOPY WITH ANESTHESIA;  Surgeon: Camilo Hanson MD;  Location: [facility] ENDOSCOPY;  Service:    • COLONOSCOPY N/A 2018    Procedure: COLONOSCOPY WITH ANESTHESIA;  Surgeon: Camilo Hanson MD;  Location: [facility] ENDOSCOPY;  Service:    • ENDOSCOPY  2009    antral ulcer   • ENDOSCOPY N/A 10/10/2016    Procedure: ESOPHAGOGASTRODUODENOSCOPY WITH ANESTHESIA;  Surgeon: Camilo Hanson MD;  Location: [facility] ENDOSCOPY;  Service:    • ENDOSCOPY N/A 2017    Procedure: ESOPHAGOGASTRODUODENOSCOPY;  Surgeon: Camilo Hanson MD;  Location: [facility] ENDOSCOPY;  Service:    • ENDOSCOPY N/A 2017    Procedure: ESOPHAGOGASTRODUODENOSCOPY WITH ANESTHESIA;  Surgeon: Camilo Hanson MD;  Location: [facility] ENDOSCOPY;  Service:    • ENDOSCOPY N/A 2018    Procedure: ESOPHAGOGASTRODUODENOSCOPY ;  Surgeon: Camilo Hanson MD;  Location: [facility] ENDOSCOPY;  Service:    • ENDOSCOPY N/A 2019    Procedure: ESOPHAGOGASTRODUODENOSCOPY WITH ANESTHESIA;  Surgeon: Katarina Davis MD;  Location: [facility] ENDOSCOPY;  Service: Gastroenterology   • HERNIA REPAIR     • HYSTERECTOMY     • SMALL INTESTINE SURGERY N/A 2016   • TONSILLECTOMY     • TOTAL HIP ARTHROPLASTY Left 3/11/2020    Procedure: TOTAL HIP  REPLACEMENT;  Surgeon: Jorge Farley MD;  Location: [facility];  Service: Orthopedics;  Laterality: Left;     General Information     Row Name 03/12/20 1006          PT Evaluation Time/Intention    Document Type  evaluation L MEHDI, and T11 compression fracture, closed displaced fx L femoral neck.   -MS (r) ET (t) MS (c)     Mode of Treatment  physical therapy  -MS (r) ET (t) MS (c)     Row Name 03/12/20 1006          General Information    Patient Profile Reviewed?  yes  -MS (r) ET (t) MS (c)     Prior Level of Function  independent:;all household mobility;gait;dressing;bathing;driving;bed mobility;transfer  -MS (r) ET (t) MS (c)     Existing Precautions/Restrictions  fall;spinal;hip, anterior;brace worn when out of bed LSO  -MS (r) ET (t) MS (c)     Barriers to Rehab  physical barrier;cognitive status;medically complex  -MS (r) ET (t) MS (c)     Row Name 03/12/20 1006          Relationship/Environment    Lives With  alone;child(ranjana), adult  -MS (r) ET (t) MS (c)     Row Name 03/12/20 1006          Resource/Environmental Concerns    Current Living Arrangements  home/apartment/condo  -MS (r) ET (t) MS (c)     Row Name 03/12/20 1006          Home Main Entrance    Number of Stairs, Main Entrance  five  -MS (r) ET (t) MS (c)     Stair Railings, Main Entrance  railings on both sides of stairs  -MS (r) ET (t) MS (c)     Row Name 03/12/20 1006          Cognitive Assessment/Intervention- PT/OT    Orientation Status (Cognition)  oriented x 4  -MS (r) ET (t) MS (c)     Row Name 03/12/20 1006          Safety Issues, Functional Mobility    Safety Issues Affecting Function (Mobility)  safety precaution awareness  -MS (r) ET (t) MS (c)     Impairments Affecting Function (Mobility)  balance;cognition;strength;endurance/activity tolerance;pain  -MS (r) ET (t) MS (c)     Comment, Safety Issues/Impairments (Mobility)  Pt was aware of safety precautions but needed reminding and verbal cues to maintain them when moving around the  bed/sitting to standing.   -MS (r) ET (t) MS (c)       User Key  (r) = Recorded By, (t) = Taken By, (c) = Cosigned By    Initials Name Provider Type    Sally Cantu R, PT, DPT, NCS Physical Therapist    ET Mango Osborn, PT Student PT Student        Mobility     Row Name 03/12/20 1013          Bed Mobility Assessment/Treatment    Bed Mobility Assessment/Treatment  rolling right;sidelying-sit  -MS (r) ET (t) MS (c)     Rolling Right Weston (Bed Mobility)  contact guard;verbal cues 2 person assist.   -MS (r) ET (t) MS (c)     Sidelying-Sit [place] (Bed Mobility)  verbal cues;minimum assist (75% patient effort) 2 person min assist.   -MS (r) ET (t) MS (c)     Comment (Bed Mobility)  Pt required verbal cues to maintain spinal/hip precautions.  -MS (r) ET (t) MS (c)     Row Name 03/12/20 1013          Transfer Assessment/Treatment    Comment (Transfers)  Pt was not able to achieve a fully erect position for longer than 2 minutes d/t dizziness on a self reported 8/10 scale.   -MS (r) ET (t) MS (c)     Row Name 03/12/20 1013          Sit-Stand Transfer    Sit-Stand Weston (Transfers)  verbal cues;moderate assist (50% patient effort);minimum assist (75% patient effort);2 person assist  -MS (r) ET (t) MS (c)     Assistive Device (Sit-Stand Transfers)  walker, front-wheeled  -MS (r) ET (t) MS (c)       User Key  (r) = Recorded By, (t) = Taken By, (c) = Cosigned By    Initials Name Provider Type    Sally Cantu R, PT, DPT, NCS Physical Therapist    ET Mango Osborn PT Student PT Student        Obj/Interventions     Row Name 03/12/20 1017          General ROM    GENERAL ROM COMMENTS  RLE AROM WFL, LLE limited d/t L MEHDI  -MS (r) ET (t) MS (c)     Row Name 03/12/20 1017          MMT (Manual Muscle Testing)    General MMT Comments  RLE 4/5, no formal rating taken on LLE d/t L MEHDI.   -MS (r) ET (t) MS (c)     Row Name 03/12/20 1017          Static Sitting Balance    Level of Weston (Unsupported  Sitting, Static Balance)  conditional independence  -MS (r) ET (t) MS (c)     Sitting Position (Unsupported Sitting, Static Balance)  sitting on edge of bed  -MS (r) ET (t) MS (c)     Time Able to Maintain Position (Unsupported Sitting, Static Balance)  3 to 4 minutes  -MS (r) ET (t) MS (c)     Comment (Unsupported Sitting, Static Balance)  Was able to sit edge of bed without support and no LOB. While static sitting pt reported no dizziness/nausea.   -MS (r) ET (t) MS (c)     Row Name 03/12/20 1017          Dynamic Sitting Balance    Level of Carlock, Reaches Outside Midline (Sitting, Dynamic Balance)  conditional independence  -MS (r) ET (t) MS (c)     Sitting Position, Reaches Outside Midline (Sitting, Dynamic Balance)  sitting on edge of bed  -MS (r) ET (t) MS (c)     Comment, Reaches Outside Midline (Sitting, Dynamic Balance)  Pt was able to don sock on RLE while sitting edge of bed with no LOB, needed assist to don sock on LLE.   -MS (r) ET (t) MS (c)     Row Name 03/12/20 1017          Static Standing Balance    Level of Carlock (Supported Standing, Static Balance)  minimal assist, 75% patient effort;1 person assist  -MS (r) ET (t) MS (c)     Time Able to Maintain Position (Supported Standing, Static Balance)  15 to 30 seconds  -MS (r) ET (t) MS (c)     Assistive Device Utilized (Supported Standing, Static Balance)  walker, rolling  -MS (r) ET (t) MS (c)     Comment (Supported Standing, Static Balance)  When pt stood up pt reported an 8/10 dizziness and requested to sit down back into bed.   -MS (r) ET (t) MS (c)     Row Name 03/12/20 1017          Sensory Assessment/Intervention    Sensory General Assessment  no sensation deficits identified  -MS (r) ET (t) MS (c)       User Key  (r) = Recorded By, (t) = Taken By, (c) = Cosigned By    Initials Name Provider Type    Sally Cantu R, PT, DPT, NCS Physical Therapist    ET Mango Osborn PT Student PT Student        Goals/Plan     Row Name 03/12/20  1037          Bed Mobility Goal 1 (PT)    Activity/Assistive Device (Bed Mobility Goal 1, PT)  rolling to right;sidelying to sit  -MS (r) ET (t) MS (c)     Fergus Level/Cues Needed (Bed Mobility Goal 1, PT)  minimum assist (75% or more patient effort);supervision required  -MS (r) ET (t) MS (c)     Time Frame (Bed Mobility Goal 1, PT)  long term goal (LTG);by discharge  -MS (r) ET (t) MS (c)     Progress/Outcomes (Bed Mobility Goal 1, PT)  continuing progress toward goal  -MS (r) ET (t) MS (c)     Row Name 03/12/20 1037          Transfer Goal 1 (PT)    Activity/Assistive Device (Transfer Goal 1, PT)  sit-to-stand/stand-to-sit;bed-to-chair/chair-to-bed;walker, rolling  -MS (r) ET (t) MS (c)     [place]ergus Level/Cues Needed (Transfer Goal 1, PT)  minimum assist (75% or more patient effort);supervision required  -MS (r) ET (t) MS (c)     Time Frame (Transfer Goal 1, PT)  long term goal (LTG);by discharge  -MS (r) ET (t) MS (c)     Progress/Outcome (Transfer Goal 1, PT)  continuing progress toward goal  -MS (r) ET (t) MS (c)     Row Name 03/12/20 1037          Gait Training Goal 1 (PT)    Activity/Assistive Device (Gait Training Goal 1, PT)  gait (walking locomotion);increase endurance/gait distance;increase energy conservation;walker, rolling  -MS (r) ET (t) MS (c)     [place]ergus Level (Gait Training Goal 1, PT)  supervision required;minimum assist (75% or more patient effort)  -MS (r) ET (t) MS (c)     Distance (Gait Goal 1, PT)  50 ft  -MS (r) ET (t) MS (c)     Time Frame (Gait Training Goal 1, PT)  long term goal (LTG);by discharge  -MS (r) ET (t) MS (c)     Progress/Outcome (Gait Training Goal 1, PT)  continuing progress toward goal  -MS (r) ET (t) MS (c)     Row Name 03/12/20 1037          ROM Goal 1 (PT)    Time Frame (ROM Goal 1, PT)  long term goal (LTG);by discharge  -MS (r) ET (t) MS (c)     Progress/Outcome (ROM Goal 1, PT)  continuing progress toward goal  -MS (r) ET (t) MS (c)     Row Name  03/12/20 [facility]          Stairs Goal 1 (PT)    Activity/Assistive Device (Stairs Goal 1, PT)  ascending stairs;descending stairs;using handrail, left  -MS (r) ET (t) MS (c)     Ballinger Level/Cues Needed (Stairs Goal 1, PT)  minimum assist (75% or more patient effort)  -MS (r) ET (t) MS (c)     Time Frame (Stairs Goal 1, PT)  long term goal (LTG);by discharge  -MS (r) ET (t) MS (c)     Progress/Outcome (Stairs Goal 1, PT)  continuing progress toward goal  -MS (r) ET (t) MS (c)     Row Name 03/12/20 [facility]          Patient Education Goal (PT)    Activity (Patient Education Goal, PT)  Pt educated on safely transferring sit to stand, stand to sit, supine to sit, sit to supine while maintaining precautions.  Ambulating while maintaining anterior hip precautions.   -MS (r) ET (t) MS (c)     Ballinger/Cues/Accuracy (Memory Goal 2, PT)  demonstrates adequately  -MS (r) ET (t) MS (c)     Time Frame (Patient Education Goal, PT)  long term goal (LTG);by discharge  -MS (r) ET (t) MS (c)     Barriers (Patient Education Goal, PT)  Cognition  -MS (r) ET (t) MS (c)     Progress/Outcome (Patient Education Goal, PT)  continuing progress toward goal  -MS (r) ET (t) MS (c)       User Key  (r) = Recorded By, (t) = Taken By, (c) = Cosigned By    Initials Name Provider Type    Sally Cantu, PT, DPT, NCS Physical Therapist    ET Mango Osborn PT Student PT Student        Clinical Impression     Row Name 03/12/20 1023          Pain Assessment    Additional Documentation  Pain Scale: Numbers Pre/Post-Treatment (Group)  -MS (r) ET (t) MS (c)     Row Name 03/12/20 1023          Pain Scale: Numbers Pre/Post-Treatment    Pain Scale: Numbers, Pretreatment  7/10  -MS (r) ET (t) MS (c)     Pain Scale: Numbers, Post-Treatment  7/10  -MS (r) ET (t) MS (c)     Pain Location - Side  Left  -MS (r) ET (t) MS (c)     Pain Location  hip  -MS (r) ET (t) MS (c)     Pain Intervention(s)  Repositioned;Ambulation/increased activity  -MS (r) ET (t)  MS (c)     Row Name 03/12/20 1023          Plan of Care Review    Plan of Care Reviewed With  patient;family  -MS (r) ET (t) MS (c)     Progress  no change  -MS (r) ET (t) MS (c)     Outcome Summary  Pt eval completed. A&O 4x, can be forgetful. Pt we given education on precautions for spine and anterior hip, pt was able verbally confirm those, but needed verbal cues as a reminder to follow them. Pt reported 8/10 dizziness when going from sitting edge of bed to standing and was sat back down, then laid supine in bed to reduce symptoms. Pt is recommended for PT in this setting, pt eventually wants to go home, but with current condition PT is recommending to be placed in a SNF or acute inpatient rehab.    -MS (r) ET (t) MS (c)     Row Name 03/12/20 1023          Physical Therapy Clinical Impression    Criteria for Skilled Interventions Met (PT Clinical Impression)  yes  -MS (r) ET (t) MS (c)     Row Name 03/12/20 1023          Positioning and Restraints    Pre-Treatment Position  in bed  -MS (r) ET (t) MS (c)     Post Treatment Position  bed  -MS (r) ET (t) MS (c)     In Bed  supine;call light within reach;encouraged to call for assist;with family/caregiver  -MS (r) ET (t) MS (c)       User Key  (r) = Recorded By, (t) = Taken By, (c) = Cosigned By    Initials Name Provider Type    Sally Cantu, PT, DPT, NCS Physical Therapist    Mango Velez PT Student PT Student        Outcome Measures     Row Name 03/12/20 1043          How much help from another person do you currently need...    Turning from your back to your side while in flat bed without using bedrails?  3  -MS (r) ET (t) MS (c)     Moving from lying on back to sitting on the side of a flat bed without bedrails?  2  -MS (r) ET (t) MS (c)     Moving to and from a bed to a chair (including a wheelchair)?  2  -MS (r) ET (t) MS (c)     Standing up from a chair using your arms (e.g., wheelchair, bedside chair)?  2  -MS (r) ET (t) MS (c)     Climbing 3-5  steps with a railing?  2  -MS (r) ET (t) MS (c)     To walk in hospital room?  2  -MS (r) ET (t) MS (c)     AM-PAC 6 Clicks Score (PT)  13  -MS (r) ET (t)     Row Name 03/12/20 1043          Functional Assessment    Outcome Measure Options  AM-PAC 6 Clicks Basic Mobility (PT)  -MS (r) ET (t) MS (c)       User Key  (r) = Recorded By, (t) = Taken By, (c) = Cosigned By    Initials Name Provider Type    Sally Cantu ELIU, PT, DPT, NCS Physical Therapist    ET Mango Osborn, PT Student PT Student          PT Recommendation and Plan  Planned Therapy Interventions (PT Eval): balance training, bed mobility training, gait training, orthotic fitting/training, transfer training, strengthening, stair training  Outcome Summary/Treatment Plan (PT)  Anticipated Discharge Disposition (PT): inpatient rehabilitation facility, skilled nursing facility  Plan of Care Reviewed With: patient, family  Progress: no change  Outcome Summary: Pt eval completed. A&O 4x, can be forgetful. Pt we given education on precautions for spine and anterior hip, pt was able verbally confirm those, but needed verbal cues as a reminder to follow them. Pt reported 8/10 dizziness when going from sitting edge of bed to standing and was sat back down, then laid supine in bed to reduce symptoms. Pt is recommended for PT in this setting, pt eventually wants to go home, but with current condition PT is recommending to be placed in a SNF or acute inpatient rehab.       Time Calculation:   PT Charges     Row Name 03/12/20 1046             Time Calculation    Start Time  0917 Chart review [id number]  -MS (r) ET (t) MS (c)      Stop Time  0953  -MS (r) ET (t) MS (c)      Time Calculation (min)  36 min  -MS (r) ET (t)      PT Received On  03/12/20  -MS (r) ET (t) MS (c)      PT Goal Re-Cert Due Date  03/22/20  -MS (r) ET (t) MS (c)        User Key  (r) = Recorded By, (t) = Taken By, (c) = Cosigned By    Initials Name Provider Type    Sally Cantu ELIU, PT, DPT, NCS  Physical Therapist    ET Mango Osborn, PT Student PT Student        Therapy Charges for Today     Code Description Service Date Service Provider Modifiers Qty    [id number] HC PT EVAL HIGH COMPLEXITY 3 3/12/2020 Mango Osborn, PT Student GP 1          PT G-Codes  Outcome Measure Options: AM-PAC 6 Clicks Basic Mobility (PT)  AM-PAC 6 Clicks Score (PT): 13    Mango Osborn PT Student  3/12/2020

## 2020-03-12 NOTE — PLAN OF CARE
Problem: Patient Care Overview  Goal: Plan of Care Review  Outcome: Ongoing (interventions implemented as appropriate)  Flowsheets (Taken 3/12/2020 9223)  Progress: improving  Plan of Care Reviewed With: patient  Outcome Summary: Pt A&Ox4, VSS safety maintained. Up x1 with RW to restroom. C/o pain with relief from po prn pain medication. Voiding, will continue to monitor

## 2020-03-13 VITALS
OXYGEN SATURATION: 98 % | DIASTOLIC BLOOD PRESSURE: 82 MMHG | TEMPERATURE: 98.9 F | HEIGHT: 67 IN | BODY MASS INDEX: 19.35 KG/M2 | HEART RATE: 88 BPM | WEIGHT: 123.3 LBS | SYSTOLIC BLOOD PRESSURE: 127 MMHG | RESPIRATION RATE: 16 BRPM

## 2020-03-13 LAB
ABO + RH BLD: NORMAL
ABO + RH BLD: NORMAL
ANION GAP SERPL CALCULATED.3IONS-SCNC: 10 MMOL/L (ref 5–15)
BH BB BLOOD EXPIRATION DATE: NORMAL
BH BB BLOOD EXPIRATION DATE: NORMAL
BH BB BLOOD TYPE BARCODE: 7300
BH BB BLOOD TYPE BARCODE: 7300
BH BB DISPENSE STATUS: NORMAL
BH BB DISPENSE STATUS: NORMAL
BH BB PRODUCT CODE: NORMAL
BH BB PRODUCT CODE: NORMAL
BH BB UNIT NUMBER: NORMAL
BH BB UNIT NUMBER: NORMAL
BUN BLD-MCNC: 7 MG/DL (ref 6–20)
BUN/CREAT SERPL: 15.6 (ref 7–25)
CALCIUM SPEC-SCNC: 8.3 MG/DL (ref 8.6–10.5)
CHLORIDE SERPL-SCNC: 105 MMOL/L (ref 98–107)
CO2 SERPL-SCNC: 24 MMOL/L (ref 22–29)
CREAT BLD-MCNC: 0.45 MG/DL (ref 0.57–1)
CYTO UR: NORMAL
DEPRECATED RDW RBC AUTO: 45.1 FL (ref 37–54)
ERYTHROCYTE [DISTWIDTH] IN BLOOD BY AUTOMATED COUNT: 13.7 % (ref 12.3–15.4)
GFR SERPL CREATININE-BSD FRML MDRD: 148 ML/MIN/1.73
GLUCOSE BLD-MCNC: 109 MG/DL (ref 65–99)
HCT VFR BLD AUTO: 25 % (ref 34–46.6)
HGB BLD-MCNC: 8.2 G/DL (ref 12–15.9)
LAB AP CASE REPORT: NORMAL
MCH RBC QN AUTO: 29.7 PG (ref 26.6–33)
MCHC RBC AUTO-ENTMCNC: 32.8 G/DL (ref 31.5–35.7)
MCV RBC AUTO: 90.6 FL (ref 79–97)
PATH REPORT.FINAL DX SPEC: NORMAL
PATH REPORT.GROSS SPEC: NORMAL
PLATELET # BLD AUTO: 477 10*3/MM3 (ref 140–450)
PMV BLD AUTO: 8.9 FL (ref 6–12)
POTASSIUM BLD-SCNC: 3.6 MMOL/L (ref 3.5–5.2)
RBC # BLD AUTO: 2.76 10*6/MM3 (ref 3.77–5.28)
SODIUM BLD-SCNC: 139 MMOL/L (ref 136–145)
UNIT  ABO: NORMAL
UNIT  ABO: NORMAL
UNIT  RH: NORMAL
UNIT  RH: NORMAL
WBC NRBC COR # BLD: 10.83 10*3/MM3 (ref 3.4–10.8)

## 2020-03-13 PROCEDURE — 97166 OT EVAL MOD COMPLEX 45 MIN: CPT | Performed by: OCCUPATIONAL THERAPIST

## 2020-03-13 PROCEDURE — 97116 GAIT TRAINING THERAPY: CPT

## 2020-03-13 PROCEDURE — 85027 COMPLETE CBC AUTOMATED: CPT | Performed by: INTERNAL MEDICINE

## 2020-03-13 PROCEDURE — 25010000003 CEFAZOLIN 1-4 GM/50ML-% SOLUTION: Performed by: ORTHOPAEDIC SURGERY

## 2020-03-13 PROCEDURE — 80048 BASIC METABOLIC PNL TOTAL CA: CPT | Performed by: INTERNAL MEDICINE

## 2020-03-13 RX ORDER — OXYCODONE AND ACETAMINOPHEN 7.5; 325 MG/1; MG/1
1 TABLET ORAL EVERY 6 HOURS PRN
Qty: 40 TABLET | Refills: 0 | Status: SHIPPED | OUTPATIENT
Start: 2020-03-13 | End: 2021-01-01

## 2020-03-13 RX ADMIN — POLYETHYLENE GLYCOL 3350 17 G: 17 POWDER, FOR SOLUTION ORAL at 11:19

## 2020-03-13 RX ADMIN — AMLODIPINE BESYLATE 10 MG: 10 TABLET ORAL at 11:17

## 2020-03-13 RX ADMIN — FLUTICASONE PROPIONATE 2 SPRAY: 50 SPRAY, METERED NASAL at 11:19

## 2020-03-13 RX ADMIN — CEFAZOLIN SODIUM 1 G: 1 INJECTION, SOLUTION INTRAVENOUS at 05:21

## 2020-03-13 RX ADMIN — APIXABAN 2.5 MG: 2.5 TABLET, FILM COATED ORAL at 11:18

## 2020-03-13 RX ADMIN — OXYCODONE HYDROCHLORIDE AND ACETAMINOPHEN 1 TABLET: 7.5; 325 TABLET ORAL at 05:27

## 2020-03-13 RX ADMIN — LEVETIRACETAM 500 MG: 500 TABLET, FILM COATED ORAL at 11:18

## 2020-03-13 RX ADMIN — VENLAFAXINE HYDROCHLORIDE 150 MG: 75 CAPSULE, EXTENDED RELEASE ORAL at 11:18

## 2020-03-13 RX ADMIN — OXYCODONE HYDROCHLORIDE AND ACETAMINOPHEN 1 TABLET: 7.5; 325 TABLET ORAL at 00:55

## 2020-03-13 RX ADMIN — BUSPIRONE HYDROCHLORIDE 7.5 MG: 5 TABLET ORAL at 11:18

## 2020-03-13 NOTE — DISCHARGE SUMMARY
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  Commonly known as:  PERCOCET   1 tablet, Oral, Every 6 Hours PRN      polyethylene glycol pack packet  Commonly known as:  MIRALAX   17 g, Oral, Daily         Continue These Medications      Instructions Start Date   amitriptyline 100 MG tablet  Commonly known as:  ELAVIL   100 mg, Oral, Nightly      amLODIPine 10 MG tablet  Commonly known as:  NORVASC   10 mg, Oral, Daily      aspirin 81 MG EC tablet   81 mg, Oral, Daily      busPIRone 7.5 MG tablet  Commonly known as:  BUSPAR   7.5 mg, Oral, 3 Times Daily      fluticasone 50 MCG/ACT nasal spray  Commonly known as:  FLONASE   2 sprays, Nasal, Daily      levETIRAcetam 500 MG tablet  Commonly known as:  KEPPRA   500 mg, Oral, 2 Times Daily      metoprolol succinate  MG 24 hr tablet  Commonly known as:  TOPROL-XL   100 mg, Oral, Nightly      ondansetron ODT 4 MG disintegrating tablet  Commonly known as:  ZOFRAN-ODT   4-8 mg, Oral, Every 8 Hours PRN      rosuvastatin 10 MG tablet  Commonly known as:  CRESTOR   10 mg, Oral, Nightly      venlafaxine  MG 24 hr capsule  Commonly known as:  EFFEXOR-XR   150 mg, Oral, Daily         Stop These Medications    cloNIDine 0.1 MG tablet  Commonly known as:  CATAPRES     promethazine 25 MG tablet  Commonly known as:  PHENERGAN          Discharge Diet:   Diet Instructions     Diet:  Regular; Thin      Discharge Diet:  Regular    Fluid Consistency:  Thin        Activity at Discharge:   Activity Instructions     Activity as Tolerated            Follow-up Appointments:   No future appointments.    Test Results Pending at Discharge: None    Rey Malloy MD  03/13/20  11:00    Time: 35 minutes.

## 2020-03-13 NOTE — THERAPY DISCHARGE NOTE
Acute Care - Occupational Therapy Discharge Summary  [facility]     Patient Name: Kamryn Oliva  : 1970  MRN: [id number]    Today's Date: 3/13/2020  Onset of Illness/Injury or Date of Surgery: 20    Date of Referral to OT: 20  Referring Physician: Dr. Farley      Admit Date: 3/9/2020        OT Recommendation and Plan    Visit Dx:    ICD-10-CM ICD-9-CM   1. Closed left hip fracture, initial encounter (CMS/HCC) S72.002A 820.8   2. Compression fracture of thoracic vertebra, initial encounter, unspecified thoracic vertebral level (CMS/[facility]) S22.000A 805.2   3. Osteoarthritis of left hip M16.12 715.95   4. Decreased activities of daily living (ADL) Z78.9 V49.89   5. Irritable bowel syndrome with both constipation and diarrhea K58.2 564.1   6. 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         Time Calculation- OT     Row Name 20 0918             Time Calculation- OT    OT Start Time  912  -      OT Stop Time  953  -      OT Time Calculation (min)  41 min  -      Total Timed Code Minutes- OT  41 minute(s)  -      OT Received On  20  -      OT Goal Re-Cert Due Date  20  -        User Key  (r) = Recorded By, (t) = Taken By, (c) = Cosigned By    Initials Name Provider Type     Bety Castaneda, OTR/L Occupational Therapist            Rehab Goal Summary     Row Name 20 1400             Transfer Goal 1 (OT)    Activity/Assistive Device (Transfer Goal 1, OT)  sit-to-stand/stand-to-sit;bed-to-chair/chair-to-bed;commode, 3-in-1;walker, rolling  -AC      [place] Level/Cues Needed (Transfer Goal 1, OT)  moderate assist (50-74% patient effort);verbal cues required  -AC      Time Frame (Transfer Goal 1, OT)  long term goal (LTG);by discharge  -AC      Progress/Outcome (Transfer Goal 1, OT)  goal not met  -AC         Toileting Goal 1 (OT)    Activity/Device (Toileting Goal 1, OT)  toileting skills, all;adjust/manage clothing;perform perineal hygiene;commode, 3-in-1  -AC       [place] Level/Cues Needed (Toileting Goal 1, OT)  maximum assist (25-49% patient effort);1 person assist  -AC      Time Frame (Toileting Goal 1, OT)  long term goal (LTG);by discharge  -AC      Progress/Outcome (Toileting Goal 1, OT)  goal not met  -AC        User Key  (r) = Recorded By, (t) = Taken By, (c) = Cosigned By    Initials Name Provider Type Discipline     Jori Moreno OTR/L, NOÉ Occupational Therapist OT          Outcome Measures     Row Name 03/13/20 1100 03/12/20 1000          How much help from another is currently needed...    Putting on and taking off regular lower body clothing?  2  -CH  --     Bathing (including washing, rinsing, and drying)  3  -CH  --     Toileting (which includes using toilet bed pan or urinal)  3  -CH  --     Putting on and taking off regular upper body clothing  3  -CH  --     Taking care of personal grooming (such as brushing teeth)  4  -CH  --     Eating meals  4  -CH  --     AM-PAC 6 Clicks Score (OT)  19  -CH  --        Functional Assessment    Outcome Measure Options  AM-PAC 6 Clicks Daily Activity (OT)  -CH  AM-PAC 6 Clicks Daily Activity (OT)  -CH       User Key  (r) = Recorded By, (t) = Taken By, (c) = Cosigned By    Initials Name Provider Type     Bety Castaneda OTR/L Occupational Therapist          Therapy Suggested Charges     Code   Minutes Charges    None                 OT Discharge Summary  Anticipated Discharge Disposition (OT): home  Reason for Discharge: Discharge from facility  Outcomes Achieved: Refer to plan of care for updates on goals achieved  Discharge Destination: Home      CLARENCE Balderrama/L, CNT  3/13/2020

## 2020-03-13 NOTE — PROGRESS NOTES
Continued Stay Note  [facility]     Patient Name: Kamrny Oliva  MRN: [id number]  Today's Date: 3/13/2020    Admit Date: 3/9/2020    Discharge Plan     Row Name 03/13/20 1309       Plan    Final Discharge Disposition Code  06 - home with home health care    Final Note  PT HAS DECIDED ON HOME WITH  VS SNF. PT SON AGREES. PT CHOSE [facility]. NOTIFIED JAYY WITH [facility]  OF NEW ORDERS.         Discharge Codes    No documentation.       Expected Discharge Date and Time     Expected Discharge Date Expected Discharge Time    Mar 13, 2020             PEREZ Newell

## 2020-03-13 NOTE — THERAPY DISCHARGE NOTE
Acute Care - Physical Therapy Discharge Summary  [facility]       Patient Name: Kamryn Oliva  : 1970  MRN: [id number]    Today's Date: 3/13/2020  Onset of Illness/Injury or Date of Surgery: 20       Referring Physician: Dr. Farley      Admit Date: 3/9/2020      PT Recommendation and Plan    Visit Dx:    ICD-10-CM ICD-9-CM   1. Closed left hip fracture, initial encounter (CMS/HCC) S72.002A 820.8   2. Compression fracture of thoracic vertebra, initial encounter, unspecified thoracic vertebral level (CMS/HCC) S22.000A 805.2   3. Osteoarthritis of left hip M16.12 715.95   4. Decreased activities of daily living (ADL) Z78.9 V49.89   5. Irritable bowel syndrome with both constipation and diarrhea K58.2 564.1   6. 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    Putting on and taking off regular lower body clothing?  2  -CH  --     Bathing (including washing, rinsing, and drying)  3  -CH  --     Toileting (which includes using toilet bed pan or urinal)  3  -CH  --     Putting on and taking off regular upper body clothing  3  -CH  --     Taking care of personal grooming (such as brushing teeth)  4  -CH  --     Eating meals  4  -CH  --     AM-PAC 6 Clicks Score (OT)  19  -CH  --        Functional Assessment    Outcome Measure Options  AM-PAC 6 Clicks Daily Activity (OT)  -CH  AM-PAC 6 Clicks Daily Activity (OT)  -CH       User Key  (r) = Recorded By, (t) = Taken By, (c) = Cosigned By    Initials Name Provider Type     Bety Castaneda, OTR/L Occupational Therapist          PT Charges     Row Name 20 1134             Time Calculation    Start Time  1107  -KJ      Stop Time  1135  -KJ      Time Calculation (min)  28 min  -KJ      PT Received On  20  -KJ      PT Goal Re-Cert Due Date  20  -KJ         Time Calculation- PT    Total Timed Code Minutes- PT  28 minute(s)  -KJ        User Key   (r) = Recorded By, (t) = Taken By, (c) = Cosigned By    Initials Name Provider Type    Silvia Moreira, PTA Physical Therapy Assistant          Rehab Goal Summary     Row Name 03/13/20 1505 03/13/20 1400          Bed Mobility Goal 1 (PT)    Activity/Assistive Device (Bed Mobility Goal 1, PT)  rolling to right;sidelying to sit  -  --     [place] Level/Cues Needed (Bed Mobility Goal 1, PT)  minimum assist (75% or more patient effort);supervision required  -  --     Time Frame (Bed Mobility Goal 1, PT)  long term goal (LTG);by discharge  -  --     Progress/Outcomes (Bed Mobility Goal 1, PT)  goal met  -  --        Transfer Goal 1 (PT)    Activity/Assistive Device (Transfer Goal 1, PT)  sit-to-stand/stand-to-sit;bed-to-chair/chair-to-bed;walker, rolling  -  --     [place] Level/Cues Needed (Transfer Goal 1, PT)  minimum assist (75% or more patient effort);supervision required  -  --     Time Frame (Transfer Goal 1, PT)  long term goal (LTG);by discharge  -  --     Progress/Outcome (Transfer Goal 1, PT)  goal met  -  --        Gait Training Goal 1 (PT)    Activity/Assistive Device (Gait Training Goal 1, PT)  gait (walking locomotion);increase endurance/gait distance;increase energy conservation;walker, rolling  -  --     [place] Level (Gait Training Goal 1, PT)  supervision required;minimum assist (75% or more patient effort)  -  --     Distance (Gait Goal 1, PT)  50 ft  -  --     Time Frame (Gait Training Goal 1, PT)  long term goal (LTG);by discharge  -  --     Progress/Outcome (Gait Training Goal 1, PT)  goal not met  -  --        ROM Goal 1 (PT)    Time Frame (ROM Goal 1, PT)  long term goal (LTG);by discharge  -  --     Progress/Outcome (ROM Goal 1, PT)  goal not met  -  --        Stairs Goal 1 (PT)    Activity/Assistive Device (Stairs Goal 1, PT)  ascending stairs;descending stairs;using handrail, left  -  --     [place] Level/Cues Needed (Stairs Goal 1, PT)   minimum assist (75% or more patient effort)  -  --     Time Frame (Stairs Goal 1, PT)  long term goal (LTG);by discharge  -  --     Progress/Outcome (Stairs Goal 1, PT)  goal not met  -  --        Patient Education Goal (PT)    Activity (Patient Education Goal, PT)  Pt educated on safely transferring sit to stand, stand to sit, supine to sit, sit to supine while maintaining precautions.  Ambulating while maintaining anterior hip precautions.   -  --     [place]/Cues/Accuracy (Memory Goal 2, PT)  demonstrates adequately  -  --     Time Frame (Patient Education Goal, PT)  long term goal (LTG);by discharge  -  --     Barriers (Patient Education Goal, PT)  Cognition  -  --     Progress/Outcome (Patient Education Goal, PT)  goal met  -  --        Transfer Goal 1 (OT)    Activity/Assistive Device (Transfer Goal 1, OT)  --  sit-to-stand/stand-to-sit;bed-to-chair/chair-to-bed;commode, 3-in-1;walker, rolling  -AC     [place] Level/Cues Needed (Transfer Goal 1, OT)  --  moderate assist (50-74% patient effort);verbal cues required  -AC     Time Frame (Transfer Goal 1, OT)  --  long term goal (LTG);by discharge  -AC     Progress/Outcome (Transfer Goal 1, OT)  --  goal not met  -AC        Toileting Goal 1 (OT)    Activity/Device (Toileting Goal 1, OT)  --  toileting skills, all;adjust/manage clothing;perform perineal hygiene;commode, 3-in-1  -AC     [place] Level/Cues Needed (Toileting Goal 1, OT)  --  maximum assist (25-49% patient effort);1 person assist  -AC     Time Frame (Toileting Goal 1, OT)  --  long term goal (LTG);by discharge  -AC     Progress/Outcome (Toileting Goal 1, OT)  --  goal not met  -AC       User Key  (r) = Recorded By, (t) = Taken By, (c) = Cosigned By    Initials Name Provider Type Discipline    AC Jori Moreno, OTR/L, CNT Occupational Therapist OT     Celia Barboza, PTA Physical Therapy Assistant PT              PT Discharge Summary  Reason for Discharge: Discharge  from facility  Outcomes Achieved: Refer to plan of care for updates on goals achieved  Discharge Destination: SNF      Celia Barboza, PTA   3/13/2020

## 2020-03-13 NOTE — THERAPY TREATMENT NOTE
Acute Care - Physical Therapy Treatment Note  [facility]     Patient Name: Kamryn Oliva  : 1970  MRN: [id number]  Today's Date: 3/13/2020  Onset of Illness/Injury or Date of Surgery: 20     Referring Physician: Dr. Farley    Admit Date: 3/9/2020    Visit Dx:    ICD-10-CM ICD-9-CM   1. Closed left hip fracture, initial encounter (CMS/HCC) S72.002A 820.8   2. Compression fracture of thoracic vertebra, initial encounter, unspecified thoracic vertebral level (CMS/[facility]) S22.000A 805.2   3. Osteoarthritis of left hip M16.12 715.95   4. Decreased activities of daily living (ADL) Z78.9 V49.89   5. Irritable bowel syndrome with both constipation and diarrhea K58.2 564.1     Patient Active Problem List   Diagnosis   • Acute UTI (urinary tract infection)   • KRISHNA (acute kidney injury) (CMS/[facility])   • HTN (hypertension)   • Irritable bowel syndrome with both constipation and diarrhea   • Maravilla maravilla disease   • Anemia   • Abdominal pain   • Constipation   • Gastroesophageal reflux disease   • Hypokalemia   • Gastroparesis   • Abnormal CT of the abdomen   • Severe protein-calorie malnutrition (CMS/[facility])   • Anxiety and depression   • Seizure disorder (CMS/[facility])   • Mixed hyperlipidemia   • Failure to thrive in adult   • Anorexia nervosa   • Chronic back pain   • History of kidney stones   • Lumbar facet arthropathy   • Other dysphagia   • Status post abdominal hysterectomy   • Closed displaced fracture of left femoral neck (CMS/[facility])   • Closed wedge compression fracture of T11 vertebra (CMS/[facility])       Therapy Treatment    Rehabilitation Treatment Summary     Row Name 20 1107 20 0918 20 0912       Treatment Time/Intention    Discipline  physical therapy assistant  -KJ  --  occupational therapist  -CH    Document Type  therapy note (daily note)  -KJ2  --  -CH  therapy note (daily note)  -CH2    Subjective Information  no complaints  -KJ2  --  --    Mode of Treatment  physical therapy  -KJ2  --  -CH   occupational therapy  -[facility]    Patient/Family Observations  son present  -KJ2  --  --    Patient Effort  good  -KJ2  --  --    Existing Precautions/Restrictions  fall;brace worn when out of bed;spinal;hip, anterior;left  -KJ2  --  fall;brace worn when out of bed;left;hip, anterior;spinal  -CH    Recorded by [KJ] Silvia Pierce, PTA 03/13/20 1112  [KJ2] Silvia Pierce, PTA 03/13/20 1134 [CH] Bety Castaneda, OTR/L 03/13/20 1048 [CH] Bety Castaneda, OTR/L 03/13/20 1058  [CH2] Bety Castaneda OTR/L 03/13/20 1048    Row Name 03/13/20 1107 03/13/20 0912          Bed Mobility Assessment/Treatment    Bed Mobility Assessment/Treatment  --  rolling left;sidelying-sit;sit-sidelying  -     Rolling Left [place] (Bed Mobility)  --  contact guard;verbal cues  -     Rolling Right [place] (Bed Mobility)  --  contact guard;verbal cues  -     Sidelying-Sit [place] (Bed Mobility)  verbal cues;minimum assist (75% patient effort)  -KJ  minimum assist (75% patient effort);verbal cues  -     Sit-Sidelying [place] (Bed Mobility)  --  minimum assist (75% patient effort);verbal cues  -CH     Recorded by [KJ] Silvia Pierce, PTA 03/13/20 1134 [CH] Bety Castaneda OTR/L 03/13/20 1058     Row Name 03/13/20 0912             Functional Mobility    Functional Mobility- Ind. Level  contact guard assist;verbal cues required  -      Functional Mobility- Device  rolling walker  -      Functional Mobility- Safety Issues  balance decreased during turns  -CH      Recorded by [CH] Bety Castaneda OTR/L 03/13/20 1058      Row Name 03/13/20 0912             Transfer Assessment/Treatment    Transfer Assessment/Treatment  sit-stand transfer;stand-sit transfer  -CH      Recorded by [CH] Bety Castaneda OTR/L 03/13/20 1058      Row Name 03/13/20 1107 03/13/20 0912          Sit-Stand Transfer    Sit-Stand [place] (Transfers)  verbal cues;contact guard  -KJ  verbal cues;minimum assist (75% patient effort)  -     Assistive  Device (Sit-Stand Transfers)  walker, front-wheeled  -KJ  walker, front-wheeled  -CH     Recorded by [KJ] Silvia Pierce, PTA 03/13/20 1134 [CH] Bety Castaneda OTR/L 03/13/20 1058     Row Name 03/13/20 1107 03/13/20 0912          Stand-Sit Transfer    Stand-Sit [place] (Transfers)  verbal cues;contact guard  -KJ  minimum assist (75% patient effort);verbal cues  -CH     Assistive Device (Stand-Sit Transfers)  walker, front-wheeled  -KJ  walker, front-wheeled  -CH     Recorded by [KJ] Silvia Pierce, PTA 03/13/20 1134 [CH] Bety Castaneda OTR/L 03/13/20 1058     Row Name 03/13/20 1107             Gait/Stairs Assessment/Training    Gait/Stairs Assessment/Training  gait/ambulation independence  -KJ      [place] Level (Gait)  verbal cues;contact guard  -KJ      Assistive Device (Gait)  walker, front-wheeled  -KJ      Distance in Feet (Gait)  25' x 2  -KJ      Pattern (Gait)  step-to  -KJ      Deviations/Abnormal Patterns (Gait)  antalgic;gait speed decreased;stride length decreased  -KJ      Bilateral Gait Deviations  forward flexed posture  -KJ      Recorded by [KJ] Silvia Pierce, PTA 03/13/20 1134      Row Name 03/13/20 0912             ADL Assessment/Intervention    BADL Assessment/Intervention  bathing;upper body dressing  -CH      Recorded by [CH] Bety Castaneda OTR/L 03/13/20 1058      Row Name 03/13/20 0912             Bathing Assessment/Intervention    Bathing [place] Level  set up;upper body;lower body  -CH      Bathing Position  unsupported sitting;supported standing  -CH      Recorded by [CH] Bety Castaneda OTR/L 03/13/20 1058      Row Name 03/13/20 0912             Upper Body Dressing Assessment/Training    Upper Body Dressing [place] Level  minimum assist (75% patient effort);don  -CH      Upper Body Dressing Position  edge of bed sitting  -CH      Comment (Upper Body Dressing)  [facility]  -CH2      Recorded by [CH] Bety Castaneda OTR/L 03/13/20 1058  [CH2] Bety Castaneda  WALE PEREZR/L 03/13/20 1100      Row Name 03/13/20 0912             BADL Safety/Performance    Impairments, BADL Safety/Performance  balance;endurance/activity tolerance;strength;pain  -CH      Recorded by [CH] Bety Castaneda OTR/L 03/13/20 1100      Row Name 03/13/20 1107 03/13/20 0912          Positioning and Restraints    Pre-Treatment Position  in bed  -KJ  in bed  -CH     Post Treatment Position  chair  -KJ  bed  -CH     In Bed  call light within reach  -KJ  fowlers;call light within reach;encouraged to call for assist;with family/caregiver;side rails up x2  -CH     Recorded by [KJ] Silvia Pierce, PTA 03/13/20 1134 [CH] Bety Castaneda OTR/L 03/13/20 1100     Row Name 03/13/20 0912             Pain Assessment    Additional Documentation  Pain Scale: Numbers Pre/Post-Treatment (Group)  -CH      Recorded by [CH] Bety Castaneda OTR/L 03/13/20 1100      Row Name 03/13/20 1107 03/13/20 0912          Pain Scale: Numbers Pre/Post-Treatment    Pain Scale: Numbers, Pretreatment  5/10  -KJ  5/10  -CH     Pain Scale: Numbers, Post-Treatment  6/10  -KJ  6/10  -CH     Pain Location - Side  Left  -KJ  --     Pain Location  hip  -KJ  --     Recorded by [KJ] Silvia Pierce, PTA 03/13/20 1134 [CH] Bety Castaneda OTR/L 03/13/20 1100     Row Name                Wound 03/11/20 1254 Left anterior greater trochanter Incision    Wound - Properties Group Date first assessed: 03/11/20 [SH] Time first assessed: 1254 [SH] Side: Left [SH] Orientation: anterior [SH] Location: greater trochanter [SH] Primary Wound Type: Incision [SH] Additional Comments: adaptic 4x4 tape [] Recorded by:  [] Lucille Vallecillo RN 03/11/20 1354    Row Name 03/13/20 0912             Plan of Care Review    Plan of Care Reviewed With  patient  -CH      Recorded by [CH] Bety Castaneda OTR/L 03/13/20 1100      Row Name 03/13/20 0912             Outcome Summary/Treatment Plan (OT)    Daily Summary of Progress (OT)  progress toward functional goals is good  -CH       Barriers to Overall Progress (OT)  decline in ADLs  -      Anticipated Discharge Disposition (OT)  skilled nursing facility;inpatient rehabilitation facility  -      Recorded by [CH] Bety Castaneda OTR/L 03/13/20 1100        User Key  (r) = Recorded By, (t) = Taken By, (c) = Cosigned By    Initials Name Effective Dates Discipline     Bety Castaneda OTR/L 08/02/16 -  OT    Silvia Moreira, MARION 08/02/16 -  PT    Lucille Watson RN 08/02/16 -  Nurse          Wound 03/11/20 1254 Left anterior greater trochanter Incision (Active)   Dressing Appearance dry;intact;no drainage 3/12/2020  9:04 PM   Dressing Care, Wound gauze 3/12/2020  9:04 PM               PT Recommendation and Plan     Plan of Care Reviewed With: patient  Progress: improving  Outcome Summary: PT tx completed. Pt supine in bed with no c/o. Cues for log rolling, [place] sidelying to sit, CG/Joesph sit<>stand, amb 25' x 2 with r wx CG. Cues for gait mechanics. Pt internally rotates LLE. Safety cues needed for spine precautions.Benefit from SNF vs  for cont'd PT.  Outcome Measures     Row Name 03/13/20 1100 03/12/20 1000          How much help from another is currently needed...    Putting on and taking off regular lower body clothing?  2  -CH  --     Bathing (including washing, rinsing, and drying)  3  -CH  --     Toileting (which includes using toilet bed pan or urinal)  3  -CH  --     Putting on and taking off regular upper body clothing  3  -CH  --     Taking care of personal grooming (such as brushing teeth)  4  -CH  --     Eating meals  4  -CH  --     AM-PAC 6 Clicks Score (OT)  19  -CH  --        Functional Assessment    Outcome Measure Options  AM-PAC 6 Clicks Daily Activity (OT)  -CH  AM-PAC 6 Clicks Daily Activity (OT)  -       User Key  (r) = Recorded By, (t) = Taken By, (c) = Cosigned By    Initials Name Provider Type     Bety Castnaeda OTR/L Occupational Therapist         Time Calculation:   PT Charges     Row Name 03/13/20 0017              Time Calculation    Start Time  1107  -KJ      Stop Time  1135  -KJ      Time Calculation (min)  28 min  -KJ      PT Received On  03/13/20  -KJ      PT Goal Re-Cert Due Date  03/22/20  -KJ         Time Calculation- PT    Total Timed Code Minutes- PT  28 minute(s)  -KJ        User Key  (r) = Recorded By, (t) = Taken By, (c) = Cosigned By    Initials Name Provider Type    Silvia Moreira PTA Physical Therapy Assistant        Therapy Charges for Today     Code Description Service Date Service Provider Modifiers Qty    36420301701 HC PT THERAPEUTIC ACT EA 15 MIN 3/12/2020 Silvia Pierce PTA GP 2    [id number] HC GAIT TRAINING EA 15 MIN 3/13/2020 Silvia Pierce PTA GP 1          PT G-Codes  Outcome Measure Options: AM-PAC 6 Clicks Daily Activity (OT)  AM-PAC 6 Clicks Score (PT): 13  AM-PAC 6 Clicks Score (OT): 19    Silvia Pierce PTA  3/13/2020

## 2020-03-13 NOTE — THERAPY TREATMENT NOTE
Acute Care - Occupational Therapy Treatment Note  [facility]     Patient Name: Kamryn Oliva  : 1970  MRN: [id number]  Today's Date: 3/13/2020  Onset of Illness/Injury or Date of Surgery: 20  Date of Referral to OT: 20  Referring Physician: Dr. Farley    Admit Date: 3/9/2020       ICD-10-CM ICD-9-CM   1. Closed left hip fracture, initial encounter (CMS/[facility]) S72.002A 820.8   2. Compression fracture of thoracic vertebra, initial encounter, unspecified thoracic vertebral level (CMS/[facility]) S22.000A 805.2   3. Osteoarthritis of left hip M16.12 715.95   4. Decreased activities of daily living (ADL) Z78.9 V49.89   5. Irritable bowel syndrome with both constipation and diarrhea K58.2 564.1     Patient Active Problem List   Diagnosis   • Acute UTI (urinary tract infection)   • KRISHNA (acute kidney injury) (CMS/[facility])   • HTN (hypertension)   • Irritable bowel syndrome with both constipation and diarrhea   • Maravilla maravilla disease   • Anemia   • Abdominal pain   • Constipation   • Gastroesophageal reflux disease   • Hypokalemia   • Gastroparesis   • Abnormal CT of the abdomen   • Severe protein-calorie malnutrition (CMS/[facility])   • Anxiety and depression   • Seizure disorder (CMS/[facility])   • Mixed hyperlipidemia   • Failure to thrive in adult   • Anorexia nervosa   • Chronic back pain   • History of kidney stones   • Lumbar facet arthropathy   • Other dysphagia   • Status post abdominal hysterectomy   • Closed displaced fracture of left femoral neck (CMS/[facility])   • Closed wedge compression fracture of T11 vertebra (CMS/[facility])     Past Medical History:   Diagnosis Date   • Acute kidney failure (CMS/[facility])    • Anxiety    • Bowel obstruction (CMS/[facility])    • Constipation    • Depression    • GERD (gastroesophageal reflux disease)    • H/O gastric ulcer    • Headache    • History of transfusion    • Hypertension    • IBS (irritable bowel syndrome)    • Insomnia    • Kidney stone    • Maravilla maravilla disease    • Pseudocholinesterase  deficiency    • Rapid weight loss    • SBO (small bowel obstruction) (CMS/HCC)    • Stroke (CMS/HCC)     X 2   • UTI (urinary tract infection)     CHRONIC, SEPTIC X2   • Volvulosis      Past Surgical History:   Procedure Laterality Date   • APPENDECTOMY      COMPLICATION OF SEPTIC   • AUGMENTATION MAMMAPLASTY     • BRAIN SURGERY      x2   • BREAST AUGMENTATION BILATERAL MASTOPEXY     • BREAST LUMPECTOMY Left 3/9/2017    Procedure: EXCISION LEFT BREAST LESION AND LEFT AXILLARY LYMPH NODE BIOPSY;  Surgeon: Evi Farley MD;  Location: [facility] OR;  Service:    • BREAST SURGERY     •  SECTION     • CHOLECYSTECTOMY     • CHOLECYSTECTOMY WITH INTRAOPERATIVE CHOLANGIOGRAM N/A 2018    Procedure: CHOLECYSTECTOMY LAPAROSCOPIC INTRAOPERATIVE CHOLANGIOGRAM;  Surgeon: Antonio Salvador MD;  Location: [facility] OR;  Service: General   • COLON SURGERY     • COLONOSCOPY  2007    normal   • COLONOSCOPY N/A 11/10/2016    Procedure: COLONOSCOPY WITH ANESTHESIA;  Surgeon: Camilo Hanson MD;  Location: [facility] ENDOSCOPY;  Service:    • COLONOSCOPY N/A 2018    Procedure: COLONOSCOPY WITH ANESTHESIA;  Surgeon: Camilo Hanson MD;  Location: [facility] ENDOSCOPY;  Service:    • ENDOSCOPY  2009    antral ulcer   • ENDOSCOPY N/A 10/10/2016    Procedure: ESOPHAGOGASTRODUODENOSCOPY WITH ANESTHESIA;  Surgeon: Camilo Hanson MD;  Location: [facility] ENDOSCOPY;  Service:    • ENDOSCOPY N/A 2017    Procedure: ESOPHAGOGASTRODUODENOSCOPY;  Surgeon: Camilo Hanson MD;  Location: [facility] ENDOSCOPY;  Service:    • ENDOSCOPY N/A 2017    Procedure: ESOPHAGOGASTRODUODENOSCOPY WITH ANESTHESIA;  Surgeon: Camilo Hanson MD;  Location: [facility] ENDOSCOPY;  Service:    • ENDOSCOPY N/A 2018    Procedure: ESOPHAGOGASTRODUODENOSCOPY ;  Surgeon: Camilo Hanson MD;  Location: [facility] ENDOSCOPY;  Service:    • ENDOSCOPY N/A 2019    Procedure: ESOPHAGOGASTRODUODENOSCOPY WITH ANESTHESIA;  Surgeon: Katarina Davis MD;   Location: [facility] ENDOSCOPY;  Service: Gastroenterology   • HERNIA REPAIR     • HYSTERECTOMY     • SMALL INTESTINE SURGERY N/A 03/2016   • TONSILLECTOMY     • TOTAL HIP ARTHROPLASTY Left 3/11/2020    Procedure: TOTAL HIP REPLACEMENT;  Surgeon: Jorge Farley MD;  Location: [facility] OR;  Service: Orthopedics;  Laterality: Left;       Therapy Treatment    Rehabilitation Treatment Summary     Row Name 03/13/20 0918 03/13/20 0912          Treatment Time/Intention    Discipline  --  occupational therapist  -CH     Document Type  --  -CH  therapy note (daily note)  -CH2     Mode of Treatment  --  -CH  occupational therapy  -CH2     Existing Precautions/Restrictions  --  fall;brace worn when out of bed;left;hip, anterior;spinal  -CH     Recorded by [CH] Bety Castaneda OTR/L 03/13/20 1048 [CH] Bety Castaneda OTR/L 03/13/20 1058  [CH2] Bety Castaneda OTR/L 03/13/20 1048     Row Name 03/13/20 0912             Bed Mobility Assessment/Treatment    Bed Mobility Assessment/Treatment  rolling left;sidelying-sit;sit-sidelying  -CH      Rolling Left Thurman (Bed Mobility)  contact guard;verbal cues  -CH      Rolling Right Thurman (Bed Mobility)  contact guard;verbal cues  -CH      Sidelying-Sit Thurman (Bed Mobility)  minimum assist (75% patient effort);verbal cues  -CH      Sit-Sidelying Thurman (Bed Mobility)  minimum assist (75% patient effort);verbal cues  -CH      Recorded by [CH] Bety Castaneda OTR/L 03/13/20 1058      Row Name 03/13/20 0912             Functional Mobility    Functional Mobility- Ind. Level  contact guard assist;verbal cues required  -CH      Functional Mobility- Device  rolling walker  -CH      Functional Mobility- Safety Issues  balance decreased during turns  -CH      Recorded by [CH] Bety Castaneda OTR/L 03/13/20 1058      Row Name 03/13/20 0912             Transfer Assessment/Treatment    Transfer Assessment/Treatment  sit-stand transfer;stand-sit transfer  -CH      Recorded by  [CH] Bety Castaneda OTR/L 03/13/20 1058      Row Name 03/13/20 0912             Sit-Stand Transfer    Sit-Stand [place] (Transfers)  verbal cues;minimum assist (75% patient effort)  -CH      Assistive Device (Sit-Stand Transfers)  walker, front-wheeled  -CH      Recorded by [CH] Bety Castaneda OTR/L 03/13/20 1058      Row Name 03/13/20 0912             Stand-Sit Transfer    Stand-Sit [place] (Transfers)  minimum assist (75% patient effort);verbal cues  -CH      Assistive Device (Stand-Sit Transfers)  walker, front-wheeled  -CH      Recorded by [CH] Bety Castaneda OTR/L 03/13/20 1058      Row Name 03/13/20 0912             ADL Assessment/Intervention    BADL Assessment/Intervention  bathing;upper body dressing  -CH      Recorded by [CH] Bety Castaneda OTR/L 03/13/20 1058      Row Name 03/13/20 0912             Bathing Assessment/Intervention    Bathing [place] Level  set up;upper body;lower body  -CH      Bathing Position  unsupported sitting;supported standing  -CH      Recorded by [CH] Bety Castaneda OTR/L 03/13/20 1058      Row Name 03/13/20 0912             Upper Body Dressing Assessment/Training    Upper Body Dressing [place] Level  minimum assist (75% patient effort);don  -CH      Upper Body Dressing Position  edge of bed sitting  -CH      Comment (Upper Body Dressing)  [facility]  -CH2      Recorded by [CH] Bety Castaneda OTR/L 03/13/20 1058  [CH2] Bety Castaneda OTR/L 03/13/20 1100      Row Name 03/13/20 0912             BADL Safety/Performance    Impairments, BADL Safety/Performance  balance;endurance/activity tolerance;strength;pain  -CH      Recorded by [CH] Bety Castaneda OTR/L 03/13/20 1100      Row Name 03/13/20 0912             Positioning and Restraints    Pre-Treatment Position  in bed  -CH      Post Treatment Position  bed  -CH      In Bed  fowlers;call light within reach;encouraged to call for assist;with family/caregiver;side rails up x2  -CH      Recorded by  [CH] Bety Castaneda OTR/L 03/13/20 1100      Row Name 03/13/20 0912             Pain Assessment    Additional Documentation  Pain Scale: Numbers Pre/Post-Treatment (Group)  -CH      Recorded by [] Bety Castaneda OTR/L 03/13/20 1100      Row Name 03/13/20 0912             Pain Scale: Numbers Pre/Post-Treatment    Pain Scale: Numbers, Pretreatment  5/10  -CH      Pain Scale: Numbers, Post-Treatment  6/10  -CH      Recorded by [] Bety Castaneda OTR/L 03/13/20 1100      Row Name                Wound 03/11/20 1254 Left anterior greater trochanter Incision    Wound - Properties Group Date first assessed: 03/11/20 [] Time first assessed: 1254 [] Side: Left [] Orientation: anterior [SH] Location: greater trochanter [] Primary Wound Type: Incision [] Additional Comments: adaptic 4x4 tape [] Recorded by:  [] Lucille Vallecillo RN 03/11/20 1354    Row Name 03/13/20 0912             Plan of Care Review    Plan of Care Reviewed With  patient  -CH      Recorded by [] Bety Castaneda OTR/L 03/13/20 1100      Row Name 03/13/20 0912             Outcome Summary/Treatment Plan (OT)    Daily Summary of Progress (OT)  progress toward functional goals is good  -      Barriers to Overall Progress (OT)  decline in ADLs  -      Anticipated Discharge Disposition (OT)  skilled nursing facility;inpatient rehabilitation facility  -      Recorded by [] Bety Castaneda OTR/L 03/13/20 1100        User Key  (r) = Recorded By, (t) = Taken By, (c) = Cosigned By    Initials Name Effective Dates Discipline    CH Bety Castaneda OTR/L 08/02/16 -  OT    SH Lucille Vallecillo RN 08/02/16 -  Nurse        Wound 03/11/20 1254 Left anterior greater trochanter Incision (Active)   Dressing Appearance dry;intact;no drainage 3/12/2020  9:04 PM   Dressing Care, Wound gauze 3/12/2020  9:04 PM           OT Recommendation and Plan  Outcome Summary/Treatment Plan (OT)  Daily Summary of Progress (OT): progress toward functional goals is  good  Barriers to Overall Progress (OT): decline in ADLs  Anticipated Equipment Needs at Discharge (OT): front wheeled walker, bedside commode  Anticipated Discharge Disposition (OT): skilled nursing facility, inpatient rehabilitation facility  Planned Therapy Interventions (OT Eval): activity tolerance training, adaptive equipment training, BADL retraining, functional balance retraining, occupation/activity based interventions, patient/caregiver education/training, ROM/therapeutic exercise, strengthening exercise, transfer/mobility retraining, orthotic fabrication/fitting/training  Therapy Frequency (OT Eval): 5 times/wk  Daily Summary of Progress (OT): progress toward functional goals is good  Plan of Care Review  Plan of Care Reviewed With: patient  Plan of Care Reviewed With: patient  Outcome Measures     Row Name 03/13/20 1100 03/12/20 1000          How much help from another is currently needed...    Putting on and taking off regular lower body clothing?  2  -CH  --     Bathing (including washing, rinsing, and drying)  3  -CH  --     Toileting (which includes using toilet bed pan or urinal)  3  -CH  --     Putting on and taking off regular upper body clothing  3  -CH  --     Taking care of personal grooming (such as brushing teeth)  4  -CH  --     Eating meals  4  -CH  --     AM-PAC 6 Clicks Score (OT)  19  -CH  --        Functional Assessment    Outcome Measure Options  AM-PAC 6 Clicks Daily Activity (OT)  -  AM-PAC 6 Clicks Daily Activity (OT)  -       User Key  (r) = Recorded By, (t) = Taken By, (c) = Cosigned By    Initials Name Provider Type    Bety Gorman, OTR/L Occupational Therapist           Time Calculation:   Time Calculation- OT     Row Name 03/13/20 0918             Time Calculation- OT    OT Start Time  0912  -      OT Stop Time  0953  -      OT Time Calculation (min)  41 min  -      OT Received On  03/13/20  -      OT Goal Re-Cert Due Date  03/22/20  -        User Key  (r) =  Recorded By, (t) = Taken By, (c) = Cosigned By    Initials Name Provider Type     Bety Castaneda, OTR/L Occupational Therapist        Therapy Charges for Today     Code Description Service Date Service Provider Modifiers Qty    91153154840 HC OT EVAL HIGH COMPLEXITY 4 3/12/2020 Bety Castaneda OTR/L GO 1    90103813052 HC OT EVAL MOD COMPLEXITY 3 3/13/2020 Bety Castaneda OTR/L GO 1               Bety Castaneda OTR/MIMI  3/13/2020

## 2020-03-13 NOTE — PLAN OF CARE
Problem: Patient Care Overview  Goal: Plan of Care Review  Flowsheets (Taken 3/13/2020 0758)  Progress: improving  Plan of Care Reviewed With: patient  Outcome Summary: PT tx completed. Pt supine in bed with no c/o. Cues for log rolling, Joesph sidelying to sit, CG/Joesph sit<>stand, amb 25' x 2 with r wx CG. Cues for gait mechanics. Pt internally rotates LLE. Safety cues needed for spine precautions.Benefit from SNF vs HH for cont'd PT.

## 2020-03-13 NOTE — PLAN OF CARE
Problem: Patient Care Overview  Goal: Plan of Care Review  Outcome: Ongoing (interventions implemented as appropriate)  Flowsheets (Taken 3/13/2020 0629)  Progress: no change  Plan of Care Reviewed With: patient  Outcome Summary: Pt has rated pain 5-6/10 when awake.  Left hip drsg CDI.  Refuses most turns, but pt is shifting weight independently well in bed.  Heels pink but blanchable.  Bed alarm on, pt will forget and try to get OOB by herself.  Otherwise A&O.  No peripheral vascular changes.  Pt has not had BM since 3/7.  Has so far been refusing laxatives.  Right side bowel sounds hypoactive.

## 2020-03-13 NOTE — PLAN OF CARE
Problem: Patient Care Overview  Goal: Plan of Care Review  Flowsheets  Taken 3/13/2020 0629 by Pam Babroza, RN  Progress: no change  Taken 3/13/2020 0912 by Bety Castaneda, OTR/L  Plan of Care Reviewed With: patient  Note:   OT tx completed. Pt. Assisted to bathroom for TB sponge bath & toileting.  Please see flowsheet for independence ratings.  Ms. Oliva would benefit from cont'd OT tx to improve her indep & fxl mob as she is medically complex from her hx.  Anticipate DC to SNF vs. Acute rehab.

## 2020-03-16 DIAGNOSIS — F41.9 ANXIETY AND DEPRESSION: ICD-10-CM

## 2020-03-16 DIAGNOSIS — F32.A ANXIETY AND DEPRESSION: ICD-10-CM

## 2020-03-16 DIAGNOSIS — I10 ESSENTIAL HYPERTENSION: Chronic | ICD-10-CM

## 2020-03-16 DIAGNOSIS — G40.909 SEIZURE DISORDER (HCC): ICD-10-CM

## 2020-03-16 RX ORDER — LEVETIRACETAM 500 MG/1
500 TABLET ORAL 2 TIMES DAILY
Qty: 180 TABLET | Refills: 1 | Status: SHIPPED | OUTPATIENT
Start: 2020-03-16

## 2020-03-16 RX ORDER — AMITRIPTYLINE HYDROCHLORIDE 100 MG/1
100 TABLET, FILM COATED ORAL NIGHTLY
Qty: 90 TABLET | Refills: 1 | Status: SHIPPED | OUTPATIENT
Start: 2020-03-16 | End: 2020-01-01

## 2020-03-16 RX ORDER — AMLODIPINE BESYLATE 10 MG/1
10 TABLET ORAL DAILY
Qty: 90 TABLET | Refills: 3 | Status: SHIPPED | OUTPATIENT
Start: 2020-03-16

## 2020-03-16 RX ORDER — METOPROLOL SUCCINATE 100 MG/1
100 TABLET, EXTENDED RELEASE ORAL NIGHTLY
Qty: 90 TABLET | Refills: 3 | Status: SHIPPED | OUTPATIENT
Start: 2020-03-16

## 2020-03-16 NOTE — TELEPHONE ENCOUNTER
Patient called, stated that she has just been discharged from recent hospital stay, total hip replacement, needing refills on all of her medications.  Also, stated that she needs refill on clonidine, didn't see that you had prescribed that one before.

## 2020-03-19 RX ORDER — VENLAFAXINE HYDROCHLORIDE 150 MG/1
150 CAPSULE, EXTENDED RELEASE ORAL DAILY
Qty: 30 CAPSULE | Refills: 5 | Status: SHIPPED | OUTPATIENT
Start: 2020-03-19 | End: 2020-01-01

## 2020-03-19 RX ORDER — BUSPIRONE HYDROCHLORIDE 7.5 MG/1
7.5 TABLET ORAL 3 TIMES DAILY
Qty: 90 TABLET | Refills: 5 | Status: SHIPPED | OUTPATIENT
Start: 2020-03-19 | End: 2020-01-01

## 2020-03-26 ENCOUNTER — TELEPHONE (OUTPATIENT)
Dept: INTERNAL MEDICINE | Facility: CLINIC | Age: 50
End: 2020-03-26

## 2020-03-26 DIAGNOSIS — R11.0 NAUSEA: Primary | ICD-10-CM

## 2020-03-26 NOTE — TELEPHONE ENCOUNTER
Patient requesting refill on phenergan. I didn't see where this was prescribed from our office.  Refill appropriate?

## 2020-03-27 RX ORDER — PROMETHAZINE HYDROCHLORIDE 25 MG/1
25 TABLET ORAL EVERY 6 HOURS PRN
Qty: 30 TABLET | Refills: 3 | Status: SHIPPED | OUTPATIENT
Start: 2020-03-27 | End: 2020-06-18 | Stop reason: SDUPTHER

## 2020-04-03 ENCOUNTER — TELEPHONE (OUTPATIENT)
Dept: INTERNAL MEDICINE | Facility: CLINIC | Age: 50
End: 2020-04-03

## 2020-04-03 DIAGNOSIS — R11.2 NAUSEA AND VOMITING, INTRACTABILITY OF VOMITING NOT SPECIFIED, UNSPECIFIED VOMITING TYPE: ICD-10-CM

## 2020-04-03 DIAGNOSIS — I10 ESSENTIAL HYPERTENSION: Primary | ICD-10-CM

## 2020-04-03 RX ORDER — ONDANSETRON 4 MG/1
4-8 TABLET, ORALLY DISINTEGRATING ORAL EVERY 8 HOURS PRN
Qty: 30 TABLET | Refills: 6 | Status: SHIPPED | OUTPATIENT
Start: 2020-04-03

## 2020-04-03 NOTE — TELEPHONE ENCOUNTER
Patient requesting refill on clonidine and zofran.  These medications have both been discontinued in chart, refills appropriate?

## 2020-04-06 RX ORDER — CLONIDINE HYDROCHLORIDE 0.1 MG/1
0.1 TABLET ORAL 2 TIMES DAILY
Qty: 60 TABLET | Refills: 2 | Status: SHIPPED | OUTPATIENT
Start: 2020-04-06 | End: 2020-06-18 | Stop reason: SDUPTHER

## 2020-04-06 NOTE — TELEPHONE ENCOUNTER
Contacted patient, stated that she had a total hip replacement last week, states that her BP has been more elevated than usual.

## 2020-04-06 NOTE — TELEPHONE ENCOUNTER
Would rather the pt be on lisinopril if she has not tried it before, less rebound high blood pressure. Please inform pt. May not need treatment if BP elevation is due to pain.

## 2020-04-06 NOTE — TELEPHONE ENCOUNTER
Returned call to patient, states that she tried lisinopril several years ago without much success.  She said that they still had to add an additional medication to help treat elevated BP.

## 2020-04-07 NOTE — TELEPHONE ENCOUNTER
PATIENT STATED THAT SHE HAS NOT TAKEN HER BP TODAY BUT FEELS LIKE IT IS ELEVATED.  PATIENT STATED SHE TOLD ALL THIS TO THE NURSE YESTERDAY.

## 2020-06-18 DIAGNOSIS — R11.0 NAUSEA: ICD-10-CM

## 2020-06-18 DIAGNOSIS — I10 ESSENTIAL HYPERTENSION: ICD-10-CM

## 2020-06-19 RX ORDER — PROMETHAZINE HYDROCHLORIDE 25 MG/1
25 TABLET ORAL EVERY 6 HOURS PRN
Qty: 30 TABLET | Refills: 3 | Status: SHIPPED | OUTPATIENT
Start: 2020-06-19

## 2020-06-19 RX ORDER — CLONIDINE HYDROCHLORIDE 0.1 MG/1
0.1 TABLET ORAL 2 TIMES DAILY
Qty: 60 TABLET | Refills: 2 | Status: SHIPPED | OUTPATIENT
Start: 2020-06-19

## 2021-07-08 ENCOUNTER — TELEPHONE (OUTPATIENT)
Dept: FAMILY MEDICINE CLINIC | Facility: CLINIC | Age: 51
End: 2021-07-08

## 2021-07-08 NOTE — TELEPHONE ENCOUNTER
[facility] Coroner, Quynh Moore contacted the office today to inform Dr. Jones that patient has passed away, on 7/4/21 in her home.  States that she found an extensive medication list in patient's wallet, however, only one prescription drug was found inside the home, Buspirone.  Also, stated that patients family on site did not know much about patient's treatment.  She is requesting to discuss with Dr. Jones, would like medication list/records faxed to [phone].

## 2021-07-20 NOTE — TELEPHONE ENCOUNTER
Spoke with , inform them that I have not seen the patient recently, and medications listed had not had adequate refills.

## 2024-01-19 NOTE — ED NOTES
PT REQUESTING ADDITIONAL PAIN MEDICATIONS.  MD MCKEON ADVISED, AND NEW ORDERS RECEIVED.  PT UNABLE TO RECEIVE NARCOTIC PAIN MEDICATIONS AT THIS TIME.      Virgilio Gil, RN  09/15/19 8541     Writer spoke with patient and scheduled reclast infusion for 1/31 at 1300. All questions or concerns answered.

## 2024-12-01 NOTE — PROGRESS NOTES
ED PROVIDER NOTE    Chief Complaint   Patient presents with    Fall     Pt stated she stood up, felt dizzy and fell over. Pt denies LOC or hitting head. Pt states she gets blood transfusions once a week but missed her appointment this week.    Abdominal Pain     Pt states abd pain yesterday and noticed tarry stool today       HPI:  12/1/24,   Time: 2:42 PM SAAD Barber is a 74 y.o. female presenting to the ED for near syncope.  Acute onset this morning.  Patient states that she has a history of GAVE, and has chronic intermittent GI bleeding.  Today she was on the toilet and had a larger than usual episode of melena and was unable to get off the toilet due to lightheadedness.  She did fall to the ground.  No head injury.  Not on anticoagulants.  She reports some recent intermittent abdominal pain and headache.  No abdominal pain currently.  She did have nausea and vomiting a couple of days ago.  No black or bloody emesis.  Denies associated fever, chills, cough, chest pain.  She does have shortness of breath.  Decreased appetite and p.o. intake.  Normal urine output.  No frequent NSAID use or steroid use.  Does not drink alcohol. Has a GI doctor at [facility].    Chart review: hx of WARNER, ISABEL, depression, DM2, HTN, chronic pain syndrome, migraine, AVM of colon, GAVE, hepatic cirrhosis, COPD    Reviewed outpatient primary care progress note from 10/8/2024 by Dr. Freddy Hu:  Dx GAVE, anemia of chronic disease, hepatic cirrhosis, depression, DM2    Review of Systems:     Review of Systems  Pertinent positives and negatives as stated in HPI     --------------------------------------------- PAST HISTORY ---------------------------------------------  Past Medical History:   Past Medical History:   Diagnosis Date    Arthritis     Cataract     right    Chronic fatigue syndrome     Depression     Diabetes mellitus (HCC)     SC injection weekly    Fibromyalgia     Hypertension     Memory loss     Mononucleosis      Patient seen and examined at bedside.  Patient is stable this morning.  Blood pressure improved.     Suspect troponin elevation due to hypotension last night and her colitis.  Low suspicion for cardiac event.    Per my read of EKG, and patient has ID interval shortening as well as upsloping of the QRS interval concerning for delta wave.  This is most likely an incidental finding but can be representative of Jude-Parkinson-White syndrome.  Low suspicion this is the cause of the syncope, but may be beneficial to establish with a cardiology.  Case discussed with cardiology APRN, consult placed.    Continue antibiotics.    time. [SS]    ABG shows compensated metabolic acidosis.  Patient did have hyperglycemia on initial labs with bicarb of 18 and normal anion gap.  Will repeat blood glucose, BMP, and obtained a hydroxybutyrate to determine if patient is in DKA and needs insulin drip.  Will also give 1 L normal saline bolus now. [SS]      ED Course User Index  [SS] Libra Montalvo MD         Consultations:             Internal medicine - Dr. Miller will admit the patient    Critical Care: Please note that the withdrawal or failure to initiate urgent interventions for this patient would likely result in a life threatening deterioration or permanent disability.      Accordingly this patient received 60 minutes of critical care time, excluding separately billable procedures.     Independent interpretation of tests:  CXR - no focal consolidation, pneumothorax, or pleural effusion     The patient presents with a new acute problem or complaint.        Counseling:   The emergency provider has spoken with the patient and discussed today’s results, in addition to providing specific details for the plan of care and counseling regarding the diagnosis and prognosis.  Questions are answered at this time and they are agreeable with the plan.    ED Course/Medical Decision Makin y.o. female here with abdominal pain, near syncope, melena.  On initial arrival patient is nontoxic-appearing, afebrile, hemodynamically stable.  Breathing comfortably on room air without respiratory distress.  Hemoglobin stable from prior baseline values, however initial workup was notable for leukocytosis of 22K, UTI, hyperglycemia.  Patient was treated with IV antibiotics.  CT imaging was obtained of the abdomen which showed no acute abnormality.  After returning from CT abdomen, patient was increasingly tachycardic, tachypneic, hypoxic with increasing oxygen requirement.  She was sent back to CT to rule out pulmonary embolism.  There does appear to be pneumonia on the

## (undated) DEVICE — THE CHANNEL CLEANING BRUSH IS A NYLON FLEXI BRUSH ATTACHED TO A FLEXIBLE PLASTIC SHEATH DESIGNED TO SAFELY REMOVE DEBRIS FROM FLEXIBLE ENDOSCOPES.

## (undated) DEVICE — FRCP BX RADJAW4 NDL 2.8 240 STD OG

## (undated) DEVICE — COVER,MAYO STAND,STERILE: Brand: MEDLINE

## (undated) DEVICE — ENDOPOUCH RETRIEVER SPECIMEN RETRIEVAL BAGS: Brand: ENDOPOUCH RETRIEVER

## (undated) DEVICE — PK HIP TOTL ANT 30

## (undated) DEVICE — KT DRN EVAC WND PVC PCH WTROC RND 10F400

## (undated) DEVICE — PAD GRND REM POLYHESIVE A/ DISP

## (undated) DEVICE — ENDOGATOR AUXILIARY WATER JET CONNECTOR: Brand: ENDOGATOR

## (undated) DEVICE — 3M™ STERI-STRIP™ REINFORCED ADHESIVE SKIN CLOSURES, R1548, 1 IN X 5 IN (25 MM X 125 MM), 4 STRIPS/ENVELOPE: Brand: 3M™ STERI-STRIP™

## (undated) DEVICE — GLV SURG DERMASSURE GRN LF PF 8.0

## (undated) DEVICE — SENSR O2 OXIMAX FNGR A/ 18IN NONSTR

## (undated) DEVICE — SUT VIC 4/0 P3 18IN UD VCP494H

## (undated) DEVICE — TRY PREP SCRB VAG PVP

## (undated) DEVICE — 2, DISPOSABLE SUCTION/IRRIGATOR WITHOUT DISPOSABLE TIP: Brand: STRYKEFLOW

## (undated) DEVICE — CONMED SCOPE SAVER BITE BLOCK, 20X27 MM: Brand: SCOPE SAVER

## (undated) DEVICE — Device: Brand: DEFENDO AIR/WATER/SUCTION AND BIOPSY VALVE

## (undated) DEVICE — ADHS LIQ MASTISOL 2/3ML

## (undated) DEVICE — SUT VIC 0 SUTUPAK TIES 18IN J906G

## (undated) DEVICE — ELECTRD BLD EDGE/INSUL1P 2.4X5.1MM STRL

## (undated) DEVICE — MONOPOLAR METZENBAUM SCISSOR, MINI BLADE TIP, DISPOSABLE: Brand: MONOPOLAR METZENBAUM SCISSOR, MINI BLADE TIP, DISPOSABLE

## (undated) DEVICE — PK TURNOVER RM ADV

## (undated) DEVICE — CLIP APPLIER: Brand: ENDO CLIP

## (undated) DEVICE — 3M™ STERI-STRIP™ REINFORCED ADHESIVE SKIN CLOSURES, R1547, 1/2 IN X 4 IN (12 MM X 100 MM), 6 STRIPS/ENVELOPE: Brand: 3M™ STERI-STRIP™

## (undated) DEVICE — STCKNT STRL 4X48 IN

## (undated) DEVICE — SPNG DISSCT SECTO KTTNER PK/5

## (undated) DEVICE — SPK10183 ORTHOPEDIC FRACTURE AND TRAUMA KIT: Brand: SPK10183 ORTHOPEDIC FRACTURE AND TRAUMA KIT

## (undated) DEVICE — GLV SURG TRIUMPH PF LTX 7.5 STRL

## (undated) DEVICE — GAUZE,SPONGE,FLUFF,6"X6.75",STRL,10/TRAY: Brand: MEDLINE

## (undated) DEVICE — SHORT LENS-STERILE

## (undated) DEVICE — DRSNG WND GZ CURAD OIL EMULSION 3X8IN STRL PK/3

## (undated) DEVICE — TBG SMPL FLTR LINE NASL 02/C02 A/ BX/100

## (undated) DEVICE — PENCL E/S PLUMEPEN 9.5MM 10FT LF

## (undated) DEVICE — YANKAUER,BULB TIP WITH VENT: Brand: ARGYLE

## (undated) DEVICE — Device: Brand: SOFT TRACTION BOOT LINERS, LARGE

## (undated) DEVICE — SUT GUT CHRM 2/0 CT1 36IN 923H

## (undated) DEVICE — 4-PORT MANIFOLD: Brand: NEPTUNE 2

## (undated) DEVICE — ST TB EXT STANDARDBORE 30IN

## (undated) DEVICE — CLTH CLENS READYCLEANSE PERI CARE PK/5

## (undated) DEVICE — PAD MINOR UNIVERSAL: Brand: MEDLINE INDUSTRIES, INC.

## (undated) DEVICE — SYR LUERLOK 50ML

## (undated) DEVICE — LAPAROSCOPIC MONOPOLAR CORD: Brand: VALLEYLAB

## (undated) DEVICE — CUFF,BP,DISP,1 TUBE,ADULT,HP: Brand: MEDLINE

## (undated) DEVICE — SPNG GZ WOVN 4X4IN 12PLY 10/BX STRL

## (undated) DEVICE — SUT VIC 3/0 RB1 27IN UD VCP215H

## (undated) DEVICE — TP SILK DURAPORE 3IN

## (undated) DEVICE — SUT VIC 0 UR6 27IN VCP603H

## (undated) DEVICE — DRSNG SURESITE WNDW 4X4.5

## (undated) DEVICE — PDS II VLT 0 107CM AG ST3: Brand: ENDOLOOP

## (undated) DEVICE — TOTAL TRAY, 16FR 10ML SIL FOLEY, URN: Brand: MEDLINE

## (undated) DEVICE — BIPOLAR SEALER 23-112-1 AQM 6.0: Brand: AQUAMANTYS™

## (undated) DEVICE — BNDG ELAS CO-FLEX SLF ADHR 4IN5YD LF STRL

## (undated) DEVICE — DUAL CUT SAGITTAL BLADE

## (undated) DEVICE — SUT ETHLN 3/0 PSLX 30IN 1683H

## (undated) DEVICE — GLV SURG BIOGEL LTX PF 6 1/2

## (undated) DEVICE — PAD LAP CHOLE: Brand: MEDLINE INDUSTRIES, INC.

## (undated) DEVICE — ANTIBACTERIAL UNDYED BRAIDED (POLYGLACTIN 910), SYNTHETIC ABSORBABLE SUTURE: Brand: COATED VICRYL

## (undated) DEVICE — TOWEL,OR,DSP,ST,BLUE,STD,4/PK,20PK/CS: Brand: MEDLINE

## (undated) DEVICE — 3M™ IOBAN™ 2 ANTIMICROBIAL INCISE DRAPE 6650EZ: Brand: IOBAN™ 2

## (undated) DEVICE — MSK O2 MD CONCENTR A/ LF 7FT 1P/U

## (undated) DEVICE — DRAPE,UTILITY,TAPE,15X26,STERILE: Brand: MEDLINE

## (undated) DEVICE — SNAR POLYP SENSATION MICRO OVL 13 240X40

## (undated) DEVICE — GLV SURG TRIUMPH MICRO PF LTX 7.5 STRL

## (undated) DEVICE — SUT ETHLN 2/0 FSLX 30IN 1674H

## (undated) DEVICE — ST CATH CHOLANG WKARLAN/BALN 2LUM 4F 1.25

## (undated) DEVICE — 3M™ STERI-STRIP™ REINFORCED ADHESIVE SKIN CLOSURES, R1546, 1/4 IN X 4 IN (6 MM X 100 MM), 10 STRIPS/ENVELOPE: Brand: 3M™ STERI-STRIP™

## (undated) DEVICE — SCINTILLANT SURGICAL LIGHT WITH SUCTION: Brand: SCINTILLANT